# Patient Record
Sex: MALE | Race: WHITE | NOT HISPANIC OR LATINO | URBAN - METROPOLITAN AREA
[De-identification: names, ages, dates, MRNs, and addresses within clinical notes are randomized per-mention and may not be internally consistent; named-entity substitution may affect disease eponyms.]

---

## 2019-07-02 ENCOUNTER — INPATIENT (INPATIENT)
Facility: HOSPITAL | Age: 84
LOS: 1 days | Discharge: HOME | DRG: 74 | End: 2019-07-04
Attending: INTERNAL MEDICINE | Admitting: INTERNAL MEDICINE
Payer: MEDICARE

## 2019-07-02 VITALS
HEART RATE: 68 BPM | TEMPERATURE: 99 F | SYSTOLIC BLOOD PRESSURE: 167 MMHG | OXYGEN SATURATION: 96 % | HEIGHT: 67 IN | DIASTOLIC BLOOD PRESSURE: 87 MMHG | WEIGHT: 160.06 LBS | RESPIRATION RATE: 17 BRPM

## 2019-07-02 DIAGNOSIS — I63.9 CEREBRAL INFARCTION, UNSPECIFIED: ICD-10-CM

## 2019-07-02 LAB
ALBUMIN SERPL ELPH-MCNC: 4.1 G/DL — SIGNIFICANT CHANGE UP (ref 3.3–5.2)
ALP SERPL-CCNC: 60 U/L — SIGNIFICANT CHANGE UP (ref 40–120)
ALT FLD-CCNC: 17 U/L — SIGNIFICANT CHANGE UP
ANION GAP SERPL CALC-SCNC: 10 MMOL/L — SIGNIFICANT CHANGE UP (ref 5–17)
APPEARANCE UR: CLEAR — SIGNIFICANT CHANGE UP
APTT BLD: 41.4 SEC — HIGH (ref 27.5–36.3)
AST SERPL-CCNC: 20 U/L — SIGNIFICANT CHANGE UP
BASOPHILS # BLD AUTO: 0.04 K/UL — SIGNIFICANT CHANGE UP (ref 0–0.2)
BASOPHILS NFR BLD AUTO: 0.7 % — SIGNIFICANT CHANGE UP (ref 0–2)
BILIRUB SERPL-MCNC: 0.4 MG/DL — SIGNIFICANT CHANGE UP (ref 0.4–2)
BILIRUB UR-MCNC: NEGATIVE — SIGNIFICANT CHANGE UP
BUN SERPL-MCNC: 18 MG/DL — SIGNIFICANT CHANGE UP (ref 8–20)
CALCIUM SERPL-MCNC: 9.3 MG/DL — SIGNIFICANT CHANGE UP (ref 8.6–10.2)
CHLORIDE SERPL-SCNC: 107 MMOL/L — SIGNIFICANT CHANGE UP (ref 98–107)
CO2 SERPL-SCNC: 23 MMOL/L — SIGNIFICANT CHANGE UP (ref 22–29)
COLOR SPEC: YELLOW — SIGNIFICANT CHANGE UP
CREAT SERPL-MCNC: 0.78 MG/DL — SIGNIFICANT CHANGE UP (ref 0.5–1.3)
DIFF PNL FLD: NEGATIVE — SIGNIFICANT CHANGE UP
EOSINOPHIL # BLD AUTO: 0.2 K/UL — SIGNIFICANT CHANGE UP (ref 0–0.5)
EOSINOPHIL NFR BLD AUTO: 3.3 % — SIGNIFICANT CHANGE UP (ref 0–6)
GLUCOSE SERPL-MCNC: 100 MG/DL — SIGNIFICANT CHANGE UP (ref 70–115)
GLUCOSE UR QL: NEGATIVE MG/DL — SIGNIFICANT CHANGE UP
HCT VFR BLD CALC: 40.4 % — SIGNIFICANT CHANGE UP (ref 39–50)
HGB BLD-MCNC: 13.5 G/DL — SIGNIFICANT CHANGE UP (ref 13–17)
IMM GRANULOCYTES NFR BLD AUTO: 0.2 % — SIGNIFICANT CHANGE UP (ref 0–1.5)
INR BLD: 1.94 RATIO — HIGH (ref 0.88–1.16)
KETONES UR-MCNC: NEGATIVE — SIGNIFICANT CHANGE UP
LEUKOCYTE ESTERASE UR-ACNC: NEGATIVE — SIGNIFICANT CHANGE UP
LYMPHOCYTES # BLD AUTO: 1.17 K/UL — SIGNIFICANT CHANGE UP (ref 1–3.3)
LYMPHOCYTES # BLD AUTO: 19.6 % — SIGNIFICANT CHANGE UP (ref 13–44)
MCHC RBC-ENTMCNC: 30.3 PG — SIGNIFICANT CHANGE UP (ref 27–34)
MCHC RBC-ENTMCNC: 33.4 GM/DL — SIGNIFICANT CHANGE UP (ref 32–36)
MCV RBC AUTO: 90.6 FL — SIGNIFICANT CHANGE UP (ref 80–100)
MONOCYTES # BLD AUTO: 0.77 K/UL — SIGNIFICANT CHANGE UP (ref 0–0.9)
MONOCYTES NFR BLD AUTO: 12.9 % — SIGNIFICANT CHANGE UP (ref 2–14)
NEUTROPHILS # BLD AUTO: 3.79 K/UL — SIGNIFICANT CHANGE UP (ref 1.8–7.4)
NEUTROPHILS NFR BLD AUTO: 63.3 % — SIGNIFICANT CHANGE UP (ref 43–77)
NITRITE UR-MCNC: NEGATIVE — SIGNIFICANT CHANGE UP
PH UR: 6.5 — SIGNIFICANT CHANGE UP (ref 5–8)
PLATELET # BLD AUTO: 184 K/UL — SIGNIFICANT CHANGE UP (ref 150–400)
POTASSIUM SERPL-MCNC: 4.3 MMOL/L — SIGNIFICANT CHANGE UP (ref 3.5–5.3)
POTASSIUM SERPL-SCNC: 4.3 MMOL/L — SIGNIFICANT CHANGE UP (ref 3.5–5.3)
PROT SERPL-MCNC: 6.8 G/DL — SIGNIFICANT CHANGE UP (ref 6.6–8.7)
PROT UR-MCNC: NEGATIVE MG/DL — SIGNIFICANT CHANGE UP
PROTHROM AB SERPL-ACNC: 22.8 SEC — HIGH (ref 10–12.9)
RBC # BLD: 4.46 M/UL — SIGNIFICANT CHANGE UP (ref 4.2–5.8)
RBC # FLD: 13.2 % — SIGNIFICANT CHANGE UP (ref 10.3–14.5)
SODIUM SERPL-SCNC: 140 MMOL/L — SIGNIFICANT CHANGE UP (ref 135–145)
SP GR SPEC: 1 — LOW (ref 1.01–1.02)
TROPONIN T SERPL-MCNC: <0.01 NG/ML — SIGNIFICANT CHANGE UP (ref 0–0.06)
UROBILINOGEN FLD QL: NEGATIVE MG/DL — SIGNIFICANT CHANGE UP
WBC # BLD: 5.98 K/UL — SIGNIFICANT CHANGE UP (ref 3.8–10.5)
WBC # FLD AUTO: 5.98 K/UL — SIGNIFICANT CHANGE UP (ref 3.8–10.5)

## 2019-07-02 PROCEDURE — 93010 ELECTROCARDIOGRAM REPORT: CPT

## 2019-07-02 PROCEDURE — 99291 CRITICAL CARE FIRST HOUR: CPT

## 2019-07-02 PROCEDURE — 71045 X-RAY EXAM CHEST 1 VIEW: CPT | Mod: 26

## 2019-07-02 PROCEDURE — 70450 CT HEAD/BRAIN W/O DYE: CPT | Mod: 26

## 2019-07-02 NOTE — ED ADULT NURSE NOTE - OBJECTIVE STATEMENT
Assumed pt care, pt ambulatory to stretcher, sitting comfortably at this time, family at bedside. Pt is A+Ox4 complaining of "left arm heaviness." Pt states he has been having this L arm pain and feeling of heaviness and "having no strength in my left hand." Pt denies explicit chest pain but states "I believe I had a little heart attack." Pt denies shortness of breath but does complain of generalized "fatigued" feeling with activity x1 week. Assumed pt care, pt ambulatory to stretcher, sitting comfortably at this time, family at bedside. Pt is A+Ox4 complaining of "left arm heaviness." Pt states he has been having this L arm pain and feeling of heaviness and "having no strength in my left hand." Pt denies numbness or tingling in hand. Pt denies chest pain or shortness of breath but does complain of generalized "fatigued" feeling with activity x1 week. Pt with equal strength in b/l hands. Moving all extremities without issue. Assumed pt care, pt ambulatory to stretcher, sitting comfortably at this time, family at bedside. Pt is A+Ox4 complaining of "left arm heaviness." Pt states he has been having this L arm pain and feeling of heaviness and "having no strength in my left hand." Pt denies numbness or tingling in hand. Pt denies chest pain or shortness of breath but does complain of generalized "fatigued" feeling with activity x1 week. Pt with equal strength in b/l hands. Moving all extremities without issue.  Pt has LLQ colostomy, beefy red stoma.

## 2019-07-02 NOTE — ED PROVIDER NOTE - PHYSICAL EXAMINATION
Gen: NAD, AOx3  Head: NCAT  HEENT: PERRL, EOMI, oral mucosa moist, normal conjunctiva, neck supple  Lung: CTAB, no respiratory distress  CV: rrr, no murmur, Normal perfusion  Abd: soft, NTND  MSK: No edema, no visible deformities  Neuro: CN II-XII intact, 5/5 global strength except for left wrist extension, sensation intact, no dysmetria/ataxia, gait intact   Skin: No rash   Psych: normal affect Gen: NAD, AOx3  Head: NCAT  HEENT: PERRL, EOMI, oral mucosa moist, normal conjunctiva, neck supple  Lung: CTAB, no respiratory distress  CV: rrr, no murmur, Normal perfusion  Abd: soft, NTND  MSK: No edema, no visible deformities  Neuro: CN II-XII intact, 5/5 global strength except for left wrist extension and finger abduction/adduction, sensation intact, no dysmetria/ataxia, gait intact   Skin: No rash   Psych: normal affect

## 2019-07-02 NOTE — ED ADULT TRIAGE NOTE - CHIEF COMPLAINT QUOTE
Pt A&Ox4 states "I've had left hand numbness and trouble closing it."  BIBA ambulated into ED with steady gait c/o hand weakness and numbness. Patient has equal hand strength, [id number] Ling at beside for eval. 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

## 2019-07-02 NOTE — ED PROVIDER NOTE - CLINICAL SUMMARY MEDICAL DECISION MAKING FREE TEXT BOX
patient with dizziness/weaknses/CP on exertion last few days. here today for isolated left hand weakness, rapidly improving, only in extension. unlikely CVA. more likely peripheral but will active code stroke with onset @ 6pm patient with dizziness/weaknses/CP on exertion last few days. here today for isolated left hand weakness, rapidly improving, but with noted deficit in extension/abduction/adduction. possible CVA vs peripheral lesion. CODE STROKE ACTIVATED

## 2019-07-02 NOTE — ED PROVIDER NOTE - NS ED ROS FT
ROS: +CP no SOB. no cough. no fever. no n/v/d/c. no abd pain. no rash. no bleeding. no urinary complaints. +weakness. no vision changes. no HA. no neck/back pain. no extremity swelling/deformity. No change in mental status.

## 2019-07-03 DIAGNOSIS — Z93.3 COLOSTOMY STATUS: Chronic | ICD-10-CM

## 2019-07-03 LAB
APTT BLD: 35.2 SEC — SIGNIFICANT CHANGE UP (ref 27.5–36.3)
CK SERPL-CCNC: 34 U/L — SIGNIFICANT CHANGE UP (ref 30–200)
CK SERPL-CCNC: 34 U/L — SIGNIFICANT CHANGE UP (ref 30–200)
CK SERPL-CCNC: 37 U/L — SIGNIFICANT CHANGE UP (ref 30–200)
HBA1C BLD-MCNC: 5.4 % — SIGNIFICANT CHANGE UP (ref 4–5.6)
INR BLD: 1.24 RATIO — HIGH (ref 0.88–1.16)
PROTHROM AB SERPL-ACNC: 14.4 SEC — HIGH (ref 10–12.9)
TROPONIN T SERPL-MCNC: <0.01 NG/ML — SIGNIFICANT CHANGE UP (ref 0–0.06)

## 2019-07-03 PROCEDURE — 70551 MRI BRAIN STEM W/O DYE: CPT | Mod: 26

## 2019-07-03 PROCEDURE — 99223 1ST HOSP IP/OBS HIGH 75: CPT

## 2019-07-03 PROCEDURE — 12345: CPT | Mod: NC

## 2019-07-03 PROCEDURE — 70498 CT ANGIOGRAPHY NECK: CPT | Mod: 26

## 2019-07-03 PROCEDURE — 70496 CT ANGIOGRAPHY HEAD: CPT | Mod: 26

## 2019-07-03 RX ORDER — RIVAROXABAN 15 MG-20MG
20 KIT ORAL
Refills: 0 | Status: DISCONTINUED | OUTPATIENT
Start: 2019-07-03 | End: 2019-07-03

## 2019-07-03 RX ORDER — METOPROLOL TARTRATE 50 MG
100 TABLET ORAL DAILY
Refills: 0 | Status: DISCONTINUED | OUTPATIENT
Start: 2019-07-03 | End: 2019-07-04

## 2019-07-03 RX ORDER — ENOXAPARIN SODIUM 100 MG/ML
70 INJECTION SUBCUTANEOUS EVERY 12 HOURS
Refills: 0 | Status: DISCONTINUED | OUTPATIENT
Start: 2019-07-03 | End: 2019-07-04

## 2019-07-03 RX ORDER — ASPIRIN/CALCIUM CARB/MAGNESIUM 324 MG
81 TABLET ORAL DAILY
Refills: 0 | Status: DISCONTINUED | OUTPATIENT
Start: 2019-07-03 | End: 2019-07-04

## 2019-07-03 RX ORDER — ATORVASTATIN CALCIUM 80 MG/1
20 TABLET, FILM COATED ORAL AT BEDTIME
Refills: 0 | Status: DISCONTINUED | OUTPATIENT
Start: 2019-07-03 | End: 2019-07-04

## 2019-07-03 RX ORDER — ISOSORBIDE MONONITRATE 60 MG/1
30 TABLET, EXTENDED RELEASE ORAL DAILY
Refills: 0 | Status: DISCONTINUED | OUTPATIENT
Start: 2019-07-03 | End: 2019-07-04

## 2019-07-03 RX ADMIN — Medication 81 MILLIGRAM(S): at 13:30

## 2019-07-03 RX ADMIN — ATORVASTATIN CALCIUM 20 MILLIGRAM(S): 80 TABLET, FILM COATED ORAL at 21:32

## 2019-07-03 RX ADMIN — ENOXAPARIN SODIUM 70 MILLIGRAM(S): 100 INJECTION SUBCUTANEOUS at 17:11

## 2019-07-03 RX ADMIN — Medication 100 MILLIGRAM(S): at 10:47

## 2019-07-03 RX ADMIN — ISOSORBIDE MONONITRATE 30 MILLIGRAM(S): 60 TABLET, EXTENDED RELEASE ORAL at 10:48

## 2019-07-03 NOTE — H&P ADULT - NSHPPHYSICALEXAM_GEN_ALL_CORE
General: Well developed. well nourished. not in distress  HEENT: AT, NC. PERRL. intact EOM. no throat erythema or exudate. no nystagmus, no facial droop.   Neck: supple. no JVD.   Chest: CTA bilaterally  Heart: S1,S2. RRR. no heart murmur. trace edema of LLE.   Abdomen: soft. non-tender. non-distended. + BS. + colostomy bag, intact, no surrounding erythema or pain.   Ext: no calf tenderness. DP 2 + b/L.   Neuro: AAO x3. no speech disorder. no facial droop. LUE strength 4/5 and in the hand 3 to 4 /5.  motor strength in rest of the muscle groups is 5/5. sensory intact B/L. CNs intact.   Skin: no rash noted, no pallor, no diaphoresis.   Psych : normal affect.

## 2019-07-03 NOTE — H&P ADULT - HISTORY OF PRESENT ILLNESS
82 y/o male reports that he took a nap around 6 pm on 7/2/19 for an hour and when he woke up he notiuced weakness in his LUE and left hand. Pt. called his Cardiologist in a different state , left the message and as per pt. about 45 minutes later cardiologist called and asked him to call ambulance and go to the hospital. Pt. was code stroke in the ER, pt. did not get TPA as he is on xeralto and INR is 1.94. Pt's LUE/ hand weakness has improved while in the ER. pt. did not have weakness in any other parts of his body. no speech. swallow difficulty. pt. has felt on and off somewhat dizzy for past couple of days. no syncopy. Pt. also felt mid sternal cp for past 2 days which was lasting for about 5 minutes and pt. thought it was indigestion. NO SOB. no cough, no fever. no abd. pain. no n/v/d. pt. does not have h/o stroke. pt. had LLE dvt and PE as per pt. it was about few months ago and he has been on xeralto by his cardiologist. no other complaints. 84 y/o male reports that he took a nap around 6 pm on 7/2/19 for an hour and when he woke up he notiuced weakness in his LUE and left hand. Pt. called his Cardiologist in a different state , left the message and as per pt. about 45 minutes later cardiologist called and asked him to call ambulance and go to the hospital. Pt. was code stroke in the ER, pt. did not get TPA as he is on xeralto and INR is 1.94. Pt's LUE/ hand weakness has improved while in the ER. pt. did not have weakness in any other parts of his body. no speech. swallow difficulty. pt. has felt on and off somewhat dizzy for past couple of days. no syncopy. Pt. also felt mid sternal cp for past 2 days which was lasting for about 5 minutes and pt. thought it was indigestion. no cp now. NO SOB. no cough, no fever. no abd. pain. no n/v/d. pt. does not have h/o stroke. pt. had LLE dvt and PE as per pt. it was about few months ago and he has been on xeralto by his cardiologist. no other complaints.

## 2019-07-03 NOTE — H&P ADULT - ASSESSMENT
pt. is admitted for     CVA unspecified mecahnism. stroke unit, neuro checks, mri brain, ct angio, neck and brain, neeurology consult krish mann.     Other CP, echo, serial trop, cardio consult [facility].     Essential htn, close monitoring , if cva ruled out then will restart lopressor/ imdur, bp treatment as per stroke protocol as of now.    LLE dvt, old unspecified vein, will continue his xeralto, pt. is admitted for     CVA unspecified mecahnism. stroke unit, neuro checks, mri brain, ct angio, neck and brain performed, report pending, will request on call hospitalist to follow up report, neeurology consult krish mann.     Other CP, echo, serial trop, cardio consult [facility].     Essential htn, close monitoring , if cva ruled out then will restart lopressor/ imdur, bp treatment as per stroke protocol as of now.    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

## 2019-07-03 NOTE — ED ADULT NURSE REASSESSMENT NOTE - NURSING NEURO LEVEL OF CONSCIOUSNESS
alert and awake
follows commands/alert and awake
alert and awake
follows commands/alert and awake

## 2019-07-03 NOTE — ED ADULT NURSE REASSESSMENT NOTE - GLASGOW COMA SCALE: EYE OPENING
(E4) spontaneous

## 2019-07-03 NOTE — CONSULT NOTE ADULT - ASSESSMENT
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 Patient states that he woke up around 7 PM yesterday with new onset left hand numbness and weakness, and called his Cardiologist Dr. Conroy who advised him to go to the ED. Patient was a code stroke, CT head negative for acute infarct, awaiting MRI. Patient states he follows closely with Dr. Conroy, who put him on the Xarelto for the DVT and PE. Patient states that for the last few months he has been experiencing VU and fatigue with limited activity, worsening at this time. Patient has also had some dizziness on and off. Patient currently denying any chest pain. Patient denies any fevers, chills, orthopnea, dyspnea at rest, abdominal pain, N/v/d, hematuria, dysuria, melena, or BRPBR.   Troponin neg x 2  CK normal    1. CAD  - Past angina with anterolateral ischemia on stress test 01/2018, being medically managed  - Pt now with progressive fatigue and dyspnea with exertion  - Obtain TTE  - Plan for cardiac cath on Friday pending MRI and Neuro evaluation  - Continue asa, statin, beta blocker, and Imdur  - Monitor for signs of ACS    2. CVA  - CT negative  - MRI pending  - Neuro consult   - Will evaluate need for further workup pending MRI results    3. DVT/PE  - On Xarelto for at least 3 mos, continue  - Repeat LLE duplex to evaluate for clots  - Unprovoked DVT    Preliminary evaluation, please await official recommendations by Dr. 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 Patient states that he woke up around 7 PM yesterday with new onset left hand numbness and weakness, and called his Cardiologist Dr. Conroy who advised him to go to the ED. Patient was a code stroke, CT head negative for acute infarct, awaiting MRI. Patient states he follows closely with Dr. Conroy, who put him on the Xarelto for the DVT and PE. Patient states that for the last few months he has been experiencing VU and fatigue with limited activity, worsening at this time. Patient has also had some dizziness on and off. Patient currently denying any chest pain. Patient denies any fevers, chills, orthopnea, dyspnea at rest, abdominal pain, N/v/d, hematuria, dysuria, melena, or BRPBR.   Troponin neg x 2  CK normal    1. CAD  - Past angina with anterolateral ischemia on stress test 01/2018, being medically managed  - Pt now with progressive fatigue and dyspnea with exertion  - Obtain TTE  - Plan for cardiac cath on Friday pending MRI and Neuro evaluation  - Continue asa, statin, beta blocker, and Imdur  - Monitor for signs of ACS    2. CVA  - CT negative  - MRI pending  - Neuro consult   - Will evaluate need for further workup pending MRI results    3. DVT/PE  - On Xarelto for at least 3 mos, continue indefinitely  - Repeat LLE duplex to evaluate for clots  - Unprovoked DVT

## 2019-07-03 NOTE — ED ADULT NURSE REASSESSMENT NOTE - GENERAL PATIENT STATE
comfortable appearance/cooperative/resting/sleeping
comfortable appearance/resting/sleeping/cooperative
resting/sleeping/comfortable appearance/cooperative
family/SO at bedside/cooperative/resting/sleeping/comfortable appearance
comfortable appearance/cooperative/resting/sleeping/family/SO at bedside

## 2019-07-03 NOTE — ED ADULT NURSE REASSESSMENT NOTE - ED NEURO NIH ASSESS
within defined limits

## 2019-07-03 NOTE — ED ADULT NURSE REASSESSMENT NOTE - STATUS
awaiting bed, no change

## 2019-07-03 NOTE — CONSULT NOTE ADULT - ATTENDING COMMENTS
Patient seen and examined.  Agree with above.  TTE, cardiac enzymes.  Await brain MRI/neurology evaluation.  Will plan for cardiac catheterization pending MRI/neuro eval.

## 2019-07-03 NOTE — ED ADULT NURSE REASSESSMENT NOTE - GLASGOW COMA SCALE: BEST MOTOR RESPONSE
(M6) obeys commands

## 2019-07-03 NOTE — CONSULT NOTE ADULT - SUBJECTIVE AND OBJECTIVE BOX
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 Patient states that he woke up around 7 PM yesterday with new onset left hand numbness and weakness, and called his Cardiologist Dr. Conroy who advised him to go to the ED. Patient was a code stroke, CT head negative for acute infarct, awaiting MRI. Patient states he follows closely with Dr. Conroy, who put him on the Xarelto for the DVT and PE. Patient states that for the last few months he has been experiencing VU and fatigue with limited activity, worsening at this time. Patient has also had some dizziness on and off. Patient currently denying any chest pain. Patient denies any fevers, chills, orthopnea   82 y/o male reports that he took a nap around 6 pm on 19 for an hour and when he woke up he notiuced weakness in his LUE and left hand. Pt. called his Cardiologist in a different state , left the message and as per pt. about 45 minutes later cardiologist called and asked him to call ambulance and go to the hospital. Pt. was code stroke in the ER, pt. did not get TPA as he is on xeralto and INR is 1.94. Pt's LUE/ hand weakness has improved while in the ER. pt. did not have weakness in any other parts of his body. no speech. swallow difficulty. pt. has felt on and off somewhat dizzy for past couple of days. no syncopy. Pt. also felt mid sternal cp for past 2 days which was lasting for about 5 minutes and pt. thought it was indigestion. no cp now. NO SOB. no cough, no fever. no abd. pain. no n/v/d. pt. does not have h/o stroke. pt. had LLE dvt and PE as per pt. it was about few months ago and he has been on xeralto by his cardiologist. no other complaints. (2019 00:45)      PAST MEDICAL & SURGICAL HISTORY:  Colon cancer: colostomy about 40 years ago per pt.  Hyperlipidemia  HTN (hypertension)  Pulmonary embolism  DVT, lower extremity  S/P colostomy: several years ago.      PREVIOUS DIAGNOSTIC TESTING:      ECHO  FINDINGS:    STRESS  FINDINGS:    CATHETERIZATION  FINDINGS:      Allergies    No Known Allergies    Intolerances        MEDICATIONS  (STANDING):  atorvastatin 20 milliGRAM(s) Oral at bedtime  rivaroxaban 20 milliGRAM(s) Oral with dinner    MEDICATIONS  (PRN):      FAMILY HISTORY:      SOCIAL HISTORY:    CIGARETTES:    ALCOHOL:    REVIEW OF SYSTEMS:  CONSTITUTIONAL: No fever, weight loss, or fatigue  EYES: No eye pain, visual disturbances, or discharge  ENMT:  No difficulty hearing, tinnitus, vertigo; No sinus or throat pain  NECK: No pain or stiffness  RESPIRATORY: No cough, wheezing, chills or hemoptysis; No Shortness of Breath  CARDIOVASCULAR: No chest pain, palpitations, passing out, dizziness, or leg swelling  GASTROINTESTINAL: No abdominal or epigastric pain. No nausea, vomiting, or hematemesis; No diarrhea or constipation. No melena or hematochezia.  GENITOURINARY: No dysuria, frequency, hematuria, or incontinence  NEUROLOGICAL: No headaches, memory loss, loss of strength, numbness, or tremors  SKIN: No itching, burning, rashes, or lesions   LYMPH Nodes: No enlarged glands  ENDOCRINE: No heat or cold intolerance; No hair loss  MUSCULOSKELETAL: No joint pain or swelling; No muscle, back, or extremity pain  PSYCHIATRIC: No depression, anxiety, mood swings, or difficulty sleeping  HEME/LYMPH: No easy bruising, or bleeding gums  ALLERY AND IMMUNOLOGIC: No hives or eczema	      REVIEW OF SYMPTOMS:   Cardiovascular:     chest pain,  dyspnea,  syncope,    palpitaitons,      dizziness,    Orthopnea,      Paroxsymal nocturnal dyspnea;  Respiratory:    Dyspnea,   cough,   ;   Genitourinary:  No dysuria, no hematuria;   Gastrointestinal:   No dark color stool, no melena, no diarrhea, no constipation, no abdominal pain;   Neurological: No headache, no dizziness, no slurred speech;    Psychiatric: No agitation, no anxiety.    ALL OTHER REVIEW OF SYSTEMS ARE NEGATIVE.    Vital Signs Last 24 Hrs  T(C): 37 (2019 20:54), Max: 37 (2019 20:26)  T(F): 98.6 (2019 20:54), Max: 98.6 (2019 20:26)  HR: 60 (2019 07:34) (58 - 68)  BP: 129/71 (2019 07:34) (120/84 - 184/90)  BP(mean): --  RR: 18 (2019 07:34) (17 - 22)  SpO2: 97% (2019 07:34) (96% - 100%)    Daily Height in cm: 170.18 (2019 20:26)    Daily     I&O's Detail      PHYSICAL EXAM:  Appearance: Normal, well nourished	  HEENT:   Normal oral mucosa, PERRL, EOMI, sclera non-icteric	  Lymphatic: No cervical lymphadenopathy  Cardiovascular: Normal S1 S2, No JVD, II/VI systolic murmur, No carotid bruits, Trace LLE edema   Respiratory: Lungs clear to auscultation	  Psychiatry: A & O x 3, Mood & affect appropriate  Gastrointestinal:  Soft, Non-tender, + BS, no bruits	  Skin: No rashes, No ecchymoses, No cyanosis  Neurologic: 4/5-5 LUE strength, 5/5 RUE, RLE, and LLE strength  Extremities: Normal range of motion, No clubbing, cyanosis, trace LLE  Vascular: Peripheral pulses palpable 2+ bilaterally      INTERPRETATION OF TELEMETRY: SR PACs    ECG: NSR 1st degree AV block    LABS:                        13.5   5.98  )-----------( 184      ( 2019 21:08 )             40.4     07-02    140  |  107  |  18.0  ----------------------------<  100  4.3   |  23.0  |  0.78    Ca    9.3      2019 21:08    TPro  6.8  /  Alb  4.1  /  TBili  0.4  /  DBili  x   /  AST  20  /  ALT  17  /  AlkPhos  60  07-02    CARDIAC MARKERS ( 2019 01:31 )  x     / <0.01 ng/mL / 37 U/L / x     / x      CARDIAC MARKERS ( 2019 21:08 )  x     / <0.01 ng/mL / x     / x     / x          PT/INR - ( 2019 06:23 )   PT: 14.4 sec;   INR: 1.24 ratio         PTT - ( 2019 06:23 )  PTT:35.2 sec  Urinalysis Basic - ( 2019 22:12 )    Color: Yellow / Appearance: Clear / S.005 / pH: x  Gluc: x / Ketone: Negative  / Bili: Negative / Urobili: Negative mg/dL   Blood: x / Protein: Negative mg/dL / Nitrite: Negative   Leuk Esterase: Negative / RBC: x / WBC x   Sq Epi: x / Non Sq Epi: x / Bacteria: x      I&O's Summary    BNP    RADIOLOGY & ADDITIONAL STUDIES: Patient is a 83y old  Male who presents with a chief complaint of LUE / LEFT HAND weakness (2019 00:45)      HPI: 84 y/o M with a PMHx of angina (with anterolateral ischemia on stress testing ), 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 Patient states that he woke up around 7 PM yesterday with new onset left hand numbness and weakness, and called his Cardiologist Dr. Conroy who advised him to go to the ED. Patient was a code stroke, CT head negative for acute infarct, awaiting MRI. Patient states he follows closely with Dr. Conroy, who put him on the Xarelto for the DVT and PE. Patient states that for the last few months he has been experiencing VU and fatigue with limited activity, worsening at this time. Patient has also had some dizziness on and off. Patient currently denying any chest pain. Patient denies any fevers, chills, orthopnea, dyspnea at rest, abdominal pain, N/v/d, hematuria, dysuria, melena, or BRPBR.       PAST MEDICAL & SURGICAL HISTORY:  Colon cancer: colostomy about 40 years ago per pt.  Hyperlipidemia  HTN (hypertension)  Pulmonary embolism  DVT, lower extremity  S/P colostomy: several years ago.      PREVIOUS DIAGNOSTIC TESTING:      ECHO  FINDINGS:  Outpatient at Dr. Conroy's Office 18  ef 60%, normal RV, normal LA/RA, aortic valve with sclerosis and mild insufficiency, no stenosis, mild MR, trace Tr, trace IN, ascending aorta dilatation. No RWMA     STRESS  FINDINGS:  Outpatient at Dr. Conroy 18  Exercise stress ECG is positive for ischemia. Patient experienced chest pain interpreted as angina with stress. No arrhythmias. Moderate sized ischemic area demonstrated anterolaterally. LVEF 63%. Abnormal RV size.     Allergies    No Known Allergies    Intolerances    MEDICATIONS  (STANDING):  atorvastatin 20 milliGRAM(s) Oral at bedtime  rivaroxaban 20 milliGRAM(s) Oral with dinner      FAMILY HISTORY: Non-contributory      SOCIAL HISTORY:    CIGARETTES: Never smoker    ALCOHOL: Denies    REVIEW OF SYSTEMS:  CONSTITUTIONAL: AS PER HPI  Cardiovascular: AS PER HPI  Respiratory:  AS PER HPI  Genitourinary:  No dysuria, no hematuria;   Gastrointestinal:   No dark color stool, no melena, no diarrhea, no constipation, no abdominal pain;   Neurological: AS PER HPI   Psychiatric: No agitation, no anxiety.    ALL OTHER REVIEW OF SYSTEMS ARE NEGATIVE.    Vital Signs Last 24 Hrs  T(C): 37 (2019 20:54), Max: 37 (2019 20:26)  T(F): 98.6 (2019 20:54), Max: 98.6 (2019 20:26)  HR: 60 (2019 07:34) (58 - 68)  BP: 129/71 (2019 07:34) (120/84 - 184/90)  RR: 18 (2019 07:34) (17 - 22)  SpO2: 97% (2019 07:34) (96% - 100%)    Daily Height in cm: 170.18 (2019 20:26)      PHYSICAL EXAM:  Appearance: Normal, well nourished	  HEENT:   Normal oral mucosa, PERRL, EOMI, sclera non-icteric	  Lymphatic: No cervical lymphadenopathy  Cardiovascular: Normal S1 S2, No JVD, II/VI systolic murmur, No carotid bruits, Trace LLE edema   Respiratory: Lungs clear to auscultation	  Psychiatry: A & O x 3, Mood & affect appropriate  Gastrointestinal:  Soft, Non-tender, + BS, no bruits	  Skin: No rashes, No ecchymoses, No cyanosis  Neurologic: 4/5-5 LUE strength, 5/5 RUE, RLE, and LLE strength  Extremities: Normal range of motion, No clubbing, cyanosis, trace LLE  Vascular: Peripheral pulses palpable 2+ bilaterally      INTERPRETATION OF TELEMETRY:  PACs    ECG: NSR 1st degree AV block    LABS:                        13.5   5.98  )-----------( 184      ( 2019 21:08 )             40.4     07-02    140  |  107  |  18.0  ----------------------------<  100  4.3   |  23.0  |  0.78    Ca    9.3      2019 21:08    TPro  6.8  /  Alb  4.1  /  TBili  0.4  /  DBili  x   /  AST  20  /  ALT  17  /  AlkPhos  60  07-02    CARDIAC MARKERS ( 2019 01:31 )  x     / <0.01 ng/mL / 37 U/L / x     / x      CARDIAC MARKERS ( 2019 21:08 )  x     / <0.01 ng/mL / x     / x     / x          PT/INR - ( 2019 06:23 )   PT: 14.4 sec;   INR: 1.24 ratio         PTT - ( 2019 06:23 )  PTT:35.2 sec  Urinalysis Basic - ( 2019 22:12 )    Color: Yellow / Appearance: Clear / S.005 / pH: x  Gluc: x / Ketone: Negative  / Bili: Negative / Urobili: Negative mg/dL   Blood: x / Protein: Negative mg/dL / Nitrite: Negative   Leuk Esterase: Negative / RBC: x / WBC x   Sq Epi: x / Non Sq Epi: x / Bacteria: x      I&O's Summary    BNP    RADIOLOGY & ADDITIONAL STUDIES:  < from: CT Brain Stroke Protocol (19 @ 20:59) >  IMPRESSION:  Moderate chronic microvascular changes without evidence of   an acute transcortical infarction or hemorrhage. MR is a more sensitive   imaging modality for the evaluation of an acute infarction. On his   discussed with Dr. Barone at 9:55 PM.    < end of copied text >    < from: CT Angio Head w/ IV Cont (19 @ 00:26) >  IMPRESSION:    No acute findings    A preliminary report was given by [facility].      < end of copied text >    < from: CT Angio Neck w/ IV Cont (19 @ 00:25) >   EXAM:  CT ANGIO NECK (W)AW IC                         EXAM:  CT ANGIO BRAIN (W)AW IC                          PROCEDURE DATE:  2019          INTERPRETATION:  CT angiogram head and neck with and without contrast    History left hand weakness    Multiplanar MIPS included    Contrast Omnipaque 94 cc; 6 cc discarded    Precontrast CT of the brain is negative for hemorrhage, cortical edema,   mass effect or hydrocephalus.    Examination of the intracranial circulation is negative for focal   occlusion or aneurysm. There is a hypoplastic right A1 segment. There is   roughly 50% stenosis of the proximal right posterior cerebral artery. It   originates from the anterior circulation. There is uniform venous sinus   enhancement.    Examinationof the cervical circulation demonstrates mild calcific   atheromatous plaque in the carotid bulbs without significant carotid or   vertebral artery stenosis, occlusion or dissection. The right vertebral   artery is slightly dominant. Examination of the left-sided vessels is   somewhat limited by artifact related to left-sided venous injection and   associated reflux.    The ascending aorta is ectatic, measuring approximately 4 cm in diameter.    IMPRESSION:    No acute findings    A preliminary report was given by [facility].        < end of copied text >

## 2019-07-03 NOTE — CONSULT NOTE ADULT - SUBJECTIVE AND OBJECTIVE BOX
[facility]                                     [facility] [place]                                 Rajan Troy, & Cholo                                  [address]                                        [place], [place], [address]                                             [phone]    CC: left hand/arm weakness  HPI: The patient is a 83y Male who presented with left hand weakness and left arm heaviness starting yesterday after he awoke from a nap. He was last normal at 1800 on 7/2.  His hand slowly got better and he no longer feels heaviness of the arm.  He had no facial droop, other limb weakness or numbness, imbalance or dizziness during this event.  Neurology is asked to evaluate.    PAST MEDICAL & SURGICAL HISTORY:  Colon cancer: colostomy about 40 years ago per pt.  Hyperlipidemia  HTN (hypertension)  Pulmonary embolism  DVT, lower extremity  S/P colostomy: several years ago.      MEDICATIONS  (STANDING):  aspirin enteric coated 81 milliGRAM(s) Oral daily  atorvastatin 20 milliGRAM(s) Oral at bedtime  enoxaparin Injectable 70 milliGRAM(s) SubCutaneous every 12 hours  isosorbide   mononitrate ER Tablet (IMDUR) 30 milliGRAM(s) Oral daily  metoprolol succinate  milliGRAM(s) Oral daily    MEDICATIONS  (PRN):      Allergies    No Known Allergies    Intolerances        SOCIAL HISTORY:  no tob,   social alcohol   no drugs    FAMILY HISTORY:    no CVA    ROS: 14 point ROS negative other than what is present in HPI or below    Vital Signs Last 24 Hrs  T(C): 37 (02 Jul 2019 20:54), Max: 37 (02 Jul 2019 20:26)  T(F): 98.6 (02 Jul 2019 20:54), Max: 98.6 (02 Jul 2019 20:26)  HR: 63 (03 Jul 2019 13:33) (58 - 73)  BP: 111/66 (03 Jul 2019 13:33) (111/66 - 184/90)  BP(mean): --  RR: 18 (03 Jul 2019 13:33) (17 - 22)  SpO2: 99% (03 Jul 2019 13:33) (96% - 100%)      General: NAD    Detailed Neurologic Exam:    Mental status: The patient is awake and alert and has normal attention span.  The patient is fully oriented in 3 spheres. The patient is oriented to current events. The patient is able to name objects, follow commands, repeat sentences.    Cranial nerves: Pupils equal and react symmetrically to light. There is no visual field deficit to confrontation. Extraocular motion is full with no nystagmus. There is no ptosis. Facial sensation is intact. Facial musculature is symmetric. Palate elevates symmetrically. Shoulder shrug is normal. Tongue is midline.    Motor: There is normal bulk and tone.  There is no tremor.  Strength is 5/5 in the right arm and leg.   Strength is 5/5 in the left arm and leg.    Sensation: Intact to light touch and pin in 4 extremities    Reflexes: 1+ throughout and plantar responses are flexor.    Cerebellar: There is no dysmetria on finger to nose testing.    Gait : deferred    LABS:                         13.5   5.98  )-----------( 184      ( 02 Jul 2019 21:08 )             40.4       07-02    140  |  107  |  18.0  ----------------------------<  100  4.3   |  23.0  |  0.78    Ca    9.3      02 Jul 2019 21:08    TPro  6.8  /  Alb  4.1  /  TBili  0.4  /  DBili  x   /  AST  20  /  ALT  17  /  AlkPhos  60  07-02      PT/INR - ( 03 Jul 2019 06:23 )   PT: 14.4 sec;   INR: 1.24 ratio         PTT - ( 03 Jul 2019 06:23 )  PTT:35.2 sec        RADIOLOGY & ADDITIONAL STUDIES (independently reviewed unless otherwise noted):  MRI brain did not show acute CVA on diffusion weighted sequences, no bleed or mass seen  CTA head: no aneurysm, AVM, LVO or sig stenosis in COW, hypoplastic right A1, 50% strenosis iun right PCA  CTA neck: no sig carotid or vertebral stenosis

## 2019-07-03 NOTE — CONSULT NOTE ADULT - ASSESSMENT
The patient is a 83y Male who is followed by neurology because of left hand numbness    Left hand numbness  nearly resolved.   possible neurapraxia   no CVA on MRI brain  PT/OT eval    HTN  continue home meds, keep normotensive    will follow with you    Larry Tolentino MD PhD   [id number]

## 2019-07-03 NOTE — H&P ADULT - NSHPLABSRESULTS_GEN_ALL_CORE
sinus rhythm 90 with frequent pvcs.  cxr reviewed by me no acute cardio pulmonary process noted, radiologist read pending.

## 2019-07-03 NOTE — ED ADULT NURSE REASSESSMENT NOTE - NS ED NURSE REASSESS COMMENT FT1
Assumed pt care at 0720, pt sleeping in stretcher in no apparent signs of distress. Pt A&Ox4, still connected to cardiac monitor. Updated pt on plan of care for the day, pt safely ambulated to bathroom without assistance. Awaiting bed on 4brkt and MRI. pt status unchanged, refer to flowsheet and chart, pt safety maintained, pt hemodynamically stable, will continue to monitor.
Code Stroke in progress, MD Lizabeth Toussaint, critical care RN ROWAN and RN AC at bedside with pt at this time.
Pt asleep in stretcher, easily arousable to verbal stimuli. Pt offers no complaints at this time. Continues awaiting bed placement.
Pt received back from CT scan, no acute distress noted. Pt placed back on cardiac monitor/. MD Betancourt at bedside evaluating pt for admission at this time. Pt safety maintained, call bell within reach. Pt is ambulatory independently with steady gait.

## 2019-07-03 NOTE — PHYSICAL THERAPY INITIAL EVALUATION ADULT - ADDITIONAL COMMENTS
Pt lives alone in a 2 story house with no steps to enter.  Independent with all PTA, without devices.

## 2019-07-03 NOTE — PROGRESS NOTE ADULT - ASSESSMENT
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 Patient was a code stroke, CT head negative for acute infarct, awaiting MRI. Patient states he follows closely with Dr. Conroy, who put him on the Xarelto for the DVT and PE. Patient states that for the last few months he has been experiencing VU and fatigue with limited activity, worsening at this time.    A/P     > L sided weakness - r/o CVA  - CT head negative  - CTA unremarkable  - MRI pending  - Neuro consult   - start aspirin, statin  -Lipid panel in am     > CAD  - Past angina with anterolateral ischemia on stress test 01/2018, being medically managed  - Pt now with progressive fatigue and dyspnea with exertion  - Obtain TTE  - Plan for cardiac cath on Friday pending MRI and Neuro evaluation  - Continue asa, statin, beta blocker, and Imdur    >HTN - c/w metoprolol, Imdur  monitor BP    >HLD - c/w statin    > DVT/PE  - On Xarelto for at least 3 mos, continue  - Unprovoked DVT 84 y/o M with a PMHx of angina (with anterolateral ischemia on stress testing 2018), 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 Patient was a code stroke, CT head negative for acute infarct, awaiting MRI. Patient states he follows closely with Dr. Conroy, who put him on the Xarelto for the DVT and PE. Patient states that for the last few months he has been experiencing VU and fatigue with limited activity, worsening at this time.    A/P     > L sided weakness - resolved - r/o CVA  - CT head negative  - CTA unremarkable  - MRI pending  - Neuro consult   - start aspirin, statin  -Lipid panel in am     > CAD  - Past angina with anterolateral ischemia on stress test 01/2018, being medically managed  - Pt now with progressive fatigue and dyspnea with exertion  - Obtain TTE  - Plan for cardiac cath on Friday pending MRI and Neuro evaluation  - Continue asa, statin, beta blocker, and Imdur    >HTN - c/w metoprolol, Imdur  monitor BP    >HLD - c/w statin    > DVT/PE  - On Xarelto for at least 3 mos, continue  - Unprovoked DVT

## 2019-07-03 NOTE — H&P ADULT - NSICDXPASTMEDICALHX_GEN_ALL_CORE_FT
PAST MEDICAL HISTORY:  Colon cancer colostomy about 40 years ago per pt.    DVT, lower extremity     HTN (hypertension)     Hyperlipidemia     Pulmonary embolism

## 2019-07-04 VITALS
RESPIRATION RATE: 18 BRPM | TEMPERATURE: 98 F | DIASTOLIC BLOOD PRESSURE: 64 MMHG | OXYGEN SATURATION: 93 % | SYSTOLIC BLOOD PRESSURE: 116 MMHG | HEART RATE: 60 BPM

## 2019-07-04 LAB
ANION GAP SERPL CALC-SCNC: 10 MMOL/L — SIGNIFICANT CHANGE UP (ref 5–17)
BUN SERPL-MCNC: 20 MG/DL — SIGNIFICANT CHANGE UP (ref 8–20)
CALCIUM SERPL-MCNC: 8.9 MG/DL — SIGNIFICANT CHANGE UP (ref 8.6–10.2)
CHLORIDE SERPL-SCNC: 108 MMOL/L — HIGH (ref 98–107)
CHOLEST SERPL-MCNC: 134 MG/DL — SIGNIFICANT CHANGE UP (ref 110–199)
CO2 SERPL-SCNC: 22 MMOL/L — SIGNIFICANT CHANGE UP (ref 22–29)
CREAT SERPL-MCNC: 0.7 MG/DL — SIGNIFICANT CHANGE UP (ref 0.5–1.3)
GLUCOSE SERPL-MCNC: 92 MG/DL — SIGNIFICANT CHANGE UP (ref 70–115)
HCT VFR BLD CALC: 41.1 % — SIGNIFICANT CHANGE UP (ref 39–50)
HDLC SERPL-MCNC: 40 MG/DL — SIGNIFICANT CHANGE UP
HGB BLD-MCNC: 13.7 G/DL — SIGNIFICANT CHANGE UP (ref 13–17)
LIPID PNL WITH DIRECT LDL SERPL: 73 MG/DL — SIGNIFICANT CHANGE UP
MCHC RBC-ENTMCNC: 30.1 PG — SIGNIFICANT CHANGE UP (ref 27–34)
MCHC RBC-ENTMCNC: 33.3 GM/DL — SIGNIFICANT CHANGE UP (ref 32–36)
MCV RBC AUTO: 90.3 FL — SIGNIFICANT CHANGE UP (ref 80–100)
PLATELET # BLD AUTO: 163 K/UL — SIGNIFICANT CHANGE UP (ref 150–400)
POTASSIUM SERPL-MCNC: 4 MMOL/L — SIGNIFICANT CHANGE UP (ref 3.5–5.3)
POTASSIUM SERPL-SCNC: 4 MMOL/L — SIGNIFICANT CHANGE UP (ref 3.5–5.3)
RBC # BLD: 4.55 M/UL — SIGNIFICANT CHANGE UP (ref 4.2–5.8)
RBC # FLD: 13.1 % — SIGNIFICANT CHANGE UP (ref 10.3–14.5)
SODIUM SERPL-SCNC: 140 MMOL/L — SIGNIFICANT CHANGE UP (ref 135–145)
TOTAL CHOLESTEROL/HDL RATIO MEASUREMENT: 3 RATIO — LOW (ref 3.4–9.6)
TRIGL SERPL-MCNC: 103 MG/DL — SIGNIFICANT CHANGE UP (ref 10–200)
WBC # BLD: 6.08 K/UL — SIGNIFICANT CHANGE UP (ref 3.8–10.5)
WBC # FLD AUTO: 6.08 K/UL — SIGNIFICANT CHANGE UP (ref 3.8–10.5)

## 2019-07-04 PROCEDURE — 70450 CT HEAD/BRAIN W/O DYE: CPT

## 2019-07-04 PROCEDURE — 97163 PT EVAL HIGH COMPLEX 45 MIN: CPT

## 2019-07-04 PROCEDURE — 83036 HEMOGLOBIN GLYCOSYLATED A1C: CPT

## 2019-07-04 PROCEDURE — 70498 CT ANGIOGRAPHY NECK: CPT

## 2019-07-04 PROCEDURE — 99232 SBSQ HOSP IP/OBS MODERATE 35: CPT

## 2019-07-04 PROCEDURE — 93005 ELECTROCARDIOGRAM TRACING: CPT

## 2019-07-04 PROCEDURE — 84484 ASSAY OF TROPONIN QUANT: CPT

## 2019-07-04 PROCEDURE — C8929: CPT

## 2019-07-04 PROCEDURE — 80061 LIPID PANEL: CPT

## 2019-07-04 PROCEDURE — 81003 URINALYSIS AUTO W/O SCOPE: CPT

## 2019-07-04 PROCEDURE — 71045 X-RAY EXAM CHEST 1 VIEW: CPT

## 2019-07-04 PROCEDURE — 99239 HOSP IP/OBS DSCHRG MGMT >30: CPT

## 2019-07-04 PROCEDURE — 85610 PROTHROMBIN TIME: CPT

## 2019-07-04 PROCEDURE — 85027 COMPLETE CBC AUTOMATED: CPT

## 2019-07-04 PROCEDURE — 70551 MRI BRAIN STEM W/O DYE: CPT

## 2019-07-04 PROCEDURE — 80053 COMPREHEN METABOLIC PANEL: CPT

## 2019-07-04 PROCEDURE — 70496 CT ANGIOGRAPHY HEAD: CPT

## 2019-07-04 PROCEDURE — 36415 COLL VENOUS BLD VENIPUNCTURE: CPT

## 2019-07-04 PROCEDURE — 99291 CRITICAL CARE FIRST HOUR: CPT

## 2019-07-04 PROCEDURE — 82962 GLUCOSE BLOOD TEST: CPT

## 2019-07-04 PROCEDURE — 80048 BASIC METABOLIC PNL TOTAL CA: CPT

## 2019-07-04 PROCEDURE — 85730 THROMBOPLASTIN TIME PARTIAL: CPT

## 2019-07-04 PROCEDURE — 82550 ASSAY OF CK (CPK): CPT

## 2019-07-04 RX ORDER — METOPROLOL TARTRATE 50 MG
1 TABLET ORAL
Qty: 0 | Refills: 0 | DISCHARGE

## 2019-07-04 RX ORDER — ISOSORBIDE MONONITRATE 60 MG/1
1 TABLET, EXTENDED RELEASE ORAL
Qty: 0 | Refills: 0 | DISCHARGE

## 2019-07-04 RX ORDER — RIVAROXABAN 15 MG-20MG
1 KIT ORAL
Qty: 0 | Refills: 0 | DISCHARGE

## 2019-07-04 RX ORDER — ASPIRIN/CALCIUM CARB/MAGNESIUM 324 MG
1 TABLET ORAL
Qty: 0 | Refills: 0 | DISCHARGE
Start: 2019-07-04

## 2019-07-04 RX ORDER — ATORVASTATIN CALCIUM 80 MG/1
1 TABLET, FILM COATED ORAL
Qty: 0 | Refills: 0 | DISCHARGE

## 2019-07-04 RX ADMIN — Medication 100 MILLIGRAM(S): at 05:38

## 2019-07-04 RX ADMIN — ISOSORBIDE MONONITRATE 30 MILLIGRAM(S): 60 TABLET, EXTENDED RELEASE ORAL at 11:56

## 2019-07-04 RX ADMIN — ENOXAPARIN SODIUM 70 MILLIGRAM(S): 100 INJECTION SUBCUTANEOUS at 05:37

## 2019-07-04 NOTE — PROGRESS NOTE ADULT - SUBJECTIVE AND OBJECTIVE BOX
[facility]                                     [facility] at [place]                                 Rajan Troy, & Cholo                                  [address]                                        [place], [place], [address]                                             [phone]    CC: left hand/arm weakness  HPI: The patient is a 83y Male who presented with left hand weakness and left arm heaviness starting yesterday after he awoke from a nap. He was last normal at 1800 on 7/2.  His hand slowly got better and he no longer feels heaviness of the arm.  He had no facial droop, other limb weakness or numbness, imbalance or dizziness during this event.  Neurology is asked to evaluate.    Interval history: power in left hand improving    ROS neurology: Denies headache or dizziness. Denies weakness/numbness.  Denies speech/language deficits. Denies diplopia/blurred vision.  Denies confusion    MEDICATIONS  (STANDING):  aspirin enteric coated 81 milliGRAM(s) Oral daily  atorvastatin 20 milliGRAM(s) Oral at bedtime  enoxaparin Injectable 70 milliGRAM(s) SubCutaneous every 12 hours  isosorbide   mononitrate ER Tablet (IMDUR) 30 milliGRAM(s) Oral daily  metoprolol succinate  milliGRAM(s) Oral daily    MEDICATIONS  (PRN):      Vital Signs Last 24 Hrs  T(C): 36.4 (04 Jul 2019 10:15), Max: 36.7 (03 Jul 2019 21:05)  T(F): 97.5 (04 Jul 2019 10:15), Max: 98.1 (03 Jul 2019 21:05)  HR: 69 (04 Jul 2019 10:15) (56 - 69)  BP: 126/65 (04 Jul 2019 10:15) (111/66 - 127/73)  BP(mean): --  RR: 18 (04 Jul 2019 10:15) (16 - 19)  SpO2: 93% (04 Jul 2019 10:15) (93% - 99%)      Detailed Neurologic Exam:    Mental status: The patient is awake and alert and has normal attention span.  The patient is fully oriented in 3 spheres. The patient is oriented to current events. The patient is able to name objects, follow commands, repeat sentences.    Cranial nerves: Pupils equal and react symmetrically to light. There is no visual field deficit to confrontation. Extraocular motion is full with no nystagmus. There is no ptosis. Facial sensation is intact. Facial musculature is symmetric. Palate elevates symmetrically. Shoulder shrug is normal. Tongue is midline.    Motor: There is normal bulk and tone.  There is no tremor.  Strength is 5/5 in the right arm and leg.   Strength is 5/5 in the left arm and leg.    Sensation: Intact to light touch and pin in 4 extremities    Reflexes: 1+ throughout and plantar responses are flexor.    Cerebellar: There is no dysmetria on finger to nose testing.    Gait : deferred    LABS:                                    13.7   6.08  )-----------( 163      ( 04 Jul 2019 06:01 )             41.1     07-04    140  |  108<H>  |  20.0  ----------------------------<  92  4.0   |  22.0  |  0.70    Ca    8.9      04 Jul 2019 06:01    TPro  6.8  /  Alb  4.1  /  TBili  0.4  /  DBili  x   /  AST  20  /  ALT  17  /  AlkPhos  60  07-02    LIVER FUNCTIONS - ( 02 Jul 2019 21:08 )  Alb: 4.1 g/dL / Pro: 6.8 g/dL / ALK PHOS: 60 U/L / ALT: 17 U/L / AST: 20 U/L / GGT: x           PT/INR - ( 03 Jul 2019 06:23 )   PT: 14.4 sec;   INR: 1.24 ratio         PTT - ( 03 Jul 2019 06:23 )  PTT:35.2 sec    Lipid Profile (07.04.19 @ 06:01)    Total Cholesterol/HDL Ratio Measurement: 3.0 Ratio    Cholesterol, Serum: 134 mg/dL    Triglycerides, Serum: 103 mg/dL    HDL Cholesterol, Serum: 40: HDL Levels >/= 60 mg/dL are considered beneficial and a "negative" risk  factor.  Effective 08/15/2018: New reference range and interpretive comment. mg/dL    Direct LDL: 73:      RADIOLOGY & ADDITIONAL STUDIES (independently reviewed unless otherwise noted):  MRI brain did not show acute CVA on diffusion weighted sequences, no bleed or mass seen  CTA head: no aneurysm, AVM, LVO or sig stenosis in COW, hypoplastic right A1, 50% stenosis in right PCA  CTA neck: no sig carotid or vertebral stenosis    < from: TTE Echo w/Cont Complete (07.03.19 @ 09:27) >    Summary:   1. Left ventricular ejection fraction, by visual estimation, is 60 to   65%.   2. Normal global left ventricular systolic function.   3. Mildly increased LV wall thickness.   4. Spectral Doppler shows impaired relaxation pattern of left   ventricular myocardial filling (Grade I diastolic dysfunction).   5. Mild mitral annular calcification.   6. Thickening of the anterior and posterior mitral valve leaflets.   7. Mild aortic regurgitation.   8. Dilatation of the aortic root.
Internal Medicine Hospitalist - Dr. Virginie CHAPA    [id number]    83y      Male    Patient is a 83y old  Male who presents with a chief complaint of LUE / LEFT HAND weakness (2019 09:12)      INTERVAL HPI/ OVERNIGHT EVENTS: Patient is seen and examined, weakness resolved, denied chest pain, SOB, numbness, tingling    REVIEW OF SYSTEMS:    Denied fever, chills, abd. pain, nausea, vomiting, chest pain, SOB, headache, dizziness    PHYSICAL EXAM:    Vital Signs Last 24 Hrs  T(C): 37 (2019 20:54), Max: 37 (2019 20:26)  T(F): 98.6 (2019 20:54), Max: 98.6 (2019 20:26)  HR: 73 (2019 10:44) (58 - 73)  BP: 152/82 (2019 10:44) (120/84 - 184/90)  BP(mean): --  RR: 18 (2019 10:44) (17 - 22)  SpO2: 96% (2019 10:44) (96% - 100%)    GENERAL: NAD  HEENT: EOMI, no facial asymmetry  Neck: supple  CHEST/LUNG: CTA b/l   HEART: S1S2+ audible  ABDOMEN: Soft, Nontender, Nondistended; Bowel sounds present  EXTREMITIES:  no edema  CNS: AAO X 3, power 5/5 all 4 extremities, sensation intact  Psychiatry: normal mood    LABS:                        13.5   5.98  )-----------( 184      ( 2019 21:08 )             40.4     07-02    140  |  107  |  18.0  ----------------------------<  100  4.3   |  23.0  |  0.78    Ca    9.3      2019 21:08    TPro  6.8  /  Alb  4.1  /  TBili  0.4  /  DBili  x   /  AST  20  /  ALT  17  /  AlkPhos  60  07-02    PT/INR - ( 2019 06:23 )   PT: 14.4 sec;   INR: 1.24 ratio         PTT - ( 2019 06:23 )  PTT:35.2 sec  Urinalysis Basic - ( 2019 22:12 )    Color: Yellow / Appearance: Clear / S.005 / pH: x  Gluc: x / Ketone: Negative  / Bili: Negative / Urobili: Negative mg/dL   Blood: x / Protein: Negative mg/dL / Nitrite: Negative   Leuk Esterase: Negative / RBC: x / WBC x   Sq Epi: x / Non Sq Epi: x / Bacteria: x          MEDICATIONS  (STANDING):  atorvastatin 20 milliGRAM(s) Oral at bedtime  isosorbide   mononitrate ER Tablet (IMDUR) 30 milliGRAM(s) Oral daily  metoprolol succinate  milliGRAM(s) Oral daily  rivaroxaban 20 milliGRAM(s) Oral with dinner    MEDICATIONS  (PRN):      RADIOLOGY & ADDITIONAL TEST
[facility]                                                        Office: [address] [address]                                                       Telephone: [phone]. Fax: [phone]      CC: paresthesias    INTERVAL HISTORY: Patient stable. No chest pain. Mild to moderate shortness of breath.     MEDICATIONS  (STANDING):  aspirin enteric coated 81 milliGRAM(s) Oral daily  atorvastatin 20 milliGRAM(s) Oral at bedtime  enoxaparin Injectable 70 milliGRAM(s) SubCutaneous every 12 hours  isosorbide   mononitrate ER Tablet (IMDUR) 30 milliGRAM(s) Oral daily  metoprolol succinate  milliGRAM(s) Oral daily    ROS: All others negative     PHYSICAL EXAM:  T(C): 36.4 (07-04-19 @ 10:15), Max: 36.7 (07-03-19 @ 21:05)  HR: 69 (07-04-19 @ 10:15) (56 - 69)  BP: 126/65 (07-04-19 @ 10:15) (111/66 - 127/73)  RR: 18 (07-04-19 @ 10:15) (16 - 19)  SpO2: 93% (07-04-19 @ 10:15) (93% - 99%)  Wt(kg): --  I&O's Summary    03 Jul 2019 07:01  -  04 Jul 2019 07:00  --------------------------------------------------------  IN: 480 mL / OUT: 0 mL / NET: 480 mL    04 Jul 2019 07:01  -  04 Jul 2019 11:40  --------------------------------------------------------  IN: 240 mL / OUT: 0 mL / NET: 240 mL      Appearance: Normal	  HEENT:   Normal oral mucosa, PERRL, EOMI	  Lymphatic: No lymphadenopathy  Cardiovascular: Normal S1 S2, No JVD, No murmurs, No edema  Respiratory: Lungs clear to auscultation	  Psychiatry: A & O x 3, Mood & affect appropriate  Gastrointestinal:  Soft, Non-tender, + BS	  Skin: No rashes, No ecchymoses, No cyanosis  Neurologic: Non-focal  Extremities: Normal range of motion, No clubbing, cyanosis or edema  Vascular: Peripheral pulses palpable 2+ bilaterally      LABS:	 	                        13.7   6.08  )-----------( 163      ( 04 Jul 2019 06:01 )             41.1     07-04    140  |  108<H>  |  20.0  ----------------------------<  92  4.0   |  22.0  |  0.70    Ca    8.9      04 Jul 2019 06:01    TPro  6.8  /  Alb  4.1  /  TBili  0.4  /  DBili  x   /  AST  20  /  ALT  17  /  AlkPhos  60  07-02

## 2019-07-04 NOTE — PROGRESS NOTE ADULT - ASSESSMENT
Neurologic symptoms- MRi negative for acute CVA. Now with improved symptoms. Unclear etiology.   Abnormal stress in 1/2019- has progressive symptoms of VU. Cath is recommended. Patient does not want to undergo procedure here at [facility]. He wants to return to [place] to his cardiologist.   Considering baseline normal EKG, normal echo and no acute changes in symptoms, can consider discharge safely with close follow up with primary cardiologist.

## 2019-07-04 NOTE — DISCHARGE NOTE PROVIDER - NSDCCPCAREPLAN_GEN_ALL_CORE_FT
PRINCIPAL DISCHARGE DIAGNOSIS  Diagnosis: Weakness  Assessment and Plan of Treatment: - Neuro consult appreciated - possible neurapraxia , can have o/p EMG/NCV if persists greater than 10 days      SECONDARY DISCHARGE DIAGNOSES  Diagnosis: CAD (coronary artery disease)  Assessment and Plan of Treatment: c/w home medication    Diagnosis: DVT, lower extremity  Assessment and Plan of Treatment: c/w xarelto    Diagnosis: HLD (hyperlipidemia)  Assessment and Plan of Treatment: c/w statin    Diagnosis: HTN (hypertension)  Assessment and Plan of Treatment: c/w home medication

## 2019-07-04 NOTE — DISCHARGE NOTE NURSING/CASE MANAGEMENT/SOCIAL WORK - NSDCPEPTSTRK_GEN_ALL_CORE
Stroke warning signs and symptoms/Signs and symptoms of stroke/Stroke support groups for patients, families, and friends/Call 911 for stroke/Need for follow up after discharge/Prescribed medications/Risk factors for stroke/Stroke education booklet

## 2019-07-04 NOTE — DISCHARGE NOTE PROVIDER - NSDCFUADDAPPT_GEN_ALL_CORE_FT
f/u primary care and cardiology  f/u Neuro- possible neurapraxia , can have o/p EMG/NCV if persists greater than 10 days

## 2019-07-04 NOTE — DISCHARGE NOTE NURSING/CASE MANAGEMENT/SOCIAL WORK - NSDCDPATPORTLINK_GEN_ALL_CORE
You can access the [facility] Patient Portal, offered by [facility], by registering with the following website: [email or web address]

## 2019-07-04 NOTE — PROGRESS NOTE ADULT - ASSESSMENT
The patient is a 83y Male who is followed by neurology because of left hand numbness    Left hand numbness/weakness  nearly resolved.   possible neurapraxia   can have o/p EMG/NCV if persists greater than 10 days  no CVA on MRI brain  PT/OT eval    HTN  continue home meds, keep normotensive    Lipids  check fasting lipid panel LDL=73  goal LDL is under 70 for TIA/stroke or cardiovascular issues  increase lipitor to 40 mg daily    Inpatient neuro work up completed  available as needed to follow up    Thank you for allowing me to participate in the care of your patient    Larry Tolentino MD, PhD   [id number]

## 2019-07-04 NOTE — DISCHARGE NOTE PROVIDER - CARE PROVIDER_API CALL
Larry Tolentino; PhD)  Neurology; Vascular Neurology  [address], [address]  [place] [address]  Phone: [phone]  Fax: [phone]  Follow Up Time:     Héctor Bonilla)  Cardiovascular Disease; Interventional Cardiology  [address], [address]  [place] [address]  Phone: [phone]  Fax: [phone]  Follow Up Time:

## 2019-07-04 NOTE — DISCHARGE NOTE PROVIDER - HOSPITAL COURSE
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 Patient was a code stroke, CT head negative for acute infarct, awaiting MRI. Patient states he follows closely with Dr. Conroy, who put him on the Xarelto for the DVT and PE. Patient states that for the last few months he has been experiencing VU and fatigue with limited activity, worsening at this time.         A/P         > L sided weakness - ruled out CVA    - CT head negative    - CTA unremarkable    - MRI head no acute CVA    - Neuro consult appreciated - possible neurapraxia , can have o/p EMG/NCV if persists greater than 10 days    - c/w aspirin, statin    LDL 73        > CAD    - Past angina with anterolateral ischemia on stress test 01/2018, being medically managed    - Pt now with progressive fatigue and dyspnea with exertion    -  TTE LVEF 60-65%    - Continue asa, statin, beta blocker, and Imdur    - cardiology eval appreciated - . Cath is recommended. Patient does not want to undergo procedure here at [facility]. He wants to return to [place] to his cardiologist.     Considering baseline normal EKG, normal echo and no acute changes in symptoms, can consider discharge safely with close follow up with primary cardiologist. Risk and benefit explained to patient - MI/death - understood, recommend f/u with primary cardiologist ASAP            >HTN - c/w metoprolol, Imdur    monitor BP        >HLD - c/w statin        > DVT/PE    - On Xarelto for at least 3 mos, continue    - Unprovoked DVT        ICU Vital Signs Last 24 Hrs    T(C): 36.4 (04 Jul 2019 10:15), Max: 36.7 (03 Jul 2019 21:05)    T(F): 97.5 (04 Jul 2019 10:15), Max: 98.1 (03 Jul 2019 21:05)    HR: 69 (04 Jul 2019 10:15) (56 - 69)    BP: 126/65 (04 Jul 2019 10:15) (111/66 - 127/73)    BP(mean): --    ABP: --    ABP(mean): --    RR: 18 (04 Jul 2019 10:15) (16 - 19)    SpO2: 93% (04 Jul 2019 10:15) (93% - 99%)        PHYSICAL EXAM:        GENERAL: NAD    EYES: EOMI, PERRLA, conjunctiva and sclera clear    NECK: Supple, No JVD, Normal thyroid    NERVOUS SYSTEM:  Alert & Oriented X3, Good concentration; Motor Strength 5/5 B/L upper and lower extremities; DTRs 2+ intact and symmetric    CHEST/LUNG: Clear to percussion bilaterally; No rales, rhonchi, wheezing, or rubs    HEART: Regular rate and rhythm; No murmurs, rubs, or gallops    ABDOMEN: Soft, Nontender, Nondistended; Bowel sounds present    EXTREMITIES: no edema        time spent 32 minutes

## 2019-07-04 NOTE — DISCHARGE NOTE PROVIDER - CARE PROVIDERS DIRECT ADDRESSES
[email or web address]

## 2019-07-10 PROBLEM — Z00.00 ENCOUNTER FOR PREVENTIVE HEALTH EXAMINATION: Status: ACTIVE | Noted: 2019-07-10

## 2020-04-25 ENCOUNTER — APPOINTMENT (INPATIENT)
Dept: RADIOLOGY | Facility: HOSPITAL | Age: 85
DRG: 301 | End: 2020-04-25
Attending: HOSPITALIST
Payer: MEDICARE

## 2020-04-25 ENCOUNTER — HOSPITAL ENCOUNTER (INPATIENT)
Facility: HOSPITAL | Age: 85
LOS: 2 days | Discharge: HOME | DRG: 301 | End: 2020-04-27
Attending: EMERGENCY MEDICINE | Admitting: HOSPITALIST
Payer: MEDICARE

## 2020-04-25 ENCOUNTER — APPOINTMENT (EMERGENCY)
Dept: RADIOLOGY | Facility: HOSPITAL | Age: 85
DRG: 301 | End: 2020-04-25
Attending: EMERGENCY MEDICINE
Payer: MEDICARE

## 2020-04-25 DIAGNOSIS — R55 NEAR SYNCOPE: Primary | ICD-10-CM

## 2020-04-25 DIAGNOSIS — R05.9 COUGH: ICD-10-CM

## 2020-04-25 DIAGNOSIS — R06.00 DYSPNEA, UNSPECIFIED TYPE: ICD-10-CM

## 2020-04-25 DIAGNOSIS — M79.605 LEFT LEG PAIN: ICD-10-CM

## 2020-04-25 DIAGNOSIS — I82.412 ACUTE DEEP VEIN THROMBOSIS (DVT) OF FEMORAL VEIN OF LEFT LOWER EXTREMITY (CMS/HCC): ICD-10-CM

## 2020-04-25 PROBLEM — E78.5 HLD (HYPERLIPIDEMIA): Status: ACTIVE | Noted: 2020-04-25

## 2020-04-25 PROBLEM — I82.409 DVT (DEEP VENOUS THROMBOSIS) (CMS/HCC): Status: ACTIVE | Noted: 2020-04-25

## 2020-04-25 PROBLEM — I25.10 CAD (CORONARY ARTERY DISEASE): Status: ACTIVE | Noted: 2020-04-25

## 2020-04-25 LAB
ANION GAP SERPL CALC-SCNC: 6 MEQ/L (ref 3–15)
APTT PPP: 28 SEC (ref 23–35)
BASOPHILS # BLD: 0.04 K/UL (ref 0.01–0.1)
BASOPHILS NFR BLD: 0.7 %
BUN SERPL-MCNC: 15 MG/DL (ref 8–20)
CALCIUM SERPL-MCNC: 9 MG/DL (ref 8.9–10.3)
CHLORIDE SERPL-SCNC: 109 MEQ/L (ref 98–109)
CO2 SERPL-SCNC: 24 MEQ/L (ref 22–32)
CREAT SERPL-MCNC: 1 MG/DL (ref 0.8–1.3)
DIFFERENTIAL METHOD BLD: ABNORMAL
EOSINOPHIL # BLD: 0.13 K/UL (ref 0.04–0.54)
EOSINOPHIL NFR BLD: 2.2 %
ERYTHROCYTE [DISTWIDTH] IN BLOOD BY AUTOMATED COUNT: 13.4 % (ref 11.6–14.4)
GFR SERPL CREATININE-BSD FRML MDRD: >60 ML/MIN/1.73M*2
GLUCOSE SERPL-MCNC: 108 MG/DL (ref 70–99)
HCT VFR BLDCO AUTO: 40.2 % (ref 40.1–51)
HGB BLD-MCNC: 13.6 G/DL (ref 13.7–17.5)
IMM GRANULOCYTES # BLD AUTO: 0.01 K/UL (ref 0–0.08)
IMM GRANULOCYTES NFR BLD AUTO: 0.2 %
INR PPP: 1.2 INR
LYMPHOCYTES # BLD: 0.64 K/UL (ref 1.2–3.5)
LYMPHOCYTES NFR BLD: 10.7 %
MCH RBC QN AUTO: 31.1 PG (ref 28–33.2)
MCHC RBC AUTO-ENTMCNC: 33.8 G/DL (ref 32.2–36.5)
MCV RBC AUTO: 91.8 FL (ref 83–98)
MONOCYTES # BLD: 0.73 K/UL (ref 0.3–1)
MONOCYTES NFR BLD: 12.2 %
NEUTROPHILS # BLD: 4.42 K/UL (ref 1.7–7)
NEUTS SEG NFR BLD: 74 %
NRBC BLD-RTO: 0 %
PDW BLD AUTO: 10 FL (ref 9.4–12.4)
PLATELET # BLD AUTO: 181 K/UL (ref 150–350)
POTASSIUM SERPL-SCNC: 4.5 MEQ/L (ref 3.6–5.1)
PROTHROMBIN TIME: 14.8 SEC (ref 12.2–14.5)
RBC # BLD AUTO: 4.38 M/UL (ref 4.5–5.8)
SARS-COV-2 RNA RESP QL NAA+PROBE: NEGATIVE
SODIUM SERPL-SCNC: 139 MEQ/L (ref 136–144)
TROPONIN I SERPL-MCNC: <0.02 NG/ML
TROPONIN I SERPL-MCNC: <0.02 NG/ML
WBC # BLD AUTO: 5.97 K/UL (ref 3.8–10.5)

## 2020-04-25 PROCEDURE — 99223 1ST HOSP IP/OBS HIGH 75: CPT | Performed by: HOSPITALIST

## 2020-04-25 PROCEDURE — 71045 X-RAY EXAM CHEST 1 VIEW: CPT

## 2020-04-25 PROCEDURE — 80048 BASIC METABOLIC PNL TOTAL CA: CPT | Performed by: PHYSICIAN ASSISTANT

## 2020-04-25 PROCEDURE — 36415 COLL VENOUS BLD VENIPUNCTURE: CPT | Performed by: PHYSICIAN ASSISTANT

## 2020-04-25 PROCEDURE — 96360 HYDRATION IV INFUSION INIT: CPT | Mod: 59

## 2020-04-25 PROCEDURE — 85610 PROTHROMBIN TIME: CPT | Performed by: HOSPITALIST

## 2020-04-25 PROCEDURE — 63600000 HC DRUGS/DETAIL CODE: Performed by: INTERNAL MEDICINE

## 2020-04-25 PROCEDURE — 93970 EXTREMITY STUDY: CPT

## 2020-04-25 PROCEDURE — 93005 ELECTROCARDIOGRAM TRACING: CPT | Performed by: PHYSICIAN ASSISTANT

## 2020-04-25 PROCEDURE — 63600105 HC IODINE BASED CONTRAST: Performed by: HOSPITALIST

## 2020-04-25 PROCEDURE — 63700000 HC SELF-ADMINISTRABLE DRUG: Performed by: INTERNAL MEDICINE

## 2020-04-25 PROCEDURE — 85730 THROMBOPLASTIN TIME PARTIAL: CPT | Performed by: HOSPITALIST

## 2020-04-25 PROCEDURE — 25800000 HC PHARMACY IV SOLUTIONS: Performed by: PHYSICIAN ASSISTANT

## 2020-04-25 PROCEDURE — 85025 COMPLETE CBC W/AUTO DIFF WBC: CPT | Performed by: PHYSICIAN ASSISTANT

## 2020-04-25 PROCEDURE — 84484 ASSAY OF TROPONIN QUANT: CPT | Performed by: HOSPITALIST

## 2020-04-25 PROCEDURE — U0002 COVID-19 LAB TEST NON-CDC: HCPCS | Performed by: PHYSICIAN ASSISTANT

## 2020-04-25 PROCEDURE — 71260 CT THORAX DX C+: CPT

## 2020-04-25 PROCEDURE — 20600000 HC ROOM AND CARE INTERMEDIATE/TELEMETRY

## 2020-04-25 PROCEDURE — 99285 EMERGENCY DEPT VISIT HI MDM: CPT

## 2020-04-25 PROCEDURE — 84484 ASSAY OF TROPONIN QUANT: CPT | Performed by: PHYSICIAN ASSISTANT

## 2020-04-25 RX ORDER — NITROGLYCERIN 0.4 MG/1
0.4 TABLET SUBLINGUAL EVERY 5 MIN PRN
COMMUNITY

## 2020-04-25 RX ORDER — ISOSORBIDE MONONITRATE 30 MG/1
30 TABLET, EXTENDED RELEASE ORAL DAILY
Status: DISCONTINUED | OUTPATIENT
Start: 2020-04-26 | End: 2020-04-27 | Stop reason: HOSPADM

## 2020-04-25 RX ORDER — DEXTROSE 50 % IN WATER (D50W) INTRAVENOUS SYRINGE
25 AS NEEDED
Status: DISCONTINUED | OUTPATIENT
Start: 2020-04-25 | End: 2020-04-27 | Stop reason: HOSPADM

## 2020-04-25 RX ORDER — HEPARIN SODIUM 10000 [USP'U]/100ML
0-4000 INJECTION, SOLUTION INTRAVENOUS
Status: DISCONTINUED | OUTPATIENT
Start: 2020-04-25 | End: 2020-04-25

## 2020-04-25 RX ORDER — ATORVASTATIN CALCIUM 20 MG/1
20 TABLET, FILM COATED ORAL
Status: DISCONTINUED | OUTPATIENT
Start: 2020-04-25 | End: 2020-04-27 | Stop reason: HOSPADM

## 2020-04-25 RX ORDER — METOPROLOL SUCCINATE 100 MG/1
100 TABLET, EXTENDED RELEASE ORAL DAILY
Status: DISCONTINUED | OUTPATIENT
Start: 2020-04-26 | End: 2020-04-27 | Stop reason: HOSPADM

## 2020-04-25 RX ORDER — METOPROLOL SUCCINATE 100 MG/1
100 TABLET, EXTENDED RELEASE ORAL DAILY
COMMUNITY
Start: 2019-10-21

## 2020-04-25 RX ORDER — NITROGLYCERIN 0.4 MG/1
0.4 TABLET SUBLINGUAL EVERY 5 MIN PRN
Status: DISCONTINUED | OUTPATIENT
Start: 2020-04-25 | End: 2020-04-27 | Stop reason: HOSPADM

## 2020-04-25 RX ORDER — ATROPINE SULFATE 0.1 MG/ML
0.5 INJECTION INTRAVENOUS EVERY 5 MIN PRN
Status: DISCONTINUED | OUTPATIENT
Start: 2020-04-25 | End: 2020-04-27 | Stop reason: HOSPADM

## 2020-04-25 RX ORDER — DEXTROSE 40 %
15-30 GEL (GRAM) ORAL AS NEEDED
Status: DISCONTINUED | OUTPATIENT
Start: 2020-04-25 | End: 2020-04-27 | Stop reason: HOSPADM

## 2020-04-25 RX ORDER — RIVAROXABAN 10 MG/1
10 TABLET, FILM COATED ORAL DAILY
COMMUNITY
Start: 2020-02-18 | End: 2020-04-27 | Stop reason: HOSPADM

## 2020-04-25 RX ORDER — IBUPROFEN 200 MG
16-32 TABLET ORAL AS NEEDED
Status: DISCONTINUED | OUTPATIENT
Start: 2020-04-25 | End: 2020-04-27 | Stop reason: HOSPADM

## 2020-04-25 RX ORDER — HEPARIN SODIUM 10000 [USP'U]/100ML
0-4000 INJECTION, SOLUTION INTRAVENOUS
Status: DISCONTINUED | OUTPATIENT
Start: 2020-04-25 | End: 2020-04-26

## 2020-04-25 RX ORDER — CYANOCOBALAMIN 1000 UG/ML
1000 INJECTION, SOLUTION INTRAMUSCULAR; SUBCUTANEOUS
COMMUNITY
Start: 2020-04-21

## 2020-04-25 RX ORDER — ATORVASTATIN CALCIUM 20 MG/1
20 TABLET, FILM COATED ORAL
Status: DISCONTINUED | OUTPATIENT
Start: 2020-04-26 | End: 2020-04-25

## 2020-04-25 RX ORDER — ISOSORBIDE MONONITRATE 30 MG/1
30 TABLET, EXTENDED RELEASE ORAL DAILY
COMMUNITY
Start: 2019-11-25 | End: 2024-06-15 | Stop reason: ENTERED-IN-ERROR

## 2020-04-25 RX ORDER — ATORVASTATIN CALCIUM 20 MG/1
20 TABLET, FILM COATED ORAL DAILY
COMMUNITY
Start: 2020-03-09

## 2020-04-25 RX ORDER — NITROGLYCERIN 0.4 MG/1
0.4 TABLET SUBLINGUAL EVERY 5 MIN PRN
Status: DISCONTINUED | OUTPATIENT
Start: 2020-04-25 | End: 2020-04-25

## 2020-04-25 RX ADMIN — ATORVASTATIN CALCIUM 20 MG: 20 TABLET, FILM COATED ORAL at 22:56

## 2020-04-25 RX ADMIN — HEPARIN SODIUM AND DEXTROSE 1300 UNITS/HR: 10000; 5 INJECTION INTRAVENOUS at 22:43

## 2020-04-25 RX ADMIN — SODIUM CHLORIDE 250 ML: 9 INJECTION, SOLUTION INTRAVENOUS at 17:06

## 2020-04-25 RX ADMIN — IOHEXOL 85 ML: 300 INJECTION, SOLUTION INTRAVENOUS at 18:00

## 2020-04-25 ASSESSMENT — ENCOUNTER SYMPTOMS
CHILLS: 0
PALPITATIONS: 0
DIZZINESS: 1
CHEST TIGHTNESS: 1
ABDOMINAL DISTENTION: 0
ABDOMINAL PAIN: 0
DIFFICULTY URINATING: 0
FEVER: 0
COUGH: 1
SHORTNESS OF BREATH: 1

## 2020-04-25 NOTE — ASSESSMENT & PLAN NOTE
Possibly related to viral syndrome, dehydration but in setting of known DVT cannot rule out PE  Send for CT chest with IV contrast to rule out PE - will start heparin gtt and hold xarelto for now  Check orthostatics  Monitor on tele for arrhythmias  Check echocardiogram  Encourage hydration

## 2020-04-25 NOTE — ASSESSMENT & PLAN NOTE
With cough and subjective fevers for over 1 month - sars-cov-2 test still pending  Possible viral etiology but cannot rule out PE   CT chest pending   CXR is negative for acute disease and no O2 requirement  Supportive care with tylenol, cough suppressant, etc

## 2020-04-25 NOTE — ASSESSMENT & PLAN NOTE
On NOAC, denies any missed doses over last few weeks  Doppler done in ED showing resolution of L calf DVT but continued femoral/ proximal DVT  ? xarelto failure  Consult hematology - will start heparin gtt while awaiting results of CT chest

## 2020-04-25 NOTE — H&P
"[facility] DISASTER HISTORY AND PHYSICAL NOTE    HPI  Dave Arredondo is a 84 y.o. year-old male admitted on 4/25/2020 with Near syncope [R55].  Pt is a 83 yo male with pmh CAD, HTN, DVT, colon CA who presented to [facility] ED with dyspnea and cough. He also reports \"almost passing out\" this morning after urinating. He was standing in his bathroom when he suddenly felt like he was about to faint. He grabbed onto the sink and was able to brace himself. He did not fall but he did continue to have lightheadedness for the next hour or so. He reports feeling \"dizzy\" all day today. He admits he does not drink enough fluids but has been eating normal amount. He reports he developed a productive cough approx 4 weeks ago that has been resolving and is now dry. He began feeling short of breath yesterday and today noticed that he was \"extremely winded\" and \"panting\" with just minimal exertion. He reports subjective elevated temps (his normal temp is 96, he has been having temps around 97 lately) but denies chills or sweats. He admits to chest discomfort happening mostly at night when he lays down to go to sleep. He has discussed this previously with his cardiologist who reportedly asked him to roll over onto his other side when it happens. The chest pain is not new. He believes he was exposed to COVID 19 as he lives in a commune ([facility]) and one of the sisters is currently hospitalized with COVID 19. He denies any GI symptoms. 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 He denies skipping any doses of his xarelto. The last time he skipped a dose was for a colonoscopy about 1 year ago.     Code status was discussed and pt wants to remain full code.    I attempted to reach pt's friend/ contact GUY at the number provided and received error message.    Reviewed case with all relevant consultants.    PCP:   Mazin Muhammad,     EMERGENCY CONTACT: "   Extended Emergency Contact Information  Primary Emergency Contact: Elías Blackman   [facility]  Home Phone: [phone]  Relation: Friend    SUBJECTIVE:  Past Medical History:  Past Medical History:   Diagnosis Date   • Colon cancer (CMS/HCC)    • Coronary artery disease    • DVT (deep venous thrombosis) (CMS/HCC)    • Hypertension    • Lipid disorder        Past Surgical History:  Past Surgical History:   Procedure Laterality Date   • COLOSTOMY     • HERNIA REPAIR         Allergies:  Patient has no known allergies.    Home Medications:  Prior to Admission medications    Medication Sig Start Date End Date Taking? Authorizing Provider   atorvastatin (LIPITOR) 20 mg tablet Take 20 mg by mouth daily. 3/9/20  Yes Nemesio Urrutia MD   isosorbide mononitrate (IMDUR) 30 mg 24 hr tablet Take 30 mg by mouth daily. 11/25/19  Yes Nemesio Urrutia MD   metoprolol succinate XL (TOPROL-XL) 100 mg 24 hr tablet Take 100 mg by mouth daily. 10/21/19  Yes Nemesio Urrutia MD   cyanocobalamin (VITAMIN B-12) 1,000 mcg/mL injection Inject 1,000 mcg into the shoulder, thigh, or buttocks every 30 (thirty) days. 4/21/20   Nemesio Urrutia MD   nitroglycerin (NITROSTAT) 0.4 mg SL tablet Place 0.4 mg under the tongue every 5 (five) minutes as needed.      Nemesio Urrutia MD   XARELTO 10 mg tablet Take 10 mg by mouth daily.   2/18/20   Nemesio Urrutia MD       Social History:  Social History     Socioeconomic History   • Marital status: Single     Spouse name: None   • Number of children: None   • Years of education: None   • Highest education level: None   Occupational History   • None   Social Needs   • Financial resource strain: None   • Food insecurity:     Worry: None     Inability: None   • Transportation needs:     Medical: None     Non-medical: None   Tobacco Use   • Smoking status: Never Smoker   • Smokeless tobacco: Never Used   Substance and Sexual Activity   • Alcohol use: Not Currently    • Drug use: Never   • Sexual activity: None   Lifestyle   • Physical activity:     Days per week: None     Minutes per session: None   • Stress: None   Relationships   • Social connections:     Talks on phone: None     Gets together: None     Attends L[employer] service: None     Active member of club or organization: None     Attends meetings of clubs or organizations: None     Relationship status: None   • Intimate partner violence:     Fear of current or ex partner: None     Emotionally abused: None     Physically abused: None     Forced sexual activity: None   Other Topics Concern   • None   Social History Narrative    Lives in the [facility] in [place]        Family History:  Family History   Problem Relation Age of Onset   • Heart disease Biological Mother        Review of Systems:  A complete review of systems was reviewed.  This included: Review of Systems   Constitutional: Negative for chills and fever.   HENT: Negative for congestion.    Respiratory: Positive for cough, chest tightness and shortness of breath.    Cardiovascular: Positive for chest pain. Negative for palpitations and leg swelling.   Gastrointestinal: Negative for abdominal distention and abdominal pain.   Genitourinary: Negative for difficulty urinating.   Neurological: Positive for dizziness.       OBJECTIVE:  Vitals:   Visit Vitals  BP (!) 112/58 (BP Location: Right upper arm, Patient Position: Lying)   Pulse (!) 56   Temp 37.1 °C (98.7 °F) (Tympanic)   Resp 18   Wt 72.6 kg (160 lb)   SpO2 97%     TMax (12h): Temp (12hrs), Av.1 °C (98.7 °F), Min:37.1 °C (98.7 °F), Max:37.1 °C (98.7 °F)    Weight from last three encounters:   Wt Readings from Last 3 Encounters:   20 72.6 kg (160 lb)     I/Os: No intake or output data in the 24 hours ending 20 1633    Physical Exam   Constitutional: He is oriented to person, place, and time. No distress.   Elderly male   HENT:   Head: Normocephalic and atraumatic.   Neck: Neck  supple.   Cardiovascular: Normal rate and normal heart sounds.   Pulmonary/Chest: Effort normal and breath sounds normal. No respiratory distress.   Abdominal: Soft. Bowel sounds are normal. There is no tenderness.   LLQ colostomy bag with formed brown stool   Musculoskeletal: He exhibits no edema.   Neurological: He is alert and oriented to person, place, and time.   Skin: Skin is warm and dry. No rash noted.   Nursing note and vitals reviewed.      DATA  Imaging personally reviewed(does not include unread studies):  X-ray Chest 1 View    Result Date: 4/25/2020  IMPRESSION: No definite acute cardiopulmonary disease. I certify that I have reviewed this examination and agree with this report. Ishmael Montes De Oca MD    Us Venous Leg Bilateral    Result Date: 4/25/2020  IMPRESSION: 1.  Occlusive thrombus within the LEFT femoral through popliteal vein. 2.  No evidence for RIGHT lower extremity deep venous thrombosis. Finding: Other   Acuity: Critical  Status: OPEN The results were critically read back with DR RAVINDER JUSTICE on 4/25/2020 4: PM. I certify that I have reviewed this examination and agree with this report. Ishmael Montes De Oca MD      Telemetry/ECGs: Personally reviewed    Labs personally reviewed:  Results from last 7 days   Lab Units 04/25/20  1349   SODIUM mEQ/L 139   POTASSIUM mEQ/L 4.5   CHLORIDE mEQ/L 109   CO2 mEQ/L 24   BUN mg/dL 15   CREATININE mg/dL 1.0   GLUCOSE mg/dL 108*   CALCIUM mg/dL 9.0          Results from last 7 days   Lab Units 04/25/20  1349   WBC K/uL 5.97   HEMOGLOBIN g/dL 13.6*   HEMATOCRIT % 40.2   PLATELETS K/uL 181                Microbiology Data personally reviewed:  Microbiology Results     Procedure Component Value Units Date/Time    SARS-CoV-2 (COVID-19), PCR Nasopharynx [[id number]]  (Normal) Collected:  04/25/20 1503    Specimen:  Nasopharyngeal Swab from Nasopharynx Updated:  04/25/20 1607     SARS-CoV-2 (COVID-19) Negative          ASSESSMENT AND  PLAN  Dyspnea  Assessment & Plan  With cough and subjective fevers for over 1 month - sars-cov-2 test still pending  Possible viral etiology but cannot rule out PE   CT chest pending   CXR is negative for acute disease and no O2 requirement  Supportive care with tylenol, cough suppressant, etc    Near syncope  Assessment & Plan  Possibly related to viral syndrome, dehydration but in setting of known DVT cannot rule out PE  Send for CT chest with IV contrast to rule out PE - will start heparin gtt and hold xarelto for now  Check orthostatics  Monitor on tele for arrhythmias  Check echocardiogram  Encourage hydration    HLD (hyperlipidemia)  Assessment & Plan  Cont statin    DVT (deep venous thrombosis) (CMS/[facility])  Assessment & Plan  On NOAC, denies any missed doses over last few weeks  Doppler done in ED showing resolution of L calf DVT but continued femoral/ proximal DVT  ? xarelto failure  Consult hematology - will start heparin gtt while awaiting results of CT chest    CAD (coronary artery disease)  Assessment & Plan  Sees Dr Campbell at [facility]  Has known angina - takes imdur, beta blocker, and nitroglycerin prn  Continue medical management  Low suspicion for ACS but with report of chest discomfort with coughing will trend troponin and monitor on tele          Code Status: Full Code  VTE Assessment: [place] VTE Score: 7  VTE Prophylaxis:   Estimated discharge date: 4/27/2020    Caity Wellington DO  4/25/2020 4:33 PM

## 2020-04-25 NOTE — ED ATTESTATION NOTE
I personally saw and evaluated the patient, participated in the management, and agree with the findings in the above note except as where stated.  The Physician Assistant and I discussed  the case, workup, and disposition.  Because of the COVID-19 pandemic, in order to limit patient contact and promote the safety of patients and the healthcare team and preserve PPE, I did not physically examine the patient.  84-year-old man states over the last week he has had fever with increasing dry cough and shortness of breath.  He states in early passed out today.  He has had is a mild headache but denies abdominal pain or vomiting.  Examination: Alert man pleasant cooperative no respiratory distress is not pale answer questions appropriately.  Nonfocal neurologic exam.  Discussion with patient about CODE STATUS and he states if need be, he would be intubated or get CPR.     Yariel Farmer MD  04/25/20 3119

## 2020-04-25 NOTE — ASSESSMENT & PLAN NOTE
Sees Dr Campbell at [facility]  Has known angina - takes imdur, beta blocker, and nitroglycerin prn  Continue medical management  Low suspicion for ACS but with report of chest discomfort with coughing will trend troponin and monitor on tele

## 2020-04-25 NOTE — ED PROVIDER NOTES
HPI     Chief Complaint   Patient presents with   • Cough     fever       Patient reports over the last week he has had periods of subjective fever with increasing cough increasing shortness of breath.  Patient states of the last 24 hours he has developed worsening shortness of breath.  With a nonproductive cough.  He reports today he had a period of feeling lightheaded like he could faint after going to the bathroom.  He also admits to intermittent chest discomfort multiple episodes over the last week.  Last episode was last night lasting about 10 minutes.  Patient states mild discomfort in his chest without radiation shortness of breath nauseous or vomiting.  Patient states usually he feels the chest pain at night.  Currently chest pain-free.  Patient denies any nauseous vomiting or diarrhea denies any abdominal pain.  Denies any headaches.  Denies any chills but admits to subjective fever.           Patient History     Past Medical History:   Diagnosis Date   • Colon cancer (CMS/HCC)    • Coronary artery disease    • DVT (deep venous thrombosis) (CMS/HCC)    • Hypertension    • Lipid disorder        Past Surgical History:   Procedure Laterality Date   • COLOSTOMY     • HERNIA REPAIR         Family History   Problem Relation Age of Onset   • Heart disease Biological Mother        Social History     Tobacco Use   • Smoking status: Never Smoker   • Smokeless tobacco: Never Used   Substance Use Topics   • Alcohol use: Not Currently   • Drug use: Never       Systems Reviewed from Nursing Triage:  Tobacco  Allergies  Meds  Problems  Med Hx  Surg Hx  Fam Hx  Soc Hx           Review of Systems     Review of Systems   Constitutional: Negative for chills and fever.   Respiratory: Positive for chest tightness and shortness of breath.    All other systems reviewed and are negative.       Physical Exam     ED Triage Vitals   Temp Heart Rate Resp BP SpO2   04/25/20 1338 04/25/20 1338 04/25/20 1338 04/25/20 1338 04/25/20  1338   37.1 °C (98.7 °F) 65 20 (!) 148/78 98 %      Temp Source Heart Rate Source Patient Position BP Location FiO2 (%) (Set)   04/25/20 1338 04/25/20 1504 04/25/20 1504 04/25/20 1504 --   Tympanic Monitor Lying Right upper arm        Pulse Ox %: 99 % (04/25/20 1406)  Pulse Ox Interpretation: Normal (04/25/20 1406)  Heart Rate: 68 (04/25/20 1406)  Rhythm Strip Interpretation: Sinus Bradycardia (04/25/20 1406)    Patient Vitals for the past 24 hrs:   BP Temp Temp src Pulse Resp SpO2 Weight   04/25/20 1808 118/63 -- -- (!) 58 20 97 % --   04/25/20 1619 (!) 133/93 -- -- (!) 56 18 96 % --   04/25/20 1504 (!) 112/58 -- -- (!) 56 18 97 % --   04/25/20 1338 (!) 148/78 37.1 °C (98.7 °F) Tympanic 65 20 98 % 72.6 kg (160 lb)                                          Physical Exam   Constitutional: He is oriented to person, place, and time. He appears well-developed.   HENT:   Head: Normocephalic.   Eyes: Pupils are equal, round, and reactive to light.   Cardiovascular: Normal rate, regular rhythm and normal heart sounds.   Pulmonary/Chest: Effort normal. No respiratory distress. He has rales (Mild right base).   Abdominal: Soft. Bowel sounds are normal. He exhibits no distension. There is no tenderness. There is no guarding.   Musculoskeletal: Normal range of motion. He exhibits no tenderness.   Neurological: He is alert and oriented to person, place, and time.   Skin: Skin is warm.   Psychiatric: He has a normal mood and affect.            Procedures    ED Course & MDM     Labs Reviewed   CBC AND DIFF - Abnormal       Result Value    WBC 5.97      RBC 4.38 (*)     Hemoglobin 13.6 (*)     Hematocrit 40.2      MCV 91.8      MCH 31.1      MCHC 33.8      RDW 13.4      Platelets 181      MPV 10.0      Differential Type Auto      nRBC 0.0      Immature Granulocytes 0.2      Neutrophils 74.0      Lymphocytes 10.7      Monocytes 12.2      Eosinophils 2.2      Basophils 0.7      Immature Granulocytes, Absolute 0.01      Neutrophils,  Absolute 4.42      Lymphocytes, Absolute 0.64 (*)     Monocytes, Absolute 0.73      Eosinophils, Absolute 0.13      Basophils, Absolute 0.04     BASIC METABOLIC PANEL - Abnormal    Sodium 139      Potassium 4.5      Chloride 109      CO2 24      BUN 15      Creatinine 1.0      Glucose 108 (*)     Calcium 9.0      eGFR >60.0      Anion Gap 6     SARS-COV-2 (COVID 19), PCR - Normal    SARS-CoV-2 (COVID-19) Negative     TROPONIN I - Normal    Troponin I <0.02     RAINBOW DRAW PANEL    Narrative:     The following orders were created for panel order [place] Draw Panel.  Procedure                               Abnormality         Status                     ---------                               -----------         ------                     RAINBOW LT BLUE[[id number]]                                  In process                   Please view results for these tests on the individual orders.   RAINBOW LT BLUE       CT CHEST PULMONARY EMBOLISM WITH IV CONTRAST   Final Result   IMPRESSION:   1.  No evidence of acute pulmonary embolism to the level of the segmental   pulmonary arteries.   2. Bilateral pulmonary nodules.  Recommend comparison with prior outside CT   chest imaging.   3. Two sclerotic foci right ribs. Likely represent bone islands however direct   comparison to the previous CT is recommended.   4. Prominent renal pelvis cystic structures likely parapelvic cysts. Dilated   renal pelvises unlikely however direct comparison to the previous study is   recommended.         I certify that I have reviewed this examination and agree with this report.   Harmony Iraheta MD      US venous leg bilateral   Final Result   IMPRESSION:   1.  Occlusive thrombus within the LEFT femoral through popliteal vein.   2.  No evidence for RIGHT lower extremity deep venous thrombosis.      Finding: Other   Acuity: Critical  Status: OPEN   The results were critically read back with DR RAVINDER JUSTICE on 4/25/2020 4:   PM.      I certify  that I have reviewed this examination and agree with this report.   Ishmael Montes De Oca MD      X-RAY CHEST 1 VIEW   Final Result   IMPRESSION:   No definite acute cardiopulmonary disease.         I certify that I have reviewed this examination and agree with this report.   Ishmael Montes De Oca MD      ECG 12 lead   ED Interpretation   Sinus 62 bpm with first-degree block EKG read by attending                        [facility]         ED Course as of Apr 25 1850   Sat Apr 25, 2020   1514 I spoke with Dr. Wellington regarding admission.  Awaiting ultrasound.  I was reviewing plan with patient when he commented that he has been rubbing his left calf his left pain in his calf this week.  Ordered ultrasound pending results    [JR]   1619 Discussed with Dr. Wellington she will write for anticoagulations patient has a thrombus in his left lower extremity and she is going order a CT chest     [JR]      ED Course User Index  [JR] Ricardo Leroy PA C         Clinical Impressions as of Apr 25 1850   Near syncope   Cough   Dyspnea, unspecified type   Left leg pain   Acute deep vein thrombosis (DVT) of femoral vein of left lower extremity (CMS/[facility])        Ricardo Leroy PA C  04/25/20 1850

## 2020-04-26 LAB
APTT PPP: 132 SEC (ref 23–35)
APTT PPP: 76 SEC (ref 23–35)
APTT PPP: >230 SEC (ref 23–35)
ATRIAL RATE: 62
P AXIS: 44
PR INTERVAL: 248
QRS DURATION: 96
QT INTERVAL: 412
QTC CALCULATION(BAZETT): 418
R AXIS: 7
T WAVE AXIS: 34
TROPONIN I SERPL-MCNC: <0.02 NG/ML
VENTRICULAR RATE: 62

## 2020-04-26 PROCEDURE — 36415 COLL VENOUS BLD VENIPUNCTURE: CPT | Performed by: INTERNAL MEDICINE

## 2020-04-26 PROCEDURE — 63700000 HC SELF-ADMINISTRABLE DRUG: Performed by: INTERNAL MEDICINE

## 2020-04-26 PROCEDURE — 85730 THROMBOPLASTIN TIME PARTIAL: CPT | Performed by: INTERNAL MEDICINE

## 2020-04-26 PROCEDURE — 97116 GAIT TRAINING THERAPY: CPT | Mod: GP

## 2020-04-26 PROCEDURE — 63700000 HC SELF-ADMINISTRABLE DRUG: Performed by: HOSPITALIST

## 2020-04-26 PROCEDURE — 84484 ASSAY OF TROPONIN QUANT: CPT | Performed by: HOSPITALIST

## 2020-04-26 PROCEDURE — 63600000 HC DRUGS/DETAIL CODE: Performed by: INTERNAL MEDICINE

## 2020-04-26 PROCEDURE — 97162 PT EVAL MOD COMPLEX 30 MIN: CPT | Mod: GP

## 2020-04-26 PROCEDURE — 99233 SBSQ HOSP IP/OBS HIGH 50: CPT | Performed by: HOSPITALIST

## 2020-04-26 PROCEDURE — 85730 THROMBOPLASTIN TIME PARTIAL: CPT | Performed by: HOSPITALIST

## 2020-04-26 PROCEDURE — 63700000 HC SELF-ADMINISTRABLE DRUG: Performed by: STUDENT IN AN ORGANIZED HEALTH CARE EDUCATION/TRAINING PROGRAM

## 2020-04-26 PROCEDURE — 20600000 HC ROOM AND CARE INTERMEDIATE/TELEMETRY

## 2020-04-26 RX ORDER — DOCUSATE SODIUM 100 MG/1
100 CAPSULE, LIQUID FILLED ORAL 2 TIMES DAILY
Status: DISCONTINUED | OUTPATIENT
Start: 2020-04-26 | End: 2020-04-27 | Stop reason: HOSPADM

## 2020-04-26 RX ADMIN — DOCUSATE SODIUM 100 MG: 100 CAPSULE, LIQUID FILLED ORAL at 12:42

## 2020-04-26 RX ADMIN — ISOSORBIDE MONONITRATE 30 MG: 30 TABLET, EXTENDED RELEASE ORAL at 09:13

## 2020-04-26 RX ADMIN — APIXABAN 10 MG: 5 TABLET, FILM COATED ORAL at 18:51

## 2020-04-26 RX ADMIN — METOPROLOL SUCCINATE 100 MG: 100 TABLET, EXTENDED RELEASE ORAL at 09:13

## 2020-04-26 RX ADMIN — ATORVASTATIN CALCIUM 20 MG: 20 TABLET, FILM COATED ORAL at 17:20

## 2020-04-26 RX ADMIN — DOCUSATE SODIUM 100 MG: 100 CAPSULE, LIQUID FILLED ORAL at 18:51

## 2020-04-26 RX ADMIN — HEPARIN SODIUM AND DEXTROSE 1100 UNITS/HR: 10000; 5 INJECTION INTRAVENOUS at 16:16

## 2020-04-26 ASSESSMENT — COGNITIVE AND FUNCTIONAL STATUS - GENERAL
WALKING IN HOSPITAL ROOM: 4 - NONE
MOVING TO AND FROM BED TO CHAIR: 4 - NONE
STANDING UP FROM CHAIR USING ARMS: 4 - NONE
CLIMB 3 TO 5 STEPS WITH RAILING: 4 - NONE
AFFECT: WFL

## 2020-04-26 NOTE — PROGRESS NOTES
"Patient: Dave RODRIGUEZ [facility]  Location: [address] [address]  MRN: [id number]  Today's date: 4/26/2020    Patient in bed, alarm on, call bell and belongings in reach, NADLuis Alberto RODRIGUEZ is a 84 y.o. male admitted on 4/25/2020 with Near syncope. Principal problem is No Principal Problem: There is no principal problem currently on the Problem List. Please update the Problem List and refresh..    Past Medical History  Dave RODRIGUEZ has a past medical history of Colon cancer (CMS/[facility]), Coronary artery disease, DVT (deep venous thrombosis) (CMS/[facility]), Hypertension, and Lipid disorder.    History of Present Illness  Pt presented to [facility] ED with dyspnea and cough. He also reports \"almost passing out\" this morning after urinating. He was standing in his bathroom when he suddenly felt like he was about to faint. He grabbed onto the sink and was able to brace himself. He did not fall but he did continue to have lightheadedness for the next hour or so. He reports feeling \"dizzy\" all day today. He admits he does not drink enough fluids but has been eating normal amount. He reports he developed a productive cough approx 4 weeks ago that has been resolving and is now dry. Patient stated he came to hospital over concern about COVID-like symptoms. COVID (-).    PT Vitals    Date/Time Pulse SpO2 Pt Activity O2 Therapy BP [facility]   04/26/20 1620 75 94 % At rest None (Room air) 122/75 BP taken by nursing prior to PT's arrival ML   04/26/20 1625 -- 94 % Walking None (Room air) -- MLP      PT Pain    Date/Time Pain Type Pref Pain Scale Rating: Rest Rating: Activity [facility]   04/26/20 1620 Pain Assessment number (Numeric Rating Pain Scale) 0 0 MLP          Prior Living Environment      Most Recent Value   Living Environment Comment  2SH w/ FF U/L HR to 2nd fl bed/bath (stall shower), Does have stall shower on 1st fl if needed. Patient stated he lives w/ \"two or three other men on sort of like a [place].\"          Prior Level of Function  "     Most Recent Value   Dominant Hand  right   Ambulation  independent   Transferring  independent   Toileting  independent   Bathing  independent   Dressing  independent   Eating  independent   Communication  understands/communicates without difficulty   Swallowing  swallows foods/liquids without difficulty   Prior Level of Function Comment  ind w/ ADLs, works as a  where he lives, drives, uses no DME          PT Evaluation and Treatment - 04/26/20 1610        Time Calculation    Start Time  1610     Stop Time  1633     Time Calculation (min)  23 min        Session Details    Document Type  initial evaluation     Mode of Treatment  physical therapy        General Information    Patient Profile Reviewed?  yes     General Observations of Patient  patient standing at sink w/ nurse when PT entered.     Existing Precautions/Restrictions  other (see comments);fall    on Heparin drip       Cognition/Psychosocial    Affect/Mental Status (Cognitive)  WFL     Orientation Status (Cognition)  oriented x 3     Follows Commands (Cognition)  WFL        Sensory    Hearing Status  WFL        Vision Assessment/Intervention    Visual Impairment/Limitations  WFL        Sensory Assessment (Somatosensory)    Sensory Assessment (Somatosensory)  sensation intact;bilateral LE        Range of Motion (ROM)    Range of Motion  ROM is WFL        Strength (Manual Muscle Testing)    Strength (Manual Muscle Testing)  strength is WFL        Strength Comprehensive (MMT)    Comprehensive MMT Assessment  no strength deficits identified        Bed Mobility    Bed Mobility  bed mobility (all) activities     [place], All Bed Mobility Activities  independent        Sit to Stand Transfer    [place], Sit to Stand Transfer  distant supervision     Assistive Device  none        Stand to Sit Transfer    [place], Stand to Sit Transfer  distant supervision     Assistive Device  none        Gait Training    [place], Gait  distant  supervision     Assistive Device  other (see comments)    used IV pole as AD    Distance in Feet  400 feet     Gait Pattern Utilized  step-through     Comment  no SOB, tolerated well, SpO2 94% RA        Stairs Training    [place], Stairs  distant supervision     Assistive Device  railing     Handrail Location  right side (ascending)     Number of Stairs  3     Ascending Stairs Technique  step-over-step     Descending Stairs Technique  step-over-step     Comment  descended backwards using HR on R, no LOB    Limited steps due to IV length       Balance    Balance Assessment  sitting static balance;standing static balance     Static Sitting Balance  WFL     Static Standing Balance  WFL        Motor Skills    Motor Skills  coordination;functional endurance     Coordination  WFL;fine motor deficit;gross motor deficit     Functional Endurance  good        Coping    Observed Emotional State  accepting     Verbalized Emotional State  acceptance        AM-PAC (TM) - Mobility (Current Function)    Turning from your back to your side while in a flat bed without using bedrails?  4 - None     Moving from lying on your back to sitting on the side of a flat bed without using bedrails?  4 - None     Moving to and from a bed to a chair?  4 - None     Standing up from a chair using your arms?  4 - None     To walk in a hospital room?  4 - None     Climbing 3-5 steps with a railing?  4 - None     AM-PAC (TM) Mobility Score  24        Therapy Assessment/Plan (PT)    Rehab Potential (PT)  good, to achieve stated therapy goals     Therapy Frequency (PT)  2 times/wk    likely only needs 1-2 sessions to assess on FF of steps       Progress Summary (PT)    Daily Outcome Statement (PT)  Good motivation, happy to have ambulated/did some steps w/ PT.  Used IV as AD in order to slow patient's pace as patient mildly impulsive.     Symptoms Noted During/After Treatment  none    no SOB, no dizziness.       Therapy Plan Review/Discharge Plan  (PT)    PT Recommended Discharge Disposition  home with assist     Anticipated Equipment Needs at Discharge (PT Eval)  none        Plan of Care Review    Plan of Care Reviewed With  patient                       Education provided this session. See the Patient Education summary report for full details.    PT Goals      Most Recent Value   Gait Training Goal 1   Activity/Assistive Device  gait (walking locomotion) at 04/26/2020 1610   [place]  independent at 04/26/2020 1610   Distance  400 at 04/26/2020 1610   Stairs Goal 1   Activity/Assistive Device  stairs, all skills, ascending stairs, descending stairs at 04/26/2020 1610   [place]  independent at 04/26/2020 1610   Number of Stairs  13 at 04/26/2020 1610   Time Frame  by discharge at 04/26/2020 1610   Progress/Outcome  goal ongoing at 04/26/2020 1610

## 2020-04-26 NOTE — ASSESSMENT & PLAN NOTE
Possibly related to viral syndrome, dehydration   Sent  for CT chest with IV contrast  Has ruled out new or acute  PE  TTE nml EF 60-60% no wall motion abnormalities    Follow up with outpt cardiologist at [facility]  Encourage hydration

## 2020-04-26 NOTE — HOSPITAL COURSE
"Dave RODRIGUEZ is a 84 y.o. male admitted on 4/25/2020 with Near syncope. Principal problem is No Principal Problem: There is no principal problem currently on the Problem List. Please update the Problem List and refresh..    Past Medical History  Dave RODRIGUEZ has a past medical history of Colon cancer (CMS/[facility]), Coronary artery disease, DVT (deep venous thrombosis) (CMS/[facility]), Hypertension, and Lipid disorder.    History of Present Illness  Pt presented to [facility] ED with dyspnea and cough. He also reports \"almost passing out\" this morning after urinating. He was standing in his bathroom when he suddenly felt like he was about to faint. He grabbed onto the sink and was able to brace himself. He did not fall but he did continue to have lightheadedness for the next hour or so. He reports feeling \"dizzy\" all day today. He admits he does not drink enough fluids but has been eating normal amount. He reports he developed a productive cough approx 4 weeks ago that has been resolving and is now dry. Patient stated he came to hospital over concern about COVID-like symptoms. COVID (-).  "

## 2020-04-26 NOTE — PROGRESS NOTES
"[facility] DAILY SOAP NOTE    SUBJECTIVE  Dave Arredondo is a 84 y.o. year-old male admitted on 2020 with Cough [R05]  Left leg pain [M79.605]  Near syncope [R55]  Acute deep vein thrombosis (DVT) of femoral vein of left lower extremity (CMS/[facility]) [I82.412]  Dyspnea, unspecified type [R06.00].  Mild left calf pain, is better, no other complaint, denies dizziness, denies cough or shortness of breath, denies chest pain.    Patient is awake alert orientated, he said he will call to family by himself    Reviewed case with all relevant consultants.    OBJECTIVE  Vitals:   Visit Vitals  BP (!) 163/79 (BP Location: Right upper arm, Patient Position: Lying)   Pulse 63   Temp 36.6 °C (97.9 °F) (Oral)   Resp 16   Ht 1.676 m (5' 6\")   Wt 72.6 kg (160 lb)   SpO2 97%   BMI 25.82 kg/m²     Tmax (24h): Temp (24hrs), Av.7 °C (98 °F), Min:36.5 °C (97.7 °F), Max:37.1 °C (98.7 °F)    I/Os:    Intake/Output Summary (Last 24 hours) at 2020 1009  Last data filed at 2020 0557  Gross per 24 hour   Intake --   Output 650 ml   Net -650 ml       Physical Exam:  Physical Exam       General: no acute distress  HEENT: Normocephalic atraumatic.  Pupils equal round reactive to light.  Sclera anicteric.    Oropharynx: moist mucous membranes with no lesions  Neck: supple, trachea midline  Heart: regular rate, regular rhythm, normal S1, normal S2  Lungs: clear to auscultation bilaterally,   Abdomen: soft, normal bowel sounds, no distension, no mass, no tenderness  Musculoskeletal: normal range of motion, no deformity  Skin: no rash  Neurologic::alert, Cranial nerves II through XII are grossly intact.  Sensation intact to light touch.  Power is noted to be equal and symmetric at 5 out of 5.    Vital signs reviewed        DATA  Imaging personally reviewed (does not include unread studies):  X-ray Chest 1 View    Result Date: 2020  IMPRESSION: No definite acute cardiopulmonary disease. I certify that I have " reviewed this examination and agree with this report. Ishmael Montes De Oca MD    Ct Chest Pulmonary Embolism With Iv Contrast    Result Date: 4/25/2020  IMPRESSION: 1.  No evidence of acute pulmonary embolism to the level of the segmental pulmonary arteries. 2. Bilateral pulmonary nodules.  Recommend comparison with prior outside CT chest imaging. 3. Two sclerotic foci right ribs. Likely represent bone islands however direct comparison to the previous CT is recommended. 4. Prominent renal pelvis cystic structures likely parapelvic cysts. Dilated renal pelvises unlikely however direct comparison to the previous study is recommended. I certify that I have reviewed this examination and agree with this report. Harmony Iraheta MD    Us Venous Leg Bilateral    Result Date: 4/25/2020  IMPRESSION: 1.  Occlusive thrombus within the LEFT femoral through popliteal vein. 2.  No evidence for RIGHT lower extremity deep venous thrombosis. Finding: Other   Acuity: Critical  Status: OPEN The results were critically read back with DR RAVINDER JUSTICE on 4/25/2020 4: PM. I certify that I have reviewed this examination and agree with this report. Ishmael Montes De Oca MD      Telemetry/ECGs: Personally reviewed    Labs (personally reviewed):  Results from last 7 days   Lab Units 04/25/20  1349   SODIUM mEQ/L 139   POTASSIUM mEQ/L 4.5   CHLORIDE mEQ/L 109   CO2 mEQ/L 24   BUN mg/dL 15   CREATININE mg/dL 1.0   GLUCOSE mg/dL 108*   CALCIUM mg/dL 9.0          Results from last 7 days   Lab Units 04/25/20  1349   WBC K/uL 5.97   HEMOGLOBIN g/dL 13.6*   HEMATOCRIT % 40.2   PLATELETS K/uL 181       Results from last 7 days   Lab Units 04/25/20  1350   INR INR 1.2          Microbiology Data (personally reviewed):  Microbiology Results     Procedure Component Value Units Date/Time    SARS-CoV-2 (COVID-19), PCR Nasopharynx [[id number]]  (Normal) Collected:  04/25/20 1508    Specimen:  Nasopharyngeal Swab from Nasopharynx Updated:  04/25/20 5209      SARS-CoV-2 (COVID-19) Negative          ASSESSMENT AND PLAN  Dyspnea  Assessment & Plan  rWith cough and subjective fevers for over 1 month    resolved     sars-cov-2 test neg  CXR is negative for acute disease and no O2 requirement  Supportive care with tylenol, cough suppressant, etc    * DVT (deep venous thrombosis) (CMS/HCC)  Assessment & Plan  On NOAC, denies any missed doses over last few weeks  Doppler done in ED showing resolution of L calf DVT with  Occlusive thrombus within the LEFT femoral through popliteal vein.  Likely xarelto failure  Pending  hematology   Cont  heparin gtt       Near syncope  Assessment & Plan  Possibly related to viral syndrome, dehydration   Sent  for CT chest with IV contrast  Has ruled out new or acute  PE    Cont heparin gtt and hold xarelto for now because of new DVT in left lower extremity possible failed outpatient  xarelto  at home    Check orthostatics  Monitor on tele for arrhythmias  Check echocardiogram  Encourage hydration        Code Status: Full Code  VTE Assessment: [place] VTE Score: 7  VTE Prophylaxis: Current anticoagulant orders:              heparin infusion in D5W 100 units/mL  Titrated          heparin (porcine) bolus from bag 2,900-5,800 Units  Every 6 hours PRN                 Estimated discharge date: 4/27/2020    Hollis Atkinson MD  4/26/2020 10:09 AM

## 2020-04-26 NOTE — PLAN OF CARE
Problem: Adult Inpatient Plan of Care  Goal: Plan of Care Review  Outcome: Progressing  Flowsheets (Taken 4/26/2020 1075)  Progress: improving  Plan of Care Reviewed With: patient  Outcome Summary: PT eval completed.

## 2020-04-26 NOTE — CONSULTS
Heme/Onc Consult Note  Given the COVID-19 pandemic this consult was done remotely in order to protect the safety of the patient and the health care team and also to preserve personal protective equipment.  The patient was interviewed on the phone and the electronic medical record was reviewed.    Subjective     Dave Arredondo is a 84 y.o. male who was admitted for Cough [R05]  Left leg pain [M79.605]  Near syncope [R55]  Acute deep vein thrombosis (DVT) of femoral vein of left lower extremity (CMS/HCC) [I82.412]  Dyspnea, unspecified type [R06.00]. Hematology/Oncology consulted for management recommendations.     Patient is 84-year-old gentleman with a history of rectal cancer in 2012 without evidence of recurrence and a history in March 2019 of having an extensive left leg DVT and subsegmental pulmonary emboli.  He was treated with Xarelto at a therapeutic dose for about 6 months and then in the fall of 2019 was changed to a prophylactic dose of 10 mg daily.  He reports that he has been compliant with his Xarelto.      The patient has had about 4 weeks of initially a productive cough which is now resolving.  On the day prior to admission he noted some dyspnea on exertion.  The dyspnea became worse on the day of admission and he had a near syncopal episode which prompted him going to the emergency department.  CTA of the chest was negative for pulmonary emboli.  Left leg Doppler showed a DVT from his calf into the mid common femoral vein.  Report of a Doppler done and in the [place] system from March 2019 was that there was an extensive acute DVT from the calf into the mid common femoral vein.    Xarelto has been discontinued and the patient is now on IV heparin.   COVID-19 NP swab is reported to be negative.    The patient states that he has had some left calf pain over about the past week in addition to his other symptoms as noted above.  He denies having any fevers.    Regarding the history of rectal cancer  records are difficult find given that the patient has been seen at three separate health systems.  However, he tells me that he was diagnosed with rectal cancer in 2010.  On review of records it was apparently a [id number] rectal cancer for which he received neoadjuvant chemotherapy/radiation followed by surgery.  He tells me that he had multiple complications after the surgery and had several operations.  He has a colostomy.  He tells me that he received 4 months of adjuvant chemotherapy postoperatively.  There has been no evidence of recurrence of the rectal cancer.    Outside records reviewed.    Medical History:   Past Medical History:   Diagnosis Date   • Colon cancer (CMS/HCC)    • Coronary artery disease    • DVT (deep venous thrombosis) (CMS/HCC)    • Hypertension    • Lipid disorder        Surgical History:   Past Surgical History:   Procedure Laterality Date   • COLOSTOMY     • HERNIA REPAIR         Allergies: Patient has no known allergies.    •  atorvastatin, 20 mg, oral, Daily (6p)  •  atropine, 0.5 mg, intravenous, q5 min PRN  •  glucose, 16-32 g of dextrose, oral, PRN **OR** dextrose, 15-30 g of dextrose, oral, PRN **OR** glucagon, 1 mg, intramuscular, PRN **OR** dextrose in water, 25 mL, intravenous, PRN  •  docusate sodium, 100 mg, oral, BID  •  heparin (porcine), 40-80 Units/kg, intravenous, q6h PRN  •  heparin infusion - MAR calculator by PTT, 0-4,000 Units/hr, intravenous, Titrated  •  isosorbide mononitrate, 30 mg, oral, Daily  •  metoprolol succinate XL, 100 mg, oral, Daily  •  nitroglycerin, 0.4 mg, sublingual, q5 min PRN    Social History:   Social History     Socioeconomic History   • Marital status: Single     Spouse name: None   • Number of children: None   • Years of education: None   • Highest education level: None   Occupational History   • None   Social Needs   • Financial resource strain: None   • Food insecurity:     Worry: None     Inability: None   • Transportation needs:     Medical:  "None     Non-medical: None   Tobacco Use   • Smoking status: Never Smoker   • Smokeless tobacco: Never Used   Substance and Sexual Activity   • Alcohol use: Not Currently   • Drug use: Never   • Sexual activity: None   Lifestyle   • Physical activity:     Days per week: None     Minutes per session: None   • Stress: None   Relationships   • Social connections:     Talks on phone: None     Gets together: None     Attends [employer] service: None     Active member of club or organization: None     Attends meetings of clubs or organizations: None     Relationship status: None   • Intimate partner violence:     Fear of current or ex partner: None     Emotionally abused: None     Physically abused: None     Forced sexual activity: None   Other Topics Concern   • None   Social History Narrative    Lives in the [facility] in [place]        Family History:   Family History   Problem Relation Age of Onset   • Heart disease Biological Mother        Review of Systems  All other systems reviewed and negative except as noted in the HPI.    Vital signs in last 24 hours:  Temp:  [36.5 °C (97.7 °F)-37.1 °C (98.7 °F)] 36.6 °C (97.9 °F)  Heart Rate:  [55-65] 63  Resp:  [16-20] 16  BP: (112-163)/(58-93) 163/79    Objective     Physical Exam:  Visit Vitals  BP (!) 163/79 (BP Location: Right upper arm, Patient Position: Lying)   Pulse 63   Temp 36.6 °C (97.9 °F) (Oral)   Resp 16   Ht 1.676 m (5' 6\")   Wt 72.6 kg (160 lb)   SpO2 97%   BMI 25.82 kg/m²         Labs  I have reviewed the patient's labs to the time of note. No new clinical concern.    Results from last 7 days   Lab Units 04/25/20  1349   WBC K/uL 5.97   HEMOGLOBIN g/dL 13.6*   HEMATOCRIT % 40.2   PLATELETS K/uL 181     Results from last 7 days   Lab Units 04/25/20  1349   SODIUM mEQ/L 139   POTASSIUM mEQ/L 4.5   CHLORIDE mEQ/L 109   CO2 mEQ/L 24   BUN mg/dL 15   CREATININE mg/dL 1.0   GLUCOSE mg/dL 108*   CALCIUM mg/dL 9.0     Results from last 7 days   Lab Units " 04/26/20  0526 04/25/20  2103 04/25/20  1349   TROPONIN I ng/mL <0.02 <0.02 <0.02     Results from last 7 days   Lab Units 04/25/20  1350   INR INR 1.2         Imaging  I have independently reviewed the pertinent imaging from the last 24 hrs.  X-ray Chest 1 View    Result Date: 4/25/2020  CLINICAL HISTORY: Shortness of breath.  COVID clinically suspected. COMPARISON: None available. COMMENT: Frontal erect portable view of the chest was obtained. Lungs/pleura: No definite focal consolidation. No large pleural effusion or pneumothorax. Cardiomediastinal silhouette: Within normal limits. Upper abdomen: Within normal limits. Bones: Regional osseous structures are unremarkable.     IMPRESSION: No definite acute cardiopulmonary disease. I certify that I have reviewed this examination and agree with this report. Ishmael Montes De Oca MD    Ct Chest Pulmonary Embolism With Iv Contrast    Result Date: 4/25/2020  CLINICAL HISTORY: Dyspnea.  Clinical concern for pulmonary embolism.  Left lower extremity deep vein thrombosis.  History of colorectal cancer 2012. COMMENT: COMPARISON: Outside report CT chest 8/20/2019. Chest x-ray 4/25/2020 TECHNIQUE: CT of the chest was performed with the patient in the supine position. Images reconstructed/reformatted in the axial, coronal and sagittal plane. CT DOSE:  One or more dose reduction techniques (e.g. automated exposure control, adjustment of the mA and/or kV according to patient size, use of iterative reconstruction technique) utilized for this examination. ORAL CONTRAST: None INTRAVENOUS CONTRAST: 85 mL of Omnipaque 350 intravenous contrast was administered without event. Image quality and contrast bolus timing: Satisfactory bolus.  Respiratory motion at the lung bases limits evaluation of the subsegmental pulmonary arteries. CHEST LUNG, LARGE AIRWAYS AND PLEURA: Bibasilar dependent hypoventilatory changes. Bibasilar subsegmental atelectasis and/or scarring. Pulmonary nodules are as  follows: *  6 mm left upper lobe linear nodular opacity likely scar (image #45). *  5 mm lateral right middle lobe pulmonary nodule (lung series, axial image #61) appears linear on reformatted images and may represent scar. *  2 mm right middle lobe pulmonary nodule (image #66). *  3 mm right middle lobe subpleural pulmonary nodule (image #63). *  5 mm right lower lobe pulmonary nodule (image #67). *  3 mm left lingula pulmonary nodule (image #84). *  4 mm left upper lobe pulmonary nodule (image #57). *  2 mm right upper lobe nodule (image #54). Scattered minimal subpleural reticulation.  No focal consolidation.  No pleural effusion or pneumothorax. CHEST WALL AND LOWER NECK: Bilateral gynecomastia MEDIASTINUM AND JENNIFER: Within normal limits. HEART: Normal in size. No pericardial effusion.  Coronary artery calcifications. VESSELS: No evidence of acute intraluminal filling defect within the central, lobar, or segmental pulmonary arteries to suggest pulmonary embolism. Atherosclerotic disease of the normal caliber thoracic aorta. UPPER ABDOMEN: Partially included kidneys demonstrate prominent renal pelvis cystic structures, left greater than right. Parapelvic cysts are described on the previous abdominal CT. BONES: Sclerotic foci right ribs. These may represent bone islands. Multilevel degenerative changes in the imaged thoracolumbar spine.     IMPRESSION: 1.  No evidence of acute pulmonary embolism to the level of the segmental pulmonary arteries. 2. Bilateral pulmonary nodules.  Recommend comparison with prior outside CT chest imaging. 3. Two sclerotic foci right ribs. Likely represent bone islands however direct comparison to the previous CT is recommended. 4. Prominent renal pelvis cystic structures likely parapelvic cysts. Dilated renal pelvises unlikely however direct comparison to the previous study is recommended. I certify that I have reviewed this examination and agree with this report. Harmony Iraheta,  MD    Us Venous Leg Bilateral    Result Date: 4/25/2020  CLINICAL HISTORY: Left leg pain.  Cough and shortness of breath.  History of deep vein thrombosis.  COVID possible. COMMENT: Ultrasound examination of the bilateral lower extremity venous system is performed utilizing gray scale, color, and pulsed Doppler sonography. COMPARISON: US lower extremity outside report 3/5/2019. There is normal response to compression within the RIGHT common femoral, femoral, and popliteal veins. Normal color-flow, venous waveforms, and response to augmentation is identified. The peroneal and posterior tibial vein were also examined in the calf and where visualized are free of thrombus. There is near occlusive thrombus noted within the LEFT femoral and popliteal veins, which demonstrates noncompressibility, trace color-flow, and absent venous waveforms. There is normal response to compression within the LEFT common femoral vein. Normal color-flow, venous waveforms, and response to augmentation is identified. The peroneal and posterior tibial vein were also examined in the calf and where visualized are free of thrombus.     IMPRESSION: 1.  Occlusive thrombus within the LEFT femoral through popliteal vein. 2.  No evidence for RIGHT lower extremity deep venous thrombosis. Finding: Other   Acuity: Critical  Status: OPEN The results were critically read back with DR RAVINDER JUSTICE on 4/25/2020 4: PM. I certify that I have reviewed this examination and agree with this report. Ishmael Montes De Oca MD          Assessment   84 y.o. male being consulted for management recommendations.       The patient presents with a near syncopal episode probably from dehydration due to a viral syndrome.  He has had some left calf pain and a venous Doppler shows a DVT as noted above.  In March 2019 he had an extensive acute left leg DVT from the calf extending into the mid common femoral vein.  He has been treated with Xarelto at a therapeutic dose for 6  months and in the fall of 2019 he was changed to prophylactic dose.  He has recently had some left calf pain.  CT of the chest shows no evidence of pulmonary emboli.  He has a history of rectal cancer as noted in the HPI without evidence of recurrence.    It is difficult to tell whether this is truly a Xarelto failure since there have not been any Dopplers done since his initial diagnosis one year ago until now.  However, given that he does have left calf pain I believe that he should not be placed back on Xarelto, but I would use Eliquis instead.  I recommend that the IV heparin be discontinued and that he be treated with Eliquis 10 mg twice a day for 7 days and then 5 mg twice a day.  Obviously, he needs to follow-up with his other physicians both in the [place] and [place] systems for continued management.    Our group will be available to comment during the rest of the patient's hospitalization.     Plan     No new Assessment & Plan notes have been filed under this hospital service since the last note was generated.  Service: Oncology      See above.      Jorge Plummer MD  4/26/2020

## 2020-04-26 NOTE — PLAN OF CARE
Problem: Adult Inpatient Plan of Care  Goal: Plan of Care Review  Outcome: Progressing  Flowsheets (Taken 4/26/2020 1533)  Progress: no change  Plan of Care Reviewed With: patient  Outcome Summary: Patient feeling better today. No dizziness, afebrile. CHD currently at 1100. next PTT due 1745.  Waiting on heme/onc c/s; has been on Xarelto for H/O of DVT.

## 2020-04-26 NOTE — ASSESSMENT & PLAN NOTE
On NOAC, denies any missed doses over last few weeks  Doppler done in ED showing resolution of L calf DVT with  Occlusive thrombus within the LEFT femoral through popliteal vein.Likely xarelto failure  hematology consulted- recommended switching xarelto to Eliquis   Follow up with outpt hematologist at [place]

## 2020-04-27 ENCOUNTER — APPOINTMENT (INPATIENT)
Dept: CARDIOLOGY | Facility: HOSPITAL | Age: 85
DRG: 301 | End: 2020-04-27
Attending: HOSPITALIST
Payer: MEDICARE

## 2020-04-27 VITALS
TEMPERATURE: 97.6 F | SYSTOLIC BLOOD PRESSURE: 164 MMHG | HEIGHT: 66 IN | OXYGEN SATURATION: 96 % | WEIGHT: 160 LBS | HEART RATE: 62 BPM | RESPIRATION RATE: 16 BRPM | DIASTOLIC BLOOD PRESSURE: 82 MMHG | BODY MASS INDEX: 25.71 KG/M2

## 2020-04-27 LAB
ANION GAP SERPL CALC-SCNC: 6 MEQ/L (ref 3–15)
AORTIC ROOT ANNULUS: 4.2 CM
AORTIC VALVE MEAN VELOCITY: 0.62 M/S
AORTIC VALVE VELOCITY TIME INTEGRAL: 20.5 CM
APTT PPP: 31 SEC (ref 23–35)
AV MEAN GRADIENT: 2 MMHG
AV PEAK GRADIENT: 4 MMHG
AV PEAK VELOCITY-S: 0.96 M/S
AV REG PEAK VEL: 2.31 M/S
AV REGURGITATION PRESSURE HALF TIME: 649 MS
AV VALVE AREA: 2.21 CM2
BSA FOR ECHO PROCEDURE: 1.84 M2
BUN SERPL-MCNC: 18 MG/DL (ref 8–20)
CALCIUM SERPL-MCNC: 9 MG/DL (ref 8.9–10.3)
CHLORIDE SERPL-SCNC: 109 MEQ/L (ref 98–109)
CO2 SERPL-SCNC: 24 MEQ/L (ref 22–32)
CREAT SERPL-MCNC: 0.8 MG/DL (ref 0.8–1.3)
DOP CALC LVOT STROKE VOLUME: 45.22 ML
E WAVE DECELERATION TIME: 331 MS
E/A RATIO: 0.8
E/E' RATIO: 11.7
E/LAT E' RATIO: 8.2
EDV (BP): 110 CM3
EF (A4C): 67.3 %
EF A2C: 64.2 %
EJECTION FRACTION: 64.7 %
ERYTHROCYTE [DISTWIDTH] IN BLOOD BY AUTOMATED COUNT: 13.2 % (ref 11.6–14.4)
ESV (BP): 38.8 CM3
GFR SERPL CREATININE-BSD FRML MDRD: >60 ML/MIN/1.73M*2
GLUCOSE SERPL-MCNC: 91 MG/DL (ref 70–99)
HCT VFR BLDCO AUTO: 39.9 % (ref 40.1–51)
HGB BLD-MCNC: 13.6 G/DL (ref 13.7–17.5)
INTERVENTRICULAR SEPTUM: 1.21 CM
LA ESV (BP): 81.8 CM3
LA ESV INDEX (A2C): 42.34 CM3/M2
LA ESV INDEX (BP): 44.46 CM3/M2
LA/AORTA RATIO: 0.95
LAAS-AP2: 24.4 CM2
LAAS-AP4: 25.7 CM2
LAD 2D: 4 CM
LALD A4C: 6.04 CM
LALD A4C: 6.18 CM
LAV-S: 77.9 CM3
LEFT ATRIUM VOLUME INDEX: 45.71 CM3/M2
LEFT ATRIUM VOLUME: 84.1 CM3
LEFT INTERNAL DIMENSION IN SYSTOLE: 3.18 CM (ref 2.53–3.83)
LEFT VENTRICLE DIASTOLIC VOLUME INDEX: 65.76 CM3/M2
LEFT VENTRICLE DIASTOLIC VOLUME: 121 CM3
LEFT VENTRICLE SYSTOLIC VOLUME INDEX: 21.52 CM3/M2
LEFT VENTRICLE SYSTOLIC VOLUME: 39.6 CM3
LEFT VENTRICULAR POSTERIOR WALL IN END DIASTOLE: 1.3 CM (ref 0.56–1.04)
LV DIASTOLIC VOLUME: 101 CM3
LV ESV (APICAL 2 CHAMBER): 36.2 CM3
LVAD-AP2: 32.7 CM2
LVAD-AP4: 36.5 CM2
LVAS-AP2: 17.7 CM2
LVAS-AP4: 18 CM2
LVEDVI(A2C): 54.89 CM3/M2
LVEDVI(BP): 59.78 CM3/M2
LVESVI(A2C): 19.67 CM3/M2
LVESVI(BP): 21.09 CM3/M2
LVLD-AP2: 8.69 CM
LVLD-AP4: 8.71 CM
LVLS-AP2: 7.06 CM
LVLS-AP4: 6.64 CM
LVOT 2D: 2 CM
LVOT A: 3.14 CM2
LVOT MG: 1 MMHG
LVOT MV: 0.48 M/S
LVOT PEAK VELOCITY: 0.7 M/S
LVOT PG: 2 MMHG
LVOT VTI: 14.4 CM
MCH RBC QN AUTO: 30.9 PG (ref 28–33.2)
MCHC RBC AUTO-ENTMCNC: 34.1 G/DL (ref 32.2–36.5)
MCV RBC AUTO: 90.7 FL (ref 83–98)
MV E'TISSUE VEL-LAT: 0.07 M/S
MV E'TISSUE VEL-MED: 0.05 M/S
MV PEAK A VEL: 0.7 M/S
MV PEAK E VEL: 0.54 M/S
MV VALVE AREA P 1/2 METHOD: 2.27 CM2
PDW BLD AUTO: 9.9 FL (ref 9.4–12.4)
PLATELET # BLD AUTO: 157 K/UL (ref 150–350)
POSTERIOR WALL: 1.3 CM
POTASSIUM SERPL-SCNC: 4 MEQ/L (ref 3.6–5.1)
PULMONARY REGURGITATION LATE DIASTOLIC VELOCITY: 0.75 M/S
RBC # BLD AUTO: 4.4 M/UL (ref 4.5–5.8)
SODIUM SERPL-SCNC: 139 MEQ/L (ref 136–144)
TAPSE: 2.75 CM
TR MAX PG: 22 MMHG
TRICUSPID VALVE PEAK REGURGITATION VELOCITY: 2.36 M/S
WBC # BLD AUTO: 5.35 K/UL (ref 3.8–10.5)
Z-SCORE OF LEFT VENTRICULAR DIMENSION IN END SYSTOLE: 0.17
Z-SCORE OF LEFT VENTRICULAR POSTERIOR WALL IN END DIASTOLE: 2.77

## 2020-04-27 PROCEDURE — 85730 THROMBOPLASTIN TIME PARTIAL: CPT | Performed by: HOSPITALIST

## 2020-04-27 PROCEDURE — 63700000 HC SELF-ADMINISTRABLE DRUG: Performed by: HOSPITALIST

## 2020-04-27 PROCEDURE — 85027 COMPLETE CBC AUTOMATED: CPT | Performed by: STUDENT IN AN ORGANIZED HEALTH CARE EDUCATION/TRAINING PROGRAM

## 2020-04-27 PROCEDURE — 63700000 HC SELF-ADMINISTRABLE DRUG: Performed by: STUDENT IN AN ORGANIZED HEALTH CARE EDUCATION/TRAINING PROGRAM

## 2020-04-27 PROCEDURE — 99239 HOSP IP/OBS DSCHRG MGMT >30: CPT | Performed by: HOSPITALIST

## 2020-04-27 PROCEDURE — 97116 GAIT TRAINING THERAPY: CPT | Mod: GP

## 2020-04-27 PROCEDURE — 93306 TTE W/DOPPLER COMPLETE: CPT

## 2020-04-27 PROCEDURE — 80048 BASIC METABOLIC PNL TOTAL CA: CPT | Performed by: STUDENT IN AN ORGANIZED HEALTH CARE EDUCATION/TRAINING PROGRAM

## 2020-04-27 PROCEDURE — 36415 COLL VENOUS BLD VENIPUNCTURE: CPT | Performed by: HOSPITALIST

## 2020-04-27 RX ADMIN — APIXABAN 10 MG: 5 TABLET, FILM COATED ORAL at 09:47

## 2020-04-27 RX ADMIN — ISOSORBIDE MONONITRATE 30 MG: 30 TABLET, EXTENDED RELEASE ORAL at 09:48

## 2020-04-27 RX ADMIN — METOPROLOL SUCCINATE 100 MG: 100 TABLET, EXTENDED RELEASE ORAL at 09:48

## 2020-04-27 RX ADMIN — DOCUSATE SODIUM 100 MG: 100 CAPSULE, LIQUID FILLED ORAL at 09:47

## 2020-04-27 ASSESSMENT — COGNITIVE AND FUNCTIONAL STATUS - GENERAL
CLIMB 3 TO 5 STEPS WITH RAILING: 4 - NONE
STANDING UP FROM CHAIR USING ARMS: 4 - NONE
WALKING IN HOSPITAL ROOM: 4 - NONE
AFFECT: WFL
MOVING TO AND FROM BED TO CHAIR: 4 - NONE

## 2020-04-27 NOTE — PROGRESS NOTES
"Patient: Dave RODRIGUEZ [facility]  Location: [address] [address]  MRN: [id number]  Today's date: 4/27/2020    Pt left supine in bed with call button within reach.    Dave RODRIGUEZ is a 84 y.o. male admitted on 4/25/2020 with Near syncope. Principal problem is No Principal Problem: There is no principal problem currently on the Problem List. Please update the Problem List and refresh..    Past Medical History  Dave RODRIGUEZ has a past medical history of Colon cancer (CMS/[facility]), Coronary artery disease, DVT (deep venous thrombosis) (CMS/[facility]), Hypertension, and Lipid disorder.    History of Present Illness  Pt presented to [facility] ED with dyspnea and cough. He also reports \"almost passing out\" this morning after urinating. He was standing in his bathroom when he suddenly felt like he was about to faint. He grabbed onto the sink and was able to brace himself. He did not fall but he did continue to have lightheadedness for the next hour or so. He reports feeling \"dizzy\" all day today. He admits he does not drink enough fluids but has been eating normal amount. He reports he developed a productive cough approx 4 weeks ago that has been resolving and is now dry. Patient stated he came to hospital over concern about COVID-like symptoms. COVID (-).    PT Vitals    Date/Time Pulse SpO2 Pt Activity O2 Therapy BP BP Location BP Method Pt Position [facility]   04/27/20 1124 62 96 % At rest None (Room air) 164/82 Left upper arm Automatic Lying RC      PT Pain    Date/Time Pain Type Pref Pain Scale Rating: Rest Rating: Activity [facility]   04/27/20 1124 Pain Assessment number (Numeric Rating Pain Scale) 0 0 RC          Prior Living Environment      Most Recent Value   Living Environment Comment  2SH w/ FF U/L HR to 2nd fl bed/bath (stall shower), Does have stall shower on 1st fl if needed. Patient stated he lives w/ \"two or three other men on sort of like a [place] commune.\"          Prior Level of Function      Most Recent Value   Dominant Hand  right "   Ambulation  independent   Transferring  independent   Toileting  independent   Bathing  independent   Dressing  independent   Eating  independent   Communication  understands/communicates without difficulty   Swallowing  swallows foods/liquids without difficulty   Prior Level of Function Comment  ind w/ ADLs, works as a  where he lives, drives, uses no DME          PT Evaluation and Treatment - 04/27/20 1124        Time Calculation    Start Time  1124     Stop Time  1136     Time Calculation (min)  12 min        Session Details    Document Type  daily treatment/progress note     Mode of Treatment  physical therapy        General Information    Patient Profile Reviewed?  yes     General Observations of Patient  pt rec'd lying supine in bed, agreeable to PT treatment     Existing Precautions/Restrictions  no known precautions/restrictions        Cognition/Psychosocial    Affect/Mental Status (Cognitive)  WFL     Orientation Status (Cognition)  oriented x 3     Follows Commands (Cognition)  WFL        Bed Mobility    [place], Supine to Sit  independent     [place], Sit to Supine  independent     Comment (Bed Mobility)  HOB flat        Transfers    Transfers  other (see comments)    sit to stand       Sit to Stand Transfer    [place], Sit to Stand Transfer  independent     Assistive Device  none     Comment  denies lightheadedness or dizziness, no instability noted        Stand to Sit Transfer    [place], Stand to Sit Transfer  independent     Assistive Device  none     Comment  no instability noted        Gait Training    [place], Gait  independent     Distance in Feet  250 feet     Gait Pattern Utilized  step-through     Comment  no SOB or dizziness, no episodes of instability or LOB, no recommended for use of an AD at this time        Stairs Training    [place], Stairs  modified independence     Assistive Device  railing     Handrail Location  right side (descending);left side  (ascending)     Number of Stairs  13     Ascending Stairs Technique  step-over-step     Descending Stairs Technique  step-over-step     Comment  no instability noted, denies lightheadedness or SOB        Balance    Balance Assessment  sitting static balance;standing static balance;standing dynamic balance     Static Sitting Balance  WFL     Static Standing Balance  WFL     Dynamic Standing Balance  WFL        Coping    Observed Emotional State  calm        AM-PAC (TM) - Mobility (Current Function)    Turning from your back to your side while in a flat bed without using bedrails?  4 - None     Moving from lying on your back to sitting on the side of a flat bed without using bedrails?  4 - None     Moving to and from a bed to a chair?  4 - None     Standing up from a chair using your arms?  4 - None     To walk in a hospital room?  4 - None     Climbing 3-5 steps with a railing?  4 - None     AM-PAC (TM) Mobility Score  24        Therapy Assessment/Plan (PT)    Rehab Potential (PT)  good, to achieve stated therapy goals     Therapy Frequency (PT)  --    d/c from acute PT       Progress Summary (PT)    Daily Outcome Statement (PT)  [facility] 24/24 and indep in all mobility tested this session including stair navigation and ambulation without an assistive device. Pt without further acute PT goals and is recommended for d/c home upon medical stability.        Therapy Plan Review/Discharge Plan (PT)    PT Recommended Discharge Disposition  home     Anticipated Equipment Needs at Discharge (PT Eval)  none        Plan of Care Review    Plan of Care Reviewed With  patient                       Education provided this session. See the Patient Education summary report for full details.    PT Goals      Most Recent Value   Gait Training Goal 1   Activity/Assistive Device  gait (walking locomotion) at 04/26/2020 1610   [place]  independent at 04/26/2020 1610   Distance  400 at 04/26/2020 1610   Progress/Outcome  goal partially  met at 04/27/2020 1124   Stairs Goal 1   Activity/Assistive Device  stairs, all skills, ascending stairs, descending stairs at 04/26/2020 1610   [place]  independent at 04/26/2020 1610   Number of Stairs  13 at 04/26/2020 1610   Time Frame  by discharge at 04/26/2020 1610   Progress/Outcome  goal met at 04/27/2020 1124

## 2020-04-27 NOTE — ASSESSMENT & PLAN NOTE
Recent increase in Left calf pain as outpatient.  Hx extensive acute left leg DVT from the calf extending into the mid common femoral vein in 3/19.    Treated with Xarelto at a therapeutic dose for 6 months. In fall of 2019 he was changed to prophylactic dose.      Doppler US 4/25/20:  1.  Occlusive thrombus within the LEFT femoral through popliteal vein.  2.  No evidence for RIGHT lower extremity deep venous thrombosis.    CT of the chest shows no evidence of pulmonary emboli.     Difficult to tell whether this is truly a Xarelto failure since there have not been any Dopplers done since his initial diagnosis one year ago until now. However, given that he does have left calf pain, he should not be placed back on Xarelto, but use Eliquis instead.    -IV Heparin was discontinued as recommended  -Recommend Eliquis 10 mg twice a day for 7 days and then 5 mg twice a day.    -Follow-up with physicians both in the [place] and [place] systems for continued management.  -Patient states he will follow up with Dr Muhammad in [place].

## 2020-04-27 NOTE — PLAN OF CARE
Problem: Adult Inpatient Plan of Care  Goal: Readiness for Transition of Care  Intervention: Mutually Develop Transition Plan  Flowsheets (Taken 4/27/2020 1133)  Discharge Coordination/Progress: Pt denied needs and stated  he can call for a ride back to [facility] in [facility].  Equipment Currently Used at Home: none  Concerns Comments: Pt lives at [facility] in [facility] and stated they will transport him back to home.  Transportation Concerns: car, none  Readmission Within the Last 30 Days: no previous admission in last 30 days  Patient/Family Anticipated Services at Transition: none  Concerns to be Addressed: no discharge needs identified

## 2020-04-27 NOTE — DISCHARGE SUMMARY
[facility]ISA[facility] DISCHARGE SUMMARY    OVERVIEW    Admitting Provider: Caity Wellington DO  Attending Provider: Hollis Atkinson MD Attending phys phone: [phone]    PCP: Mazin Muhammad [phone]    Admission Date: 4/25/2020  Discharge Date: 4/27/2020    DISCHARGE DIAGNOSES    Primary Discharge Diagnosis  DVT (deep venous thrombosis) (CMS/HCC)    Secondary Discharge Diagnoses  Active Hospital Problems    Diagnosis Date Noted   • Near syncope 04/25/2020   • Dyspnea 04/25/2020   • CAD (coronary artery disease) 04/25/2020   • DVT (deep venous thrombosis) (CMS/HCC) 04/25/2020   • HLD (hyperlipidemia) 04/25/2020      Resolved Hospital Problems    Diagnosis Date Noted Date Resolved   • Cough 04/25/2020 04/25/2020   • Left leg pain 04/25/2020 04/25/2020       Problem List on Day of Discharge  Dyspnea  Assessment & Plan  rWith cough and subjective fevers for over 1 month  resolved  sars-cov-2 test neg  CXR is negative for acute disease and no O2 requirement      Near syncope  Assessment & Plan  Possibly related to viral syndrome, dehydration   Sent  for CT chest with IV contrast  Has ruled out new or acute  PE  TTE nml EF 60-60% no wall motion abnormalities    Follow up with outpt [facility]ologist at [facility]  Encourage hydration    * DVT (deep venous thrombosis) (CMS/HCC)  Assessment & Plan  On NOAC, denies any missed doses over last few weeks  Doppler done in ED showing resolution of L calf DVT with  Occlusive thrombus within the LEFT femoral through popliteal vein.Likely xarelto failure  hematology consulted- recommended switching xarelto to Eliquis   Follow up with outpt hematologist at [place]      Presenting Problem/History of Present Illness  Near syncope    SUMMARY OF HOSPITALIZATION  This is a 84 y.o. year-old male admitted on 4/25/2020 with Cough [R05]  Left leg pain [M79.605]  Near syncope [R55]  Acute deep vein thrombosis (DVT) of femoral vein of left lower extremity (CMS/HCC) [I82.412]  Dyspnea,  "unspecified type [R06.00].    Per HPI    \"Dave Arredondo is a 84 y.o. year-old male admitted on 4/25/2020 with Near syncope [R55].  Pt is a 83 yo male with pmh CAD, HTN, DVT, colon CA who presented to [facility] ED with dyspnea and cough. He also reports \"almost passing out\" this morning after urinating. He was standing in his bathroom when he suddenly felt like he was about to faint. He grabbed onto the sink and was able to brace himself. He did not fall but he did continue to have lightheadedness for the next hour or so. He reports feeling \"dizzy\" all day today. He admits he does not drink enough fluids but has been eating normal amount. He reports he developed a productive cough approx 4 weeks ago that has been resolving and is now dry. He began feeling short of breath yesterday and today noticed that he was \"extremely winded\" and \"panting\" with just minimal exertion. He reports subjective elevated temps (his normal temp is 96, he has been having temps around 97 lately) but denies chills or sweats. He admits to chest discomfort happening mostly at night when he lays down to go to sleep. He has discussed this previously with his cardiologist who reportedly asked him to roll over onto his other side when it happens. The chest pain is not new. He believes he was exposed to COVID 19 as he lives in a commune ([facility]) and one of the sisters is currently hospitalized with COVID 19. He denies any GI symptoms. 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 He denies skipping any doses of his xarelto. The last time he skipped a dose was for a colonoscopy about 1 year ago.\"    Hospital Course    Patient was admitted for dyspnea and dizziness. Doppler done in ED showing resolution of L calf DVT with  Occlusive thrombus within the LEFT femoral through popliteal vein. Patient was on xarelto for recent DVT Likely xarelto failure. Patient was started on heparin and " hematology consulted. They recommended switching xarelto to eliquis. Near syncope related to viral syndrome vs dehydration. Patient was encourage to stay hydrate. TTE neg. CT PE was also negative. Patient was monitored overnight with no acute event thus was stable for discharge with initiation of eliquis and follow up with his outpatient hematologist at [place].     Exam on Day of Discharge  Physical Exam  HEENT: Normocephalic atraumatic.  Pupils equal round reactive to light.    Oropharynx: moist mucous membranes with no lesions  Neck: supple, trachea midline  Heart: regular rate, regular rhythm, normal S1, normal S2  Lungs: clear to auscultation bilaterally,   Abdomen: soft, normal bowel sounds, no distension, no mass, no tenderness  Musculoskeletal: normal range of motion, no deformity  Skin: no rash  Neurologic: A&ox3. Non focal  Vital signs reviewed     Consults During Admission  IP CONSULT TO HEMATOLOGY    DISCHARGE MEDICATIONS       Medication List      START taking these medications    * apixaban 5 mg tablet  Commonly known as:  ELIQUIS  Take 2 tablets (10 mg total) by mouth 2 (two) times a day for 12 doses Indications: blood clot in a deep vein of the extremities, a clot in the lung.  Dose:  10 mg     * apixaban 5 mg tablet  Commonly known as:  ELIQUIS  Start taking on:  May 3, 2020  Take 1 tablet (5 mg total) by mouth 2 (two) times a day Indications: blood clot in a deep vein of the extremities, a clot in the lung.  Dose:  5 mg         * This list has 2 medication(s) that are the same as other medications prescribed for you. Read the directions carefully, and ask your doctor or other care provider to review them with you.            CONTINUE taking these medications    atorvastatin 20 mg tablet  Commonly known as:  LIPITOR  Take 20 mg by mouth daily.  Dose:  20 mg     cyanocobalamin 1,000 mcg/mL injection  Commonly known as:  VITAMIN B-12  Inject 1,000 mcg into the shoulder, thigh, or buttocks every 30  (thirty) days.  Dose:  1,000 mcg     isosorbide mononitrate 30 mg 24 hr tablet  Commonly known as:  IMDUR  Take 30 mg by mouth daily.  Dose:  30 mg     metoprolol succinate  mg 24 hr tablet  Commonly known as:  TOPROL-XL  Take 100 mg by mouth daily.  Dose:  100 mg     nitroglycerin 0.4 mg SL tablet  Commonly known as:  NITROSTAT  Place 0.4 mg under the tongue every 5 (five) minutes as needed.  Dose:  0.4 mg        STOP taking these medications    XARELTO 10 mg tablet  Generic drug:  rivaroxaban            Instructions for after discharge     Follow-up with primary physician (PCP)      In 1-2 weeks    Other Follow-up      follow-up with his other physicians ( oncologist and cardiologist)  both in the [place] and [place] systems for continued management.          PROCEDURES/LABS/IMAGING    Operative Procedures (if any)  None    Other Procedures  none    Pertinent Labs  Results from last 7 days   Lab Units 04/27/20  0443 04/25/20  1349   WBC K/uL 5.35 5.97   HEMOGLOBIN g/dL 13.6* 13.6*   HEMATOCRIT % 39.9* 40.2   PLATELETS K/uL 157 181     Results from last 7 days   Lab Units 04/27/20  0443 04/25/20  1349   SODIUM mEQ/L 139 139   POTASSIUM mEQ/L 4.0 4.5   CHLORIDE mEQ/L 109 109   CO2 mEQ/L 24 24   BUN mg/dL 18 15   CREATININE mg/dL 0.8 1.0   GLUCOSE mg/dL 91 108*   CALCIUM mg/dL 9.0 9.0     COVID neg    Pertinent Imaging  X-ray Chest 1 View    Result Date: 4/25/2020  IMPRESSION: No definite acute cardiopulmonary disease. I certify that I have reviewed this examination and agree with this report. Ishmael Montes De Oca MD    Ct Chest Pulmonary Embolism With Iv Contrast    Result Date: 4/25/2020  IMPRESSION: 1.  No evidence of acute pulmonary embolism to the level of the segmental pulmonary arteries. 2. Bilateral pulmonary nodules.  Recommend comparison with prior outside CT chest imaging. 3. Two sclerotic foci right ribs. Likely represent bone islands however direct comparison to the previous CT is recommended. 4.  Prominent renal pelvis cystic structures likely parapelvic cysts. Dilated renal pelvises unlikely however direct comparison to the previous study is recommended. I certify that I have reviewed this examination and agree with this report. Harmony Iraheta MD    Us Venous Leg Bilateral    Result Date: 4/25/2020  IMPRESSION: 1.  Occlusive thrombus within the LEFT femoral through popliteal vein. 2.  No evidence for RIGHT lower extremity deep venous thrombosis. Finding: Other   Acuity: Critical  Status: OPEN The results were critically read back with DR RAVINDER JUSTICE on 4/25/2020 4: PM. I certify that I have reviewed this examination and agree with this report. Ishmael Montes De Oca MD      OUTPATIENT FOLLOW UP:  Follow up with PCP  Follow up with outpt hematologist at [place]  Follow up with outpt cardiologist at [place]    DISCHARGE DISPOSITION  Disposition: Home     Code Status At Discharge: Full Code    Physician Order for Life-Sustaining Treatment Document Status      No documents found                Juan Jose Salcido MD  4/27/2020 1:30 PM

## 2020-04-27 NOTE — DISCHARGE INSTRUCTIONS
You presented to [facility] with dizziness, shortness of breath and left pain was found to have acute DVT of left calf  You were seen by the Resident Family Medicine Team along with specialists from Hematology  Your hospital course progressed without incident.   It was a pleasure taking care of you.         New Medications on Discharge:    START taking:  Eliquis 10 mg twice a day for 7 days and then 5 mg twice a day.    Change how you take these medications:   STOP TAKING  Xarelto    no changes to rest of your home medications      Please make an appointment with your PCP within 2-3 days of discharge. Any time you are in a hospital you should see your primary within one week of discharge.    follow-up with his other physicians (oncologist and cardiologist)  both in the [place] and [place] systems for continued management.

## 2020-04-27 NOTE — PLAN OF CARE
Problem: Adult Inpatient Plan of Care  Goal: Plan of Care Review  Outcome: Progressing  Flowsheets (Taken 4/27/2020 1138)  Progress: improving  Plan of Care Reviewed With: patient  Outcome Summary: PT treatment: [facility] 24/24 and indep in all mobility tested this session including stair navigation and ambulation without an assistive device. Pt without further acute PT goals and is recommended for d/c home upon medical stability.

## 2020-04-27 NOTE — ASSESSMENT & PLAN NOTE
Cough and subjective fevers for > 1 month  Covid19 negative  CXR no acute disease     CTA chest:   No evidence of acute pulmonary embolism to the level of the segmental pulmonary arteries.  2. Bilateral pulmonary nodules.  Recommend comparison with prior outside CT chest imaging.  3. Two sclerotic foci right ribs. Likely represent bone islands however direct comparison to the previous CT is recommended.  4. Prominent renal pelvis cystic structures likely parapelvic cysts. Dilated renal pelvises unlikely however direct comparison to the previous study recommended.    -Supportive Care  -Follow up with physicians at [facility] and [facility]

## 2020-04-27 NOTE — PROGRESS NOTES
"ONCOLOGY Progress Note  Heme/Onc Consult Note  Given the COVID-19 pandemic this consult was done remotely in order to protect the safety of the patient and the health care team and also to preserve personal protective equipment.  The patient was interviewed on the phone and the electronic medical record was reviewed.      Subjective    Discussed with patient today by telephone. Dizziness resolved when walking yesterday, but felt slightly dizzy when awakening this morning and was still lying in bed. No cough or SOB. No Chest pain. Left calf pain improved.      Objective  Vital signs in last 24 hours:  Temp:  [36.1 °C (97 °F)-36.8 °C (98.2 °F)] 36.8 °C (98.2 °F)  Heart Rate:  [58-75] 62  Resp:  [16-18] 18  BP: (111-163)/(65-81) 126/74    Physical Exam:  Visit Vitals  /74 (BP Location: Left upper arm, Patient Position: Lying)   Pulse 62   Temp 36.8 °C (98.2 °F) (Oral)   Resp 18   Ht 1.676 m (5' 6\")   Wt 72.6 kg (160 lb)   SpO2 96%   BMI 25.82 kg/m²       Physical exam not completed by Heme/Onc.    Medications:    •  apixaban, 10 mg, oral, BID **FOLLOWED BY** [START ON 5/3/2020] apixaban, 5 mg, oral, BID  •  atorvastatin, 20 mg, oral, Daily (6p)  •  atropine, 0.5 mg, intravenous, q5 min PRN  •  glucose, 16-32 g of dextrose, oral, PRN **OR** dextrose, 15-30 g of dextrose, oral, PRN **OR** glucagon, 1 mg, intramuscular, PRN **OR** dextrose in water, 25 mL, intravenous, PRN  •  docusate sodium, 100 mg, oral, BID  •  isosorbide mononitrate, 30 mg, oral, Daily  •  metoprolol succinate XL, 100 mg, oral, Daily  •  nitroglycerin, 0.4 mg, sublingual, q5 min PRN    Labs:  Results from last 7 days   Lab Units 04/27/20  0443 04/25/20  1349   WBC K/uL 5.35 5.97   HEMOGLOBIN g/dL 13.6* 13.6*   HEMATOCRIT % 39.9* 40.2   PLATELETS K/uL 157 181     Results from last 7 days   Lab Units 04/27/20  0443 04/25/20  1349   SODIUM mEQ/L 139 139   POTASSIUM mEQ/L 4.0 4.5   CHLORIDE mEQ/L 109 109   CO2 mEQ/L 24 24   BUN mg/dL 18 15 "   CREATININE mg/dL 0.8 1.0   GLUCOSE mg/dL 91 108*   CALCIUM mg/dL 9.0 9.0     Results from last 7 days   Lab Units 04/26/20  0526 04/25/20  2103 04/25/20  1349   TROPONIN I ng/mL <0.02 <0.02 <0.02     Results from last 7 days   Lab Units 04/25/20  1350   INR INR 1.2          Imaging:  X-ray Chest 1 View  4/25/2020  IMPRESSION: No definite acute cardiopulmonary disease..         Assessment & Plan    * DVT (deep venous thrombosis) (CMS/[facility])  Assessment & Plan  Recent increase in Left calf pain as outpatient.  Hx extensive acute left leg DVT from the calf extending into the mid common femoral vein in 3/19.    Treated with Xarelto at a therapeutic dose for 6 months. In fall of 2019 he was changed to prophylactic dose.      Doppler US 4/25/20:  1.  Occlusive thrombus within the LEFT femoral through popliteal vein.  2.  No evidence for RIGHT lower extremity deep venous thrombosis.    CT of the chest shows no evidence of pulmonary emboli.     Difficult to tell whether this is truly a Xarelto failure since there have not been any Dopplers done since his initial diagnosis one year ago until now. However, given that he does have left calf pain, he should not be placed back on Xarelto, but use Eliquis instead.    -IV Heparin was discontinued as recommended  -Recommend Eliquis 10 mg twice a day for 7 days and then 5 mg twice a day.    -Follow-up with physicians both in the [place] and [place] systems for continued management.  -Patient states he will follow up with Dr Muhammad in [place].    Dyspnea  Assessment & Plan  Cough and subjective fevers for > 1 month  Covid19 negative  CXR no acute disease     CTA chest:   No evidence of acute pulmonary embolism to the level of the segmental pulmonary arteries.  2. Bilateral pulmonary nodules.  Recommend comparison with prior outside CT chest imaging.  3. Two sclerotic foci right ribs. Likely represent bone islands however direct comparison to the previous CT is  recommended.  4. Prominent renal pelvis cystic structures likely parapelvic cysts. Dilated renal pelvises unlikely however direct comparison to the previous study recommended.    -Supportive Care  -Follow up with physicians at [facility] and [facility]    Near syncope  Assessment & Plan  Possibly related to viral syndrome, dehydration  -Echo pending  -Mgmnt per hospitalist attending          VALENCIA Hua  4/27/2020

## 2020-04-27 NOTE — PROGRESS NOTES
Patient: Dave Arredondo  Location: [facility] 6C 0663  MRN: [id number]  Today's date: 4/27/2020    Attempted to see patient for therapy. Unable due to patient at test or procedure(ECHO).   Will cont to follow.

## 2020-04-27 NOTE — PATIENT CARE CONFERENCE
Care Progression Rounds Note  Date: 4/27/2020  Time: 10:45 AM     Patient Name: Dave Arredondo     Medical Record Number: [id number]   YOB: 1935  Sex: Male      Room/Bed: 06    Admitting Diagnosis: Cough [R05]  Left leg pain [M79.605]  Near syncope [R55]  Acute deep vein thrombosis (DVT) of femoral vein of left lower extremity (CMS/HCC) [I82.412]  Dyspnea, unspecified type [R06.00]   Admit Date/Time: 4/25/2020  1:25 PM    Primary Diagnosis: DVT (deep venous thrombosis) (CMS/HCC)  Principal Problem: DVT (deep venous thrombosis) (CMS/HCC)    GMLOS: pending  Anticipated Discharge Date: 4/27/2020    AM-PAC  Mobility Score: 24    Discharge Planning:       Barriers to Discharge:  Barriers to Discharge: None    Participants:  , social work/services, nursing, physical therapy

## 2021-05-26 NOTE — ASSESSMENT & PLAN NOTE
rWith cough and subjective fevers for over 1 month  resolved  sars-cov-2 test neg  CXR is negative for acute disease and no O2 requirement     Erivedge Counseling- I discussed with the patient the risks of Erivedge including but not limited to nausea, vomiting, diarrhea, constipation, weight loss, changes in the sense of taste, decreased appetite, muscle spasms, and hair loss.  The patient verbalized understanding of the proper use and possible adverse effects of Erivedge.  All of the patient's questions and concerns were addressed.

## 2024-06-15 ENCOUNTER — APPOINTMENT (INPATIENT)
Dept: RADIOLOGY | Facility: HOSPITAL | Age: 89
DRG: 181 | End: 2024-06-15
Attending: HOSPITALIST
Payer: COMMERCIAL

## 2024-06-15 ENCOUNTER — APPOINTMENT (EMERGENCY)
Dept: RADIOLOGY | Facility: HOSPITAL | Age: 89
DRG: 181 | End: 2024-06-15
Attending: EMERGENCY MEDICINE
Payer: COMMERCIAL

## 2024-06-15 ENCOUNTER — APPOINTMENT (EMERGENCY)
Dept: RADIOLOGY | Facility: HOSPITAL | Age: 89
DRG: 181 | End: 2024-06-15
Payer: COMMERCIAL

## 2024-06-15 ENCOUNTER — HOSPITAL ENCOUNTER (INPATIENT)
Facility: HOSPITAL | Age: 89
LOS: 4 days | Discharge: HOME | DRG: 181 | End: 2024-06-19
Attending: EMERGENCY MEDICINE | Admitting: HOSPITALIST
Payer: COMMERCIAL

## 2024-06-15 DIAGNOSIS — M79.601 RIGHT ARM PAIN: ICD-10-CM

## 2024-06-15 DIAGNOSIS — J90 PLEURAL EFFUSION ON RIGHT: Primary | ICD-10-CM

## 2024-06-15 PROBLEM — Z85.048 HISTORY OF RECTAL CANCER: Status: ACTIVE | Noted: 2024-06-15

## 2024-06-15 LAB
ALBUMIN SERPL-MCNC: 3.9 G/DL (ref 3.5–5.7)
ALP SERPL-CCNC: 104 IU/L (ref 34–125)
ALT SERPL-CCNC: 15 IU/L (ref 7–52)
ANION GAP SERPL CALC-SCNC: 6 MEQ/L (ref 3–15)
AST SERPL-CCNC: 18 IU/L (ref 13–39)
BASOPHILS # BLD: 0.05 K/UL (ref 0.01–0.1)
BASOPHILS NFR BLD: 0.6 %
BILIRUB SERPL-MCNC: 0.8 MG/DL (ref 0.3–1.2)
BILIRUB UR QL STRIP.AUTO: NEGATIVE MG/DL
BNP SERPL-MCNC: 115 PG/ML
BUN SERPL-MCNC: 17 MG/DL (ref 7–25)
CALCIUM SERPL-MCNC: 9.8 MG/DL (ref 8.6–10.3)
CHLORIDE SERPL-SCNC: 100 MEQ/L (ref 98–107)
CLARITY UR REFRACT.AUTO: CLEAR
CO2 SERPL-SCNC: 29 MEQ/L (ref 21–31)
COLOR UR AUTO: COLORLESS
CREAT SERPL-MCNC: 1 MG/DL (ref 0.7–1.3)
DIFFERENTIAL METHOD BLD: ABNORMAL
EGFRCR SERPLBLD CKD-EPI 2021: >60 ML/MIN/1.73M*2
EOSINOPHIL # BLD: 0.21 K/UL (ref 0.04–0.54)
EOSINOPHIL NFR BLD: 2.5 %
ERYTHROCYTE [DISTWIDTH] IN BLOOD BY AUTOMATED COUNT: 13.4 % (ref 11.6–14.4)
EST. AVERAGE GLUCOSE BLD GHB EST-MCNC: 117 MG/DL
FLUAV RNA SPEC QL NAA+PROBE: NEGATIVE
FLUBV RNA SPEC QL NAA+PROBE: NEGATIVE
GLUCOSE SERPL-MCNC: 129 MG/DL (ref 70–99)
GLUCOSE UR STRIP.AUTO-MCNC: NEGATIVE MG/DL
HBA1C MFR BLD: 5.7 %
HCT VFR BLD AUTO: 43.5 % (ref 40.1–51)
HGB BLD-MCNC: 14 G/DL (ref 13.7–17.5)
HGB UR QL STRIP.AUTO: NEGATIVE
IMM GRANULOCYTES # BLD AUTO: 0.02 K/UL (ref 0–0.08)
IMM GRANULOCYTES NFR BLD AUTO: 0.2 %
INR PPP: 1.4
KETONES UR STRIP.AUTO-MCNC: NEGATIVE MG/DL
LEUKOCYTE ESTERASE UR QL STRIP.AUTO: NEGATIVE
LYMPHOCYTES # BLD: 1.21 K/UL (ref 1.2–3.5)
LYMPHOCYTES NFR BLD: 14.4 %
MAGNESIUM SERPL-MCNC: 2.1 MG/DL (ref 1.8–2.5)
MCH RBC QN AUTO: 29.3 PG (ref 28–33.2)
MCHC RBC AUTO-ENTMCNC: 32.2 G/DL (ref 32.2–36.5)
MCV RBC AUTO: 91 FL (ref 83–98)
MONOCYTES # BLD: 1.24 K/UL (ref 0.3–1)
MONOCYTES NFR BLD: 14.7 %
NEUTROPHILS # BLD: 5.7 K/UL (ref 1.7–7)
NEUTS SEG NFR BLD: 67.6 %
NITRITE UR QL STRIP.AUTO: NEGATIVE
NRBC BLD-RTO: 0 %
PDW BLD AUTO: 9.3 FL (ref 9.4–12.4)
PH UR STRIP.AUTO: 6.5 [PH]
PHOSPHATE SERPL-MCNC: 3 MG/DL (ref 2.4–4.7)
PLATELET # BLD AUTO: 264 K/UL (ref 150–350)
POTASSIUM SERPL-SCNC: 3.9 MEQ/L (ref 3.5–5.1)
PROT SERPL-MCNC: 7.3 G/DL (ref 6–8.2)
PROT UR QL STRIP.AUTO: NEGATIVE
PROTHROMBIN TIME: 16.8 SEC (ref 12.2–14.5)
RBC # BLD AUTO: 4.78 M/UL (ref 4.5–5.8)
RSV RNA SPEC QL NAA+PROBE: NEGATIVE
SARS-COV-2 RNA RESP QL NAA+PROBE: NEGATIVE
SODIUM SERPL-SCNC: 135 MEQ/L (ref 136–145)
SP GR UR REFRACT.AUTO: 1.01
TROPONIN I SERPL HS-MCNC: 8.2 PG/ML
TROPONIN I SERPL HS-MCNC: 8.6 PG/ML
UROBILINOGEN UR STRIP-ACNC: 0.2 EU/DL
WBC # BLD AUTO: 8.43 K/UL (ref 3.8–10.5)

## 2024-06-15 PROCEDURE — 63600000 HC DRUGS/DETAIL CODE: Mod: JZ | Performed by: HOSPITALIST

## 2024-06-15 PROCEDURE — 84484 ASSAY OF TROPONIN QUANT: CPT | Performed by: EMERGENCY MEDICINE

## 2024-06-15 PROCEDURE — 93005 ELECTROCARDIOGRAM TRACING: CPT | Performed by: EMERGENCY MEDICINE

## 2024-06-15 PROCEDURE — 63700000 HC SELF-ADMINISTRABLE DRUG: Performed by: HOSPITALIST

## 2024-06-15 PROCEDURE — 84100 ASSAY OF PHOSPHORUS: CPT | Performed by: HOSPITALIST

## 2024-06-15 PROCEDURE — 83735 ASSAY OF MAGNESIUM: CPT | Performed by: HOSPITALIST

## 2024-06-15 PROCEDURE — 63600105 HC IODINE BASED CONTRAST: Mod: JZ | Performed by: EMERGENCY MEDICINE

## 2024-06-15 PROCEDURE — 81003 URINALYSIS AUTO W/O SCOPE: CPT | Performed by: HOSPITALIST

## 2024-06-15 PROCEDURE — 99223 1ST HOSP IP/OBS HIGH 75: CPT | Performed by: HOSPITALIST

## 2024-06-15 PROCEDURE — 71046 X-RAY EXAM CHEST 2 VIEWS: CPT

## 2024-06-15 PROCEDURE — 93971 EXTREMITY STUDY: CPT | Mod: RT

## 2024-06-15 PROCEDURE — 71275 CT ANGIOGRAPHY CHEST: CPT

## 2024-06-15 PROCEDURE — 99285 EMERGENCY DEPT VISIT HI MDM: CPT | Mod: 25

## 2024-06-15 PROCEDURE — 85610 PROTHROMBIN TIME: CPT | Performed by: HOSPITALIST

## 2024-06-15 PROCEDURE — 83880 ASSAY OF NATRIURETIC PEPTIDE: CPT | Performed by: EMERGENCY MEDICINE

## 2024-06-15 PROCEDURE — 85025 COMPLETE CBC W/AUTO DIFF WBC: CPT | Performed by: EMERGENCY MEDICINE

## 2024-06-15 PROCEDURE — 20600000 HC ROOM AND CARE INTERMEDIATE/TELEMETRY

## 2024-06-15 PROCEDURE — 83036 HEMOGLOBIN GLYCOSYLATED A1C: CPT | Performed by: HOSPITALIST

## 2024-06-15 PROCEDURE — 96360 HYDRATION IV INFUSION INIT: CPT | Mod: 59

## 2024-06-15 PROCEDURE — 25800000 HC PHARMACY IV SOLUTIONS: Performed by: EMERGENCY MEDICINE

## 2024-06-15 PROCEDURE — 80053 COMPREHEN METABOLIC PANEL: CPT | Performed by: EMERGENCY MEDICINE

## 2024-06-15 PROCEDURE — 87637 SARSCOV2&INF A&B&RSV AMP PRB: CPT | Performed by: EMERGENCY MEDICINE

## 2024-06-15 PROCEDURE — 36415 COLL VENOUS BLD VENIPUNCTURE: CPT | Performed by: EMERGENCY MEDICINE

## 2024-06-15 RX ORDER — IBUPROFEN 200 MG
16-32 TABLET ORAL AS NEEDED
Status: DISCONTINUED | OUTPATIENT
Start: 2024-06-15 | End: 2024-06-19 | Stop reason: HOSPADM

## 2024-06-15 RX ORDER — RIVAROXABAN 10 MG/1
TABLET, FILM COATED ORAL
Status: ON HOLD | COMMUNITY
End: 2024-07-09

## 2024-06-15 RX ORDER — PANTOPRAZOLE SODIUM 40 MG/1
40 TABLET, DELAYED RELEASE ORAL NIGHTLY
Status: DISCONTINUED | OUTPATIENT
Start: 2024-06-15 | End: 2024-06-19 | Stop reason: HOSPADM

## 2024-06-15 RX ORDER — ALBUTEROL SULFATE 90 UG/1
2 INHALANT RESPIRATORY (INHALATION) EVERY 8 HOURS PRN
COMMUNITY
Start: 2024-05-01

## 2024-06-15 RX ORDER — DOCUSATE SODIUM 100 MG/1
100 CAPSULE, LIQUID FILLED ORAL 2 TIMES DAILY
Status: DISCONTINUED | OUTPATIENT
Start: 2024-06-15 | End: 2024-06-19 | Stop reason: HOSPADM

## 2024-06-15 RX ORDER — FUROSEMIDE 10 MG/ML
40 INJECTION INTRAMUSCULAR; INTRAVENOUS ONCE
Status: COMPLETED | OUTPATIENT
Start: 2024-06-15 | End: 2024-06-15

## 2024-06-15 RX ORDER — IOPAMIDOL 755 MG/ML
100 INJECTION, SOLUTION INTRAVASCULAR
Status: COMPLETED | OUTPATIENT
Start: 2024-06-15 | End: 2024-06-15

## 2024-06-15 RX ORDER — ATORVASTATIN CALCIUM 20 MG/1
20 TABLET, FILM COATED ORAL DAILY
Status: DISCONTINUED | OUTPATIENT
Start: 2024-06-16 | End: 2024-06-19 | Stop reason: HOSPADM

## 2024-06-15 RX ORDER — ACETAMINOPHEN 325 MG/1
650 TABLET ORAL EVERY 6 HOURS PRN
Status: DISCONTINUED | OUTPATIENT
Start: 2024-06-15 | End: 2024-06-19 | Stop reason: HOSPADM

## 2024-06-15 RX ORDER — ALBUTEROL SULFATE 90 UG/1
2 INHALANT RESPIRATORY (INHALATION) EVERY 4 HOURS PRN
Status: DISCONTINUED | OUTPATIENT
Start: 2024-06-15 | End: 2024-06-19 | Stop reason: HOSPADM

## 2024-06-15 RX ORDER — DEXTROSE 40 %
15-30 GEL (GRAM) ORAL AS NEEDED
Status: DISCONTINUED | OUTPATIENT
Start: 2024-06-15 | End: 2024-06-19 | Stop reason: HOSPADM

## 2024-06-15 RX ORDER — METOPROLOL SUCCINATE 100 MG/1
100 TABLET, EXTENDED RELEASE ORAL DAILY
Status: DISCONTINUED | OUTPATIENT
Start: 2024-06-16 | End: 2024-06-19 | Stop reason: HOSPADM

## 2024-06-15 RX ORDER — FUROSEMIDE 20 MG/1
20 TABLET ORAL DAILY
Status: DISCONTINUED | OUTPATIENT
Start: 2024-06-16 | End: 2024-06-19 | Stop reason: HOSPADM

## 2024-06-15 RX ORDER — FUROSEMIDE 20 MG/1
20 TABLET ORAL DAILY
COMMUNITY
Start: 2024-05-16

## 2024-06-15 RX ORDER — DEXTROSE 50 % IN WATER (D50W) INTRAVENOUS SYRINGE
25 AS NEEDED
Status: DISCONTINUED | OUTPATIENT
Start: 2024-06-15 | End: 2024-06-19 | Stop reason: HOSPADM

## 2024-06-15 RX ADMIN — IOPAMIDOL 100 ML: 755 INJECTION, SOLUTION INTRAVENOUS at 12:43

## 2024-06-15 RX ADMIN — SODIUM CHLORIDE 500 ML: 9 INJECTION, SOLUTION INTRAVENOUS at 12:29

## 2024-06-15 RX ADMIN — PANTOPRAZOLE SODIUM 40 MG: 40 TABLET, DELAYED RELEASE ORAL at 21:10

## 2024-06-15 RX ADMIN — FUROSEMIDE 40 MG: 10 INJECTION, SOLUTION INTRAMUSCULAR; INTRAVENOUS at 15:09

## 2024-06-15 RX ADMIN — DOCUSATE SODIUM 100 MG: 100 CAPSULE, LIQUID FILLED ORAL at 21:10

## 2024-06-15 ASSESSMENT — ENCOUNTER SYMPTOMS
COUGH: 0
FLANK PAIN: 0
HEMOPTYSIS: 0
FEVER: 0
PND: 0
CLAUDICATION: 0
ABDOMINAL PAIN: 0
VOMITING: 0
MUSCLE WEAKNESS: 0
PALPITATIONS: 0
NECK PAIN: 0
DIAPHORESIS: 0
FATIGUE: 0
CHILLS: 0
WHEEZING: 0
STIFFNESS: 0
SHORTNESS OF BREATH: 1
EXTREMITY NUMBNESS: 0
BACK PAIN: 0
HEADACHES: 0

## 2024-06-15 ASSESSMENT — COGNITIVE AND FUNCTIONAL STATUS - GENERAL
CLIMB 3 TO 5 STEPS WITH RAILING: 3 - A LITTLE
WALKING IN HOSPITAL ROOM: 4 - NONE
MOVING TO AND FROM BED TO CHAIR: 4 - NONE
STANDING UP FROM CHAIR USING ARMS: 4 - NONE
DO YOU HAVE SERIOUS DIFFICULTY WALKING OR CLIMBING STAIRS: NO

## 2024-06-15 NOTE — PROGRESS NOTES
"Dave Arredondo   [id number]    Your doctor has referred you for a CT examination that requires the injection of an iodinated contrast material into your bloodstream. This iodinated contrast material, sometimes referred to as \"x-ray dye\" allows for better interpretation of the x-ray films or CT images and results in a more accurate interpretation of the examination.     Without the use of iodinated contrast (x-ray dye), the examination may be less informative and result in a suboptimal examination, and possibly a delay in diagnosis and, if needed, treatment.     The iodinated contrast material is given through a small needle or catheter placed into a vein, usually on the inside of the elbow, on the back of hand, or in a vein in the foot or lower leg.    The most common, though still rare, potential reaction to an intravenous contrast injection is an allergic-like reaction. Most allergic-like reactions are mild, though a small subset of people can have more severe reactions. Mild reactions include mild / scattered hives, itching, scratchy throat, sneezing and nasal congestion. More severe reactions include facial swelling, severe difficulty breathing, wheezing and anaphylactic shock. Those with a prior history allergic-like reaction to the same class of contrast media (such as CT contrast or MRI contrast) are at the highest risk for an allergic reaction.     If you believe you had an allergic reaction to contrast in the past, please let our staff know. We can determine if this increases your risk for a future reaction and provide steps to decrease the risk. This may delay your examination, but it decreases the risk of having a new and possibly more severe reaction to the contrast injection.    People with a history of prior allergic reactions to other substances (such as unrelated medications and food) and patients with a history of asthma have slightly increased risk for an allergic reaction from contrast " material when compared with the general population, but do not require to be pretreated prior to a contrast injection.    You should notify the physician, nurse or technologist if you have ever had any of these conditions or if you have any questions.

## 2024-06-15 NOTE — ED PROVIDER NOTES
Emergency Medicine Note  HPI   HISTORY OF PRESENT ILLNESS       History provided by:  Patient  Upper Extremity Issue  Location:  Arm  Arm location:  R arm  Injury: no    Pain details:     Quality:  Burning    Radiates to:  Does not radiate    Severity:  Mild    Onset quality:  Sudden    Duration:  2 months    Timing:  Constant    Progression:  Waxing and waning  Handedness:  Right-handed  Dislocation: no    Prior injury to area:  No  Relieved by: exercise.  Worsened by:  Nothing  Ineffective treatments:  None tried  Associated symptoms: no back pain, no decreased range of motion, no fatigue, no fever, no muscle weakness, no neck pain, no numbness, no stiffness, no swelling and no tingling    Shortness of Breath  Severity:  Moderate  Onset quality:  Gradual  Duration:  2 months  Timing:  Constant  Progression:  Worsening  Chronicity:  New  Context comment:  Diagnosed with pneumonia on april 17.  treated with 6 days antibiotics.  never felt better.  having gradually worsening exertional shortness of breath  Relieved by:  Rest  Worsened by:  Exertion and activity  Ineffective treatments:  None tried  Associated symptoms: no abdominal pain, no chest pain, no claudication, no cough, no diaphoresis, no ear pain, no fever, no headaches, no hemoptysis, no neck pain, no PND, no rash, no vomiting and no wheezing          Patient History   PAST HISTORY     Reviewed from Nursing Triage:  Tobacco  Allergies  Meds  Problems  Med Hx  Surg Hx  Fam Hx  Soc   Hx      Past Medical History:   Diagnosis Date    Colon cancer (CMS/HCC)     Coronary artery disease     DVT (deep venous thrombosis) (CMS/HCC)     Hypertension     Lipid disorder        Past Surgical History:   Procedure Laterality Date    COLOSTOMY      HERNIA REPAIR         Family History   Problem Relation Age of Onset    Heart disease Biological Mother        Social History     Tobacco Use    Smoking status: Never    Smokeless tobacco: Never   Substance Use Topics     Alcohol use: Not Currently    Drug use: Never         Review of Systems   REVIEW OF SYSTEMS     Review of Systems   Constitutional:  Negative for chills, diaphoresis, fatigue and fever.   HENT:  Negative for ear pain.    Respiratory:  Positive for shortness of breath. Negative for cough, hemoptysis and wheezing.    Cardiovascular:  Negative for chest pain, palpitations, claudication, leg swelling and PND.   Gastrointestinal:  Negative for abdominal pain and vomiting.   Genitourinary:  Negative for flank pain.   Musculoskeletal:  Negative for back pain, neck pain and stiffness.   Skin:  Negative for rash.   Neurological:  Negative for headaches.   All other systems reviewed and are negative.        VITALS     ED Vitals      Date/Time Temp Pulse Resp BP SpO2 [facility]   06/15/24 1339 -- -- 22 172/77 99 % [facility]   06/15/24 1130 -- 96 28 145/81 96 % SK   06/15/24 1114 37.1 °C (98.8 °F) 101 22 141/81 95 % J[facility]          Pulse Ox %: 96 % (06/15/24 1135)  Pulse Ox Interpretation: Normal (06/15/24 1135)  Heart Rate: 96 (06/15/24 1135)  Rhythm Strip Interpretation: Normal Sinus Rhythm (06/15/24 1135)     Physical Exam   PHYSICAL EXAM     Physical Exam  Vitals and nursing note reviewed.   Constitutional:       Appearance: Normal appearance. He is normal weight.   HENT:      Head: Normocephalic.   Eyes:      Conjunctiva/sclera: Conjunctivae normal.   Cardiovascular:      Rate and Rhythm: Normal rate. Rhythm irregular.      Heart sounds: Normal heart sounds.   Pulmonary:      Effort: No tachypnea, bradypnea, accessory muscle usage or respiratory distress.      Breath sounds: Examination of the right-middle field reveals decreased breath sounds. Examination of the right-lower field reveals decreased breath sounds. Decreased breath sounds present.   Abdominal:      Palpations: Abdomen is soft.      Tenderness: There is no abdominal tenderness.   Musculoskeletal:      Cervical back: Neck supple.      Right lower leg: No edema.      Left  lower leg: No edema.   Skin:     General: Skin is warm and dry.      Capillary Refill: Capillary refill takes less than 2 seconds.   Neurological:      Mental Status: He is alert and oriented to person, place, and time.   Psychiatric:         Mood and Affect: Mood normal.           PROCEDURES     ECG 12 lead    Date/Time: 6/15/2024 11:30 AM    Performed by: Cholo Kilpatrick PA C  Authorized by: Cholo Kilpatrick PA C    ECG interpreted by ED Physician in the absence of a cardiologist: yes    Previous ECG:     Previous ECG:  Compared to current    Similarity:  No change  Rate:     ECG rate:  86    ECG rate assessment: normal    Rhythm:     Rhythm: sinus rhythm    Ectopy:     Ectopy: PVCs    QRS:     QRS axis:  Normal    QRS intervals:  Normal    QRS conduction: normal    ST segments:     ST segments:  Normal  T waves:     T waves: normal         DATA     Results       Procedure Component Value Units Date/Time    B-type natriuretic peptide [[id number]] Collected: 06/15/24 1427    Specimen: Blood, Venous Updated: 06/15/24 1430    SARS-COV-2 (COVID-19)/ FLU A/B, AND RSV, PCR Nasopharynx [[id number]]  (Normal) Collected: 06/15/24 1247    Specimen: Nasopharyngeal Swab from Nasopharynx Updated: 06/15/24 1335     SARS-CoV-2 (COVID-19) Negative     Influenza A Negative     Influenza B Negative     Respiratory Syncytial Virus Negative    Narrative:      Testing performed using real-time PCR for detection of COVID-19. EUA approved validation studies performed on site.     HS Troponin (with 2 hour reflex) [[id number]]  (Normal) Collected: 06/15/24 1126    Specimen: Blood, Venous Updated: 06/15/24 1212     High Sens Troponin I 8.6 pg/mL     Comprehensive metabolic panel [[id number]]  (Abnormal) Collected: 06/15/24 1126    Specimen: Blood, Venous Updated: 06/15/24 1208     Sodium 135 mEQ/L      Potassium 3.9 mEQ/L      Comment: Results obtained on plasma. Plasma Potassium values may be up to 0.4 mEQ/L less than serum values. The  differences may be greater for patients with high platelet or white cell counts.        Chloride 100 mEQ/L      CO2 29 mEQ/L      BUN 17 mg/dL      Creatinine 1.0 mg/dL      Glucose 129 mg/dL      Calcium 9.8 mg/dL      AST (SGOT) 18 IU/L      ALT (SGPT) 15 IU/L      Alkaline Phosphatase 104 IU/L      Total Protein 7.3 g/dL      Comment: Test performed on plasma which typically contains approximately 0.4 g/dL more protein than serum.        Albumin 3.9 g/dL      Bilirubin, Total 0.8 mg/dL      eGFR >60.0 mL/min/1.73m*2      Comment: Calculation based on the Chronic Kidney Disease Epidemiology Collaboration (CKD-EPI) equation refit without adjustment for race.        Anion Gap 6 mEQ/L     CBC and differential [[id number]]  (Abnormal) Collected: 06/15/24 1126    Specimen: Blood, Venous Updated: 06/15/24 1133     WBC 8.43 K/uL      RBC 4.78 M/uL      Hemoglobin 14.0 g/dL      Hematocrit 43.5 %      MCV 91.0 fL      MCH 29.3 pg      MCHC 32.2 g/dL      RDW 13.4 %      Platelets 264 K/uL      MPV 9.3 fL      Differential Type Auto     nRBC 0.0 %      Immature Granulocytes 0.2 %      Neutrophils 67.6 %      Lymphocytes 14.4 %      Monocytes 14.7 %      Eosinophils 2.5 %      Basophils 0.6 %      Immature Granulocytes, Absolute 0.02 K/uL      Neutrophils, Absolute 5.70 K/uL      Lymphocytes, Absolute 1.21 K/uL      Monocytes, Absolute 1.24 K/uL      Eosinophils, Absolute 0.21 K/uL      Basophils, Absolute 0.05 K/uL     [facility] Blood Culture [[id number]] Collected: 06/15/24 1126    Specimen: Blood, Venous Updated: 06/15/24 1130    [facility] Draw Panel [[id number]] Collected: 06/15/24 1126    Specimen: Blood, Venous Updated: 06/15/24 1130    Narrative:      The following orders were created for panel order [facility] Draw Panel.  Procedure                               Abnormality         Status                     ---------                               -----------         ------                     RAINBOW LT BLUE[[id number]]                                   In process                 [place] Blood Culture[[id number]]                            In process                   Please view results for these tests on the individual orders.    LAUREN POND [[id number]] Collected: 06/15/24 1126    Specimen: Blood, Venous Updated: 06/15/24 1130            Imaging Results              CT ANGIOGRAPHY CHEST PULMONARY EMBOLISM WITH IV CONTRAST (Final result)  Result time 06/15/24 13:02:50      Final result                   Impression:    IMPRESSION:  development of a large right pleural effusion with right lower lobe collapse and  partial atelectasis of the right middle and upper lobes centrally.    Scattered small pulmonary nodules are again seen less than 6 mm the largest is  linearly oriented in the left upper lobe.    No CT evidence for proximal or segmental pulmonary arterial embolism.                 Narrative:    CLINICAL HISTORY:    Pulmonary embolism (PE) suspected, high prob    COMPARISON:    4/25/2020    COMMENT:  Technique: CT of the Chest was performed following the administration of IV  contrast.    100mL of iopamidoL (ISOVUE-370) 370 mg iodine /mL (76 %) injection 100 mL            KV and/or mA were adjusted according to the patient's size and body part for CT  dose reduction.  3D MIP and/or volume rendered image reconstruction performed and reviewed.    CHEST:    Lungs airways and pleura: Subpleural reticulation is seen.  Scattered small  pulmonary nodules are again seen less than 6 mm the largest is linearly oriented  in the left upper lobe.  The right middle and lower lobe pulmonary nodules are  difficult to evaluate due to the development of a large right pleural effusion  with right lower lobe collapse and partial atelectasis of the right middle and  upper lobes centrally.    Lower Neck: within normal limits.  Axilla: No enlarged nodes.  Mediastinum and Glenda: No enlarged nodes.  Heart and Pericardium: Normal size.  No pericardial  effusion.  Vessels:  Vascular calcification  Pulmonary arteries:  There is no proximal or segmental pulmonary arterial  embolism.  Esophagus:  Normal caliber.  Upper abdomen:  Stable.  Chest Wall: Bilateral gynecomastia again noted..  Bones:  Degenerative change.  Sclerotic foci in the ribs are unchanged.                                       X-RAY CHEST 2 VIEWS (Final result)  Result time 06/15/24 11:41:54      Final result                   Impression:    IMPRESSION:  moderate right pleural effusion with basilar airspace opacity.  Fluid is likely  to accurately into the fissures on the right as well.               Narrative:      CLINICAL HISTORY:   sob    COMMENT:    VIEWS: two - frontal and lateral.    Comparison date: 4/25/2020.    The heart and mediastinal contours are partly obscured by moderate right pleural  effusion with basilar airspace opacity.  Fluid is likely to accurately into the  fissures on the right as well.  The left lung is clear.  There is a large left  pleural effusion.  Trachea is midline.  Bony thorax intact with degenerative  change.                                        ECG 12 lead   Independent Interpretation by ED Provider   Rhythm: [NSR]  Rate: 80  P waves: [1st deg block]  QRS: [normal QRS]  Axis: [normal]  ST Segments: [no obvious ST elevation or ischemia]  Unchanged from previous   Reviewed by ED attending      ECG 12 lead    (Results Pending)       Scoring tools                                  ED Course & MDM   MDM / ED COURSE / CLINICAL IMPRESSION / DISPO     Medical Decision Making  Problems Addressed:  Pleural effusion on right: acute illness or injury    Amount and/or Complexity of Data Reviewed  External Data Reviewed: labs and ECG.  Labs: ordered. Decision-making details documented in ED Course.  Radiology: ordered. Decision-making details documented in ED Course.  ECG/medicine tests: ordered and independent interpretation performed.    Risk  Prescription drug  management.  Decision regarding hospitalization.        ED Course as of 06/15/24 1442   Sat Scar 15, 2024   1135 CBC and differential(!)  No significant change [RS]   1146 X-RAY CHEST 2 VIEWS  IMPRESSION:  moderate right pleural effusion with basilar airspace opacity.  Fluid is likely  to accurately into the fissures on the right as well.   [RS]   1152 Pt updated on results and plan for chest ct [RS]   1209 Comprehensive metabolic panel(!)  No significant change [RS]   1214 High Sens Troponin I: 8.6  Will repeat 2 hour trop [RS]   1324 CT ANGIOGRAPHY CHEST PULMONARY EMBOLISM WITH IV CONTRAST  IMPRESSION:  development of a large right pleural effusion with right lower lobe collapse and  partial atelectasis of the right middle and upper lobes centrally.     Scattered small pulmonary nodules are again seen less than 6 mm the largest is  linearly oriented in the left upper lobe.     No CT evidence for proximal or segmental pulmonary arterial embolism.      [RS]   1340 SARS-COV-2 (COVID-19)/ FLU A/B, AND RSV, PCR Nasopharynx [RS]   1340 [facility] [place] [RS]   0439 Spoke with [facility] they will evaluate [RS]      ED Course User Index  [RS] Cholo Kilpatrick PA C     Clinical Impression      Pleural effusion on right     _________________       ED Disposition   Admit / Observation                       Cholo Kilpatrick PA C  06/15/24 1442

## 2024-06-15 NOTE — ASSESSMENT & PLAN NOTE
Chronic  Continue Xarelto ,metoprolol and statin therapy  Cardiology is following, appreciate recommendations.   Cardiology recommends no further workup required, follow-up at discharge with outpatient cardiologist Dr. Campbell.

## 2024-06-15 NOTE — ASSESSMENT & PLAN NOTE
Acute  Reportedly had right upper extremity pain and swelling  Doppler of the extremity negative for DVT  Currently pain-free, attributes symptoms as radiating from the chest.  Tylenol as needed   No pain this am

## 2024-06-15 NOTE — ASSESSMENT & PLAN NOTE
Chronic  Status post resection in 2010 per patient  There is concern about the patient's pleural effusion potentially being attributed to an underlying malignancy, thoracentesis  completed , pleural fluid pending cytology 6/17/2024.   follows with colorectal surgery at [facility] f/u at discharge

## 2024-06-15 NOTE — H&P
Hospital Medicine Service -  History & Physical        CHIEF COMPLAINT   Rt arm/forearm pain     HISTORY OF PRESENT ILLNESS      Dave Arredondo is a 88 y.o. 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     - Says that for the past few months, his Rt forearm/arm has felt intermittently painful and colder than Lft.   - Denies any injuries.   - Says Sxs worsened today, extending up to axilla, prompting ED visit.   - On exam in ED, no swelling, erythema, lesion noted. No significant temp change between arms. Good radial pulses bilat.     - (+)MARY x 2 months. Mild intermittent cough. Denies any associated CP or palpitations. Denies prior Hx HF.   - Says he has been following w/his Cardiologist for these Sxs since April. Tx w/Abx in April for possible PNA w/out improvement of Sxs. Started on Lasix 20mg PO daily in April w/out much improvement.   - On exam in ED, comfortable appearing overall. On RA. No resp distress or tachypnea noted. Poor air movement on Rt.       Cardiologist - Dr Campbell at [facility]  Colorectal Surgeon - [facility]  Code Status - Full Code    PAST MEDICAL AND SURGICAL HISTORY      Past Medical History:   Diagnosis Date    Colon cancer (CMS/HCC)     Coronary artery disease     DVT (deep venous thrombosis) (CMS/[facility], [facility])     Hypertension     Lipid disorder        Past Surgical History:   Procedure Laterality Date    COLOSTOMY      HERNIA REPAIR         MEDICATIONS      Prior to Admission medications    Medication Sig Start Date End Date Taking? Authorizing Provider   albuterol HFA 90 mcg/actuation inhaler INHALE 2 PUFF USING INHALER EVERY EIGHT HOURS AS NEEDED 5/1/24  Yes Provider, [facility] MD Nemesio   furosemide (LASIX) 20 mg tablet Take 20 mg by mouth daily. 5/16/24  Yes Provider, [facility] MD Nemesio   atorvastatin (LIPITOR) 20 mg tablet Take 20 mg  by mouth daily. 3/9/20   Nemesio Urrutia MD   cyanocobalamin (VITAMIN B-12) 1,000 mcg/mL injection Inject 1,000 mcg into the shoulder, thigh, or buttocks every 30 (thirty) days. 4/21/20   Nemesio Urrutia MD   metoprolol succinate XL (TOPROL-XL) 100 mg 24 hr tablet Take 100 mg by mouth daily. 10/21/19   Nemesio Urrutia MD   nitroglycerin (NITROSTAT) 0.4 mg SL tablet Place 0.4 mg under the tongue every 5 (five) minutes as needed.      Nemesio Urrutia MD   XARELTO 10 mg tablet TAKE 1 TABLET BY MOUTH EVERY DAY WITH DINNER    Ghada haven Herr MD   apixaban (ELIQUIS) 5 mg tablet Take 2 tablets (10 mg total) by mouth 2 (two) times a day for 12 doses Indications: blood clot in a deep vein of the extremities, a clot in the lung. 4/27/20 6/15/24  Juan Jose Salcido MD   apixaban (ELIQUIS) 5 mg tablet Take 1 tablet (5 mg total) by mouth 2 (two) times a day Indications: blood clot in a deep vein of the extremities, a clot in the lung. 5/3/20 6/15/24  Juan Jose Salcido MD   isosorbide mononitrate (IMDUR) 30 mg 24 hr tablet Take 30 mg by mouth daily. 11/25/19 6/15/24  Nemesio Urrutia MD       ALLERGIES      Patient has no known allergies.    FAMILY HISTORY      Family History   Problem Relation Age of Onset    Heart disease Biological Mother        SOCIAL HISTORY      Social History     Socioeconomic History    Marital status: Single     Spouse name: None    Number of children: None    Years of education: None    Highest education level: None   Tobacco Use    Smoking status: Never    Smokeless tobacco: Never   Substance and Sexual Activity    Alcohol use: Not Currently    Drug use: Never    Sexual activity: Defer   Social History Narrative    Lives in the [facility]      Social Determinants of Health     Food Insecurity: No Food Insecurity (6/15/2024)    Hunger Vital Sign     Worried About Running Out of Food in the Last Year: Never true     Ran Out of Food in the Last  Year: Never true       REVIEW OF SYSTEMS      All other systems reviewed and negative except as noted in HPI    PHYSICAL EXAMINATION        General: Alert, comfortable, well appearing overall.  Neck: Supple.  Cardio: Regular rate, (+)S1/S2, no murmurs. Good, equal radial pulse bilat.  Pulm: Good chest expansion. No rales/rhonchi/wheezing bilat. Poor air movement on Rt.  GI: (+)BS. Abd soft, NT, ND.  Gu: No Eaton.   Neuro: AAOx3. CN 2-12 intact. Strength and sensation in UE/LE equal bilat.  Skin: Normal appearing. No lesions/rashes.  Lymphatics: No LE edema bilat.  Psych: Normal mood and affect.    LABS / IMAGING / EKG          - Old/Prior available records reviewed.  - Labs personally reviewed.  - EKG and imaging personally reviewed.    ASSESSMENT AND PLAN           * Pleural effusion on right  Assessment & Plan  Large Right Pleural Effusion, Lung Nodules  - (+)MARY x 2 months. Mild intermittent cough. Denies any associated CP or palpitations. Denies prior Hx HF.   - Says he has been following w/his Cardiologist for these Sxs since April. Tx w/Abx in April for possible PNA w/out improvement of Sxs. Started on Lasix 20mg PO daily in April w/out much improvement.   - On exam in ED, comfortable appearing overall. On RA. No resp distress or tachypnea noted. Poor air movement on Rt.     - CXR:  Moderate right pleural effusion with basilar airspace opacity.    Fluid is likely to accurately into the fissures on the right as well.    - CTA Chest:  Development of a large right pleural effusion with right lower lobe collapse and partial atelectasis of the right middle and upper lobes centrally.    Scattered small pulmonary nodules are again seen less than 6 mm the largest is linearly oriented in the left upper lobe.  No CT evidence for proximal or segmental pulmonary arterial embolism.    - Trop neg. EKG w/no acute ischemic changes.   - Check BNP.   - Obtain Echo to r/o HF.     - Afebrile. No leukocytosis.   - Labs  unremarkable overall. VSS.   - COVID/RSV/Flu neg.     - Obtain Diagnostic/Therapeutic Rt Thoracentesis.   - HOLD home Xarelto until cleared by IR to restart. Last dose on Fri, 6/14 in PM.  - Give dose Lasix 40mg IV x1 now. Continue home Lasix tomorrow.   - Pulm consulted to eval for possible malignant causes in setting of lung nodules noted on CT.         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   - (+)MARY x 2 months. Mild intermittent cough. Denies any associated CP or palpitations. Denies prior Hx HF.   - Says he has been following w/his Cardiologist for these Sxs since April. Tx w/Abx in April for possible PNA w/out improvement of Sxs. Started on Lasix 20mg PO daily in April w/out much improvement.     - BP stable.   - Trop neg. EKG w/no acute ischemic changes.   - Check BNP.     - Echo 4/2020:  Normal-sized LV. Mild concentric left ventricular hypertrophy.  Normal LV systolic function. Estimated EF 60- 65%. No regional wall motion abnormalities.  Normal-sized RV. Normal RV systolic function.  Mildly dilated LA.  Normal-sized RA.  Tricuspid AV. Sclerotic AV leaflets. Mild AV regurgitation.  MV bileaflet thickening.  Normal-sized IVC.  No evidence of pericardial effusion.    - Obtain new Echo to assess for changes.   - Give dose Lasix 40mg IV x1 now, then continue home Lasix dose.  - Pending diagnostic and therapeutic Thoracentesis.  - Cardiology consulted to eval for possible HF as cause. Follows w/Dr Campbell at [facility].  - Continue home meds: Metoprolol XL, Xarelto (HOLD pending Thoro), Atorvastatin, Lasix.   - GI ppx added for AC to prevent GIB (will need Rx upon D/C).       Right arm pain  Assessment & Plan  RUE Pain  - Pt presents w/Rt arm/forearm pain.  - Says that for the past few months, his Rt forearm/arm has felt intermittently painful and colder than Lft.   - Denies  any injuries.   - Says Sxs worsened today, extending up to axilla, prompting ED visit.   - On exam in ED, no swelling, erythema, lesion noted. No significant temp change between arms. Good radial pulses bilat.   - Suspect thoracic outlet syndrome/MSK cause, though may be related to significant Rt pleural effusion (referred pain).   - Due to Hx prior DVT, will obtain RUE US. Says he is complaint w/Xarelto 10mg nightly.     History of rectal cancer  Assessment & Plan  Hx Rectal Cancer s/p Ostomy (2012, s/p resection/chemo/ostomy)  - Stable.  - Ostomy care.   - Follows w/Colorectal Surgeon at [facility].           VTE ppx  - On Xarelto.  - SCDs.     VTE Assessment: Padua VTE Score: 4  VTE Prophylaxis Plan: See noted above.   Code Status: Full Code  Estimated Discharge Date: 6/18/2024  Disposition Planning: 3 days    Total time spent providing patient care is 75 min (prep work, reviewing old records, reviewing new/recent results, obtaining history, performing exam, discussing results/care with patient and family, providing counseling/education, ordering tests/labs/medications, discussing w/other providers, coordinating care, documenting).     Deja Mackey, DO  6/15/2024

## 2024-06-15 NOTE — PLAN OF CARE
Problem: Adult Inpatient Plan of Care  Goal: Plan of Care Review  Outcome: Progressing  Flowsheets (Taken 6/15/2024 5196)  Progress: improving  Outcome Evaluation: pt feels better after IV lasix given and voided a lot,  ind in the room.  awaiting IR for pleural effusion drainage and echo on monday,  for US of right arm tonight.  sinus on the monitor. no report of pain.  will cont to monitor  Plan of Care Reviewed With: patient   Plan of Care Review  Plan of Care Reviewed With: patient  Progress: improving  Outcome Evaluation: pt feels better after IV lasix given and voided a lot,  ind in the room.  awaiting IR for pleural effusion drainage and echo on monday,  for US of right arm tonight.  sinus on the monitor. no report of pain.  will cont to monitor

## 2024-06-15 NOTE — ED ATTESTATION NOTE
I have personally seen and examined the patient.  I personally performed the key components of the encounter and provided a substantive portion of the care and medical decision making for this patient.  I reviewed and agree with physician assistant / nurse practitioner’s assessment and plan of care, with the following exceptions: None  My examination, assessment, and plan of care of Dave Arredondo is as follows:     Exam: Well-appearing, no acute distress, awake alert oriented x 3, regular rate and rhythm, mild tachypnea, lungs decreased in the right base, right upper extremity normal in appearance, normal temperature, normal pulses normal strength and sensation, no swelling, no lower extremity edema    Assessment and plan: Patient has had shortness of breath and dry cough since mid April was treated with antibiotics by his cardiologist without much improvement, worse with lying down, intermittent chest pain on the right, also complains of right upper extremity pain to the medial aspect that is also intermittent in nature, he has a nonischemic EKG, lab work reassuring, chest x-ray reviewed/interpreted by me shows pleural effusion with possible infiltrate, will CT and reassess     Jorge Pratt,   06/15/24 1005

## 2024-06-16 LAB
ANION GAP SERPL CALC-SCNC: 7 MEQ/L (ref 3–15)
ATRIAL RATE: 138
ATRIAL RATE: 67
ATRIAL RATE: 80
BUN SERPL-MCNC: 20 MG/DL (ref 7–25)
CALCIUM SERPL-MCNC: 9.2 MG/DL (ref 8.6–10.3)
CHLORIDE SERPL-SCNC: 105 MEQ/L (ref 98–107)
CO2 SERPL-SCNC: 25 MEQ/L (ref 21–31)
CREAT SERPL-MCNC: 0.8 MG/DL (ref 0.7–1.3)
EGFRCR SERPLBLD CKD-EPI 2021: >60 ML/MIN/1.73M*2
ERYTHROCYTE [DISTWIDTH] IN BLOOD BY AUTOMATED COUNT: 13.5 % (ref 11.6–14.4)
GLUCOSE SERPL-MCNC: 99 MG/DL (ref 70–99)
HCT VFR BLD AUTO: 39.5 % (ref 40.1–51)
HGB BLD-MCNC: 13 G/DL (ref 13.7–17.5)
MAGNESIUM SERPL-MCNC: 2.1 MG/DL (ref 1.8–2.5)
MCH RBC QN AUTO: 29.7 PG (ref 28–33.2)
MCHC RBC AUTO-ENTMCNC: 32.9 G/DL (ref 32.2–36.5)
MCV RBC AUTO: 90.4 FL (ref 83–98)
P AXIS: 42
P AXIS: 9
PDW BLD AUTO: 9.6 FL (ref 9.4–12.4)
PHOSPHATE SERPL-MCNC: 3.4 MG/DL (ref 2.4–4.7)
PLATELET # BLD AUTO: 227 K/UL (ref 150–350)
POTASSIUM SERPL-SCNC: 4.2 MEQ/L (ref 3.5–5.1)
PR INTERVAL: 242
PR INTERVAL: 246
QRS DURATION: 90
QRS DURATION: 90
QRS DURATION: 94
QT INTERVAL: 352
QT INTERVAL: 368
QT INTERVAL: 402
QTC CALCULATION(BAZETT): 421
QTC CALCULATION(BAZETT): 424
QTC CALCULATION(BAZETT): 424
R AXIS: 15
R AXIS: 21
R AXIS: 5
RBC # BLD AUTO: 4.37 M/UL (ref 4.5–5.8)
SODIUM SERPL-SCNC: 137 MEQ/L (ref 136–145)
T WAVE AXIS: 42
T WAVE AXIS: 44
T WAVE AXIS: 49
VENTRICULAR RATE: 67
VENTRICULAR RATE: 80
VENTRICULAR RATE: 86
WBC # BLD AUTO: 7.91 K/UL (ref 3.8–10.5)

## 2024-06-16 PROCEDURE — 20600000 HC ROOM AND CARE INTERMEDIATE/TELEMETRY

## 2024-06-16 PROCEDURE — 99223 1ST HOSP IP/OBS HIGH 75: CPT | Performed by: INTERNAL MEDICINE

## 2024-06-16 PROCEDURE — 36415 COLL VENOUS BLD VENIPUNCTURE: CPT | Performed by: HOSPITALIST

## 2024-06-16 PROCEDURE — 84100 ASSAY OF PHOSPHORUS: CPT | Performed by: HOSPITALIST

## 2024-06-16 PROCEDURE — 85027 COMPLETE CBC AUTOMATED: CPT | Performed by: HOSPITALIST

## 2024-06-16 PROCEDURE — 63700000 HC SELF-ADMINISTRABLE DRUG: Performed by: HOSPITALIST

## 2024-06-16 PROCEDURE — 80048 BASIC METABOLIC PNL TOTAL CA: CPT | Performed by: HOSPITALIST

## 2024-06-16 PROCEDURE — 99233 SBSQ HOSP IP/OBS HIGH 50: CPT | Performed by: STUDENT IN AN ORGANIZED HEALTH CARE EDUCATION/TRAINING PROGRAM

## 2024-06-16 PROCEDURE — 83735 ASSAY OF MAGNESIUM: CPT | Performed by: HOSPITALIST

## 2024-06-16 PROCEDURE — 93005 ELECTROCARDIOGRAM TRACING: CPT | Performed by: HOSPITALIST

## 2024-06-16 RX ADMIN — FUROSEMIDE 20 MG: 20 TABLET ORAL at 08:27

## 2024-06-16 RX ADMIN — METOPROLOL SUCCINATE 100 MG: 100 TABLET, EXTENDED RELEASE ORAL at 08:27

## 2024-06-16 RX ADMIN — DOCUSATE SODIUM 100 MG: 100 CAPSULE, LIQUID FILLED ORAL at 08:27

## 2024-06-16 RX ADMIN — ATORVASTATIN CALCIUM 20 MG: 20 TABLET, FILM COATED ORAL at 08:27

## 2024-06-16 RX ADMIN — DOCUSATE SODIUM 100 MG: 100 CAPSULE, LIQUID FILLED ORAL at 21:00

## 2024-06-16 RX ADMIN — PANTOPRAZOLE SODIUM 40 MG: 40 TABLET, DELAYED RELEASE ORAL at 21:01

## 2024-06-16 ASSESSMENT — COGNITIVE AND FUNCTIONAL STATUS - GENERAL
STANDING UP FROM CHAIR USING ARMS: 4 - NONE
MOVING TO AND FROM BED TO CHAIR: 4 - NONE
CLIMB 3 TO 5 STEPS WITH RAILING: 3 - A LITTLE
WALKING IN HOSPITAL ROOM: 3 - A LITTLE

## 2024-06-16 NOTE — PLAN OF CARE
Problem: Adult Inpatient Plan of Care  Goal: Plan of Care Review  Outcome: Progressing  Flowsheets (Taken 6/16/2024 9916)  Progress: improving  Outcome Evaluation: Pt received lying in bed, A&Ox4. Vss on ra. Reports some sob and R sided chest pain. Colostomy bag changed this shift. Large bulge surrounding LLQ ostomy, denies pain states it's always been there. Resting on/off through shift, ambulating independently refusing bed alarm at this time call bell in reach.  Plan of Care Reviewed With: patient

## 2024-06-16 NOTE — PROGRESS NOTES
Hospital Medicine Service -  Daily Progress Note       SUBJECTIVE   Interval History:     The patient is an 88-year-old male with a past medical history of hypertension, coronary artery disease, hyperlipidemia, rectal cancer status post resection and subsequent colostomy, deep vein thrombosis on Xarelto, first-degree AV block, ventral hernia, and obesity, who presented to the emergency department at [facility] 6/15/2024 with chest pain associated with right arm pain.    The patient was seen and examined at bedside, he is seated comfortably on chair beside his bed.  He still notes dyspnea on exertion and some mild shortness of breath.  He was informed of the CT findings consistent with a large right-sided pleural effusion I am concerned that, because of his history of colon cancer, that there is a chance that the pleural fluid buildup is from an underlying malignancy.  IR for thoracentesis tomorrow.  Xarelto on hold.     OBJECTIVE        Vital signs in last 24 hours:  Temp:  [36.6 °C (97.8 °F)-36.9 °C (98.5 °F)] 36.6 °C (97.8 °F)  Heart Rate:  [65-76] 65  Resp:  [18] 18  BP: (120-143)/(60-76) 133/75  No intake or output data in the 24 hours ending 06/16/24 1453      PHYSICAL EXAMINATION      Physical Exam  Constitutional:       General: He is not in acute distress.     Appearance: He is not ill-appearing.   HENT:      Head: Normocephalic and atraumatic.      Nose: No congestion or rhinorrhea.      Mouth/Throat:      Pharynx: No oropharyngeal exudate or posterior oropharyngeal erythema.   Eyes:      General: No scleral icterus.     Conjunctiva/sclera: Conjunctivae normal.   Cardiovascular:      Rate and Rhythm: Normal rate.      Heart sounds: No murmur heard.  Pulmonary:      Effort: No respiratory distress.      Breath sounds: Rhonchi (Particularly in the right lower lobe) present.   Abdominal:      General: There is no distension.      Tenderness: There is no abdominal tenderness.      Hernia: A hernia  is present.      Comments: Colostomy bag in place without any surrounding erythema, or leakage   Musculoskeletal:      Cervical back: Neck supple. No tenderness.      Right lower leg: No edema.      Left lower leg: No edema.   Skin:     Findings: No erythema or rash.   Neurological:      Mental Status: He is alert and oriented to person, place, and time.      Cranial Nerves: No cranial nerve deficit.      Motor: No weakness.        LINES, CATHETERS, DRAINS, AIRWAYS, AND WOUNDS   Lines, Drains, and Airways:  Wounds (agree with documentation and present on admission):  Peripheral IV (Adult) 06/15/24 Left Antecubital (Active)   Number of days: 1          LABS / IMAGING / TELE      Labs  CBC   CBC Results         06/16/24 06/15/24 04/27/20     0514 1126 0443    WBC 7.91 8.43 5.35    RBC 4.37 4.78 4.40    HGB 13.0 14.0 13.6    HCT 39.5 43.5 39.9    MCV 90.4 91.0 90.7    MCH 29.7 29.3 30.9    MCHC 32.9 32.2 34.1     264 157           BMP or CMP   BMP Results         06/16/24 06/15/24 04/27/20     0514 1126 0443     135 139    K 4.2 3.9 4.0    Cl 105 100 109    CO2 25 29 24    Glucose 99 129 91    BUN 20 17 18    Creatinine 0.8 1.0 0.8    Calcium 9.2 9.8 9.0    Anion Gap 7 6 6    EGFR >60.0 >60.0 >60.0           Comment for K at 1126 on 06/15/24: Results obtained on plasma. Plasma Potassium values may be up to 0.4 mEQ/L less than serum values. The differences may be greater for patients with high platelet or white cell counts.    Comment for EGFR at 0514 on 06/16/24: Calculation based on the Chronic Kidney Disease Epidemiology Collaboration (CKD-EPI) equation refit without adjustment for race.    Comment for EGFR at 1126 on 06/15/24: Calculation based on the Chronic Kidney Disease Epidemiology Collaboration (CKD-EPI) equation refit without adjustment for race.          CMP Results         06/16/24 06/15/24 04/27/20     0514 1126 0443     135 139    K 4.2 3.9 4.0    Cl 105 100 109    CO2 25 29 24     Glucose 99 129 91    BUN 20 17 18    Creatinine 0.8 1.0 0.8    Calcium 9.2 9.8 9.0    Anion Gap 7 6 6    AST -- 18 --    ALT -- 15 --    Albumin -- 3.9 --    EGFR >60.0 >60.0 >60.0           Comment for K at 1126 on 06/15/24: Results obtained on plasma. Plasma Potassium values may be up to 0.4 mEQ/L less than serum values. The differences may be greater for patients with high platelet or white cell counts.    Comment for EGFR at 0514 on 06/16/24: Calculation based on the Chronic Kidney Disease Epidemiology Collaboration (CKD-EPI) equation refit without adjustment for race.    Comment for EGFR at 1126 on 06/15/24: Calculation based on the Chronic Kidney Disease Epidemiology Collaboration (CKD-EPI) equation refit without adjustment for race.                 Imaging  Ultrasound venous arm right    Result Date: 6/15/2024  CLINICAL HISTORY: Right upper extremity numbness and pain. COMMENT: Ultrasound imaging of the right upper extremity was performed.  Color and spectral flow Doppler images were also obtained. COMPARISON: None. There is normal compressibility and flow in the internal jugular, axillary, basilic, cephalic and brachial veins.  Spectral Doppler images demonstrate normal respiratory phasicity.  There is normal color flow within the subclavian vein.     IMPRESSION: No evidence of right upper extremity deep vein thrombosis.     CT ANGIOGRAPHY CHEST PULMONARY EMBOLISM WITH IV CONTRAST    Result Date: 6/15/2024  CLINICAL HISTORY:    Pulmonary embolism (PE) suspected, high prob COMPARISON:    4/25/2020 COMMENT: Technique: CT of the Chest was performed following the administration of IV contrast. 100mL of iopamidoL (ISOVUE-370) 370 mg iodine /mL (76 %) injection 100 mL KV and/or mA were adjusted according to the patient's size and body part for CT dose reduction. 3D MIP and/or volume rendered image reconstruction performed and reviewed. CHEST: Lungs airways and pleura: Subpleural reticulation is seen.  Scattered  small pulmonary nodules are again seen less than 6 mm the largest is linearly oriented in the left upper lobe.  The right middle and lower lobe pulmonary nodules are difficult to evaluate due to the development of a large right pleural effusion with right lower lobe collapse and partial atelectasis of the right middle and upper lobes centrally. Lower Neck: within normal limits. Axilla: No enlarged nodes. Mediastinum and Glenda: No enlarged nodes. Heart and Pericardium: Normal size.  No pericardial effusion. Vessels:  Vascular calcification Pulmonary arteries:  There is no proximal or segmental pulmonary arterial embolism. Esophagus:  Normal caliber. Upper abdomen:  Stable. Chest Wall: Bilateral gynecomastia again noted.. Bones:  Degenerative change.  Sclerotic foci in the ribs are unchanged.     IMPRESSION: development of a large right pleural effusion with right lower lobe collapse and partial atelectasis of the right middle and upper lobes centrally. Scattered small pulmonary nodules are again seen less than 6 mm the largest is linearly oriented in the left upper lobe. No CT evidence for proximal or segmental pulmonary arterial embolism.     X-RAY CHEST 2 VIEWS    Result Date: 6/15/2024  CLINICAL HISTORY:   sob COMMENT: VIEWS: two - frontal and lateral. Comparison date: 4/25/2020. The heart and mediastinal contours are partly obscured by moderate right pleural effusion with basilar airspace opacity.  Fluid is likely to accurately into the fissures on the right as well.  The left lung is clear.  There is a large left pleural effusion.  Trachea is midline.  Bony thorax intact with degenerative change.     IMPRESSION: moderate right pleural effusion with basilar airspace opacity.  Fluid is likely to accurately into the fissures on the right as well.     ECG/Telemetry  Monitor.     ASSESSMENT AND PLAN      * Pleural effusion on right  Assessment & Plan  CT finding  Imaging noted a large right pleural effusion with right  lower lobe collapse and partial atelectasis of the right middle lobe and upper lobe centrally  Etiology concerning for underlying malignancy in light of history of colon cancer  For thoracentesis tomorrow  Holding Xarelto  Currently saturating well on room air, monitor saturations and maintain O2 sats above 90%  Incentive spirometry  Recommend out of bed to chair    History of rectal cancer  Assessment & Plan  Chronic  Status post resection in 2010 per patient  There is concern about the patient's pleural effusion potentially being attributed to an underlying malignancy, thoracentesis to be done on 6/17/2024  The patient does follow with colorectal surgery at [facility]    Right arm pain  Assessment & Plan  Acute  Reportedly had right upper extremity pain and swelling  Doppler of the extremity negative for DVT  Currently pain-free, attributes symptoms as radiating from the chest  Monitor closely, Tylenol as needed for now    CAD (coronary artery disease)  Assessment & Plan  Chronic  Xarelto is currently on hold, continue metoprolol and statin therapy  Monitor telemetry  Cardiology is following, appreciate recommendations.          VTE Assessment: Padua VTE Score: 4  VTE Prophylaxis:  Current anticoagulants:  [Provider Managed Hold] rivaroxaban (XARELTO) tablet 10 mg, oral, Daily with dinner      Code Status: Full Code      Estimated Discharge Date: 6/18/2024     Disposition Planning: Large right pleural effusion concerning for underlying malignancy in light of history of colon cancer.  Thoracentesis tomorrow.  Xarelto on hold.    I have spent more than 55 minutes performing the evaluation and patient discussion, physical examination, entering orders, documenting, providing counseling and education, independently reviewing charting and studies, communicating results, discussing with house staff including specialists and nursing, and coordinating care.       Chad Schuler MD  6/16/2024

## 2024-06-16 NOTE — CONSULTS
Pulmonology Consult Note    Subjective     Dave Arredondo is a 88 y.o. male with a past medical history of hypertension hyperlipidemia coronary artery disease deep venous thrombosis and pulmonary embolism.  There is a history of rectal cancer in 2012 treated with radiation chemotherapy and surgery.  Approximately 2 months ago he developed a mild intermittent cough which was nonproductive.  He noted decline in exercise tolerance, weakness and loss of appetite.  He was seen and prescribed antibiotics.  No chest x-ray was obtained.  He denied fever chills night sweats, myalgias, arthralgias, nasal congestion or hemoptysis.    He has had discomfort in the right upper extremity and right anterior chest.  No neurologic deficits.  He believes there may be weight loss.  No ankle edema.  Due to worsening shortness of breath he presented to the emergency room for further evaluation.  Found to have large right pleural effusion.    He was never a cigarette smoker.  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  No known history of tuberculosis or tuberculosis exposure.    Outside records reviewed..    Past Medical History:   Diagnosis Date    Colon cancer (CMS/HCC)     Coronary artery disease     DVT (deep venous thrombosis) (CMS/[facility])     Hypertension     Lipid disorder      Past Surgical History:   Procedure Laterality Date    COLOSTOMY      HERNIA REPAIR       Social History     Socioeconomic History    Marital status: Single     Spouse name: None    Number of children: None    Years of education: None    Highest education level: None   Tobacco Use    Smoking status: Never    Smokeless tobacco: Never   Substance and Sexual Activity    Alcohol use: Not Currently    Drug use: Never    Sexual activity: Defer   Social History Narrative    Lives in the [facility]      Social Determinants of Health      Food Insecurity: No Food Insecurity (6/15/2024)    Hunger Vital Sign     Worried About Running Out of Food in the Last Year: Never true     Ran Out of Food in the Last Year: Never true     Family History   Problem Relation Age of Onset    Heart disease Biological Mother        Allergies: Patient has no known allergies.    Current Inpatient Medications   Medication Dose Route Frequency Provider Last Rate Last Admin    acetaminophen (TYLENOL) tablet 650 mg  650 mg oral q6h PRN Rashidaatocatherine Deja, DO        albuterol HFA 90 mcg/actuation inhaler 2 puff  2 puff inhalation q4h PRN Rashidaatocatherine Deja, DO        atorvastatin (LIPITOR) tablet 20 mg  20 mg oral Daily Ignatowitara, Deja, DO   20 mg at 06/16/24 0827    glucose chewable tablet 16-32 g of dextrose  16-32 g of dextrose oral PRN Key Deja, DO        Or    dextrose 40 % oral gel 15-30 g of dextrose  15-30 g of dextrose oral PRN Key Deja, DO        Or    glucagon (GLUCAGEN) injection 1 mg  1 mg intramuscular PRN Key Deja DO        Or    dextrose 50 % in water (D50) injection 12.5 g  25 mL intravenous PRN Key Deja, DO        docusate sodium (COLACE) capsule 100 mg  100 mg oral BID Rashidaatocatherine Deja, DO   100 mg at 06/16/24 0827    furosemide (LASIX) tablet 20 mg  20 mg oral Daily Rashidaatowitara Deja, DO   20 mg at 06/16/24 0827    metoprolol succinate XL (TOPROL-XL) 24 hr ER tablet 100 mg  100 mg oral Daily Ignatowitara, Djea, DO   100 mg at 06/16/24 0827    pantoprazole (PROTONIX) tablet,delayed release (DR/EC) 40 mg  40 mg oral Nightly Rashidaatocatherine Deja, DO   40 mg at 06/15/24 2110    [Provider Managed Hold] rivaroxaban (XARELTO) tablet 10 mg  10 mg oral Daily with dinner Deja Mackey DO            Medication List        ASK your doctor about these medications      albuterol HFA 90 mcg/actuation inhaler  INHALE 2 PUFF USING INHALER EVERY EIGHT HOURS AS NEEDED     atorvastatin 20 mg tablet  Commonly known as:  LIPITOR  Take 20 mg by mouth daily.  Dose: 20 mg     cyanocobalamin 1,000 mcg/mL injection  Commonly known as: VITAMIN B-12  Inject 1,000 mcg into the shoulder, thigh, or buttocks every 30 (thirty) days.  Dose: 1,000 mcg     furosemide 20 mg tablet  Commonly known as: LASIX  Take 20 mg by mouth daily.  Dose: 20 mg     metoprolol succinate  mg 24 hr tablet  Commonly known as: TOPROL-XL  Take 100 mg by mouth daily.  Dose: 100 mg     nitroglycerin 0.4 mg SL tablet  Commonly known as: NITROSTAT  Place 0.4 mg under the tongue every 5 (five) minutes as needed.  Dose: 0.4 mg     XARELTO 10 mg tablet  TAKE 1 TABLET BY MOUTH EVERY DAY WITH DINNER  Generic drug: rivaroxaban              Review of Systems:  Pertinent items are noted in HPI.    Objective     Vital Signs for the last 24 hours:  Temp:  [36.6 °C (97.8 °F)-36.9 °C (98.5 °F)] 36.6 °C (97.8 °F)  Heart Rate:  [65-76] 65  Resp:  [18] 18  BP: (120-143)/(60-76) 133/75  SpO2:  [93 %-96 %] 94 %    Oxygen:  Oxygen Therapy: None (Room air)              Labs:     Latest Reference Range & Units 06/15/24 11:26   Sodium 136 - 145 mEQ/L 135 (L)   Potassium, Bld 3.5 - 5.1 mEQ/L 3.9   Chloride 98 - 107 mEQ/L 100   CO2 21 - 31 mEQ/L 29   BUN 7 - 25 mg/dL 17   Creatinine 0.7 - 1.3 mg/dL 1.0   Glucose 70 - 99 mg/dL 129 (H)   Calcium 8.6 - 10.3 mg/dL 9.8   AST (SGOT) 13 - 39 IU/L 18   ALT (SGPT) 7 - 52 IU/L 15   Alkaline Phosphatase 34 - 125 IU/L 104   Total Protein 6.0 - 8.2 g/dL 7.3   Albumin 3.5 - 5.7 g/dL 3.9   Bilirubin, Total 0.3 - 1.2 mg/dL 0.8   eGFR >=60.0 mL/min/1.73m*2 >60.0   Anion Gap 3 - 15 mEQ/L 6   Magnesium 1.8 - 2.5 mg/dL 2.1   Phosphorus 2.4 - 4.7 mg/dL 3.0      [facility] Reference Range & Units 06/15/24 11:26   High Sens Troponin I <15.0 pg/mL 8.6   Hemoglobin A1C <5.7 % 5.7 (H)   Estimated Ave Glucose mg/dL 117   Calcium 8.6 - 10.3 mg/dL 9.8   WBC 3.80 - 10.50 K/uL 8.43   RBC 4.50 - 5.80 M/uL 4.78   Hemoglobin 13.7 - 17.5 g/dL 14.0   Hematocrit 40.1 - 51.0 %  43.5   MCV 83.0 - 98.0 fL 91.0   MCH 28.0 - 33.2 pg 29.3   MCHC 32.2 - 36.5 g/dL 32.2   RDW 11.6 - 14.4 % 13.4   Platelets 150 - 350 K/uL 264   MPV 9.4 - 12.4 fL 9.3 (L)   Differential Type  Auto   nRBC <=0.0 % 0.0   Immature Granulocytes % 0.2   Neutrophils % 67.6   Lymphocytes % 14.4   Monocytes % 14.7   Eosinophils % 2.5     INR 1.4 PT 16.8    Influenza A/B, RSV, COVID negative    Imaging:  Chest x-ray PA and lateral independently reviewed from 4/17/2024. - right pleural effusion.  Follow-up chest x-ray on 5/17/2024 reveals increasing pleural effusion.  Follow-up chest x-ray on 6/15/2020 for further increasing right pleural effusion now extending into the fissure.    CT of the chest on 4/25/2020 reveals multiple scattered lung nodules in the 3 to 6 mm range.  CT angiogram on 6/15/2024-large right pleural effusion.  Compressive atelectasis middle lobe and right lower lobe.  No evidence of pulmonary embolism.        ECG:  sinus rhythm    Physical Exam:      ENT exam unremarkable  Neck revealed no adenopathy  Chest dull on the right, diminished breath sounds  Cardiac regular rate and rhythm  Abdomen soft      Assessment       88 y.o. male being consulted for subacute development of enlarging right pleural effusion.  Compressive atelectasis of middle and right lower lobe.  No signs of infection.  No signs of congestive heart failure.  No evidence of intra-abdominal process.  Should be considered malignant until proven otherwise.      Plan     -Hold Xarelto  -Right thoracentesis-  -repeat chest CT after right thoracentesis to visualize underlying lung.    Vince Park MD.

## 2024-06-16 NOTE — PLAN OF CARE
Care Coordination Admission Assessment Note    General Information:  Readmission Within the last 30 days: no previous admission in last 30 days  Does patient have a : No  Patient-Specific Goals (include timeframe):      Living Arrangements:  Arrived From: home (Father Shauna home [place])  Current Living Arrangements: home  People in Home: other (see comments) (Father Shauna brothers)  Home Accessibility:    Living Arrangement Comments:      Housing Stability and Utility Access (SDOH):  In the last 12 months, was there a time when you were not able to pay the mortgage or rent on time?:    In the last 12 months, how many places have you lived?:    In the last 12 months, was there a time when you did not have a steady place to sleep or slept in a shelter (including now)?:    In the past 12 months has the electric, gas, oil, or water company threatened to shut off services in your home?:      Functional Status Prior to Admission:   Assistive Device/Animal Currently Used at Home: none  Functional Status Comments:    IADL Comments:       Supports and Services:  Current Outpatient/Agency/Support Group[facility]  Type of Current Home Care Services:    History of home care episode or rehab stay:      Discharge Needs Assessment:   Concerns to be Addressed: denies needs/concerns at this time  Current Discharge Risk:    Anticipated Changes Related to Illness: none    Patient/Family Anticipated Discharge Plan:  Patient/Family Anticipates Transition To: home  Patient/Family Anticipated Services at Transition: none    Connection to Community       Patient Choice:   Offered/Gave Vendor List:    Patient's Choice of Community Agency(s):         Anticipated Discharge Plan:  Met with patient. Provided education and contact information for Care Coordination services.: yes  Anticipated Discharge Disposition: home without assistance or services     Transportation Needs (SDOH):  Transportation Concerns:  "none  Transportation Anticipated: family or friend will provide  Is Out of Hospital DNR needed at discharge?: no    In the past 12 months, has lack of transportation kept you from medical appointments or from getting medications?:    In the past 12 months, has lack of transportation kept you from meetings, work, or from getting things needed for daily living?:      Concerns - comments:    Met with patient. Patient lives with his \"brothers\" with Father Shauna group. Says he has no family, travels often to [place]. Patient independent, is a . Patient denies having a PCP, accepted PCP resource. Confirmed pharmacy [facility]. Denies issues accessing services or medications. Denies having/using any DME. He says he had home care in the remote past, fourteen years ago during illness. Plan home at discharge, does not need transport, denies needs.  "

## 2024-06-16 NOTE — CONSULTS
[facility]  [facility] Consult Note       Reason for consult: L R pleural effusion, ?CHF     HPI     Dave Arredondo is a 88 y.o. 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  Sometimes radiates down R am.  No orthopnea/pnd/edema/abdominal distension.  He endorses poor appetite and ~ 5lb weight loss.   He was treated in April for possible pneumonia without improvement and then started on Lasix 20 daily back in April- again without improvement.  Thus far no clear issue with his right arm is been identified, RUE ultrasound negative for DVT.  However, workup included chest imaging with a CTA that showed a large right pleural effusion with right lower lobe collapse and partial atelectasis of the right middle and upper lobe centrally along with scattered small pulmonary nodules. He is oxygenating adequately on room air, vital signs are notable for elevated BP at times.  He is afebrile with no signs of infection.  .  His home Xarelto is on hold with plans for diagnostic and therapeutic thoracentesis tomorrow.  We are consulted to comment on possible CHF contributing to his effusion.  He has no prior history of CHF, last available echo from 2020 with mild cLVH, preserved EF.  Prior outpatient chest x-rays from April and then again in May show right effusion as well.    He was treated with IV fluid bolus followed by Lasix 40 IV and then 20 oral  this morning.  Otherwise his remains in his regular cardiac meds with exception his Xarelto is on hold for planned thoracentesis.  Urine output not recorded, creatinine stable at 0.8.  He is clinically euvolemic on exam.     Past History      Past Medical History:   Diagnosis Date   • Colon cancer (CMS/HCC)    • Coronary artery disease    • DVT (deep venous thrombosis) (CMS/HCC)    • Hypertension    • Lipid disorder        Past Surgical History:   Procedure Laterality Date   • COLOSTOMY     • HERNIA REPAIR         Social History     Social History Narrative    Lives in the [facility] in [place]        Family History   Problem Relation Age of Onset   • Heart disease Biological Mother         Medications     Medications prior to admission:  Medications Prior to Admission   Medication Sig Dispense Refill Last Dose   • atorvastatin (LIPITOR) 20 mg tablet Take 20 mg by mouth daily.   6/15/2024   • cyanocobalamin (VITAMIN B-12) 1,000 mcg/mL injection Inject 1,000 mcg into the shoulder, thigh, or buttocks every 30 (thirty) days.   Past Month   • furosemide (LASIX) 20 mg tablet Take 20 mg by mouth daily.   6/15/2024   • metoprolol succinate XL (TOPROL-XL) 100 mg 24 hr tablet Take 100 mg by mouth daily.   6/15/2024   • XARELTO 10 mg tablet TAKE 1 TABLET BY MOUTH EVERY DAY WITH DINNER   6/14/2024   • albuterol HFA 90 mcg/actuation inhaler INHALE 2 PUFF USING INHALER EVERY EIGHT HOURS AS NEEDED   More than a month   • nitroglycerin (NITROSTAT) 0.4 mg SL tablet Place 0.4 mg under the tongue every 5 (five) minutes as needed.     Unknown       Scheduled Inpatient Medications:  • atorvastatin  20 mg oral Daily   • docusate sodium  100 mg oral BID   • furosemide  20 mg oral Daily   • metoprolol succinate XL  100 mg oral Daily   • pantoprazole  40 mg oral Nightly   • [Provider Managed Hold] rivaroxaban  10 mg oral Daily with dinner       Scheduled Inpatient Infusions:       Allergies     Patient has no known allergies.    "  Vital Signs/Exam     Vital signs in last 24 hours:  Temp:  [36.1 °C (97 °F)-37.1 °C (98.8 °F)] 36.9 °C (98.4 °F)  Heart Rate:  [] 69  Resp:  [18-28] 18  BP: (120-172)/(60-89) 136/76    I/O    Intake/Output Summary (Last 24 hours) at 6/16/2024 0830  Last data filed at 6/15/2024 1329  Gross per 24 hour   Intake 500 ml   Output --   Net 500 ml       Weight  Weight change:     Physical Exam:  Visit Vitals  /76 (BP Location: Left upper arm, Patient Position: Lying)   Pulse 69   Temp 36.9 °C (98.4 °F) (Oral)   Resp 18   Ht 1.702 m (5' 7\")   Wt 73.9 kg (162 lb 14.4 oz)   SpO2 93%   BMI 25.51 kg/m²       Gen: no distress  HEENT: no jvd  Lungs: decreased R 1/2 way up  Chest wall: no tenderness  Heart: regular rate and rhythm; no murmur  Abdomen: nondistended, nontender  Extremities: no edema  Neuro: nonfocal, alert and oriented  Psych: normal mood and affect     Diagnostic Data   Diagnostic data personally reviewed.    Labs:  Results from last 7 days   Lab Units 06/16/24  0514 06/15/24  1126   WBC K/uL 7.91 8.43   HEMOGLOBIN g/dL 13.0* 14.0   HEMATOCRIT % 39.5* 43.5   PLATELETS K/uL 227 264     Results from last 7 days   Lab Units 06/16/24  0514 06/15/24  1126   SODIUM mEQ/L 137 135*   POTASSIUM mEQ/L 4.2 3.9   CHLORIDE mEQ/L 105 100   CO2 mEQ/L 25 29   BUN mg/dL 20 17   CREATININE mg/dL 0.8 1.0   GLUCOSE mg/dL 99 129*   CALCIUM mg/dL 9.2 9.8         High Sens Troponin I   Date Value Ref Range Status   06/15/2024 8.2 <15.0 pg/mL Final   06/15/2024 8.6 <15.0 pg/mL Final         Results from last 7 days   Lab Units 06/15/24  1427   BNP pg/mL 115*     Results from last 7 days   Lab Units 06/15/24  1126   PROTIME sec 16.8*   INR  1.4                   Imaging last 24 hrs:   Ultrasound venous arm right    Result Date: 6/15/2024  CLINICAL HISTORY: Right upper extremity numbness and pain. COMMENT: Ultrasound imaging of the right upper extremity was performed.  Color and spectral flow Doppler images were also obtained. " COMPARISON: None. There is normal compressibility and flow in the internal jugular, axillary, basilic, cephalic and brachial veins.  Spectral Doppler images demonstrate normal respiratory phasicity.  There is normal color flow within the subclavian vein.     IMPRESSION: No evidence of right upper extremity deep vein thrombosis.     CT ANGIOGRAPHY CHEST PULMONARY EMBOLISM WITH IV CONTRAST    Result Date: 6/15/2024  CLINICAL HISTORY:    Pulmonary embolism (PE) suspected, high prob COMPARISON:    4/25/2020 COMMENT: Technique: CT of the Chest was performed following the administration of IV contrast. 100mL of iopamidoL (ISOVUE-370) 370 mg iodine /mL (76 %) injection 100 mL KV and/or mA were adjusted according to the patient's size and body part for CT dose reduction. 3D MIP and/or volume rendered image reconstruction performed and reviewed. CHEST: Lungs airways and pleura: Subpleural reticulation is seen.  Scattered small pulmonary nodules are again seen less than 6 mm the largest is linearly oriented in the left upper lobe.  The right middle and lower lobe pulmonary nodules are difficult to evaluate due to the development of a large right pleural effusion with right lower lobe collapse and partial atelectasis of the right middle and upper lobes centrally. Lower Neck: within normal limits. Axilla: No enlarged nodes. Mediastinum and Glenda: No enlarged nodes. Heart and Pericardium: Normal size.  No pericardial effusion. Vessels:  Vascular calcification Pulmonary arteries:  There is no proximal or segmental pulmonary arterial embolism. Esophagus:  Normal caliber. Upper abdomen:  Stable. Chest Wall: Bilateral gynecomastia again noted.. Bones:  Degenerative change.  Sclerotic foci in the ribs are unchanged.     IMPRESSION: development of a large right pleural effusion with right lower lobe collapse and partial atelectasis of the right middle and upper lobes centrally. Scattered small pulmonary nodules are again seen less than  6 mm the largest is linearly oriented in the left upper lobe. No CT evidence for proximal or segmental pulmonary arterial embolism.     X-RAY CHEST 2 VIEWS    Result Date: 6/15/2024  CLINICAL HISTORY:   sob COMMENT: VIEWS: two - frontal and lateral. Comparison date: 4/25/2020. The heart and mediastinal contours are partly obscured by moderate right pleural effusion with basilar airspace opacity.  Fluid is likely to accurately into the fissures on the right as well.  The left lung is clear.  There is a large left pleural effusion.  Trachea is midline.  Bony thorax intact with degenerative change.     IMPRESSION: moderate right pleural effusion with basilar airspace opacity.  Fluid is likely to accurately into the fissures on the right as well.     Vascular Studies:  No results found for this or any previous visit from the past 365 days.      Peripheral:  No results found for this or any previous visit from the past 365 days.      Stress Tests:             Cardiac cath:      Echocardiogram:  Cardiac Imaging    TRANSTHORACIC ECHO (TTE) COMPLETE 04/27/2020    Interpretation Summary  · Normal-sized LV. Mild concentric left ventricular hypertrophy.  · Normal LV systolic function. Estimated EF 60- 65%. No regional wall motion abnormalities.  · Normal-sized RV. Normal RV systolic function.  · Mildly dilated LA.  · Normal-sized RA.  · Tricuspid AV. Sclerotic AV leaflets. Mild AV regurgitation.  · MV bileaflet thickening.  · Normal-sized IVC.  · No evidence of pericardial effusion.      ECG: Independently reviewed: Sinus with first-degree AV block, no ischemic changes    Telemetry: Independently reviewed: sinus with 1st AVb       Assessment and Plan    CODE STATUS: Full Code    Active Hospital Problems    Diagnosis Date Noted   • Pleural effusion on right 06/15/2024   • Right arm pain 06/15/2024   • History of rectal cancer 06/15/2024   • CAD (coronary artery disease) 04/25/2020      Resolved Hospital Problems   No resolved  problems to display.       Dave Arredondo is a 88 y.o. 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  He also endorsed approximately 2 months of worsening dyspnea for which has been treated as an outpatient with antibiotic for possible pneumonia as well as low-dose Lasix with no significant improvement.  Imaging showed a large right pleural effusion and he is being admitted for diagnostic/therapeutic thoracentesis.  We are consulted to comment on possible heart failure contributing to the effusion.    R Pleural Effusion   R chest pain> R arm.    >Increased in size c/w outpt CXR from April, May  >, Clinically euvolemic   > No DVT on R UE US today.   ?referred pain from effusion.  Pain is positional/pleurtic.    -For diagnostic/therapeutic thoracentesis 6/17.   Xarelto on hold.  Pt with hx colon CA 2010 (no recurrence at f/u visits per pt)  -Check echo, BP control.  No need for further IV diuresis at this time.  Continues on home oral dose    Chronic DVT, hx PE  >on low dose xarelto as outpt.  Currently on hold for thoracentesis     Hx presumed CAD (?based on abnormal stress in 2018, pEF in 2020)  Continue statin/toprol.   Not on ASA- presumably due to Xarelto use.    F/u with BENJY Diaz seen in conjunction with Dr Welch    [facility] Cardiology, Main Office: [phone]  Primary [facility]: [facility]     6/16/2024

## 2024-06-17 ENCOUNTER — APPOINTMENT (INPATIENT)
Dept: RADIOLOGY | Facility: HOSPITAL | Age: 89
DRG: 181 | End: 2024-06-17
Attending: STUDENT IN AN ORGANIZED HEALTH CARE EDUCATION/TRAINING PROGRAM
Payer: COMMERCIAL

## 2024-06-17 ENCOUNTER — APPOINTMENT (INPATIENT)
Dept: RADIOLOGY | Facility: HOSPITAL | Age: 89
DRG: 181 | End: 2024-06-17
Attending: PHYSICIAN ASSISTANT
Payer: COMMERCIAL

## 2024-06-17 ENCOUNTER — APPOINTMENT (INPATIENT)
Dept: CARDIOLOGY | Facility: HOSPITAL | Age: 89
DRG: 181 | End: 2024-06-17
Attending: HOSPITALIST
Payer: COMMERCIAL

## 2024-06-17 LAB
ALBUMIN SERPL-MCNC: 3.3 G/DL (ref 3.5–5.7)
ALP SERPL-CCNC: 86 IU/L (ref 34–125)
ALT SERPL-CCNC: 14 IU/L (ref 7–52)
ANION GAP SERPL CALC-SCNC: 7 MEQ/L (ref 3–15)
AORTIC ROOT ANNULUS: 4 CM
APPEARANCE FLD: ABNORMAL
AST SERPL-CCNC: 17 IU/L (ref 13–39)
BILIRUB SERPL-MCNC: 0.8 MG/DL (ref 0.3–1.2)
BODY FLD TYPE: ABNORMAL
BSA FOR ECHO PROCEDURE: 1.86 M2
BUN SERPL-MCNC: 23 MG/DL (ref 7–25)
CALCIUM SERPL-MCNC: 9.2 MG/DL (ref 8.6–10.3)
CHLORIDE SERPL-SCNC: 104 MEQ/L (ref 98–107)
CO2 SERPL-SCNC: 26 MEQ/L (ref 21–31)
COLOR FLD: ABNORMAL
CREAT SERPL-MCNC: 0.7 MG/DL (ref 0.7–1.3)
E WAVE DECELERATION TIME: 267 MS
E/A RATIO: 0.6
E/E' RATIO: 6.1
E/LAT E' RATIO: 5.5
EDV (BP): 78.2 CM3
EF (A4C): 61.8 %
EF A2C: 43.4 %
EGFRCR SERPLBLD CKD-EPI 2021: >60 ML/MIN/1.73M*2
EJECTION FRACTION: 54.7 %
EOSINOPHIL NFR FLD MANUAL: 1 %
ERYTHROCYTE [DISTWIDTH] IN BLOOD BY AUTOMATED COUNT: 13.5 % (ref 11.6–14.4)
EST RIGHT VENT SYSTOLIC PRESSURE BY TRICUSPID REGURGITATION JET: 37 MMHG
ESV (BP): 35.4 CM3
FRACTIONAL SHORTENING: 27.42 %
GLUCOSE FLD-MCNC: 61 MG/DL
GLUCOSE SERPL-MCNC: 98 MG/DL (ref 70–99)
HCT VFR BLD AUTO: 39.8 % (ref 40.1–51)
HGB BLD-MCNC: 13.3 G/DL (ref 13.7–17.5)
INTERVENTRICULAR SEPTUM: 1.32 CM
LA ESV (BP): 34.9 CM3
LA ESV INDEX (A2C): 17.31 CM3/M2
LA ESV INDEX (BP): 18.76 CM3/M2
LA/AORTA RATIO: 0.95
LAAS-AP2: 13.6 CM2
LAAS-AP4: 15.3 CM2
LAD 2D: 3.8 CM
LALD A4C: 4.71 CM
LALD A4C: 4.81 CM
LAV-S: 32.2 CM3
LDH FLD L TO P-CCNC: 247 U/L
LDH SERPL L TO P-CCNC: 160 IU/L (ref 98–271)
LEFT ATRIUM VOLUME INDEX: 20.32 CM3/M2
LEFT ATRIUM VOLUME: 37.8 CM3
LEFT INTERNAL DIMENSION IN SYSTOLE: 2.62 CM (ref 2.58–3.9)
LEFT VENTRICLE DIASTOLIC VOLUME INDEX: 47.96 CM3/M2
LEFT VENTRICLE DIASTOLIC VOLUME: 89.2 CM3
LEFT VENTRICLE SYSTOLIC VOLUME INDEX: 18.33 CM3/M2
LEFT VENTRICLE SYSTOLIC VOLUME: 34.1 CM3
LEFT VENTRICULAR INTERNAL DIMENSION IN DIASTOLE: 3.61 CM (ref 4.36–6.06)
LEFT VENTRICULAR POSTERIOR WALL IN END DIASTOLE: 1.54 CM (ref 0.57–1.07)
LV DIASTOLIC VOLUME: 65.8 CM3
LV ESV (APICAL 2 CHAMBER): 37.3 CM3
LVAD-AP2: 25.5 CM2
LVAD-AP4: 30.8 CM2
LVAS-AP2: 18.2 CM2
LVAS-AP4: 17.8 CM2
LVEDVI(A2C): 35.38 CM3/M2
LVEDVI(BP): 42.04 CM3/M2
LVESVI(A2C): 20.05 CM3/M2
LVESVI(BP): 19.03 CM3/M2
LVLD-AP2: 8.14 CM
LVLD-AP4: 8.64 CM
LVLS-AP2: 7.81 CM
LVLS-AP4: 7.8 CM
LYMPHOCYTES NFR FLD MANUAL: 92 %
MAGNESIUM SERPL-MCNC: 2 MG/DL (ref 1.8–2.5)
MCH RBC QN AUTO: 30 PG (ref 28–33.2)
MCHC RBC AUTO-ENTMCNC: 33.4 G/DL (ref 32.2–36.5)
MCV RBC AUTO: 89.8 FL (ref 83–98)
MONOS+MACROS NFR FLD MANUAL: 6 %
MV E'TISSUE VEL-LAT: 0.09 M/S
MV E'TISSUE VEL-MED: 0.08 M/S
MV PEAK A VEL: 0.84 M/S
MV PEAK E VEL: 0.5 M/S
MV STENOSIS PRESSURE HALF TIME: 78 MS
MV VALVE AREA P 1/2 METHOD: 2.82 CM2
NEUTROPHILS NFR FLD MANUAL: 1 %
PDW BLD AUTO: 9.9 FL (ref 9.4–12.4)
PH FLD: 7.5 [PH]
PHOSPHATE SERPL-MCNC: 3.3 MG/DL (ref 2.4–4.7)
PLATELET # BLD AUTO: 223 K/UL (ref 150–350)
POSTERIOR WALL: 1.54 CM
POTASSIUM SERPL-SCNC: 4.4 MEQ/L (ref 3.5–5.1)
PROT FLD-MCNC: 3.6 G/DL
PROT SERPL-MCNC: 5.9 G/DL (ref 6–8.2)
RAP: 5 MMHG
RBC # BLD AUTO: 4.43 M/UL (ref 4.5–5.8)
RBC # SNV: ABNORMAL CELLS/CU MM (ref 0–10000)
SODIUM SERPL-SCNC: 137 MEQ/L (ref 136–145)
SPECIMEN SOURCE: ABNORMAL
TR MAX PG: 32.26 MMHG
TRICUSPID VALVE PEAK REGURGITATION VELOCITY: 2.84 M/S
WBC # BLD AUTO: 8.14 K/UL (ref 3.8–10.5)
WBC # SNV AUTO: 1439 CELLS/CU MM (ref 0–200)
Z-SCORE OF LEFT VENTRICULAR DIMENSION IN END DIASTOLE: -3.53
Z-SCORE OF LEFT VENTRICULAR DIMENSION IN END SYSTOLE: -1.51
Z-SCORE OF LEFT VENTRICULAR POSTERIOR WALL IN END DIASTOLE: 3.54

## 2024-06-17 PROCEDURE — 93306 TTE W/DOPPLER COMPLETE: CPT

## 2024-06-17 PROCEDURE — 87205 SMEAR GRAM STAIN: CPT | Performed by: STUDENT IN AN ORGANIZED HEALTH CARE EDUCATION/TRAINING PROGRAM

## 2024-06-17 PROCEDURE — 63700000 HC SELF-ADMINISTRABLE DRUG: Performed by: HOSPITALIST

## 2024-06-17 PROCEDURE — 93306 TTE W/DOPPLER COMPLETE: CPT | Mod: 26 | Performed by: INTERNAL MEDICINE

## 2024-06-17 PROCEDURE — 83615 LACTATE (LD) (LDH) ENZYME: CPT | Performed by: STUDENT IN AN ORGANIZED HEALTH CARE EDUCATION/TRAINING PROGRAM

## 2024-06-17 PROCEDURE — 71045 X-RAY EXAM CHEST 1 VIEW: CPT

## 2024-06-17 PROCEDURE — 82945 GLUCOSE OTHER FLUID: CPT | Performed by: STUDENT IN AN ORGANIZED HEALTH CARE EDUCATION/TRAINING PROGRAM

## 2024-06-17 PROCEDURE — 99233 SBSQ HOSP IP/OBS HIGH 50: CPT | Performed by: STUDENT IN AN ORGANIZED HEALTH CARE EDUCATION/TRAINING PROGRAM

## 2024-06-17 PROCEDURE — 80053 COMPREHEN METABOLIC PANEL: CPT | Performed by: STUDENT IN AN ORGANIZED HEALTH CARE EDUCATION/TRAINING PROGRAM

## 2024-06-17 PROCEDURE — 99233 SBSQ HOSP IP/OBS HIGH 50: CPT | Performed by: INTERNAL MEDICINE

## 2024-06-17 PROCEDURE — 87116 MYCOBACTERIA CULTURE: CPT | Performed by: STUDENT IN AN ORGANIZED HEALTH CARE EDUCATION/TRAINING PROGRAM

## 2024-06-17 PROCEDURE — 27200000 HC STERILE SUPPLY

## 2024-06-17 PROCEDURE — 36100345 IR THORACENTESIS

## 2024-06-17 PROCEDURE — 87206 SMEAR FLUORESCENT/ACID STAI: CPT | Performed by: STUDENT IN AN ORGANIZED HEALTH CARE EDUCATION/TRAINING PROGRAM

## 2024-06-17 PROCEDURE — 88112 CYTOPATH CELL ENHANCE TECH: CPT | Performed by: STUDENT IN AN ORGANIZED HEALTH CARE EDUCATION/TRAINING PROGRAM

## 2024-06-17 PROCEDURE — C1729 CATH, DRAINAGE: HCPCS

## 2024-06-17 PROCEDURE — 87102 FUNGUS ISOLATION CULTURE: CPT | Performed by: STUDENT IN AN ORGANIZED HEALTH CARE EDUCATION/TRAINING PROGRAM

## 2024-06-17 PROCEDURE — 84100 ASSAY OF PHOSPHORUS: CPT | Performed by: STUDENT IN AN ORGANIZED HEALTH CARE EDUCATION/TRAINING PROGRAM

## 2024-06-17 PROCEDURE — 63700000 HC SELF-ADMINISTRABLE DRUG: Performed by: STUDENT IN AN ORGANIZED HEALTH CARE EDUCATION/TRAINING PROGRAM

## 2024-06-17 PROCEDURE — 97161 PT EVAL LOW COMPLEX 20 MIN: CPT | Mod: GP

## 2024-06-17 PROCEDURE — 84157 ASSAY OF PROTEIN OTHER: CPT | Performed by: STUDENT IN AN ORGANIZED HEALTH CARE EDUCATION/TRAINING PROGRAM

## 2024-06-17 PROCEDURE — 85027 COMPLETE CBC AUTOMATED: CPT | Performed by: STUDENT IN AN ORGANIZED HEALTH CARE EDUCATION/TRAINING PROGRAM

## 2024-06-17 PROCEDURE — 89051 BODY FLUID CELL COUNT: CPT | Performed by: STUDENT IN AN ORGANIZED HEALTH CARE EDUCATION/TRAINING PROGRAM

## 2024-06-17 PROCEDURE — 83986 ASSAY PH BODY FLUID NOS: CPT | Performed by: STUDENT IN AN ORGANIZED HEALTH CARE EDUCATION/TRAINING PROGRAM

## 2024-06-17 PROCEDURE — 20600000 HC ROOM AND CARE INTERMEDIATE/TELEMETRY

## 2024-06-17 PROCEDURE — 0W993ZZ DRAINAGE OF RIGHT PLEURAL CAVITY, PERCUTANEOUS APPROACH: ICD-10-PCS | Performed by: RADIOLOGY

## 2024-06-17 PROCEDURE — 83735 ASSAY OF MAGNESIUM: CPT | Performed by: STUDENT IN AN ORGANIZED HEALTH CARE EDUCATION/TRAINING PROGRAM

## 2024-06-17 PROCEDURE — 36415 COLL VENOUS BLD VENIPUNCTURE: CPT | Performed by: STUDENT IN AN ORGANIZED HEALTH CARE EDUCATION/TRAINING PROGRAM

## 2024-06-17 RX ADMIN — FUROSEMIDE 20 MG: 20 TABLET ORAL at 08:09

## 2024-06-17 RX ADMIN — RIVAROXABAN 10 MG: 10 TABLET, FILM COATED ORAL at 17:01

## 2024-06-17 RX ADMIN — PANTOPRAZOLE SODIUM 40 MG: 40 TABLET, DELAYED RELEASE ORAL at 21:10

## 2024-06-17 RX ADMIN — DOCUSATE SODIUM 100 MG: 100 CAPSULE, LIQUID FILLED ORAL at 21:10

## 2024-06-17 RX ADMIN — METOPROLOL SUCCINATE 100 MG: 100 TABLET, EXTENDED RELEASE ORAL at 08:09

## 2024-06-17 RX ADMIN — ATORVASTATIN CALCIUM 20 MG: 20 TABLET, FILM COATED ORAL at 08:09

## 2024-06-17 ASSESSMENT — COGNITIVE AND FUNCTIONAL STATUS - GENERAL
MOVING TO AND FROM BED TO CHAIR: 4 - NONE
CLIMB 3 TO 5 STEPS WITH RAILING: 3 - A LITTLE
WALKING IN HOSPITAL ROOM: 3 - A LITTLE
STANDING UP FROM CHAIR USING ARMS: 4 - NONE
CLIMB 3 TO 5 STEPS WITH RAILING: 3 - A LITTLE
WALKING IN HOSPITAL ROOM: 4 - NONE
STANDING UP FROM CHAIR USING ARMS: 4 - NONE
CLIMB 3 TO 5 STEPS WITH RAILING: 3 - A LITTLE
MOVING TO AND FROM BED TO CHAIR: 4 - NONE
AFFECT: WFL;FLAT/BLUNTED AFFECT
WALKING IN HOSPITAL ROOM: 4 - NONE
WALKING IN HOSPITAL ROOM: 3 - A LITTLE
MOVING TO AND FROM BED TO CHAIR: 4 - NONE
CLIMB 3 TO 5 STEPS WITH RAILING: 4 - NONE
MOVING TO AND FROM BED TO CHAIR: 4 - NONE

## 2024-06-17 NOTE — PROGRESS NOTES
"   [facility] Cardiology  [facility] Progress Note          Interval History       On interview he reports an improvement in his pleuritic chest pain but continues to have dyspnea with minimal activity and a cough. He remains on room air. Pulmonary consulted. Plans for right thoracentesis with CT chest after to visual underlying lungs.      Medications     Scheduled Inpatient Medications:  • atorvastatin  20 mg oral Daily   • docusate sodium  100 mg oral BID   • furosemide  20 mg oral Daily   • metoprolol succinate XL  100 mg oral Daily   • pantoprazole  40 mg oral Nightly   • [Provider Managed Hold] rivaroxaban  10 mg oral Daily with dinner       Scheduled Inpatient Infusions:       Vital Signs/Exam     Vital signs in last 24 hours:  Temp:  [36.3 °C (97.4 °F)-37.1 °C (98.7 °F)] 36.6 °C (97.9 °F)  Heart Rate:  [65-87] 87  Resp:  [16-18] 16  BP: (105-163)/(68-84) 163/84    I/O  No intake or output data in the 24 hours ending 06/17/24 1058    Weight  Weight change:     Physical Exam:  Visit Vitals  BP (!) 163/84   Pulse 87   Temp 36.6 °C (97.9 °F) (Oral)   Resp 16   Ht 1.702 m (5' 7\")   Wt 73.5 kg (162 lb)   SpO2 95%   BMI 25.37 kg/m²            Gen: no distress  HEENT: no jvd  Lungs: diminished right sided breath sounds; left lungs clear   Chest wall: no tenderness  Heart: regular rate and rhythm; no murmur  Abdomen: nondistended, nontender  Extremities: no edema  Neuro: nonfocal, alert and oriented  Psych: normal mood and affect     Diagnostic Data   Diagnostic data personally reviewed.    Labs:  Results from last 7 days   Lab Units 06/17/24  0440 06/16/24  0514 06/15/24  1126   WBC K/uL 8.14 7.91 8.43   HEMOGLOBIN g/dL 13.3* 13.0* 14.0   HEMATOCRIT % 39.8* 39.5* 43.5   PLATELETS K/uL 223 227 264     Results from last 7 days   Lab Units 06/17/24  0440 06/16/24  0514 06/15/24  1126   SODIUM mEQ/L 137 137 135*   POTASSIUM mEQ/L 4.4 4.2 3.9   CHLORIDE mEQ/L 104 105 100   CO2 mEQ/L 26 25 29   BUN mg/dL 23 20 17 "   CREATININE mg/dL 0.7 0.8 1.0   GLUCOSE mg/dL 98 99 129*   CALCIUM mg/dL 9.2 9.2 9.8         High Sens Troponin I   Date Value Ref Range Status   06/15/2024 8.2 <15.0 pg/mL Final   06/15/2024 8.6 <15.0 pg/mL Final         Results from last 7 days   Lab Units 06/15/24  1427   BNP pg/mL 115*     Results from last 7 days   Lab Units 06/15/24  1126   PROTIME sec 16.8*   INR  1.4                   Cardiac cath:      Echocardiogram:  Cardiac Imaging    TRANSTHORACIC ECHO (TTE) COMPLETE 04/27/2020    Interpretation Summary  · Normal-sized LV. Mild concentric left ventricular hypertrophy.  · Normal LV systolic function. Estimated EF 60- 65%. No regional wall motion abnormalities.  · Normal-sized RV. Normal RV systolic function.  · Mildly dilated LA.  · Normal-sized RA.  · Tricuspid AV. Sclerotic AV leaflets. Mild AV regurgitation.  · MV bileaflet thickening.  · Normal-sized IVC.  · No evidence of pericardial effusion.      Telemetry: Recent telemetry independently reviewed: Sinus rhythm with PACs and PVCs       Assessment and Plan    CODE STATUS: Full Code      Active Hospital Problems    Diagnosis Date Noted   • Pleural effusion on right 06/15/2024   • Right arm pain 06/15/2024   • History of rectal cancer 06/15/2024   • CAD (coronary artery disease) 04/25/2020      Resolved Hospital Problems   No resolved problems to display.     Home cardiac medications: Lipitor 20 daily Lasix 20 daily Toprol 100 daily Xarelto 10 daily    Dave Arredondo is a 88 y.o. male known to Dr. 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       He presented to [facility] on 6/15/2024 with complaints of worsening right arm pain and dyspnea x 2 months for which has been treated as an outpatient with antibiotic for possible pneumonia as  well as low-dose Lasix with no significant improvement.  Imaging showed a large right pleural effusion and he is being admitted for diagnostic/therapeutic thoracentesis.  Cardiology was consulted to comment on possible heart failure contributing to the effusion.     Right pleural effusion; Right chest/arm pain   -CTA with large right pleural effusion with right lower lobe collapse. Pulmonary nodules noted  - on admission  -No DVT on RUE ultrasound  -RUE pain is possibly referred from effusion and chest pain is positional/pleuritic  -He is euvolemic on exam - continue outpatient PO Lasix   -Diagnostic and therapeutic thoracentesis anticipated 6/17   -Echocardiogram pending  -Management per HMS and pulmonary - plan to repeat CT scan after thoracentesis. Hx of colon cancer     Chronic DVT: History of PE  -On low dose Xarelto as outpatient - currently on hold for thoracentesis      Presumed coronary artery disease (? based on abnormal stress in 2018, pEF in 2020)  -Continue statin therapy and Toprol XL. Not on aspirin - presumably due to Xarelto use   -HS troponin negative x 2. EKG nonischemic   -F/u with Dr Campbell     Hypertension  -Blood pressure is intermittently elevated. Continue to monitor on Toprol XL  -Adjust regimen as needed       VALENCIA Martínez with Dr. Parker Mendes     [facility] Cardiology, Main Office: [phone]  Pager [phone]  Primary [facility] [place]: [facility]    6/17/2024

## 2024-06-17 NOTE — PLAN OF CARE
Plan of Care Review  Plan of Care Reviewed With: patient  Progress: no change  Outcome Evaluation: Patient supervision OOB. Independent with colostomy care. Denies any right arm pain.

## 2024-06-17 NOTE — PROGRESS NOTES
Physical Therapy -  Initial Evaluation     Patient: Dave Arredondo  Location: [facility] 3C 0363  MRN: [id number]  Today's date: 6/17/2024    HISTORY OF PRESENT ILLNESS     Dave is a 88 y.o. male admitted on 6/15/2024 with Right arm pain [M79.601]  Pleural effusion on right [J90]. Principal problem is Pleural effusion on right.    Past Medical History  Dave has a past medical history of Colon cancer (CMS/HCC), Coronary artery disease, DVT (deep venous thrombosis) (CMS/HCC), Hypertension, and Lipid disorder.    History of Present Illness   Per H&P: 88 y.o. male w/PMHx HTN, HLD, CAD (declined cath), 1st AV Block, LLE DVT/PE (2019, on Xarelto), Rectal Cancer s/p Ostomy (2012, s/p resection/chemo/ostomy), Never Smoker presents to [facility] w/Rt arm/forearm pain.      - Says that for the past few months, his Rt forearm/arm has felt intermittently painful and colder than Lft.   - Denies any injuries.   - Says Sxs worsened today, extending up to axilla, prompting ED visit.   PRIOR LEVEL OF FUNCTION AND LIVING ENVIRONMENT     Prior Level of Function      Flowsheet Row Most Recent Value   Dominant Hand right   Ambulation independent   Transferring independent   Toileting independent   Bathing assistive equipment   Dressing independent   Prior Level of Function Comment independent PTA w/o AD   Assistive Device Currently Used at Home grab bar, shower chair  [pt reports has access to walkers and canes]          Prior Living Environment      Flowsheet Row Most Recent Value   People in Home other (see comments)  [Father Divine brothers]   Current Living Arrangements home   Living Environment Comment 3SH, pt reports his bedroom/bathroom is on 2nd floor          VITALS AND PAIN     PT Vitals      Date/Time Pulse SpO2 Pt Activity O2 Therapy BP BP Location BP Method Pt Position Who   06/17/24 0826 80 95 % At rest None (Room air) 143/83 Right upper arm Automatic Sitting EEW   06/17/24 0831 87 95 % At rest s/p  "mobility None (Room air) 163/84 Right upper arm Automatic Sitting EEW          PT Pain      Date/Time Pain Type Location Rating: Rest Rating: Activity Interventions [facility]   06/17/24 0826 Pain Assessment chest pt reports care team is aware 2 - mild pain \"tightness\" 0 - no pain position adjusted EEW             Objective   OBJECTIVE     Start time:  0825  End time:  0837  Session Length: 12 min  Mode of Treatment: individual therapy, physical therapy    General Observations  Patient received upright, in chair. He was agreeable to therapy, no issues or concerns identified by nurse prior to session.      Precautions: no known precautions/restrictions              PT Eval and Treat - 06/17/24 0825          Cognition    Orientation Status oriented x 3   date=June 18th or 19th    Affect/Mental Status WFL;flat/blunted affect     Follows Commands WFL     Comment, Cognition pleasant/cooperative/receptive        Vision Assessment/Intervention    Vision Assessment corrective lenses for reading        Hearing Assessment    Hearing Status WFL   pt reports he may need hearing aids but is \"stubborn\"       Sensory Assessment    Sensory Assessment sensation intact, lower extremities   denied numbness/tingling in LEs       Lower Extremity Assessment    General Observations Assessed functionally BLE ROM WFL and strength at least 3/5        Bed Mobility    Comment NT as pt OOB t/o session        Mobility Belt    Mobility Belt Used During Session no - medical contraindication     Reason Mobility Belt Not Used colostomy and pt independent        Sit/Stand Transfer    Surface chair with arm rests     [place] independent     Assistive Device none        Gait Training    [place], Gait modified independence     Assistive Device none     Distance in Feet 150 feet     Pattern step-through     Comment no overt LOBs observed        Stairs Training    [place], Stairs supervision     Number of Stairs 3     Ascending Stairs Technique " step-to-step     Descending Stairs Technique step-to-step     Comment Performed on step stool in pt's room w/ U/L UE support.        Balance    Static Sitting Balance WFL     Sit to Stand Dynamic Balance WFL     Static Standing Balance WFL;unsupported     Dynamic Standing Balance WFL;unsupported     Comment, Balance no overt LOBs observed while ambulating in hallway        Impairments/Safety Issues    Comment, Safety Issues/Impairments Pt reports he could have assist at home if needed                                    Education Documentation  Fall Prevention, taught by Nel Saldivar PT at 6/17/2024  8:54 AM.  Learner: Patient  Readiness: Acceptance  Method: Explanation  Response: Verbalizes Understanding  Comment: Role of PT, PT POC, pacing mobility at home, to sit down and call for RN if not feeling well upon standing while in hospital        Session Outcome  Patient upright, in chair at end of session, call light in reach, personal items in reach, all needs met (unalarmed as found at beginning of session). Nursing notified about patient's performance.    AM-PAC™ - Mobility (Current Function)     Turning form your back to your side while in flat bed without using bedrails 4 - None   Moving from lying on your back to sitting on the side of a flat bed without using bedrails 4 - None   Moving to and from a bed to a chair 4 - None   Standing up from a chair using your arms 4 - None   To walk in a hospital room 4 - None   Climbing 3-5 steps with a railing 3 - A Little   AM-PAC™ Mobility Score 23      ASSESSMENT AND PLAN     Progress Summary  [facility] 23/24. Pt independent/mod I for STS & amb w/o AD and supervision for step ups. No overt LOBs observed and pt appears close to fxnl baseline. PT educated regading pacing mobility at d/c. Pt has no further acute PT needs at this time. Rec home w/ assist at d/c.    Patient/Family Therapy Goals Statement: eat breakfast    PT Plan      Flowsheet Row Most Recent Value   Therapy  Frequency evaluation only at 06/17/2024 0825            PT Discharge Recommendations      Flowsheet Row Most Recent Value   PT Recommended Discharge Disposition home with assistance at 06/17/2024 0825   Anticipated Equipment Needs if Discharged Home (PT) none  [pt reports he has access to ADs if needed] at 06/17/2024 0825

## 2024-06-17 NOTE — HOSPITAL COURSE
Dave is a 88 y.o. male admitted on 6/15/2024 with Right arm pain [M79.601]  Pleural effusion on right [J90]. Principal problem is Pleural effusion on right.    Past Medical History  Dave has a past medical history of Colon cancer (CMS/HCC), Coronary artery disease, DVT (deep venous thrombosis) (CMS/HCC), Hypertension, and Lipid disorder.    History of Present Illness   Per H&P: 88 y.o. male w/PMHx HTN, HLD, CAD (declined cath), 1st AV Block, LLE DVT/PE (2019, on Xarelto), Rectal Cancer s/p Ostomy (2012, s/p resection/chemo/ostomy), Never Smoker presents to [facility] w/Rt arm/forearm pain.      - Says that for the past few months, his Rt forearm/arm has felt intermittently painful and colder than Lft.   - Denies any injuries.   - Says Sxs worsened today, extending up to axilla, prompting ED visit.

## 2024-06-17 NOTE — POST-PROCEDURE NOTE
Interventional Radiology Brief Postprocedure Note    Dave Arredondo     Attending: BENJY Sanders / ALLY Yen MD     Assistant: Jose    Diagnosis: SOB    Description of procedure: US guided R thora    Contrast: none     Anesthesia:  Local (Lidocaine)    Volume of Lidocaine Utilized (ml): 10     Medications: none     Complications: None      Estimated Blood Loss: Estimated Blood Loss: None    Anticoagulation: Okay to resume after 24 hours    Specimens: 1.2L serosanguinous pleural fluid    Findings: Successful US guided R thora with 1.2L pleural fluid removed and sent to lab. Pt linda well. Vss. Cxr pending.     6/17/2024 2:23 PM

## 2024-06-17 NOTE — PROGRESS NOTES
Hospital Medicine Service -  Daily Progress Note       SUBJECTIVE   Interval History:     The patient is an 88-year-old male with a past medical history of hypertension, coronary artery disease, hyperlipidemia, rectal cancer status post resection and subsequent colostomy, deep vein thrombosis on Xarelto, first-degree AV block, ventral hernia, and obesity, who presented to the emergency department at [facility] 6/15/2024 with chest pain associated with right arm pain.    The patient was seen and examined at bedside, he is status post therapeutic and diagnostic thoracentesis on the right.  Appears to be transudative per lights criteria.  Consulted infectious disease.  He remains on room air, otherwise.  Remains dyspneic as well, however.     OBJECTIVE        Vital signs in last 24 hours:  Temp:  [36.3 °C (97.4 °F)-37.1 °C (98.7 °F)] 36.3 °C (97.4 °F)  Heart Rate:  [65-87] 73  Resp:  [16-20] 16  BP: (115-172)/(68-84) 127/70    Intake/Output Summary (Last 24 hours) at 6/17/2024 1530  Last data filed at 6/17/2024 1415  Gross per 24 hour   Intake --   Output 1200 ml   Net -1200 ml         PHYSICAL EXAMINATION        Physical Exam  Constitutional:       General: He is not in acute distress.     Appearance: He is not ill-appearing.   HENT:      Head: Normocephalic and atraumatic.      Nose: No congestion or rhinorrhea.      Mouth/Throat:      Pharynx: No oropharyngeal exudate or posterior oropharyngeal erythema.   Eyes:      General: No scleral icterus.     Conjunctiva/sclera: Conjunctivae normal.   Cardiovascular:      Rate and Rhythm: Normal rate.      Heart sounds: No murmur heard.  Pulmonary:      Effort: No respiratory distress.      Breath sounds: Rhonchi (Particularly in the right lower lobe) present.   Abdominal:      General: There is no distension.      Tenderness: There is no abdominal tenderness.      Hernia: A hernia is present.      Comments: Colostomy bag in place without any surrounding erythema,  or leakage   Musculoskeletal:      Cervical back: Neck supple. No tenderness.      Right lower leg: No edema.      Left lower leg: No edema.   Skin:     Findings: No erythema or rash.   Neurological:      Mental Status: He is alert and oriented to person, place, and time.      Cranial Nerves: No cranial nerve deficit.      Motor: No weakness.      LINES, CATHETERS, DRAINS, AIRWAYS, AND WOUNDS   Lines, Drains, and Airways:  Wounds (agree with documentation and present on admission):  Peripheral IV (Adult) 06/15/24 Left Antecubital (Active)   Number of days: 1          LABS / IMAGING / TELE      Labs  CBC   CBC Results         06/16/24 06/15/24 04/27/20     0514 1126 0443    WBC 7.91 8.43 5.35    RBC 4.37 4.78 4.40    HGB 13.0 14.0 13.6    HCT 39.5 43.5 39.9    MCV 90.4 91.0 90.7    MCH 29.7 29.3 30.9    MCHC 32.9 32.2 34.1     264 157           BMP or CMP   BMP Results         06/16/24 06/15/24 04/27/20     0514 1126 0443     135 139    K 4.2 3.9 4.0    Cl 105 100 109    CO2 25 29 24    Glucose 99 129 91    BUN 20 17 18    Creatinine 0.8 1.0 0.8    Calcium 9.2 9.8 9.0    Anion Gap 7 6 6    EGFR >60.0 >60.0 >60.0           Comment for K at 1126 on 06/15/24: Results obtained on plasma. Plasma Potassium values may be up to 0.4 mEQ/L less than serum values. The differences may be greater for patients with high platelet or white cell counts.    Comment for EGFR at 0514 on 06/16/24: Calculation based on the Chronic Kidney Disease Epidemiology Collaboration (CKD-EPI) equation refit without adjustment for race.    Comment for EGFR at 1126 on 06/15/24: Calculation based on the Chronic Kidney Disease Epidemiology Collaboration (CKD-EPI) equation refit without adjustment for race.          CMP Results         06/16/24 06/15/24 04/27/20     0514 1126 0443     135 139    K 4.2 3.9 4.0    Cl 105 100 109    CO2 25 29 24    Glucose 99 129 91    BUN 20 17 18    Creatinine 0.8 1.0 0.8    Calcium 9.2 9.8 9.0    Anion  Gap 7 6 6    AST -- 18 --    ALT -- 15 --    Albumin -- 3.9 --    EGFR >60.0 >60.0 >60.0           Comment for K at 1126 on 06/15/24: Results obtained on plasma. Plasma Potassium values may be up to 0.4 mEQ/L less than serum values. The differences may be greater for patients with high platelet or white cell counts.    Comment for EGFR at 0514 on 06/16/24: Calculation based on the Chronic Kidney Disease Epidemiology Collaboration (CKD-EPI) equation refit without adjustment for race.    Comment for EGFR at 1126 on 06/15/24: Calculation based on the Chronic Kidney Disease Epidemiology Collaboration (CKD-EPI) equation refit without adjustment for race.               Imaging  Noted.     ECG/Telemetry  Monitor.     ASSESSMENT AND PLAN      * Pleural effusion on right  Assessment & Plan  CT finding  Imaging noted a large right pleural effusion with right lower lobe collapse and partial atelectasis of the right middle lobe and upper lobe centrally  Etiology concerning for underlying malignancy in light of history of colon cancer  Status post thoracentesis, lights criteria positive for exudative fluid.  Notable blood in the fluid as well.  Infectious disease consulted.  Patient endorsed he recently had a pneumonia.  Currently saturating well on room air, monitor saturations and maintain O2 sats above 90%  Incentive spirometry  Recommend out of bed to chair    History of rectal cancer  Assessment & Plan  Chronic  Status post resection in 2010 per patient  There is concern about the patient's pleural effusion potentially being attributed to an underlying malignancy, thoracentesis to be done on 6/17/2024  The patient does follow with colorectal surgery at [facility]    Right arm pain  Assessment & Plan  Acute  Reportedly had right upper extremity pain and swelling  Doppler of the extremity negative for DVT  Currently pain-free, attributes symptoms as radiating from the chest  Monitor closely, Tylenol as needed  for now    CAD (coronary artery disease)  Assessment & Plan  Chronic  Xarelto is currently on hold, continue metoprolol and statin therapy  Monitor telemetry  Cardiology is following, appreciate recommendations.          VTE Assessment: Padua VTE Score: 4  VTE Prophylaxis:  Current anticoagulants:  rivaroxaban (XARELTO) tablet 10 mg, oral, Daily with dinner      Code Status: Full Code      Estimated Discharge Date: 6/18/2024     Disposition Planning: Large right pleural effusion concerning for underlying malignancy in light of history of colon cancer.  Thoracentesis done today. 1.2L serosanguinous fluid removed. Exudative per Lights criteria. ID consulted.     I have spent more than 55 minutes performing the evaluation and patient discussion, physical examination, entering orders, documenting, providing counseling and education, independently reviewing charting and studies, communicating results, discussing with house staff including specialists and nursing, and coordinating care.       Chad Schuler MD  6/17/2024

## 2024-06-17 NOTE — OR SURGEON
Pre-Procedure patient identification:  I am the primary operating surgeon/proceduralist and I have reviewed the applicable pathology reports and radiology studies for this procedure. I have identified the patient on 06/17/24 at 2:23 PM BENJY Sanders

## 2024-06-17 NOTE — PLAN OF CARE
Problem: Adult Inpatient Plan of Care  Goal: Plan of Care Review  Outcome: Progressing  Flowsheets (Taken 6/17/2024 4178)  Progress: improving  Outcome Evaluation: PT eval complete.  Plan of Care Reviewed With: patient

## 2024-06-18 ENCOUNTER — APPOINTMENT (INPATIENT)
Dept: RADIOLOGY | Facility: HOSPITAL | Age: 89
DRG: 181 | End: 2024-06-18
Attending: NURSE PRACTITIONER
Payer: COMMERCIAL

## 2024-06-18 PROBLEM — R07.9 RIGHT-SIDED CHEST PAIN: Status: ACTIVE | Noted: 2024-06-18

## 2024-06-18 LAB
FUNGUS STAIN: NORMAL
RHODAMINE-AURAMINE STN SPEC: NORMAL

## 2024-06-18 PROCEDURE — 99233 SBSQ HOSP IP/OBS HIGH 50: CPT | Performed by: STUDENT IN AN ORGANIZED HEALTH CARE EDUCATION/TRAINING PROGRAM

## 2024-06-18 PROCEDURE — 99233 SBSQ HOSP IP/OBS HIGH 50: CPT | Performed by: INTERNAL MEDICINE

## 2024-06-18 PROCEDURE — 20600000 HC ROOM AND CARE INTERMEDIATE/TELEMETRY

## 2024-06-18 PROCEDURE — 71250 CT THORAX DX C-: CPT

## 2024-06-18 PROCEDURE — 63700000 HC SELF-ADMINISTRABLE DRUG: Performed by: HOSPITALIST

## 2024-06-18 PROCEDURE — 63700000 HC SELF-ADMINISTRABLE DRUG: Performed by: STUDENT IN AN ORGANIZED HEALTH CARE EDUCATION/TRAINING PROGRAM

## 2024-06-18 RX ADMIN — RIVAROXABAN 10 MG: 10 TABLET, FILM COATED ORAL at 17:09

## 2024-06-18 RX ADMIN — PANTOPRAZOLE SODIUM 40 MG: 40 TABLET, DELAYED RELEASE ORAL at 22:07

## 2024-06-18 RX ADMIN — DOCUSATE SODIUM 100 MG: 100 CAPSULE, LIQUID FILLED ORAL at 22:07

## 2024-06-18 RX ADMIN — FUROSEMIDE 20 MG: 20 TABLET ORAL at 08:47

## 2024-06-18 RX ADMIN — DOCUSATE SODIUM 100 MG: 100 CAPSULE, LIQUID FILLED ORAL at 08:47

## 2024-06-18 RX ADMIN — METOPROLOL SUCCINATE 100 MG: 100 TABLET, EXTENDED RELEASE ORAL at 08:46

## 2024-06-18 RX ADMIN — ATORVASTATIN CALCIUM 20 MG: 20 TABLET, FILM COATED ORAL at 08:46

## 2024-06-18 ASSESSMENT — COGNITIVE AND FUNCTIONAL STATUS - GENERAL
WALKING IN HOSPITAL ROOM: 3 - A LITTLE
MOVING TO AND FROM BED TO CHAIR: 4 - NONE
WALKING IN HOSPITAL ROOM: 4 - NONE
CLIMB 3 TO 5 STEPS WITH RAILING: 3 - A LITTLE
STANDING UP FROM CHAIR USING ARMS: 4 - NONE
CLIMB 3 TO 5 STEPS WITH RAILING: 3 - A LITTLE
MOVING TO AND FROM BED TO CHAIR: 4 - NONE
STANDING UP FROM CHAIR USING ARMS: 4 - NONE

## 2024-06-18 NOTE — PROGRESS NOTES
"   [facility]  [facility] Progress Note          Interval History     He underwent a right sided thoracentesis yesterday with -1.2L serosanguinous fluid removed. Xarelto resumed. Echocardiogram was also completed and generally unremarkable with preserved LV function.    On interview, the patient reports an improvement in his chest/RUE pain but his breathing is slower to recover. He remains on room air and is compliant with IS use.      Medications     Scheduled Inpatient Medications:  • atorvastatin  20 mg oral Daily   • azithromycin  500 mg intravenous q24h INT   • cefTRIAXone  1 g intravenous q24h INT   • docusate sodium  100 mg oral BID   • furosemide  20 mg oral Daily   • metoprolol succinate XL  100 mg oral Daily   • pantoprazole  40 mg oral Nightly   • rivaroxaban  10 mg oral Daily with dinner       Scheduled Inpatient Infusions:       Vital Signs/Exam     Vital signs in last 24 hours:  Temp:  [36.2 °C (97.1 °F)-36.9 °C (98.5 °F)] 36.9 °C (98.5 °F)  Heart Rate:  [69-76] 72  Resp:  [16-20] 16  BP: (124-172)/(69-84) 124/74    I/O    Intake/Output Summary (Last 24 hours) at 6/18/2024 0931  Last data filed at 6/17/2024 1415  Gross per 24 hour   Intake --   Output 1200 ml   Net -1200 ml       Weight  Weight change:     Physical Exam:  Visit Vitals  /74 (BP Location: Left upper arm, Patient Position: Lying)   Pulse 72   Temp 36.9 °C (98.5 °F) (Oral)   Resp 16   Ht 1.702 m (5' 7\")   Wt 73.5 kg (162 lb)   SpO2 94%   BMI 25.37 kg/m²       Gen: no distress  HEENT: no jvd  Lungs: clear/diminished bilaterally, improved aeration on the right   Chest wall: no tenderness  Heart: regular rate and rhythm; no murmur  Abdomen: nondistended, nontender  Extremities: no edema  Neuro: nonfocal, alert and oriented  Psych: normal mood and affect     Diagnostic Data   Diagnostic data personally reviewed.    Labs:  Results from last 7 days   Lab Units 06/17/24  0440 06/16/24  0514 06/15/24  1126   WBC K/uL 8.14 " 7.91 8.43   HEMOGLOBIN g/dL 13.3* 13.0* 14.0   HEMATOCRIT % 39.8* 39.5* 43.5   PLATELETS K/uL 223 227 264     Results from last 7 days   Lab Units 06/17/24  0440 06/16/24  0514 06/15/24  1126   SODIUM mEQ/L 137 137 135*   POTASSIUM mEQ/L 4.4 4.2 3.9   CHLORIDE mEQ/L 104 105 100   CO2 mEQ/L 26 25 29   BUN mg/dL 23 20 17   CREATININE mg/dL 0.7 0.8 1.0   GLUCOSE mg/dL 98 99 129*   CALCIUM mg/dL 9.2 9.2 9.8         High Sens Troponin I   Date Value Ref Range Status   06/15/2024 8.2 <15.0 pg/mL Final   06/15/2024 8.6 <15.0 pg/mL Final         Results from last 7 days   Lab Units 06/15/24  1427   BNP pg/mL 115*     Results from last 7 days   Lab Units 06/15/24  1126   PROTIME sec 16.8*   INR  1.4                   Cardiac cath:      Echocardiogram:  Cardiac Imaging    TRANSTHORACIC ECHO (TTE) COMPLETE 06/17/2024    Interpretation Summary  •  Left Ventricle: Normal ventricle size. Concentric left ventricular hypertrophy. Normal systolic function. Estimated EF 65%. No regional wall motion abnormalities. Grade I diastolic dysfunction.  •  Right Ventricle: Normal ventricle size. Normal systolic function.  •  Left Atrium: Normal sized atrium.  •  Right Atrium: Normal sized atrium.  •  Aortic Valve: Tricuspid valve.  Sclerotic leaflets. Mild regurgitation.  •  Mitral Valve: Sclerotic mitral valve. Normal leaflet motion. Mild mitral annular calcification. Mild regurgitation.  •  Tricuspid Valve: Normal structure. Mild regurgitation. Estimated RVSP = 37 mmHg.  •  Pulmonic Valve: Normal structure. Trace regurgitation.  •  Aorta: Aortic root grossly normal.  •  IVC/SVC: Normal sized inferior vena cava. Inferior vena cava demonstrates normal respiratory collapse.  •  Pericardium: No evidence of pericardial effusion.  •  Compared to the prior echocardiogram of 4/27/2020, there is no significant change.      Telemetry: Recent telemetry independently reviewed: Sinus rhythm        Assessment and Plan    CODE STATUS: Full  Code      Active Hospital Problems    Diagnosis Date Noted   • Pleural effusion on right 06/15/2024   • Right arm pain 06/15/2024   • History of rectal cancer 06/15/2024   • CAD (coronary artery disease) 04/25/2020      Resolved Hospital Problems   No resolved problems to display.     Home cardiac medications: Lipitor 20 daily Lasix 20 daily Toprol 100 daily Xarelto 10 daily    Dave Arredondo is a 88 y.o. male known to Dr. Campbell ([place]) with a history of CAD (based on abnormal stress test in 2018 with anterolateral ischemia, declined cath at that time), first-degree AV block, HTN, HLD, colon cancer 2010, DVT/PE (2012-postop, 3/20/2019) maintained on low-dose Xarelto.       He presented to [facility] on 6/15/2024 with complaints of worsening right arm pain and dyspnea x 2 months for which has been treated as an outpatient with antibiotic for possible pneumonia as well as low-dose Lasix with no significant improvement.  Imaging showed a large right pleural effusion and he is being admitted for diagnostic/therapeutic thoracentesis.  Cardiology was consulted to comment on possible heart failure contributing to the effusion.     Right pleural effusion; Right chest/arm pain   -CTA with large right pleural effusion with right lower lobe collapse. Pulmonary nodules noted  - on admission  -No DVT on RUE ultrasound  -RUE pain is possibly referred from effusion and chest pain is positional/pleuritic  -He is euvolemic on exam - continue outpatient PO Lasix   -S/p thoracentesis on 6/17 with -1.2 L serosanguineous fluid removed, follow studies   -TTE LVEF 65% with normal wall motion. Grade I DD. No significant valvular disease   -Management per HMS and pulmonary - plan to repeat CT scan after thoracentesis. Hx of colon cancer     Chronic DVT: History of PE  -On low dose Xarelto as outpatient      Presumed coronary artery disease (? based on abnormal stress in 2018)  -Continue statin therapy and Toprol XL. Not  on aspirin - presumably due to Xarelto use   -HS troponin negative x 2. EKG nonischemic  -F/u with Dr. Campbell after discharge     Hypertension  -Blood pressure is intermittently elevated. Continue to monitor on Toprol XL  -Adjust regimen as needed     *The patient appears stable from a cardiac standpoint.  We will follow on an as-needed basis    VALENCIA Martínez with Dr. Parker Mendes     [facility], Main Office: [phone]  Pager [phone]  [facility] [place]    6/18/2024

## 2024-06-18 NOTE — UM PHYSICIAN REVIEW NOTE
Patient Name: Dave Arredondo      MRN: [id number]    6/15 admit    6/16 pulm and cards saw     6/17 thora 1.2L of serosanguinous pleural fluid removed (had to wait for rivaroxaban washout)    Awaiting CT scan as fluid is exudative   LD > than plasma     Spoke to gladys to setup P2P Dr Lombardi    [facility].    Zeyad Whipple MD  6/18/2024

## 2024-06-18 NOTE — CONSULTS
"Infectious Disease Consult Note    Patient Name: Dave Arredondo  MR#: [id number]  : 1935  Admission Date: 6/15/2024  Consult Date: 24 4:42 PM   Consultant: Solis Rogers MD    Reason for Consult: Exudative effusion per lights criteria  Referring Provider: Dr. Chad Schuler        Dave Arredondo is a 88 y.o. male who was admitted on 6/15/2024.  I was asked to see this patient due to exudative right-sided effusion.  He was diagnosed with \"aspiration pneumonia\" at [facility] in April and treated with 6 days of IV antibiotics.  He never recovered and continued with shortness of breath nonproductive cough and worsening arm and chest pain.  He was found to have a large effusion with was tapped.  The fluid had 21,000 red cells 1400 white cells 92% lymphocytes and a glucose of 61 LDH and 247 and pH of 7.5  The patient's white count is normal with no left shift he has a history of rectal cancer until now in remission    Allergies: No Known Allergies    Medical History:   Past Medical History:   Diagnosis Date   • Colon cancer (CMS/HCC)    • Coronary artery disease    • DVT (deep venous thrombosis) (CMS/[facility])    • Hypertension    • Lipid disorder        Surgical History:   Past Surgical History:   Procedure Laterality Date   • COLOSTOMY     • HERNIA REPAIR         Social History     Tobacco Use   • Smoking status: Never   • Smokeless tobacco: Never   Substance Use Topics   • Alcohol use: Not Currently   • Drug use: Never       Family History:   Family History   Problem Relation Age of Onset   • Heart disease Biological Mother        Review of Systems    All other systems reviewed and negative except as noted in the HPI.    Medications:    Current IP Meds (From admission, onward)        Frequency     cefTRIAXone (ROCEPHIN) IVPB 1 g in 100 mL NSS vial in bag  Status:  Discontinued         Every 24 hours interval     azithromycin (ZITHROMAX) IVPB 500 mg in 250 mL NSS vial in bag  Status:  Discontinued      "    Every 24 hours interval     atorvastatin (LIPITOR) tablet 20 mg         Daily     furosemide (LASIX) tablet 20 mg         Daily     metoprolol succinate XL (TOPROL-XL) 24 hr ER tablet 100 mg         Daily     pantoprazole (PROTONIX) tablet,delayed release (DR/EC) 40 mg         Nightly     docusate sodium (COLACE) capsule 100 mg  (Order Panel)         2 times daily     rivaroxaban (XARELTO) tablet 10 mg         Daily with dinner     albuterol HFA 90 mcg/actuation inhaler 2 puff         Every 4 hours PRN     glucose chewable tablet 16-32 g of dextrose  (Hypoglycemia Treatment Protocol and Hyperglycemia Validation Protocol)        See Hyperspace for full Linked Orders Report.    As needed     dextrose 40 % oral gel 15-30 g of dextrose  (Hypoglycemia Treatment Protocol and Hyperglycemia Validation Protocol)        See Hyperspace for full Linked Orders Report.    As needed     glucagon (GLUCAGEN) injection 1 mg  (Hypoglycemia Treatment Protocol and Hyperglycemia Validation Protocol)        See Hyperspace for full Linked Orders Report.    As needed     dextrose 50 % in water (D50) injection 12.5 g  (Hypoglycemia Treatment Protocol and Hyperglycemia Validation Protocol)        See Hyperspace for full Linked Orders Report.    As needed     acetaminophen (TYLENOL) tablet 650 mg  (Order Panel)         Every 6 hours PRN                Vital Signs:    Temp:  [36.2 °C (97.1 °F)-36.9 °C (98.5 °F)] 36.7 °C (98.1 °F)  Heart Rate:  [69-77] 76  Resp:  [16-18] 16  BP: (117-167)/(65-80) 128/65    Temp (72hrs), Av.7 °C (98 °F), Min:36.2 °C (97.1 °F), Max:37.1 °C (98.7 °F)      Physical Exam     Gen: Aox3  HEENT: OP clear  Neck: Supple  LAD: No cervical LAD  Lungs: Decreased breath sounds with crackles on the right  CV: RRR no murmurs  Abd: Soft NTND +BS  Ext: No c/c/e  Skin: no rash  Neuro: II-XII intact        Lines, Drains, Airways, Wounds:  Peripheral IV (Adult) 06/15/24 Left Antecubital (Active)   Number of days: 3        Labs:    Lab Results   Component Value Date    WBC 8.14 06/17/2024    HGB 13.3 (L) 06/17/2024    HCT 39.8 (L) 06/17/2024    MCV 89.8 06/17/2024     06/17/2024     Lab Results   Component Value Date    GLUCOSE 98 06/17/2024    CALCIUM 9.2 06/17/2024     06/17/2024    K 4.4 06/17/2024    CO2 26 06/17/2024     06/17/2024    BUN 23 06/17/2024    CREATININE 0.7 06/17/2024     Lab Results   Component Value Date    ALT 14 06/17/2024    AST 17 06/17/2024    ALKPHOS 86 06/17/2024    BILITOT 0.8 06/17/2024     UA Results       06/15/24     1626    Color Colorless    Clarity Clear    Glucose Negative    Bilirubin Negative    Ketones Negative    Sp Grav 1.013    Blood Negative    Ph 6.5    Protein Negative    Urobilinogen 0.2    Nitrite Negative    Leuk Est Negative         Comment for Blood at 1626 on 06/15/24: The sensitivity of the occult blood test is equivalent to approximately 4 intact RBC/HPF.    Comment for Leuk Est at 1626 on 06/15/24: Results can be falsely negative due to high specific gravity, some antibiotics, glucose >3 g/dl, or WBC other than neutrophils.        Microbiology Results     Procedure Component Value Units Date/Time    Body Fluid Culture/Smear Pleural Fluid, Right [[id number]] Collected: 06/17/24 1410    Specimen: Pleural Fluid, Right Updated: 06/18/24 0056     Gram Stain Result 1+ WBC      No organisms seen    Fungal Stain Pleural Fluid, Right [[id number]] Collected: 06/17/24 1410    Specimen: Pleural Fluid, Right Updated: 06/18/24 0648     Fungus Stain No fungal elements seen    AFB stain Pleural Fluid, Right [[id number]]  (Normal) Collected: 06/17/24 1410    Specimen: Pleural Fluid, Right Updated: 06/18/24 1514     AFB Stain No acid fast bacilli seen    SARS-COV-2 (COVID-19)/ FLU A/B, AND RSV, PCR Nasopharynx [[id number]]  (Normal) Collected: 06/15/24 1247    Specimen: Nasopharyngeal Swab from Nasopharynx Updated: 06/15/24 1335     SARS-CoV-2 (COVID-19) Negative     Influenza A  Negative     Influenza B Negative     Respiratory Syncytial Virus Negative    Narrative:      Testing performed using real-time PCR for detection of COVID-19. EUA approved validation studies performed on site.            Pathology Results     ** No results found for the last 720 hours. **          Echo:     Cardiac Imaging    TRANSTHORACIC ECHO (TTE) COMPLETE 06/17/2024    Interpretation Summary  •  Left Ventricle: Normal ventricle size. Concentric left ventricular hypertrophy. Normal systolic function. Estimated EF 65%. No regional wall motion abnormalities. Grade I diastolic dysfunction.  •  Right Ventricle: Normal ventricle size. Normal systolic function.  •  Left Atrium: Normal sized atrium.  •  Right Atrium: Normal sized atrium.  •  Aortic Valve: Tricuspid valve.  Sclerotic leaflets. Mild regurgitation.  •  Mitral Valve: Sclerotic mitral valve. Normal leaflet motion. Mild mitral annular calcification. Mild regurgitation.  •  Tricuspid Valve: Normal structure. Mild regurgitation. Estimated RVSP = 37 mmHg.  •  Pulmonic Valve: Normal structure. Trace regurgitation.  •  Aorta: Aortic root grossly normal.  •  IVC/SVC: Normal sized inferior vena cava. Inferior vena cava demonstrates normal respiratory collapse.  •  Pericardium: No evidence of pericardial effusion.  •  Compared to the prior echocardiogram of 4/27/2020, there is no significant change.      Imaging:    Radiology Imaging    XR CHEST 1 VW    Narrative  CLINICAL HISTORY: s/p R thora    Impression  IMPRESSION:  No pneumothorax.  Small right pleural effusion and right basilar airspace  opacity.    COMMENT:    Comparison: Chest x-ray 6/15/2024.    Technique: A single frontal AP portable upright projection of the chest was  obtained.    FINDINGS:    There is a small right pleural effusion with right basilar airspace opacity.  The left lung is clear.  There is no pneumothorax.  The cardiomediastinal  silhouette is unchanged.  The upper abdomen is unremarkable.   There is no acute  osseous abnormality.      Patient Active Problem List   Diagnosis Code   • Near syncope R55   • Dyspnea R06.00   • CAD (coronary artery disease) I25.10   • DVT (deep venous thrombosis) (CMS/HCC) I82.409   • HLD (hyperlipidemia) E78.5   • Pleural effusion on right J90   • Right arm pain M79.601   • History of rectal cancer Z85.048   • Right-sided chest pain R07.9     * Pleural effusion on right  Assessment & Plan  I have little concern for infection  I doubt he had aspiration pneumonia in April, it was likely the beginning of this pleural effusion issue  The main concern is either a primary malignancy or recurrence of his prior colorectal cancer and cytology is pending  Pulmonary is on the case  I do not recommend antibiotics        Solis Rogers MD  6/18/2024 4:42 PM

## 2024-06-18 NOTE — PLAN OF CARE
Plan of Care Review  Plan of Care Reviewed With: patient  Progress: no change  Outcome Evaluation: Patient independent in the room. No c/o pain. Walked the [place]ins today. Self managing colostomy. Had CT of chest done this evening. Seen by infectious disease today.

## 2024-06-18 NOTE — ASSESSMENT & PLAN NOTE
On admit right-sided chest pain  On admit CT chest right pleural effusion  On admit   HS troponin negative x 2  Ultrasound RUE no DVT  RUE pain likely referred from pleural effusion and chest pain is positional/pleuritic  Appears euvolemic on exam  Continue outpatient Lasix  6/17/2024 TT echo EF 65% grade 1 diastolic dysfunction  Cardiology consult no ischemia on EKG  When lying flat in bed and especially lying on right side continues to have some right-sided chest discomfort, but no chest discomfort with ambulation or being out of bed.  Continue home Lasix, metoprolol, statin, Xarelto history DVT/PE, (not on aspirin)  Per cardiology no further workup or testing required ;follow-up outpatient with outpatient cardiologist Dr. Campbell at discharge at [place]

## 2024-06-18 NOTE — PROGRESS NOTES
Hospital Medicine Service -  Daily Progress Note       SUBJECTIVE   Interval History: Awake alert lying flat in bed.  Patient states his breathing is much easier and he no longer has right chest or arm pain after having thoracentesis yesterday.  Denies any lightheadedness or dizziness.  Denies any palpitations.  Denies any abdominal pain or nausea.  Denies any dysuria.    Pulmonary consult recommends repeat CAT scan chest s/p thoracentesis to better visualize lung.  Monitor cytology pleural fluid.    CRNP Haase discussed with Dr Solis Allison ID pleural fluid exudative? pleural fluid per ID no antibiotics required no sign of infection.   OBJECTIVE      Vital signs in last 24 hours:  Temp:  [36.2 °C (97.1 °F)-36.9 °C (98.5 °F)] 36.8 °C (98.3 °F)  Heart Rate:  [69-76] 71  Resp:  [16-20] 16  BP: (117-172)/(69-84) 117/69    Intake/Output Summary (Last 24 hours) at 6/18/2024 1327  Last data filed at 6/17/2024 1415  Gross per 24 hour   Intake --   Output 1200 ml   Net -1200 ml       PHYSICAL EXAMINATION      Physical Exam  Vitals afebrile 98.3-71-16 117/69 O2 sat 94% on room air  Telemetry NSR with first-degree AV block occasional PVC  Frail-appearing elderly gentleman color slightly pale no apparent distress  Awake alert and oriented x 3  Sclera nonicteric  No JVD  Lungs diminished bilaterally  S1-S2 regular rate and rhythm no murmur gallop or rub  Abdomen soft rounded nontender nondistended positive bowel sounds x 4 quadrants ;left side of abdomen ostomy appliance with brown soft stool.  Large amount.  Musculoskeletal strength 5/5 all extremities  No pretibial edema  No rashes or lesions  Calm, cooperative, pleasant, appropriate conversation     LINES, CATHETERS, DRAINS, AIRWAYS, AND WOUNDS   Lines, Drains, and Airways:  Wounds   Peripheral IV (Adult) 06/15/24 Left Antecubital (Active)   Number of days: 3         Comments: Peripheral IV no erythema no drainage   LABS / IMAGING / TELE      Labs  Results from  last 7 days   Lab Units 06/17/24  0440   WBC K/uL 8.14   HEMOGLOBIN g/dL 13.3*   HEMATOCRIT % 39.8*   PLATELETS K/uL 223     Results from last 7 days   Lab Units 06/17/24  0440   SODIUM mEQ/L 137   POTASSIUM mEQ/L 4.4   CHLORIDE mEQ/L 104   CO2 mEQ/L 26   BUN mg/dL 23   CREATININE mg/dL 0.7   GLUCOSE mg/dL 98   CALCIUM mg/dL 9.2     Results from last 7 days   Lab Units 06/17/24  0440   MAGNESIUM mg/dL 2.0       Imaging  Significant findings include:   6/17/2024 IR right thoracentesis  IMPRESSION: Successful ultrasound-guided right-sided thoracentesis with 1.2 L   pleural fluid removed and sent to the lab.  All components of the time-out,   debriefing, and handling of the specimen were conducted as per the ASAP Specimen   Protocol.     6/17/24 s/p thoracentesis  IMPRESSION:   No pneumothorax.  Small right pleural effusion and right basilar airspace   opacity.     6/17/2024 TT echo  nterpretation Summary         Left Ventricle: Normal ventricle size. Concentric left ventricular hypertrophy. Normal systolic function. Estimated EF 65%. No regional wall motion abnormalities. Grade I diastolic dysfunction.    Right Ventricle: Normal ventricle size. Normal systolic function.    Left Atrium: Normal sized atrium.    Right Atrium: Normal sized atrium.    Aortic Valve: Tricuspid valve.  Sclerotic leaflets. Mild regurgitation.    Mitral Valve: Sclerotic mitral valve. Normal leaflet motion. Mild mitral annular calcification. Mild regurgitation.    Tricuspid Valve: Normal structure. Mild regurgitation. Estimated RVSP = 37 mmHg.    Pulmonic Valve: Normal structure. Trace regurgitation.    Aorta: Aortic root grossly normal.    IVC/SVC: Normal sized inferior vena cava. Inferior vena cava demonstrates normal respiratory collapse.    Pericardium: No evidence of pericardial effusion.    Compared to the prior echocardiogram of 4/27/2020, there is no significant change.     ECG/Telemetry  Telemetry NSR with first-degree AV block  occasional PVC    ASSESSMENT AND PLAN      * Pleural effusion on right  Assessment & Plan  CT finding  Imaging noted a large right pleural effusion with right lower lobe collapse and partial atelectasis of the right middle lobe and upper lobe centrally  Etiology concerning for underlying malignancy in light of history of colon cancer  6/17 s/p Right thoracentesis , lights criteria positive for exudative fluid.  Notable blood in the fluid as well.  Patient endorsed he recently had a pneumonia. However ,ID consult does not recommend antibiotics.  Currently O2 sat stable   on room air, monitor saturations and maintain O2 sats above 90%  Incentive spirometry  Recommend out of bed to chair  Monitor temp, WBC  Monitor pleural fluid cytology and cultures  Pulm consult recommends repeat  CT chest s/p thoracentesis to evaluate lung.     CAD (coronary artery disease)  Assessment & Plan  Chronic  Continue Xarelto ,metoprolol and statin therapy  Monitor telemetry  Cardiology is following, appreciate recommendations.   Cardiology recommends no further workup required follow-up at discharge with outpatient cardiologist Dr. Campbell.    Right-sided chest pain  Assessment & Plan  On admit right-sided chest pain  On admit CT chest right pleural effusion  On admit   HS troponin negative x 2  Ultrasound RUE no DVT  RUE pain likely referred from pleural effusion and chest pain is positional/pleuritic  Appears euvolemic on exam  Continue outpatient Lasix  6/17/2024 TT echo EF 65% grade 1 diastolic dysfunction  Cardiology consult no ischemia on EKG  No further chest pain s/p thoracentesis  Continue home Lasix, metoprolol, statin, Xarelto history DVT/PE, (not on aspirin)  Per cardiology no further workup or testing required ;follow-up outpatient with outpatient cardiologist Dr. Campbell at discharge/           History of rectal cancer  Assessment & Plan  Chronic  Status post resection in 2010 per patient  There is concern about  the patient's pleural effusion potentially being attributed to an underlying malignancy, thoracentesis  cpleted , pleural fluid pending cytology 6/17/2024  The patient does follow with colorectal surgery at [facility]  Pulm recommends check CT chest s/p thoracentesis to better visualize lung.     Right arm pain  Assessment & Plan  Acute  Reportedly had right upper extremity pain and swelling  Doppler of the extremity negative for DVT  Currently pain-free, attributes symptoms as radiating from the chest  Monitor closely, Tylenol as needed for now.  No pain this am         VTE Assessment: Pad[place] VTE Score: 4  VTE Prophylaxis:  Current anticoagulants:  rivaroxaban (XARELTO) tablet 10 mg, oral, Daily with dinner      Code Status: Full Code      Estimated Discharge Date: 6/19/2024   Disposition Planning: Pulmonology recommends repeat CAT scan chest s/p thoracentesis to better visualize lung.  Monitor cytology pleural fluid.  Monitor O2 sat closely.     Patricia Haase, CRNP  6/18/2024

## 2024-06-18 NOTE — PLAN OF CARE
Problem: Adult Inpatient Plan of Care  Goal: Plan of Care Review  Outcome: Progressing  Flowsheets (Taken 6/18/2024 0512)  Progress: improving  Outcome Evaluation: pt without complaints of pain overnigt. Free from falls this shift. Independent in room. Independent with colostomy care. Swallows pills whole with water. NSR with 1st degree AVB on monitor. Will ctm  Plan of Care Reviewed With: patient   Plan of Care Review  Plan of Care Reviewed With: patient  Progress: improving  Outcome Evaluation: pt without complaints of pain overnigt. Free from falls this shift. Independent in room. Independent with colostomy care. Swallows pills whole with water. NSR with 1st degree AVB on monitor. Will ctm

## 2024-06-19 VITALS
HEIGHT: 67 IN | RESPIRATION RATE: 16 BRPM | DIASTOLIC BLOOD PRESSURE: 70 MMHG | OXYGEN SATURATION: 94 % | SYSTOLIC BLOOD PRESSURE: 133 MMHG | BODY MASS INDEX: 25.43 KG/M2 | TEMPERATURE: 98.3 F | WEIGHT: 162 LBS | HEART RATE: 76 BPM

## 2024-06-19 PROBLEM — R91.8 PULMONARY NODULES: Status: ACTIVE | Noted: 2024-06-19

## 2024-06-19 LAB
ANION GAP SERPL CALC-SCNC: 7 MEQ/L (ref 3–15)
BASOPHILS # BLD: 0.04 K/UL (ref 0.01–0.1)
BASOPHILS NFR BLD: 0.5 %
BUN SERPL-MCNC: 26 MG/DL (ref 7–25)
CALCIUM SERPL-MCNC: 9.1 MG/DL (ref 8.6–10.3)
CASE RPRT: NORMAL
CHLORIDE SERPL-SCNC: 105 MEQ/L (ref 98–107)
CO2 SERPL-SCNC: 25 MEQ/L (ref 21–31)
CREAT SERPL-MCNC: 0.8 MG/DL (ref 0.7–1.3)
DIFFERENTIAL METHOD BLD: ABNORMAL
EGFRCR SERPLBLD CKD-EPI 2021: >60 ML/MIN/1.73M*2
EOSINOPHIL # BLD: 0.32 K/UL (ref 0.04–0.54)
EOSINOPHIL NFR BLD: 4 %
ERYTHROCYTE [DISTWIDTH] IN BLOOD BY AUTOMATED COUNT: 13.7 % (ref 11.6–14.4)
GLUCOSE SERPL-MCNC: 98 MG/DL (ref 70–99)
HCT VFR BLD AUTO: 41.2 % (ref 40.1–51)
HGB BLD-MCNC: 13.5 G/DL (ref 13.7–17.5)
IMM GRANULOCYTES # BLD AUTO: 0.03 K/UL (ref 0–0.08)
IMM GRANULOCYTES NFR BLD AUTO: 0.4 %
LYMPHOCYTES # BLD: 1.04 K/UL (ref 1.2–3.5)
LYMPHOCYTES NFR BLD: 12.9 %
MAGNESIUM SERPL-MCNC: 2 MG/DL (ref 1.8–2.5)
MCH RBC QN AUTO: 29.6 PG (ref 28–33.2)
MCHC RBC AUTO-ENTMCNC: 32.8 G/DL (ref 32.2–36.5)
MCV RBC AUTO: 90.4 FL (ref 83–98)
MONOCYTES # BLD: 1.11 K/UL (ref 0.3–1)
MONOCYTES NFR BLD: 13.8 %
NEUTROPHILS # BLD: 5.53 K/UL (ref 1.7–7)
NEUTS SEG NFR BLD: 68.4 %
NRBC BLD-RTO: 0 %
PATH REPORT.FINAL DX SPEC: NORMAL
PATH REPORT.GROSS SPEC: NORMAL
PDW BLD AUTO: 9.3 FL (ref 9.4–12.4)
PLATELET # BLD AUTO: 229 K/UL (ref 150–350)
POTASSIUM SERPL-SCNC: 4.2 MEQ/L (ref 3.5–5.1)
RBC # BLD AUTO: 4.56 M/UL (ref 4.5–5.8)
SODIUM SERPL-SCNC: 137 MEQ/L (ref 136–145)
WBC # BLD AUTO: 8.07 K/UL (ref 3.8–10.5)

## 2024-06-19 PROCEDURE — 63700000 HC SELF-ADMINISTRABLE DRUG: Performed by: HOSPITALIST

## 2024-06-19 PROCEDURE — 85025 COMPLETE CBC W/AUTO DIFF WBC: CPT | Performed by: NURSE PRACTITIONER

## 2024-06-19 PROCEDURE — 99239 HOSP IP/OBS DSCHRG MGMT >30: CPT | Performed by: STUDENT IN AN ORGANIZED HEALTH CARE EDUCATION/TRAINING PROGRAM

## 2024-06-19 PROCEDURE — 83735 ASSAY OF MAGNESIUM: CPT | Performed by: NURSE PRACTITIONER

## 2024-06-19 PROCEDURE — 80048 BASIC METABOLIC PNL TOTAL CA: CPT | Performed by: NURSE PRACTITIONER

## 2024-06-19 PROCEDURE — 36415 COLL VENOUS BLD VENIPUNCTURE: CPT | Performed by: NURSE PRACTITIONER

## 2024-06-19 RX ORDER — PANTOPRAZOLE SODIUM 40 MG/1
40 TABLET, DELAYED RELEASE ORAL NIGHTLY
Qty: 30 TABLET | Refills: 0 | Status: SHIPPED | OUTPATIENT
Start: 2024-06-19

## 2024-06-19 RX ADMIN — ATORVASTATIN CALCIUM 20 MG: 20 TABLET, FILM COATED ORAL at 08:28

## 2024-06-19 RX ADMIN — FUROSEMIDE 20 MG: 20 TABLET ORAL at 08:28

## 2024-06-19 RX ADMIN — METOPROLOL SUCCINATE 100 MG: 100 TABLET, EXTENDED RELEASE ORAL at 08:28

## 2024-06-19 ASSESSMENT — COGNITIVE AND FUNCTIONAL STATUS - GENERAL
MOVING TO AND FROM BED TO CHAIR: 4 - NONE
STANDING UP FROM CHAIR USING ARMS: 4 - NONE
WALKING IN HOSPITAL ROOM: 4 - NONE
WALKING IN HOSPITAL ROOM: 4 - NONE
CLIMB 3 TO 5 STEPS WITH RAILING: 3 - A LITTLE
CLIMB 3 TO 5 STEPS WITH RAILING: 3 - A LITTLE
STANDING UP FROM CHAIR USING ARMS: 4 - NONE
MOVING TO AND FROM BED TO CHAIR: 4 - NONE

## 2024-06-19 NOTE — DISCHARGE SUMMARY
Hospital Medicine Service -  Inpatient Discharge Summary        BRIEF OVERVIEW   Patient: Dave Arredondo (1935)    Admitting Provider: Deja Mackey DO  Attending Provider: Chad Schuler MD Attending phys phone: [phone]    PCP: Mazin Muhammad [phone]    Admission Date: 6/15/2024  Discharge Date: 6/19/2024     DISCHARGE DIAGNOSES      Primary Discharge Diagnosis  Pleural effusion on right    Secondary Discharge Diagnoses  Active Hospital Problems    Diagnosis Date Noted    Pleural effusion on right 06/15/2024     Priority: High    CAD (coronary artery disease) 04/25/2020     Priority: Medium    Pulmonary nodules 06/19/2024     Priority: Low    Right-sided chest pain 06/18/2024     Priority: Low    Right arm pain 06/15/2024     Priority: Low    History of rectal cancer 06/15/2024     Priority: Low    DVT (deep venous thrombosis) (CMS/[facility]) 04/25/2020     Priority: Low      Resolved Hospital Problems   No resolved problems to display.       Problem List on Day of Discharge  * Pleural effusion on right  Assessment & Plan  CT finding  Imaging noted a large right pleural effusion with right lower lobe collapse and partial atelectasis of the right middle lobe and upper lobe centrally  Etiology concerning for underlying malignancy in light of history of colon cancer  6/17 s/p Right thoracentesis , lights criteria positive for exudative fluid.  Notable blood in the fluid as well.  Patient endorsed he recently had a pneumonia. However ,ID consult does not recommend antibiotics.  6/18/2024 CT chest s/p thoracentesis Moderate pleural effusion, decreased from the prior CT. Stable pulmonary nodules.  Ambulatory O2 sat stable on room air  Incentive spirometry continue at discharge discussed with patient  Follow-up with PCP within 1 week of discharge  Follow-up with pulmonology Dr. Park and oncology/hematology Dr. Schreiber for monitoring of pleural fluid cytology pending.  Pleural fluid cultures  NGTD       CAD (coronary artery disease)  Assessment & Plan  Chronic  Continue Xarelto ,metoprolol and statin therapy  Cardiology is following, appreciate recommendations.   Cardiology recommends no further workup required, follow-up at discharge with outpatient cardiologist Dr. Campbell.    Pulmonary nodules  Assessment & Plan  6/18 CAT scan chest revealed pulmonary nodules  Follow-up with Dr. Schreiber hematology/oncology and Dr. Vince Vega pulmonology for continued surveillance of pulmonary nodules and to review results pleural fluid cytology pending.    Right-sided chest pain  Assessment & Plan  On admit right-sided chest pain  On admit CT chest right pleural effusion  On admit   HS troponin negative x 2  Ultrasound RUE no DVT  RUE pain likely referred from pleural effusion and chest pain is positional/pleuritic  Appears euvolemic on exam  Continue outpatient Lasix  6/17/2024 TT echo EF 65% grade 1 diastolic dysfunction  Cardiology consult no ischemia on EKG  When lying flat in bed and especially lying on right side continues to have some right-sided chest discomfort, but no chest discomfort with ambulation or being out of bed.  Continue home Lasix, metoprolol, statin, Xarelto history DVT/PE, (not on aspirin)  Per cardiology no further workup or testing required ;follow-up outpatient with outpatient cardiologist Dr. Campbell at discharge at [place]           History of rectal cancer  Assessment & Plan  Chronic  Status post resection in 2010 per patient  There is concern about the patient's pleural effusion potentially being attributed to an underlying malignancy, thoracentesis  completed , pleural fluid pending cytology 6/17/2024.   follows with colorectal surgery at [facility] f/u at discharge      Right arm pain  Assessment & Plan  Acute  Reportedly had right upper extremity pain and swelling  Doppler of the extremity negative for DVT  Currently pain-free, attributes symptoms as radiating from  the chest.  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 year-old male admitted on 6/15/2024 with Pleural effusion on right.        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    Follows with Dr. Sundeep Campbell cardiology at [facility].  Follows with colorectal surgery at [place].    HPI:  Patient presented to [facility] emergency room on 6/15/2024 with complaint of right arm/forearm pain.  Patient reports he had had right forearm arm pain intermittently for a few months extending to the right axilla. In ED afebrile, on physical exam right upper extremity no swelling, erythema or lesion ,bilateral upper extremities  +2 radial pulses, equally warm.  He also reported dyspnea for approximately 2 months with mild intermittent cough.  He denied any chest pain or palpitations.  He stated he did take antibiotics April 2024 for possible pneumonia without improvement of his symptoms as prescribed by his cardiologist and was started on Lasix 20 mg daily without much improvement.  In ED O2 sat stable on room air.  Did note decreased breath sounds right lungs.    In ED CTA chest No pulmonary embolism .  Did reveal large right pleural effusion with right lower lobe collapse and partial atelectasis of the right middle lobe and upper lobes centrally.  Small pulmonary nodules scattered again visualized less than 6 mm largest is left upper lobe.  Troponin negative.  EKG no acute ischemic changes.  .  Afebrile, no  leukocytosis.  COVID-19, RSV and flu all negative.  He was admitted for close monitoring with right pleural effusion outpatient Xarelto was held.  He did receive 1 dose of IV Lasix 40 mg in the ED.  He was then maintained on home dose p.o. Lasix.  Pulmonology was consulted Dr. Vince Park agreed with consult interventional radiology for right thoracentesis.  On 6/17/2024 IR performed right thoracentesis therapeutic and diagnostic 1.2 L removed pleural fluid.  Pleural fluid cultures no growth to date.  ID was consulted Dr. Solis Allison no antibiotics were required pleural fluid no sign of bacteria.  Pleural fluid cytology was pending.  Postthoracentesis pulmonology recommended repeat CAT scan chest to review right lung.  6/18/2024 CAT scan chest revealed moderate right pleural effusion, decreased from previous CT on admission.  Improved aeration right lung with persistent atelectasis of the right middle and upper lobes.  Scattered small pulmonary nodules again visualized without interval change.  No pneumothorax identified.  Osseous structures are stable.  He remained hemodynamically stable throughout his hospitalization.  His O2 sat was stable on room air.  Day of discharge ambulatory 2-minute room air O2 sat was stable.  At discharge he should follow-up with his PCP within 1 week for continued surveillance of pleural fluid cultures and pleural fluid cytology which is pending.  He should follow-up outpatient with pulmonology Dr. Vince Pandyafor monitoring of pleural fluid cytology and for continued surveillance of pulmonary nodules.  He was also given contact information for Dr. Vince Schreiber hematology oncology for continued surveillance of pulmonary nodules and pleural fluid cytology.  He was evaluated by cardiology no signs of acute ischemia on EKG.  Cardiology recommends follow-up outpatient with Dr. Sundeep Ricci outpatient cardiologist within 2 weeks.      Exam on Day of Discharge  Physical Exam  Subjective:  Awake alert sitting out of bed in the chair.  Walked in the hallway on room air O2 sat remained 94% or greater.  Denies any lightheadedness or dizziness with ambulating.  Denies any chest pain ,palpitations, shortness of breath or dyspnea with ambulation.  Denies any nausea tolerating diet.  Denies any dysuria.  Patient reports he did have some right-sided chest discomfort and right arm pain less severe compared to prior to hospitalization.  Patient states that right-sided chest pain is worse when he lies on his right side changing positions relieves the chest discomfort.  He has no chest discomfort when out of bed or with activity/ambulation.  Objective:  Vitals afebrile 98.3-76-16 133/70 O2 sat 94% on room air  Thin appearing gentleman color slightly pale no apparent distress  Awake alert and oriented x 3.  No neurodeficits  ambulating in the hallway with nursing gait strong and steady.  O2 sat stable room air  Lungs decreased RLL otherwise clear  S1-S2 regular rate and rhythm no murmur gallop or rub  Abdomen soft rounded nontender nondistended positive bowel sounds x 4 quadrants  Ostomy with soft brown stool  Musculoskeletal strength 5/5 all extremities  Trace pretibial edema bilateral lower extremities  No rashes or lesions  Calm, cooperative, appropriate conversation, pleasant    Consults During Admission  IP CONSULT TO SOCIAL WORK  IP CONSULT TO CARDIOLOGY  IP CONSULT TO PULMONOLOGY/SLEEP MEDICINE  IP CONSULT TO INFECTIOUS DISEASE    DISCHARGE MEDICATIONS               Medication List        START taking these medications      pantoprazole 40 mg EC tablet  Commonly known as: PROTONIX  Take 1 tablet (40 mg total) by mouth nightly Indications: GI ppx w/Xarelto.  Dose: 40 mg            CONTINUE taking these medications      albuterol HFA 90 mcg/actuation inhaler  INHALE 2 PUFF USING INHALER EVERY EIGHT HOURS AS NEEDED     atorvastatin 20 mg tablet  Commonly known as: LIPITOR  Take 20 mg by mouth daily.  Dose: 20  mg     cyanocobalamin 1,000 mcg/mL injection  Commonly known as: VITAMIN B-12  Inject 1,000 mcg into the shoulder, thigh, or buttocks every 30 (thirty) days.  Dose: 1,000 mcg     furosemide 20 mg tablet  Commonly known as: LASIX  Take 20 mg by mouth daily.  Dose: 20 mg     metoprolol succinate  mg 24 hr tablet  Commonly known as: TOPROL-XL  Take 100 mg by mouth daily.  Dose: 100 mg     nitroglycerin 0.4 mg SL tablet  Commonly known as: NITROSTAT  Place 0.4 mg under the tongue every 5 (five) minutes as needed.  Dose: 0.4 mg     XARELTO 10 mg tablet  TAKE 1 TABLET BY MOUTH EVERY DAY WITH DINNER  Generic drug: rivaroxaban               Instructions for after discharge       Discharge diet      Diet Type / Texture:  Regular  Cardiac (Low Sodium, Low Fat)       Fluid Consistency: Thin Liquids    Follow-up with Provider:      Follow-up with cardiology within 2 weeks of discharge.    Sundeep Campbell MD   [phone]    [place] [address] [place] [id number]       Follow-up with Provider:      Follow-up with pulmonology and hematology for surveillance of pulmonary nodules within 2 to 4 weeks.    Cornelio Schreiber DO   [phone]    [address]  [address]  [place] [address]       Follow-up with primary physician (PCP)      Follow-up with primary care physician within 1 week of discharge for hospital follow-up and to monitor pleural fluid cytology which is pending..    Follow-up with provider      Follow-up with pulmonology within the next 2 to 4 wee weeks to review pleural fluid cytology results and for continued surveillance of pulmonary nodules.    Vince Park MD   [phone]    Pulmonary  [address] [facility] [address] [facility]  [address]  [place] [address]       Post-Discharge Activity: Normal activity as tolerated.      Normal activity as tolerated.               PROCEDURES / LABS / IMAGING          Other Procedures  6/17/24 TT echo  Interpretation Summary          Left Ventricle: Normal ventricle size. Concentric left ventricular hypertrophy. Normal systolic function. Estimated EF 65%. No regional wall motion abnormalities. Grade I diastolic dysfunction.    Right Ventricle: Normal ventricle size. Normal systolic function.    Left Atrium: Normal sized atrium.    Right Atrium: Normal sized atrium.    Aortic Valve: Tricuspid valve.  Sclerotic leaflets. Mild regurgitation.    Mitral Valve: Sclerotic mitral valve. Normal leaflet motion. Mild mitral annular calcification. Mild regurgitation.    Tricuspid Valve: Normal structure. Mild regurgitation. Estimated RVSP = 37 mmHg.    Pulmonic Valve: Normal structure. Trace regurgitation.    Aorta: Aortic root grossly normal.    IVC/SVC: Normal sized inferior vena cava. Inferior vena cava demonstrates normal respiratory collapse.    Pericardium: No evidence of pericardial effusion.    Compared to the prior echocardiogram of 4/27/2020, there is no significant change.    Pertinent Labs  Results from last 7 days   Lab Units 06/19/24  0516   WBC K/uL 8.07   HEMOGLOBIN g/dL 13.5*   HEMATOCRIT % 41.2   PLATELETS K/uL 229     Results from last 7 days   Lab Units 06/19/24  0516   SODIUM mEQ/L 137   POTASSIUM mEQ/L 4.2   CHLORIDE mEQ/L 105   CO2 mEQ/L 25   BUN mg/dL 26*   CREATININE mg/dL 0.8   GLUCOSE mg/dL 98   CALCIUM mg/dL 9.1     Results from last 7 days   Lab Units 06/19/24  0516   MAGNESIUM mg/dL 2.0           Pertinent Imaging  CT CHEST WITHOUT IV CONTRAST    Result Date: 6/18/2024  IMPRESSION: 1.  Moderate pleural effusion, decreased from the prior CT. 2.  Stable pulmonary nodules.    IR THORACENTESIS    Result Date: 6/17/2024  IMPRESSION: Successful ultrasound-guided right-sided thoracentesis with 1.2 L pleural fluid removed and sent to the lab.  All components of the time-out, debriefing, and handling of the specimen were conducted as per the ASAP Specimen Protocol. I certify that I have personally reviewed this examination and agree  with Maria Antonia Bradley's report. Cj Yen M.D.    X-RAY CHEST 1 VIEW    Result Date: 6/17/2024  IMPRESSION: No pneumothorax.  Small right pleural effusion and right basilar airspace opacity. COMMENT: Comparison: Chest x-ray 6/15/2024. Technique: A single frontal AP portable upright projection of the chest was obtained. FINDINGS: There is a small right pleural effusion with right basilar airspace opacity. The left lung is clear.  There is no pneumothorax.  The cardiomediastinal silhouette is unchanged.  The upper abdomen is unremarkable.  There is no acute osseous abnormality.     Ultrasound venous arm right    Result Date: 6/15/2024  IMPRESSION: No evidence of right upper extremity deep vein thrombosis.     CT ANGIOGRAPHY CHEST PULMONARY EMBOLISM WITH IV CONTRAST    Result Date: 6/15/2024  IMPRESSION: development of a large right pleural effusion with right lower lobe collapse and partial atelectasis of the right middle and upper lobes centrally. Scattered small pulmonary nodules are again seen less than 6 mm the largest is linearly oriented in the left upper lobe. No CT evidence for proximal or segmental pulmonary arterial embolism.     X-RAY CHEST 2 VIEWS    Result Date: 6/15/2024  IMPRESSION: moderate right pleural effusion with basilar airspace opacity.  Fluid is likely to accurately into the fissures on the right as well.     OUTPATIENT  FOLLOW-UP / REFERRALS / PENDING TESTS        Test Results Pending at Discharge  Pending Inpatient Labs       Order Current Status    AFB Culture Pleural Fluid, Right In process    AFB culture Pleural Fluid, Right In process    Cytology, Fluids Pleural Fluid, Right In process    [place] Draw Panel In process    Body Fluid Culture/Smear Pleural Fluid, Right Preliminary result    Fungal Culture Pleural Fluid, Right Preliminary result    Fungal culture / smear Pleural Fluid, Right Preliminary result            Important Issues to Address in Follow-Up  Discharge instructions:  -You  presented to the hospital with right sided chest pain and right arm pain.  On admission CAT scan of your chest revealed no pulmonary embolism was identified however ,you did have a large right pleural effusion.   - On 6/17/2024 interventional radiology performed a right thoracentesis and removal of the pleural fluid.   - You were evaluated by Dr Solis Allison infectious disease and did not require any antibiotics, the pleural fluid was not infected.     - The pleural fluid was sent for pathology/cytology and is still pending results.  Follow-up with your primary care physician within 1 week of discharge for monitoring of the pleural fluid results and hospital follow-up.    -You were seen by Dr. Vince Park pulmonology and had a CAT scan of your chest after completing the thoracentesis which revealed decreasing size of the right pleural effusion.    -The CAT scans you had of your chest did reveal pulmonary nodules you need follow-up with Dr. Vince Park pulmonology to review results of the pleural fluid cytology/pathology and for monitoring of pulmonary nodules within the next 2 weeks.    -You should also follow-up with Dr. Cornelio Schreiber hematologist/oncologist for continued surveillance of pulmonary nodules and pleural fluid cytology results.      -You were evaluated by cardiology because you had the right chest discomfort you had no abnormality on your EKG no identified acute cardiac problem.  Recommend that you follow-up outpatient with Dr. Campbell your cardiologist within 2 weeks of discharge.      -You were started on Protonix (pantoprazole) to protect your stomach because you are taking the blood thinner Xarelto daily.  DISCHARGE DISPOSITION AND DESTINATION      Disposition: Home Home to father Divine with other brothers.  Destination: Home                            Code Status At Discharge: Full Code    Physician Order for Life-Sustaining Treatment Document Status        No documents found                      >30 mins spent for coordination/counselling related to discharge plan, including final examination of patient, discussion regarding discharge instructions, reconciliation/prescription of medications, preparation of discharge records, etc.

## 2024-06-19 NOTE — NURSING NOTE
Patient discharged to home with no needs. All discharge instructions, medications, and follow ups reviewed with patient at the bedside. All questions asked and all questions answered. IV removed. Patient medications returned from safe. Patient to be wheeled out to private vehicle by transport staff.

## 2024-06-19 NOTE — ASSESSMENT & PLAN NOTE
History of DVT/PE on Xarelto on admit  Continue home Xarelto  Added PPI with Xarelto  Follow-up with outpatient cardiologist and PCP

## 2024-06-19 NOTE — PLAN OF CARE
Problem: Adult Inpatient Plan of Care  Goal: Plan of Care Review  Outcome: Progressing  Flowsheets (Taken 6/19/2024 1111)  Progress: improving  Outcome Evaluation: Assumed care at 1930. Patient is oriented, calm, and cooperative. Ambulates independently in room and manages colostomy himself. No SOB reported. Slept through the night with no complaints. Vitals stable and SR / 1 AVB on tele monitor. Patient is a possible d/c today.

## 2024-06-19 NOTE — DISCHARGE INSTRUCTIONS
-You presented to the hospital with right sided chest pain and right arm pain.  On admission CAT scan of your chest revealed no pulmonary embolism was identified however ,you did have a large right pleural effusion.   - On 6/17/2024 interventional radiology performed a right thoracentesis and removal of the pleural fluid.   - You were evaluated by Dr Solis Allison infectious disease and did not require any antibiotics, the pleural fluid was not infected.     - The pleural fluid was sent for pathology/cytology and is still pending results.  Follow-up with your primary care physician within 1 week of discharge for monitoring of the pleural fluid results and hospital follow-up.    -You were seen by Dr. Vince Park pulmonology and had a CAT scan of your chest after completing the thoracentesis which revealed decreasing size of the right pleural effusion.    -The CAT scans you had of your chest did reveal pulmonary nodules you need follow-up with Dr. Vince Park pulmonology to review results of the pleural fluid cytology/pathology and for monitoring of pulmonary nodules within the next 2 weeks.    -You should also follow-up with Dr. Cornelio Schreiber hematologist/oncologist for continued surveillance of pulmonary nodules and pleural fluid cytology results.      -You were evaluated by cardiology because you had the right chest discomfort you had no abnormality on your EKG no identified acute cardiac problem.  Recommend that you follow-up outpatient with Dr. Campbell your cardiologist within 2 weeks of discharge.      -You were started on Protonix (pantoprazole) to protect your stomach because you are taking the blood thinner Xarelto daily.    -Thank you for allowing us to participate in your care.  We wish you continued health.

## 2024-06-19 NOTE — ASSESSMENT & PLAN NOTE
6/18 CAT scan chest revealed pulmonary nodules  Follow-up with Dr. Schreiber hematology/oncology and Dr. Vince Vega pulmonology for continued surveillance of pulmonary nodules and to review results pleural fluid cytology pending.

## 2024-06-21 LAB
GRAM STN SPEC: NORMAL
GRAM STN SPEC: NORMAL
MICROORGANISM SPEC CULT: NORMAL

## 2024-07-01 ENCOUNTER — HOSPITAL ENCOUNTER (INPATIENT)
Facility: HOSPITAL | Age: 89
LOS: 8 days | Discharge: HOME HEALTH | DRG: 167 | End: 2024-07-09
Admitting: HOSPITALIST
Payer: COMMERCIAL

## 2024-07-01 ENCOUNTER — APPOINTMENT (EMERGENCY)
Dept: RADIOLOGY | Facility: HOSPITAL | Age: 89
DRG: 167 | End: 2024-07-01
Payer: COMMERCIAL

## 2024-07-01 DIAGNOSIS — R07.9 CHEST PAIN, UNSPECIFIED TYPE: ICD-10-CM

## 2024-07-01 DIAGNOSIS — R91.8 PULMONARY NODULES: ICD-10-CM

## 2024-07-01 DIAGNOSIS — J90 PLEURAL EFFUSION ON RIGHT: Primary | ICD-10-CM

## 2024-07-01 DIAGNOSIS — Z85.048 HISTORY OF RECTAL CANCER: ICD-10-CM

## 2024-07-01 DIAGNOSIS — R06.00 DYSPNEA, UNSPECIFIED TYPE: ICD-10-CM

## 2024-07-01 LAB
ALBUMIN SERPL-MCNC: 3.5 G/DL (ref 3.5–5.7)
ALP SERPL-CCNC: 102 IU/L (ref 34–125)
ALT SERPL-CCNC: 15 IU/L (ref 7–52)
ANION GAP SERPL CALC-SCNC: 8 MEQ/L (ref 3–15)
APTT PPP: 37 SEC (ref 23–35)
AST SERPL-CCNC: 17 IU/L (ref 13–39)
ATRIAL RATE: 93
BASOPHILS # BLD: 0.05 K/UL (ref 0.01–0.1)
BASOPHILS NFR BLD: 0.5 %
BILIRUB SERPL-MCNC: 0.9 MG/DL (ref 0.3–1.2)
BNP SERPL-MCNC: 149 PG/ML
BUN SERPL-MCNC: 14 MG/DL (ref 7–25)
CALCIUM SERPL-MCNC: 9.4 MG/DL (ref 8.6–10.3)
CHLORIDE SERPL-SCNC: 100 MEQ/L (ref 98–107)
CO2 SERPL-SCNC: 27 MEQ/L (ref 21–31)
CREAT SERPL-MCNC: 0.9 MG/DL (ref 0.7–1.3)
DIFFERENTIAL METHOD BLD: ABNORMAL
EGFRCR SERPLBLD CKD-EPI 2021: >60 ML/MIN/1.73M*2
EOSINOPHIL # BLD: 0.09 K/UL (ref 0.04–0.54)
EOSINOPHIL NFR BLD: 0.9 %
ERYTHROCYTE [DISTWIDTH] IN BLOOD BY AUTOMATED COUNT: 13.8 % (ref 11.6–14.4)
GLUCOSE SERPL-MCNC: 132 MG/DL (ref 70–99)
HCT VFR BLD AUTO: 41.2 % (ref 40.1–51)
HGB BLD-MCNC: 13.5 G/DL (ref 13.7–17.5)
IMM GRANULOCYTES # BLD AUTO: 0.03 K/UL (ref 0–0.08)
IMM GRANULOCYTES NFR BLD AUTO: 0.3 %
INR PPP: 1.8
LIPASE SERPL-CCNC: 74 U/L (ref 11–82)
LYMPHOCYTES # BLD: 0.71 K/UL (ref 1.2–3.5)
LYMPHOCYTES NFR BLD: 7 %
MCH RBC QN AUTO: 29.5 PG (ref 28–33.2)
MCHC RBC AUTO-ENTMCNC: 32.8 G/DL (ref 32.2–36.5)
MCV RBC AUTO: 90 FL (ref 83–98)
MONOCYTES # BLD: 1.51 K/UL (ref 0.3–1)
MONOCYTES NFR BLD: 14.8 %
NEUTROPHILS # BLD: 7.78 K/UL (ref 1.7–7)
NEUTS SEG NFR BLD: 76.5 %
NRBC BLD-RTO: 0 %
P AXIS: 41
PDW BLD AUTO: 9.4 FL (ref 9.4–12.4)
PLATELET # BLD AUTO: 307 K/UL (ref 150–350)
POTASSIUM SERPL-SCNC: 3.8 MEQ/L (ref 3.5–5.1)
PR INTERVAL: 268
PROT SERPL-MCNC: 6.9 G/DL (ref 6–8.2)
PROTHROMBIN TIME: 20.8 SEC (ref 12.2–14.5)
QRS DURATION: 82
QT INTERVAL: 364
QTC CALCULATION(BAZETT): 452
R AXIS: 24
RBC # BLD AUTO: 4.58 M/UL (ref 4.5–5.8)
SODIUM SERPL-SCNC: 135 MEQ/L (ref 136–145)
T WAVE AXIS: 44
TROPONIN I SERPL HS-MCNC: 12.6 PG/ML
TROPONIN I SERPL HS-MCNC: 14.4 PG/ML
VENTRICULAR RATE: 93
WBC # BLD AUTO: 10.17 K/UL (ref 3.8–10.5)

## 2024-07-01 PROCEDURE — 83880 ASSAY OF NATRIURETIC PEPTIDE: CPT

## 2024-07-01 PROCEDURE — 84484 ASSAY OF TROPONIN QUANT: CPT

## 2024-07-01 PROCEDURE — 93005 ELECTROCARDIOGRAM TRACING: CPT

## 2024-07-01 PROCEDURE — 63600000 HC DRUGS/DETAIL CODE: Performed by: HOSPITALIST

## 2024-07-01 PROCEDURE — 83690 ASSAY OF LIPASE: CPT | Performed by: PHYSICIAN ASSISTANT

## 2024-07-01 PROCEDURE — 99285 EMERGENCY DEPT VISIT HI MDM: CPT | Mod: 25

## 2024-07-01 PROCEDURE — 80053 COMPREHEN METABOLIC PANEL: CPT

## 2024-07-01 PROCEDURE — 85025 COMPLETE CBC W/AUTO DIFF WBC: CPT

## 2024-07-01 PROCEDURE — 36415 COLL VENOUS BLD VENIPUNCTURE: CPT

## 2024-07-01 PROCEDURE — 85610 PROTHROMBIN TIME: CPT

## 2024-07-01 PROCEDURE — 85730 THROMBOPLASTIN TIME PARTIAL: CPT

## 2024-07-01 PROCEDURE — 20600000 HC ROOM AND CARE INTERMEDIATE/TELEMETRY

## 2024-07-01 PROCEDURE — 71046 X-RAY EXAM CHEST 2 VIEWS: CPT

## 2024-07-01 PROCEDURE — 63700000 HC SELF-ADMINISTRABLE DRUG: Performed by: HOSPITALIST

## 2024-07-01 PROCEDURE — 99223 1ST HOSP IP/OBS HIGH 75: CPT | Performed by: HOSPITALIST

## 2024-07-01 PROCEDURE — 1123F ACP DISCUSS/DSCN MKR DOCD: CPT | Performed by: HOSPITALIST

## 2024-07-01 RX ORDER — FUROSEMIDE 20 MG/1
20 TABLET ORAL DAILY
Status: DISCONTINUED | OUTPATIENT
Start: 2024-07-01 | End: 2024-07-09 | Stop reason: HOSPADM

## 2024-07-01 RX ORDER — DEXTROSE 40 %
15-30 GEL (GRAM) ORAL AS NEEDED
Status: DISCONTINUED | OUTPATIENT
Start: 2024-07-01 | End: 2024-07-09 | Stop reason: HOSPADM

## 2024-07-01 RX ORDER — IBUPROFEN 200 MG
16-32 TABLET ORAL AS NEEDED
Status: DISCONTINUED | OUTPATIENT
Start: 2024-07-01 | End: 2024-07-09 | Stop reason: HOSPADM

## 2024-07-01 RX ORDER — METOPROLOL SUCCINATE 25 MG/1
100 TABLET, EXTENDED RELEASE ORAL DAILY
Status: DISCONTINUED | OUTPATIENT
Start: 2024-07-01 | End: 2024-07-01

## 2024-07-01 RX ORDER — IBUPROFEN/PSEUDOEPHEDRINE HCL 200MG-30MG
3 TABLET ORAL NIGHTLY PRN
Status: DISCONTINUED | OUTPATIENT
Start: 2024-07-01 | End: 2024-07-09 | Stop reason: HOSPADM

## 2024-07-01 RX ORDER — NITROGLYCERIN 0.4 MG/1
0.4 TABLET SUBLINGUAL EVERY 5 MIN PRN
Status: DISCONTINUED | OUTPATIENT
Start: 2024-07-01 | End: 2024-07-09 | Stop reason: HOSPADM

## 2024-07-01 RX ORDER — CYANOCOBALAMIN 1000 UG/ML
1000 INJECTION, SOLUTION INTRAMUSCULAR; SUBCUTANEOUS
Status: DISCONTINUED | OUTPATIENT
Start: 2024-07-01 | End: 2024-07-09 | Stop reason: HOSPADM

## 2024-07-01 RX ORDER — POLYETHYLENE GLYCOL 3350 17 G/17G
17 POWDER, FOR SOLUTION ORAL DAILY PRN
Status: DISCONTINUED | OUTPATIENT
Start: 2024-07-01 | End: 2024-07-05

## 2024-07-01 RX ORDER — ALBUTEROL SULFATE 90 UG/1
2 INHALANT RESPIRATORY (INHALATION) EVERY 6 HOURS PRN
Status: DISCONTINUED | OUTPATIENT
Start: 2024-07-01 | End: 2024-07-09 | Stop reason: HOSPADM

## 2024-07-01 RX ORDER — DEXTROSE 40 %
15-30 GEL (GRAM) ORAL AS NEEDED
Status: DISCONTINUED | OUTPATIENT
Start: 2024-07-01 | End: 2024-07-01

## 2024-07-01 RX ORDER — ACETAMINOPHEN 325 MG/1
650 TABLET ORAL EVERY 4 HOURS PRN
Status: DISCONTINUED | OUTPATIENT
Start: 2024-07-01 | End: 2024-07-09 | Stop reason: HOSPADM

## 2024-07-01 RX ORDER — DEXTROSE 50 % IN WATER (D50W) INTRAVENOUS SYRINGE
25 AS NEEDED
Status: DISCONTINUED | OUTPATIENT
Start: 2024-07-01 | End: 2024-07-09 | Stop reason: HOSPADM

## 2024-07-01 RX ORDER — ATORVASTATIN CALCIUM 20 MG/1
20 TABLET, FILM COATED ORAL
Status: DISCONTINUED | OUTPATIENT
Start: 2024-07-01 | End: 2024-07-09 | Stop reason: HOSPADM

## 2024-07-01 RX ORDER — ACETAMINOPHEN 325 MG/1
650 TABLET ORAL EVERY 4 HOURS PRN
Status: DISCONTINUED | OUTPATIENT
Start: 2024-07-01 | End: 2024-07-01

## 2024-07-01 RX ORDER — IBUPROFEN 200 MG
16-32 TABLET ORAL AS NEEDED
Status: DISCONTINUED | OUTPATIENT
Start: 2024-07-01 | End: 2024-07-01

## 2024-07-01 RX ORDER — PANTOPRAZOLE SODIUM 40 MG/1
40 TABLET, DELAYED RELEASE ORAL NIGHTLY
Status: DISCONTINUED | OUTPATIENT
Start: 2024-07-01 | End: 2024-07-09 | Stop reason: HOSPADM

## 2024-07-01 RX ORDER — DEXTROSE 50 % IN WATER (D50W) INTRAVENOUS SYRINGE
25 AS NEEDED
Status: DISCONTINUED | OUTPATIENT
Start: 2024-07-01 | End: 2024-07-01

## 2024-07-01 RX ORDER — METOPROLOL SUCCINATE 100 MG/1
100 TABLET, EXTENDED RELEASE ORAL DAILY
Status: DISCONTINUED | OUTPATIENT
Start: 2024-07-02 | End: 2024-07-09 | Stop reason: HOSPADM

## 2024-07-01 RX ADMIN — ATORVASTATIN CALCIUM 20 MG: 20 TABLET, FILM COATED ORAL at 17:44

## 2024-07-01 RX ADMIN — CYANOCOBALAMIN 1000 MCG: 1000 INJECTION INTRAMUSCULAR; SUBCUTANEOUS at 21:14

## 2024-07-01 RX ADMIN — FUROSEMIDE 20 MG: 20 TABLET ORAL at 17:44

## 2024-07-01 RX ADMIN — PANTOPRAZOLE SODIUM 40 MG: 40 TABLET, DELAYED RELEASE ORAL at 21:13

## 2024-07-01 ASSESSMENT — COGNITIVE AND FUNCTIONAL STATUS - GENERAL
DO YOU HAVE SERIOUS DIFFICULTY WALKING OR CLIMBING STAIRS: NO
WALKING IN HOSPITAL ROOM: 4 - NONE
MOVING TO AND FROM BED TO CHAIR: 4 - NONE
STANDING UP FROM CHAIR USING ARMS: 4 - NONE
STANDING UP FROM CHAIR USING ARMS: 4 - NONE
CLIMB 3 TO 5 STEPS WITH RAILING: 4 - NONE
MOVING TO AND FROM BED TO CHAIR: 4 - NONE
CLIMB 3 TO 5 STEPS WITH RAILING: 4 - NONE
WALKING IN HOSPITAL ROOM: 4 - NONE

## 2024-07-01 ASSESSMENT — ENCOUNTER SYMPTOMS
VOMITING: 0
NAUSEA: 0
NUMBNESS: 0
WEAKNESS: 0
ABDOMINAL PAIN: 0
SHORTNESS OF BREATH: 1
FEVER: 0

## 2024-07-01 NOTE — ASSESSMENT & PLAN NOTE
In 2010 per patient, cared for at [place]   S/p colostomy, chemo and radiation   With recurrent exudative pleural effusion and CT scan findings concern for malignancy   VATS/pleural biopsy and Pleurx placement 7/8... Will follow-up with CT surgery and oncology outpatient to go over results and management of the Pleurx

## 2024-07-01 NOTE — PLAN OF CARE
Plan of Care Review  Progress: improving  Outcome Evaluation: No acute events, Patient eating dinner, offer no c/o, mild pleuric pain, repositioned for comfort and pillow support, wallet and money sent to security safe,7/2  IR for possible thoracentesis, patient independent with colostomy, call bell in reach, will continue with poc.

## 2024-07-01 NOTE — ED PROVIDER NOTES
Emergency Medicine Note  HPI   HISTORY OF PRESENT ILLNESS     Patient history of colon cancer colostomy DVT on Xarelto complains of worsening shortness of breath right-sided chest pain.  Patient states he is here few weeks ago had fluid drained.  Symptoms are getting worse.  He is having difficulty getting a full satisfying breath with pain.  Patient states has had no fever or chills.  Etiology of the pleural effusion is still under investigation per patient report..  Patient denies any abdominal pain denies any nauseous or vomiting.  Denies any recent fall injury or trauma.  Patient states that symptoms continued worsening came to the ER for further evaluation.            Patient History   PAST HISTORY     Reviewed from Nursing Triage:  Meds  Problems       Past Medical History:   Diagnosis Date    Colon cancer (CMS/HCC)     Coronary artery disease     DVT (deep venous thrombosis) (CMS/HCC)     Hypertension     Lipid disorder        Past Surgical History:   Procedure Laterality Date    COLOSTOMY      HERNIA REPAIR         Family History   Problem Relation Age of Onset    Heart disease Biological Mother        Social History     Tobacco Use    Smoking status: Never    Smokeless tobacco: Never   Substance Use Topics    Alcohol use: Not Currently    Drug use: Never         Review of Systems   REVIEW OF SYSTEMS     Review of Systems   Constitutional:  Negative for fever.   Respiratory:  Positive for shortness of breath.    Cardiovascular:  Positive for chest pain.   Gastrointestinal:  Negative for abdominal pain, nausea and vomiting.   Neurological:  Negative for weakness and numbness.         VITALS     ED Vitals      Date/Time Temp Pulse Resp BP SpO2 [facility]   07/01/24 1623 37.1 °C (98.7 °F) 86 18 133/75 92 % BES   07/01/24 1430 -- 84 18 140/85 94 % JR   07/01/24 1227 -- 84 20 113/66 93 % TR   07/01/24 1209 -- 85 -- -- -- DFI   07/01/24 1119 36.9 °C (98.4 °F) 101 20 143/84 95 % JLG          Pulse Ox %: 95 % (07/01/24  1216)  Pulse Ox Interpretation: Normal (07/01/24 1216)  Heart Rate: 85 (07/01/24 1216)  Rhythm Strip Interpretation: Normal Sinus Rhythm (07/01/24 1216)     Physical Exam   PHYSICAL EXAM     Physical Exam  Vitals and nursing note reviewed.   HENT:      Head: Normocephalic and atraumatic.   Eyes:      Conjunctiva/sclera: Conjunctivae normal.   Cardiovascular:      Rate and Rhythm: Normal rate and regular rhythm.   Pulmonary:      Comments: Right-sided posterior lung fields diminished breath sounds  Abdominal:      General: Bowel sounds are normal.      Palpations: Abdomen is soft.      Tenderness: There is no abdominal tenderness. There is no guarding.   Musculoskeletal:      Right lower leg: No edema.      Left lower leg: No edema.   Neurological:      General: No focal deficit present.      Mental Status: He is alert and oriented to person, place, and time.   Psychiatric:         Mood and Affect: Mood normal.           PROCEDURES     Procedures     DATA     Results       Procedure Component Value Units Date/Time    B-type natriuretic peptide [[id number]]  (Abnormal) Collected: 07/01/24 1123    Specimen: Blood, Venous Updated: 07/01/24 1303      pg/mL     Protime-INR [[id number]]  (Abnormal) Collected: 07/01/24 1123    Specimen: Blood, Venous Updated: 07/01/24 1236     PT 20.8 sec      INR 1.8     Comment: Moderate Intensity Anticoagulation = 2.0 to 3.0, High Intensity = 2.5 to 3.5       PTT [[id number]]  (Abnormal) Collected: 07/01/24 1123    Specimen: Blood, Venous Updated: 07/01/24 1236     PTT 37 sec      Comment: The Standard Therapeutic Range for Heparin is 74 to 106 seconds.       HS Troponin (with 2 hour reflex) [[id number]]  (Normal) Collected: 07/01/24 1123    Specimen: Blood, Venous Updated: 07/01/24 1208     High Sens Troponin I 14.4 pg/mL     Comprehensive metabolic panel [[id number]]  (Abnormal) Collected: 07/01/24 1123    Specimen: Blood, Venous Updated: 07/01/24 1203     Sodium 135 mEQ/L       Potassium 3.8 mEQ/L      Comment: Results obtained on plasma. Plasma Potassium values may be up to 0.4 mEQ/L less than serum values. The differences may be greater for patients with high platelet or white cell counts.        Chloride 100 mEQ/L      CO2 27 mEQ/L      BUN 14 mg/dL      Creatinine 0.9 mg/dL      Glucose 132 mg/dL      Calcium 9.4 mg/dL      AST (SGOT) 17 IU/L      ALT (SGPT) 15 IU/L      Alkaline Phosphatase 102 IU/L      Total Protein 6.9 g/dL      Comment: Test performed on plasma which typically contains approximately 0.4 g/dL more protein than serum.        Albumin 3.5 g/dL      Bilirubin, Total 0.9 mg/dL      eGFR >60.0 mL/min/1.73m*2      Comment: Calculation based on the Chronic Kidney Disease Epidemiology Collaboration (CKD-EPI) equation refit without adjustment for race.        Anion Gap 8 mEQ/L     Lipase [[id number]]  (Normal) Collected: 07/01/24 1123    Specimen: Blood, Venous Updated: 07/01/24 1203     Lipase 74 U/L     CBC and differential [[id number]]  (Abnormal) Collected: 07/01/24 1123    Specimen: Blood, Venous Updated: 07/01/24 1141     WBC 10.17 K/uL      RBC 4.58 M/uL      Hemoglobin 13.5 g/dL      Hematocrit 41.2 %      MCV 90.0 fL      MCH 29.5 pg      MCHC 32.8 g/dL      RDW 13.8 %      Platelets 307 K/uL      MPV 9.4 fL      Differential Type Auto     nRBC 0.0 %      Immature Granulocytes 0.3 %      Neutrophils 76.5 %      Lymphocytes 7.0 %      Monocytes 14.8 %      Eosinophils 0.9 %      Basophils 0.5 %      Immature Granulocytes, Absolute 0.03 K/uL      Neutrophils, Absolute 7.78 K/uL      Lymphocytes, Absolute 0.71 K/uL      Monocytes, Absolute 1.51 K/uL      Eosinophils, Absolute 0.09 K/uL      Basophils, Absolute 0.05 K/uL     Kimberly Draw Panel [[id number]] Collected: 07/01/24 1123    Specimen: Blood, Venous Updated: 07/01/24 1138    Narrative:      The following orders were created for panel order Kimberly Draw Panel.  Procedure                               Abnormality          Status                     ---------                               -----------         ------                     LAUREN POND[[id number]]                                  In process                   Please view results for these tests on the individual orders.    LAUREN POND [[id number]] Collected: 07/01/24 1123    Specimen: Blood, Venous Updated: 07/01/24 1138            Imaging Results              X-RAY CHEST 2 VIEWS (Final result)  Result time 07/01/24 11:53:57      Final result                   Impression:    IMPRESSION: Large right pleural effusion.    COMMENT: PA and lateral radiographs the chest reveal a large right pleural  effusion. Left lung is well-aerated. Cardiac silhouette is unchanged compared to  6/17/2024.               Narrative:    CLINICAL HISTORY: Chest pain.                                      ECG 12 lead   Independent Interpretation by ED Provider   Sinus 93 bpm first-degree block nonspecific ST-T wave changes EKG read by attending          Scoring tools                                  ED Course & MDM   MDM / ED COURSE / CLINICAL IMPRESSION / DISPO     Medical Decision Making  Problems Addressed:  Chest pain, unspecified type: acute illness or injury  Dyspnea, unspecified type: acute illness or injury  Pleural effusion on right: acute illness or injury    Amount and/or Complexity of Data Reviewed  Labs: ordered.  Radiology: ordered. Decision-making details documented in ED Course.  ECG/medicine tests: ordered and independent interpretation performed.    Risk  Decision regarding hospitalization.        ED Course as of 07/01/24 1714   Mon Jul 01, 2024   1142 EKG was interpreted by ED physician:  Sinus rhythm with first-degree AV block at 93 bpm, normal axis, no acute ischemic changes, QTc 452. [MONICA]   1146 \ [JR]   1147 DISCHARGE NOT Assessment/Plan: 6/19/24     Right pleural effusion.  Acute.  Noted on CT.  S/p right thoracentesis.  Lights criteria shows exudative fluid.   Evaluated by ID who did not feel this is infectious in nature.  Potentially attributed to underlying malignancy. Cytology pending. Pulmonology outpatient. Pending cytology with PCP, patient may require outpatient hematology/oncology.      Rest of care as per TOMER documentation     I have spent more than 55 minutes performing the evaluation and patient discussion, physical examination, entering orders, documenting, providing counseling and education, independently reviewing charting and studies, communicating results, discussing with house staff including specialists and nursing, and coordinating care.        Chad Schuler MD  06/19/24      [JR]   1224 CXR w/ large right pleural effusion noted, no pneumothorax.  [MONICA]   1245 X-RAY CHEST 2 VIEWS  Reading Physician Reading Date Result Priority  Quentin Ritter MD  [phone] 7/1/2024     Narrative & Impression  CLINICAL HISTORY: Chest pain.     --  IMPRESSION: Large right pleural effusion.     COMMENT: PA and lateral radiographs the chest reveal a large right pleural  effusion. Left lung is well-aerated. Cardiac silhouette is unchanged compared to  6/17/2024.        Specimen Collected: 07/01/24 11:53 Last Resulted: 07/01/24 11:53      Order Details        View Encounter        Lab and Collection Details        Routing        Result History    View All Conversations on this Encounter         [JR]   4652 Update patient regarding diagnosis and plan patient verbalized understanding.  Patient states he is full code [JR]      ED Course User Index  [MONICA] Gm Flores,   [JR] Ricardo Leroy PA C     Clinical Impression      Pleural effusion on right   Chest pain, unspecified type   Dyspnea, unspecified type     _________________       ED Disposition   Admit / Observation                       Ricardo Leroy PA C  07/01/24 2021

## 2024-07-01 NOTE — ASSESSMENT & PLAN NOTE
6/18 CAT scan chest revealed pulmonary nodules  CT chest 7/2 showing stable pulm nodules, largest JESSE up to 6mm   Follow-up outpatient with PCP and oncology

## 2024-07-01 NOTE — PLAN OF CARE
Care Coordination Admission Assessment Note    General Information:  Readmission Within the last 30 days: current reason for admission unrelated to previous admission  Does patient have a : No  Patient-Specific Goals (include timeframe): Safe and sustainable discharge when medically stable.    Living Arrangements:  Arrived From: home  Current Living Arrangements: home  People in Home: friend(s)  Home Accessibility:    Living Arrangement Comments: Mr. Arredondo resides with several others who belong to the same [employer] order.  He has known many of them for over 50 years.    Housing Stability and Utility Access (SDOH):  In the last 12 months, was there a time when you were not able to pay the mortgage or rent on time?: No  In the last 12 months, how many places have you lived?: 1  In the last 12 months, was there a time when you did not have a steady place to sleep or slept in a shelter (including now)?: No  In the past 12 months has the electric, gas, oil, or water company threatened to shut off services in your home?: No    Functional Status Prior to Admission:   Assistive Device/Animal Currently Used at Home: grab bar, shower chair  Functional Status Comments: Mr. Arredondo is independent with ADLs and mobility.  He does not use an assistive device to support his ambulation  IADL Comments:       Supports and Services:  Current Outpatient/Agency/Support Group: none  Type of Current Home Care Services:    History of home care episode or rehab stay: None    Discharge Needs Assessment:   Concerns to be Addressed: denies needs/concerns at this time, care coordination/care conferences, discharge planning  Current Discharge Risk: chronically ill  Anticipated Changes Related to Illness: none    Patient/Family Anticipated Discharge Plan:  Patient/Family Anticipates Transition To: home  Patient/Family Anticipated Services at Transition: none    Connection to Community  Not applicable    Patient Choice:    Offered/Gave Vendor List: other (see comments)  Patient's Choice of Community Agency(s): NA as no discharge planning referrals have been initiated at this time as the medical plan of care is pending.       Anticipated Discharge Plan:  Met with patient. Provided education and contact information for Care Coordination services.: yes  Anticipated Discharge Disposition: home without assistance or services     Transportation Needs (SDOH):  Transportation Concerns: none  Transportation Anticipated: family or friend will provide  Is Out of Hospital DNR needed at discharge?: no    In the past 12 months, has lack of transportation kept you from medical appointments or from getting medications?: No  In the past 12 months, has lack of transportation kept you from meetings, work, or from getting things needed for daily living?: No    Concerns - comments: Mr Arredondo has no specific concerns in advance of discharge home.  He is hopeful to feel better once the fluid is removed from his lung.    I have reviewed the interdisciplinary progress notes to date.      A Care Coordination Consult has been ordered by the healthcare team at the time of this assessment.  The consultation order requests interventions related to ongoing discharge planning.      In an effort to support the ongoing healthcare needs of our patients, a Care Coordination admission assessment is attempted to be completed for each patient who is admitted to the acute care setting.  A Care Coordination admission assessment was completed at the emergency department bedside while patient awaits admission to [facility] ([facility]).  Assessment was completed in collaboration with Mr Arredondo.      Patient presents to the [facility] emergency department for shortness of breathe.  Patient has had 0 emergency department visits and 1 acute care hospitalizations in the last 30 days.  He was admitted to [facility] 06/15-06/19/2024 for similar health concerns and discharged to the home  setting.  He shares that he was feeling well for some time, but then could feel the fluid recollecting and then once again began to have difficulties with breathing.        Initiation of Discharge Planning Referrals:     No discharge planning referrals have been initiated at the completion of the admission assessment as the medical needs of the patient after discharge have not yet been established as the plan of care is still being developed.  Mr. Arredondo shares that he plans to return home at discharge.  Home healthcare can be discussed as the medical plan of care is established to determine if there is a specific skilled or therapy need to be addressed.  He does not anticipate this but would be agreeable to further conversation should his healthcare needs change.  Mr. Arredondo is independent at baseline and anticipates this being the same once the fluid is removed from his lung again.  Friends will assist with transportation at discharge.      Demographic information, insurance, home address, pharmacy and emergency contacts confirmed.  Mr Arredondo has recently been provided with information for PCP providers.  He does not have a PCP at this time and does not need any additional information regarding such.      Care Coordination Plan of Care:  Care Coordination will continue to be available throughout this medical encounter for discharge planning as indicated to support a safe and sustainable discharge plan that aligns with the medical plan of care.  Ongoing discharge planning needs have been identified pending the outcome of the medical plan of care and patient response to treatment.      Please do not hesitate to reach out to the assigned Care Coordinator on the unit in which the patient is receiving care for additional intervention.      Dispo:  Home w/friends

## 2024-07-01 NOTE — ED ATTESTATION NOTE
I have personally seen and examined Dave Arredondo.  I was involved in the care and medical decision making for this patient.    I reviewed and agree with physician assistant / nurse practitioner’s assessment and plan of care; any exceptions are documented below.      My focused history, examination, assessment and plan of care of Dave Arredondo is as follows:      Focused Physical Exam:  Physical Exam  Constitutional:       General: He is not in acute distress.     Appearance: He is not ill-appearing or diaphoretic.   Cardiovascular:      Rate and Rhythm: Normal rate and regular rhythm.      Heart sounds: No murmur heard.  Pulmonary:      Effort: Pulmonary effort is normal. No tachypnea, accessory muscle usage or respiratory distress.      Comments: Diminished breath sounds right side  Skin:     General: Skin is warm and dry.   Neurological:      General: No focal deficit present.      Mental Status: He is alert and oriented to person, place, and time.             Assessment / Plan:  See ED course      Medical Decision Making  Amount and/or Complexity of Data Reviewed  Labs: ordered.  Radiology: ordered. Decision-making details documented in ED Course.  ECG/medicine tests: ordered and independent interpretation performed.    Risk  Decision regarding hospitalization.              Gm Flores,   07/01/24 1536

## 2024-07-01 NOTE — ASSESSMENT & PLAN NOTE
Reason for presentation.  Presenting as shortness of breath  S/p thoracentesis x 2 with negative cytology   S/p pigtail placement 7/4  and TPA/dornase x 6 doses with follow up CT scan on 7/7 showing decrease in size of effusion, small R hydropneumothorax noted.   S/p VATS/pleural biopsy and Pleurex placement on 7/8

## 2024-07-01 NOTE — CONSULTS
[facility] Pulmonary, Critical Care and Sleep Consult Note       SUBJECTIVE:  Mr. Dave Arredondo is an 89 yo M with a PMHx of HTN, HLD and CAD who presented on 71/24 with worsening shortness of breath and right-sided pleuritic chest pain.  He was recently hospitalized for similar symptoms and was noted to have a right pleural effusion for which he underwent thoracentesis 6/17/24. 1.2L of pleural fluid was drained. The pleural fluid was exudative with negative cytology and culture. The patient symptoms improved following drainage and he was discharged home on 6/19/24. On arrival to the hospital this time he was noted to be afebrile, hemodynamically stable and saturating well on room air. Initial laboratory analysis was notable for WBC 10.17, eos 90, Hb 13.5, HCO3 14, Cr 0.9, hsTN 14.4 and pro-. A CXR was obtained and showed a large R pleural effusion.     Outside records reviewed.    Medical History:   Past Medical History:   Diagnosis Date    Colon cancer (CMS/HCC)     Coronary artery disease     DVT (deep venous thrombosis) (CMS/[facility])     Hypertension     Lipid disorder        Surgical History:   Past Surgical History:   Procedure Laterality Date    COLOSTOMY      HERNIA REPAIR         Allergies: Patient has no known allergies.    Med List Reviewed.    Social History:   Social History     Socioeconomic History    Marital status:      Spouse name: None    Number of children: None    Years of education: None    Highest education level: None   Tobacco Use    Smoking status: Never    Smokeless tobacco: Never   Substance and Sexual Activity    Alcohol use: Not Currently    Drug use: Never    Sexual activity: Defer   Social History Narrative    Lives in the [facility]      Social Determinants of Health     Food Insecurity: No Food Insecurity (7/1/2024)    Hunger Vital Sign     Worried About Running Out of Food in the Last Year: Never true     Ran Out of Food in the Last Year: Never  true   Transportation Needs: No Transportation Needs (7/1/2024)    PRAPARE - Transportation     Lack of Transportation (Medical): No     Lack of Transportation (Non-Medical): No   Housing Stability: Low Risk  (7/1/2024)    Housing Stability Vital Sign     Unable to Pay for Housing in the Last Year: No     Number of Places Lived in the Last Year: 1     Unstable Housing in the Last Year: No       Family History:   Family History   Problem Relation Age of Onset    Heart disease Biological Mother        Review of Systems  All other systems reviewed and negative except as noted in the HPI.    Vital signs in last 24 hours:  Temp:  [36.9 °C (98.4 °F)-37.1 °C (98.7 °F)] 37.1 °C (98.7 °F)  Heart Rate:  [] 86  Resp:  [18-20] 18  BP: (113-143)/(66-85) 133/75    OBJECTIVE    Physical Exam:  Gen: WDWN, in NAD, AAOx3, pleasant  HEENT: PERRLA, neck supple, no LAD  CV: RRR, +s1/s2, no M/R/G  Pulm: CTA b/l without W/R/R  GI: abd soft, NT/ND, normoactive BS  Extremities: no LE edema   Skin: clean, dry and intact without evidence of break down   Neuro: CN II-XII grossly in tact, no focal deficits     Labs:  I have reviewed the patient's pertinent labs.     Imaging:  I have independently reviewed the patient's pertinent imaging for this hospital visit.   X-RAY CHEST 2 VIEWS    Result Date: 7/1/2024  IMPRESSION: Large right pleural effusion. COMMENT: PA and lateral radiographs the chest reveal a large right pleural effusion. Left lung is well-aerated. Cardiac silhouette is unchanged compared to 6/17/2024.    CT CHEST WITHOUT IV CONTRAST    Result Date: 6/18/2024  IMPRESSION: 1.  Moderate pleural effusion, decreased from the prior CT. 2.  Stable pulmonary nodules.    IR THORACENTESIS    Result Date: 6/17/2024  IMPRESSION: Successful ultrasound-guided right-sided thoracentesis with 1.2 L pleural fluid removed and sent to the lab.  All components of the time-out, debriefing, and handling of the specimen were conducted as per the ASAP  Specimen Protocol. I certify that I have personally reviewed this examination and agree with Maria Antonia Bradley's report. Cj Yen M.D.    X-RAY CHEST 1 VIEW    Result Date: 6/17/2024  IMPRESSION: No pneumothorax.  Small right pleural effusion and right basilar airspace opacity. COMMENT: Comparison: Chest x-ray 6/15/2024. Technique: A single frontal AP portable upright projection of the chest was obtained. FINDINGS: There is a small right pleural effusion with right basilar airspace opacity. The left lung is clear.  There is no pneumothorax.  The cardiomediastinal silhouette is unchanged.  The upper abdomen is unremarkable.  There is no acute osseous abnormality.     Ultrasound venous arm right    Result Date: 6/15/2024  IMPRESSION: No evidence of right upper extremity deep vein thrombosis.     CT ANGIOGRAPHY CHEST PULMONARY EMBOLISM WITH IV CONTRAST    Result Date: 6/15/2024  IMPRESSION: development of a large right pleural effusion with right lower lobe collapse and partial atelectasis of the right middle and upper lobes centrally. Scattered small pulmonary nodules are again seen less than 6 mm the largest is linearly oriented in the left upper lobe. No CT evidence for proximal or segmental pulmonary arterial embolism.     X-RAY CHEST 2 VIEWS    Result Date: 6/15/2024  IMPRESSION: moderate right pleural effusion with basilar airspace opacity.  Fluid is likely to accurately into the fissures on the right as well.       Assessment and Plan:  Mr. Dave Arredondo is an 87 yo M with a PMHx of rectal cancer s/p resection, chemotherapy and ostomy placement, HTN, HLD and CAD who presented on 71/24 with worsening shortness of breath and right-sided pleuritic chest pain. He was hospitalized 6/15/24 to /19/24 and was found to have a large right pleural effusion for which 1.2 L was drained during thoracentesis.  Pleural fluid analysis was consistent with an exudate with negative cytology and culture.  He now presents with  worsening dyspnea and a large recurrent right pleural effusion.    #Recurrent exudative pleural effusion  #Dyspnea     -Recommend IR consult for diagnostic R thoracentesis  -Would recommend a non-con CT chest following thoracentesis to evaluate the lung parenchyma when full expanded  -Pleural fluid analysis 6/17 was notable for an exudate with negative cytology and culture   -A malignant pleural effusion due to recurrent rectal cancer certainly needs to be excluded   -If the pleural fluid is again exudative with negative cytology and culture, and the CT chest is unrevealing may need to consider thoracic surgery consult for consideration of VATS pleural biopsy although he may be a poor candidate given his age and underlying comorbidities    -Please call with any questions or concerns you may have        Parker Post, DO  Pulmonary and Critical Care Medicine

## 2024-07-01 NOTE — ASSESSMENT & PLAN NOTE
Patient with presumed CAD based on abnormal stress test in 2018, declined cardiac cath   Continue statin therapy

## 2024-07-02 ENCOUNTER — APPOINTMENT (INPATIENT)
Dept: RADIOLOGY | Facility: HOSPITAL | Age: 89
DRG: 167 | End: 2024-07-02
Attending: HOSPITALIST
Payer: COMMERCIAL

## 2024-07-02 ENCOUNTER — APPOINTMENT (INPATIENT)
Dept: RADIOLOGY | Facility: HOSPITAL | Age: 89
DRG: 167 | End: 2024-07-02
Attending: PHYSICIAN ASSISTANT
Payer: COMMERCIAL

## 2024-07-02 PROBLEM — R63.0 ANOREXIA: Status: ACTIVE | Noted: 2024-07-02

## 2024-07-02 PROBLEM — D64.9 ANEMIA: Status: ACTIVE | Noted: 2024-07-02

## 2024-07-02 LAB
ANION GAP SERPL CALC-SCNC: 7 MEQ/L (ref 3–15)
APPEARANCE FLD: ABNORMAL
BODY FLD TYPE: ABNORMAL
BUN SERPL-MCNC: 13 MG/DL (ref 7–25)
CALCIUM SERPL-MCNC: 9.1 MG/DL (ref 8.6–10.3)
CEA SERPL-MCNC: 3.1 NG/ML
CHLORIDE SERPL-SCNC: 100 MEQ/L (ref 98–107)
CO2 SERPL-SCNC: 28 MEQ/L (ref 21–31)
COLOR FLD: ABNORMAL
CREAT SERPL-MCNC: 0.8 MG/DL (ref 0.7–1.3)
EGFRCR SERPLBLD CKD-EPI 2021: >60 ML/MIN/1.73M*2
EOSINOPHIL NFR FLD MANUAL: 1 %
ERYTHROCYTE [DISTWIDTH] IN BLOOD BY AUTOMATED COUNT: 13.9 % (ref 11.6–14.4)
FERRITIN SERPL-MCNC: 222 NG/ML (ref 24–250)
FOLATE SERPL-MCNC: 11.5 NG/ML
FUNGUS STAIN: NORMAL
GLUCOSE FLD-MCNC: 13 MG/DL
GLUCOSE SERPL-MCNC: 99 MG/DL (ref 70–99)
HCT VFR BLD AUTO: 39.1 % (ref 40.1–51)
HGB BLD-MCNC: 12.6 G/DL (ref 13.7–17.5)
INR PPP: 1.2
IRON SATN MFR SERPL: 17 % (ref 15–45)
IRON SERPL-MCNC: 40 UG/DL (ref 35–150)
LDH FLD L TO P-CCNC: 428 U/L
LDH SERPL L TO P-CCNC: 161 IU/L (ref 98–271)
LYMPHOCYTES NFR FLD MANUAL: 79 %
MCH RBC QN AUTO: 29 PG (ref 28–33.2)
MCHC RBC AUTO-ENTMCNC: 32.2 G/DL (ref 32.2–36.5)
MCV RBC AUTO: 90.1 FL (ref 83–98)
MONOS+MACROS NFR FLD MANUAL: 8 %
NEUTROPHILS NFR FLD MANUAL: 12 %
PDW BLD AUTO: 9.2 FL (ref 9.4–12.4)
PH FLD: 7.5 [PH]
PLATELET # BLD AUTO: 274 K/UL (ref 150–350)
POTASSIUM SERPL-SCNC: 3.9 MEQ/L (ref 3.5–5.1)
PROT FLD-MCNC: <3 G/DL
PROTHROMBIN TIME: 15.3 SEC (ref 12.2–14.5)
RBC # BLD AUTO: 4.34 M/UL (ref 4.5–5.8)
RBC # SNV: ABNORMAL CELLS/CU MM (ref 0–10000)
SODIUM SERPL-SCNC: 135 MEQ/L (ref 136–145)
SPECIMEN SOURCE: ABNORMAL
TIBC SERPL-MCNC: 232 UG/DL (ref 270–460)
UIBC SERPL-MCNC: 192 UG/DL (ref 180–360)
VIT B12 SERPL-MCNC: >1500 PG/ML (ref 180–914)
WBC # BLD AUTO: 8.82 K/UL (ref 3.8–10.5)
WBC # SNV AUTO: 413 CELLS/CU MM (ref 0–200)

## 2024-07-02 PROCEDURE — 99232 SBSQ HOSP IP/OBS MODERATE 35: CPT | Performed by: HOSPITALIST

## 2024-07-02 PROCEDURE — 82945 GLUCOSE OTHER FLUID: CPT | Performed by: HOSPITALIST

## 2024-07-02 PROCEDURE — 83986 ASSAY PH BODY FLUID NOS: CPT | Performed by: HOSPITALIST

## 2024-07-02 PROCEDURE — 89050 BODY FLUID CELL COUNT: CPT | Performed by: HOSPITALIST

## 2024-07-02 PROCEDURE — 82746 ASSAY OF FOLIC ACID SERUM: CPT | Performed by: NURSE PRACTITIONER

## 2024-07-02 PROCEDURE — 36415 COLL VENOUS BLD VENIPUNCTURE: CPT | Performed by: HOSPITALIST

## 2024-07-02 PROCEDURE — 83550 IRON BINDING TEST: CPT | Performed by: NURSE PRACTITIONER

## 2024-07-02 PROCEDURE — 63700000 HC SELF-ADMINISTRABLE DRUG

## 2024-07-02 PROCEDURE — 99221 1ST HOSP IP/OBS SF/LOW 40: CPT | Performed by: SURGERY

## 2024-07-02 PROCEDURE — C1729 CATH, DRAINAGE: HCPCS

## 2024-07-02 PROCEDURE — 83540 ASSAY OF IRON: CPT | Performed by: NURSE PRACTITIONER

## 2024-07-02 PROCEDURE — 87206 SMEAR FLUORESCENT/ACID STAI: CPT | Performed by: HOSPITALIST

## 2024-07-02 PROCEDURE — 88184 FLOWCYTOMETRY/ TC 1 MARKER: CPT | Performed by: HOSPITALIST

## 2024-07-02 PROCEDURE — 84157 ASSAY OF PROTEIN OTHER: CPT | Performed by: HOSPITALIST

## 2024-07-02 PROCEDURE — 85027 COMPLETE CBC AUTOMATED: CPT | Performed by: HOSPITALIST

## 2024-07-02 PROCEDURE — 0W993ZZ DRAINAGE OF RIGHT PLEURAL CAVITY, PERCUTANEOUS APPROACH: ICD-10-PCS | Performed by: RADIOLOGY

## 2024-07-02 PROCEDURE — 83615 LACTATE (LD) (LDH) ENZYME: CPT | Performed by: NURSE PRACTITIONER

## 2024-07-02 PROCEDURE — 36100345 IR THORACENTESIS

## 2024-07-02 PROCEDURE — 87116 MYCOBACTERIA CULTURE: CPT | Performed by: HOSPITALIST

## 2024-07-02 PROCEDURE — 63600000 HC DRUGS/DETAIL CODE: Mod: JZ | Performed by: PHYSICIAN ASSISTANT

## 2024-07-02 PROCEDURE — 63700000 HC SELF-ADMINISTRABLE DRUG: Performed by: HOSPITALIST

## 2024-07-02 PROCEDURE — 25000000 HC PHARMACY GENERAL: Performed by: PHYSICIAN ASSISTANT

## 2024-07-02 PROCEDURE — 88112 CYTOPATH CELL ENHANCE TECH: CPT | Performed by: HOSPITALIST

## 2024-07-02 PROCEDURE — 20600000 HC ROOM AND CARE INTERMEDIATE/TELEMETRY

## 2024-07-02 PROCEDURE — 27200000 HC STERILE SUPPLY

## 2024-07-02 PROCEDURE — 82728 ASSAY OF FERRITIN: CPT | Performed by: NURSE PRACTITIONER

## 2024-07-02 PROCEDURE — 88185 FLOWCYTOMETRY/TC ADD-ON: CPT | Performed by: HOSPITALIST

## 2024-07-02 PROCEDURE — 97162 PT EVAL MOD COMPLEX 30 MIN: CPT | Mod: GP

## 2024-07-02 PROCEDURE — 85610 PROTHROMBIN TIME: CPT | Performed by: HOSPITALIST

## 2024-07-02 PROCEDURE — 80048 BASIC METABOLIC PNL TOTAL CA: CPT | Performed by: HOSPITALIST

## 2024-07-02 PROCEDURE — 82378 CARCINOEMBRYONIC ANTIGEN: CPT | Performed by: NURSE PRACTITIONER

## 2024-07-02 PROCEDURE — 82607 VITAMIN B-12: CPT | Performed by: NURSE PRACTITIONER

## 2024-07-02 PROCEDURE — 83615 LACTATE (LD) (LDH) ENZYME: CPT | Performed by: HOSPITALIST

## 2024-07-02 PROCEDURE — 71250 CT THORAX DX C-: CPT

## 2024-07-02 PROCEDURE — 87102 FUNGUS ISOLATION CULTURE: CPT | Performed by: HOSPITALIST

## 2024-07-02 PROCEDURE — 87205 SMEAR GRAM STAIN: CPT | Performed by: HOSPITALIST

## 2024-07-02 RX ORDER — LIDOCAINE HYDROCHLORIDE 10 MG/ML
INJECTION, SOLUTION INFILTRATION; PERINEURAL
Status: COMPLETED | OUTPATIENT
Start: 2024-07-02 | End: 2024-07-02

## 2024-07-02 RX ORDER — ENOXAPARIN SODIUM 100 MG/ML
40 INJECTION SUBCUTANEOUS
Status: DISCONTINUED | OUTPATIENT
Start: 2024-07-02 | End: 2024-07-09 | Stop reason: HOSPADM

## 2024-07-02 RX ORDER — CALCIUM CARBONATE 200(500)MG
1000 TABLET,CHEWABLE ORAL 2 TIMES DAILY PRN
Status: DISCONTINUED | OUTPATIENT
Start: 2024-07-02 | End: 2024-07-09 | Stop reason: HOSPADM

## 2024-07-02 RX ADMIN — FUROSEMIDE 20 MG: 20 TABLET ORAL at 08:27

## 2024-07-02 RX ADMIN — ATORVASTATIN CALCIUM 20 MG: 20 TABLET, FILM COATED ORAL at 17:20

## 2024-07-02 RX ADMIN — PANTOPRAZOLE SODIUM 40 MG: 40 TABLET, DELAYED RELEASE ORAL at 21:23

## 2024-07-02 RX ADMIN — LIDOCAINE HYDROCHLORIDE 5 ML: 10 INJECTION, SOLUTION INFILTRATION; PERINEURAL at 09:25

## 2024-07-02 RX ADMIN — METOPROLOL SUCCINATE 100 MG: 100 TABLET, EXTENDED RELEASE ORAL at 08:27

## 2024-07-02 RX ADMIN — ENOXAPARIN SODIUM 40 MG: 40 INJECTION SUBCUTANEOUS at 17:22

## 2024-07-02 RX ADMIN — ACETAMINOPHEN 650 MG: 325 TABLET ORAL at 21:29

## 2024-07-02 ASSESSMENT — COGNITIVE AND FUNCTIONAL STATUS - GENERAL
STANDING UP FROM CHAIR USING ARMS: 4 - NONE
MOVING TO AND FROM BED TO CHAIR: 4 - NONE
MOVING TO AND FROM BED TO CHAIR: 4 - NONE
WALKING IN HOSPITAL ROOM: 4 - NONE
STANDING UP FROM CHAIR USING ARMS: 4 - NONE
AFFECT: WFL
CLIMB 3 TO 5 STEPS WITH RAILING: 4 - NONE
WALKING IN HOSPITAL ROOM: 4 - NONE
CLIMB 3 TO 5 STEPS WITH RAILING: 4 - NONE

## 2024-07-02 NOTE — OR SURGEON
Pre-Procedure patient identification:  I am the primary operating surgeon/proceduralist and I have reviewed the applicable pathology reports and radiology studies for this procedure. I have identified the patient and confirmed laterality is right on 07/02/24 at 9:15 AM BENJY Carrizales C

## 2024-07-02 NOTE — ASSESSMENT & PLAN NOTE
Normocytic anemia.  -Hemoglobin 13.5.  MCV 90.1.  Creatinine 0.9.  -Patient receives B12 injections every 30 days.  -Monitor. Hgb stable        Lab Results   Component Value Date    [id number] >1,500 (H) 07/02/2024     Lab Results   Component Value Date    FOLATE 11.5 07/02/2024     Lab Results   Component Value Date    IRON 40 07/02/2024    TIBC 232 (L) 07/02/2024    FERRITIN 222 07/02/2024     % sat 17

## 2024-07-02 NOTE — CONSULTS
Consult Note    Subjective     Dave Arredondo is a 88 year old man with history of rectal cancer s/p resection with ostomy, CAD, DVT/PE on Xarelto who initially presented in mid June with SOB and pleuritic chest pain. She had a thoracentesis at that time with removal of 1.2L exudative fluid, negative cytology and culture. He returned to the ED yesterday with worsening SOB and pleuritic chest pain, worse when lying on his side. He was again found to have a pleural effusion and is now s/p thoracentesis with 1.1L exudative fluid. Cytology and culture pending. CT obtained after thoracenitesis with moderate R loculated pleural effusion c/f malignant effusion in the setting of prior rectal cancer.     Outside records reviewed. Pertinent radiology results reviewed. Pertinent lab results reviewed.    Medical History:   Past Medical History:   Diagnosis Date    Colon cancer (CMS/HCC)     Coronary artery disease     DVT (deep venous thrombosis) (CMS/HCC)     Hypertension     Lipid disorder        Surgical History:   Past Surgical History:   Procedure Laterality Date    COLOSTOMY      HERNIA REPAIR         Allergies: Patient has no known allergies.    Current Inpatient Medications   Medication Dose Route Frequency Provider Last Rate Last Admin    acetaminophen (TYLENOL) tablet 650 mg  650 mg oral q4h PRN Lauren Man MD        albuterol HFA 90 mcg/actuation inhaler 2 puff  2 puff inhalation q6h PRN Lauren Man MD        atorvastatin (LIPITOR) tablet 20 mg  20 mg oral Daily (6p) Lauren Man MD   20 mg at 07/02/24 1720    calcium carbonate (TUMS) chewable tablet 1,000 mg  1,000 mg oral 2x daily PRN Kvng Valladares MD        cyanocobalamin (VITAMIN B-12) injection 1,000 mcg  1,000 mcg intramuscular q30 days Lauren Man MD   1,000 mcg at 07/01/24 2114    glucose chewable tablet 16-32 g of dextrose  16-32 g of dextrose oral PRN Lauren Man MD        Or    dextrose 40 % oral gel 15-30 g of dextrose   15-30 g of dextrose oral PRN Lauren Man MD        Or    glucagon (GLUCAGEN) injection 1 mg  1 mg intramuscular PRN Lauren Man MD        Or    dextrose 50 % in water (D50) injection 12.5 g  25 mL intravenous PRN Lauren Man MD        enoxaparin (LOVENOX) syringe 40 mg  40 mg subcutaneous Daily (6p) India Baxter PA C   40 mg at 07/02/24 1722    furosemide (LASIX) tablet 20 mg  20 mg oral Daily Lauren Man MD   20 mg at 07/02/24 0827    melatonin ODT 3 mg  3 mg oral Nightly PRN Dania Martínez CRNP        metoprolol succinate XL (TOPROL-XL) 24 hr ER tablet 100 mg  100 mg oral Daily Dania Martínez CRNP   100 mg at 07/02/24 0827    nitroglycerin (NITROSTAT) SL tablet 0.4 mg  0.4 mg sublingual q5 min PRN Lauren Man MD        pantoprazole (PROTONIX) tablet,delayed release (DR/EC) 40 mg  40 mg oral Nightly Lauren Man MD   40 mg at 07/01/24 2113    polyethylene glycol (MIRALAX) 17 gram packet 17 g  17 g oral Daily PRN Dania Martínez CRNP            Social History:   Social History     Socioeconomic History    Marital status:      Spouse name: None    Number of children: None    Years of education: None    Highest education level: None   Tobacco Use    Smoking status: Never    Smokeless tobacco: Never   Substance and Sexual Activity    Alcohol use: Not Currently    Drug use: Never    Sexual activity: Defer   Social History Narrative    Lives in the [facility]      Social Determinants of Health     Food Insecurity: No Food Insecurity (7/1/2024)    Hunger Vital Sign     Worried About Running Out of Food in the Last Year: Never true     Ran Out of Food in the Last Year: Never true   Transportation Needs: No Transportation Needs (7/1/2024)    PRAPARE - Transportation     Lack of Transportation (Medical): No     Lack of Transportation (Non-Medical): No   Housing Stability: Low Risk  (7/1/2024)    Housing Stability Vital Sign     Unable to  Pay for Housing in the Last Year: No     Number of Places Lived in the Last Year: 1     Unstable Housing in the Last Year: No       Family History:   Family History   Problem Relation Age of Onset    Heart disease Biological Mother        Review of Systems  Review of Systems   Cardiovascular:  Positive for chest pain.       Objective     Vital signs for last 24 hours:  Temp:  [36.1 °C (97 °F)-37.1 °C (98.8 °F)] 36.1 °C (97 °F)  Heart Rate:  [73-94] 78  Resp:  [16-20] 20  BP: (101-168)/(60-86) 116/66    Physicial Exam  Physical Exam  Constitutional:       General: He is not in acute distress.  Eyes:      General: No scleral icterus.     Conjunctiva/sclera: Conjunctivae normal.   Cardiovascular:      Rate and Rhythm: Normal rate and regular rhythm.      Pulses: Normal pulses.   Pulmonary:      Effort: Pulmonary effort is normal. No respiratory distress.   Skin:     General: Skin is warm and dry.   Neurological:      General: No focal deficit present.      Mental Status: He is alert.         Labs  Labs reviewed    Imaging  I have independently reviewed the pertinent imaging from the last 24 hrs.    ECG/Telemetry  I have independently reviewed the ECG. No significant findings.    Assessment/Plan     Dave Arredondo is a 88 year old man with history of rectal cancer s/p resection with ostomy, CAD, DVT/PE on Xarelto who presented with a recurrent loculated pleural effusion concerning for possible malignancy    Plan:  - No acute surgical intervention at this time, patient is stable with no acute respiratory distress  - Follow up pleural fluid culture and cytology  - Recommend IR pigtail placement followed by 6 doses of tPA/Dornase  - CT chest after 6th dose for re-evaluation of pleural effusion  - Rest of care as per primary    Michele Joseph M.D.  General Surgery PGY-2  [phone]

## 2024-07-02 NOTE — PLAN OF CARE
Problem: Adult Inpatient Plan of Care  Goal: Plan of Care Review  Flowsheets (Taken 7/2/2024 1407)  Progress: no change  Outcome Evaluation: Pt seen for MST score.  Plan of Care Reviewed With: patient     Pt in bed at time of visit s/p R thoracentesis with 1.1 L removed today.  Pt was admitted 6/15-6/19 and had R thoracentesis at that time with 1.2 L removed and cytology was negative.  Per pulmonary note, need to exclude malignant pleural effusion from recurrent rectal CA.  Per pt his appetite has been decreased for a few weeks, but he ate well today and is feeling better with the fluid removed.  Pt declined any supplements at this time, but is aware they are available.  Pt with no significant weight changes.    Goals:  Pt to meet >/= 75% of needs via PO    Recommendations:  Continue cardiac diet  Encourage PO intake  If intake <50% consider Ensure Compact BID  Monitor weight

## 2024-07-02 NOTE — PLAN OF CARE
Plan of Care Review  Plan of Care Reviewed With: patient  Progress: no change  Outcome Evaluation: Patient has no c/o pain. NSR with 1st deg AV block on monitor. Stand by OOB. Colostomy changed. Plan for IR for thoracentesis today.

## 2024-07-02 NOTE — ASSESSMENT & PLAN NOTE
LLE DVT and PE in 3/2019, on therapeutic dose Xarelto for 6 months then changed to prophylactic dose, then had evidence of DVT in 4/2020, unclear if acute DVT but anticoagulation was changed to Eliquis then resumed Xarelto.    -Xarelto on hold pending completion of invasive procedures  -Resume Xarelto 7/10 -- per Surgery  -DVT prophylactic dose enoxaparin 40 mg daily has been ordered in interim

## 2024-07-02 NOTE — ASSESSMENT & PLAN NOTE
Rectal cancer  -Patient was followed at [facility], T2 N1 M0 rectal cancer  -Neoadjuvant chemotherapy and radiotherapy followed by surgery (APR), then had multiple complications after the surgery, then recovered and had 4 months of adjuvant chemotherapy postoperatively and has not had known recurrence of his rectal cancer.    -Surveillance colonoscopy 8/11/2022 revealed hyperplastic polyp in the transverse colon with pathology revealing tubular adenoma.  -Ostomy functioning  -CEA 3.1

## 2024-07-02 NOTE — POST-PROCEDURE NOTE
Interventional Radiology Brief Postprocedure Note    Dave Arredondo     Attending: BENJY Carrizales MD    Assistant: none    Diagnosis: recurrent right pleural effusion    Description of procedure: right thora    Contrast: none     Anesthesia:  Local (Lidocaine)    Volume of Lidocaine Utilized (ml): 5     Medications: none     Complications: None      Estimated Blood Loss: Estimated Blood Loss: None    Anticoagulation: off Xarelto    Specimens: 1.1 L serosang fluid removed and sent to lab    Findings: successful right thora on multi-loculated effusion  Pt tolerated procedure well  Vs stable    To CT for chest    7/2/2024 9:50 AM

## 2024-07-02 NOTE — PROGRESS NOTES
Hospital Medicine     Daily Progress Note       SUBJECTIVE   Interval History: Patient seen and examined at bedside.  No acute events since presentation to the floor from emergency department.  Patient is reporting ongoing dyspnea on exertion and conversational dyspnea.  Has right-sided chest discomfort.  Reviewed plan for thoracentesis today, and CT scan post procedure.  Patient otherwise without acute complaint this morning.    Patient reports that he has no family locally, declined team calling any family members today to update.      OBJECTIVE      Vital signs in last 24 hours:  Temp:  [36.6 °C (97.9 °F)-37.1 °C (98.8 °F)] 36.6 °C (97.9 °F)  Heart Rate:  [] 94  Resp:  [16-20] 18  BP: (113-168)/(66-86) 139/71    Intake/Output Summary (Last 24 hours) at 7/2/2024 0936  Last data filed at 7/2/2024 0428  Gross per 24 hour   Intake --   Output 500 ml   Net -500 ml       PHYSICAL EXAMINATION      Physical Exam  Vitals and nursing note reviewed.   Constitutional:       General: He is not in acute distress.     Comments: Very pleasant elderly gentleman sitting up in bed   HENT:      Head: Normocephalic.      Mouth/Throat:      Mouth: Mucous membranes are moist.   Eyes:      Extraocular Movements: Extraocular movements intact.   Cardiovascular:      Rate and Rhythm: Normal rate and regular rhythm.   Pulmonary:      Comments: Breath sounds diminished at right lung base  On room air but with mild conversational dyspnea noted  Abdominal:      Palpations: Abdomen is soft.      Tenderness: There is no abdominal tenderness.      Comments: Colostomy LUQ    Musculoskeletal:      Right lower leg: No edema.      Left lower leg: No edema.   Skin:     General: Skin is warm and dry.   Neurological:      General: No focal deficit present.      Mental Status: He is alert and oriented to person, place, and time.            LINES, CATHETERS, DRAINS, AIRWAYS, AND WOUNDS   Lines, Drains, and Airways:  Wounds (agree with  documentation and present on admission):  Peripheral IV (Adult) 07/01/24 Left Antecubital (Active)   Number of days: 1       Colostomy Unknown LUQ (Active)   Number of days: 5212         Comments:    LABS / IMAGING / TELE          Labs  I have reviewed the patient's pertinent labs.  Significant abnormals are mild hyponatremia, normocytic anemia.    Results from last 7 days   Lab Units 07/02/24  0337 07/01/24  1123   WBC K/uL 8.82 10.17   HEMOGLOBIN g/dL 12.6* 13.5*   HEMATOCRIT % 39.1* 41.2   PLATELETS K/uL 274 307       Results from last 7 days   Lab Units 07/02/24  0337 07/01/24  1123   SODIUM mEQ/L 135* 135*   POTASSIUM mEQ/L 3.9 3.8   CHLORIDE mEQ/L 100 100   CO2 mEQ/L 28 27   BUN mg/dL 13 14   CREATININE mg/dL 0.8 0.9   CALCIUM mg/dL 9.1 9.4   ALBUMIN g/dL  --  3.5   BILIRUBIN TOTAL mg/dL  --  0.9   ALK PHOS IU/L  --  102   ALT IU/L  --  15   AST IU/L  --  17   GLUCOSE mg/dL 99 132*             Imaging  I have independently reviewed the pertinent imaging from the last 24 hrs.    Narrative & Impression   CLINICAL HISTORY: Chest pain.     --  IMPRESSION: Large right pleural effusion.     COMMENT: PA and lateral radiographs the chest reveal a large right pleural  effusion. Left lung is well-aerated. Cardiac silhouette is unchanged compared to  6/17/2024.       ECG/Telemetry  I have independently reviewed the telemetry. No events for the last 24 hours.    ASSESSMENT AND PLAN      * Pleural effusion on right  Assessment & Plan  S/p 6/17 R sided thoracentesis with 1.2 L removed, exudative. Culture and cytology negative   CXR on admission with large pleural effusion   Stable on RA but with MARY and mild conversational dyspnea     Holding xarelto for planned thoracentesis today- will send off fluid to lab   Pulmonary recommendations appreciated, will check noncontrast CT chest post IR thoracentesis   2MWT prior to d/c   IS, OOB       Pulmonary nodules  Assessment & Plan  6/18 CAT scan chest revealed pulmonary  nodules  Repeating CT chest post thora today   Heme/onc consulted       Right-sided chest pain  Assessment & Plan  Pleuritic in nature, suspect secondary to large R pleural effusion   No ischemic EKG changes  Monitor on telemetry       History of rectal cancer  Assessment & Plan  S/p colostomy, chemo  With recurrent exudative pleural effusion c/f malignancy related   Heme/onc consulted   Checking CT chest post thora today     DVT (deep venous thrombosis) (CMS/HCC)  Assessment & Plan  On Xarelto- holding for thoracentesis today     CAD (coronary artery disease)  Assessment & Plan  Patient with presumed CAD based on abnormal stress test in 2018, declined cardiac cath   Chest pain noted but is R sided and suspected 2/2 large pleural effusion   Troponin flat, monitoring on tele          VTE Assessment: [place] VTE Score: 4  VTE Prophylaxis:  Current anticoagulants:    None      Code Status: Full Code      Estimated Discharge Date: 7/3/2024   Disposition Planning: home when medically stable for d/c      BENJY Gonzalez  7/2/2024

## 2024-07-02 NOTE — H&P (VIEW-ONLY)
Consult Note    Subjective     Dave Arredondo is a 88 year old man with history of rectal cancer s/p resection with ostomy, CAD, DVT/PE on Xarelto who initially presented in mid June with SOB and pleuritic chest pain. She had a thoracentesis at that time with removal of 1.2L exudative fluid, negative cytology and culture. He returned to the ED yesterday with worsening SOB and pleuritic chest pain, worse when lying on his side. He was again found to have a pleural effusion and is now s/p thoracentesis with 1.1L exudative fluid. Cytology and culture pending. CT obtained after thoracenitesis with moderate R loculated pleural effusion c/f malignant effusion in the setting of prior rectal cancer.     Outside records reviewed. Pertinent radiology results reviewed. Pertinent lab results reviewed.    Medical History:   Past Medical History:   Diagnosis Date    Colon cancer (CMS/HCC)     Coronary artery disease     DVT (deep venous thrombosis) (CMS/HCC)     Hypertension     Lipid disorder        Surgical History:   Past Surgical History:   Procedure Laterality Date    COLOSTOMY      HERNIA REPAIR         Allergies: Patient has no known allergies.    Current Inpatient Medications   Medication Dose Route Frequency Provider Last Rate Last Admin    acetaminophen (TYLENOL) tablet 650 mg  650 mg oral q4h PRN Lauren Man MD        albuterol HFA 90 mcg/actuation inhaler 2 puff  2 puff inhalation q6h PRN Lauren Man MD        atorvastatin (LIPITOR) tablet 20 mg  20 mg oral Daily (6p) Lauren Man MD   20 mg at 07/02/24 1720    calcium carbonate (TUMS) chewable tablet 1,000 mg  1,000 mg oral 2x daily PRN Kvng Valladares MD        cyanocobalamin (VITAMIN B-12) injection 1,000 mcg  1,000 mcg intramuscular q30 days Lauren Man MD   1,000 mcg at 07/01/24 2114    glucose chewable tablet 16-32 g of dextrose  16-32 g of dextrose oral PRN Lauren Man MD        Or    dextrose 40 % oral gel 15-30 g of dextrose   15-30 g of dextrose oral PRN Lauren Man MD        Or    glucagon (GLUCAGEN) injection 1 mg  1 mg intramuscular PRN aLuren Man MD        Or    dextrose 50 % in water (D50) injection 12.5 g  25 mL intravenous PRN Lauren Man MD        enoxaparin (LOVENOX) syringe 40 mg  40 mg subcutaneous Daily (6p) India Baxter PA C   40 mg at 07/02/24 1722    furosemide (LASIX) tablet 20 mg  20 mg oral Daily Lauren Man MD   20 mg at 07/02/24 0827    melatonin ODT 3 mg  3 mg oral Nightly PRN Dania Martínez CRNP        metoprolol succinate XL (TOPROL-XL) 24 hr ER tablet 100 mg  100 mg oral Daily Dania Martínez CRNP   100 mg at 07/02/24 0827    nitroglycerin (NITROSTAT) SL tablet 0.4 mg  0.4 mg sublingual q5 min PRN Lauren Man MD        pantoprazole (PROTONIX) tablet,delayed release (DR/EC) 40 mg  40 mg oral Nightly Lauren Man MD   40 mg at 07/01/24 2113    polyethylene glycol (MIRALAX) 17 gram packet 17 g  17 g oral Daily PRN Dania Martínez CRNP            Social History:   Social History     Socioeconomic History    Marital status:      Spouse name: None    Number of children: None    Years of education: None    Highest education level: None   Tobacco Use    Smoking status: Never    Smokeless tobacco: Never   Substance and Sexual Activity    Alcohol use: Not Currently    Drug use: Never    Sexual activity: Defer   Social History Narrative    L[facility] in the [facility]      Social Determinants of Health     Food Insecurity: No Food Insecurity (7/1/2024)    Hunger Vital Sign     Worried About Running Out of Food in the Last Year: Never true     Ran Out of Food in the Last Year: Never true   Transportation Needs: No Transportation Needs (7/1/2024)    PRAPARE - Transportation     Lack of Transportation (Medical): No     Lack of Transportation (Non-Medical): No   Housing Stability: Low Risk  (7/1/2024)    Housing Stability Vital Sign     Unable to  Pay for Housing in the Last Year: No     Number of Places Lived in the Last Year: 1     Unstable Housing in the Last Year: No       Family History:   Family History   Problem Relation Age of Onset    Heart disease Biological Mother        Review of Systems  Review of Systems   Cardiovascular:  Positive for chest pain.       Objective     Vital signs for last 24 hours:  Temp:  [36.1 °C (97 °F)-37.1 °C (98.8 °F)] 36.1 °C (97 °F)  Heart Rate:  [73-94] 78  Resp:  [16-20] 20  BP: (101-168)/(60-86) 116/66    Physicial Exam  Physical Exam  Constitutional:       General: He is not in acute distress.  Eyes:      General: No scleral icterus.     Conjunctiva/sclera: Conjunctivae normal.   Cardiovascular:      Rate and Rhythm: Normal rate and regular rhythm.      Pulses: Normal pulses.   Pulmonary:      Effort: Pulmonary effort is normal. No respiratory distress.   Skin:     General: Skin is warm and dry.   Neurological:      General: No focal deficit present.      Mental Status: He is alert.         Labs  Labs reviewed    Imaging  I have independently reviewed the pertinent imaging from the last 24 hrs.    ECG/Telemetry  I have independently reviewed the ECG. No significant findings.    Assessment/Plan     Dave Arredondo is a 88 year old man with history of rectal cancer s/p resection with ostomy, CAD, DVT/PE on Xarelto who presented with a recurrent loculated pleural effusion concerning for possible malignancy    Plan:  - No acute surgical intervention at this time, patient is stable with no acute respiratory distress  - Follow up pleural fluid culture and cytology  - Recommend IR pigtail placement followed by 6 doses of tPA/Dornase  - CT chest after 6th dose for re-evaluation of pleural effusion  - Rest of care as per primary    Michele Joseph M.D.  General Surgery PGY-2  [phone]

## 2024-07-02 NOTE — HOSPITAL COURSE
Dave is a 88 y.o. male admitted on 7/1/2024 with Pleural effusion on right [J90]  Dyspnea, unspecified type [R06.00]  Chest pain, unspecified type [R07.9]. Principal problem is Pleural effusion on right.    Past Medical History  Dave has a past medical history of Colon cancer (CMS/HCC), Coronary artery disease, DVT (deep venous thrombosis) (CMS/HCC), Hypertension, and Lipid disorder.    History of Present Illness   Dave Arredondo is a 87 yo Male admitted to [facility] with cc: SOB, right sided chest pain.     Chest X-Ray 7/1:   Large right pleural effusion     IR thoracentesis 7/2

## 2024-07-02 NOTE — NURSING NOTE
Patient returned to unit form IR, 1100 ml taken off/ Thoracentesis. Patient vitals stable, no c/o of new pain. Band aid on Back CDI. Patient will remain bedrest as per order/ until 1050am. Patient is aware, Call bell in reach of patient.       Patient conts to admit to mild pleuretic pain, no c/o SOB.  refused pain meds. Supp oob, Thoracic surgery involved for loculated pleural effusion. Colostomy intact/ patent.

## 2024-07-02 NOTE — PROGRESS NOTES
[facility] Pulmonary, Critical Care and Sleep Progress Note       SUBJECTIVE:  Pt seen and examined at bedside. No acute events overnight. S/p R thoracentesis this AM with 1.1L of serosanguinous fluid removed. CT chest following thoracentesis showed a moderate R pleural effusion, that appears complex, with associated pleural thickening, patchy consolidative changes in the RML and lower lobes and scattered pulmonary nodules with the largest in the JESSE measuring 6mm.     Outside records reviewed.    Medical History:   Past Medical History:   Diagnosis Date    Colon cancer (CMS/HCC)     Coronary artery disease     DVT (deep venous thrombosis) (CMS/HCC)     Hypertension     Lipid disorder        Surgical History:   Past Surgical History:   Procedure Laterality Date    COLOSTOMY      HERNIA REPAIR         Allergies: Patient has no known allergies.    Med List Reviewed.    Social History:   Social History     Socioeconomic History    Marital status:      Spouse name: None    Number of children: None    Years of education: None    Highest education level: None   Tobacco Use    Smoking status: Never    Smokeless tobacco: Never   Substance and Sexual Activity    Alcohol use: Not Currently    Drug use: Never    Sexual activity: Defer   Social History Narrative    Lives in the [facility]      Social Determinants of Health     Food Insecurity: No Food Insecurity (7/1/2024)    Hunger Vital Sign     Worried About Running Out of Food in the Last Year: Never true     Ran Out of Food in the Last Year: Never true   Transportation Needs: No Transportation Needs (7/1/2024)    PRAPARE - Transportation     Lack of Transportation (Medical): No     Lack of Transportation (Non-Medical): No   Housing Stability: Low Risk  (7/1/2024)    Housing Stability Vital Sign     Unable to Pay for Housing in the Last Year: No     Number of Places Lived in the Last Year: 1     Unstable Housing in the Last Year: No        Family History:   Family History   Problem Relation Age of Onset    Heart disease Biological Mother        Review of Systems  All other systems reviewed and negative except as noted in the HPI.    Vital signs in last 24 hours:  Temp:  [36.1 °C (97 °F)-37.1 °C (98.8 °F)] 36.1 °C (97 °F)  Heart Rate:  [73-94] 81  Resp:  [16-20] 20  BP: (101-168)/(60-86) 116/66    OBJECTIVE    Physical Exam:  Gen: WDWN, in NAD, AAOx3, pleasant  HEENT: PERRLA, neck supple, no LAD  CV: RRR, +s1/s2, no M/R/G  Pulm: CTA b/l without W/R/R  GI: abd soft, NT/ND, normoactive BS  Extremities: no LE edema   Skin: clean, dry and intact without evidence of break down   Neuro: CN II-XII grossly in tact, no focal deficits     Labs:  I have reviewed the patient's pertinent labs.     Imaging:  I have independently reviewed the patient's pertinent imaging for this hospital visit.   CT CHEST WITHOUT IV CONTRAST    Result Date: 7/2/2024  IMPRESSION: Moderate right pleural effusion with areas of pleural thickening and heterogeneity particularly at the right lung base for which malignancy is not excluded. Patchy consolidative changes in the right middle and lower lobes for which superimposed pneumonia should be excluded clinically. Additional findings as above.    X-RAY CHEST 2 VIEWS    Result Date: 7/1/2024  IMPRESSION: Large right pleural effusion. COMMENT: PA and lateral radiographs the chest reveal a large right pleural effusion. Left lung is well-aerated. Cardiac silhouette is unchanged compared to 6/17/2024.    CT CHEST WITHOUT IV CONTRAST    Result Date: 6/18/2024  IMPRESSION: 1.  Moderate pleural effusion, decreased from the prior CT. 2.  Stable pulmonary nodules.    IR THORACENTESIS    Result Date: 6/17/2024  IMPRESSION: Successful ultrasound-guided right-sided thoracentesis with 1.2 L pleural fluid removed and sent to the lab.  All components of the time-out, debriefing, and handling of the specimen were conducted as per the ASAP Specimen  Protocol. I certify that I have personally reviewed this examination and agree with Maria Antonia Bradley's report. Cj Yen M.D.    X-RAY CHEST 1 VIEW    Result Date: 6/17/2024  IMPRESSION: No pneumothorax.  Small right pleural effusion and right basilar airspace opacity. COMMENT: Comparison: Chest x-ray 6/15/2024. Technique: A single frontal AP portable upright projection of the chest was obtained. FINDINGS: There is a small right pleural effusion with right basilar airspace opacity. The left lung is clear.  There is no pneumothorax.  The cardiomediastinal silhouette is unchanged.  The upper abdomen is unremarkable.  There is no acute osseous abnormality.     Ultrasound venous arm right    Result Date: 6/15/2024  IMPRESSION: No evidence of right upper extremity deep vein thrombosis.     CT ANGIOGRAPHY CHEST PULMONARY EMBOLISM WITH IV CONTRAST    Result Date: 6/15/2024  IMPRESSION: development of a large right pleural effusion with right lower lobe collapse and partial atelectasis of the right middle and upper lobes centrally. Scattered small pulmonary nodules are again seen less than 6 mm the largest is linearly oriented in the left upper lobe. No CT evidence for proximal or segmental pulmonary arterial embolism.     X-RAY CHEST 2 VIEWS    Result Date: 6/15/2024  IMPRESSION: moderate right pleural effusion with basilar airspace opacity.  Fluid is likely to accurately into the fissures on the right as well.       Assessment and Plan:  Mr. Dave Arredondo is an 89 yo M with a PMHx of rectal cancer s/p resection, chemotherapy and ostomy placement, HTN, HLD and CAD who presented on 71/24 with worsening shortness of breath and right-sided pleuritic chest pain. He was hospitalized 6/15/24 to /19/24 and was found to have a large right pleural effusion for which 1.2 L was drained during thoracentesis.  Pleural fluid analysis was consistent with an exudate with negative cytology and culture.  He now presents with worsening  dyspnea and a large recurrent right pleural effusion. S/p thoracentesis 7/2. CT chest following thoracentesis showed a moderate R pleural effusion, that appears complex, with associated pleural thickening, patchy consolidative changes in the RML and lower lobes and scattered pulmonary nodules with the largest in the JESSE measuring 6mm.    #Recurrent exudative pleural effusion  #Dyspnea   #History of rectal cancer    -S/p R sided thoracentesis on 7/2 with 1.1L of serosanguinous fluid removed; pleural fluid analysis appears c/w an exudate  -A malignant pleural effusion due to recurrent rectal cancer (or a separate malignancy) certainly needs to be excluded   -F/u pleural fluid culture and cytology  -CT chest following thoracentesis shows a complex moderate R pleural effusion with associated pleural thickening, consolidative opacities in the RML and LL's and scattered pulmonary nodules  -Agree with thoracic surgery consult  -May need to consider R sided chest tube placement and possible intrapleural fibrinolysis  -Pleural fluid analysis 6/17 was notable for an exudate with negative cytology and culture   -If the pleural fluid cytology and culture are again negative may need to consider pleural biopsy for further evaluation  -Pain control w/o oversedation  -IS 10 x per hour    -Please call with any questions or concerns you may have        Parker Post, DO  Pulmonary and Critical Care Medicine

## 2024-07-02 NOTE — ASSESSMENT & PLAN NOTE
Recurrent right pleural effusion.  Concern for malignancy.  -Patient was inpatient at [facility] 6/15 to 6/19/2024, SOB, right pleuritic chest pain  -R Thoracentesis 6/17/2024: 1.2L pleural fluid, exudative, negative cytology, culture.  Improved and he was discharged 6/19/2024.  -He returned to [facility] 7/1/2024 due to gradually increasing SOB with exertion and right chest pain worse when lying on the right side.   -Mild anorexia and 6 or 7 pound weight loss.  He has had a dry cough for several months.    -Hematology/Oncology consulted due to concern regarding possible malignancy  -CXR 7/1/2024 large right pleural effusion.  -Right thoracentesis 7/2/2024 for multiloculated effusion with 1.1L fluid removed.  -Pleural fluid: , pH 7.5, protein <3.   (N 12, L79, M8, Eos 1). Cultures NGSF. Cytology 7/2 negative; flow negative  -CT chest 7/2/2024 moderate right pleural effusion, pleural thickening and heterogeneity particularly at right lung base, malignancy not excluded; patchy consolidative changes right middle and lower lobes--superimposed pneumonia should be excluded; stable scattered pulmonary nodules largest in JESSE measuring up to 6 mm; mildly prominent mediastinal lymph nodes measuring up to 9 mm.  -LDH-161 not elevated  -CEA- 3.1 --minimal elevation (nonsmoker)  -Right pleural pigtail inserted 7/3/2024 --intrapleural fibrinolysis completed    -Appreciate pulmonologist input and thoracic surgery input  -Cytology, flow cytometry of right pleural fluid from 7/2/2024 negative for malignancy  -Completed 6 doses tPA/dornase instillations (as recommended by thoracic surgery)   -Repeat CT  7/7/24 decrease right pleural effusion post drainage. Right pigtail catheter pleural space. Small right hydropneumothorax.  -Patient is nonsmoker, consider CTDs with serositis in DDX. Pleural fluid diff predominantly lymphocytic suggesting lymphoma vs autoimmune etiology    -VATS, right pleural biopsy, right pleurex catheter 7/8/2024.  Pathology pending  -Will arrange prompt outpatient follow up ASAP after discharge to review pathology and arrange treatment  -Home RN needed to manage pleurex catheter  -Consider outpatient PET/CT when discharged

## 2024-07-02 NOTE — CONSULTS
Heme/Onc Consult Note    Subjective     Dave Arredondo is a 88 y.o. male who was admitted for Pleural effusion on right [J90]  Dyspnea, unspecified type [R06.00]  Chest pain, unspecified type [R07.9]. Hematology/Oncology consulted for management recommendations regarding recurrent pleural effusion and concern for possible malignancy.     Patient is 88-year-old Mong[place]ian gentleman, never smoker, admitted to [facility] 7/1/2024 with worsening SOB and right pleuritic chest pain.  He has history HTN, HLD, CAD, [facility] in 2012 [facility], on therapeutic dose Xarelto for 6 months then changed to prophylactic dose, then had evidence of DVT in 4/2020, unclear if acute DVT but anticoagulation was changed to Eliquis then resumed Xarelto.  Patient was inpatient at [facility] 6/15 to 6/19/2024 for shortness of breath and right pleuritic chest pain and had thoracentesis 6/17/2024 when 1.2L of pleural fluid was removed, exudative, with negative cytology and culture.  His symptoms improved and he was discharged on 6/19/2024.  He returned to [facility] 7/1/2024 due to gradually increasing SOB with exertion and right chest pain worse when lying on the right side.  He also has mild anorexia and 6 or 7 pound weight loss.  He has had a dry cough for several months.  Hematology/Oncology consulted due to concern regarding possible malignancy.    In the ED, afebrile.  .  RR 20.  /84.  Pulse ox 95% on room air.  Has not been hypoxic on room air.    WBC 10.17.  Hemoglobin 13.5 then 12.6.  MCV 90.1.  Platelets 307,000.    .  High-sensitivity troponin 12.6.    INR 1.8.  PTT 37.    Sodium 135.  Potassium 3.8.  Chloride 100.  BUN 14.  Creatinine 0.9.  Glucose 132.  Calcium 9.4.  HCO3 14.  Liver function studies unremarkable.    CXR 7/1/2024 large right pleural effusion.    After our evaluation this morning, patient had right thoracentesis for multiloculated effusion with 1.1L fluid  removed.    Pleural fluid with , pH 7.5, protein <3.     (N 12, L79, M8, Eos 1)  Cultures pending  Cytology pending    CT chest 7/2/2024 with moderate right pleural effusion with areas of pleural thickening and heterogeneity particularly at right lung base for which malignancy is not excluded; patchy consolidative changes in the right middle and lower lobes for which superimposed pneumonia should be excluded; stable scattered pulmonary nodules largest in left upper lobe measuring up to 6 mm; mildly prominent mediastinal lymph nodes measuring up to 9 mm.    Regarding rectal cancer, patient was followed at [facility], T2 N1 M0 rectal cancer, neoadjuvant chemotherapy and radiotherapy followed by surgery (APR), then had multiple complications after the surgery, then recovered and had 4 months of adjuvant chemotherapy postoperatively and has not had known recurrence of his rectal cancer.  Surveillance colonoscopy 8/11/2022 revealed hyperplastic polyp in the transverse colon with pathology revealing tubular adenoma.    Outside records reviewed.    Medical History:   Past Medical History:   Diagnosis Date    Colon cancer (CMS/HCC)     Coronary artery disease     DVT (deep venous thrombosis) (CMS/HCC)     Hypertension     Lipid disorder        Surgical History:   Past Surgical History:   Procedure Laterality Date    COLOSTOMY      HERNIA REPAIR         Allergies: Patient has no known allergies.      acetaminophen, 650 mg, oral, q4h PRN    albuterol HFA, 2 puff, inhalation, q6h PRN    atorvastatin, 20 mg, oral, Daily (6p)    calcium carbonate, 1,000 mg, oral, 2x daily PRN    cyanocobalamin, 1,000 mcg, intramuscular, q30 days    glucose, 16-32 g of dextrose, oral, PRN **OR** dextrose, 15-30 g of dextrose, oral, PRN **OR** glucagon, 1 mg, intramuscular, PRN **OR** dextrose 50 % in water (D50), 25 mL, intravenous, PRN    enoxaparin, 40 mg, subcutaneous, Daily (6p)    furosemide, 20 mg, oral, Daily     "melatonin, 3 mg, oral, Nightly PRN    metoprolol succinate XL, 100 mg, oral, Daily    nitroglycerin, 0.4 mg, sublingual, q5 min PRN    pantoprazole, 40 mg, oral, Nightly    polyethylene glycol, 17 g, oral, Daily PRN    Social History:   Social History     Socioeconomic History    Marital status:      Spouse name: None    Number of children: None    Years of education: None    Highest education level: None   Tobacco Use    Smoking status: Never    Smokeless tobacco: Never   Substance and Sexual Activity    Alcohol use: Not Currently    Drug use: Never    Sexual activity: Defer   Social History Narrative    Lives in the [facility] in [place]      Social Determinants of Health     Food Insecurity: No Food Insecurity (7/1/2024)    Hunger Vital Sign     Worried About Running Out of Food in the Last Year: Never true     Ran Out of Food in the Last Year: Never true   Transportation Needs: No Transportation Needs (7/1/2024)    PRAPARE - Transportation     Lack of Transportation (Medical): No     Lack of Transportation (Non-Medical): No   Housing Stability: Low Risk  (7/1/2024)    Housing Stability Vital Sign     Unable to Pay for Housing in the Last Year: No     Number of Places Lived in the Last Year: 1     Unstable Housing in the Last Year: No       Family History:   Family History   Problem Relation Age of Onset    Heart disease Biological Mother        Review of Systems  All other systems reviewed and negative except as noted in the HPI.    Vital signs in last 24 hours:  Temp:  [36.6 °C (97.9 °F)-37.1 °C (98.8 °F)] 36.6 °C (97.9 °F)  Heart Rate:  [73-94] 84  Resp:  [16-20] 18  BP: (101-168)/(60-86) 112/60    Objective     Physical Exam:  Visit Vitals  /60 (BP Location: Right upper arm, Patient Position: Lying)   Pulse 84   Temp 36.6 °C (97.9 °F) (Oral)   Resp 18   Ht 1.702 m (5' 7\")   Wt 72.7 kg (160 lb 3.2 oz)   SpO2 95%   BMI 25.09 kg/m²       General appearance: alert, no acute distress. " Respirations non-labored on RA. Non-toxic  Head: normocephalic, without obvious abnormality, atraumatic  Eyes: Anicteric.   Throat: Mucosa moist and pink. No exudates or ulcers.  Neck: no adenopathy  Lungs: clear to auscultation bilaterally; decreased BS on R  Heart: regular rate and rhythm and S1, S2 normal  Abdomen: soft, non-tender; ostomy left abd.with brown stool.  Extremities: No BLE edema or calf tenderness  Skin: Skin color, texture, turgor normal. No rashes or lesions  Lymph nodes: No palpable adenopathy  Neurologic: Grossly normal        Labs  I have reviewed the patient's pertinent labs.  Significant abnormals are below.    Results from last 7 days   Lab Units 07/02/24  0337 07/01/24  1123   WBC K/uL 8.82 10.17   HEMOGLOBIN g/dL 12.6* 13.5*   HEMATOCRIT % 39.1* 41.2   PLATELETS K/uL 274 307     Results from last 7 days   Lab Units 07/02/24  0337 07/01/24  1123   SODIUM mEQ/L 135* 135*   POTASSIUM mEQ/L 3.9 3.8   CHLORIDE mEQ/L 100 100   CO2 mEQ/L 28 27   BUN mg/dL 13 14   CREATININE mg/dL 0.8 0.9   GLUCOSE mg/dL 99 132*   CALCIUM mg/dL 9.1 9.4         Results from last 7 days   Lab Units 07/02/24  0337 07/01/24  1123   INR  1.2 1.8         Imaging  Significant findings include: below  X-RAY CHEST 2 VIEWS    Result Date: 7/1/2024  CLINICAL HISTORY: Chest pain.     IMPRESSION: Large right pleural effusion. COMMENT: PA and lateral radiographs the chest reveal a large right pleural effusion. Left lung is well-aerated. Cardiac silhouette is unchanged compared to 6/17/2024.         Assessment   88 y.o. male being consulted for management recommendations       Plan     * Pleural effusion on right  Assessment & Plan  Recurrent right pleural effusion.  Concern for malignancy.  -Patient was inpatient at [facility] 6/15 to 6/19/2024, SOB, right pleuritic chest pain  -R Thoracentesis 6/17/2024: 1.2L pleural fluid, exudative, negative cytology, culture.  Improved and he was discharged 6/19/2024.  -He returned to [facility] 7/1/2024  due to gradually increasing SOB with exertion and right chest pain worse when lying on the right side.   -Mild anorexia and 6 or 7 pound weight loss.  He has had a dry cough for several months.    -Hematology/Oncology consulted due to concern regarding possible malignancy  -CXR 7/1/2024 large right pleural effusion.  -Right thoracentesis 7/2/2024 for multiloculated effusion with 1.1L fluid removed.  -Pleural fluid with , pH 7.5, protein <3.   (N 12, L79, M8, Eos 1). Cultures pending. Cytology pending  -CT chest 7/2/2024 moderate right pleural effusion, pleural thickening and heterogeneity particularly at right lung base, malignancy not excluded; patchy consolidative changes right middle and lower lobes--superimposed pneumonia should be excluded; stable scattered pulmonary nodules largest in JESSE measuring up to 6 mm; mildly prominent mediastinal lymph nodes measuring up to 9 mm.    -Appreciate pulmonologist input  -Await cytology of right pleural fluid from 7/2/2024  -Consider cardiothoracic surgery evaluation for VATS versus completion of EBUS  -Consider CT A/P  -Consider outpatient PET/CT  -Check LDH, CEA      Anorexia  Assessment & Plan  Patient has had anorexia with 6 to 7 pound weight loss.  Workup to rule out malignancy is in progress  -Protonix 40 mg nightly  -Nutrition consult  -Encourage supplements        Anemia  Assessment & Plan  Normocytic anemia.  -Hemoglobin 13.5 then 12.6.  MCV 90.1.  Creatinine 0.9.  -Check iron studies, B12 and folate levels.  -Patient receives B12 injections every 30 days.  -Monitor.    Pulmonary nodules  Assessment & Plan  See Pleural effusion    History of rectal cancer  Assessment & Plan  Rectal cancer  -Patient was followed at [facility], T2 N1 M0 rectal cancer  -Neoadjuvant chemotherapy and radiotherapy followed by surgery (APR), then had multiple complications after the surgery, then recovered and had 4 months of adjuvant chemotherapy postoperatively  and has not had known recurrence of his rectal cancer.    -Surveillance colonoscopy 8/11/2022 revealed hyperplastic polyp in the transverse colon with pathology revealing tubular adenoma.    DVT (deep venous thrombosis) (CMS/HCC)  Assessment & Plan  LLE DVT and PE in 3/2019, on therapeutic dose Xarelto for 6 months then changed to prophylactic dose, then had evidence of DVT in 4/2020, unclear if acute DVT but anticoagulation was changed to Eliquis then resumed Xarelto.    -Xarelto on hold pending completion of invasive procedures  -Resume Xarelto when able  -DVT prophylactic dose enoxaparin 40 mg daily has been ordered.    CAD (coronary artery disease)  Assessment & Plan  Patient takes atorvastatin and metoprolol              VALENCIA Hua  7/2/2024

## 2024-07-02 NOTE — PROGRESS NOTES
Physical Therapy -  Initial Evaluation     Patient: Dave Arredondo  Location: [facility] 3C [id number]  MRN: [id number]  Today's date: 7/2/2024    HISTORY OF PRESENT ILLNESS     Dave is a 88 y.o. male admitted on 7/1/2024 with Pleural effusion on right [J90]  Dyspnea, unspecified type [R06.00]  Chest pain, unspecified type [R07.9]. Principal problem is Pleural effusion on right.    Past Medical History  Dave has a past medical history of Colon cancer (CMS/[facility]), Coronary artery disease, DVT (deep venous thrombosis) (CMS/[facility]), Hypertension, and Lipid disorder.    History of Present Illness   Dave Arredondo is a 89 yo Male admitted to [facility] with cc: SOB, right sided chest pain.     Chest X-Ray 7/1:   Large right pleural effusion     IR thoracentesis 7/2   PRIOR LEVEL OF FUNCTION AND LIVING ENVIRONMENT     Prior Level of Function      Flowsheet Row Most Recent Value   Dominant Hand right   Ambulation independent   Transferring independent   Toileting independent   Bathing assistive equipment   Dressing independent   Eating independent   IADLs independent   Driving/Transportation    Communication understands/communicates without difficulty   Prior Level of Function Comment per chart review+pt: IND w/o AD, retired.   Assistive Device Currently Used at Home grab bar, shower chair          Prior Living Environment      Flowsheet Row Most Recent Value   People in Home friend(s)   Current Living Arrangements home   Living Environment Comment per chart review: 3SH, 4 MILLIE, FF with b/l HR to bed/bath on 2nd floor, GB/SC.          VITALS AND PAIN     PT Vitals      Date/Time Pulse SpO2 Pt Activity O2 Therapy BP BP Location BP Method Pt Position Who   07/02/24 1408 82 95 % At rest None (Room air) 125/61 Right upper arm Automatic Lying MONICA   07/02/24 1428 82 92 % At rest None (Room air) 131/75 Right upper arm Automatic Sitting MONICA          PT Pain      Date/Time Pain Type Side/Orientation Location  Rating: Rest Rating: Activity [facility]   07/02/24 1408 Pain Assessment right chest 1 1 MONICA             Objective   OBJECTIVE     Start time:  1408  End time:  1428  Session Length: 20 min  Mode of Treatment: individual therapy, physical therapy    General Observations  Patient received supine, in bed. He was agreeable to therapy, no issues or concerns identified by nurse prior to session. per RN: OK to see pt.    Precautions: fall        Services  Do You Speak a Language Other Than English at Home?: no      PT Eval and Treat - 07/02/24 1408          Cognition    Orientation Status oriented x 3     Affect/Mental Status WFL     Follows Commands WFL     Cognitive Function WFL     Comment, Cognition Pt pleasant and cooperative t/o session. Mildy lethargic upon entry.        Vision Assessment/Intervention    Vision Assessment corrective lenses for reading        Hearing Assessment    Hearing Status WFL        Sensory Assessment    Sensory Assessment sensation intact, lower extremities        Lower Extremity Strength    Hip, Left (Strength) 5/5     Knee, Left (Strength) 5/5     Ankle, Left (Strength) 5/5     Hip, Right (Strength) 5/5     Knee, Right (Strength) 5/5     Ankle, Right (Strength) 5/5        Motor Skills    Motor Skills neuro-muscular function     Coordination WFL;bilateral;lower extremity;heel to shin   alternating toe taps intact    Muscle Tone WNL     Neuromuscular Function WFL        Bed Mobility    Bed Mobility Activities supine to sit;sit to supine     [facility] [place] independent     Assistive Device none     Comment IND for sup<>sit. No dizziness sitting EOB.        Mobility Belt    Mobility Belt Used During Session yes        Sit/Stand Transfer    Surface edge of bed     [facility] independent     Assistive Device none     Transfer Comments IND for STS from EOB and steady upon standing. No LOB with pregait weightshifting and standing marches.        Gait Training    [facility] [place], Gait independent      Assistive Device none     Distance in Feet 120 feet     Pattern step-through     Comment IND ambulating 120 ft with no AD. Some SOB at end of gait trial, VSS.        Stairs Training    [place], Stairs independent     Assistive Device railing     Handrail Location (Stairs) right side (ascending);left side (descending)     Number of Stairs 8     Ascending Stairs Technique step-over-step     Descending Stairs Technique step-over-step     Comment IND for 8 steps with R HR ascending and L HR descending.        Balance    Static Sitting Balance WFL     Dynamic Sitting Balance WFL     Sit to Stand Dynamic Balance WFL     Static Standing Balance WFL     Dynamic Standing Balance WFL     Comment, Balance Pt steady during STS, ambulation, and stairs with no LOB.        Lower Extremity (Therapeutic Exercise)    Exercise Position/Type standing     General Exercise bilateral;marching while standing     Reps and Sets x10        Impairments/Safety Issues    Impairments Affecting Function pain;shortness of breath;endurance/activity tolerance     Functional Endurance Limited by SOB after gait trial.                                    Education Documentation  Transfers, taught by Carla Bocanegra at 7/2/2024  3:05 PM.  Learner: Patient  Readiness: Acceptance  Method: Explanation  Response: Verbalizes Understanding  Comment: role of PT in acute care setting, pacing, purpose of mobility belt, pursed lip breathing techniques    Stairs Negotiation, taught by Carla Bocanegra at 7/2/2024  3:05 PM.  Learner: Patient  Readiness: Acceptance  Method: Explanation  Response: Verbalizes Understanding  Comment: role of PT in acute care setting, pacing, purpose of mobility belt, pursed lip breathing techniques    Guarding Techniques and Tips, taught by Carla Bocanegra at 7/2/2024  3:05 PM.  Learner: Patient  Readiness: Acceptance  Method: Explanation  Response: Verbalizes Understanding  Comment: role of PT in acute care setting, pacing,  purpose of mobility belt, pursed lip breathing techniques    Gait Training, taught by Carla Bocanegra at 7/2/2024  3:05 PM.  Learner: Patient  Readiness: Acceptance  Method: Explanation  Response: Verbalizes Understanding  Comment: role of PT in acute care setting, pacing, purpose of mobility belt, pursed lip breathing techniques    Bed Mobility, taught by Carla Bocanegra at 7/2/2024  3:05 PM.  Learner: Patient  Readiness: Acceptance  Method: Explanation  Response: Verbalizes Understanding  Comment: role of PT in acute care setting, pacing, purpose of mobility belt, pursed lip breathing techniques        Session Outcome  Patient supine, in bed at end of session, all needs met, personal items in reach, call light in reach. Nursing notified about change in vital signs, patient's position, patient's response to therapy/activity, and patient's performance.    AM-PAC™ - Mobility (Current Function)     Turning form your back to your side while in flat bed without using bedrails 4 - None   Moving from lying on your back to sitting on the side of a flat bed without using bedrails 4 - None   Moving to and from a bed to a chair 4 - None   Standing up from a chair using your arms 4 - None   To walk in a hospital room 4 - None   Climbing 3-5 steps with a railing 4 - None   AM-PAC™ Mobility Score 24      ASSESSMENT AND PLAN     Progress Summary  PT eval complete on 7/2/2024. Pt is a 89 yo Male admitted to [facility] with cc: SOB, right sided chest pain. Recent hospitalization at [facility] for R arm/forearm pain. Chest X-Ray 7/1: large R pleural effusion, IR thoracentesis on 7/2. Pt IND for bed mobility, transfers, ambulation with no AD, and stairs. Pt main limiting factor is R sided chest pain and SOB after increased activity. At this time, skilled physical therapy intervention in the acute care setting is no longer needed. PT d/c rec: home. PT to sign off.    Patient/Family Therapy Goals Statement: to go home    Reason for Discharge: no  further needs identified    PT Discharge Recommendations      Flowsheet Row Most Recent Value   PT Recommended Discharge Disposition home at 07/02/2024 1404   Anticipated Equipment Needs if Discharged Home (PT) none at 07/02/2024 140

## 2024-07-02 NOTE — PLAN OF CARE
Problem: Adult Inpatient Plan of Care  Goal: Plan of Care Review  Outcome: Progressing  Flowsheets (Taken 7/2/2024 0486)  Progress: improving  Outcome Evaluation: PT eval complete  Plan of Care Reviewed With: patient     Problem: Mobility Impairment  Goal: Optimal Mobility  Outcome: Progressing

## 2024-07-03 ENCOUNTER — APPOINTMENT (INPATIENT)
Dept: RADIOLOGY | Facility: HOSPITAL | Age: 89
DRG: 167 | End: 2024-07-03
Attending: HOSPITALIST
Payer: COMMERCIAL

## 2024-07-03 LAB
ANION GAP SERPL CALC-SCNC: 7 MEQ/L (ref 3–15)
BUN SERPL-MCNC: 17 MG/DL (ref 7–25)
CALCIUM SERPL-MCNC: 8.9 MG/DL (ref 8.6–10.3)
CHLORIDE SERPL-SCNC: 101 MEQ/L (ref 98–107)
CO2 SERPL-SCNC: 27 MEQ/L (ref 21–31)
CREAT SERPL-MCNC: 0.7 MG/DL (ref 0.7–1.3)
EGFRCR SERPLBLD CKD-EPI 2021: >60 ML/MIN/1.73M*2
ERYTHROCYTE [DISTWIDTH] IN BLOOD BY AUTOMATED COUNT: 13.7 % (ref 11.6–14.4)
GLUCOSE SERPL-MCNC: 100 MG/DL (ref 70–99)
HCT VFR BLD AUTO: 39.7 % (ref 40.1–51)
HGB BLD-MCNC: 13 G/DL (ref 13.7–17.5)
MCH RBC QN AUTO: 29.3 PG (ref 28–33.2)
MCHC RBC AUTO-ENTMCNC: 32.7 G/DL (ref 32.2–36.5)
MCV RBC AUTO: 89.6 FL (ref 83–98)
PDW BLD AUTO: 9.4 FL (ref 9.4–12.4)
PLATELET # BLD AUTO: 284 K/UL (ref 150–350)
POTASSIUM SERPL-SCNC: 4.1 MEQ/L (ref 3.5–5.1)
RBC # BLD AUTO: 4.43 M/UL (ref 4.5–5.8)
RHODAMINE-AURAMINE STN SPEC: NORMAL
SODIUM SERPL-SCNC: 135 MEQ/L (ref 136–145)
WBC # BLD AUTO: 8.6 K/UL (ref 3.8–10.5)

## 2024-07-03 PROCEDURE — 63700000 HC SELF-ADMINISTRABLE DRUG

## 2024-07-03 PROCEDURE — 99233 SBSQ HOSP IP/OBS HIGH 50: CPT | Performed by: STUDENT IN AN ORGANIZED HEALTH CARE EDUCATION/TRAINING PROGRAM

## 2024-07-03 PROCEDURE — 25000000 HC PHARMACY GENERAL: Performed by: RADIOLOGY

## 2024-07-03 PROCEDURE — 20600000 HC ROOM AND CARE INTERMEDIATE/TELEMETRY

## 2024-07-03 PROCEDURE — 200200 PR NO CHARGE: Performed by: SURGERY

## 2024-07-03 PROCEDURE — 25000000 HC PHARMACY GENERAL: Performed by: STUDENT IN AN ORGANIZED HEALTH CARE EDUCATION/TRAINING PROGRAM

## 2024-07-03 PROCEDURE — 27200000 HC STERILE SUPPLY

## 2024-07-03 PROCEDURE — 3E0L317 INTRODUCTION OF OTHER THROMBOLYTIC INTO PLEURAL CAVITY, PERCUTANEOUS APPROACH: ICD-10-PCS | Performed by: STUDENT IN AN ORGANIZED HEALTH CARE EDUCATION/TRAINING PROGRAM

## 2024-07-03 PROCEDURE — 63600000 HC DRUGS/DETAIL CODE: Mod: JZ | Performed by: PHYSICIAN ASSISTANT

## 2024-07-03 PROCEDURE — 25800000 HC PHARMACY IV SOLUTIONS: Performed by: STUDENT IN AN ORGANIZED HEALTH CARE EDUCATION/TRAINING PROGRAM

## 2024-07-03 PROCEDURE — 0W9930Z DRAINAGE OF RIGHT PLEURAL CAVITY WITH DRAINAGE DEVICE, PERCUTANEOUS APPROACH: ICD-10-PCS | Performed by: RADIOLOGY

## 2024-07-03 PROCEDURE — 76942 ECHO GUIDE FOR BIOPSY: CPT

## 2024-07-03 PROCEDURE — 80048 BASIC METABOLIC PNL TOTAL CA: CPT | Performed by: PHYSICIAN ASSISTANT

## 2024-07-03 PROCEDURE — 36415 COLL VENOUS BLD VENIPUNCTURE: CPT | Performed by: PHYSICIAN ASSISTANT

## 2024-07-03 PROCEDURE — 3E0L3GC INTRODUCTION OF OTHER THERAPEUTIC SUBSTANCE INTO PLEURAL CAVITY, PERCUTANEOUS APPROACH: ICD-10-PCS | Performed by: STUDENT IN AN ORGANIZED HEALTH CARE EDUCATION/TRAINING PROGRAM

## 2024-07-03 PROCEDURE — 63700000 HC SELF-ADMINISTRABLE DRUG: Performed by: HOSPITALIST

## 2024-07-03 PROCEDURE — 63600000 HC DRUGS/DETAIL CODE: Mod: JZ,JG | Performed by: STUDENT IN AN ORGANIZED HEALTH CARE EDUCATION/TRAINING PROGRAM

## 2024-07-03 PROCEDURE — 36100360 IR PLEURAL CATHETER PLACEMENT

## 2024-07-03 PROCEDURE — C1729 CATH, DRAINAGE: HCPCS

## 2024-07-03 PROCEDURE — 85027 COMPLETE CBC AUTOMATED: CPT | Performed by: PHYSICIAN ASSISTANT

## 2024-07-03 PROCEDURE — C1769 GUIDE WIRE: HCPCS

## 2024-07-03 RX ORDER — LIDOCAINE HYDROCHLORIDE 10 MG/ML
INJECTION, SOLUTION INFILTRATION; PERINEURAL
Status: COMPLETED | OUTPATIENT
Start: 2024-07-03 | End: 2024-07-03

## 2024-07-03 RX ORDER — TRAMADOL HYDROCHLORIDE 50 MG/1
50 TABLET ORAL ONCE
Status: COMPLETED | OUTPATIENT
Start: 2024-07-03 | End: 2024-07-03

## 2024-07-03 RX ORDER — OXYCODONE HYDROCHLORIDE 5 MG/1
5 TABLET ORAL ONCE
Status: COMPLETED | OUTPATIENT
Start: 2024-07-03 | End: 2024-07-03

## 2024-07-03 RX ADMIN — ACETAMINOPHEN 650 MG: 325 TABLET ORAL at 17:20

## 2024-07-03 RX ADMIN — ENOXAPARIN SODIUM 40 MG: 40 INJECTION SUBCUTANEOUS at 17:20

## 2024-07-03 RX ADMIN — ATORVASTATIN CALCIUM 20 MG: 20 TABLET, FILM COATED ORAL at 17:20

## 2024-07-03 RX ADMIN — OXYCODONE HYDROCHLORIDE 5 MG: 5 TABLET ORAL at 20:58

## 2024-07-03 RX ADMIN — Medication 3 MG: at 22:45

## 2024-07-03 RX ADMIN — FUROSEMIDE 20 MG: 20 TABLET ORAL at 08:26

## 2024-07-03 RX ADMIN — DORNASE ALFA 5 MG: 1 SOLUTION RESPIRATORY (INHALATION) at 18:00

## 2024-07-03 RX ADMIN — ALTEPLASE 10 MG: 2.2 INJECTION, POWDER, LYOPHILIZED, FOR SOLUTION INTRAVENOUS at 18:00

## 2024-07-03 RX ADMIN — LIDOCAINE HYDROCHLORIDE 10 ML: 10 INJECTION, SOLUTION INFILTRATION; PERINEURAL at 14:12

## 2024-07-03 RX ADMIN — METOPROLOL SUCCINATE 100 MG: 100 TABLET, EXTENDED RELEASE ORAL at 08:25

## 2024-07-03 RX ADMIN — ACETAMINOPHEN 650 MG: 325 TABLET ORAL at 22:45

## 2024-07-03 RX ADMIN — ACETAMINOPHEN 650 MG: 325 TABLET ORAL at 08:25

## 2024-07-03 RX ADMIN — TRAMADOL HYDROCHLORIDE 50 MG: 50 TABLET, COATED ORAL at 20:04

## 2024-07-03 RX ADMIN — PANTOPRAZOLE SODIUM 40 MG: 40 TABLET, DELAYED RELEASE ORAL at 20:04

## 2024-07-03 ASSESSMENT — COGNITIVE AND FUNCTIONAL STATUS - GENERAL
CLIMB 3 TO 5 STEPS WITH RAILING: 4 - NONE
STANDING UP FROM CHAIR USING ARMS: 4 - NONE
MOVING TO AND FROM BED TO CHAIR: 4 - NONE
WALKING IN HOSPITAL ROOM: 4 - NONE

## 2024-07-03 NOTE — PLAN OF CARE
Plan of Care Review  Plan of Care Reviewed With: patient  Progress: improving  Outcome Evaluation: c/o mild pleuritic pain relieved with tylenol. Denies SOB. Standby assist to [facility].

## 2024-07-03 NOTE — NURSING NOTE
VAT: 1800 Chest Tube clamped. Wall suction turned off, and stopcock pointing to atrium. Intrapleural tpa/ Dornase administered via Rt chest tube as ordered. Patient denies pain or discomfort. We will return to unclamp chest tube post one hour dwell time. Primary RN made aware it.     1900: Chest tube unclamped, wall suction turned back on, and stopcock opened to drainage. Patient  remains without pain or discomfort. Primary RN aware.

## 2024-07-03 NOTE — NURSING NOTE
Patient with 250ml out put form chest tube, 1st hour after TPA given. Drainage bloody, not deeper than previous drainage. Dr Dennis made aware. No new order received . Care ongoing. Patient with some increase pain R side. a response for HMS. Care ongoing.

## 2024-07-03 NOTE — PROGRESS NOTES
Hospital Medicine     Daily Progress Note       SUBJECTIVE   Interval History: Patient seen and examined at bedside. No acute events overnight. S/p thoracentesis yesterday with 1.1 L removed. Reviewed CT scan results and plan for pigtail catheter placement, TPA/dornse. Patient in agreement with proceeding. Reports SOB and chest discomfort is better today. Patient discussed on multidisciplinary rounds today.     Case discussed with IR and surgery today regarding AC.      OBJECTIVE      Vital signs in last 24 hours:  Temp:  [36.1 °C (97 °F)-36.9 °C (98.5 °F)] 36.6 °C (97.8 °F)  Heart Rate:  [76-86] 76  Resp:  [16-20] 16  BP: (112-145)/(61-85) 124/67    Intake/Output Summary (Last 24 hours) at 7/3/2024 1244  Last data filed at 7/3/2024 0600  Gross per 24 hour   Intake 560 ml   Output 550 ml   Net 10 ml       PHYSICAL EXAMINATION      Physical Exam  Vitals and nursing note reviewed.   Constitutional:       General: He is not in acute distress.     Comments: Very pleasant elderly gentleman resting in bed    HENT:      Head: Normocephalic.      Mouth/Throat:      Mouth: Mucous membranes are moist.   Eyes:      Extraocular Movements: Extraocular movements intact.   Cardiovascular:      Rate and Rhythm: Normal rate and regular rhythm.   Pulmonary:      Comments: Breathing comfortably on RA, breath sounds diminished right lung base   Abdominal:      Palpations: Abdomen is soft.      Comments: Colostomy with soft brown stool noted. + BS, nontender    Musculoskeletal:      Right lower leg: No edema.      Left lower leg: No edema.   Neurological:      General: No focal deficit present.      Mental Status: He is alert and oriented to person, place, and time.            LINES, CATHETERS, DRAINS, AIRWAYS, AND WOUNDS   Lines, Drains, and Airways:  Wounds (agree with documentation and present on admission):  Peripheral IV (Adult) 07/01/24 Left Antecubital (Active)   Number of days: 2       Colostomy Unknown LUQ (Active)   Number  of days: 5213         Comments:    LABS / IMAGING / TELE      Labs  I have reviewed the patient's labs to the time of note. No new clinical concern.    Results from last 7 days   Lab Units 07/03/24 0445 07/02/24 0337 07/01/24  1123   WBC K/uL 8.60 8.82 10.17   HEMOGLOBIN g/dL 13.0* 12.6* 13.5*   HEMATOCRIT % 39.7* 39.1* 41.2   PLATELETS K/uL 284 274 307       Results from last 7 days   Lab Units 07/03/24 0445 07/02/24  0337 07/01/24  1123   SODIUM mEQ/L 135* 135* 135*   POTASSIUM mEQ/L 4.1 3.9 3.8   CHLORIDE mEQ/L 101 100 100   CO2 mEQ/L 27 28 27   BUN mg/dL 17 13 14   CREATININE mg/dL 0.7 0.8 0.9   CALCIUM mg/dL 8.9 9.1 9.4   ALBUMIN g/dL  --   --  3.5   BILIRUBIN TOTAL mg/dL  --   --  0.9   ALK PHOS IU/L  --   --  102   ALT IU/L  --   --  15   AST IU/L  --   --  17   GLUCOSE mg/dL 100* 99 132*           Imaging  I have independently reviewed the pertinent imaging from the last 24 hrs.    Narrative & Impression   CLINICAL HISTORY: Pleural effusion, malignancy suspected     COMPARISON: CT 6/18/2024     COMMENT:     TECHNIQUE: CT of the chest was performed with the patient in the supine  position. Images reconstructed/reformatted in the axial, coronal and  sagittal  plane.  CT DOSE:  One or more dose reduction techniques (e.g. automated exposure  control, adjustment of the mA and/or kV according to patient size, use of  iterative reconstruction technique) utilized for this examination.  ADMINISTERED CONTRAST AND DOSE: None  The lack of intravenous contrast limits interpretation of the solid organs,  vessels and certain processes.     LUNG, LARGE AIRWAYS AND PLEURA: Moderate right pleural effusion with areas of  pleural thickening and heterogeneity particularly at the right lung base for  which malignancy is not excluded.  Patchy consolidative changes in the right  middle and lower lobes for which superimposed pneumonia should be excluded  clinically.  Stable scattered pulmonary nodules largest in the left upper  lobe  measuring up to 6 mm (axial image #132).  CHEST WALL AND LOWER NECK: within normal limits.  MEDIASTINUM AND JENNIFER: There are mildly prominent mediastinal lymph nodes  measuring up to 9 mm in short axis.  HEART: normal size.  Trace pericardial effusion.  VESSELS: Aorta measures up to 4.3 cm at the level the main pulmonary artery.  UPPER ABDOMEN:Partially visualized bilateral parapelvic cysts.  BONES: Mild multilevel degenerative changes in the spine.  Stable sclerotic foci  in the ribs.     --  IMPRESSION:  Moderate right pleural effusion with areas of pleural thickening and  heterogeneity particularly at the right lung base for which malignancy is not  excluded.  Patchy consolidative changes in the right middle and lower lobes for which  superimposed pneumonia should be excluded clinically.  Additional findings as above.       ECG/Telemetry  I have independently reviewed the telemetry. No events for the last 24 hours.    ASSESSMENT AND PLAN      * Pleural effusion on right  Assessment & Plan  S/p 6/17 R sided thoracentesis with 1.2 L removed, exudative. Culture and cytology negative   CXR on admission with large pleural effusion   S/p repeat thora 7/2 with 1.1 L off, locultated   Follow up fluid culture studies and flow cytometry   CT chest post thora on 7/2 showing ongoing moderate R pleural effusion and pelural thickening, concern for malignancy (noted hx of rectal CA)   Pulmonary and thoracic surgery following   Plan for IR for pigtail placement today then TPA/dornase x 6 doses and follow up CT chest post       Pulmonary nodules  Assessment & Plan  6/18 CAT scan chest revealed pulmonary nodules  CT chest 7/2 showing stable pulm nodules, largest JESSE up to 6mm       Right-sided chest pain  Assessment & Plan  Pleuritic in nature, suspect secondary to large R pleural effusion   Improved post thora yesterday   No ischemic EKG changes  Monitor on telemetry       History of rectal cancer  Assessment & Plan  In 2010  per patient, cared for at [facility]   S/p colostomy, chemo and radiation   With recurrent exudative pleural effusion and CT scan findings concern for malignancy   Oncology following   Follow up pleural fluid studies     DVT (deep venous thrombosis) (CMS/HCC)  Assessment & Plan  History of DVT/PE   On Xarelto as OP   Holding Xarelto for pigtail cath placement, on subq lovenox for vte ppx only   Discussed with surgery who will be okay with AC while pigtail in place/ undergoing TPA/dornase     CAD (coronary artery disease)  Assessment & Plan  Patient with presumed CAD based on abnormal stress test in 2018, declined cardiac cath   Chest pain noted but is R sided and suspected 2/2 large pleural effusion   Troponin flat, monitoring on tele          VTE Assessment: Padua VTE Score: 4  VTE Prophylaxis:  Current anticoagulants:  enoxaparin (LOVENOX) syringe 40 mg, subcutaneous, Daily (6p)      Code Status: Full Code      Estimated Discharge Date: 7/7/2024   Disposition Planning: will be inpatient for minimum 3 additional days for TPA/dornase      BENJY Gonzalez  7/3/2024

## 2024-07-03 NOTE — CONSULTS
Progress Note    Subjective     NAEON, AVDONNELL. This morning, ***    Medical History:   Past Medical History:   Diagnosis Date    Colon cancer (CMS/HCC)     Coronary artery disease     DVT (deep venous thrombosis) (CMS/HCC)     Hypertension     Lipid disorder        Surgical History:   Past Surgical History:   Procedure Laterality Date    COLOSTOMY      HERNIA REPAIR         Allergies: Patient has no known allergies.    Current Inpatient Medications   Medication Dose Route Frequency Provider Last Rate Last Admin    acetaminophen (TYLENOL) tablet 650 mg  650 mg oral q4h PRN Lauren Man MD   650 mg at 07/02/24 2129    albuterol HFA 90 mcg/actuation inhaler 2 puff  2 puff inhalation q6h PRN Lauren Man MD        atorvastatin (LIPITOR) tablet 20 mg  20 mg oral Daily (6p) Lauren Man MD   20 mg at 07/02/24 1720    calcium carbonate (TUMS) chewable tablet 1,000 mg  1,000 mg oral 2x daily PRN Kvng Valladares MD        cyanocobalamin (VITAMIN B-12) injection 1,000 mcg  1,000 mcg intramuscular q30 days Lauren Man MD   1,000 mcg at 07/01/24 2114    glucose chewable tablet 16-32 g of dextrose  16-32 g of dextrose oral PRN Lauren Man MD        Or    dextrose 40 % oral gel 15-30 g of dextrose  15-30 g of dextrose oral PRN Lauren Man MD        Or    glucagon (GLUCAGEN) injection 1 mg  1 mg intramuscular PRN Lauren Man MD        Or    dextrose 50 % in water (D50) injection 12.5 g  25 mL intravenous PRN Lauren Man MD        enoxaparin (LOVENOX) syringe 40 mg  40 mg subcutaneous Daily (6p) India Baxter PA C   40 mg at 07/02/24 1722    furosemide (LASIX) tablet 20 mg  20 mg oral Daily Lauren Man MD   20 mg at 07/02/24 0827    melatonin ODT 3 mg  3 mg oral Nightly PRN Dania Martínez CRNP        metoprolol succinate XL (TOPROL-XL) 24 hr ER tablet 100 mg  100 mg oral Daily Dania Martínez CRNP   100 mg at 07/02/24 0827    nitroglycerin (NITROSTAT) SL tablet  0.4 mg  0.4 mg sublingual q5 min PRN Lauren Man MD        pantoprazole (PROTONIX) tablet,delayed release (DR/EC) 40 mg  40 mg oral Nightly Lauren Man MD   40 mg at 07/02/24 2123    polyethylene glycol (MIRALAX) 17 gram packet 17 g  17 g oral Daily PRN Dania Martínez CRNP            Social History:   Social History     Socioeconomic History    Marital status:      Spouse name: None    Number of children: None    Years of education: None    Highest education level: None   Tobacco Use    Smoking status: Never    Smokeless tobacco: Never   Substance and Sexual Activity    Alcohol use: Not Currently    Drug use: Never    Sexual activity: Defer   Social History Narrative    Lives in the [facility] in [place]      Social Determinants of Health     Food Insecurity: No Food Insecurity (7/1/2024)    Hunger Vital Sign     Worried About Running Out of Food in the Last Year: Never true     Ran Out of Food in the Last Year: Never true   Transportation Needs: No Transportation Needs (7/1/2024)    PRAPARE - Transportation     Lack of Transportation (Medical): No     Lack of Transportation (Non-Medical): No   Housing Stability: Low Risk  (7/1/2024)    Housing Stability Vital Sign     Unable to Pay for Housing in the Last Year: No     Number of Places Lived in the Last Year: 1     Unstable Housing in the Last Year: No       Family History:   Family History   Problem Relation Age of Onset    Heart disease Biological Mother        Review of Systems  Review of Systems   Cardiovascular:  Positive for chest pain.       Objective     Vital signs for last 24 hours:  Temp:  [36.1 °C (97 °F)-36.9 °C (98.5 °F)] 36.9 °C (98.5 °F)  Heart Rate:  [73-94] 80  Resp:  [16-20] 20  BP: (101-168)/(60-86) 125/75    Physicial Exam  Physical Exam  Constitutional:       General: He is not in acute distress.  Eyes:      General: No scleral icterus.     Conjunctiva/sclera: Conjunctivae normal.   Cardiovascular:      Rate  and Rhythm: Normal rate and regular rhythm.      Pulses: Normal pulses.   Pulmonary:      Effort: Pulmonary effort is normal. No respiratory distress.   Skin:     General: Skin is warm and dry.   Neurological:      General: No focal deficit present.      Mental Status: He is alert.         Labs  Labs reviewed    Imaging  I have independently reviewed the pertinent imaging from the last 24 hrs.    ECG/Telemetry  I have independently reviewed the ECG. No significant findings.    Assessment/Plan     Dave Arredondo is a 88 year old man with history of rectal cancer s/p resection with ostomy, CAD, DVT/PE on Xarelto who presented with a recurrent loculated pleural effusion concerning for possible malignancy    Plan:  - No acute surgical intervention at this time, patient is stable with no acute respiratory distress  - Follow up pleural fluid culture and cytology  - Recommend IR pigtail placement followed by 6 doses of tPA/Dornase  - CT chest after 6th dose for re-evaluation of pleural effusion  - Rest of care as per primary    Deepa Gudino M.D.  General Surgery PGY-2  [phone]

## 2024-07-03 NOTE — PLAN OF CARE
Care Coordination Discharge Plan Note     Discharge Needs Assessment  Concerns to be Addressed: other (see comments) (Pt states doctors at [facility] referred him to doctors that did not accept his insurance. BENJY Osborne was informed of pt's concerns r/t specialist not in network with [employer].)  Current Discharge Risk: chronically ill    Anticipated Discharge Plan  Anticipated Discharge Disposition: home without assistance or services       Patient Choice  Offered/Gave Vendor List: no  Patient's Choice of Community Agency(s): NA as no discharge planning referrals have been initiated at this time as the medical plan of care is pending.         ---------------------------------------------------------------------------------------------------------------------    Interdisciplinary Discharge Plan Review:  Participants:advanced practice provider, physical therapy, nursing, social work/services    Concerns Comments: Pt signed IMM. Pending medical clearance, pt will dc home w/o services.    Discharge Plan:   Disposition/Destination: Home  / Home  Discharge Facility:    Community Resources:      Discharge Transportation:  Is Out of Hospital DNR needed at Discharge: no  Does patient need discharge transport? No      DCP: dc home w/o services

## 2024-07-03 NOTE — PROGRESS NOTES
[facility] Pulmonary, Critical Care and Sleep Progress Note       SUBJECTIVE:  Pt seen and examined at bedside. No acute events overnight. Now s/p R 12Fr pigtail placement.     Outside records reviewed.    Medical History:   Past Medical History:   Diagnosis Date    Colon cancer (CMS/HCC)     Coronary artery disease     DVT (deep venous thrombosis) (CMS/HCC)     Hypertension     Lipid disorder        Surgical History:   Past Surgical History:   Procedure Laterality Date    COLOSTOMY      HERNIA REPAIR         Allergies: Patient has no known allergies.    Med List Reviewed.    Social History:   Social History     Socioeconomic History    Marital status:      Spouse name: None    Number of children: None    Years of education: None    Highest education level: None   Tobacco Use    Smoking status: Never    Smokeless tobacco: Never   Substance and Sexual Activity    Alcohol use: Not Currently    Drug use: Never    Sexual activity: Defer   Social History Narrative    Lives in the [facility]      Social Determinants of Health     Food Insecurity: No Food Insecurity (7/1/2024)    Hunger Vital Sign     Worried About Running Out of Food in the Last Year: Never true     Ran Out of Food in the Last Year: Never true   Transportation Needs: No Transportation Needs (7/1/2024)    PRAPARE - Transportation     Lack of Transportation (Medical): No     Lack of Transportation (Non-Medical): No   Housing Stability: Low Risk  (7/1/2024)    Housing Stability Vital Sign     Unable to Pay for Housing in the Last Year: No     Number of Places Lived in the Last Year: 1     Unstable Housing in the Last Year: No       Family History:   Family History   Problem Relation Age of Onset    Heart disease Biological Mother        Review of Systems  All other systems reviewed and negative except as noted in the HPI.    Vital signs in last 24 hours:  Temp:  [36.4 °C (97.6 °F)-36.9 °C (98.5 °F)] 36.7 °C (98 °F)  Heart  Rate:  [76-86] 82  Resp:  [16-20] 18  BP: (112-152)/(67-85) 125/75    OBJECTIVE    Physical Exam:  Gen: WDWN, in NAD, AAOx3, pleasant  HEENT: PERRLA, neck supple, no LAD  CV: RRR, +s1/s2, no M/R/G  Pulm: CTA b/l without W/R/R  GI: abd soft, NT/ND, normoactive BS  Extremities: no LE edema   Skin: clean, dry and intact without evidence of break down   Neuro: CN II-XII grossly in tact, no focal deficits     Labs:  I have reviewed the patient's pertinent labs.     Imaging:  I have independently reviewed the patient's pertinent imaging for this hospital visit.   IR PLEURAL CATHETER PLACEMENT    Result Date: 7/3/2024  IMPRESSION:   Satisfactory 12 Tu[place]uan right pigtail chest tube placement. COMMENT: PROCEDURE:  Chest tube placement ANESTHESIA:  Lidocaine 1% local. FLUORO TIME:  0.1 minutes. REFERENCE AIR KERMA: 0.9 mGy. CONTRAST:  None. MEDICATIONS:  None. DESCRIPTION OF PROCEDURE:    Written informed consent was obtained.  Ultrasound demonstrates complex, multiloculated right pleural effusion.  The patient was prepped and draped in the usual sterile manner.  Using real-time ultrasound guidance, a Yueh needle was inserted into the right pleural effusion.  Following serial dilation, a 12 Tuvaluan pigtail chest tube was inserted.  Serosanguineous fluid was aspirated.  The catheter was secured with suture and connected to Pleur-evac drainage.  The patient tolerated the procedure well without immediate complication.     ULTRASOUND GUIDED NEEDLE PLACEMENT    Result Date: 7/3/2024  IMPRESSION:   Satisfactory 12 [place] right pigtail chest tube placement. COMMENT: PROCEDURE:  Chest tube placement ANESTHESIA:  Lidocaine 1% local. FLUORO TIME:  0.1 minutes. REFERENCE AIR KERMA: 0.9 mGy. CONTRAST:  None. MEDICATIONS:  None. DESCRIPTION OF PROCEDURE:    Written informed consent was obtained.  Ultrasound demonstrates complex, multiloculated right pleural effusion.  The patient was prepped and draped in the usual sterile manner.  Using  real-time ultrasound guidance, a Yueh needle was inserted into the right pleural effusion.  Following serial dilation, a 12 Persian pigtail chest tube was inserted.  Serosanguineous fluid was aspirated.  The catheter was secured with suture and connected to Pleur-evac drainage.  The patient tolerated the procedure well without immediate complication.     IR THORACENTESIS    Result Date: 7/3/2024  IMPRESSION: Successful ultrasound guided right thoracentesis with 1100 cc of fluid removed and  sent for laboratory studies, as requested COMMENT: The patient is referred for ultrasound guided thoracentesis on the right side. Informed consent was obtained. With the patient sitting, ultrasound imaging is performed and a moderately large multiloculated right pleural effusion is identified. The fluid is localized and a site is selected on the skin with ultrasound. The site is marked. Using sterile technique, under local anesthesia, the thoracentesis catheter is inserted into the pleural space. 1100 cc of serosanguineous fluid are removed. The fluid spontaneously stopped draining. The catheter is removed and a sterile dressing is placed over the catheter insertion site. Vital signs were monitored throughout the procedure and remained stable. The fluid is discarded  sent for laboratory analysis, as requested. A dedicated CT chest was ordered and will be obtained and separately reported. This service rendered by Melissa Tsang PA-C. I certify that I have personally reviewed this examination and agree with Melissa Tsang's report. Quentin Ritter M.D.    CT CHEST WITHOUT IV CONTRAST    Result Date: 7/2/2024  IMPRESSION: Moderate right pleural effusion with areas of pleural thickening and heterogeneity particularly at the right lung base for which malignancy is not excluded. Patchy consolidative changes in the right middle and lower lobes for which superimposed pneumonia should be excluded clinically. Additional findings as  above.    X-RAY CHEST 2 VIEWS    Result Date: 7/1/2024  IMPRESSION: Large right pleural effusion. COMMENT: PA and lateral radiographs the chest reveal a large right pleural effusion. Left lung is well-aerated. Cardiac silhouette is unchanged compared to 6/17/2024.    CT CHEST WITHOUT IV CONTRAST    Result Date: 6/18/2024  IMPRESSION: 1.  Moderate pleural effusion, decreased from the prior CT. 2.  Stable pulmonary nodules.    IR THORACENTESIS    Result Date: 6/17/2024  IMPRESSION: Successful ultrasound-guided right-sided thoracentesis with 1.2 L pleural fluid removed and sent to the lab.  All components of the time-out, debriefing, and handling of the specimen were conducted as per the ASAP Specimen Protocol. I certify that I have personally reviewed this examination and agree with Maria Antonia Bradley's report. Cj Yen M.D.    X-RAY CHEST 1 VIEW    Result Date: 6/17/2024  IMPRESSION: No pneumothorax.  Small right pleural effusion and right basilar airspace opacity. COMMENT: Comparison: Chest x-ray 6/15/2024. Technique: A single frontal AP portable upright projection of the chest was obtained. FINDINGS: There is a small right pleural effusion with right basilar airspace opacity. The left lung is clear.  There is no pneumothorax.  The cardiomediastinal silhouette is unchanged.  The upper abdomen is unremarkable.  There is no acute osseous abnormality.     Ultrasound venous arm right    Result Date: 6/15/2024  IMPRESSION: No evidence of right upper extremity deep vein thrombosis.     CT ANGIOGRAPHY CHEST PULMONARY EMBOLISM WITH IV CONTRAST    Result Date: 6/15/2024  IMPRESSION: development of a large right pleural effusion with right lower lobe collapse and partial atelectasis of the right middle and upper lobes centrally. Scattered small pulmonary nodules are again seen less than 6 mm the largest is linearly oriented in the left upper lobe. No CT evidence for proximal or segmental pulmonary arterial embolism.     X-RAY  CHEST 2 VIEWS    Result Date: 6/15/2024  IMPRESSION: moderate right pleural effusion with basilar airspace opacity.  Fluid is likely to accurately into the fissures on the right as well.       Assessment and Plan:  Mr. Dave Arredondo is an 89 yo M with a PMHx of rectal cancer s/p resection, chemotherapy and ostomy placement, HTN, HLD and CAD who presented on 71/24 with worsening shortness of breath and right-sided pleuritic chest pain. He was hospitalized 6/15/24 to /19/24 and was found to have a large right pleural effusion for which 1.2 L was drained during thoracentesis.  Pleural fluid analysis was consistent with an exudate with negative cytology and culture.  He now presents with worsening dyspnea and a large recurrent right pleural effusion. S/p thoracentesis 7/2 and chest tube placement 7/3. CT chest following thoracentesis showed a moderate R pleural effusion, that appears complex, with associated pleural thickening, patchy consolidative changes in the RML and lower lobes and scattered pulmonary nodules with the largest in the JESSE measuring 6mm.    #Recurrent exudative pleural effusion  #Dyspnea   #History of rectal cancer    -S/p R sided thoracentesis on 7/2 with 1.1L of serosanguinous fluid removed; pleural fluid analysis appears c/w an exudate  -A malignant pleural effusion due to recurrent rectal cancer (or a separate malignancy) certainly needs to be excluded   -F/u pleural fluid culture and cytology  -CT chest following thoracentesis shows a complex moderate R pleural effusion with associated pleural thickening, consolidative opacities in the RML and LL's and scattered pulmonary nodules  -Now s/p R sided 12 Fr pigtail placement with plans for intrapleural fibrinolysis x 3   -Appreciate on going thoracic surgery recs   -If the pleural fluid cytology and culture are again negative may need to consider pleural biopsy for further evaluation  -Pain control w/o oversedation  -IS 10 x per hour    -Please  call with any questions or concerns you may have      Parker Post, DO  Pulmonary and Critical Care Medicine

## 2024-07-03 NOTE — POST-PROCEDURE NOTE
Interventional Radiology Brief Postprocedure Note    Dave Arredondo    Attending: Yovana    Assistant: None    Diagnosis: Loculated right pleural effusion    Description of procedure: Chest tube placement    Contrast: None     Anesthesia:  Local (Lidocaine)    Volume of Lidocaine Utilized (ml): 5     Medications: None     Complications: None    Estimated Blood Loss: 0-10 ml    Anticoagulation: No restrictions    Specimens: None    Findings: 12F right pigtail chest tube placement.    Cj Yen MD  L

## 2024-07-03 NOTE — PROGRESS NOTES
Thoracic Surgery Daily Progress Note    Subjective        NAEON, AVSS. This morning, patient reports mild CP that improves upon sitting. Otherwise, no complaints. Denies n/v, f/c, -dizziness/LH. Satting well on RA.     Objective     Vital signs in last 24 hours:  Temp:  [36.1 °C (97 °F)-36.9 °C (98.5 °F)] 36.9 °C (98.4 °F)  Heart Rate:  [73-94] 77  Resp:  [16-20] 20  BP: (101-168)/(60-86) 145/82      Intake/Output Summary (Last 24 hours) at 7/3/2024 0512  Last data filed at 7/2/2024 1830  Gross per 24 hour   Intake 1050 ml   Output 1400 ml   Net -350 ml     Intake/Output this shift:  No intake/output data recorded.    Physical Exam  Physical Exam  Constitutional:       General: He is not in acute distress.  Eyes:      General: No scleral icterus.     Conjunctiva/sclera: Conjunctivae normal.   Cardiovascular:      Rate and Rhythm: Normal rate and regular rhythm.      Pulses: Normal pulses.   Pulmonary:      Effort: Pulmonary effort is normal. No respiratory distress.   Skin:     General: Skin is warm and dry.   Neurological:      General: No focal deficit present.      Mental Status: He is alert.             Labs:  BMP Results         07/02/24 07/01/24 06/19/24     0337 1123 0516     135 137    K 3.9 3.8 4.2    Cl 100 100 105    CO2 28 27 25    Glucose 99 132 98    BUN 13 14 26    Creatinine 0.8 0.9 0.8    Calcium 9.1 9.4 9.1    Anion Gap 7 8 7    EGFR >60.0 >60.0 >60.0           Comment for K at 1123 on 07/01/24: Results obtained on plasma. Plasma Potassium values may be up to 0.4 mEQ/L less than serum values. The differences may be greater for patients with high platelet or white cell counts.    Comment for EGFR at 0337 on 07/02/24: Calculation based on the Chronic Kidney Disease Epidemiology Collaboration (CKD-EPI) equation refit without adjustment for race.    Comment for EGFR at 1123 on 07/01/24: Calculation based on the Chronic Kidney Disease Epidemiology Collaboration (CKD-EPI) equation refit without  adjustment for race.    Comment for EGFR at 0516 on 06/19/24: Calculation based on the Chronic Kidney Disease Epidemiology Collaboration (CKD-EPI) equation refit without adjustment for race.           CBC Results         07/02/24 07/01/24 06/19/24     0337 1123 0516    WBC 8.82 10.17 8.07    RBC 4.34 4.58 4.56    HGB 12.6 13.5 13.5    HCT 39.1 41.2 41.2    MCV 90.1 90.0 90.4    MCH 29.0 29.5 29.6    MCHC 32.2 32.8 32.8     307 229            Imaging  I have reviewed the Imaging from the last 24 hrs.      Assessment/Plan       Dave Arredondo is a 88 year old man with history of rectal cancer s/p resection with ostomy, CAD, DVT/PE on Xarelto who presented with a recurrent loculated pleural effusion concerning for possible malignancy     Plan:  - No acute surgical intervention at this time, patient is stable with no acute respiratory distress  - Follow up pleural fluid culture and cytology  - Recommend IR pigtail placement followed by 6 doses of tPA/Dornase  - CT chest after 6th dose for re-evaluation of pleural effusion  - Rest of care as per primary        Deepa Gudino MD  General Surgery Resident, PGY1  Please page *[facility] [phone] with any questions or concerns.

## 2024-07-03 NOTE — PROGRESS NOTES
"ONCOLOGY Progress Note    Subjective    Patient with mild chest pain, worse if side lying on right.     Cytology pending from pleural fluid 7/2/2024    Added Flow cytometry to pleural fluid testing--result pending    Await pleural pigtail catheter placement then TPA/dornase      Objective  Vital signs in last 24 hours:  Temp:  [36.1 °C (97 °F)-36.9 °C (98.5 °F)] 36.6 °C (97.8 °F)  Heart Rate:  [76-86] 76  Resp:  [16-20] 16  BP: (112-145)/(61-85) 124/67    Physical Exam:  Visit Vitals  /67 (BP Location: Right upper arm, Patient Position: Lying)   Pulse 76   Temp 36.6 °C (97.8 °F) (Oral)   Resp 16   Ht 1.702 m (5' 7\")   Wt 71.7 kg (158 lb 1.1 oz)   SpO2 96%   BMI 24.76 kg/m²       General appearance: alert, no acute distress. Respirations non-labored on RA. Non-toxic  Eyes: Anicteric.   Throat: Mucosa moist and pink. No exudates or ulcers.  Neck: no adenopathy  Lungs: clear to auscultation bilaterally; decreased BS on R  Heart: regular rate and rhythm and S1, S2 normal  Abdomen: soft, non-tender; ostomy left abd.with brown stool.  Extremities: No BLE edema or calf tenderness  Skin: Skin color, texture, turgor normal. No rashes or lesions  Neurologic: Grossly normal    Medications:      acetaminophen, 650 mg, oral, q4h PRN    albuterol HFA, 2 puff, inhalation, q6h PRN    atorvastatin, 20 mg, oral, Daily (6p)    calcium carbonate, 1,000 mg, oral, 2x daily PRN    cyanocobalamin, 1,000 mcg, intramuscular, q30 days    glucose, 16-32 g of dextrose, oral, PRN **OR** dextrose, 15-30 g of dextrose, oral, PRN **OR** glucagon, 1 mg, intramuscular, PRN **OR** dextrose 50 % in water (D50), 25 mL, intravenous, PRN    enoxaparin, 40 mg, subcutaneous, Daily (6p)    furosemide, 20 mg, oral, Daily    melatonin, 3 mg, oral, Nightly PRN    metoprolol succinate XL, 100 mg, oral, Daily    nitroglycerin, 0.4 mg, sublingual, q5 min PRN    pantoprazole, 40 mg, oral, Nightly    polyethylene glycol, 17 g, oral, Daily PRN    Labs:  Results " from last 7 days   Lab Units 07/03/24  0445 07/02/24  0337 07/01/24  1123   WBC K/uL 8.60 8.82 10.17   HEMOGLOBIN g/dL 13.0* 12.6* 13.5*   HEMATOCRIT % 39.7* 39.1* 41.2   PLATELETS K/uL 284 274 307     Results from last 7 days   Lab Units 07/03/24  0445 07/02/24  0337 07/01/24  1123   SODIUM mEQ/L 135* 135* 135*   POTASSIUM mEQ/L 4.1 3.9 3.8   CHLORIDE mEQ/L 101 100 100   CO2 mEQ/L 27 28 27   BUN mg/dL 17 13 14   CREATININE mg/dL 0.7 0.8 0.9   GLUCOSE mg/dL 100* 99 132*   CALCIUM mg/dL 8.9 9.1 9.4         Results from last 7 days   Lab Units 07/02/24  0337 07/01/24  1123   INR  1.2 1.8       Microbiology Results       Procedure Component Value Units Date/Time    Body Fluid Culture/Smear Pleural Fluid, Right [[id number]] Collected: 07/02/24 0928    Specimen: Pleural Fluid, Right Updated: 07/03/24 1016     Culture No growth at 18-24 hours     Gram Stain Result 1+ WBC      No organisms seen    Fungal Stain Pleural Fluid, Right [[id number]] Collected: 07/02/24 0928    Specimen: Pleural Fluid, Right Updated: 07/02/24 1404     Fungus Stain No fungal elements seen    Body Fluid Culture/Smear Pleural Fluid, Right [[id number]] Collected: 06/17/24 1410    Specimen: Pleural Fluid, Right Updated: 06/21/24 1212     Culture No growth at 96 hours     Gram Stain Result 1+ WBC      No organisms seen    Fungal Stain Pleural Fluid, Right [[id number]] Collected: 06/17/24 1410    Specimen: Pleural Fluid, Right Updated: 06/18/24 0648     Fungus Stain No fungal elements seen    Fungal Culture Pleural Fluid, Right [[id number]]  (Normal) Collected: 06/17/24 1410    Specimen: Pleural Fluid, Right Updated: 06/18/24 2300     Fungal Culture No Fungus Isolated to Date    AFB stain Pleural Fluid, Right [[id number]]  (Normal) Collected: 06/17/24 1410    Specimen: Pleural Fluid, Right Updated: 06/18/24 1514     AFB Stain No acid fast bacilli seen    AFB Culture Pleural Fluid, Right [[id number]]  (Normal) Collected: 06/17/24 1410    Specimen: Pleural  Fluid, Right Updated: 06/19/24 1401     AFB Culture No Acid Fast Bacilli Isolated to Date    SARS-COV-2 (COVID-19)/ FLU A/B, AND RSV, PCR Nasopharynx [[id number]]  (Normal) Collected: 06/15/24 1247    Specimen: Nasopharyngeal Swab from Nasopharynx Updated: 06/15/24 1335     SARS-CoV-2 (COVID-19) Negative     Influenza A Negative     Influenza B Negative     Respiratory Syncytial Virus Negative    Narrative:      Testing performed using real-time PCR for detection of COVID-19. EUA approved validation studies performed on site.              Imaging:  IR THORACENTESIS    Result Date: 7/3/2024  89526 THORACOSTOMY CATHETER INSERTION WITH ULTRASOUND GUIDANCE CLINICAL HISTORY: Recurrent right pleural effusion, shortness of breath     IMPRESSION: Successful ultrasound guided right thoracentesis with 1100 cc of fluid removed and  sent for laboratory studies, as requested COMMENT: The patient is referred for ultrasound guided thoracentesis on the right side. Informed consent was obtained. With the patient sitting, ultrasound imaging is performed and a moderately large multiloculated right pleural effusion is identified. The fluid is localized and a site is selected on the skin with ultrasound. The site is marked. Using sterile technique, under local anesthesia, the thoracentesis catheter is inserted into the pleural space. 1100 cc of serosanguineous fluid are removed. The fluid spontaneously stopped draining. The catheter is removed and a sterile dressing is placed over the catheter insertion site. Vital signs were monitored throughout the procedure and remained stable. The fluid is discarded  sent for laboratory analysis, as requested. A dedicated CT chest was ordered and will be obtained and separately reported. This service rendered by Melissa Tsang PA-C. I certify that I have personally reviewed this examination and agree with Melissa Tsang's report. Quentin Ritter M.D.    CT CHEST WITHOUT IV CONTRAST    Result Date:  7/2/2024  CLINICAL HISTORY: Pleural effusion, malignancy suspected COMPARISON: CT 6/18/2024 COMMENT: TECHNIQUE: CT of the chest was performed with the patient in the supine position. Images reconstructed/reformatted in the axial, coronal and  sagittal plane. CT DOSE:  One or more dose reduction techniques (e.g. automated exposure control, adjustment of the mA and/or kV according to patient size, use of iterative reconstruction technique) utilized for this examination. ADMINISTERED CONTRAST AND DOSE: None The lack of intravenous contrast limits interpretation of the solid organs, vessels and certain processes. LUNG, LARGE AIRWAYS AND PLEURA: Moderate right pleural effusion with areas of pleural thickening and heterogeneity particularly at the right lung base for which malignancy is not excluded.  Patchy consolidative changes in the right middle and lower lobes for which superimposed pneumonia should be excluded clinically.  Stable scattered pulmonary nodules largest in the left upper lobe measuring up to 6 mm (axial image #132). CHEST WALL AND LOWER NECK: within normal limits. MEDIASTINUM AND JENNIFER: There are mildly prominent mediastinal lymph nodes measuring up to 9 mm in short axis. HEART: normal size.  Trace pericardial effusion. VESSELS: Aorta measures up to 4.3 cm at the level the main pulmonary artery. UPPER ABDOMEN:Partially visualized bilateral parapelvic cysts. BONES: Mild multilevel degenerative changes in the spine.  Stable sclerotic foci in the ribs.     IMPRESSION: Moderate right pleural effusion with areas of pleural thickening and heterogeneity particularly at the right lung base for which malignancy is not excluded. Patchy consolidative changes in the right middle and lower lobes for which superimposed pneumonia should be excluded clinically. Additional findings as above.          Assessment & Plan    * Pleural effusion on right  Assessment & Plan  Recurrent right pleural effusion.  Concern for  malignancy.  -Patient was inpatient at [facility] 6/15 to 6/19/2024, SOB, right pleuritic chest pain  -R Thoracentesis 6/17/2024: 1.2L pleural fluid, exudative, negative cytology, culture.  Improved and he was discharged 6/19/2024.  -He returned to [facility] 7/1/2024 due to gradually increasing SOB with exertion and right chest pain worse when lying on the right side.   -Mild anorexia and 6 or 7 pound weight loss.  He has had a dry cough for several months.    -Hematology/Oncology consulted due to concern regarding possible malignancy  -CXR 7/1/2024 large right pleural effusion.  -Right thoracentesis 7/2/2024 for multiloculated effusion with 1.1L fluid removed.  -Pleural fluid with , pH 7.5, protein <3.   (N 12, L79, M8, Eos 1). Cultures NGSF. Cytology pending  -CT chest 7/2/2024 moderate right pleural effusion, pleural thickening and heterogeneity particularly at right lung base, malignancy not excluded; patchy consolidative changes right middle and lower lobes--superimposed pneumonia should be excluded; stable scattered pulmonary nodules largest in JESSE measuring up to 6 mm; mildly prominent mediastinal lymph nodes measuring up to 9 mm.  -LDH-161 not elevated  -CEA- 3.1 --minimal elevation (nonsmoker)    -Appreciate pulmonologist input and thoracic surgery input  -Await cytology of right pleural fluid from 7/2/2024, and added Flow Cytometry pleural fluid--pending  -Await pleural pigtail catheter placement then tPA/dornase instillations and repeat CT  -May need to consider VATS   -May need CT A/P,  outpatient PET/CT      Anorexia  Assessment & Plan  Patient has had anorexia with 6 to 7 pound weight loss.  Workup to rule out malignancy is in progress  -Protonix 40 mg nightly  -Nutrition consult  -Encourage supplements        Anemia  Assessment & Plan  Normocytic anemia.  -Hemoglobin 13.5 then 12.6.  MCV 90.1.  Creatinine 0.9.  -Patient receives B12 injections every 30 days.  -Monitor. Hgb 13 on  7/3--stable        Lab Results   Component Value Date    UMDCYBOW88 >1,500 (H) 07/02/2024     Lab Results   Component Value Date    FOLATE 11.5 07/02/2024     Lab Results   Component Value Date    IRON 40 07/02/2024    TIBC 232 (L) 07/02/2024    FERRITIN 222 07/02/2024     % sat 17    Pulmonary nodules  Assessment & Plan  See Pleural effusion    History of rectal cancer  Assessment & Plan  Rectal cancer  -Patient was followed at [facility], T2 N1 M0 rectal cancer  -Neoadjuvant chemotherapy and radiotherapy followed by surgery (APR), then had multiple complications after the surgery, then recovered and had 4 months of adjuvant chemotherapy postoperatively and has not had known recurrence of his rectal cancer.    -Surveillance colonoscopy 8/11/2022 revealed hyperplastic polyp in the transverse colon with pathology revealing tubular adenoma.  -Ostomy functioning  -CEA 3.1    DVT (deep venous thrombosis) (CMS/HCC)  Assessment & Plan  LLE DVT and PE in 3/2019, on therapeutic dose Xarelto for 6 months then changed to prophylactic dose, then had evidence of DVT in 4/2020, unclear if acute DVT but anticoagulation was changed to Eliquis then resumed Xarelto.    -Xarelto on hold pending completion of invasive procedures  -Resume Xarelto when able  -DVT prophylactic dose enoxaparin 40 mg daily has been ordered.    CAD (coronary artery disease)  Assessment & Plan  Patient takes atorvastatin and metoprolol            VALENCIA Hua  7/3/2024  [facility] ([facility])  [phone] office

## 2024-07-03 NOTE — NURSING NOTE
Patient returned to unit from IR with R chest tube, order to attach to wall suction. -20 H20. Patient Aao3, no c/o increase pluretic pain, admits to discomfort at cath site, Cath site dsg CDI, Patient with diminished lung sounds R bases, no crepitus felt, no c/o SOB. Chest tube draining light bloody drainage. Chest tube protocol initiated. Care on going.     18:49 7/3/2024. First dose of TPA adm by IV Team nurse. Chest tube clamped, Patency to be initiated in 1 hr by IV team. Patient denies chest pain or SOB, tylenol given prior to TPA adm. Care ongoing.

## 2024-07-04 LAB
ANION GAP SERPL CALC-SCNC: 8 MEQ/L (ref 3–15)
BUN SERPL-MCNC: 23 MG/DL (ref 7–25)
CALCIUM SERPL-MCNC: 9 MG/DL (ref 8.6–10.3)
CHLORIDE SERPL-SCNC: 100 MEQ/L (ref 98–107)
CO2 SERPL-SCNC: 26 MEQ/L (ref 21–31)
CREAT SERPL-MCNC: 0.9 MG/DL (ref 0.7–1.3)
EGFRCR SERPLBLD CKD-EPI 2021: >60 ML/MIN/1.73M*2
ERYTHROCYTE [DISTWIDTH] IN BLOOD BY AUTOMATED COUNT: 13.7 % (ref 11.6–14.4)
GLUCOSE SERPL-MCNC: 115 MG/DL (ref 70–99)
HCT VFR BLD AUTO: 38.5 % (ref 40.1–51)
HGB BLD-MCNC: 12.6 G/DL (ref 13.7–17.5)
MAGNESIUM SERPL-MCNC: 2 MG/DL (ref 1.8–2.5)
MCH RBC QN AUTO: 29.4 PG (ref 28–33.2)
MCHC RBC AUTO-ENTMCNC: 32.7 G/DL (ref 32.2–36.5)
MCV RBC AUTO: 90 FL (ref 83–98)
PDW BLD AUTO: 9.5 FL (ref 9.4–12.4)
PLATELET # BLD AUTO: 296 K/UL (ref 150–350)
POTASSIUM SERPL-SCNC: 4.7 MEQ/L (ref 3.5–5.1)
RBC # BLD AUTO: 4.28 M/UL (ref 4.5–5.8)
SODIUM SERPL-SCNC: 134 MEQ/L (ref 136–145)
WBC # BLD AUTO: 10.07 K/UL (ref 3.8–10.5)

## 2024-07-04 PROCEDURE — 63600000 HC DRUGS/DETAIL CODE: Mod: JZ,JG | Performed by: STUDENT IN AN ORGANIZED HEALTH CARE EDUCATION/TRAINING PROGRAM

## 2024-07-04 PROCEDURE — 85027 COMPLETE CBC AUTOMATED: CPT | Performed by: PHYSICIAN ASSISTANT

## 2024-07-04 PROCEDURE — 80048 BASIC METABOLIC PNL TOTAL CA: CPT | Performed by: PHYSICIAN ASSISTANT

## 2024-07-04 PROCEDURE — 25000000 HC PHARMACY GENERAL: Performed by: STUDENT IN AN ORGANIZED HEALTH CARE EDUCATION/TRAINING PROGRAM

## 2024-07-04 PROCEDURE — 99233 SBSQ HOSP IP/OBS HIGH 50: CPT | Performed by: STUDENT IN AN ORGANIZED HEALTH CARE EDUCATION/TRAINING PROGRAM

## 2024-07-04 PROCEDURE — 3E0L3GC INTRODUCTION OF OTHER THERAPEUTIC SUBSTANCE INTO PLEURAL CAVITY, PERCUTANEOUS APPROACH: ICD-10-PCS | Performed by: STUDENT IN AN ORGANIZED HEALTH CARE EDUCATION/TRAINING PROGRAM

## 2024-07-04 PROCEDURE — 63700000 HC SELF-ADMINISTRABLE DRUG

## 2024-07-04 PROCEDURE — 63700000 HC SELF-ADMINISTRABLE DRUG: Performed by: STUDENT IN AN ORGANIZED HEALTH CARE EDUCATION/TRAINING PROGRAM

## 2024-07-04 PROCEDURE — 20600000 HC ROOM AND CARE INTERMEDIATE/TELEMETRY

## 2024-07-04 PROCEDURE — 3E0L317 INTRODUCTION OF OTHER THROMBOLYTIC INTO PLEURAL CAVITY, PERCUTANEOUS APPROACH: ICD-10-PCS | Performed by: STUDENT IN AN ORGANIZED HEALTH CARE EDUCATION/TRAINING PROGRAM

## 2024-07-04 PROCEDURE — 63600000 HC DRUGS/DETAIL CODE: Mod: JZ | Performed by: PHYSICIAN ASSISTANT

## 2024-07-04 PROCEDURE — 200200 PR NO CHARGE: Performed by: SURGERY

## 2024-07-04 PROCEDURE — 83735 ASSAY OF MAGNESIUM: CPT | Performed by: PHYSICIAN ASSISTANT

## 2024-07-04 PROCEDURE — 25800000 HC PHARMACY IV SOLUTIONS: Performed by: STUDENT IN AN ORGANIZED HEALTH CARE EDUCATION/TRAINING PROGRAM

## 2024-07-04 PROCEDURE — 36415 COLL VENOUS BLD VENIPUNCTURE: CPT | Performed by: PHYSICIAN ASSISTANT

## 2024-07-04 PROCEDURE — 63700000 HC SELF-ADMINISTRABLE DRUG: Performed by: HOSPITALIST

## 2024-07-04 RX ORDER — MORPHINE SULFATE 2 MG/ML
2 INJECTION, SOLUTION INTRAMUSCULAR; INTRAVENOUS 2 TIMES DAILY PRN
Status: DISCONTINUED | OUTPATIENT
Start: 2024-07-04 | End: 2024-07-06

## 2024-07-04 RX ORDER — ADHESIVE BANDAGE
30 BANDAGE TOPICAL ONCE
Status: COMPLETED | OUTPATIENT
Start: 2024-07-04 | End: 2024-07-04

## 2024-07-04 RX ADMIN — ATORVASTATIN CALCIUM 20 MG: 20 TABLET, FILM COATED ORAL at 17:50

## 2024-07-04 RX ADMIN — DORNASE ALFA 5 MG: 1 SOLUTION RESPIRATORY (INHALATION) at 17:39

## 2024-07-04 RX ADMIN — FUROSEMIDE 20 MG: 20 TABLET ORAL at 08:46

## 2024-07-04 RX ADMIN — ACETAMINOPHEN 650 MG: 325 TABLET ORAL at 21:09

## 2024-07-04 RX ADMIN — ACETAMINOPHEN 650 MG: 325 TABLET ORAL at 08:18

## 2024-07-04 RX ADMIN — PANTOPRAZOLE SODIUM 40 MG: 40 TABLET, DELAYED RELEASE ORAL at 20:47

## 2024-07-04 RX ADMIN — ENOXAPARIN SODIUM 40 MG: 40 INJECTION SUBCUTANEOUS at 17:50

## 2024-07-04 RX ADMIN — DORNASE ALFA 5 MG: 1 SOLUTION RESPIRATORY (INHALATION) at 08:18

## 2024-07-04 RX ADMIN — ACETAMINOPHEN 650 MG: 325 TABLET ORAL at 17:38

## 2024-07-04 RX ADMIN — ALTEPLASE 10 MG: 2.2 INJECTION, POWDER, LYOPHILIZED, FOR SOLUTION INTRAVENOUS at 17:39

## 2024-07-04 RX ADMIN — MAGNESIUM HYDROXIDE 30 ML: 400 SUSPENSION ORAL at 17:50

## 2024-07-04 RX ADMIN — ALTEPLASE 10 MG: 2.2 INJECTION, POWDER, LYOPHILIZED, FOR SOLUTION INTRAVENOUS at 08:17

## 2024-07-04 RX ADMIN — METOPROLOL SUCCINATE 100 MG: 100 TABLET, EXTENDED RELEASE ORAL at 08:46

## 2024-07-04 ASSESSMENT — COGNITIVE AND FUNCTIONAL STATUS - GENERAL
MOVING TO AND FROM BED TO CHAIR: 4 - NONE
CLIMB 3 TO 5 STEPS WITH RAILING: 4 - NONE
MOVING TO AND FROM BED TO CHAIR: 4 - NONE
STANDING UP FROM CHAIR USING ARMS: 4 - NONE
STANDING UP FROM CHAIR USING ARMS: 4 - NONE
WALKING IN HOSPITAL ROOM: 4 - NONE
CLIMB 3 TO 5 STEPS WITH RAILING: 4 - NONE
WALKING IN HOSPITAL ROOM: 4 - NONE

## 2024-07-04 NOTE — PROGRESS NOTES
Thoracic Surgery Daily Progress Note    Subjective     No acute events overnight. Satting well on RA.     Objective     Vital signs in last 24 hours:  Temp:  [36.1 °C (97 °F)-36.8 °C (98.2 °F)] 36.3 °C (97.3 °F)  Heart Rate:  [75-84] 81  Resp:  [16-18] 18  BP: (100-138)/(59-77) 115/70      Intake/Output Summary (Last 24 hours) at 7/5/2024 0638  Last data filed at 7/4/2024 1854  Gross per 24 hour   Intake 10 ml   Output 970 ml   Net -960 ml     Intake/Output this shift:  No intake/output data recorded.    Physical Exam  Physical Exam  Constitutional:       General: He is not in acute distress.  Eyes:      General: No scleral icterus.     Conjunctiva/sclera: Conjunctivae normal.   Cardiovascular:      Rate and Rhythm: Normal rate and regular rhythm.      Pulses: Normal pulses.   Pulmonary:      Effort: Pulmonary effort is normal. No respiratory distress. CT to suction, serosang output  Skin:     General: Skin is warm and dry.   Neurological:      General: No focal deficit present.      Mental Status: He is alert.             Labs:  BMP Results         07/02/24 07/01/24 06/19/24     0337 1123 0516     135 137    K 3.9 3.8 4.2    Cl 100 100 105    CO2 28 27 25    Glucose 99 132 98    BUN 13 14 26    Creatinine 0.8 0.9 0.8    Calcium 9.1 9.4 9.1    Anion Gap 7 8 7    EGFR >60.0 >60.0 >60.0           Comment for K at 1123 on 07/01/24: Results obtained on plasma. Plasma Potassium values may be up to 0.4 mEQ/L less than serum values. The differences may be greater for patients with high platelet or white cell counts.    Comment for EGFR at 0337 on 07/02/24: Calculation based on the Chronic Kidney Disease Epidemiology Collaboration (CKD-EPI) equation refit without adjustment for race.    Comment for EGFR at 1123 on 07/01/24: Calculation based on the Chronic Kidney Disease Epidemiology Collaboration (CKD-EPI) equation refit without adjustment for race.    Comment for EGFR at 0516 on 06/19/24: Calculation based on the  Chronic Kidney Disease Epidemiology Collaboration (CKD-EPI) equation refit without adjustment for race.           CBC Results         07/02/24 07/01/24 06/19/24     0337 1123 0516    WBC 8.82 10.17 8.07    RBC 4.34 4.58 4.56    HGB 12.6 13.5 13.5    HCT 39.1 41.2 41.2    MCV 90.1 90.0 90.4    MCH 29.0 29.5 29.6    MCHC 32.2 32.8 32.8     307 229            Imaging  I have reviewed the Imaging from the last 24 hrs.      Assessment/Plan       Dave Arredondo is a 88 year old man with history of rectal cancer s/p resection with ostomy, CAD, DVT/PE on Xarelto who presented with a recurrent loculated pleural effusion concerning for possible malignancy     Plan:  - Follow up pleural fluid culture and cytology  - Continue with tPA/Dornase 6 doses total (end 7/6), followed by CT chest for re-evaluation of pleural effusion  - Rest of care as per primary    Sven Quinonez MD  General Surgery, PGY-3  [phone]

## 2024-07-04 NOTE — PROGRESS NOTES
Hospital Medicine     Daily Progress Note       SUBJECTIVE   Interval History: Patient endorses having pain while having tPA/dornase administered yesterday.  Required tramadol and oxycodone for pain management.  He is asking for something to tolerate the pain prior to receiving more this morning.  Otherwise denies any fevers, chills, headaches, chest pain, worsening shortness of breath, abdominal pain.     OBJECTIVE      Vital signs in last 24 hours:  Temp:  [36.1 °C (97 °F)-36.7 °C (98 °F)] 36.1 °C (97 °F)  Heart Rate:  [75-88] 75  Resp:  [16-18] 16  BP: (113-143)/(68-89) 138/73    Intake/Output Summary (Last 24 hours) at 7/4/2024 1422  Last data filed at 7/4/2024 1132  Gross per 24 hour   Intake 230 ml   Output 1945 ml   Net -1715 ml       PHYSICAL EXAMINATION          Constitutional: no acute distress, sitting up in bed  Eyes:  non-icteric   ENMT: Moist mucous membranes  CV: RRR, no murmurs detected  Pulm: Breathing comfortably on room air.  Diminish right lung base sounds.  Right-sided pigtail in place  GI: Bowel sounds are normal, soft  MSK: no cyanosis, clubbing  Neuro: awake, alert and oriented x 3.  No obvious focal deficits   LINES, CATHETERS, DRAINS, AIRWAYS, AND WOUNDS   Lines, Drains, and Airways:  Wounds (agree with documentation and present on admission):  Peripheral IV (Adult) 07/01/24 Left Antecubital (Active)   Number of days: 2       Colostomy Unknown LUQ (Active)   Number of days: 5213         Comments:    LABS / IMAGING / TELE      Labs  I have reviewed the patient's labs to the time of note. No new clinical concern.    Results from last 7 days   Lab Units 07/04/24  0320 07/03/24 0445 07/02/24  0337   WBC K/uL 10.07 8.60 8.82   HEMOGLOBIN g/dL 12.6* 13.0* 12.6*   HEMATOCRIT % 38.5* 39.7* 39.1*   PLATELETS K/uL 296 284 274       Results from last 7 days   Lab Units 07/04/24  0320 07/03/24  0445 07/02/24  0337 07/01/24  1123   SODIUM mEQ/L 134* 135* 135* 135*   POTASSIUM mEQ/L 4.7 4.1 3.9 3.8    CHLORIDE mEQ/L 100 101 100 100   CO2 mEQ/L 26 27 28 27   BUN mg/dL 23 17 13 14   CREATININE mg/dL 0.9 0.7 0.8 0.9   CALCIUM mg/dL 9.0 8.9 9.1 9.4   ALBUMIN g/dL  --   --   --  3.5   BILIRUBIN TOTAL mg/dL  --   --   --  0.9   ALK PHOS IU/L  --   --   --  102   ALT IU/L  --   --   --  15   AST IU/L  --   --   --  17   GLUCOSE mg/dL 115* 100* 99 132*           Imaging  IR PLEURAL CATHETER PLACEMENT    Result Date: 7/3/2024  CLINICAL HISTORY:    88-year-old male with loculated right pleural effusion.     IMPRESSION:   Satisfactory 12 [place]an right pigtail chest tube placement. COMMENT: PROCEDURE:  Chest tube placement ANESTHESIA:  Lidocaine 1% local. FLUORO TIME:  0.1 minutes. REFERENCE AIR KERMA: 0.9 mGy. CONTRAST:  None. MEDICATIONS:  None. DESCRIPTION OF PROCEDURE:    Written informed consent was obtained.  Ultrasound demonstrates complex, multiloculated right pleural effusion.  The patient was prepped and draped in the usual sterile manner.  Using real-time ultrasound guidance, a Yueh needle was inserted into the right pleural effusion.  Following serial dilation, a 12 Chilean pigtail chest tube was inserted.  Serosanguineous fluid was aspirated.  The catheter was secured with suture and connected to Pleur-evac drainage.  The patient tolerated the procedure well without immediate complication.     ULTRASOUND GUIDED NEEDLE PLACEMENT    Result Date: 7/3/2024  CLINICAL HISTORY:    88-year-old male with loculated right pleural effusion.     IMPRESSION:   Satisfactory 12 [place]an right pigtail chest tube placement. COMMENT: PROCEDURE:  Chest tube placement ANESTHESIA:  Lidocaine 1% local. FLUORO TIME:  0.1 minutes. REFERENCE AIR KERMA: 0.9 mGy. CONTRAST:  None. MEDICATIONS:  None. DESCRIPTION OF PROCEDURE:    Written informed consent was obtained.  Ultrasound demonstrates complex, multiloculated right pleural effusion.  The patient was prepped and draped in the usual sterile manner.  Using real-time ultrasound guidance, a  Yueh needle was inserted into the right pleural effusion.  Following serial dilation, a 12 Greenlandic pigtail chest tube was inserted.  Serosanguineous fluid was aspirated.  The catheter was secured with suture and connected to Pleur-evac drainage.  The patient tolerated the procedure well without immediate complication.     IR THORACENTESIS    Result Date: 7/3/2024  06585 THORACOSTOMY CATHETER INSERTION WITH ULTRASOUND GUIDANCE CLINICAL HISTORY: Recurrent right pleural effusion, shortness of breath     IMPRESSION: Successful ultrasound guided right thoracentesis with 1100 cc of fluid removed and  sent for laboratory studies, as requested COMMENT: The patient is referred for ultrasound guided thoracentesis on the right side. Informed consent was obtained. With the patient sitting, ultrasound imaging is performed and a moderately large multiloculated right pleural effusion is identified. The fluid is localized and a site is selected on the skin with ultrasound. The site is marked. Using sterile technique, under local anesthesia, the thoracentesis catheter is inserted into the pleural space. 1100 cc of serosanguineous fluid are removed. The fluid spontaneously stopped draining. The catheter is removed and a sterile dressing is placed over the catheter insertion site. Vital signs were monitored throughout the procedure and remained stable. The fluid is discarded  sent for laboratory analysis, as requested. A dedicated CT chest was ordered and will be obtained and separately reported. This service rendered by Melissa Tsang PA-C. I certify that I have personally reviewed this examination and agree with Melissa Tsang's report. Quentin Ritter M.D.    CT CHEST WITHOUT IV CONTRAST    Result Date: 7/2/2024  CLINICAL HISTORY: Pleural effusion, malignancy suspected COMPARISON: CT 6/18/2024 COMMENT: TECHNIQUE: CT of the chest was performed with the patient in the supine position. Images reconstructed/reformatted in the axial, coronal  and  sagittal plane. CT DOSE:  One or more dose reduction techniques (e.g. automated exposure control, adjustment of the mA and/or kV according to patient size, use of iterative reconstruction technique) utilized for this examination. ADMINISTERED CONTRAST AND DOSE: None The lack of intravenous contrast limits interpretation of the solid organs, vessels and certain processes. LUNG, LARGE AIRWAYS AND PLEURA: Moderate right pleural effusion with areas of pleural thickening and heterogeneity particularly at the right lung base for which malignancy is not excluded.  Patchy consolidative changes in the right middle and lower lobes for which superimposed pneumonia should be excluded clinically.  Stable scattered pulmonary nodules largest in the left upper lobe measuring up to 6 mm (axial image #132). CHEST WALL AND LOWER NECK: within normal limits. MEDIASTINUM AND JENNIFER: There are mildly prominent mediastinal lymph nodes measuring up to 9 mm in short axis. HEART: normal size.  Trace pericardial effusion. VESSELS: Aorta measures up to 4.3 cm at the level the main pulmonary artery. UPPER ABDOMEN:Partially visualized bilateral parapelvic cysts. BONES: Mild multilevel degenerative changes in the spine.  Stable sclerotic foci in the ribs.     IMPRESSION: Moderate right pleural effusion with areas of pleural thickening and heterogeneity particularly at the right lung base for which malignancy is not excluded. Patchy consolidative changes in the right middle and lower lobes for which superimposed pneumonia should be excluded clinically. Additional findings as above.    X-RAY CHEST 2 VIEWS    Result Date: 7/1/2024  CLINICAL HISTORY: Chest pain.     IMPRESSION: Large right pleural effusion. COMMENT: PA and lateral radiographs the chest reveal a large right pleural effusion. Left lung is well-aerated. Cardiac silhouette is unchanged compared to 6/17/2024.    CT CHEST WITHOUT IV CONTRAST    Result Date: 6/18/2024  CLINICAL HISTORY:  Pleural effusion. Follow up after thoracentesis. Malignancy suspected. COMPARISON: CT dated 6/15/2024, and other studies TECHNIQUE: Noncontrast multidetector CT of the thorax was performed. Reformatted axial, sagittal and coronal images were reviewed. Axial maximum intensity projection (MIP) reconstructions were also reviewed. Dose reduction techniques such as automated exposure control were used in this study where applicable. COMMENT: The visualized thyroid gland is unremarkable. No thoracic lymphadenopathy. Ectasia of the ascending thoracic aorta measuring up to to 4.3 cm. Coronary and aortic calcifications noted. Normal cardiac size.  No pericardial effusion. No acute abnormality in the visualized upper abdomen. The central airways are grossly patent. There is a moderate right pleural effusion, decreased in size compared to the recent prior chest CT. There is improved aeration right lung with persistent atelectasis of the right middle and upper lobes. Scattered small pulmonary nodules are again noted without interval change. No pneumothorax identified. The osseous structures are stable.     IMPRESSION: 1.  Moderate pleural effusion, decreased from the prior CT. 2.  Stable pulmonary nodules.    IR THORACENTESIS    Result Date: 6/17/2024  CLINICAL HISTORY: shortness of breath. COMMENT: The patient was referred for ultrasound-guided thoracentesis on the right side.  Informed consent was obtained.  With the patient lying on his left side, ultrasound imaging was performed and a large size pleural effusion was identified.  The fluid was localized and a site was selected on the skin with ultrasound.  The site was marked.  Using sterile technique, under local anesthesia, a 4-Malay valved Yueh catheter was inserted into the pleural space. 1.2 L of serosanguineous pleural fluid were removed.  The catheter was removed and a sterile dressing was placed over the catheter insertion site. A small pleural effusion remained  post procedure.  This procedure was discontinued prior to complete evacuation of the effusion because the daily maximum was reached. The fluid was sent to the lab.  The patient tolerated the procedure well. An erect frontal chest film was obtained immediately following the procedure and will be separately reported. This service rendered by Maria Antonia Bradley PA-C     IMPRESSION: Successful ultrasound-guided right-sided thoracentesis with 1.2 L pleural fluid removed and sent to the lab.  All components of the time-out, debriefing, and handling of the specimen were conducted as per the ASAP Specimen Protocol. I certify that I have personally reviewed this examination and agree with Maria Antonia Bradley's report. Cj Yen M.D.    X-RAY CHEST 1 VIEW    Result Date: 6/17/2024  CLINICAL HISTORY: s/p R thora     IMPRESSION: No pneumothorax.  Small right pleural effusion and right basilar airspace opacity. COMMENT: Comparison: Chest x-ray 6/15/2024. Technique: A single frontal AP portable upright projection of the chest was obtained. FINDINGS: There is a small right pleural effusion with right basilar airspace opacity. The left lung is clear.  There is no pneumothorax.  The cardiomediastinal silhouette is unchanged.  The upper abdomen is unremarkable.  There is no acute osseous abnormality.     Transthoracic Echo (TTE) Complete    Result Date: 6/17/2024    Left Ventricle: Normal ventricle size. Concentric left ventricular hypertrophy. Normal systolic function. Estimated EF 65%. No regional wall motion abnormalities. Grade I diastolic dysfunction.   Right Ventricle: Normal ventricle size. Normal systolic function.   Left Atrium: Normal sized atrium.   Right Atrium: Normal sized atrium.   Aortic Valve: Tricuspid valve.  Sclerotic leaflets. Mild regurgitation.   Mitral Valve: Sclerotic mitral valve. Normal leaflet motion. Mild mitral annular calcification. Mild regurgitation.   Tricuspid Valve: Normal structure. Mild regurgitation.  Estimated RVSP = 37 mmHg.   Pulmonic Valve: Normal structure. Trace regurgitation.   Aorta: Aortic root grossly normal.   IVC/SVC: Normal sized inferior vena cava. Inferior vena cava demonstrates normal respiratory collapse.   Pericardium: No evidence of pericardial effusion.   Compared to the prior echocardiogram of 4/27/2020, there is no significant change.     Ultrasound venous arm right    Result Date: 6/15/2024  CLINICAL HISTORY: Right upper extremity numbness and pain. COMMENT: Ultrasound imaging of the right upper extremity was performed.  Color and spectral flow Doppler images were also obtained. COMPARISON: None. There is normal compressibility and flow in the internal jugular, axillary, basilic, cephalic and brachial veins.  Spectral Doppler images demonstrate normal respiratory phasicity.  There is normal color flow within the subclavian vein.     IMPRESSION: No evidence of right upper extremity deep vein thrombosis.     CT ANGIOGRAPHY CHEST PULMONARY EMBOLISM WITH IV CONTRAST    Result Date: 6/15/2024  CLINICAL HISTORY:    Pulmonary embolism (PE) suspected, high prob COMPARISON:    4/25/2020 COMMENT: Technique: CT of the Chest was performed following the administration of IV contrast. 100mL of iopamidoL ([id number]) 370 mg iodine /mL (76 %) injection 100 mL KV and/or mA were adjusted according to the patient's size and body part for CT dose reduction. 3D MIP and/or volume rendered image reconstruction performed and reviewed. CHEST: Lungs airways and pleura: Subpleural reticulation is seen.  Scattered small pulmonary nodules are again seen less than 6 mm the largest is linearly oriented in the left upper lobe.  The right middle and lower lobe pulmonary nodules are difficult to evaluate due to the development of a large right pleural effusion with right lower lobe collapse and partial atelectasis of the right middle and upper lobes centrally. Lower Neck: within normal limits. Axilla: No enlarged nodes.  Mediastinum and Glenda: No enlarged nodes. Heart and Pericardium: Normal size.  No pericardial effusion. Vessels:  Vascular calcification Pulmonary arteries:  There is no proximal or segmental pulmonary arterial embolism. Esophagus:  Normal caliber. Upper abdomen:  Stable. Chest Wall: Bilateral gynecomastia again noted.. Bones:  Degenerative change.  Sclerotic foci in the ribs are unchanged.     IMPRESSION: development of a large right pleural effusion with right lower lobe collapse and partial atelectasis of the right middle and upper lobes centrally. Scattered small pulmonary nodules are again seen less than 6 mm the largest is linearly oriented in the left upper lobe. No CT evidence for proximal or segmental pulmonary arterial embolism.     X-RAY CHEST 2 VIEWS    Result Date: 6/15/2024  CLINICAL HISTORY:   sob COMMENT: VIEWS: two - frontal and lateral. Comparison date: 4/25/2020. The heart and mediastinal contours are partly obscured by moderate right pleural effusion with basilar airspace opacity.  Fluid is likely to accurately into the fissures on the right as well.  The left lung is clear.  There is a large left pleural effusion.  Trachea is midline.  Bony thorax intact with degenerative change.     IMPRESSION: moderate right pleural effusion with basilar airspace opacity.  Fluid is likely to accurately into the fissures on the right as well.         ECG/Telemetry  I have independently reviewed the telemetry. No events for the last 24 hours.    ASSESSMENT AND PLAN      * Pleural effusion on right  Assessment & Plan  S/p 6/17 R sided thoracentesis with 1.2 L removed, exudative. Culture and cytology negative   CXR on admission with large pleural effusion   S/p repeat thora 7/2 with 1.1 L off, locultated   Follow up fluid culture studies and flow cytometry   CT chest post thora on 7/2 showing ongoing moderate R pleural effusion and pelural thickening, concern for malignancy (noted hx of rectal CA)   Pulmonary and  thoracic surgery following   IR for pigtail placement 7/4  then TPA/dornase x 6 doses and follow up CT chest post > while receiving medication causing him pain so as needed IV morphine available       Pulmonary nodules  Assessment & Plan  6/18 CAT scan chest revealed pulmonary nodules  CT chest 7/2 showing stable pulm nodules, largest JESSE up to 6mm       Right-sided chest pain  Assessment & Plan  Improving  Pleuritic in nature, suspect secondary to large R pleural effusion   Improved post thora yesterday   No ischemic EKG changes  Monitor on telemetry       History of rectal cancer  Assessment & Plan  In 2010 per patient, cared for at [place]   S/p colostomy, chemo and radiation   With recurrent exudative pleural effusion and CT scan findings concern for malignancy   Oncology following   Follow up pleural fluid studies     DVT (deep venous thrombosis) (CMS/HCC)  Assessment & Plan  History of DVT/PE   On Xarelto as OP   Discussed with surgery who will be okay with AC while pigtail in place/ undergoing TPA/dornase     CAD (coronary artery disease)  Assessment & Plan  Patient with presumed CAD based on abnormal stress test in 2018, declined cardiac cath   Chest pain noted but is R sided and suspected 2/2 large pleural effusion   Troponin flat, monitoring on tele          VTE Assessment: [place] VTE Score: 4  VTE Prophylaxis:  Current anticoagulants:  enoxaparin (LOVENOX) syringe 40 mg, subcutaneous, Daily (6p)      Code Status: Full Code      Estimated Discharge Date: 7/7/2024   Disposition Planning: will be inpatient for minimum 2 additional days for TPA/dornase      Kvng Valladares MD  7/4/2024

## 2024-07-04 NOTE — NURSING NOTE
VAT: Right chest tube unclamped at 0920 and back to suction. RICH Benitez aware. Patient  with no c/o discomfort at this time. TRAN Santillan RN

## 2024-07-04 NOTE — NURSING NOTE
VAT: Cathflo and Dornase instilled per order in right chest tube. Chest tube remains clamped and patient tolerated well. TRAN Santillan RN

## 2024-07-04 NOTE — PLAN OF CARE
Plan of Care Review  Plan of Care Reviewed With: patient  Progress: improving  AA&O pleasant appropriate and compliant. Medicated prn for CT insertion site pain. Draining average of 50cc/hr upon administration of TPA at 1800. Serous sanguinous drng at this time. VSS CT to 20 cm neg suction. Easy non labored but shallow BS note d/t pain. Bed alarm intact call light within reach

## 2024-07-04 NOTE — NURSING NOTE
VAT: right chest tube unclamped and back to suction. Patient without discomfort. RICH Benitez. TRAN Santillan RN.

## 2024-07-04 NOTE — PLAN OF CARE
Problem: Adult Inpatient Plan of Care  Goal: Plan of Care Review  Outcome: Progressing  Flowsheets (Taken 7/4/2024 6450)  Progress: improving  Outcome Evaluation: pain better today around chest tube,  TPA given 2 times with IVT.  draining serousaginous fluid.  new pleurex container placed today.  chest tube to suction and draining.  moving around in the bed. instructed to use IS.  pleasant man  Plan of Care Reviewed With: patient   Plan of Care Review  Plan of Care Reviewed With: patient  Progress: improving  Outcome Evaluation: pain better today around chest tube,  TPA given 2 times with IVT.  draining serousaginous fluid.  new pleurex container placed today.  chest tube to suction and draining.  moving around in the bed. instructed to use IS.  pleasant man

## 2024-07-04 NOTE — NURSING NOTE
"VAT: Cathflo/Dornase instilled in right chest tube per protocol. Patient given Tylenol by staff RN prior to instilling. Awaiting MD response for different pain medication to be given prior to future doses. Patient tolerated procedure but stated \"the pain will start 30 minutes from now\". TRAN Santillan RN  "

## 2024-07-04 NOTE — PROGRESS NOTES
"ONCOLOGY Progress Note    Subjective    Right pleural pigtail inserted 7/3/2024 with plan for intrapleural fibrinolysis--due to recurrent loculated exudative pleural effusion    tPA/dornase instilled--per surgery, recommend 6 doses, then CT chest after 6 dose    Cytology pleural fluid 7/2 still pending    Patient is not hypoxic on room air  +MARY, but no SOB at rest    Afebrile    Objective  Vital signs in last 24 hours:  Temp:  [36.3 °C (97.3 °F)-36.7 °C (98 °F)] 36.4 °C (97.5 °F)  Heart Rate:  [76-88] 76  Resp:  [16-18] 18  BP: (124-152)/(67-89) 134/74    Physical Exam:  Visit Vitals  /74 (BP Location: Left upper arm, Patient Position: Lying)   Pulse 76   Temp 36.4 °C (97.5 °F) (Oral)   Resp 18   Ht 1.702 m (5' 7\")   Wt 71.7 kg (158 lb 1.1 oz)   SpO2 94%   BMI 24.76 kg/m²       EMR reviewed    Medications:      acetaminophen, 650 mg, oral, q4h PRN    albuterol HFA, 2 puff, inhalation, q6h PRN    alteplase (CATHFLO ACTIVASE) 10 mg in sodium chloride 0.9 % 20 mL solution, 10 mg, intrapleural, q12h (7a, 7p) **AND** dornase alpha (PULMOZYME) 1 mg/mL 5 mg in sterile water 25 mL solution, 5 mg, intrapleural, q12h (7a, 7p)    atorvastatin, 20 mg, oral, Daily (6p)    calcium carbonate, 1,000 mg, oral, 2x daily PRN    cyanocobalamin, 1,000 mcg, intramuscular, q30 days    glucose, 16-32 g of dextrose, oral, PRN **OR** dextrose, 15-30 g of dextrose, oral, PRN **OR** glucagon, 1 mg, intramuscular, PRN **OR** dextrose 50 % in water (D50), 25 mL, intravenous, PRN    enoxaparin, 40 mg, subcutaneous, Daily (6p)    furosemide, 20 mg, oral, Daily    melatonin, 3 mg, oral, Nightly PRN    metoprolol succinate XL, 100 mg, oral, Daily    nitroglycerin, 0.4 mg, sublingual, q5 min PRN    pantoprazole, 40 mg, oral, Nightly    polyethylene glycol, 17 g, oral, Daily PRN    Labs:  Results from last 7 days   Lab Units 07/04/24  0320 07/03/24  0445 07/02/24  0337   WBC K/uL 10.07 8.60 8.82   HEMOGLOBIN g/dL 12.6* 13.0* 12.6*   HEMATOCRIT " % 38.5* 39.7* 39.1*   PLATELETS K/uL 296 284 274     Results from last 7 days   Lab Units 07/04/24  0320 07/03/24  0445 07/02/24  0337   SODIUM mEQ/L 134* 135* 135*   POTASSIUM mEQ/L 4.7 4.1 3.9   CHLORIDE mEQ/L 100 101 100   CO2 mEQ/L 26 27 28   BUN mg/dL 23 17 13   CREATININE mg/dL 0.9 0.7 0.8   GLUCOSE mg/dL 115* 100* 99   CALCIUM mg/dL 9.0 8.9 9.1         Results from last 7 days   Lab Units 07/02/24  0337 07/01/24  1123   INR  1.2 1.8       Microbiology Results       Procedure Component Value Units Date/Time    Body Fluid Culture/Smear Pleural Fluid, Right [[id number]] Collected: 07/02/24 0928    Specimen: Pleural Fluid, Right Updated: 07/03/24 1016     Culture No growth at 18-24 hours     Gram Stain Result 1+ WBC      No organisms seen    Fungal Stain Pleural Fluid, Right [[id number]] Collected: 07/02/24 0928    Specimen: Pleural Fluid, Right Updated: 07/02/24 1404     Fungus Stain No fungal elements seen    Fungal Culture Pleural Fluid, Right [[id number]]  (Normal) Collected: 07/02/24 0928    Specimen: Pleural Fluid, Right Updated: 07/03/24 1402     Fungal Culture No Fungus Isolated to Date    AFB stain Pleural Fluid, Right [[id number]]  (Normal) Collected: 07/02/24 0928    Specimen: Pleural Fluid, Right Updated: 07/03/24 1347     AFB Stain No acid fast bacilli seen    Body Fluid Culture/Smear Pleural Fluid, Right [[id number]] Collected: 06/17/24 1410    Specimen: Pleural Fluid, Right Updated: 06/21/24 1212     Culture No growth at 96 hours     Gram Stain Result 1+ WBC      No organisms seen    Fungal Stain Pleural Fluid, Right [[id number]] Collected: 06/17/24 1410    Specimen: Pleural Fluid, Right Updated: 06/18/24 0648     Fungus Stain No fungal elements seen    Fungal Culture Pleural Fluid, Right [[id number]]  (Normal) Collected: 06/17/24 1410    Specimen: Pleural Fluid, Right Updated: 06/18/24 2300     Fungal Culture No Fungus Isolated to Date    AFB stain Pleural Fluid, Right [[id number]]  (Normal) Collected:  06/17/24 1410    Specimen: Pleural Fluid, Right Updated: 06/18/24 1514     AFB Stain No acid fast bacilli seen    AFB Culture Pleural Fluid, Right [[id number]]  (Normal) Collected: 06/17/24 1410    Specimen: Pleural Fluid, Right Updated: 06/19/24 1401     AFB Culture No Acid Fast Bacilli Isolated to Date    SARS-COV-2 (COVID-19)/ FLU A/B, AND RSV, PCR Nasopharynx [[id number]]  (Normal) Collected: 06/15/24 1247    Specimen: Nasopharyngeal Swab from Nasopharynx Updated: 06/15/24 1335     SARS-CoV-2 (COVID-19) Negative     Influenza A Negative     Influenza B Negative     Respiratory Syncytial Virus Negative    Narrative:      Testing performed using real-time PCR for detection of COVID-19. EUA approved validation studies performed on site.              Imaging:  IR PLEURAL CATHETER PLACEMENT    Result Date: 7/3/2024  CLINICAL HISTORY:    88-year-old male with loculated right pleural effusion.     IMPRESSION:   Satisfactory 12 Kittitian right pigtail chest tube placement. COMMENT: PROCEDURE:  Chest tube placement ANESTHESIA:  Lidocaine 1% local. FLUORO TIME:  0.1 minutes. REFERENCE AIR KERMA: 0.9 mGy. CONTRAST:  None. MEDICATIONS:  None. DESCRIPTION OF PROCEDURE:    Written informed consent was obtained.  Ultrasound demonstrates complex, multiloculated right pleural effusion.  The patient was prepped and draped in the usual sterile manner.  Using real-time ultrasound guidance, a Yueh needle was inserted into the right pleural effusion.  Following serial dilation, a 12 Kittitian pigtail chest tube was inserted.  Serosanguineous fluid was aspirated.  The catheter was secured with suture and connected to Pleur-evac drainage.  The patient tolerated the procedure well without immediate complication.     ULTRASOUND GUIDED NEEDLE PLACEMENT    Result Date: 7/3/2024  CLINICAL HISTORY:    88-year-old male with loculated right pleural effusion.     IMPRESSION:   Satisfactory 12 Kittitian right pigtail chest tube placement. COMMENT:  PROCEDURE:  Chest tube placement ANESTHESIA:  Lidocaine 1% local. FLUORO TIME:  0.1 minutes. REFERENCE AIR KERMA: 0.9 mGy. CONTRAST:  None. MEDICATIONS:  None. DESCRIPTION OF PROCEDURE:    Written informed consent was obtained.  Ultrasound demonstrates complex, multiloculated right pleural effusion.  The patient was prepped and draped in the usual sterile manner.  Using real-time ultrasound guidance, a Yueh needle was inserted into the right pleural effusion.  Following serial dilation, a 12 Icelandic pigtail chest tube was inserted.  Serosanguineous fluid was aspirated.  The catheter was secured with suture and connected to Pleur-evac drainage.  The patient tolerated the procedure well without immediate complication.           Assessment & Plan    * Pleural effusion on right  Assessment & Plan  Recurrent right pleural effusion.  Concern for malignancy.  -Patient was inpatient at [facility] 6/15 to 6/19/2024, SOB, right pleuritic chest pain  -R Thoracentesis 6/17/2024: 1.2L pleural fluid, exudative, negative cytology, culture.  Improved and he was discharged 6/19/2024.  -He returned to [facility] 7/1/2024 due to gradually increasing SOB with exertion and right chest pain worse when lying on the right side.   -Mild anorexia and 6 or 7 pound weight loss.  He has had a dry cough for several months.    -Hematology/Oncology consulted due to concern regarding possible malignancy  -CXR 7/1/2024 large right pleural effusion.  -Right thoracentesis 7/2/2024 for multiloculated effusion with 1.1L fluid removed.  -Pleural fluid with , pH 7.5, protein <3.   (N 12, L79, M8, Eos 1). Cultures NGSF. Cytology 7/2 pending  -CT chest 7/2/2024 moderate right pleural effusion, pleural thickening and heterogeneity particularly at right lung base, malignancy not excluded; patchy consolidative changes right middle and lower lobes--superimposed pneumonia should be excluded; stable scattered pulmonary nodules largest in JESSE measuring up to 6 mm;  mildly prominent mediastinal lymph nodes measuring up to 9 mm.  -LDH-161 not elevated  -CEA- 3.1 --minimal elevation (nonsmoker)  -Right pleural pigtail inserted 7/3/2024 with plan for intrapleural fibrinolysis--due to recurrent loculated exudative pleural effusion    -Appreciate pulmonologist input and thoracic surgery input  -Await cytology of right pleural fluid from 7/2/2024, and added Flow Cytometry pleural fluid--pending  -Await completion of 6 doses tPA/dornase instillations (as recommended by thoracic surgery) then repeat CT  -May need to consider VATS, pleural biopsy  -Patient is nonsmoker, consider CTDs with serositis in DDX. Pleural fluid diff again predominantly lymphocytic suggesting ltmphoma vs autoimmune etiology (Flow pending)  -Consider outpatient PET/CT      Anorexia  Assessment & Plan  Patient has had anorexia with 6 to 7 pound weight loss.  Workup to rule out malignancy is in progress  -Protonix 40 mg nightly  -Nutrition consult  -Encourage supplements        Anemia  Assessment & Plan  Normocytic anemia.  -Hemoglobin 13.5 then 12.6.  MCV 90.1.  Creatinine 0.9.  -Patient receives B12 injections every 30 days.  -Monitor. Hgb 13 on 7/3--stable        Lab Results   Component Value Date    LHZPUHZU31 >1,500 (H) 07/02/2024     Lab Results   Component Value Date    FOLATE 11.5 07/02/2024     Lab Results   Component Value Date    IRON 40 07/02/2024    TIBC 232 (L) 07/02/2024    FERRITIN 222 07/02/2024     % sat 17    Pulmonary nodules  Assessment & Plan  See Pleural effusion    History of rectal cancer  Assessment & Plan  Rectal cancer  -Patient was followed at [facility], T2 N1 M0 rectal cancer  -Neoadjuvant chemotherapy and radiotherapy followed by surgery (APR), then had multiple complications after the surgery, then recovered and had 4 months of adjuvant chemotherapy postoperatively and has not had known recurrence of his rectal cancer.    -Surveillance colonoscopy 8/11/2022 revealed  hyperplastic polyp in the transverse colon with pathology revealing tubular adenoma.  -Ostomy functioning  -CEA 3.1    DVT (deep venous thrombosis) (CMS/HCC)  Assessment & Plan  LLE DVT and PE in 3/2019, on therapeutic dose Xarelto for 6 months then changed to prophylactic dose, then had evidence of DVT in 4/2020, unclear if acute DVT but anticoagulation was changed to Eliquis then resumed Xarelto.    -Xarelto on hold pending completion of invasive procedures  -Resume Xarelto when able  -DVT prophylactic dose enoxaparin 40 mg daily has been ordered.    CAD (coronary artery disease)  Assessment & Plan  Patient takes atorvastatin and metoprolol            VALENCIA Hua  7/4/2024  [facility] ([facility])  [phone] office

## 2024-07-04 NOTE — PROGRESS NOTES
Thoracic Surgery Daily Progress Note    Subjective     No acute events overnight. IR pigtail placed yesterday, tPA/Dornase therapy started. Sharp pain with initial dose now resolved. Feels much better this AM. CT with serosang output. Satting well on RA.     Objective     Vital signs in last 24 hours:  Temp:  [36.3 °C (97.3 °F)-36.7 °C (98 °F)] 36.4 °C (97.6 °F)  Heart Rate:  [76-88] 77  Resp:  [16-18] 16  BP: (123-152)/(67-89) 123/74      Intake/Output Summary (Last 24 hours) at 7/4/2024 0740  Last data filed at 7/4/2024 0615  Gross per 24 hour   Intake 220 ml   Output 1555 ml   Net -1335 ml     Intake/Output this shift:  No intake/output data recorded.    Physical Exam  Physical Exam  Constitutional:       General: He is not in acute distress.  Eyes:      General: No scleral icterus.     Conjunctiva/sclera: Conjunctivae normal.   Cardiovascular:      Rate and Rhythm: Normal rate and regular rhythm.      Pulses: Normal pulses.   Pulmonary:      Effort: Pulmonary effort is normal. No respiratory distress. CT in place with serosang output  Skin:     General: Skin is warm and dry.   Neurological:      General: No focal deficit present.      Mental Status: He is alert.             Labs:  BMP Results         07/02/24 07/01/24 06/19/24     0337 1123 0516     135 137    K 3.9 3.8 4.2    Cl 100 100 105    CO2 28 27 25    Glucose 99 132 98    BUN 13 14 26    Creatinine 0.8 0.9 0.8    Calcium 9.1 9.4 9.1    Anion Gap 7 8 7    EGFR >60.0 >60.0 >60.0           Comment for K at 1123 on 07/01/24: Results obtained on plasma. Plasma Potassium values may be up to 0.4 mEQ/L less than serum values. The differences may be greater for patients with high platelet or white cell counts.    Comment for EGFR at 0337 on 07/02/24: Calculation based on the Chronic Kidney Disease Epidemiology Collaboration (CKD-EPI) equation refit without adjustment for race.    Comment for EGFR at 1123 on 07/01/24: Calculation based on the Chronic Kidney  Disease Epidemiology Collaboration (CKD-EPI) equation refit without adjustment for race.    Comment for EGFR at 0516 on 06/19/24: Calculation based on the Chronic Kidney Disease Epidemiology Collaboration (CKD-EPI) equation refit without adjustment for race.           CBC Results         07/02/24 07/01/24 06/19/24     0337 1123 0516    WBC 8.82 10.17 8.07    RBC 4.34 4.58 4.56    HGB 12.6 13.5 13.5    HCT 39.1 41.2 41.2    MCV 90.1 90.0 90.4    MCH 29.0 29.5 29.6    MCHC 32.2 32.8 32.8     307 229            Imaging  I have reviewed the Imaging from the last 24 hrs.      Assessment/Plan       Dave Arredondo is a 88 year old man with history of rectal cancer s/p resection with ostomy, CAD, DVT/PE on Xarelto who presented with a recurrent loculated pleural effusion concerning for possible malignancy     Plan:  - Follow up pleural fluid culture and cytology  - Continue with tPA/Dornase therapy followed by CT chest for re-evaluation of pleural effusion  - Rest of care as per primary        Michele Joseph MD  General Surgery Resident, PGY2  Please page *[facility] [phone] with any questions or concerns.

## 2024-07-04 NOTE — PROGRESS NOTES
[facility] Pulmonary, Critical Care and Sleep Progress Note       SUBJECTIVE:  Pt seen and examined at bedside. No acute events overnight. Reports improving SOB and chest pain. Now s/p 2 doses of tpa/dornase. >1500 cc of output on 7/3.     Outside records reviewed.    Medical History:   Past Medical History:   Diagnosis Date    Colon cancer (CMS/HCC)     Coronary artery disease     DVT (deep venous thrombosis) (CMS/HCC)     Hypertension     Lipid disorder        Surgical History:   Past Surgical History:   Procedure Laterality Date    COLOSTOMY      HERNIA REPAIR         Allergies: Patient has no known allergies.    Med List Reviewed.    Social History:   Social History     Socioeconomic History    Marital status:      Spouse name: None    Number of children: None    Years of education: None    Highest education level: None   Tobacco Use    Smoking status: Never    Smokeless tobacco: Never   Substance and Sexual Activity    Alcohol use: Not Currently    Drug use: Never    Sexual activity: Defer   Social History Narrative    Lives in the [facility]      Social Determinants of Health     Food Insecurity: No Food Insecurity (7/1/2024)    Hunger Vital Sign     Worried About Running Out of Food in the Last Year: Never true     Ran Out of Food in the Last Year: Never true   Transportation Needs: No Transportation Needs (7/1/2024)    PRAPARE - Transportation     Lack of Transportation (Medical): No     Lack of Transportation (Non-Medical): No   Housing Stability: Low Risk  (7/1/2024)    Housing Stability Vital Sign     Unable to Pay for Housing in the Last Year: No     Number of Places Lived in the Last Year: 1     Unstable Housing in the Last Year: No       Family History:   Family History   Problem Relation Age of Onset    Heart disease Biological Mother        Review of Systems  All other systems reviewed and negative except as noted in the HPI.    Vital signs in last 24 hours:  Temp:   [36.1 °C (97 °F)-36.7 °C (98 °F)] 36.1 °C (97 °F)  Heart Rate:  [75-88] 75  Resp:  [16-18] 16  BP: (113-152)/(68-89) 138/73    OBJECTIVE    Physical Exam:  Gen: WDWN, in NAD, AAOx3, pleasant  HEENT: PERRLA, neck supple, no LAD  CV: RRR, +s1/s2, no M/R/G  Pulm: CTA b/l without W/R/R  GI: abd soft, NT/ND, normoactive BS  Extremities: no LE edema   Skin: clean, dry and intact without evidence of break down   Neuro: CN II-XII grossly in tact, no focal deficits     Labs:  I have reviewed the patient's pertinent labs.     Imaging:  I have independently reviewed the patient's pertinent imaging for this hospital visit.   IR PLEURAL CATHETER PLACEMENT    Result Date: 7/3/2024  IMPRESSION:   Satisfactory 12 American right pigtail chest tube placement. COMMENT: PROCEDURE:  Chest tube placement ANESTHESIA:  Lidocaine 1% local. FLUORO TIME:  0.1 minutes. REFERENCE AIR KERMA: 0.9 mGy. CONTRAST:  None. MEDICATIONS:  None. DESCRIPTION OF PROCEDURE:    Written informed consent was obtained.  Ultrasound demonstrates complex, multiloculated right pleural effusion.  The patient was prepped and draped in the usual sterile manner.  Using real-time ultrasound guidance, a Yueh needle was inserted into the right pleural effusion.  Following serial dilation, a 12 American pigtail chest tube was inserted.  Serosanguineous fluid was aspirated.  The catheter was secured with suture and connected to Pleur-evac drainage.  The patient tolerated the procedure well without immediate complication.     ULTRASOUND GUIDED NEEDLE PLACEMENT    Result Date: 7/3/2024  IMPRESSION:   Satisfactory 12 American right pigtail chest tube placement. COMMENT: PROCEDURE:  Chest tube placement ANESTHESIA:  Lidocaine 1% local. FLUORO TIME:  0.1 minutes. REFERENCE AIR KERMA: 0.9 mGy. CONTRAST:  None. MEDICATIONS:  None. DESCRIPTION OF PROCEDURE:    Written informed consent was obtained.  Ultrasound demonstrates complex, multiloculated right pleural effusion.  The patient was  prepped and draped in the usual sterile manner.  Using real-time ultrasound guidance, a Yueh needle was inserted into the right pleural effusion.  Following serial dilation, a 12 Rwandan pigtail chest tube was inserted.  Serosanguineous fluid was aspirated.  The catheter was secured with suture and connected to Pleur-evac drainage.  The patient tolerated the procedure well without immediate complication.     IR THORACENTESIS    Result Date: 7/3/2024  IMPRESSION: Successful ultrasound guided right thoracentesis with 1100 cc of fluid removed and  sent for laboratory studies, as requested COMMENT: The patient is referred for ultrasound guided thoracentesis on the right side. Informed consent was obtained. With the patient sitting, ultrasound imaging is performed and a moderately large multiloculated right pleural effusion is identified. The fluid is localized and a site is selected on the skin with ultrasound. The site is marked. Using sterile technique, under local anesthesia, the thoracentesis catheter is inserted into the pleural space. 1100 cc of serosanguineous fluid are removed. The fluid spontaneously stopped draining. The catheter is removed and a sterile dressing is placed over the catheter insertion site. Vital signs were monitored throughout the procedure and remained stable. The fluid is discarded  sent for laboratory analysis, as requested. A dedicated CT chest was ordered and will be obtained and separately reported. This service rendered by Melissa Tsang PA-C. I certify that I have personally reviewed this examination and agree with Melissa Tsang's report. Quentin Ritter M.D.    CT CHEST WITHOUT IV CONTRAST    Result Date: 7/2/2024  IMPRESSION: Moderate right pleural effusion with areas of pleural thickening and heterogeneity particularly at the right lung base for which malignancy is not excluded. Patchy consolidative changes in the right middle and lower lobes for which superimposed pneumonia should be  excluded clinically. Additional findings as above.    X-RAY CHEST 2 VIEWS    Result Date: 7/1/2024  IMPRESSION: Large right pleural effusion. COMMENT: PA and lateral radiographs the chest reveal a large right pleural effusion. Left lung is well-aerated. Cardiac silhouette is unchanged compared to 6/17/2024.    CT CHEST WITHOUT IV CONTRAST    Result Date: 6/18/2024  IMPRESSION: 1.  Moderate pleural effusion, decreased from the prior CT. 2.  Stable pulmonary nodules.    IR THORACENTESIS    Result Date: 6/17/2024  IMPRESSION: Successful ultrasound-guided right-sided thoracentesis with 1.2 L pleural fluid removed and sent to the lab.  All components of the time-out, debriefing, and handling of the specimen were conducted as per the ASAP Specimen Protocol. I certify that I have personally reviewed this examination and agree with Maria Antonia Bradley's report. Cj Yen M.D.    X-RAY CHEST 1 VIEW    Result Date: 6/17/2024  IMPRESSION: No pneumothorax.  Small right pleural effusion and right basilar airspace opacity. COMMENT: Comparison: Chest x-ray 6/15/2024. Technique: A single frontal AP portable upright projection of the chest was obtained. FINDINGS: There is a small right pleural effusion with right basilar airspace opacity. The left lung is clear.  There is no pneumothorax.  The cardiomediastinal silhouette is unchanged.  The upper abdomen is unremarkable.  There is no acute osseous abnormality.     Ultrasound venous arm right    Result Date: 6/15/2024  IMPRESSION: No evidence of right upper extremity deep vein thrombosis.     CT ANGIOGRAPHY CHEST PULMONARY EMBOLISM WITH IV CONTRAST    Result Date: 6/15/2024  IMPRESSION: development of a large right pleural effusion with right lower lobe collapse and partial atelectasis of the right middle and upper lobes centrally. Scattered small pulmonary nodules are again seen less than 6 mm the largest is linearly oriented in the left upper lobe. No CT evidence for proximal or  segmental pulmonary arterial embolism.     X-RAY CHEST 2 VIEWS    Result Date: 6/15/2024  IMPRESSION: moderate right pleural effusion with basilar airspace opacity.  Fluid is likely to accurately into the fissures on the right as well.       Assessment and Plan:  Mr. Dave Arredondo is an 89 yo M with a PMHx of rectal cancer s/p resection, chemotherapy and ostomy placement, HTN, HLD and CAD who presented on 71/24 with worsening shortness of breath and right-sided pleuritic chest pain. He was hospitalized 6/15/24 to /19/24 and was found to have a large right pleural effusion for which 1.2 L was drained during thoracentesis.  Pleural fluid analysis was consistent with an exudate with negative cytology and culture.  He now presents with worsening dyspnea and a large recurrent right pleural effusion. S/p thoracentesis 7/2 and chest tube placement 7/3. CT chest following thoracentesis showed a moderate R pleural effusion, that appears complex, with associated pleural thickening, patchy consolidative changes in the RML and lower lobes and scattered pulmonary nodules with the largest in the JESSE measuring 6mm.    #Recurrent exudative pleural effusion  #Dyspnea   #History of rectal cancer    -S/p R sided thoracentesis on 7/2 with 1.1L of serosanguinous fluid removed; pleural fluid analysis appears c/w an exudate  -A malignant pleural effusion due to recurrent rectal cancer (or a separate malignancy) certainly needs to be excluded   -F/u pleural fluid cytology, cultures are negative   -CT chest following thoracentesis shows a complex moderate R pleural effusion with associated pleural thickening, consolidative opacities in the RML and LL's and scattered pulmonary nodules  -Now s/p R sided 12 Fr pigtail placement with plans for intrapleural fibrinolysis x 3   -Appreciate on going thoracic surgery recs   -Agree w/ chest tube to low wall suction  -If the pleural fluid cytology and culture are again negative may need to consider  pleural biopsy for further evaluation  -Pain control w/o oversedation  -IS 10 x per hour    -Please call with any questions or concerns you may have      Parker Post, DO  Pulmonary and Critical Care Medicine

## 2024-07-05 PROBLEM — E87.1 HYPONATREMIA: Status: ACTIVE | Noted: 2024-07-05

## 2024-07-05 LAB
ANION GAP SERPL CALC-SCNC: 6 MEQ/L (ref 3–15)
BUN SERPL-MCNC: 22 MG/DL (ref 7–25)
CALCIUM SERPL-MCNC: 9.1 MG/DL (ref 8.6–10.3)
CASE RPRT: NORMAL
CHLORIDE SERPL-SCNC: 99 MEQ/L (ref 98–107)
CO2 SERPL-SCNC: 28 MEQ/L (ref 21–31)
CREAT SERPL-MCNC: 0.9 MG/DL (ref 0.7–1.3)
EGFRCR SERPLBLD CKD-EPI 2021: >60 ML/MIN/1.73M*2
ERYTHROCYTE [DISTWIDTH] IN BLOOD BY AUTOMATED COUNT: 13.8 % (ref 11.6–14.4)
GLUCOSE SERPL-MCNC: 114 MG/DL (ref 70–99)
HCT VFR BLD AUTO: 40.6 % (ref 40.1–51)
HGB BLD-MCNC: 13 G/DL (ref 13.7–17.5)
MAGNESIUM SERPL-MCNC: 2.1 MG/DL (ref 1.8–2.5)
MCH RBC QN AUTO: 29 PG (ref 28–33.2)
MCHC RBC AUTO-ENTMCNC: 32 G/DL (ref 32.2–36.5)
MCV RBC AUTO: 90.4 FL (ref 83–98)
PATH REPORT.FINAL DX SPEC: NORMAL
PATH REPORT.GROSS SPEC: NORMAL
PDW BLD AUTO: 9.3 FL (ref 9.4–12.4)
PHOSPHATE SERPL-MCNC: 3.2 MG/DL (ref 2.4–4.7)
PLATELET # BLD AUTO: 290 K/UL (ref 150–350)
POTASSIUM SERPL-SCNC: 4.6 MEQ/L (ref 3.5–5.1)
RBC # BLD AUTO: 4.49 M/UL (ref 4.5–5.8)
SODIUM SERPL-SCNC: 133 MEQ/L (ref 136–145)
WBC # BLD AUTO: 10.23 K/UL (ref 3.8–10.5)

## 2024-07-05 PROCEDURE — 63700000 HC SELF-ADMINISTRABLE DRUG

## 2024-07-05 PROCEDURE — 63600000 HC DRUGS/DETAIL CODE: Mod: JZ | Performed by: PHYSICIAN ASSISTANT

## 2024-07-05 PROCEDURE — 20600000 HC ROOM AND CARE INTERMEDIATE/TELEMETRY

## 2024-07-05 PROCEDURE — 63700000 HC SELF-ADMINISTRABLE DRUG: Performed by: HOSPITALIST

## 2024-07-05 PROCEDURE — 3E0L317 INTRODUCTION OF OTHER THROMBOLYTIC INTO PLEURAL CAVITY, PERCUTANEOUS APPROACH: ICD-10-PCS | Performed by: STUDENT IN AN ORGANIZED HEALTH CARE EDUCATION/TRAINING PROGRAM

## 2024-07-05 PROCEDURE — 63600000 HC DRUGS/DETAIL CODE: Mod: JZ,JG | Performed by: STUDENT IN AN ORGANIZED HEALTH CARE EDUCATION/TRAINING PROGRAM

## 2024-07-05 PROCEDURE — 83735 ASSAY OF MAGNESIUM: CPT | Performed by: STUDENT IN AN ORGANIZED HEALTH CARE EDUCATION/TRAINING PROGRAM

## 2024-07-05 PROCEDURE — 25800000 HC PHARMACY IV SOLUTIONS: Performed by: STUDENT IN AN ORGANIZED HEALTH CARE EDUCATION/TRAINING PROGRAM

## 2024-07-05 PROCEDURE — 85027 COMPLETE CBC AUTOMATED: CPT | Performed by: STUDENT IN AN ORGANIZED HEALTH CARE EDUCATION/TRAINING PROGRAM

## 2024-07-05 PROCEDURE — 25000000 HC PHARMACY GENERAL: Performed by: STUDENT IN AN ORGANIZED HEALTH CARE EDUCATION/TRAINING PROGRAM

## 2024-07-05 PROCEDURE — 84100 ASSAY OF PHOSPHORUS: CPT | Performed by: STUDENT IN AN ORGANIZED HEALTH CARE EDUCATION/TRAINING PROGRAM

## 2024-07-05 PROCEDURE — 80048 BASIC METABOLIC PNL TOTAL CA: CPT | Performed by: STUDENT IN AN ORGANIZED HEALTH CARE EDUCATION/TRAINING PROGRAM

## 2024-07-05 PROCEDURE — 99233 SBSQ HOSP IP/OBS HIGH 50: CPT | Performed by: STUDENT IN AN ORGANIZED HEALTH CARE EDUCATION/TRAINING PROGRAM

## 2024-07-05 PROCEDURE — 36415 COLL VENOUS BLD VENIPUNCTURE: CPT | Performed by: STUDENT IN AN ORGANIZED HEALTH CARE EDUCATION/TRAINING PROGRAM

## 2024-07-05 PROCEDURE — 3E0L3GC INTRODUCTION OF OTHER THERAPEUTIC SUBSTANCE INTO PLEURAL CAVITY, PERCUTANEOUS APPROACH: ICD-10-PCS | Performed by: STUDENT IN AN ORGANIZED HEALTH CARE EDUCATION/TRAINING PROGRAM

## 2024-07-05 RX ORDER — POLYETHYLENE GLYCOL 3350 17 G/17G
17 POWDER, FOR SOLUTION ORAL DAILY
Status: DISCONTINUED | OUTPATIENT
Start: 2024-07-06 | End: 2024-07-09 | Stop reason: HOSPADM

## 2024-07-05 RX ADMIN — METOPROLOL SUCCINATE 100 MG: 100 TABLET, EXTENDED RELEASE ORAL at 08:08

## 2024-07-05 RX ADMIN — PANTOPRAZOLE SODIUM 40 MG: 40 TABLET, DELAYED RELEASE ORAL at 20:59

## 2024-07-05 RX ADMIN — DORNASE ALFA 5 MG: 1 SOLUTION RESPIRATORY (INHALATION) at 08:15

## 2024-07-05 RX ADMIN — ALTEPLASE 10 MG: 2.2 INJECTION, POWDER, LYOPHILIZED, FOR SOLUTION INTRAVENOUS at 18:01

## 2024-07-05 RX ADMIN — POLYETHYLENE GLYCOL 3350 17 G: 17 POWDER, FOR SOLUTION ORAL at 03:26

## 2024-07-05 RX ADMIN — FUROSEMIDE 20 MG: 20 TABLET ORAL at 08:08

## 2024-07-05 RX ADMIN — ACETAMINOPHEN 650 MG: 325 TABLET ORAL at 01:23

## 2024-07-05 RX ADMIN — ACETAMINOPHEN 650 MG: 325 TABLET ORAL at 07:23

## 2024-07-05 RX ADMIN — DORNASE ALFA 5 MG: 1 SOLUTION RESPIRATORY (INHALATION) at 18:01

## 2024-07-05 RX ADMIN — ENOXAPARIN SODIUM 40 MG: 40 INJECTION SUBCUTANEOUS at 18:10

## 2024-07-05 RX ADMIN — ATORVASTATIN CALCIUM 20 MG: 20 TABLET, FILM COATED ORAL at 18:10

## 2024-07-05 RX ADMIN — ALTEPLASE 10 MG: 2.2 INJECTION, POWDER, LYOPHILIZED, FOR SOLUTION INTRAVENOUS at 08:15

## 2024-07-05 ASSESSMENT — COGNITIVE AND FUNCTIONAL STATUS - GENERAL
CLIMB 3 TO 5 STEPS WITH RAILING: 4 - NONE
MOVING TO AND FROM BED TO CHAIR: 4 - NONE
STANDING UP FROM CHAIR USING ARMS: 4 - NONE
WALKING IN HOSPITAL ROOM: 4 - NONE

## 2024-07-05 NOTE — PLAN OF CARE
Plan of Care Review  Plan of Care Reviewed With: patient  Progress: no change  Outcome Evaluation: Pt c/o pain around chest tube site, prn tylenol given twice with relief of pain symptoms. Chest tube remains connected to suction per order, draining serosanginous fluid. NSR with 1st deg AV block on monitor. Stand by assist oob. Colostomy intact.

## 2024-07-05 NOTE — NURSING NOTE
VAT Note:  Intrapleural dosing with t-PA / Dornase per Protocol.  Stopcock and clamp opened after 1 hr dwell time.   No untoward effects noted. Chest Tubes returned to suction.

## 2024-07-05 NOTE — PLAN OF CARE
Care Coordination Discharge Plan Note     Discharge Needs Assessment  Concerns to be Addressed: other (see comments) (Pt states doctors at [facility] referred him to doctors that did not accept his insurance. BENJY Osborne was informed of pt's concerns r/t specialist not in network with [employer].)  Current Discharge Risk: chronically ill    Anticipated Discharge Plan  Anticipated Discharge Disposition: home without assistance or services       Patient Choice  Offered/Gave Vendor List: no  Patient's Choice of Community Agency(s): No needs identified close to d/c         ---------------------------------------------------------------------------------------------------------------------    Interdisciplinary Discharge Plan Review:  Participants:advanced practice provider, nursing, occupational therapy, social work/services    Concerns Comments: SW met w/ pt in room. Pt confirmed home w/ no needs at time of potential d/c. IMM rights reviewed.    Discharge Plan:   Disposition/Destination: Home  / Home  Discharge Facility:    Community Resources:      Discharge Transportation:  Is Out of Hospital DNR needed at Discharge: no  Does patient need discharge transport? No          DCP: Home w/ no needs identified at this time  Pt's family will transport   IMM completed 7.5.24

## 2024-07-05 NOTE — NURSING NOTE
1800 Chest Tube clamped. Wall suction turned off, and stopcock pointing to atrium. Intrapleural tpa/ Dornase administered via Rt chest tube as ordered. Patient denies pain or discomfort.

## 2024-07-05 NOTE — PROGRESS NOTES
Hospital Medicine     Daily Progress Note       SUBJECTIVE   Interval History: Patient seen and examined at bedside. No acute events overnight. Tolerating TPA/dornase via chest tube. Some discomfort at chest tube site, requesting tylenol this am. Has been getting pre-medication for TPA. Chest tube output documented with 820 out yesterday, noted to be bloody. Reporting some constipation. Otherwise no acute concerns. Reports respiratory status is improving, he feels less dyspneic on exertion.      OBJECTIVE      Vital signs in last 24 hours:  Temp:  [36.1 °C (97 °F)-36.8 °C (98.3 °F)] 36.8 °C (98.3 °F)  Heart Rate:  [75-89] 89  Resp:  [16-18] 18  BP: (100-162)/(59-84) 162/84    Intake/Output Summary (Last 24 hours) at 7/5/2024 1015  Last data filed at 7/5/2024 0925  Gross per 24 hour   Intake 10 ml   Output 1230 ml   Net -1220 ml       PHYSICAL EXAMINATION      Physical Exam  Vitals and nursing note reviewed.   Constitutional:       General: He is not in acute distress.     Comments: Very pleasant elderly gentleman resting in bed    HENT:      Head: Normocephalic.      Mouth/Throat:      Mouth: Mucous membranes are moist.   Eyes:      Extraocular Movements: Extraocular movements intact.   Cardiovascular:      Rate and Rhythm: Normal rate and regular rhythm.      Heart sounds: Normal heart sounds.   Pulmonary:      Comments: Breathing comfortably on RA. Breath sounds diminished R lung base    Chest tube R chest with sanguinous output   Abdominal:      Tenderness: There is no abdominal tenderness.      Comments: Colostomy LLQ    Musculoskeletal:      Right lower leg: No edema.      Left lower leg: No edema.   Neurological:      General: No focal deficit present.      Mental Status: He is alert and oriented to person, place, and time.            LINES, CATHETERS, DRAINS, AIRWAYS, AND WOUNDS   Lines, Drains, and Airways:  Wounds (agree with documentation and present on admission):  Peripheral IV (Adult) 07/01/24 Left  Antecubital (Active)   Number of days: 4       Chest Tube 1 Right 12 Fr (Active)   Number of days: 2       Colostomy Unknown LUQ (Active)   Number of days: 5215         Comments:    LABS / IMAGING / TELE      Labs  I have reviewed the patient's labs to the time of note. No new clinical concern.    Results from last 7 days   Lab Units 07/05/24  0507 07/04/24 0320 07/03/24  0445   WBC K/uL 10.23 10.07 8.60   HEMOGLOBIN g/dL 13.0* 12.6* 13.0*   HEMATOCRIT % 40.6 38.5* 39.7*   PLATELETS K/uL 290 296 284       Results from last 7 days   Lab Units 07/05/24  0507 07/04/24  0320 07/03/24 0445 07/02/24  0337 07/01/24  1123   SODIUM mEQ/L 133* 134* 135*   < > 135*   POTASSIUM mEQ/L 4.6 4.7 4.1   < > 3.8   CHLORIDE mEQ/L 99 100 101   < > 100   CO2 mEQ/L 28 26 27   < > 27   BUN mg/dL 22 23 17   < > 14   CREATININE mg/dL 0.9 0.9 0.7   < > 0.9   CALCIUM mg/dL 9.1 9.0 8.9   < > 9.4   ALBUMIN g/dL  --   --   --   --  3.5   BILIRUBIN TOTAL mg/dL  --   --   --   --  0.9   ALK PHOS IU/L  --   --   --   --  102   ALT IU/L  --   --   --   --  15   AST IU/L  --   --   --   --  17   GLUCOSE mg/dL 114* 115* 100*   < > 132*    < > = values in this interval not displayed.             Imaging  I have independently reviewed the pertinent imaging from the last 24 hrs.    ECG/Telemetry  I have independently reviewed the telemetry. No events for the last 24 hours.    ASSESSMENT AND PLAN      * Pleural effusion on right  Assessment & Plan  S/p 6/17 R sided thoracentesis with 1.2 L removed, exudative. Culture and cytology negative   CXR on admission with recurrent large R pleural effusion   S/p repeat thora 7/2 with 1.1 L off, locultated   CT chest post thora on 7/2 showing ongoing moderate R pleural effusion and pelural thickening, concern for malignancy (noted hx of rectal CA)   Pulmonary and thoracic surgery following   S/p pigtail placement 7/4  with initiation of TPA/dornase x 6 doses   Plan for follow up CT once completed tomorrow  7/6  Premedicate for TPA/dornase administrations   Follow up fluid culture studies and flow cytometry which are pending     Pulmonary nodules  Assessment & Plan  6/18 CAT scan chest revealed pulmonary nodules  CT chest 7/2 showing stable pulm nodules, largest JESSE up to 6mm       Right-sided chest pain  Assessment & Plan  Improving  Secondary to pleural effusion and now chest tube       History of rectal cancer  Assessment & Plan  In 2010 per patient, cared for at [place]   S/p colostomy, chemo and radiation   With recurrent exudative pleural effusion and CT scan findings concern for malignancy   Oncology following   Follow up pleural fluid studies, flow cytometry     DVT (deep venous thrombosis) (CMS/HCC)  Assessment & Plan  History of DVT/PE   On Xarelto as OP   On subq lovenox as bloody fluid noted from chest tube   Plan to resume Xarelto when TPA/dornase complete/ chest tube removed and assuming no further procedures needed     CAD (coronary artery disease)  Assessment & Plan  Patient with presumed CAD based on abnormal stress test in 2018, declined cardiac cath   Chest on admission is improving, R sided and suspected 2/2 large pleural effusion and now chest tube   Troponin flat, monitoring on tele          VTE Assessment: Pad[place] VTE Score: 4  VTE Prophylaxis:  Current anticoagulants:  enoxaparin (LOVENOX) syringe 40 mg, subcutaneous, Daily (6p)      Code Status: Full Code      Estimated Discharge Date: 7/7/2024   Disposition Planning: home when completes TPA/dornase if follow up CT chest without concerns      BENJY Gonzalez  7/5/2024

## 2024-07-05 NOTE — PROGRESS NOTES
[facility] Pulmonary, Critical Care and Sleep Progress Note       SUBJECTIVE:  Pt seen and examined at bedside. No acute events overnight. Chest pain continues to improved. Today is day 2 of 3 for intrapleural fibrinolysis.     Outside records reviewed.    Medical History:   Past Medical History:   Diagnosis Date    Colon cancer (CMS/HCC)     Coronary artery disease     DVT (deep venous thrombosis) (CMS/HCC)     Hypertension     Lipid disorder        Surgical History:   Past Surgical History:   Procedure Laterality Date    COLOSTOMY      HERNIA REPAIR         Allergies: Patient has no known allergies.    Med List Reviewed.    Social History:   Social History     Socioeconomic History    Marital status:      Spouse name: None    Number of children: None    Years of education: None    Highest education level: None   Tobacco Use    Smoking status: Never    Smokeless tobacco: Never   Substance and Sexual Activity    Alcohol use: Not Currently    Drug use: Never    Sexual activity: Defer   Social History Narrative    Lives in the [facility]      Social Determinants of Health     Food Insecurity: No Food Insecurity (7/1/2024)    Hunger Vital Sign     Worried About Running Out of Food in the Last Year: Never true     Ran Out of Food in the Last Year: Never true   Transportation Needs: No Transportation Needs (7/1/2024)    PRAPARE - Transportation     Lack of Transportation (Medical): No     Lack of Transportation (Non-Medical): No   Housing Stability: Low Risk  (7/1/2024)    Housing Stability Vital Sign     Unable to Pay for Housing in the Last Year: No     Number of Places Lived in the Last Year: 1     Unstable Housing in the Last Year: No       Family History:   Family History   Problem Relation Age of Onset    Heart disease Biological Mother        Review of Systems  All other systems reviewed and negative except as noted in the HPI.    Vital signs in last 24 hours:  Temp:  [36.3 °C (97.3  °F)-36.8 °C (98.3 °F)] 36.4 °C (97.6 °F)  Heart Rate:  [78-89] 79  Resp:  [18] 18  BP: (100-162)/(59-84) 135/75    OBJECTIVE    Physical Exam:  Gen: WDWN, in NAD, AAOx3, pleasant  HEENT: PERRLA, neck supple, no LAD  CV: RRR, +s1/s2, no M/R/G  Pulm: CTA b/l without W/R/R  GI: abd soft, NT/ND, normoactive BS  Extremities: no LE edema   Skin: clean, dry and intact without evidence of break down   Neuro: CN II-XII grossly in tact, no focal deficits     Labs:  I have reviewed the patient's pertinent labs.     Imaging:  I have independently reviewed the patient's pertinent imaging for this hospital visit.   IR PLEURAL CATHETER PLACEMENT    Result Date: 7/3/2024  IMPRESSION:   Satisfactory 12 [place] right pigtail chest tube placement. COMMENT: PROCEDURE:  Chest tube placement ANESTHESIA:  Lidocaine 1% local. FLUORO TIME:  0.1 minutes. REFERENCE AIR KERMA: 0.9 mGy. CONTRAST:  None. MEDICATIONS:  None. DESCRIPTION OF PROCEDURE:    Written informed consent was obtained.  Ultrasound demonstrates complex, multiloculated right pleural effusion.  The patient was prepped and draped in the usual sterile manner.  Using real-time ultrasound guidance, a Yueh needle was inserted into the right pleural effusion.  Following serial dilation, a 12 [place]tarian pigtail chest tube was inserted.  Serosanguineous fluid was aspirated.  The catheter was secured with suture and connected to Pleur-evac drainage.  The patient tolerated the procedure well without immediate complication.     ULTRASOUND GUIDED NEEDLE PLACEMENT    Result Date: 7/3/2024  IMPRESSION:   Satisfactory 12 [place] right pigtail chest tube placement. COMMENT: PROCEDURE:  Chest tube placement ANESTHESIA:  Lidocaine 1% local. FLUORO TIME:  0.1 minutes. REFERENCE AIR KERMA: 0.9 mGy. CONTRAST:  None. MEDICATIONS:  None. DESCRIPTION OF PROCEDURE:    Written informed consent was obtained.  Ultrasound demonstrates complex, multiloculated right pleural effusion.  The patient was prepped and  draped in the usual sterile manner.  Using real-time ultrasound guidance, a Yueh needle was inserted into the right pleural effusion.  Following serial dilation, a 12 Portuguese pigtail chest tube was inserted.  Serosanguineous fluid was aspirated.  The catheter was secured with suture and connected to Pleur-evac drainage.  The patient tolerated the procedure well without immediate complication.     IR THORACENTESIS    Result Date: 7/3/2024  IMPRESSION: Successful ultrasound guided right thoracentesis with 1100 cc of fluid removed and  sent for laboratory studies, as requested COMMENT: The patient is referred for ultrasound guided thoracentesis on the right side. Informed consent was obtained. With the patient sitting, ultrasound imaging is performed and a moderately large multiloculated right pleural effusion is identified. The fluid is localized and a site is selected on the skin with ultrasound. The site is marked. Using sterile technique, under local anesthesia, the thoracentesis catheter is inserted into the pleural space. 1100 cc of serosanguineous fluid are removed. The fluid spontaneously stopped draining. The catheter is removed and a sterile dressing is placed over the catheter insertion site. Vital signs were monitored throughout the procedure and remained stable. The fluid is discarded  sent for laboratory analysis, as requested. A dedicated CT chest was ordered and will be obtained and separately reported. This service rendered by Melissa Tsang PA-C. I certify that I have personally reviewed this examination and agree with Melissa Tsang's report. Quentin Ritter M.D.    CT CHEST WITHOUT IV CONTRAST    Result Date: 7/2/2024  IMPRESSION: Moderate right pleural effusion with areas of pleural thickening and heterogeneity particularly at the right lung base for which malignancy is not excluded. Patchy consolidative changes in the right middle and lower lobes for which superimposed pneumonia should be excluded  clinically. Additional findings as above.    X-RAY CHEST 2 VIEWS    Result Date: 7/1/2024  IMPRESSION: Large right pleural effusion. COMMENT: PA and lateral radiographs the chest reveal a large right pleural effusion. Left lung is well-aerated. Cardiac silhouette is unchanged compared to 6/17/2024.    CT CHEST WITHOUT IV CONTRAST    Result Date: 6/18/2024  IMPRESSION: 1.  Moderate pleural effusion, decreased from the prior CT. 2.  Stable pulmonary nodules.    IR THORACENTESIS    Result Date: 6/17/2024  IMPRESSION: Successful ultrasound-guided right-sided thoracentesis with 1.2 L pleural fluid removed and sent to the lab.  All components of the time-out, debriefing, and handling of the specimen were conducted as per the ASAP Specimen Protocol. I certify that I have personally reviewed this examination and agree with Maria Antonia Bradley's report. Cj Yen M.D.    X-RAY CHEST 1 VIEW    Result Date: 6/17/2024  IMPRESSION: No pneumothorax.  Small right pleural effusion and right basilar airspace opacity. COMMENT: Comparison: Chest x-ray 6/15/2024. Technique: A single frontal AP portable upright projection of the chest was obtained. FINDINGS: There is a small right pleural effusion with right basilar airspace opacity. The left lung is clear.  There is no pneumothorax.  The cardiomediastinal silhouette is unchanged.  The upper abdomen is unremarkable.  There is no acute osseous abnormality.     Ultrasound venous arm right    Result Date: 6/15/2024  IMPRESSION: No evidence of right upper extremity deep vein thrombosis.     CT ANGIOGRAPHY CHEST PULMONARY EMBOLISM WITH IV CONTRAST    Result Date: 6/15/2024  IMPRESSION: development of a large right pleural effusion with right lower lobe collapse and partial atelectasis of the right middle and upper lobes centrally. Scattered small pulmonary nodules are again seen less than 6 mm the largest is linearly oriented in the left upper lobe. No CT evidence for proximal or segmental  pulmonary arterial embolism.     X-RAY CHEST 2 VIEWS    Result Date: 6/15/2024  IMPRESSION: moderate right pleural effusion with basilar airspace opacity.  Fluid is likely to accurately into the fissures on the right as well.       Assessment and Plan:  Mr. Dave Arredondo is an 87 yo M with a PMHx of rectal cancer s/p resection, chemotherapy and ostomy placement, HTN, HLD and CAD who presented on 71/24 with worsening shortness of breath and right-sided pleuritic chest pain. He was hospitalized 6/15/24 to /19/24 and was found to have a large right pleural effusion for which 1.2 L was drained during thoracentesis.  Pleural fluid analysis was consistent with an exudate with negative cytology and culture.  He now presents with worsening dyspnea and a large recurrent right pleural effusion. S/p thoracentesis 7/2 and chest tube placement 7/3. CT chest following thoracentesis showed a moderate R pleural effusion, that appears complex, with associated pleural thickening, patchy consolidative changes in the RML and lower lobes and scattered pulmonary nodules with the largest in the JESSE measuring 6mm.    #Recurrent exudative pleural effusion  #Dyspnea   #History of rectal cancer    -S/p R sided thoracentesis on 7/2 with 1.1L of serosanguinous fluid removed; pleural fluid analysis appears c/w an exudate; pleural fluid analysis 6/17/24 was also c/w an exudate   -A malignant pleural effusion due to recurrent rectal cancer (or a separate malignancy) certainly needs to be excluded   -Pleural fluid cultures show NGTD and both cytology and flow cytometry are unremarkable.   -Now s/p R sided 12 Fr pigtail placement with plans for intrapleural fibrinolysis x 3   -Appreciate on going thoracic surgery recs   -Agree w/ chest tube to low wall suction  -Need to consider VATS pleural biopsy given the negative pleural fluid cytology x 2  -Pain control w/o oversedation  -IS 10 x per hour    -Please call with any questions or concerns you may  have      Parker Knott, DO  Pulmonary and Critical Care Medicine

## 2024-07-05 NOTE — PLAN OF CARE
Plan of Care Review  Plan of Care Reviewed With: patient  Progress: no change  Outcome Evaluation: Pt given prn tylenol for pain at chest tube site. Vitals stable on room air. Colostomy intact. NSR with 1st AV block on tele. Will ctm.

## 2024-07-05 NOTE — PROGRESS NOTES
"ONCOLOGY Progress Note    Subjective    Patient has pain at site of right chest pigtail catheter, which is draining SS dge.  Receiving Tylenol --4 doses in 24 hours--with partial relief. States pain was better relieved with dose of oxycodone on 7/3/2024.    Tolerating TPA.dornase instillations in chest tube.     No SOB at rest. + MARY. Not hypoxic on RA.     Mild constipation (has ostomy with small amount stool)    Objective  Vital signs in last 24 hours:  Temp:  [36.1 °C (97 °F)-36.8 °C (98.3 °F)] 36.8 °C (98.3 °F)  Heart Rate:  [75-89] 89  Resp:  [16-18] 18  BP: (100-162)/(59-84) 162/84    Physical Exam:  Visit Vitals  BP (!) 162/84 (BP Location: Right upper arm, Patient Position: Lying)   Pulse 89   Temp 36.8 °C (98.3 °F) (Oral)   Resp 18   Ht 1.702 m (5' 7\")   Wt 71.2 kg (156 lb 15.5 oz)   SpO2 96%   BMI 24.58 kg/m²       General appearance: alert, no acute distress. Respirations non-labored on RA. Non-toxic  Eyes: Anicteric.   Throat: Mucosa moist and pink. No exudates or ulcers.  Lungs: clear to auscultation bilaterally; decreased BS on R  Right pigtail chest tube with SS dge in pleurevac.  Heart: regular rate and rhythm and S1, S2 normal  Abdomen: soft, non-tender; ostomy left abd.with small amt brown stool.  Extremities: No BLE edema or calf tenderness  Skin: Skin color, texture, turgor normal. No rashes or lesions  Neurologic: Grossly normal    Medications:      acetaminophen, 650 mg, oral, q4h PRN    albuterol HFA, 2 puff, inhalation, q6h PRN    alteplase (CATHFLO ACTIVASE) 10 mg in sodium chloride 0.9 % 20 mL solution, 10 mg, intrapleural, q12h (7a, 7p) **AND** dornase alpha (PULMOZYME) 1 mg/mL 5 mg in sterile water 25 mL solution, 5 mg, intrapleural, q12h (7a, 7p)    atorvastatin, 20 mg, oral, Daily (6p)    calcium carbonate, 1,000 mg, oral, 2x daily PRN    cyanocobalamin, 1,000 mcg, intramuscular, q30 days    glucose, 16-32 g of dextrose, oral, PRN **OR** dextrose, 15-30 g of dextrose, oral, PRN **OR** " glucagon, 1 mg, intramuscular, PRN **OR** dextrose 50 % in water (D50), 25 mL, intravenous, PRN    enoxaparin, 40 mg, subcutaneous, Daily (6p)    furosemide, 20 mg, oral, Daily    melatonin, 3 mg, oral, Nightly PRN    metoprolol succinate XL, 100 mg, oral, Daily    morphine, 2 mg, intravenous, 2x daily PRN    nitroglycerin, 0.4 mg, sublingual, q5 min PRN    pantoprazole, 40 mg, oral, Nightly    [START ON 7/6/2024] polyethylene glycol, 17 g, oral, Daily    Labs:  Results from last 7 days   Lab Units 07/05/24  0507 07/04/24  0320 07/03/24  0445   WBC K/uL 10.23 10.07 8.60   HEMOGLOBIN g/dL 13.0* 12.6* 13.0*   HEMATOCRIT % 40.6 38.5* 39.7*   PLATELETS K/uL 290 296 284     Results from last 7 days   Lab Units 07/05/24  0507 07/04/24  0320 07/03/24  0445   SODIUM mEQ/L 133* 134* 135*   POTASSIUM mEQ/L 4.6 4.7 4.1   CHLORIDE mEQ/L 99 100 101   CO2 mEQ/L 28 26 27   BUN mg/dL 22 23 17   CREATININE mg/dL 0.9 0.9 0.7   GLUCOSE mg/dL 114* 115* 100*   CALCIUM mg/dL 9.1 9.0 8.9         Results from last 7 days   Lab Units 07/02/24  0337 07/01/24  1123   INR  1.2 1.8       Microbiology Results       Procedure Component Value Units Date/Time    Body Fluid Culture/Smear Pleural Fluid, Right [[id number]] Collected: 07/02/24 0928    Specimen: Pleural Fluid, Right Updated: 07/05/24 0758     Culture No growth at 72 hours     Gram Stain Result 1+ WBC      No organisms seen    Fungal Stain Pleural Fluid, Right [[id number]] Collected: 07/02/24 0928    Specimen: Pleural Fluid, Right Updated: 07/02/24 1404     Fungus Stain No fungal elements seen    Fungal Culture Pleural Fluid, Right [[id number]]  (Normal) Collected: 07/02/24 0928    Specimen: Pleural Fluid, Right Updated: 07/03/24 1402     Fungal Culture No Fungus Isolated to Date    AFB stain Pleural Fluid, Right [[id number]]  (Normal) Collected: 07/02/24 0928    Specimen: Pleural Fluid, Right Updated: 07/03/24 1347     AFB Stain No acid fast bacilli seen    AFB Culture Pleural Fluid, Right  [[id number]]  (Normal) Collected: 07/02/24 0928    Specimen: Pleural Fluid, Right Updated: 07/04/24 1100     AFB Culture No Acid Fast Bacilli Isolated to Date    Body Fluid Culture/Smear Pleural Fluid, Right [[id number]] Collected: 06/17/24 1410    Specimen: Pleural Fluid, Right Updated: 06/21/24 1212     Culture No growth at 96 hours     Gram Stain Result 1+ WBC      No organisms seen    Fungal Stain Pleural Fluid, Right [[id number]] Collected: 06/17/24 1410    Specimen: Pleural Fluid, Right Updated: 06/18/24 0648     Fungus Stain No fungal elements seen    Fungal Culture Pleural Fluid, Right [[id number]]  (Normal) Collected: 06/17/24 1410    Specimen: Pleural Fluid, Right Updated: 06/18/24 2300     Fungal Culture No Fungus Isolated to Date    AFB stain Pleural Fluid, Right [[id number]]  (Normal) Collected: 06/17/24 1410    Specimen: Pleural Fluid, Right Updated: 06/18/24 1514     AFB Stain No acid fast bacilli seen    AFB Culture Pleural Fluid, Right [[id number]]  (Normal) Collected: 06/17/24 1410    Specimen: Pleural Fluid, Right Updated: 06/19/24 1401     AFB Culture No Acid Fast Bacilli Isolated to Date    SARS-COV-2 (COVID-19)/ FLU A/B, AND RSV, PCR Nasopharynx [[id number]]  (Normal) Collected: 06/15/24 1247    Specimen: Nasopharyngeal Swab from Nasopharynx Updated: 06/15/24 1335     SARS-CoV-2 (COVID-19) Negative     Influenza A Negative     Influenza B Negative     Respiratory Syncytial Virus Negative    Narrative:      Testing performed using real-time PCR for detection of COVID-19. EUA approved validation studies performed on site.              Imaging:  No results found.         Assessment & Plan    * Pleural effusion on right  Assessment & Plan  Recurrent right pleural effusion.  Concern for malignancy.  -Patient was inpatient at [facility] 6/15 to 6/19/2024, SOB, right pleuritic chest pain  -R Thoracentesis 6/17/2024: 1.2L pleural fluid, exudative, negative cytology, culture.  Improved and he was discharged  6/19/2024.  -He returned to [facility] 7/1/2024 due to gradually increasing SOB with exertion and right chest pain worse when lying on the right side.   -Mild anorexia and 6 or 7 pound weight loss.  He has had a dry cough for several months.    -Hematology/Oncology consulted due to concern regarding possible malignancy  -CXR 7/1/2024 large right pleural effusion.  -Right thoracentesis 7/2/2024 for multiloculated effusion with 1.1L fluid removed.  -Pleural fluid with , pH 7.5, protein <3.   (N 12, L79, M8, Eos 1). Cultures NGSF. Cytology 7/2 pending  -CT chest 7/2/2024 moderate right pleural effusion, pleural thickening and heterogeneity particularly at right lung base, malignancy not excluded; patchy consolidative changes right middle and lower lobes--superimposed pneumonia should be excluded; stable scattered pulmonary nodules largest in JESSE measuring up to 6 mm; mildly prominent mediastinal lymph nodes measuring up to 9 mm.  -LDH-161 not elevated  -CEA- 3.1 --minimal elevation (nonsmoker)  -Right pleural pigtail inserted 7/3/2024 --intrapleural fibrinolysis in progress--due to recurrent loculated exudative pleural effusion    -Appreciate pulmonologist input and thoracic surgery input  -Await cytology of right pleural fluid from 7/2/2024, and added Flow Cytometry pleural fluid--pending  -Await completion of 6 doses tPA/dornase instillations (as recommended by thoracic surgery) then repeat CT  -May need to consider VATS, pleural biopsy  -Patient is nonsmoker, consider CTDs with serositis in DDX. Pleural fluid diff again predominantly lymphocytic suggesting ltmphoma vs autoimmune etiology (Flow pending)  -Consider outpatient PET/CT      Anorexia  Assessment & Plan  Patient has had anorexia with 6 to 7 pound weight loss.  Workup to rule out malignancy is in progress  -Protonix 40 mg nightly  -Nutrition consult  -Encourage supplements        Anemia  Assessment & Plan  Normocytic anemia.  -Hemoglobin 13.5 then  12.6.  MCV 90.1.  Creatinine 0.9.  -Patient receives B12 injections every 30 days.  -Monitor. Hgb 13 on 7/5--stable        Lab Results   Component Value Date    NXNVPRYC60 >1,500 (H) 07/02/2024     Lab Results   Component Value Date    FOLATE 11.5 07/02/2024     Lab Results   Component Value Date    IRON 40 07/02/2024    TIBC 232 (L) 07/02/2024    FERRITIN 222 07/02/2024     % sat 17    Pulmonary nodules  Assessment & Plan  See Pleural effusion    History of rectal cancer  Assessment & Plan  Rectal cancer  -Patient was followed at [facility], T2 N1 M0 rectal cancer  -Neoadjuvant chemotherapy and radiotherapy followed by surgery (APR), then had multiple complications after the surgery, then recovered and had 4 months of adjuvant chemotherapy postoperatively and has not had known recurrence of his rectal cancer.    -Surveillance colonoscopy 8/11/2022 revealed hyperplastic polyp in the transverse colon with pathology revealing tubular adenoma.  -Ostomy functioning  -CEA 3.1    DVT (deep venous thrombosis) (CMS/HCC)  Assessment & Plan  LLE DVT and PE in 3/2019, on therapeutic dose Xarelto for 6 months then changed to prophylactic dose, then had evidence of DVT in 4/2020, unclear if acute DVT but anticoagulation was changed to Eliquis then resumed Xarelto.    -Xarelto on hold pending completion of invasive procedures  -Resume Xarelto when able  -DVT prophylactic dose enoxaparin 40 mg daily has been ordered.    CAD (coronary artery disease)  Assessment & Plan  Patient takes atorvastatin and metoprolol            VALENCIA Hua  7/5/2024  [facility] H[facility] ([facility])  [phone] office         DISPLAY PLAN FREE TEXT

## 2024-07-05 NOTE — NURSING NOTE
0815 Chest Tube clamped. Wall suction turned off, and stopcock pointing to atrium. Intrapleural tpa/ Dornase administered via Rt chest tube as ordered. Patient denies pain or discomfort. We will return to unclamp chest tube post one hour dwell time. Primary RN made aware it.     0920 Chest tune unclamped, wall sunction turned back on. Stopcock open to drain. Pt denies any pain or discomfort. Primary RN made ware.

## 2024-07-06 LAB
ANION GAP SERPL CALC-SCNC: 6 MEQ/L (ref 3–15)
BUN SERPL-MCNC: 19 MG/DL (ref 7–25)
CALCIUM SERPL-MCNC: 8.9 MG/DL (ref 8.6–10.3)
CHLORIDE SERPL-SCNC: 100 MEQ/L (ref 98–107)
CO2 SERPL-SCNC: 26 MEQ/L (ref 21–31)
CREAT SERPL-MCNC: 0.7 MG/DL (ref 0.7–1.3)
EGFRCR SERPLBLD CKD-EPI 2021: >60 ML/MIN/1.73M*2
ERYTHROCYTE [DISTWIDTH] IN BLOOD BY AUTOMATED COUNT: 14 % (ref 11.6–14.4)
GLUCOSE SERPL-MCNC: 101 MG/DL (ref 70–99)
GRAM STN SPEC: NORMAL
GRAM STN SPEC: NORMAL
HCT VFR BLD AUTO: 40.2 % (ref 40.1–51)
HGB BLD-MCNC: 13.2 G/DL (ref 13.7–17.5)
MAGNESIUM SERPL-MCNC: 2 MG/DL (ref 1.8–2.5)
MCH RBC QN AUTO: 29.5 PG (ref 28–33.2)
MCHC RBC AUTO-ENTMCNC: 32.8 G/DL (ref 32.2–36.5)
MCV RBC AUTO: 89.7 FL (ref 83–98)
MICROORGANISM SPEC CULT: NO GROWTH
PDW BLD AUTO: 9.6 FL (ref 9.4–12.4)
PLATELET # BLD AUTO: 292 K/UL (ref 150–350)
POTASSIUM SERPL-SCNC: 4.9 MEQ/L (ref 3.5–5.1)
RBC # BLD AUTO: 4.48 M/UL (ref 4.5–5.8)
SODIUM SERPL-SCNC: 132 MEQ/L (ref 136–145)
WBC # BLD AUTO: 9.97 K/UL (ref 3.8–10.5)

## 2024-07-06 PROCEDURE — 63700000 HC SELF-ADMINISTRABLE DRUG: Performed by: PHYSICIAN ASSISTANT

## 2024-07-06 PROCEDURE — 99024 POSTOP FOLLOW-UP VISIT: CPT | Performed by: THORACIC SURGERY (CARDIOTHORACIC VASCULAR SURGERY)

## 2024-07-06 PROCEDURE — 80048 BASIC METABOLIC PNL TOTAL CA: CPT | Performed by: PHYSICIAN ASSISTANT

## 2024-07-06 PROCEDURE — 63600000 HC DRUGS/DETAIL CODE: Mod: JZ | Performed by: PHYSICIAN ASSISTANT

## 2024-07-06 PROCEDURE — 63700000 HC SELF-ADMINISTRABLE DRUG

## 2024-07-06 PROCEDURE — 36415 COLL VENOUS BLD VENIPUNCTURE: CPT | Performed by: PHYSICIAN ASSISTANT

## 2024-07-06 PROCEDURE — 3E0L3GC INTRODUCTION OF OTHER THERAPEUTIC SUBSTANCE INTO PLEURAL CAVITY, PERCUTANEOUS APPROACH: ICD-10-PCS | Performed by: STUDENT IN AN ORGANIZED HEALTH CARE EDUCATION/TRAINING PROGRAM

## 2024-07-06 PROCEDURE — 85027 COMPLETE CBC AUTOMATED: CPT | Performed by: PHYSICIAN ASSISTANT

## 2024-07-06 PROCEDURE — 83735 ASSAY OF MAGNESIUM: CPT | Performed by: PHYSICIAN ASSISTANT

## 2024-07-06 PROCEDURE — 3E0L317 INTRODUCTION OF OTHER THROMBOLYTIC INTO PLEURAL CAVITY, PERCUTANEOUS APPROACH: ICD-10-PCS | Performed by: STUDENT IN AN ORGANIZED HEALTH CARE EDUCATION/TRAINING PROGRAM

## 2024-07-06 PROCEDURE — 20600000 HC ROOM AND CARE INTERMEDIATE/TELEMETRY

## 2024-07-06 PROCEDURE — 63600000 HC DRUGS/DETAIL CODE: Mod: JZ,JG | Performed by: STUDENT IN AN ORGANIZED HEALTH CARE EDUCATION/TRAINING PROGRAM

## 2024-07-06 PROCEDURE — 63700000 HC SELF-ADMINISTRABLE DRUG: Performed by: STUDENT IN AN ORGANIZED HEALTH CARE EDUCATION/TRAINING PROGRAM

## 2024-07-06 PROCEDURE — 25800000 HC PHARMACY IV SOLUTIONS: Performed by: STUDENT IN AN ORGANIZED HEALTH CARE EDUCATION/TRAINING PROGRAM

## 2024-07-06 PROCEDURE — 25000000 HC PHARMACY GENERAL: Performed by: STUDENT IN AN ORGANIZED HEALTH CARE EDUCATION/TRAINING PROGRAM

## 2024-07-06 PROCEDURE — 63700000 HC SELF-ADMINISTRABLE DRUG: Performed by: HOSPITALIST

## 2024-07-06 PROCEDURE — 99233 SBSQ HOSP IP/OBS HIGH 50: CPT | Performed by: STUDENT IN AN ORGANIZED HEALTH CARE EDUCATION/TRAINING PROGRAM

## 2024-07-06 RX ORDER — LACTULOSE 10 G/15ML
30 SOLUTION ORAL ONCE
Status: COMPLETED | OUTPATIENT
Start: 2024-07-06 | End: 2024-07-06

## 2024-07-06 RX ORDER — LACTULOSE 10 G/15ML
20 SOLUTION ORAL ONCE
Status: COMPLETED | OUTPATIENT
Start: 2024-07-06 | End: 2024-07-06

## 2024-07-06 RX ADMIN — ENOXAPARIN SODIUM 40 MG: 40 INJECTION SUBCUTANEOUS at 17:29

## 2024-07-06 RX ADMIN — LACTULOSE 20 G: 20 SOLUTION ORAL at 12:47

## 2024-07-06 RX ADMIN — POLYETHYLENE GLYCOL 3350 17 G: 17 POWDER, FOR SOLUTION ORAL at 08:53

## 2024-07-06 RX ADMIN — MORPHINE SULFATE 2 MG: 2 INJECTION, SOLUTION INTRAMUSCULAR; INTRAVENOUS at 03:52

## 2024-07-06 RX ADMIN — METOPROLOL SUCCINATE 100 MG: 100 TABLET, EXTENDED RELEASE ORAL at 08:53

## 2024-07-06 RX ADMIN — DORNASE ALFA 5 MG: 1 SOLUTION RESPIRATORY (INHALATION) at 08:11

## 2024-07-06 RX ADMIN — ALTEPLASE 10 MG: 2.2 INJECTION, POWDER, LYOPHILIZED, FOR SOLUTION INTRAVENOUS at 08:12

## 2024-07-06 RX ADMIN — ACETAMINOPHEN 650 MG: 325 TABLET ORAL at 00:18

## 2024-07-06 RX ADMIN — LACTULOSE 30 G: 20 SOLUTION ORAL at 17:28

## 2024-07-06 RX ADMIN — FUROSEMIDE 20 MG: 20 TABLET ORAL at 08:54

## 2024-07-06 RX ADMIN — Medication 3 MG: at 00:19

## 2024-07-06 RX ADMIN — PANTOPRAZOLE SODIUM 40 MG: 40 TABLET, DELAYED RELEASE ORAL at 21:40

## 2024-07-06 RX ADMIN — ATORVASTATIN CALCIUM 20 MG: 20 TABLET, FILM COATED ORAL at 17:29

## 2024-07-06 ASSESSMENT — COGNITIVE AND FUNCTIONAL STATUS - GENERAL
STANDING UP FROM CHAIR USING ARMS: 4 - NONE
WALKING IN HOSPITAL ROOM: 3 - A LITTLE
CLIMB 3 TO 5 STEPS WITH RAILING: 3 - A LITTLE
MOVING TO AND FROM BED TO CHAIR: 4 - NONE
CLIMB 3 TO 5 STEPS WITH RAILING: 3 - A LITTLE
STANDING UP FROM CHAIR USING ARMS: 3 - A LITTLE
WALKING IN HOSPITAL ROOM: 3 - A LITTLE
MOVING TO AND FROM BED TO CHAIR: 4 - NONE

## 2024-07-06 NOTE — PROGRESS NOTES
Thoracic Surgery Daily Progress Note    Subjective     No acute events overnight. Satting well on RA. No air leak noted. Now s/p 6 doses tPA/Dornase. CT chest pending    Objective     Vital signs in last 24 hours:  Temp:  [36.3 °C (97.4 °F)-37.2 °C (99 °F)] 36.8 °C (98.3 °F)  Heart Rate:  [77-89] 87  Resp:  [14-20] 14  BP: (113-139)/(68-79) 133/69      Intake/Output Summary (Last 24 hours) at 7/7/2024 0613  Last data filed at 7/7/2024 0600  Gross per 24 hour   Intake 720 ml   Output 810 ml   Net -90 ml     Intake/Output this shift:  I/O this shift:  In: -   Out: 610 [Urine:150; Chest Tube:460]    Physical Exam  Physical Exam  Constitutional:       General: He is not in acute distress.  Eyes:      General: No scleral icterus.     Conjunctiva/sclera: Conjunctivae normal.   Cardiovascular:      Rate and Rhythm: Normal rate and regular rhythm.      Pulses: Normal pulses.   Pulmonary:      Effort: Pulmonary effort is normal. No respiratory distress. CT to suction, serosang output  Skin:     General: Skin is warm and dry.   Neurological:      General: No focal deficit present.      Mental Status: He is alert.             Labs:  BMP Results         07/02/24 07/01/24 06/19/24     0337 1123 0516     135 137    K 3.9 3.8 4.2    Cl 100 100 105    CO2 28 27 25    Glucose 99 132 98    BUN 13 14 26    Creatinine 0.8 0.9 0.8    Calcium 9.1 9.4 9.1    Anion Gap 7 8 7    EGFR >60.0 >60.0 >60.0           Comment for K at 1123 on 07/01/24: Results obtained on plasma. Plasma Potassium values may be up to 0.4 mEQ/L less than serum values. The differences may be greater for patients with high platelet or white cell counts.    Comment for EGFR at 0337 on 07/02/24: Calculation based on the Chronic Kidney Disease Epidemiology Collaboration (CKD-EPI) equation refit without adjustment for race.    Comment for EGFR at 1123 on 07/01/24: Calculation based on the Chronic Kidney Disease Epidemiology Collaboration (CKD-EPI) equation refit  without adjustment for race.    Comment for EGFR at 0516 on 06/19/24: Calculation based on the Chronic Kidney Disease Epidemiology Collaboration (CKD-EPI) equation refit without adjustment for race.           CBC Results         07/02/24 07/01/24 06/19/24     0337 1123 0516    WBC 8.82 10.17 8.07    RBC 4.34 4.58 4.56    HGB 12.6 13.5 13.5    HCT 39.1 41.2 41.2    MCV 90.1 90.0 90.4    MCH 29.0 29.5 29.6    MCHC 32.2 32.8 32.8     307 229            Imaging  I have reviewed the Imaging from the last 24 hrs.      Assessment/Plan       Dave Arredondo is a 88 year old man with history of rectal cancer s/p resection with ostomy, CAD, DVT/PE on Xarelto who presented with a recurrent loculated pleural effusion concerning for possible malignancy     Plan:  - Follow up pleural fluid culture and cytology  - Recommend CT chest for re-evaluation of pleural effusion today  - Rest of care as per primary    Michele Joseph M.D.  General Surgery PGY-2  [phone]

## 2024-07-06 NOTE — PROGRESS NOTES
"ONCOLOGY Progress Note    Subjective    No adverse events documented.  Sleeping comfortably when rounding earlier this AM.  Cytology, flow returned - no malignancy.      Objective  Vital signs in last 24 hours:  Temp:  [36.4 °C (97.5 °F)-36.9 °C (98.5 °F)] 36.4 °C (97.5 °F)  Heart Rate:  [74-89] 79  Resp:  [18-20] 20  BP: (117-139)/(66-79) 139/79    Physical Exam:  Visit Vitals  /79 (BP Location: Right upper arm, Patient Position: Lying)   Pulse 79   Temp 36.4 °C (97.5 °F) (Oral)   Resp 20   Ht 1.702 m (5' 7\")   Wt 69.4 kg (153 lb)   SpO2 93%   BMI 23.96 kg/m²       General appearance: Comfortably sleeping.     Medications:      acetaminophen, 650 mg, oral, q4h PRN    albuterol HFA, 2 puff, inhalation, q6h PRN    alteplase (CATHFLO ACTIVASE) 10 mg in sodium chloride 0.9 % 20 mL solution, 10 mg, intrapleural, q12h (7a, 7p) **AND** dornase alpha (PULMOZYME) 1 mg/mL 5 mg in sterile water 25 mL solution, 5 mg, intrapleural, q12h (7a, 7p)    atorvastatin, 20 mg, oral, Daily (6p)    calcium carbonate, 1,000 mg, oral, 2x daily PRN    cyanocobalamin, 1,000 mcg, intramuscular, q30 days    glucose, 16-32 g of dextrose, oral, PRN **OR** dextrose, 15-30 g of dextrose, oral, PRN **OR** glucagon, 1 mg, intramuscular, PRN **OR** dextrose 50 % in water (D50), 25 mL, intravenous, PRN    enoxaparin, 40 mg, subcutaneous, Daily (6p)    furosemide, 20 mg, oral, Daily    melatonin, 3 mg, oral, Nightly PRN    metoprolol succinate XL, 100 mg, oral, Daily    morphine, 2 mg, intravenous, 2x daily PRN    nitroglycerin, 0.4 mg, sublingual, q5 min PRN    pantoprazole, 40 mg, oral, Nightly    polyethylene glycol, 17 g, oral, Daily    Labs:  Results from last 7 days   Lab Units 07/06/24  0458 07/05/24  0507 07/04/24  0320   WBC K/uL 9.97 10.23 10.07   HEMOGLOBIN g/dL 13.2* 13.0* 12.6*   HEMATOCRIT % 40.2 40.6 38.5*   PLATELETS K/uL 292 290 296     Results from last 7 days   Lab Units 07/06/24  0458 07/05/24  0507 07/04/24  0320   SODIUM " mEQ/L 132* 133* 134*   POTASSIUM mEQ/L 4.9 4.6 4.7   CHLORIDE mEQ/L 100 99 100   CO2 mEQ/L 26 28 26   BUN mg/dL 19 22 23   CREATININE mg/dL 0.7 0.9 0.9   GLUCOSE mg/dL 101* 114* 115*   CALCIUM mg/dL 8.9 9.1 9.0         Results from last 7 days   Lab Units 07/02/24  0337 07/01/24  1123   INR  1.2 1.8       Microbiology Results       Procedure Component Value Units Date/Time    Body Fluid Culture/Smear Pleural Fluid, Right [[id number]] Collected: 07/02/24 0928    Specimen: Pleural Fluid, Right Updated: 07/06/24 0656     Culture No growth     Gram Stain Result 1+ WBC      No organisms seen    Fungal Stain Pleural Fluid, Right [[id number]] Collected: 07/02/24 0928    Specimen: Pleural Fluid, Right Updated: 07/02/24 1404     Fungus Stain No fungal elements seen    Fungal Culture Pleural Fluid, Right [[id number]]  (Normal) Collected: 07/02/24 0928    Specimen: Pleural Fluid, Right Updated: 07/03/24 1402     Fungal Culture No Fungus Isolated to Date    AFB stain Pleural Fluid, Right [[id number]]  (Normal) Collected: 07/02/24 0928    Specimen: Pleural Fluid, Right Updated: 07/03/24 1347     AFB Stain No acid fast bacilli seen    AFB Culture Pleural Fluid, Right [[id number]]  (Normal) Collected: 07/02/24 0928    Specimen: Pleural Fluid, Right Updated: 07/04/24 1100     AFB Culture No Acid Fast Bacilli Isolated to Date    Body Fluid Culture/Smear Pleural Fluid, Right [[id number]] Collected: 06/17/24 1410    Specimen: Pleural Fluid, Right Updated: 06/21/24 1212     Culture No growth at 96 hours     Gram Stain Result 1+ WBC      No organisms seen    Fungal Stain Pleural Fluid, Right [[id number]] Collected: 06/17/24 1410    Specimen: Pleural Fluid, Right Updated: 06/18/24 0648     Fungus Stain No fungal elements seen    Fungal Culture Pleural Fluid, Right [[id number]]  (Normal) Collected: 06/17/24 1410    Specimen: Pleural Fluid, Right Updated: 06/18/24 2300     Fungal Culture No Fungus Isolated to Date    AFB stain Pleural Fluid, Right  [[id number]]  (Normal) Collected: 06/17/24 1410    Specimen: Pleural Fluid, Right Updated: 06/18/24 1514     AFB Stain No acid fast bacilli seen    AFB Culture Pleural Fluid, Right [[id number]]  (Normal) Collected: 06/17/24 1410    Specimen: Pleural Fluid, Right Updated: 06/19/24 1401     AFB Culture No Acid Fast Bacilli Isolated to Date    SARS-COV-2 (COVID-19)/ FLU A/B, AND RSV, PCR Nasopharynx [[id number]]  (Normal) Collected: 06/15/24 1247    Specimen: Nasopharyngeal Swab from Nasopharynx Updated: 06/15/24 1335     SARS-CoV-2 (COVID-19) Negative     Influenza A Negative     Influenza B Negative     Respiratory Syncytial Virus Negative    Narrative:      Testing performed using real-time PCR for detection of COVID-19. EUA approved validation studies performed on site.              Imaging:  No results found.         Assessment & Plan    * Pleural effusion on right  Assessment & Plan  Recurrent right pleural effusion.  Concern for malignancy.  -Patient was inpatient at [facility] 6/15 to 6/19/2024, SOB, right pleuritic chest pain  -R Thoracentesis 6/17/2024: 1.2L pleural fluid, exudative, negative cytology, culture.  Improved and he was discharged 6/19/2024.  -He returned to [facility] 7/1/2024 due to gradually increasing SOB with exertion and right chest pain worse when lying on the right side.   -Mild anorexia and 6 or 7 pound weight loss.  He has had a dry cough for several months.    -Hematology/Oncology consulted due to concern regarding possible malignancy  -CXR 7/1/2024 large right pleural effusion.  -Right thoracentesis 7/2/2024 for multiloculated effusion with 1.1L fluid removed.  -Pleural fluid with , pH 7.5, protein <3.   (N 12, L79, M8, Eos 1). Cultures NGSF. Cytology 7/2 pending  -CT chest 7/2/2024 moderate right pleural effusion, pleural thickening and heterogeneity particularly at right lung base, malignancy not excluded; patchy consolidative changes right middle and lower lobes--superimposed pneumonia  should be excluded; stable scattered pulmonary nodules largest in JESSE measuring up to 6 mm; mildly prominent mediastinal lymph nodes measuring up to 9 mm.  -LDH-161 not elevated  -CEA- 3.1 --minimal elevation (nonsmoker)  -Right pleural pigtail inserted 7/3/2024 --intrapleural fibrinolysis in progress--due to recurrent loculated exudative pleural effusion    -Appreciate pulmonologist input and thoracic surgery input  -Cytology, flow cytometry of right pleural fluid from 7/2/2024 negative for malignancy  -Await completion of 6 doses tPA/dornase instillations (as recommended by thoracic surgery) then repeat CT  -May need to consider VATS, pleural biopsy  -Patient is nonsmoker, consider CTDs with serositis in DDX. Pleural fluid diff again predominantly lymphocytic suggesting lymphoma vs autoimmune etiology  -Consider outpatient PET/CT when discharged      Anorexia  Assessment & Plan  Patient has had anorexia with 6 to 7 pound weight loss.  Workup to rule out malignancy is in progress  -Protonix 40 mg nightly  -Nutrition consult  -Encourage supplements        Anemia  Assessment & Plan  Normocytic anemia.  -Hemoglobin 13.5 then 12.6.  MCV 90.1.  Creatinine 0.9.  -Patient receives B12 injections every 30 days.  -Monitor. Hgb stable        Lab Results   Component Value Date    [id number] >1,500 (H) 07/02/2024     Lab Results   Component Value Date    FOLATE 11.5 07/02/2024     Lab Results   Component Value Date    IRON 40 07/02/2024    TIBC 232 (L) 07/02/2024    FERRITIN 222 07/02/2024     % sat 17    Pulmonary nodules  Assessment & Plan  See Pleural effusion    History of rectal cancer  Assessment & Plan  Rectal cancer  -Patient was followed at [facility], T2 N1 M0 rectal cancer  -Neoadjuvant chemotherapy and radiotherapy followed by surgery (APR), then had multiple complications after the surgery, then recovered and had 4 months of adjuvant chemotherapy postoperatively and has not had known recurrence of his  rectal cancer.    -Surveillance colonoscopy 8/11/2022 revealed hyperplastic polyp in the transverse colon with pathology revealing tubular adenoma.  -Ostomy functioning  -CEA 3.1    DVT (deep venous thrombosis) (CMS/HCC)  Assessment & Plan  LLE DVT and PE in 3/2019, on therapeutic dose Xarelto for 6 months then changed to prophylactic dose, then had evidence of DVT in 4/2020, unclear if acute DVT but anticoagulation was changed to Eliquis then resumed Xarelto.    -Xarelto on hold pending completion of invasive procedures  -Resume Xarelto when able  -DVT prophylactic dose enoxaparin 40 mg daily has been ordered.    CAD (coronary artery disease)  Assessment & Plan  Patient takes atorvastatin and metoprolol            Jean-Pierre Meza MD  7/6/2024  [facility] H[facility] ([facility])  [phone] office

## 2024-07-06 NOTE — PLAN OF CARE
Problem: Adult Inpatient Plan of Care  Goal: Plan of Care Review  Outcome: Progressing  Flowsheets (Taken 7/6/2024 1938)  Progress: improving  Outcome Evaluation: Pt AOx4.  Pain improving.  No prn pain medication this shift.  Colostomy intact and passing flatus but no output.  Lactulose given.  Chest tube intact, SS drainage.  NSR with 1 degree AV block.  OOB to chair this shift.  Plan of Care Reviewed With: patient   Plan of Care Review  Plan of Care Reviewed With: patient  Progress: improving  Outcome Evaluation: Pt AOx4.  Pain improving.  No prn pain medication this shift.  Colostomy intact and passing flatus but no output.  Lactulose given.  Chest tube intact, SS drainage.  NSR with 1 degree AV block.  OOB to chair this shift.

## 2024-07-06 NOTE — PROGRESS NOTES
Thoracic Surgery Daily Progress Note    Subjective     No acute events overnight. Satting well on RA. No air leak noted.      Objective     Vital signs in last 24 hours:  Temp:  [36.4 °C (97.6 °F)-36.9 °C (98.5 °F)] 36.5 °C (97.7 °F)  Heart Rate:  [74-89] 77  Resp:  [18] 18  BP: (117-162)/(66-84) 132/75      Intake/Output Summary (Last 24 hours) at 7/6/2024 0419  Last data filed at 7/6/2024 0345  Gross per 24 hour   Intake 20 ml   Output 1495 ml   Net -1475 ml     Intake/Output this shift:  I/O this shift:  In: 10 [I.V.:10]  Out: 600 [Urine:400; Chest Tube:200]    Physical Exam  Physical Exam  Constitutional:       General: He is not in acute distress.  Eyes:      General: No scleral icterus.     Conjunctiva/sclera: Conjunctivae normal.   Cardiovascular:      Rate and Rhythm: Normal rate and regular rhythm.      Pulses: Normal pulses.   Pulmonary:      Effort: Pulmonary effort is normal. No respiratory distress. CT to suction, serosang output  Skin:     General: Skin is warm and dry.   Neurological:      General: No focal deficit present.      Mental Status: He is alert.             Labs:  BMP Results         07/02/24 07/01/24 06/19/24     0337 1123 0516     135 137    K 3.9 3.8 4.2    Cl 100 100 105    CO2 28 27 25    Glucose 99 132 98    BUN 13 14 26    Creatinine 0.8 0.9 0.8    Calcium 9.1 9.4 9.1    Anion Gap 7 8 7    EGFR >60.0 >60.0 >60.0           Comment for K at 1123 on 07/01/24: Results obtained on plasma. Plasma Potassium values may be up to 0.4 mEQ/L less than serum values. The differences may be greater for patients with high platelet or white cell counts.    Comment for EGFR at 0337 on 07/02/24: Calculation based on the Chronic Kidney Disease Epidemiology Collaboration (CKD-EPI) equation refit without adjustment for race.    Comment for EGFR at 1123 on 07/01/24: Calculation based on the Chronic Kidney Disease Epidemiology Collaboration (CKD-EPI) equation refit without adjustment for race.     Comment for EGFR at 0516 on 06/19/24: Calculation based on the Chronic Kidney Disease Epidemiology Collaboration (CKD-EPI) equation refit without adjustment for race.           CBC Results         07/02/24 07/01/24 06/19/24     0337 1123 0516    WBC 8.82 10.17 8.07    RBC 4.34 4.58 4.56    HGB 12.6 13.5 13.5    HCT 39.1 41.2 41.2    MCV 90.1 90.0 90.4    MCH 29.0 29.5 29.6    MCHC 32.2 32.8 32.8     307 229            Imaging  I have reviewed the Imaging from the last 24 hrs.      Assessment/Plan       Dave Arredondo is a 88 year old man with history of rectal cancer s/p resection with ostomy, CAD, DVT/PE on Xarelto who presented with a recurrent loculated pleural effusion concerning for possible malignancy     Plan:  - Follow up pleural fluid culture and cytology  - Continue with tPA/Dornase 6 doses total (end 7/6)  - Recommend CT chest for re-evaluation of pleural effusion tomorrow 7/7  - Rest of care as per primary

## 2024-07-06 NOTE — PROGRESS NOTES
Hospital Medicine     Daily Progress Note       SUBJECTIVE   Interval History: No acute overnight events.  Endorses having a bowel movement.  Denies fevers, chest pain, SOB, N/V.  Awaiting CT for further CT surgery recs.     OBJECTIVE      Vital signs in last 24 hours:  Temp:  [36.3 °C (97.4 °F)-37.2 °C (99 °F)] 36.4 °C (97.6 °F)  Heart Rate:  [81-94] 94  Resp:  [14-18] 18  BP: (113-137)/(68-79) 137/77    Intake/Output Summary (Last 24 hours) at 7/7/2024 1018  Last data filed at 7/7/2024 0930  Gross per 24 hour   Intake 960 ml   Output 610 ml   Net 350 ml       PHYSICAL EXAMINATION          Constitutional: no acute distress, laying in bed  Eyes:  non-icteric   ENMT: Moist mucous membranes  CV: RRR, no murmurs detected  Pulm: Breathing comfortably on room air.  Right-sided chest pigtail tube in place  GI: Bowel sounds are normal, soft, + colostomy bag with adequate amount of output  MSK: no cyanosis, clubbing  Neuro: awake, alert and oriented x 3.  No obvious focal deficits   LINES, CATHETERS, DRAINS, AIRWAYS, AND WOUNDS   Lines, Drains, and Airways:  Wounds (agree with documentation and present on admission):  Peripheral IV (Adult) 07/01/24 Left Antecubital (Active)   Number of days: 4       Chest Tube 1 Right 12 Fr (Active)   Number of days: 2       Colostomy Unknown LUQ (Active)   Number of days: 5215         Comments:    LABS / IMAGING / TELE      Labs  I have reviewed the patient's labs to the time of note. No new clinical concern.    Results from last 7 days   Lab Units 07/07/24  0322 07/06/24  0458 07/05/24  0507   WBC K/uL 10.05 9.97 10.23   HEMOGLOBIN g/dL 13.4* 13.2* 13.0*   HEMATOCRIT % 40.7 40.2 40.6   PLATELETS K/uL 309 292 290       Results from last 7 days   Lab Units 07/07/24  0322 07/06/24  0458 07/05/24  0507 07/02/24  0337 07/01/24  1123   SODIUM mEQ/L 133* 132* 133*   < > 135*   POTASSIUM mEQ/L 4.8 4.9 4.6   < > 3.8   CHLORIDE mEQ/L 99 100 99   < > 100   CO2 mEQ/L 27 26 28   < > 27   BUN mg/dL  15 19 22   < > 14   CREATININE mg/dL 0.7 0.7 0.9   < > 0.9   CALCIUM mg/dL 9.1 8.9 9.1   < > 9.4   ALBUMIN g/dL  --   --   --   --  3.5   BILIRUBIN TOTAL mg/dL  --   --   --   --  0.9   ALK PHOS IU/L  --   --   --   --  102   ALT IU/L  --   --   --   --  15   AST IU/L  --   --   --   --  17   GLUCOSE mg/dL 99 101* 114*   < > 132*    < > = values in this interval not displayed.             Imaging  I have independently reviewed the pertinent imaging from the last 24 hrs.    ECG/Telemetry  I have independently reviewed the telemetry. No events for the last 24 hours.    ASSESSMENT AND PLAN      * Pleural effusion on right  Assessment & Plan  S/p 6/17 R sided thoracentesis with 1.2 L removed, exudative. Culture and cytology negative   CXR on admission with recurrent large R pleural effusion   S/p repeat thora 7/2 with 1.1 L off, locultated   CT chest post thora on 7/2 showing ongoing moderate R pleural effusion and pelural thickening, concern for malignancy (noted hx of rectal CA)   Pulmonary and thoracic surgery following   S/p pigtail placement 7/4  with initiation of TPA/dornase x 6 doses (completed)  Plan for follow up CT 7/7.  Will await further CT surgery recommendations to see if adequate for patient to have pigtail chest tube removed.  Since patient has not had received contrast for scan will order IV fluids to try to prevent EDDIE  Follow up fluid culture studies and flow cytometry > negative for malignancy    Other constipation  Assessment & Plan  In a patient with colostomy  Has resolved after heavy bowel regimen.  Last BM on night of 7/6    Hyponatremia  Assessment & Plan  Mild.  No symptoms at this time.  Encourage increased p.o intake  If it continues to downtrend will do hyponatremia workup    Normocytic anemia  Assessment & Plan  Hemoglobin stable and close to baseline  Having some bloody discharge from the chest tube but otherwise denies obvious bleeding  Monitor daily CBC    Pulmonary nodules  Assessment  & Plan  6/18 CAT scan chest revealed pulmonary nodules  CT chest 7/2 showing stable pulm nodules, largest JESSE up to 6mm       Right-sided chest pain  Assessment & Plan  Improving  Secondary to pleural effusion and now chest tube       History of rectal cancer  Assessment & Plan  In 2010 per patient, cared for at [facility]   S/p colostomy, chemo and radiation   With recurrent exudative pleural effusion and CT scan findings concern for malignancy   Oncology following   Follow up pleural fluid studies, flow cytometry     DVT (deep venous thrombosis) (CMS/HCC)  Assessment & Plan  History of DVT/PE   On Xarelto as OP   On subq lovenox as bloody fluid noted from chest tube   Plan to resume Xarelto when TPA/dornase complete/ chest tube removed and assuming no further procedures needed     CAD (coronary artery disease)  Assessment & Plan  Patient with presumed CAD based on abnormal stress test in 2018, declined cardiac cath   Chest on admission is improving, R sided and suspected 2/2 large pleural effusion and now chest tube   Troponin flat, monitoring on tele          VTE Assessment: Padua VTE Score: 4  VTE Prophylaxis:  Current anticoagulants:  enoxaparin (LOVENOX) syringe 40 mg, subcutaneous, Daily (6p)      Code Status: Full Code      Estimated Discharge Date: 7/8/2024   Disposition Planning: TBD based on resolution and/or management of issues noted above     Kvng Valladares MD  7/7/2024

## 2024-07-06 NOTE — NURSING NOTE
0812 Chest Tube clamped. Wall suction turned off, and stopcock pointing to atrium. Intrapleural tpa/ Dornase administered via Rt chest tube as ordered. Patient denies pain or discomfort.     9020 Chest tube unclamped. Stopcock open to drain, and Wall suction back on. Patient remains no pain or discomfort. Primary RN made aware.

## 2024-07-06 NOTE — PROGRESS NOTES
Hospital Medicine     Daily Progress Note       SUBJECTIVE   Interval History: No acute overnight events.  Feels similar to yesterday.  Denies any fevers, chills, chest pain or worsening shortness of breath.  He does endorse constipation and would like to try something more than just MiraLAX.     OBJECTIVE      Vital signs in last 24 hours:  Temp:  [36.3 °C (97.4 °F)-36.9 °C (98.5 °F)] 36.3 °C (97.4 °F)  Heart Rate:  [74-87] 81  Resp:  [18-20] 18  BP: (117-139)/(66-79) 122/72    Intake/Output Summary (Last 24 hours) at 7/6/2024 1255  Last data filed at 7/6/2024 1100  Gross per 24 hour   Intake 730 ml   Output 1425 ml   Net -695 ml       PHYSICAL EXAMINATION          Constitutional: no acute distress, sitting up in bed watching TV  Eyes:  non-icteric   ENMT: Moist mucous membranes  CV: RRR, no murmurs detected  Pulm: Breathing comfortably on room air.  Right-sided chest pigtail tube with bloody drainage  GI: Bowel sounds are normal, soft, + colostomy bag with no output  MSK: no cyanosis, clubbing  Neuro: awake, alert and oriented x 3.  No obvious focal deficits   LINES, CATHETERS, DRAINS, AIRWAYS, AND WOUNDS   Lines, Drains, and Airways:  Wounds (agree with documentation and present on admission):  Peripheral IV (Adult) 07/01/24 Left Antecubital (Active)   Number of days: 4       Chest Tube 1 Right 12 Fr (Active)   Number of days: 2       Colostomy Unknown LUQ (Active)   Number of days: 5215         Comments:    LABS / IMAGING / TELE      Labs  I have reviewed the patient's labs to the time of note. No new clinical concern.    Results from last 7 days   Lab Units 07/06/24  0458 07/05/24  0507 07/04/24  0320   WBC K/uL 9.97 10.23 10.07   HEMOGLOBIN g/dL 13.2* 13.0* 12.6*   HEMATOCRIT % 40.2 40.6 38.5*   PLATELETS K/uL 292 290 296       Results from last 7 days   Lab Units 07/06/24  0458 07/05/24  0507 07/04/24  0320 07/02/24  0337 07/01/24  1123   SODIUM mEQ/L 132* 133* 134*   < > 135*   POTASSIUM mEQ/L 4.9 4.6  4.7   < > 3.8   CHLORIDE mEQ/L 100 99 100   < > 100   CO2 mEQ/L 26 28 26   < > 27   BUN mg/dL 19 22 23   < > 14   CREATININE mg/dL 0.7 0.9 0.9   < > 0.9   CALCIUM mg/dL 8.9 9.1 9.0   < > 9.4   ALBUMIN g/dL  --   --   --   --  3.5   BILIRUBIN TOTAL mg/dL  --   --   --   --  0.9   ALK PHOS IU/L  --   --   --   --  102   ALT IU/L  --   --   --   --  15   AST IU/L  --   --   --   --  17   GLUCOSE mg/dL 101* 114* 115*   < > 132*    < > = values in this interval not displayed.             Imaging  I have independently reviewed the pertinent imaging from the last 24 hrs.    ECG/Telemetry  I have independently reviewed the telemetry. No events for the last 24 hours.    ASSESSMENT AND PLAN      * Pleural effusion on right  Assessment & Plan  S/p 6/17 R sided thoracentesis with 1.2 L removed, exudative. Culture and cytology negative   CXR on admission with recurrent large R pleural effusion   S/p repeat thora 7/2 with 1.1 L off, locultated   CT chest post thora on 7/2 showing ongoing moderate R pleural effusion and pelural thickening, concern for malignancy (noted hx of rectal CA)   Pulmonary and thoracic surgery following   S/p pigtail placement 7/4  with initiation of TPA/dornase x 6 doses (completed)  Plan for follow up CT once completed > but CT surgery not recommending for repeat CT on 7/7 (ordered)  Follow up fluid culture studies and flow cytometry > negative for malignancy    Hyponatremia  Assessment & Plan  Mild.  No symptoms at this time.  Encourage increased p.o intake  If it continues to downtrend will do hyponatremia workup    Normocytic anemia  Assessment & Plan  Hemoglobin stable and close to baseline  Having some bloody discharge from the chest tube but otherwise denies obvious bleeding  Monitor daily CBC    Pulmonary nodules  Assessment & Plan  6/18 CAT scan chest revealed pulmonary nodules  CT chest 7/2 showing stable pulm nodules, largest JESSE up to 6mm       Right-sided chest pain  Assessment &  Plan  Improving  Secondary to pleural effusion and now chest tube       History of rectal cancer  Assessment & Plan  In 2010 per patient, cared for at [facility]   S/p colostomy, chemo and radiation   With recurrent exudative pleural effusion and CT scan findings concern for malignancy   Oncology following   Follow up pleural fluid studies, flow cytometry     DVT (deep venous thrombosis) (CMS/HCC)  Assessment & Plan  History of DVT/PE   On Xarelto as OP   On subq lovenox as bloody fluid noted from chest tube   Plan to resume Xarelto when TPA/dornase complete/ chest tube removed and assuming no further procedures needed     CAD (coronary artery disease)  Assessment & Plan  Patient with presumed CAD based on abnormal stress test in 2018, declined cardiac cath   Chest on admission is improving, R sided and suspected 2/2 large pleural effusion and now chest tube   Troponin flat, monitoring on tele          VTE Assessment: Padua VTE Score: 4  VTE Prophylaxis:  Current anticoagulants:  enoxaparin (LOVENOX) syringe 40 mg, subcutaneous, Daily (6p)      Code Status: Full Code      Estimated Discharge Date: 7/7/2024   Disposition Planning: home when completes TPA/dornase if follow up CT chest without concerns      Kvng Valladares MD  7/6/2024

## 2024-07-06 NOTE — PROGRESS NOTES
[facility] Pulmonary, Critical Care and Sleep Progress Note       SUBJECTIVE:  Pt seen and examined at bedside. No acute events overnight.     Outside records reviewed.    Medical History:   Past Medical History:   Diagnosis Date    Colon cancer (CMS/HCC)     Coronary artery disease     DVT (deep venous thrombosis) (CMS/HCC)     Hypertension     Lipid disorder        Surgical History:   Past Surgical History:   Procedure Laterality Date    COLOSTOMY      HERNIA REPAIR         Allergies: Patient has no known allergies.    Med List Reviewed.    Social History:   Social History     Socioeconomic History    Marital status:      Spouse name: None    Number of children: None    Years of education: None    Highest education level: None   Tobacco Use    Smoking status: Never    Smokeless tobacco: Never   Substance and Sexual Activity    Alcohol use: Not Currently    Drug use: Never    Sexual activity: Defer   Social History Narrative    Lives in the [facility]      Social Determinants of Health     Food Insecurity: No Food Insecurity (7/1/2024)    Hunger Vital Sign     Worried About Running Out of Food in the Last Year: Never true     Ran Out of Food in the Last Year: Never true   Transportation Needs: No Transportation Needs (7/1/2024)    PRAPARE - Transportation     Lack of Transportation (Medical): No     Lack of Transportation (Non-Medical): No   Housing Stability: Low Risk  (7/1/2024)    Housing Stability Vital Sign     Unable to Pay for Housing in the Last Year: No     Number of Places Lived in the Last Year: 1     Unstable Housing in the Last Year: No       Family History:   Family History   Problem Relation Age of Onset    Heart disease Biological Mother        Review of Systems  All other systems reviewed and negative except as noted in the HPI.    Vital signs in last 24 hours:  Temp:  [36.3 °C (97.4 °F)-36.9 °C (98.5 °F)] 36.6 °C (97.9 °F)  Heart Rate:  [74-87] 82  Resp:  [16-20]  16  BP: (113-139)/(66-79) 113/72    OBJECTIVE    Physical Exam:  Gen: WDWN, in NAD, AAOx3, pleasant  HEENT: PERRLA, neck supple, no LAD  CV: RRR, +s1/s2, no M/R/G  Pulm: CTA b/l without W/R/R  GI: abd soft, NT/ND, normoactive BS  Extremities: no LE edema   Skin: clean, dry and intact without evidence of break down   Neuro: CN II-XII grossly in tact, no focal deficits     Labs:  I have reviewed the patient's pertinent labs.     Imaging:  I have independently reviewed the patient's pertinent imaging for this hospital visit.   IR PLEURAL CATHETER PLACEMENT    Result Date: 7/3/2024  IMPRESSION:   Satisfactory 12 Cymro right pigtail chest tube placement. COMMENT: PROCEDURE:  Chest tube placement ANESTHESIA:  Lidocaine 1% local. FLUORO TIME:  0.1 minutes. REFERENCE AIR KERMA: 0.9 mGy. CONTRAST:  None. MEDICATIONS:  None. DESCRIPTION OF PROCEDURE:    Written informed consent was obtained.  Ultrasound demonstrates complex, multiloculated right pleural effusion.  The patient was prepped and draped in the usual sterile manner.  Using real-time ultrasound guidance, a Yueh needle was inserted into the right pleural effusion.  Following serial dilation, a 12 Cymro pigtail chest tube was inserted.  Serosanguineous fluid was aspirated.  The catheter was secured with suture and connected to Pleur-evac drainage.  The patient tolerated the procedure well without immediate complication.     ULTRASOUND GUIDED NEEDLE PLACEMENT    Result Date: 7/3/2024  IMPRESSION:   Satisfactory 12 Cymro right pigtail chest tube placement. COMMENT: PROCEDURE:  Chest tube placement ANESTHESIA:  Lidocaine 1% local. FLUORO TIME:  0.1 minutes. REFERENCE AIR KERMA: 0.9 mGy. CONTRAST:  None. MEDICATIONS:  None. DESCRIPTION OF PROCEDURE:    Written informed consent was obtained.  Ultrasound demonstrates complex, multiloculated right pleural effusion.  The patient was prepped and draped in the usual sterile manner.  Using real-time ultrasound guidance, a  Yueh needle was inserted into the right pleural effusion.  Following serial dilation, a 12 Azeri pigtail chest tube was inserted.  Serosanguineous fluid was aspirated.  The catheter was secured with suture and connected to Pleur-evac drainage.  The patient tolerated the procedure well without immediate complication.     IR THORACENTESIS    Result Date: 7/3/2024  IMPRESSION: Successful ultrasound guided right thoracentesis with 1100 cc of fluid removed and  sent for laboratory studies, as requested COMMENT: The patient is referred for ultrasound guided thoracentesis on the right side. Informed consent was obtained. With the patient sitting, ultrasound imaging is performed and a moderately large multiloculated right pleural effusion is identified. The fluid is localized and a site is selected on the skin with ultrasound. The site is marked. Using sterile technique, under local anesthesia, the thoracentesis catheter is inserted into the pleural space. 1100 cc of serosanguineous fluid are removed. The fluid spontaneously stopped draining. The catheter is removed and a sterile dressing is placed over the catheter insertion site. Vital signs were monitored throughout the procedure and remained stable. The fluid is discarded  sent for laboratory analysis, as requested. A dedicated CT chest was ordered and will be obtained and separately reported. This service rendered by Melissa Tsang PA-C. I certify that I have personally reviewed this examination and agree with Melissa Tsang's report. Quentin Ritter M.D.    CT CHEST WITHOUT IV CONTRAST    Result Date: 7/2/2024  IMPRESSION: Moderate right pleural effusion with areas of pleural thickening and heterogeneity particularly at the right lung base for which malignancy is not excluded. Patchy consolidative changes in the right middle and lower lobes for which superimposed pneumonia should be excluded clinically. Additional findings as above.    X-RAY CHEST 2 VIEWS    Result Date:  7/1/2024  IMPRESSION: Large right pleural effusion. COMMENT: PA and lateral radiographs the chest reveal a large right pleural effusion. Left lung is well-aerated. Cardiac silhouette is unchanged compared to 6/17/2024.    CT CHEST WITHOUT IV CONTRAST    Result Date: 6/18/2024  IMPRESSION: 1.  Moderate pleural effusion, decreased from the prior CT. 2.  Stable pulmonary nodules.    IR THORACENTESIS    Result Date: 6/17/2024  IMPRESSION: Successful ultrasound-guided right-sided thoracentesis with 1.2 L pleural fluid removed and sent to the lab.  All components of the time-out, debriefing, and handling of the specimen were conducted as per the ASAP Specimen Protocol. I certify that I have personally reviewed this examination and agree with Maria Antonia Bradley's report. Cj Yen M.D.    X-RAY CHEST 1 VIEW    Result Date: 6/17/2024  IMPRESSION: No pneumothorax.  Small right pleural effusion and right basilar airspace opacity. COMMENT: Comparison: Chest x-ray 6/15/2024. Technique: A single frontal AP portable upright projection of the chest was obtained. FINDINGS: There is a small right pleural effusion with right basilar airspace opacity. The left lung is clear.  There is no pneumothorax.  The cardiomediastinal silhouette is unchanged.  The upper abdomen is unremarkable.  There is no acute osseous abnormality.     Ultrasound venous arm right    Result Date: 6/15/2024  IMPRESSION: No evidence of right upper extremity deep vein thrombosis.     CT ANGIOGRAPHY CHEST PULMONARY EMBOLISM WITH IV CONTRAST    Result Date: 6/15/2024  IMPRESSION: development of a large right pleural effusion with right lower lobe collapse and partial atelectasis of the right middle and upper lobes centrally. Scattered small pulmonary nodules are again seen less than 6 mm the largest is linearly oriented in the left upper lobe. No CT evidence for proximal or segmental pulmonary arterial embolism.     X-RAY CHEST 2 VIEWS    Result Date:  6/15/2024  IMPRESSION: moderate right pleural effusion with basilar airspace opacity.  Fluid is likely to accurately into the fissures on the right as well.       Assessment and Plan:  Mr. Dave Arredondo is an 89 yo M with a PMHx of rectal cancer s/p resection, chemotherapy and ostomy placement, HTN, HLD and CAD who presented on 71/24 with worsening shortness of breath and right-sided pleuritic chest pain. He was hospitalized 6/15/24 to /19/24 and was found to have a large right pleural effusion for which 1.2 L was drained during thoracentesis.  Pleural fluid analysis was consistent with an exudate with negative cytology and culture.  He now presents with worsening dyspnea and a large recurrent right pleural effusion. S/p thoracentesis 7/2 and chest tube placement 7/3. CT chest following thoracentesis showed a moderate R pleural effusion, that appears complex, with associated pleural thickening, patchy consolidative changes in the RML and lower lobes and scattered pulmonary nodules with the largest in the JESSE measuring 6mm.    #Recurrent exudative pleural effusion  #Dyspnea   #History of rectal cancer    -S/p R sided thoracentesis on 7/2 with 1.1L of serosanguinous fluid removed; pleural fluid analysis appears c/w an exudate; pleural fluid analysis 6/17/24 was also c/w an exudate with negative cytology and culture   -A malignant pleural effusion due to recurrent rectal cancer (or a separate malignancy) certainly needs to be excluded   -Pleural fluid cultures show NGTD and both cytology and flow cytometry are unremarkable   -Now s/p R sided 12 Fr pigtail placement 7/3 with plans for intrapleural fibrinolysis x 3 days  -Will plan for CT chest upon completion   -Appreciate on going thoracic surgery recs   -Agree w/ chest tube to low wall suction  -Need to consider VATS pleural biopsy given the negative pleural fluid cytology x 2  -Pain control w/o oversedation  -IS 10 x per hour    -Please call with any questions or  concerns you may have      Parker Knott, DO  Pulmonary and Critical Care Medicine

## 2024-07-06 NOTE — ASSESSMENT & PLAN NOTE
Hemoglobin stable and close to baseline  Having some bloody discharge from the chest tube but otherwise denies obvious bleeding  Monitor daily CBC

## 2024-07-07 ENCOUNTER — APPOINTMENT (INPATIENT)
Dept: RADIOLOGY | Facility: HOSPITAL | Age: 89
DRG: 167 | End: 2024-07-07
Attending: STUDENT IN AN ORGANIZED HEALTH CARE EDUCATION/TRAINING PROGRAM
Payer: COMMERCIAL

## 2024-07-07 PROBLEM — K59.09 OTHER CONSTIPATION: Status: ACTIVE | Noted: 2024-07-07

## 2024-07-07 LAB
ANION GAP SERPL CALC-SCNC: 7 MEQ/L (ref 3–15)
BUN SERPL-MCNC: 15 MG/DL (ref 7–25)
CALCIUM SERPL-MCNC: 9.1 MG/DL (ref 8.6–10.3)
CHLORIDE SERPL-SCNC: 99 MEQ/L (ref 98–107)
CO2 SERPL-SCNC: 27 MEQ/L (ref 21–31)
CREAT SERPL-MCNC: 0.7 MG/DL (ref 0.7–1.3)
EGFRCR SERPLBLD CKD-EPI 2021: >60 ML/MIN/1.73M*2
ERYTHROCYTE [DISTWIDTH] IN BLOOD BY AUTOMATED COUNT: 13.9 % (ref 11.6–14.4)
GLUCOSE SERPL-MCNC: 99 MG/DL (ref 70–99)
HCT VFR BLD AUTO: 40.7 % (ref 40.1–51)
HGB BLD-MCNC: 13.4 G/DL (ref 13.7–17.5)
MAGNESIUM SERPL-MCNC: 2.1 MG/DL (ref 1.8–2.5)
MCH RBC QN AUTO: 29.5 PG (ref 28–33.2)
MCHC RBC AUTO-ENTMCNC: 32.9 G/DL (ref 32.2–36.5)
MCV RBC AUTO: 89.5 FL (ref 83–98)
PDW BLD AUTO: 9.5 FL (ref 9.4–12.4)
PHOSPHATE SERPL-MCNC: 3.3 MG/DL (ref 2.4–4.7)
PLATELET # BLD AUTO: 309 K/UL (ref 150–350)
POTASSIUM SERPL-SCNC: 4.8 MEQ/L (ref 3.5–5.1)
RBC # BLD AUTO: 4.55 M/UL (ref 4.5–5.8)
SODIUM SERPL-SCNC: 133 MEQ/L (ref 136–145)
WBC # BLD AUTO: 10.05 K/UL (ref 3.8–10.5)

## 2024-07-07 PROCEDURE — 85027 COMPLETE CBC AUTOMATED: CPT | Performed by: STUDENT IN AN ORGANIZED HEALTH CARE EDUCATION/TRAINING PROGRAM

## 2024-07-07 PROCEDURE — 71260 CT THORAX DX C+: CPT

## 2024-07-07 PROCEDURE — 99024 POSTOP FOLLOW-UP VISIT: CPT | Performed by: THORACIC SURGERY (CARDIOTHORACIC VASCULAR SURGERY)

## 2024-07-07 PROCEDURE — 83735 ASSAY OF MAGNESIUM: CPT | Performed by: STUDENT IN AN ORGANIZED HEALTH CARE EDUCATION/TRAINING PROGRAM

## 2024-07-07 PROCEDURE — 20600000 HC ROOM AND CARE INTERMEDIATE/TELEMETRY

## 2024-07-07 PROCEDURE — 63600105 HC IODINE BASED CONTRAST: Mod: JW | Performed by: STUDENT IN AN ORGANIZED HEALTH CARE EDUCATION/TRAINING PROGRAM

## 2024-07-07 PROCEDURE — 25800000 HC PHARMACY IV SOLUTIONS: Performed by: STUDENT IN AN ORGANIZED HEALTH CARE EDUCATION/TRAINING PROGRAM

## 2024-07-07 PROCEDURE — 36415 COLL VENOUS BLD VENIPUNCTURE: CPT | Performed by: STUDENT IN AN ORGANIZED HEALTH CARE EDUCATION/TRAINING PROGRAM

## 2024-07-07 PROCEDURE — 63700000 HC SELF-ADMINISTRABLE DRUG

## 2024-07-07 PROCEDURE — 63600000 HC DRUGS/DETAIL CODE: Mod: JZ | Performed by: PHYSICIAN ASSISTANT

## 2024-07-07 PROCEDURE — 63700000 HC SELF-ADMINISTRABLE DRUG: Performed by: HOSPITALIST

## 2024-07-07 PROCEDURE — 80048 BASIC METABOLIC PNL TOTAL CA: CPT | Performed by: STUDENT IN AN ORGANIZED HEALTH CARE EDUCATION/TRAINING PROGRAM

## 2024-07-07 PROCEDURE — 84100 ASSAY OF PHOSPHORUS: CPT | Performed by: STUDENT IN AN ORGANIZED HEALTH CARE EDUCATION/TRAINING PROGRAM

## 2024-07-07 PROCEDURE — 99233 SBSQ HOSP IP/OBS HIGH 50: CPT | Performed by: STUDENT IN AN ORGANIZED HEALTH CARE EDUCATION/TRAINING PROGRAM

## 2024-07-07 RX ORDER — IOPAMIDOL 755 MG/ML
75 INJECTION, SOLUTION INTRAVASCULAR
Status: COMPLETED | OUTPATIENT
Start: 2024-07-07 | End: 2024-07-07

## 2024-07-07 RX ORDER — SODIUM CHLORIDE 9 MG/ML
INJECTION, SOLUTION INTRAVENOUS CONTINUOUS
Status: DISCONTINUED | OUTPATIENT
Start: 2024-07-07 | End: 2024-07-07

## 2024-07-07 RX ADMIN — ATORVASTATIN CALCIUM 20 MG: 20 TABLET, FILM COATED ORAL at 18:01

## 2024-07-07 RX ADMIN — PANTOPRAZOLE SODIUM 40 MG: 40 TABLET, DELAYED RELEASE ORAL at 21:39

## 2024-07-07 RX ADMIN — SODIUM CHLORIDE: 9 INJECTION, SOLUTION INTRAVENOUS at 08:58

## 2024-07-07 RX ADMIN — IOPAMIDOL 75 ML: 755 INJECTION, SOLUTION INTRAVENOUS at 11:27

## 2024-07-07 RX ADMIN — FUROSEMIDE 20 MG: 20 TABLET ORAL at 08:58

## 2024-07-07 RX ADMIN — ENOXAPARIN SODIUM 40 MG: 40 INJECTION SUBCUTANEOUS at 18:01

## 2024-07-07 RX ADMIN — METOPROLOL SUCCINATE 100 MG: 100 TABLET, EXTENDED RELEASE ORAL at 08:58

## 2024-07-07 ASSESSMENT — COGNITIVE AND FUNCTIONAL STATUS - GENERAL
CLIMB 3 TO 5 STEPS WITH RAILING: 3 - A LITTLE
WALKING IN HOSPITAL ROOM: 3 - A LITTLE
MOVING TO AND FROM BED TO CHAIR: 4 - NONE
STANDING UP FROM CHAIR USING ARMS: 3 - A LITTLE

## 2024-07-07 NOTE — PLAN OF CARE
Plan of Care Review  Plan of Care Reviewed With: patient  Progress: improving  Outcome Evaluation: Large amount of stool from colostomy after receiving lactulose. Pt denies pain.Chest tube intact.

## 2024-07-07 NOTE — PROGRESS NOTES
"Dave Arredondo   [id number]    Your doctor has referred you for a CT examination that requires the injection of an iodinated contrast material into your bloodstream. This iodinated contrast material, sometimes referred to as \"x-ray dye\" allows for better interpretation of the x-ray films or CT images and results in a more accurate interpretation of the examination.     Without the use of iodinated contrast (x-ray dye), the examination may be less informative and result in a suboptimal examination, and possibly a delay in diagnosis and, if needed, treatment.     The iodinated contrast material is given through a small needle or catheter placed into a vein, usually on the inside of the elbow, on the back of hand, or in a vein in the foot or lower leg.    The most common, though still rare, potential reaction to an intravenous contrast injection is an allergic-like reaction. Most allergic-like reactions are mild, though a small subset of people can have more severe reactions. Mild reactions include mild / scattered hives, itching, scratchy throat, sneezing and nasal congestion. More severe reactions include facial swelling, severe difficulty breathing, wheezing and anaphylactic shock. Those with a prior history allergic-like reaction to the same class of contrast media (such as CT contrast or MRI contrast) are at the highest risk for an allergic reaction.     If you believe you had an allergic reaction to contrast in the past, please let our staff know. We can determine if this increases your risk for a future reaction and provide steps to decrease the risk. This may delay your examination, but it decreases the risk of having a new and possibly more severe reaction to the contrast injection.    People with a history of prior allergic reactions to other substances (such as unrelated medications and food) and patients with a history of asthma have slightly increased risk for an allergic reaction from contrast " material when compared with the general population, but do not require to be pretreated prior to a contrast injection.    You should notify the physician, nurse or technologist if you have ever had any of these conditions or if you have any questions.

## 2024-07-07 NOTE — PROGRESS NOTES
Thoracic Surgery Daily Progress Note    Subjective     Yesterday, repeat CT chest reveals interval decrease but persistent pleural effusion present. No acute events overnight. Satting well on RA. Patient has no complaints at this time. Plan for VATS pleural biopsy, possible pleurX today.     Objective     Vital signs in last 24 hours:  Temp:  [36.2 °C (97.2 °F)-37.2 °C (99 °F)] 36.6 °C (97.8 °F)  Heart Rate:  [78-96] 89  Resp:  [16-24] 16  BP: (118-137)/(70-77) 121/77      Intake/Output Summary (Last 24 hours) at 7/8/2024 0506  Last data filed at 7/8/2024 0215  Gross per 24 hour   Intake 240 ml   Output 1130 ml   Net -890 ml     Intake/Output this shift:  I/O this shift:  In: -   Out: 200 [Urine:200]    Physical Exam  Physical Exam  Constitutional:       General: He is not in acute distress.  Eyes:      General: No scleral icterus.     Conjunctiva/sclera: Conjunctivae normal.   Cardiovascular:      Rate and Rhythm: Normal rate and regular rhythm.      Pulses: Normal pulses.   Pulmonary:      Effort: Pulmonary effort is normal. No respiratory distress. CT to suction, serosang output  Skin:     General: Skin is warm and dry.   Neurological:      General: No focal deficit present.      Mental Status: He is alert.             Labs:  BMP Results         07/02/24 07/01/24 06/19/24     0337 1123 0516     135 137    K 3.9 3.8 4.2    Cl 100 100 105    CO2 28 27 25    Glucose 99 132 98    BUN 13 14 26    Creatinine 0.8 0.9 0.8    Calcium 9.1 9.4 9.1    Anion Gap 7 8 7    EGFR >60.0 >60.0 >60.0           Comment for K at 1123 on 07/01/24: Results obtained on plasma. Plasma Potassium values may be up to 0.4 mEQ/L less than serum values. The differences may be greater for patients with high platelet or white cell counts.    Comment for EGFR at 0337 on 07/02/24: Calculation based on the Chronic Kidney Disease Epidemiology Collaboration (CKD-EPI) equation refit without adjustment for race.    Comment for EGFR at 1123 on  07/01/24: Calculation based on the Chronic Kidney Disease Epidemiology Collaboration (CKD-EPI) equation refit without adjustment for race.    Comment for EGFR at 0516 on 06/19/24: Calculation based on the Chronic Kidney Disease Epidemiology Collaboration (CKD-EPI) equation refit without adjustment for race.           CBC Results         07/02/24 07/01/24 06/19/24     0337 1123 0516    WBC 8.82 10.17 8.07    RBC 4.34 4.58 4.56    HGB 12.6 13.5 13.5    HCT 39.1 41.2 41.2    MCV 90.1 90.0 90.4    MCH 29.0 29.5 29.6    MCHC 32.2 32.8 32.8     307 229            Imaging  I have reviewed the Imaging from the last 24 hrs.      Assessment/Plan       Dave Arredondo is a 88 year old man with history of rectal cancer s/p resection with ostomy, CAD, DVT/PE on Xarelto who presented with a recurrent loculated pleural effusion concerning for possible malignancy     Plan:  - OR today - VATS pleural biopsy, possible pleurX  - Rest of care as per primary    Deepa Gudino MD  General Surgery PGY-2  [phone]

## 2024-07-07 NOTE — PROGRESS NOTES
[facility] Pulmonary, Critical Care and Sleep Progress Note       SUBJECTIVE:  Pt seen and examined at bedside. No acute events overnight. CT chest this AM shows significant improvement in R pleural effusion with a small partially loculated effusion remaining.     Outside records reviewed.    Medical History:   Past Medical History:   Diagnosis Date    Colon cancer (CMS/HCC)     Coronary artery disease     DVT (deep venous thrombosis) (CMS/HCC)     Hypertension     Lipid disorder        Surgical History:   Past Surgical History:   Procedure Laterality Date    COLOSTOMY      HERNIA REPAIR         Allergies: Patient has no known allergies.    Med List Reviewed.    Social History:   Social History     Socioeconomic History    Marital status:      Spouse name: None    Number of children: None    Years of education: None    Highest education level: None   Tobacco Use    Smoking status: Never    Smokeless tobacco: Never   Substance and Sexual Activity    Alcohol use: Not Currently    Drug use: Never    Sexual activity: Defer   Social History Narrative    [facility] in the [facility]      Social Determinants of Health     Food Insecurity: No Food Insecurity (7/1/2024)    Hunger Vital Sign     Worried About Running Out of Food in the Last Year: Never true     Ran Out of Food in the Last Year: Never true   Transportation Needs: No Transportation Needs (7/1/2024)    PRAPARE - Transportation     Lack of Transportation (Medical): No     Lack of Transportation (Non-Medical): No   Housing Stability: Low Risk  (7/1/2024)    Housing Stability Vital Sign     Unable to Pay for Housing in the Last Year: No     Number of Places Lived in the Last Year: 1     Unstable Housing in the Last Year: No       Family History:   Family History   Problem Relation Age of Onset    Heart disease Biological Mother        Review of Systems  All other systems reviewed and negative except as noted in the HPI.    Vital signs in  last 24 hours:  Temp:  [36.4 °C (97.6 °F)-37.2 °C (99 °F)] 37 °C (98.6 °F)  Heart Rate:  [78-96] 78  Resp:  [14-24] 24  BP: (113-137)/(68-79) 130/74    OBJECTIVE    Physical Exam:  Gen: WDWN, in NAD, AAOx3, pleasant  HEENT: PERRLA, neck supple, no LAD  CV: RRR, +s1/s2, no M/R/G  Pulm: CTA b/l without W/R/R  GI: abd soft, NT/ND, normoactive BS  Extremities: no LE edema   Skin: clean, dry and intact without evidence of break down   Neuro: CN II-XII grossly in tact, no focal deficits     Labs:  I have reviewed the patient's pertinent labs.     Imaging:  I have independently reviewed the patient's pertinent imaging for this hospital visit.   CT CHEST WITH IV CONTRAST    Result Date: 7/7/2024  IMPRESSION: Interval decrease is size right pleural effusion post drainage. Right pigtail catheter within the right pleural space. Small right hydropneumothorax.     IR PLEURAL CATHETER PLACEMENT    Result Date: 7/3/2024  IMPRESSION:   Satisfactory 12 German right pigtail chest tube placement. COMMENT: PROCEDURE:  Chest tube placement ANESTHESIA:  Lidocaine 1% local. FLUORO TIME:  0.1 minutes. REFERENCE AIR KERMA: 0.9 mGy. CONTRAST:  None. MEDICATIONS:  None. DESCRIPTION OF PROCEDURE:    Written informed consent was obtained.  Ultrasound demonstrates complex, multiloculated right pleural effusion.  The patient was prepped and draped in the usual sterile manner.  Using real-time ultrasound guidance, a Yueh needle was inserted into the right pleural effusion.  Following serial dilation, a 12 German pigtail chest tube was inserted.  Serosanguineous fluid was aspirated.  The catheter was secured with suture and connected to Pleur-evac drainage.  The patient tolerated the procedure well without immediate complication.     ULTRASOUND GUIDED NEEDLE PLACEMENT    Result Date: 7/3/2024  IMPRESSION:   Satisfactory 12 German right pigtail chest tube placement. COMMENT: PROCEDURE:  Chest tube placement ANESTHESIA:  Lidocaine 1% local. FLUORO  TIME:  0.1 minutes. REFERENCE AIR KERMA: 0.9 mGy. CONTRAST:  None. MEDICATIONS:  None. DESCRIPTION OF PROCEDURE:    Written informed consent was obtained.  Ultrasound demonstrates complex, multiloculated right pleural effusion.  The patient was prepped and draped in the usual sterile manner.  Using real-time ultrasound guidance, a Yueh needle was inserted into the right pleural effusion.  Following serial dilation, a 12 Bulgarian pigtail chest tube was inserted.  Serosanguineous fluid was aspirated.  The catheter was secured with suture and connected to Pleur-evac drainage.  The patient tolerated the procedure well without immediate complication.     IR THORACENTESIS    Result Date: 7/3/2024  IMPRESSION: Successful ultrasound guided right thoracentesis with 1100 cc of fluid removed and  sent for laboratory studies, as requested COMMENT: The patient is referred for ultrasound guided thoracentesis on the right side. Informed consent was obtained. With the patient sitting, ultrasound imaging is performed and a moderately large multiloculated right pleural effusion is identified. The fluid is localized and a site is selected on the skin with ultrasound. The site is marked. Using sterile technique, under local anesthesia, the thoracentesis catheter is inserted into the pleural space. 1100 cc of serosanguineous fluid are removed. The fluid spontaneously stopped draining. The catheter is removed and a sterile dressing is placed over the catheter insertion site. Vital signs were monitored throughout the procedure and remained stable. The fluid is discarded  sent for laboratory analysis, as requested. A dedicated CT chest was ordered and will be obtained and separately reported. This service rendered by Melissa Tsang PA-C. I certify that I have personally reviewed this examination and agree with Melissa Tsang's report. Quentin Ritter M.D.    CT CHEST WITHOUT IV CONTRAST    Result Date: 7/2/2024  IMPRESSION: Moderate right  pleural effusion with areas of pleural thickening and heterogeneity particularly at the right lung base for which malignancy is not excluded. Patchy consolidative changes in the right middle and lower lobes for which superimposed pneumonia should be excluded clinically. Additional findings as above.    X-RAY CHEST 2 VIEWS    Result Date: 7/1/2024  IMPRESSION: Large right pleural effusion. COMMENT: PA and lateral radiographs the chest reveal a large right pleural effusion. Left lung is well-aerated. Cardiac silhouette is unchanged compared to 6/17/2024.    CT CHEST WITHOUT IV CONTRAST    Result Date: 6/18/2024  IMPRESSION: 1.  Moderate pleural effusion, decreased from the prior CT. 2.  Stable pulmonary nodules.    IR THORACENTESIS    Result Date: 6/17/2024  IMPRESSION: Successful ultrasound-guided right-sided thoracentesis with 1.2 L pleural fluid removed and sent to the lab.  All components of the time-out, debriefing, and handling of the specimen were conducted as per the ASAP Specimen Protocol. I certify that I have personally reviewed this examination and agree with Maria Antonia Bradley's report. Cj Yen M.D.    X-RAY CHEST 1 VIEW    Result Date: 6/17/2024  IMPRESSION: No pneumothorax.  Small right pleural effusion and right basilar airspace opacity. COMMENT: Comparison: Chest x-ray 6/15/2024. Technique: A single frontal AP portable upright projection of the chest was obtained. FINDINGS: There is a small right pleural effusion with right basilar airspace opacity. The left lung is clear.  There is no pneumothorax.  The cardiomediastinal silhouette is unchanged.  The upper abdomen is unremarkable.  There is no acute osseous abnormality.     Ultrasound venous arm right    Result Date: 6/15/2024  IMPRESSION: No evidence of right upper extremity deep vein thrombosis.     CT ANGIOGRAPHY CHEST PULMONARY EMBOLISM WITH IV CONTRAST    Result Date: 6/15/2024  IMPRESSION: development of a large right pleural effusion with right  lower lobe collapse and partial atelectasis of the right middle and upper lobes centrally. Scattered small pulmonary nodules are again seen less than 6 mm the largest is linearly oriented in the left upper lobe. No CT evidence for proximal or segmental pulmonary arterial embolism.     X-RAY CHEST 2 VIEWS    Result Date: 6/15/2024  IMPRESSION: moderate right pleural effusion with basilar airspace opacity.  Fluid is likely to accurately into the fissures on the right as well.       Assessment and Plan:  Mr. Dave Arredondo is an 87 yo M with a PMHx of rectal cancer s/p resection, chemotherapy and ostomy placement, HTN, HLD and CAD who presented on 71/24 with worsening shortness of breath and right-sided pleuritic chest pain. He was hospitalized 6/15/24 to /19/24 and was found to have a large right pleural effusion for which 1.2 L was drained during thoracentesis.  Pleural fluid analysis was consistent with an exudate with negative cytology and culture.  He now presents with worsening dyspnea and a large recurrent right pleural effusion. S/p thoracentesis 7/2 and chest tube placement 7/3. CT chest following thoracentesis showed a moderate R pleural effusion, that appears complex, with associated pleural thickening, patchy consolidative changes in the RML and lower lobes and scattered pulmonary nodules with the largest in the JESSE measuring 6mm.    #Recurrent exudative pleural effusion  #Dyspnea   #History of rectal cancer    -S/p R sided thoracentesis on 7/2 with 1.1L of serosanguinous fluid removed; pleural fluid analysis appears c/w an exudate; pleural fluid analysis 6/17/24 was also c/w an exudate with negative cytology and culture   -A malignant pleural effusion due to recurrent rectal cancer or a separate malignancy certainly needs to be excluded   -Pleural fluid cultures show NGTD and both cytology and flow cytometry are unremarkable   -Now s/p R sided 12 Fr pigtail placement 7/3 and intrapleural fibrinolysis x 3  days  -CT chest 7/7 with significant interval improvement in R pleural effusion with only a small partially loculated effusion remaining   -Chest tube management as per thoracic surgery   -Need to consider VATS pleural biopsy given the negative pleural fluid cytology x 2  -Pain control w/o oversedation  -IS 10 x per hour    -Please call with any questions or concerns you may have      Parker Post, DO  Pulmonary and Critical Care Medicine

## 2024-07-07 NOTE — PROGRESS NOTES
"Dave Arredondo   [id number]    Your doctor has referred you for a CT examination that requires the injection of an iodinated contrast material into your bloodstream. This iodinated contrast material, sometimes referred to as \"x-ray dye\" allows for better interpretation of the x-ray films or CT images and results in a more accurate interpretation of the examination.     Without the use of iodinated contrast (x-ray dye), the examination may be less informative and result in a suboptimal examination, and possibly a delay in diagnosis and, if needed, treatment.     The iodinated contrast material is given through a small needle or catheter placed into a vein, usually on the inside of the elbow, on the back of hand, or in a vein in the foot or lower leg.    The most common, though still rare, potential reaction to an intravenous contrast injection is an allergic-like reaction. Most allergic-like reactions are mild, though a small subset of people can have more severe reactions. Mild reactions include mild / scattered hives, itching, scratchy throat, sneezing and nasal congestion. More severe reactions include facial swelling, severe difficulty breathing, wheezing and anaphylactic shock. Those with a prior history allergic-like reaction to the same class of contrast media (such as CT contrast or MRI contrast) are at the highest risk for an allergic reaction.     If you believe you had an allergic reaction to contrast in the past, please let our staff know. We can determine if this increases your risk for a future reaction and provide steps to decrease the risk. This may delay your examination, but it decreases the risk of having a new and possibly more severe reaction to the contrast injection.    People with a history of prior allergic reactions to other substances (such as unrelated medications and food) and patients with a history of asthma have slightly increased risk for an allergic reaction from contrast " material when compared with the general population, but do not require to be pretreated prior to a contrast injection.    You should notify the physician, nurse or technologist if you have ever had any of these conditions or if you have any questions.

## 2024-07-07 NOTE — PLAN OF CARE
Problem: Adult Inpatient Plan of Care  Goal: Plan of Care Review  Outcome: Progressing  Flowsheets (Taken 7/7/2024 1621)  Progress: improving  Outcome Evaluation: AOx4.  Chest tube intact draining yellow fluid.  Repeat chest CT shows improvement of pleural effusion.  NPO after MN for OR tomorrow for VATS bx and possible pleur-x.  Colostomy intact passing flatus and liquid brown stool.  NSR with first degree AV block  Plan of Care Reviewed With: patient   Plan of Care Review  Plan of Care Reviewed With: patient  Progress: improving  Outcome Evaluation: AOx4.  Chest tube intact draining yellow fluid.  Repeat chest CT shows improvement of pleural effusion.  NPO after MN for OR tomorrow for VATS bx and possible pleur-x.  Colostomy intact passing flatus and liquid brown stool.  NSR with first degree AV block

## 2024-07-07 NOTE — PRE-PROCEDURE NOTE
Surgery Pre-operative Note    Pre-op diagnosis Recurrent right pleural effusion    Procedure:  THORACOSCOPY--VATS-PLEURAL BIOPSY, POSSIBLE PLEURX CATHETER PLACEMENT    Schedule:  7/8, add-on   Code status: Full Code   Labs Results from last 7 days   Lab Units 07/07/24  0322 07/06/24  0458 07/05/24  0507   WBC K/uL 10.05 9.97 10.23   HEMOGLOBIN g/dL 13.4* 13.2* 13.0*   HEMATOCRIT % 40.7 40.2 40.6   PLATELETS K/uL 309 292 290     Results from last 7 days   Lab Units 07/07/24  0322 07/06/24  0458 07/05/24  0507   SODIUM mEQ/L 133* 132* 133*   POTASSIUM mEQ/L 4.8 4.9 4.6   CHLORIDE mEQ/L 99 100 99   CO2 mEQ/L 27 26 28   BUN mg/dL 15 19 22   CREATININE mg/dL 0.7 0.7 0.9   GLUCOSE mg/dL 99 101* 114*   CALCIUM mg/dL 9.1 8.9 9.1   MAGNESIUM mg/dL 2.1 2.0 2.1   PHOSPHORUS mg/dL 3.3  --  3.2     No electrolyte abnormalities need to be addressed   Coagulation studies  Anticoagulation  Antiplatelets Results from last 7 days   Lab Units 07/02/24  0337 07/01/24  1123   INR  1.2 1.8   PTT sec  --  37*     Repeat coags ordered   Blood type and screen ordered   CXR/Imaging:  X-RAY CHEST 1 VIEW 06/17/2024    Narrative  CLINICAL HISTORY: s/p R thora    Impression  IMPRESSION:  No pneumothorax.  Small right pleural effusion and right basilar airspace  opacity.    COMMENT:    Comparison: Chest x-ray 6/15/2024.    Technique: A single frontal AP portable upright projection of the chest was  obtained.    FINDINGS:    There is a small right pleural effusion with right basilar airspace opacity.  The left lung is clear.  There is no pneumothorax.  The cardiomediastinal  silhouette is unchanged.  The upper abdomen is unremarkable.  There is no acute  osseous abnormality.       EKG ECG 12 lead   Independent Interpretation by ED Provider   Sinus 93 bpm first-degree block nonspecific ST-T wave changes EKG read by attending         Diet NPO after midnight   Consent To be obtained by patient   Allergies NKDA   Periop abx Ancef on call to OR   Other       Please page [phone] with any questions or concerns.    Deeap Gudino MD

## 2024-07-08 ENCOUNTER — ANESTHESIA (INPATIENT)
Dept: OPERATING ROOM | Facility: HOSPITAL | Age: 89
DRG: 167 | End: 2024-07-08
Payer: COMMERCIAL

## 2024-07-08 ENCOUNTER — ANESTHESIA EVENT (INPATIENT)
Dept: OPERATING ROOM | Facility: HOSPITAL | Age: 89
DRG: 167 | End: 2024-07-08
Payer: COMMERCIAL

## 2024-07-08 ENCOUNTER — APPOINTMENT (INPATIENT)
Dept: RADIOLOGY | Facility: HOSPITAL | Age: 89
DRG: 167 | End: 2024-07-08
Payer: COMMERCIAL

## 2024-07-08 LAB
ABO + RH BLD: NORMAL
ANION GAP SERPL CALC-SCNC: 7 MEQ/L (ref 3–15)
APTT PPP: 31 SEC (ref 23–35)
BLD GP AB SCN SERPL QL: NEGATIVE
BUN SERPL-MCNC: 18 MG/DL (ref 7–25)
CALCIUM SERPL-MCNC: 9 MG/DL (ref 8.6–10.3)
CHLORIDE SERPL-SCNC: 100 MEQ/L (ref 98–107)
CO2 SERPL-SCNC: 26 MEQ/L (ref 21–31)
CREAT SERPL-MCNC: 0.8 MG/DL (ref 0.7–1.3)
D AG BLD QL: NEGATIVE
EGFRCR SERPLBLD CKD-EPI 2021: >60 ML/MIN/1.73M*2
ERYTHROCYTE [DISTWIDTH] IN BLOOD BY AUTOMATED COUNT: 13.9 % (ref 11.6–14.4)
GLUCOSE SERPL-MCNC: 94 MG/DL (ref 70–99)
HCT VFR BLD AUTO: 41.9 % (ref 40.1–51)
HGB BLD-MCNC: 13.6 G/DL (ref 13.7–17.5)
INR PPP: 1.1
LABORATORY COMMENT REPORT: NORMAL
LABORATORY COMMENT REPORT: NORMAL
MAGNESIUM SERPL-MCNC: 2 MG/DL (ref 1.8–2.5)
MCH RBC QN AUTO: 29.2 PG (ref 28–33.2)
MCHC RBC AUTO-ENTMCNC: 32.5 G/DL (ref 32.2–36.5)
MCV RBC AUTO: 90.1 FL (ref 83–98)
PDW BLD AUTO: 9.1 FL (ref 9.4–12.4)
PHOSPHATE SERPL-MCNC: 3.4 MG/DL (ref 2.4–4.7)
PLATELET # BLD AUTO: 282 K/UL (ref 150–350)
POTASSIUM SERPL-SCNC: 4.5 MEQ/L (ref 3.5–5.1)
PROTHROMBIN TIME: 13.6 SEC (ref 12.2–14.5)
RBC # BLD AUTO: 4.65 M/UL (ref 4.5–5.8)
SODIUM SERPL-SCNC: 133 MEQ/L (ref 136–145)
SPECIMEN EXP DATE BLD: NORMAL
WBC # BLD AUTO: 8.27 K/UL (ref 3.8–10.5)

## 2024-07-08 PROCEDURE — 87070 CULTURE OTHR SPECIMN AEROBIC: CPT | Performed by: SURGERY

## 2024-07-08 PROCEDURE — 63700000 HC SELF-ADMINISTRABLE DRUG: Performed by: STUDENT IN AN ORGANIZED HEALTH CARE EDUCATION/TRAINING PROGRAM

## 2024-07-08 PROCEDURE — 86850 RBC ANTIBODY SCREEN: CPT

## 2024-07-08 PROCEDURE — 0BBN4ZX EXCISION OF RIGHT PLEURA, PERCUTANEOUS ENDOSCOPIC APPROACH, DIAGNOSTIC: ICD-10-PCS | Performed by: SURGERY

## 2024-07-08 PROCEDURE — 87206 SMEAR FLUORESCENT/ACID STAI: CPT | Performed by: SURGERY

## 2024-07-08 PROCEDURE — 63600000 HC DRUGS/DETAIL CODE: Performed by: ANESTHESIOLOGY

## 2024-07-08 PROCEDURE — 80048 BASIC METABOLIC PNL TOTAL CA: CPT

## 2024-07-08 PROCEDURE — 63700000 HC SELF-ADMINISTRABLE DRUG: Performed by: HOSPITALIST

## 2024-07-08 PROCEDURE — 85027 COMPLETE CBC AUTOMATED: CPT

## 2024-07-08 PROCEDURE — 32609 THORACOSCOPY W/BX PLEURA: CPT | Performed by: SURGERY

## 2024-07-08 PROCEDURE — 99233 SBSQ HOSP IP/OBS HIGH 50: CPT | Performed by: STUDENT IN AN ORGANIZED HEALTH CARE EDUCATION/TRAINING PROGRAM

## 2024-07-08 PROCEDURE — 25800000 HC PHARMACY IV SOLUTIONS: Performed by: STUDENT IN AN ORGANIZED HEALTH CARE EDUCATION/TRAINING PROGRAM

## 2024-07-08 PROCEDURE — 63600000 HC DRUGS/DETAIL CODE: Mod: JZ

## 2024-07-08 PROCEDURE — 63700000 HC SELF-ADMINISTRABLE DRUG

## 2024-07-08 PROCEDURE — 27200000 HC STERILE SUPPLY: Performed by: SURGERY

## 2024-07-08 PROCEDURE — 63600000 HC DRUGS/DETAIL CODE: Mod: JZ | Performed by: STUDENT IN AN ORGANIZED HEALTH CARE EDUCATION/TRAINING PROGRAM

## 2024-07-08 PROCEDURE — 88305 TISSUE EXAM BY PATHOLOGIST: CPT | Performed by: SURGERY

## 2024-07-08 PROCEDURE — 37000001 HC ANESTHESIA GENERAL: Performed by: SURGERY

## 2024-07-08 PROCEDURE — 71000011 HC PACU PHASE 1 EA ADDL MIN: Performed by: SURGERY

## 2024-07-08 PROCEDURE — 85610 PROTHROMBIN TIME: CPT

## 2024-07-08 PROCEDURE — 87075 CULTR BACTERIA EXCEPT BLOOD: CPT | Performed by: SURGERY

## 2024-07-08 PROCEDURE — 21400000 HC ROOM AND CARE CCU/INTERMEDIATE

## 2024-07-08 PROCEDURE — 87102 FUNGUS ISOLATION CULTURE: CPT | Performed by: SURGERY

## 2024-07-08 PROCEDURE — 83735 ASSAY OF MAGNESIUM: CPT

## 2024-07-08 PROCEDURE — 63600000 HC DRUGS/DETAIL CODE: Mod: JZ,JG | Performed by: SURGERY

## 2024-07-08 PROCEDURE — 87116 MYCOBACTERIA CULTURE: CPT | Performed by: SURGERY

## 2024-07-08 PROCEDURE — 25800000 HC PHARMACY IV SOLUTIONS

## 2024-07-08 PROCEDURE — 0W9940Z DRAINAGE OF RIGHT PLEURAL CAVITY WITH DRAINAGE DEVICE, PERCUTANEOUS ENDOSCOPIC APPROACH: ICD-10-PCS | Performed by: SURGERY

## 2024-07-08 PROCEDURE — 99231 SBSQ HOSP IP/OBS SF/LOW 25: CPT | Mod: 25 | Performed by: SURGERY

## 2024-07-08 PROCEDURE — 85730 THROMBOPLASTIN TIME PARTIAL: CPT

## 2024-07-08 PROCEDURE — 36000005 HC OR LEVEL 5 INITIAL 30MIN: Performed by: SURGERY

## 2024-07-08 PROCEDURE — 25000000 HC PHARMACY GENERAL: Performed by: ANESTHESIOLOGY

## 2024-07-08 PROCEDURE — 71045 X-RAY EXAM CHEST 1 VIEW: CPT

## 2024-07-08 PROCEDURE — 71000001 HC PACU PHASE 1 INITIAL 30MIN: Performed by: SURGERY

## 2024-07-08 PROCEDURE — 36415 COLL VENOUS BLD VENIPUNCTURE: CPT

## 2024-07-08 PROCEDURE — 84100 ASSAY OF PHOSPHORUS: CPT

## 2024-07-08 PROCEDURE — 36000015 HC OR LEVEL 5 EA ADDL MIN: Performed by: SURGERY

## 2024-07-08 RX ORDER — ONDANSETRON HYDROCHLORIDE 2 MG/ML
4 INJECTION, SOLUTION INTRAVENOUS
Status: DISCONTINUED | OUTPATIENT
Start: 2024-07-08 | End: 2024-07-08 | Stop reason: HOSPADM

## 2024-07-08 RX ORDER — ROCURONIUM BROMIDE 10 MG/ML
INJECTION, SOLUTION INTRAVENOUS AS NEEDED
Status: DISCONTINUED | OUTPATIENT
Start: 2024-07-08 | End: 2024-07-08 | Stop reason: SURG

## 2024-07-08 RX ORDER — DEXTROSE 50 % IN WATER (D50W) INTRAVENOUS SYRINGE
25 AS NEEDED
Status: DISCONTINUED | OUTPATIENT
Start: 2024-07-08 | End: 2024-07-08 | Stop reason: HOSPADM

## 2024-07-08 RX ORDER — BUPIVACAINE HYDROCHLORIDE 5 MG/ML
INJECTION, SOLUTION EPIDURAL; INTRACAUDAL
Status: DISCONTINUED | OUTPATIENT
Start: 2024-07-08 | End: 2024-07-08 | Stop reason: HOSPADM

## 2024-07-08 RX ORDER — HYDROMORPHONE HYDROCHLORIDE 1 MG/ML
0.5 INJECTION, SOLUTION INTRAMUSCULAR; INTRAVENOUS; SUBCUTANEOUS
Status: DISCONTINUED | OUTPATIENT
Start: 2024-07-08 | End: 2024-07-08 | Stop reason: HOSPADM

## 2024-07-08 RX ORDER — FENTANYL CITRATE 50 UG/ML
50 INJECTION, SOLUTION INTRAMUSCULAR; INTRAVENOUS EVERY 5 MIN PRN
Status: DISCONTINUED | OUTPATIENT
Start: 2024-07-08 | End: 2024-07-08 | Stop reason: HOSPADM

## 2024-07-08 RX ORDER — LABETALOL HCL 20 MG/4 ML
5 SYRINGE (ML) INTRAVENOUS AS NEEDED
Status: DISCONTINUED | OUTPATIENT
Start: 2024-07-08 | End: 2024-07-08 | Stop reason: HOSPADM

## 2024-07-08 RX ORDER — DEXTROSE 40 %
15-30 GEL (GRAM) ORAL AS NEEDED
Status: DISCONTINUED | OUTPATIENT
Start: 2024-07-08 | End: 2024-07-08 | Stop reason: HOSPADM

## 2024-07-08 RX ORDER — IBUPROFEN 200 MG
16-32 TABLET ORAL AS NEEDED
Status: DISCONTINUED | OUTPATIENT
Start: 2024-07-08 | End: 2024-07-08 | Stop reason: HOSPADM

## 2024-07-08 RX ORDER — LIDOCAINE HYDROCHLORIDE 10 MG/ML
INJECTION, SOLUTION INFILTRATION; PERINEURAL AS NEEDED
Status: DISCONTINUED | OUTPATIENT
Start: 2024-07-08 | End: 2024-07-08 | Stop reason: SURG

## 2024-07-08 RX ORDER — DEXAMETHASONE SODIUM PHOSPHATE 4 MG/ML
INJECTION, SOLUTION INTRA-ARTICULAR; INTRALESIONAL; INTRAMUSCULAR; INTRAVENOUS; SOFT TISSUE AS NEEDED
Status: DISCONTINUED | OUTPATIENT
Start: 2024-07-08 | End: 2024-07-08 | Stop reason: SURG

## 2024-07-08 RX ORDER — PROPOFOL 10 MG/ML
INJECTION, EMULSION INTRAVENOUS AS NEEDED
Status: DISCONTINUED | OUTPATIENT
Start: 2024-07-08 | End: 2024-07-08 | Stop reason: SURG

## 2024-07-08 RX ORDER — HYDROMORPHONE HYDROCHLORIDE 1 MG/ML
INJECTION, SOLUTION INTRAMUSCULAR; INTRAVENOUS; SUBCUTANEOUS AS NEEDED
Status: DISCONTINUED | OUTPATIENT
Start: 2024-07-08 | End: 2024-07-08 | Stop reason: SURG

## 2024-07-08 RX ORDER — FENTANYL CITRATE 50 UG/ML
INJECTION, SOLUTION INTRAMUSCULAR; INTRAVENOUS AS NEEDED
Status: DISCONTINUED | OUTPATIENT
Start: 2024-07-08 | End: 2024-07-08 | Stop reason: SURG

## 2024-07-08 RX ORDER — ONDANSETRON HYDROCHLORIDE 2 MG/ML
INJECTION, SOLUTION INTRAVENOUS AS NEEDED
Status: DISCONTINUED | OUTPATIENT
Start: 2024-07-08 | End: 2024-07-08 | Stop reason: SURG

## 2024-07-08 RX ADMIN — SUGAMMADEX 200 MG: 100 INJECTION, SOLUTION INTRAVENOUS at 14:38

## 2024-07-08 RX ADMIN — PANTOPRAZOLE SODIUM 40 MG: 40 TABLET, DELAYED RELEASE ORAL at 21:31

## 2024-07-08 RX ADMIN — HYDROMORPHONE HYDROCHLORIDE 0.5 MG: 1 INJECTION, SOLUTION INTRAMUSCULAR; INTRAVENOUS; SUBCUTANEOUS at 14:18

## 2024-07-08 RX ADMIN — PROPOFOL 120 MG: 10 INJECTION, EMULSION INTRAVENOUS at 13:11

## 2024-07-08 RX ADMIN — FENTANYL CITRATE 50 MCG: 50 INJECTION INTRAMUSCULAR; INTRAVENOUS at 13:45

## 2024-07-08 RX ADMIN — CEFAZOLIN 2 G: 2 INJECTION, POWDER, FOR SOLUTION INTRAMUSCULAR; INTRAVENOUS at 13:25

## 2024-07-08 RX ADMIN — CEFAZOLIN 2 G: 2 INJECTION, POWDER, FOR SOLUTION INTRAMUSCULAR; INTRAVENOUS at 21:31

## 2024-07-08 RX ADMIN — ENOXAPARIN SODIUM 40 MG: 40 INJECTION SUBCUTANEOUS at 17:18

## 2024-07-08 RX ADMIN — ATORVASTATIN CALCIUM 20 MG: 20 TABLET, FILM COATED ORAL at 17:18

## 2024-07-08 RX ADMIN — LIDOCAINE HYDROCHLORIDE 5 ML: 10 INJECTION, SOLUTION INFILTRATION; PERINEURAL at 13:11

## 2024-07-08 RX ADMIN — ACETAMINOPHEN 650 MG: 325 TABLET ORAL at 02:11

## 2024-07-08 RX ADMIN — FENTANYL CITRATE 50 MCG: 50 INJECTION INTRAMUSCULAR; INTRAVENOUS at 13:11

## 2024-07-08 RX ADMIN — ONDANSETRON 4 MG: 2 INJECTION INTRAMUSCULAR; INTRAVENOUS at 13:54

## 2024-07-08 RX ADMIN — SODIUM CHLORIDE: 9 INJECTION, SOLUTION INTRAVENOUS at 14:39

## 2024-07-08 RX ADMIN — FUROSEMIDE 20 MG: 20 TABLET ORAL at 08:40

## 2024-07-08 RX ADMIN — DEXAMETHASONE SODIUM PHOSPHATE 4 MG: 4 INJECTION, SOLUTION INTRA-ARTICULAR; INTRALESIONAL; INTRAMUSCULAR; INTRAVENOUS; SOFT TISSUE at 13:54

## 2024-07-08 RX ADMIN — ROCURONIUM BROMIDE 50 MG: 10 INJECTION, SOLUTION INTRAVENOUS at 13:11

## 2024-07-08 RX ADMIN — METOPROLOL SUCCINATE 100 MG: 100 TABLET, EXTENDED RELEASE ORAL at 08:40

## 2024-07-08 ASSESSMENT — COGNITIVE AND FUNCTIONAL STATUS - GENERAL
MOVING TO AND FROM BED TO CHAIR: 3 - A LITTLE
STANDING UP FROM CHAIR USING ARMS: 3 - A LITTLE
MOVING TO AND FROM BED TO CHAIR: 4 - NONE
CLIMB 3 TO 5 STEPS WITH RAILING: 3 - A LITTLE
WALKING IN HOSPITAL ROOM: 3 - A LITTLE
WALKING IN HOSPITAL ROOM: 3 - A LITTLE
STANDING UP FROM CHAIR USING ARMS: 3 - A LITTLE
CLIMB 3 TO 5 STEPS WITH RAILING: 3 - A LITTLE
MOVING TO AND FROM BED TO CHAIR: 4 - NONE
CLIMB 3 TO 5 STEPS WITH RAILING: 3 - A LITTLE
WALKING IN HOSPITAL ROOM: 3 - A LITTLE
STANDING UP FROM CHAIR USING ARMS: 3 - A LITTLE

## 2024-07-08 ASSESSMENT — PAIN SCALES - GENERAL: PAIN_LEVEL: 4

## 2024-07-08 NOTE — OP NOTE
THORACIC SURGERY OPERATIVE REPORT      Patient Name:  Dave Arredondo  Medical Record Number:  [id number]  YOB: 1935   Date of Operation:  7/1/2024 - 7/8/2024  Primary Surgeon:  Sergey Dennis MD  First Assistant: Michele Joseph MD    Preoperative Diagnosis:    Pre-op Diagnosis     * Pleural effusion on right [J90]    Postoperative Diagnosis:   Post-op Diagnosis     * Pleural effusion on right [J90]    Operation:  Right VATS pleural biopsy, insertion of Pleurx catheter      Indication: Dave Arredondo is a 88 y.o. male with history of rectal cancer status postresection now with an end colostomy readmitted with a recurrent loculated pleural effusion cytology negative x 3 with unclear etiology, pigtail catheter placed tPA dornase given with significant improvement in effusion but still no clear cause presents to the OR for VATS pleural biopsy    Operative Findings:  Right thoracoscopy shows extremely thickened parietal pleura diffusely with nodularity as well as some mildly thickened visceral pleura and a small residual pleural effusion    Procedure Details   Patient was brought to the operating room and laid operating table in supine position.  After smooth induction of general anesthesia, he was intubated with a double-lumen tube for single lung ventilation.  Peripheral IV access was obtained.  He was then positioned in the left lateral decubitus with the right side up.  Previously placed pigtail catheter was removed and the right chest was prepped and draped in usual sterile fashion.  Timeout performed.  In the eighth intercostal space in the posterior x-ray line, a 1 cm incision was made and carried through the subcutaneous tissues into the right pleural space.  The right pleura was thickened.  Port was placed and the VATS camera was placed to explore the right pleural space.  There was a small residual pleural effusion.  A second port was placed anteriorly and through the second assistant  port, VATS instruments were used to mobilize the lung off of the lateral posterior and anterior chest wall.  Some thickened visceral pleura was removed and sent for permanent section.  There was thickened somewhat infected appearing visceral pleura posterior basilarly which was removed and sent for culture as well as biopsy.  Finally, a 5 cm x 5 cm² piece of thickened parietal pleura posteriorly was removed and sent for permanent section biopsy.  Hemostasis was confirmed.  A Pleurx catheter was placed through the anterior working port and secured to the skin with a silk stitch.  28 Scottish straight chest tube was placed through the camera port and secured to the skin with #2 Tycron suture.  Patient tolerated procedure well.    Estimated Blood Loss:   10 cc    Specimens:                Order Name Source Comment Collection Info Order Time   ANAEROBIC CULTURE / SMEAR (INCLUDES AEROBIC CULTURE) Pleura, Right Pre-op diagnosis:  Pleural effusion on right [J90] Collected By: Sergey Dennis MD 7/8/2024  2:01 PM     Release to patient   Immediate        FUNGAL CULTURE / SMEAR Pleura, Right Pre-op diagnosis:  Pleural effusion on right [J90] Collected By: Sergey Dennis MD 7/8/2024  2:01 PM     Release to patient   Immediate        TISSUE CULTURE / SMEAR Pleura, Right Pre-op diagnosis:  Pleural effusion on right [J90] Collected By: Sergey Dennis MD 7/8/2024  2:01 PM     Release to patient   Immediate        AFB CULTURE / SMEAR Pleura, Right Pre-op diagnosis:  Pleural effusion on right [J90] Collected By: Sergey Dennis MD 7/8/2024  2:01 PM     Release to patient   Immediate        PATHOLOGY TISSUE EXAM Pleura, Right Pre-op diagnosis:  Pleural effusion on right [J90] Collected By: Sergey Dennis MD 7/8/2024  1:54 PM     Release to patient   Immediate            Drains:   Chest Tube 1 Right Pleural  (Active)       Chest Tube 2 Right Pleural 28 Fr (Active)       Colostomy Unknown LUQ (Active)   Stomal Appliance 2 piece  07/08/24 0830   Peristomal Skin dry;intact 07/08/24 0830   Accessories/Skin Care appliance belt 07/05/24 2000   Tolerance no signs/symptoms of discomfort 07/08/24 0830   Stoma Appearance round 07/08/24 0830   Stoma Function flatus;stool 07/08/24 0830   Stool Color brown 07/07/24 1945   Stool Consistency soft;loose 07/06/24 2015       Implants:   * No implants in log *    Anesthesia: General    Anesthesiologist: Anesthesiologist: Jorge Vee MD; Samina Del Castillo DO            Complications: None: the patient tolerated the procedure well           Disposition: PACU - hemodynamically stable.           Condition: stable      Sergey Dennis MD

## 2024-07-08 NOTE — ANESTHESIOLOGIST PRE-PROCEDURE ATTESTATION
Pre-Procedure Patient Identification:  I am the Primary Anesthesiologist and have identified the patient on 07/08/24 at 11:39 AM.   I have confirmed the procedure(s) will be performed by the following surgeon/proceduralist Sergey Dennis MD.

## 2024-07-08 NOTE — HOSPITAL COURSE
88-year-old male with past medical history of hypertension, CAD and hyperlipidemia presented with worsening dyspnea.  Found to have right-sided pleural effusion.  Of note, he recently was discharged after having similar symptoms and required therapeutic thoracentesis.  At the time cytology was negative for malignant cells.  Patient had another thoracentesis on 7/2 with 1.1 L removed at that was also negative for malignant cells.  Repeat CT chest concerning for pleural thickening and malignancy.  Pulmonary and cardiothoracic surgery consulted.  IR placed pigtail catheter on 7/3 and patient received tPA/dornase until 7/6.  Repeat CT ordered per cardiothoracic followed by VATS with biopsy and new Pleurex on 7/8. Surgery ok starting AC on 7/10.    PLAN  -       Application Tool (Optional): Liquid Nitrogen Sprayer Aperture Size (Optional): C Show Applicator Variable?: Yes Render Note In Bullet Format When Appropriate: No Duration Of Freeze Thaw-Cycle (Seconds): 5 Post-Care Instructions: I reviewed with the patient in detail post-care instructions. Patient should also wear sun protection, and avoid picking at any of the treated lesions. Consent: Verbal consent was obtained including but not limited to risks of crusting, scabbing, blistering, scarring, darker or lighter pigmentary change, recurrence, incomplete removal and infection. Detail Level: Detailed Number Of Freeze-Thaw Cycles: 3 freeze-thaw cycles

## 2024-07-08 NOTE — ANESTHESIA PROCEDURE NOTES
Airway  Urgency: elective    Start Time: 7/8/2024 1:11 PM  Airway not difficult    General Information and Staff    Patient location during procedure: OR  Anesthesiologist: Samina Del Castillo DO  Performed: anesthesiologist   Performed by: Samina Del Castillo DO  Authorized by: Samina Del Castillo DO      Indications and Patient Condition  Indications for airway management: anesthesia  Sedation level: general  Preoxygenated: yes  Patient position: sniffing  MILS maintained throughout  Mask difficulty assessment: 1 - vent by mask    Final Airway Details  Final airway type: endotracheal airway      Successful airway: ETT and ETT - double lumen left  Cuffed: yes   Successful intubation technique: video laryngoscopy  Facilitating devices/methods: intubating stylet  Endotracheal tube insertion site: oral  Blade: Yessenia  Blade size: #3  ETT DL size (fr): 35  Cormack-Lehane Classification: grade I - full view of glottis  Placement verified by: chest auscultation, capnometry and radiography   Cuff volume (mL): 7  Measured from: lips  Number of attempts at approach: 1  Number of other approaches attempted: 0  Atraumatic airway insertion      Additional Comments  35 left sided FARAZ placed with glidescope without issue. GR 1 view. No trauma. Bronchoscope used to confirm Blue cuff in left main stem, and inflated with cuff slightly above neelam. Placement shown and confirmed with surgeon. No trauma. Pt tolerated well.

## 2024-07-08 NOTE — PROGRESS NOTES
Hospital Medicine     Daily Progress Note       SUBJECTIVE   Interval History: Patient seen and examined at bedside. No acute events overnight. Has been NPO for procedure today. Reports respiratory status is stable. No chest pains this am. Reports feeling deconditioned due to a lot of time in bed. No acute complaints this am. States stool consistency in colostomy is improved (was concerned for constipation earlier). I asked patient if he would like me to call any family/friends to update, he said he will update them.      OBJECTIVE      Vital signs in last 24 hours:  Temp:  [36.2 °C (97.2 °F)-37.2 °C (99 °F)] 36.4 °C (97.6 °F)  Heart Rate:  [78-96] 88  Resp:  [16-24] 16  BP: (118-133)/(70-77) 127/70    Intake/Output Summary (Last 24 hours) at 7/8/2024 0995  Last data filed at 7/8/2024 0215  Gross per 24 hour   Intake --   Output 960 ml   Net -960 ml       PHYSICAL EXAMINATION      Physical Exam  Vitals and nursing note reviewed.   Constitutional:       General: He is not in acute distress.     Comments: Very pleasant elderly gentleman resting in bed, appears tired    HENT:      Head: Normocephalic.      Mouth/Throat:      Mouth: Mucous membranes are moist.   Eyes:      Extraocular Movements: Extraocular movements intact.   Cardiovascular:      Rate and Rhythm: Normal rate and regular rhythm.   Pulmonary:      Comments: Stable on RA. Breath sounds diminished R lung base. R chest tube present   Abdominal:      Palpations: Abdomen is soft.      Tenderness: There is no abdominal tenderness.      Comments: Colostomy LLQ, very soft brown stool present    Musculoskeletal:      Right lower leg: No edema.      Left lower leg: No edema.   Neurological:      General: No focal deficit present.      Mental Status: He is alert and oriented to person, place, and time.            LINES, CATHETERS, DRAINS, AIRWAYS, AND WOUNDS   Lines, Drains, and Airways:  Wounds (agree with documentation and present on admission):  Peripheral  IV (Adult) 07/01/24 Left Antecubital (Active)   Number of days: 7       Chest Tube 1 Right 12 Fr (Active)   Number of days: 5       Colostomy Unknown LUQ (Active)   Number of days: 5218         Comments:    LABS / IMAGING / TELE      Labs  I have reviewed the patient's labs to the time of note. No new clinical concern.      Imaging  I have independently reviewed the pertinent imaging from the last 24 hrs.    ECG/Telemetry  I have independently reviewed the telemetry. No events for the last 24 hours.    ASSESSMENT AND PLAN      * Pleural effusion on right  Assessment & Plan  S/p thoracentesis x 2 with negative cytology   S/p pigtail placement 7/4  and TPA/dornase x 6 doses with follow up CT scan on 7/7 showing decrease in size of effusion, small R hydropneumothorax noted. Chest tube remains in place   Surgery following, plan for VATS/pleural biopsy today and possible pleurX catheter placement     Other constipation  Assessment & Plan  In a patient with colostomy  Resolved, soft brown stool present in colostomy today     Hyponatremia  Assessment & Plan  Mil and stable at 133   Asymptomatic   Now NPO for procedure   Will consider gentle IVF per-procedure if will not occur until later in the day     Normocytic anemia  Assessment & Plan  Hemoglobin stable and close to baseline  Having some bloody discharge from the chest tube but otherwise denies obvious bleeding  Monitor daily CBC    Pulmonary nodules  Assessment & Plan  6/18 CAT scan chest revealed pulmonary nodules  CT chest 7/2 showing stable pulm nodules, largest JESSE up to 6mm       Right-sided chest pain  Assessment & Plan  Improving  Secondary to pleural effusion and now chest tube       History of rectal cancer  Assessment & Plan  In 2010 per patient, cared for at [place]   S/p colostomy, chemo and radiation   With recurrent exudative pleural effusion and CT scan findings concern for malignancy   Oncology following   VATS/pleural biopsy today     DVT (deep  venous thrombosis) (CMS/HCC)  Assessment & Plan  History of DVT/PE   On Xarelto as OP   Xarelto on hold for pleural biopsy     CAD (coronary artery disease)  Assessment & Plan  Patient with presumed CAD based on abnormal stress test in 2018, declined cardiac cath   Continue statin therapy          VTE Assessment: Padua VTE Score: 4  VTE Prophylaxis:  Current anticoagulants:  enoxaparin (LOVENOX) syringe 40 mg, subcutaneous, Daily (6p)      Code Status: Full Code      Estimated Discharge Date: 7/11/2024   Disposition Planning: VATS/pleural biopsy today      BENJY Gonzalez  7/8/2024

## 2024-07-08 NOTE — OR SURGEON
Pre-Procedure patient identification:  I am the primary operating surgeon/proceduralist and I have reviewed the applicable pathology reports and radiology studies for this procedure. I have identified the patient and confirmed laterality is right on 07/08/24 at 12:54 PM Sergey Dennis MD  Phone Number: [phone]

## 2024-07-08 NOTE — ANESTHESIA PREPROCEDURE EVALUATION
Relevant Problems   CARDIOVASCULAR   (+) CAD (coronary artery disease)      HEMATOLOGY   (+) Normocytic anemia      RESPIRATORY SYSTEM   (+) Dyspnea      Respiratory   (+) Pleural effusion on right   (+) Pulmonary nodules      Circulatory   (+) DVT (deep venous thrombosis) (CMS/HCC)      Digestive   (+) History of rectal cancer      Other   (+) HLD (hyperlipidemia)   (+) Hyponatremia   (+) Right-sided chest pain     -Lungs And Large Airways:Likely mostly atelectasis in the lower lungs. Dependent  atelectasis left base.  -Glenda/Mediastinum/Axilla: No adenopathy or mass.  -Pleura: Pigtail catheter within the right pleural space. Decreased right  pleural effusion. Small likely partially loculated right pleural effusion  remains. Small right pneumothorax.  No left pleural effusion.  -Vascular:   Main pulmonary arteries are normal in caliber.   Aorta normal in  caliber.  -Chest Wall And Lower Neck: Unremarkable.  -Bones: No acute findings.  -Heart: Normal size. No pericardial effusion.  -Upper abdomen: Within normal limits.        --  IMPRESSION:  Interval decrease is size right pleural effusion post drainage. Right pigtail  catheter within the right pleural space.  Small right hydropneumothorax.        CBC Results         07/08/24 07/07/24 07/06/24     0715 0322 0458    WBC 8.27 10.05 9.97    RBC 4.65 4.55 4.48    HGB 13.6 13.4 13.2    HCT 41.9 40.7 40.2    MCV 90.1 89.5 89.7    MCH 29.2 29.5 29.5    MCHC 32.5 32.9 32.8     309 292        Narrative    Left Ventricle: Normal ventricle size. Concentric left ventricular hypertrophy. Normal systolic function. Estimated EF 65%. No regional wall motion abnormalities. Grade I diastolic dysfunction.    Right Ventricle: Normal ventricle size. Normal systolic function.    Left Atrium: Normal sized atrium.    Right Atrium: Normal sized atrium.    Aortic Valve: Tricuspid valve.  Sclerotic leaflets. Mild regurgitation.    Mitral Valve: Sclerotic mitral valve. Normal leaflet  motion. Mild mitral annular calcification. Mild regurgitation.    Tricuspid Valve: Normal structure. Mild regurgitation. Estimated RVSP = 37 mmHg.    Pulmonic Valve: Normal structure. Trace regurgitation.    Aorta: Aortic root grossly normal.    IVC/SVC: Normal sized inferior vena cava. Inferior vena cava demonstrates normal respiratory collapse.    Pericardium: No evidence of pericardial effusion.    Compared to the prior echocardiogram of 4/27/2020, there is no significant change.     Echo Findings    Left Ventricle Normal ventricle size. Concentric left ventricular hypertrophy. Normal systolic function. Estimated EF 65%. No regional wall motion abnormalities. Grade I diastolic dysfunction.   Right Ventricle Normal ventricle size. Normal systolic function.   Left Atrium Normal sized atrium.   Right Atrium Normal sized atrium.   Aortic Valve Tricuspid valve.  Sclerotic leaflets.   Mild regurgitation.   Mitral Valve Sclerotic mitral valve. Normal leaflet motion. Mild mitral annular calcification. Mild regurgitation.   Tricuspid Valve Normal structure. Mild regurgitation. Estimated RVSP = 37 mmHg.   Pulmonic Valve Normal structure. Trace regurgitation.   Aorta Aortic root grossly normal.   IVC/SVC Normal sized inferior vena cava. Inferior vena cava demonstrates normal respiratory collapse.   Pericardium No evidence of pericardial effusion.     Sinus rhythm with 1st degree A-V block with Premature atrial complexes with Aberrant conduction  Otherwise normal ECG  Confirmed by Ceslo Barry ([id number]) on 7/1/2024 4:09:13 PM     BMP Results         07/08/24 07/07/24 07/06/24     0715 0322 0458     133 132    K 4.5 4.8 4.9    Cl 100 99 100    CO2 26 27 26    Glucose 94 99 101    BUN 18 15 19    Creatinine 0.8 0.7 0.7    Calcium 9.0 9.1 8.9    Anion Gap 7 7 6    EGFR >60.0 >60.0 >60.0           Comment for EGFR at 0715 on 07/08/24: Calculation based on the Chronic Kidney Disease Epidemiology Collaboration (CKD-EPI)  equation refit without adjustment for race.    Comment for EGFR at 0322 on 07/07/24: Calculation based on the Chronic Kidney Disease Epidemiology Collaboration (CKD-EPI) equation refit without adjustment for race.    Comment for EGFR at 0458 on 07/06/24: Calculation based on the Chronic Kidney Disease Epidemiology Collaboration (CKD-EPI) equation refit without adjustment for race.           Anesthesia ROS/MED HX      Cardiovascular   Echocardiogram reviewed and ECG reviewed  Abnormal ECG  Comments: Carious, missing teeth. Chest tube right side       Past Surgical History:   Procedure Laterality Date    COLOSTOMY      HERNIA REPAIR         Physical Exam    Airway   Mallampati: III  Cardiovascular    Rhythm: regular   Rate: normalPulmonary    Decreased breath sounds  Other Findings   Carious, missing teeth. Chest tube right side  Dental    Teeth Problems: missing        Anesthesia Plan    Plan: general    Technique: general endotracheal      Airway: video laryngoscope and oral intubation     instructed to abstain from smoking on day of procedure    Patient did not smoke on day of surgery   4 ASA  Blood Products:   Use of Blood Products Discussed: No   Anesthetic plan and risks discussed with: patient  Postop Plan:   Patient Disposition: inpatient floor planned admission   Pain Management: IV analgesics  Comments:    Plan: FARAZ, OLV.

## 2024-07-08 NOTE — PROGRESS NOTES
Thoracic Surgery Daily Progress Note    Subjective     Patient is postop day 1 from right VATS pleural biopsy, Pleurx catheter placement. CT placed to waterseal overnight.  Otherwise NAEON. Pain well controlled. Pt remains NPO w/o nausea/vomiting.  Voiding.      Objective     Vital signs in last 24 hours:  Temp:  [36.1 °C (97 °F)-36.7 °C (98.1 °F)] 36.4 °C (97.6 °F)  Heart Rate:  [68-89] 77  Resp:  [14-20] 18  BP: (100-140)/(55-73) 127/68      Intake/Output Summary (Last 24 hours) at 7/9/2024 0620  Last data filed at 7/9/2024 0000  Gross per 24 hour   Intake 986.33 ml   Output 750 ml   Net 236.33 ml     Intake/Output this shift:  I/O this shift:  In: -   Out: 435 [Urine:375; Chest Tube:60]      Physical Exam  HENT:      Head: Normocephalic.      Mouth/Throat:      Mouth: Mucous membranes are moist.   Eyes:      Extraocular Movements: Extraocular movements intact.   Cardiovascular:      Rate and Rhythm: Normal rate and regular rhythm.   Pulmonary:      Effort: Pulmonary effort is normal.   Abdominal:      General: There is no distension.      Palpations: Abdomen is soft.      Tenderness: There is abdominal tenderness. There is no guarding or rebound.      Comments: Incisions c/d/I. Appropriately tender to palpation.  Chest tube to waterseal, SS drainage, no airleak.  Pleurx catheter dressing clean dry and intact.   Skin:     General: Skin is warm and dry.   Neurological:      Mental Status: He is alert.            Labs:  BMP Results         07/02/24 07/01/24 06/19/24     0337 1123 0516     135 137    K 3.9 3.8 4.2    Cl 100 100 105    CO2 28 27 25    Glucose 99 132 98    BUN 13 14 26    Creatinine 0.8 0.9 0.8    Calcium 9.1 9.4 9.1    Anion Gap 7 8 7    EGFR >60.0 >60.0 >60.0           Comment for K at 1123 on 07/01/24: Results obtained on plasma. Plasma Potassium values may be up to 0.4 mEQ/L less than serum values. The differences may be greater for patients with high platelet or white cell counts.    Comment  for EGFR at 0337 on 07/02/24: Calculation based on the Chronic Kidney Disease Epidemiology Collaboration (CKD-EPI) equation refit without adjustment for race.    Comment for EGFR at 1123 on 07/01/24: Calculation based on the Chronic Kidney Disease Epidemiology Collaboration (CKD-EPI) equation refit without adjustment for race.    Comment for EGFR at 0516 on 06/19/24: Calculation based on the Chronic Kidney Disease Epidemiology Collaboration (CKD-EPI) equation refit without adjustment for race.           CBC Results         07/02/24 07/01/24 06/19/24     0337 1123 0516    WBC 8.82 10.17 8.07    RBC 4.34 4.58 4.56    HGB 12.6 13.5 13.5    HCT 39.1 41.2 41.2    MCV 90.1 90.0 90.4    MCH 29.0 29.5 29.6    MCHC 32.2 32.8 32.8     307 229            Imaging  I have reviewed the Imaging from the last 24 hrs.      Assessment/Plan       Dave Arredondo is a 88 year old man with history of rectal cancer s/p resection with ostomy, CAD, DVT/PE on Xarelto who presented with a recurrent loculated pleural effusion, now status post right VATS, pleural biopsy, Pleurx catheter placement on 7/8.     PLAN  -Thoracic surgery to monitor chest tube  -Chest tube to waterseal  -Daily a.m. chest x-rays  -Diet: Advance as tolerated per primary  -Okay to restart Xarelto on postop day 2 (7/10)  -Rest of care per primary    Sven Quinonez MD  General Surgery, PGY-3  [phone]

## 2024-07-08 NOTE — PLAN OF CARE
Plan of Care Review  Plan of Care Reviewed With: patient  Progress: improving  Outcome Evaluation: Chest tube intact draining moderate amount of fluid. No complaints of pain at the insertion site. NPO since midnight for VATS procedure. Colostomy intact.

## 2024-07-08 NOTE — ANESTHESIA POSTPROCEDURE EVALUATION
Patient: Dave Arredondo    Procedure Summary       Date: 07/08/24 Room / Location: [facility] OR  / [facility] OR [facility]    Anesthesia Start: 1304 Anesthesia Stop: 1505    Procedure: THORACOSCOPY--VATS-PLEURAL BIOPSY, PLEURX CATHETER PLACEMENT (Right: Chest) Diagnosis:       Pleural effusion on right      (Pleural effusion on right [J90])    Surgeons: Sergey Dennis MD Responsible Provider: Samina Del Castillo DO    Anesthesia Type: general ASA Status: 4            Anesthesia Type: general  PACU Vitals  7/8/2024 1456 - 7/8/2024 1530        7/8/2024  1515             BP: 136/69    Temp: 36.1 °C (97 °F)    Pulse: 74    Resp: 20    SpO2: 99 %              Anesthesia Post Evaluation    Pain score: 4  Pain management: satisfactory to patient  Mode of pain management: IV medication  Patient location during evaluation: PACU  Patient participation: complete - patient participated  Level of consciousness: awake  Cardiovascular status: acceptable  Airway Patency: adequate  Respiratory status: acceptable  Hydration status: stable  Anesthetic complications: no

## 2024-07-08 NOTE — PLAN OF CARE
Care Coordination Discharge Plan Note     Discharge Needs Assessment  Concerns to be Addressed: other (see comments) (Pt states doctors at [facility] referred him to doctors that did not accept his insurance. BENJY Osborne was informed of pt's concerns r/t specialist not in network with [employer].)  Current Discharge Risk: chronically ill    Anticipated Discharge Plan  Anticipated Discharge Disposition: home without assistance or services       Patient Choice  Offered/Gave Vendor List: no  Patient's Choice of Community Agency(s): No needs identified close to d/c         ---------------------------------------------------------------------------------------------------------------------    Interdisciplinary Discharge Plan Review:  Participants:     Concerns Comments: SW met w/ pt in room. Pt confirmed home w/ no needs at time of potential d/c. IMM rights reviewed.    Discharge Plan:   Disposition/Destination: Home  / Home  Discharge Facility:    Community Resources:      Discharge Transportation:  Is Out of Hospital DNR needed at Discharge: no  Does patient need discharge transport? No   Discussed in rounds, plan for O.R today for VATS procedure.

## 2024-07-08 NOTE — PROGRESS NOTES
"ONCOLOGY Progress Note    Subjective    Patient denies any chest pain this morning.  Denies SOB at rest.   Not hypoxic    Await VATS and pleural biopsy.      Objective  Vital signs in last 24 hours:  Temp:  [36.2 °C (97.2 °F)-37.2 °C (99 °F)] 36.4 °C (97.6 °F)  Heart Rate:  [78-96] 88  Resp:  [16-24] 16  BP: (118-133)/(70-77) 127/70    Physical Exam:  Visit Vitals  /70 (BP Location: Right upper arm, Patient Position: Lying)   Pulse 88   Temp 36.4 °C (97.6 °F) (Oral)   Resp 16   Ht 1.702 m (5' 7\")   Wt 70.1 kg (154 lb 9.6 oz)   SpO2 95%   BMI 24.21 kg/m²       General appearance: alert, no acute distress. Respirations non-labored on RA. Non-toxic  Eyes: Anicteric.   Lungs: decreased BS on R; no increased work of breathing  Right pigtail chest tube with serous dge in pleurevac.  Heart: regular rate and rhythm and S1, S2 normal  Abdomen: soft, non-tender; ostomy left abd with stool.  Extremities: No BLE edema or calf tenderness  Skin: warm and dry  Neurologic: Grossly normal    Medications:      acetaminophen, 650 mg, oral, q4h PRN    albuterol HFA, 2 puff, inhalation, q6h PRN    atorvastatin, 20 mg, oral, Daily (6p)    calcium carbonate, 1,000 mg, oral, 2x daily PRN    cyanocobalamin, 1,000 mcg, intramuscular, q30 days    glucose, 16-32 g of dextrose, oral, PRN **OR** dextrose, 15-30 g of dextrose, oral, PRN **OR** glucagon, 1 mg, intramuscular, PRN **OR** dextrose 50 % in water (D50), 25 mL, intravenous, PRN    enoxaparin, 40 mg, subcutaneous, Daily (6p)    furosemide, 20 mg, oral, Daily    melatonin, 3 mg, oral, Nightly PRN    metoprolol succinate XL, 100 mg, oral, Daily    nitroglycerin, 0.4 mg, sublingual, q5 min PRN    pantoprazole, 40 mg, oral, Nightly    polyethylene glycol, 17 g, oral, Daily    Labs:  Results from last 7 days   Lab Units 07/08/24  0715 07/07/24  0322 07/06/24  0458   WBC K/uL 8.27 10.05 9.97   HEMOGLOBIN g/dL 13.6* 13.4* 13.2*   HEMATOCRIT % 41.9 40.7 40.2   PLATELETS K/uL 282 309 292 "     Results from last 7 days   Lab Units 07/08/24  0715 07/07/24  0322 07/06/24  0458   SODIUM mEQ/L 133* 133* 132*   POTASSIUM mEQ/L 4.5 4.8 4.9   CHLORIDE mEQ/L 100 99 100   CO2 mEQ/L 26 27 26   BUN mg/dL 18 15 19   CREATININE mg/dL 0.8 0.7 0.7   GLUCOSE mg/dL 94 99 101*   CALCIUM mg/dL 9.0 9.1 8.9         Results from last 7 days   Lab Units 07/08/24  0420 07/02/24  0337 07/01/24  1123   INR  1.1 1.2 1.8       Microbiology Results       Procedure Component Value Units Date/Time    Body Fluid Culture/Smear Pleural Fluid, Right [[id number]] Collected: 07/02/24 0928    Specimen: Pleural Fluid, Right Updated: 07/06/24 0656     Culture No growth     Gram Stain Result 1+ WBC      No organisms seen    Fungal Stain Pleural Fluid, Right [[id number]] Collected: 07/02/24 0928    Specimen: Pleural Fluid, Right Updated: 07/02/24 1404     Fungus Stain No fungal elements seen    Fungal Culture Pleural Fluid, Right [[id number]]  (Normal) Collected: 07/02/24 0928    Specimen: Pleural Fluid, Right Updated: 07/03/24 1402     Fungal Culture No Fungus Isolated to Date    AFB stain Pleural Fluid, Right [[id number]]  (Normal) Collected: 07/02/24 0928    Specimen: Pleural Fluid, Right Updated: 07/03/24 1347     AFB Stain No acid fast bacilli seen    AFB Culture Pleural Fluid, Right [[id number]]  (Normal) Collected: 07/02/24 0928    Specimen: Pleural Fluid, Right Updated: 07/04/24 1100     AFB Culture No Acid Fast Bacilli Isolated to Date    Body Fluid Culture/Smear Pleural Fluid, Right [[id number]] Collected: 06/17/24 1410    Specimen: Pleural Fluid, Right Updated: 06/21/24 1212     Culture No growth at 96 hours     Gram Stain Result 1+ WBC      No organisms seen    Fungal Stain Pleural Fluid, Right [[id number]] Collected: 06/17/24 1410    Specimen: Pleural Fluid, Right Updated: 06/18/24 0648     Fungus Stain No fungal elements seen    Fungal Culture Pleural Fluid, Right [[id number]]  (Normal) Collected: 06/17/24 1410    Specimen: Pleural  Fluid, Right Updated: 06/18/24 2300     Fungal Culture No Fungus Isolated to Date    AFB stain Pleural Fluid, Right [[id number]]  (Normal) Collected: 06/17/24 1410    Specimen: Pleural Fluid, Right Updated: 06/18/24 1514     AFB Stain No acid fast bacilli seen    AFB Culture Pleural Fluid, Right [[id number]]  (Normal) Collected: 06/17/24 1410    Specimen: Pleural Fluid, Right Updated: 06/19/24 1401     AFB Culture No Acid Fast Bacilli Isolated to Date    SARS-COV-2 (COVID-19)/ FLU A/B, AND RSV, PCR Nasopharynx [[id number]]  (Normal) Collected: 06/15/24 1247    Specimen: Nasopharyngeal Swab from Nasopharynx Updated: 06/15/24 1335     SARS-CoV-2 (COVID-19) Negative     Influenza A Negative     Influenza B Negative     Respiratory Syncytial Virus Negative    Narrative:      Testing performed using real-time PCR for detection of COVID-19. EUA approved validation studies performed on site.              Imaging:  CT CHEST WITH IV CONTRAST    Result Date: 7/7/2024  CLINICAL HISTORY: Pleural effusion, malignancy suspected TECHNIQUE:  Helical acquisition of the chest with axial reconstructions after administration of  intravenous contrast. Coronal and sagittal reformats also obtained. 75mL of iopamidoL (ISOVUE-370) 370 mg iodine /mL (76 %) injection 75 mL CT DOSE:  One or more dose reduction techniques (e.g. automated exposure control, adjustment of the mA and/or kV according to patient size, use of iterative reconstruction technique) utilized for this examination COMPARISON: CT of the chest 7/2/2024 FINDINGS: -Lungs And Large Airways:Likely mostly atelectasis in the lower lungs. Dependent atelectasis left base. -Glenda/Mediastinum/Axilla: No adenopathy or mass. -Pleura: Pigtail catheter within the right pleural space. Decreased right pleural effusion. Small likely partially loculated right pleural effusion remains. Small right pneumothorax. No left pleural effusion. -Vascular:   Main pulmonary arteries are normal in caliber.    Aorta normal in caliber. -Chest Wall And Lower Neck: Unremarkable. -Bones: No acute findings. -Heart: Normal size. No pericardial effusion. -Upper abdomen: Within normal limits.     IMPRESSION: Interval decrease is size right pleural effusion post drainage. Right pigtail catheter within the right pleural space. Small right hydropneumothorax.           Assessment & Plan    * Pleural effusion on right  Assessment & Plan  Recurrent right pleural effusion.  Concern for malignancy.  -Patient was inpatient at [facility] 6/15 to 6/19/2024, SOB, right pleuritic chest pain  -R Thoracentesis 6/17/2024: 1.2L pleural fluid, exudative, negative cytology, culture.  Improved and he was discharged 6/19/2024.  -He returned to [facility] 7/1/2024 due to gradually increasing SOB with exertion and right chest pain worse when lying on the right side.   -Mild anorexia and 6 or 7 pound weight loss.  He has had a dry cough for several months.    -Hematology/Oncology consulted due to concern regarding possible malignancy  -CXR 7/1/2024 large right pleural effusion.  -Right thoracentesis 7/2/2024 for multiloculated effusion with 1.1L fluid removed.  -Pleural fluid: , pH 7.5, protein <3.   (N 12, L79, M8, Eos 1). Cultures NGSF. Cytology 7/2 negative; flow negative  -CT chest 7/2/2024 moderate right pleural effusion, pleural thickening and heterogeneity particularly at right lung base, malignancy not excluded; patchy consolidative changes right middle and lower lobes--superimposed pneumonia should be excluded; stable scattered pulmonary nodules largest in JESSE measuring up to 6 mm; mildly prominent mediastinal lymph nodes measuring up to 9 mm.  -LDH-161 not elevated  -CEA- 3.1 --minimal elevation (nonsmoker)  -Right pleural pigtail inserted 7/3/2024 --intrapleural fibrinolysis completed    -Appreciate pulmonologist input and thoracic surgery input  -Cytology, flow cytometry of right pleural fluid from 7/2/2024 negative for  malignancy  -Completed 6 doses tPA/dornase instillations (as recommended by thoracic surgery)   -Repeat CT  7/7/24 decrease right pleural effusion post drainage. Right pigtail catheter pleural space. Small right hydropneumothorax.  --Patient is nonsmoker, consider CTDs with serositis in DDX. Pleural fluid diff predominantly lymphocytic suggesting lymphoma vs autoimmune etiology    -VATS, pleural biopsy pending.  -Consider outpatient PET/CT when discharged      Anorexia  Assessment & Plan  Patient has had anorexia with 6 to 7 pound weight loss.  Workup to rule out malignancy is in progress  -Protonix 40 mg nightly  -Nutrition consult  -Encourage supplements        Normocytic anemia  Assessment & Plan  Normocytic anemia.  -Hemoglobin 13.5.  MCV 90.1.  Creatinine 0.9.  -Patient receives B12 injections every 30 days.  -Monitor. Hgb stable        Lab Results   Component Value Date    SCYBAHKM98 >1,500 (H) 07/02/2024     Lab Results   Component Value Date    FOLATE 11.5 07/02/2024     Lab Results   Component Value Date    IRON 40 07/02/2024    TIBC 232 (L) 07/02/2024    FERRITIN 222 07/02/2024     % sat 17    Pulmonary nodules  Assessment & Plan  See Pleural effusion    History of rectal cancer  Assessment & Plan  Rectal cancer  -Patient was followed at [facility], T2 N1 M0 rectal cancer  -Neoadjuvant chemotherapy and radiotherapy followed by surgery (APR), then had multiple complications after the surgery, then recovered and had 4 months of adjuvant chemotherapy postoperatively and has not had known recurrence of his rectal cancer.    -Surveillance colonoscopy 8/11/2022 revealed hyperplastic polyp in the transverse colon with pathology revealing tubular adenoma.  -Ostomy functioning  -CEA 3.1    DVT (deep venous thrombosis) (CMS/HCC)  Assessment & Plan  LLE DVT and PE in 3/2019, on therapeutic dose Xarelto for 6 months then changed to prophylactic dose, then had evidence of DVT in 4/2020, unclear if acute DVT  but anticoagulation was changed to Eliquis then resumed Xarelto.    -Xarelto on hold pending completion of invasive procedures  -Resume Xarelto when able  -DVT prophylactic dose enoxaparin 40 mg daily has been ordered.    CAD (coronary artery disease)  Assessment & Plan  Patient takes atorvastatin and metoprolol            VALENCIA Hua  7/8/2024  [facility] ([facility])  [phone] office

## 2024-07-08 NOTE — PERIOPERATIVE NURSING NOTE
Pt with a small air leak from chest tube. Pt asymptomatic. O2 sats 99% on 2liters. Resting comfortably. Surgery aware

## 2024-07-09 ENCOUNTER — APPOINTMENT (INPATIENT)
Dept: RADIOLOGY | Facility: HOSPITAL | Age: 89
DRG: 167 | End: 2024-07-09
Payer: COMMERCIAL

## 2024-07-09 ENCOUNTER — APPOINTMENT (INPATIENT)
Dept: RADIOLOGY | Facility: HOSPITAL | Age: 89
DRG: 167 | End: 2024-07-09
Attending: STUDENT IN AN ORGANIZED HEALTH CARE EDUCATION/TRAINING PROGRAM
Payer: COMMERCIAL

## 2024-07-09 VITALS
HEIGHT: 67 IN | RESPIRATION RATE: 18 BRPM | DIASTOLIC BLOOD PRESSURE: 56 MMHG | TEMPERATURE: 98 F | SYSTOLIC BLOOD PRESSURE: 113 MMHG | HEART RATE: 76 BPM | BODY MASS INDEX: 24.27 KG/M2 | WEIGHT: 154.6 LBS | OXYGEN SATURATION: 94 %

## 2024-07-09 PROBLEM — K59.00 CONSTIPATION: Status: ACTIVE | Noted: 2024-07-07

## 2024-07-09 PROBLEM — D72.829 LEUKOCYTOSIS: Status: ACTIVE | Noted: 2024-07-09

## 2024-07-09 LAB
ANION GAP SERPL CALC-SCNC: 8 MEQ/L (ref 3–15)
BUN SERPL-MCNC: 22 MG/DL (ref 7–25)
CALCIUM SERPL-MCNC: 9.2 MG/DL (ref 8.6–10.3)
CHLORIDE SERPL-SCNC: 99 MEQ/L (ref 98–107)
CO2 SERPL-SCNC: 25 MEQ/L (ref 21–31)
CREAT SERPL-MCNC: 0.8 MG/DL (ref 0.7–1.3)
EGFRCR SERPLBLD CKD-EPI 2021: >60 ML/MIN/1.73M*2
ERYTHROCYTE [DISTWIDTH] IN BLOOD BY AUTOMATED COUNT: 14 % (ref 11.6–14.4)
FUNGUS STAIN: NORMAL
GLUCOSE SERPL-MCNC: 109 MG/DL (ref 70–99)
HCT VFR BLD AUTO: 41.1 % (ref 40.1–51)
HGB BLD-MCNC: 13.1 G/DL (ref 13.7–17.5)
MAGNESIUM SERPL-MCNC: 2 MG/DL (ref 1.8–2.5)
MCH RBC QN AUTO: 29 PG (ref 28–33.2)
MCHC RBC AUTO-ENTMCNC: 31.9 G/DL (ref 32.2–36.5)
MCV RBC AUTO: 91.1 FL (ref 83–98)
PDW BLD AUTO: 9.5 FL (ref 9.4–12.4)
PLATELET # BLD AUTO: 313 K/UL (ref 150–350)
POTASSIUM SERPL-SCNC: 4.8 MEQ/L (ref 3.5–5.1)
RBC # BLD AUTO: 4.51 M/UL (ref 4.5–5.8)
RHODAMINE-AURAMINE STN SPEC: NORMAL
SODIUM SERPL-SCNC: 132 MEQ/L (ref 136–145)
WBC # BLD AUTO: 16.28 K/UL (ref 3.8–10.5)

## 2024-07-09 PROCEDURE — 63700000 HC SELF-ADMINISTRABLE DRUG: Performed by: STUDENT IN AN ORGANIZED HEALTH CARE EDUCATION/TRAINING PROGRAM

## 2024-07-09 PROCEDURE — 71045 X-RAY EXAM CHEST 1 VIEW: CPT

## 2024-07-09 PROCEDURE — 85027 COMPLETE CBC AUTOMATED: CPT | Performed by: STUDENT IN AN ORGANIZED HEALTH CARE EDUCATION/TRAINING PROGRAM

## 2024-07-09 PROCEDURE — 99239 HOSP IP/OBS DSCHRG MGMT >30: CPT | Performed by: STUDENT IN AN ORGANIZED HEALTH CARE EDUCATION/TRAINING PROGRAM

## 2024-07-09 PROCEDURE — 83735 ASSAY OF MAGNESIUM: CPT | Performed by: STUDENT IN AN ORGANIZED HEALTH CARE EDUCATION/TRAINING PROGRAM

## 2024-07-09 PROCEDURE — 36415 COLL VENOUS BLD VENIPUNCTURE: CPT | Performed by: STUDENT IN AN ORGANIZED HEALTH CARE EDUCATION/TRAINING PROGRAM

## 2024-07-09 PROCEDURE — 80048 BASIC METABOLIC PNL TOTAL CA: CPT | Performed by: STUDENT IN AN ORGANIZED HEALTH CARE EDUCATION/TRAINING PROGRAM

## 2024-07-09 PROCEDURE — 99231 SBSQ HOSP IP/OBS SF/LOW 25: CPT | Performed by: SURGERY

## 2024-07-09 RX ORDER — RIVAROXABAN 10 MG/1
10 TABLET, FILM COATED ORAL DAILY
Qty: 30 TABLET | Refills: 0 | Status: SHIPPED | OUTPATIENT
Start: 2024-07-10 | End: 2024-10-25 | Stop reason: HOSPADM

## 2024-07-09 RX ADMIN — FUROSEMIDE 20 MG: 20 TABLET ORAL at 08:14

## 2024-07-09 RX ADMIN — METOPROLOL SUCCINATE 100 MG: 100 TABLET, EXTENDED RELEASE ORAL at 08:14

## 2024-07-09 RX ADMIN — POLYETHYLENE GLYCOL 3350 17 G: 17 POWDER, FOR SOLUTION ORAL at 08:14

## 2024-07-09 ASSESSMENT — COGNITIVE AND FUNCTIONAL STATUS - GENERAL
WALKING IN HOSPITAL ROOM: 3 - A LITTLE
CLIMB 3 TO 5 STEPS WITH RAILING: 3 - A LITTLE
STANDING UP FROM CHAIR USING ARMS: 3 - A LITTLE
MOVING TO AND FROM BED TO CHAIR: 3 - A LITTLE

## 2024-07-09 NOTE — DISCHARGE INSTRUCTIONS
You were admitted for shortness of breath and found to have fluid around your lung.  You required a procedure to remove the fluid in your lung and have a biopsy done.  You now have a Pleurx catheter that will need training in order to prevent further accumulation of fluid around your lung.  You follow-up with the cardiothoracic surgeon regarding management of this tube.  Please make sure you follow-up with your primary care doctor and I have placed a referral for an oncologist to go over the biopsy results in case cancer is found.  Please make sure you take your medications as noted in your discharge instructions.  Follow up with your PCP within 1 week of discharge to reassess how you're doing.    Thoracic Surgery Discharge Instructions:  If you are a current smoker….Stop!!  We Discourage VAPING as we do not know the long  term effects of doing it.     Keep you bandage on for two days after chest tube removal.  ·       Once the dressings are removed, small amounts of straw colored fluid may come from the drain sites.  This is normal.  Should the fluid turn purulent, please contact us immediately.  ·       If you are sent home with a drain, please check for an air leak every morning.  Call the office daily and update us on the air leak status.  Hygiene:  Shower Daily, wash wounds with a mild soap and water.  Pat Dry.    ·       Avoid soaps with abrasive agents or abrasive sponges.  ·       No Baths or Swimming until wound is completely healed, and you are cleared by the Surgeon.  Gradually increase activity.    ·       Ambulate at least 4 times daily (In this case, the more you walk, the faster you recover)  ·       Continue using incentive spirometry for the next two weeks.  ·       Keep your lifting limited to less than 8 lbs for 1 month (roughly equivalent to 1 gallon of milk).  Pleurx catheter:  ·      You had placement of a pleurx catheter to be able to drain the fluid from the chest when it accumulates. You  will be sent home with kits and you can drain three times a week or sooner if needed pending shortness or breath/symptoms.      Temperatures:  Low grade temperatures are to be expected.       Follow-up Studies  ·       If you are given a prescription for a follow-up study, please have them completed prior to your visit  ·       Laboratory Studies should be completed 24hrs in advance  ·       Radiographic studies done at any [facility] center can be done the same day.  ·       Radiographic studies done outside of [facility], can be completed up to the day of the appointment, but you will need to bring a copy of the study on a disc. (Not just the report)         When Should You Call?  ·       Please call during normal office hours for routine questions.  Please call at any time of the day for: [phone] Call office for follow up appt in about one week and they will help you arrange where to go for your chest xray you need to get done prior to your visit   ·       Purulent drainage from your incision or drain sites.  ·       Increasing Redness around your incisions  ·       For temperatures > 101deg F.  ·       Loss of Appetite  ·       Evolving malaise  ·       Increasing Pain at the Incision at the Drain or Incision Site     Please DO NOT DRIVE while taking narcotic pain medications  Please do not lift > 8 lbs (1 gallon of milk), until cleared by the Thoracic Surgery Team

## 2024-07-09 NOTE — PROGRESS NOTES
"ONCOLOGY Progress Note    Subjective    Patient had VATS, right pleurex catheter insertion, and right pleural biopsy done 7/8/2024.   Pathology pending.    Objective  Vital signs in last 24 hours:  Temp:  [36.1 °C (97 °F)-36.7 °C (98.1 °F)] 36.4 °C (97.6 °F)  Heart Rate:  [68-85] 76  Resp:  [14-20] 18  BP: (100-140)/(55-73) 131/62    Physical Exam:  Visit Vitals  /62 (BP Location: Right upper arm, Patient Position: Lying)   Pulse 76   Temp 36.4 °C (97.6 °F) (Oral)   Resp 18   Ht 1.702 m (5' 7\")   Wt 70.1 kg (154 lb 9.6 oz)   SpO2 95%   BMI 24.21 kg/m²       General appearance: alert, no acute distress. Respirations non-labored on RA. Non-toxic  Eyes: Anicteric.   Lungs: clear,; no increased work of breathing  Right chest tube to water seal with minimal dge; right pleurex noted.  Heart: regular rate and rhythm and S1, S2 normal  Abdomen: soft, non-tender; ostomy left abd with very small amount of brown stool.  Extremities: No BLE edema or calf tenderness  Skin: warm and dry  Neurologic: Grossly normal    Medications:      acetaminophen, 650 mg, oral, q4h PRN    albuterol HFA, 2 puff, inhalation, q6h PRN    atorvastatin, 20 mg, oral, Daily (6p)    calcium carbonate, 1,000 mg, oral, 2x daily PRN    cyanocobalamin, 1,000 mcg, intramuscular, q30 days    glucose, 16-32 g of dextrose, oral, PRN **OR** dextrose, 15-30 g of dextrose, oral, PRN **OR** glucagon, 1 mg, intramuscular, PRN **OR** dextrose 50 % in water (D50), 25 mL, intravenous, PRN    enoxaparin, 40 mg, subcutaneous, Daily (6p)    furosemide, 20 mg, oral, Daily    melatonin, 3 mg, oral, Nightly PRN    metoprolol succinate XL, 100 mg, oral, Daily    nitroglycerin, 0.4 mg, sublingual, q5 min PRN    pantoprazole, 40 mg, oral, Nightly    polyethylene glycol, 17 g, oral, Daily    Labs:  Results from last 7 days   Lab Units 07/09/24  0422 07/08/24  0715 07/07/24  0322   WBC K/uL 16.28* 8.27 10.05   HEMOGLOBIN g/dL 13.1* 13.6* 13.4*   HEMATOCRIT % 41.1 41.9 40.7 "   PLATELETS K/uL 313 282 309     Results from last 7 days   Lab Units 07/09/24  0422 07/08/24  0715 07/07/24  0322   SODIUM mEQ/L 132* 133* 133*   POTASSIUM mEQ/L 4.8 4.5 4.8   CHLORIDE mEQ/L 99 100 99   CO2 mEQ/L 25 26 27   BUN mg/dL 22 18 15   CREATININE mg/dL 0.8 0.8 0.7   GLUCOSE mg/dL 109* 94 99   CALCIUM mg/dL 9.2 9.0 9.1         Results from last 7 days   Lab Units 07/08/24  0420   INR  1.1       Microbiology Results       Procedure Component Value Units Date/Time    Anaerobic Culture / Smear (includes Aerobic Culture) Pleura, Right [[id number]] Collected: 07/08/24 1400    Specimen: Tissue from Pleura, Right Updated: 07/08/24 2017     Gram Stain Result 1+ WBC      No organisms seen    Body Fluid Culture/Smear Pleural Fluid, Right [[id number]] Collected: 07/02/24 0928    Specimen: Pleural Fluid, Right Updated: 07/06/24 0656     Culture No growth     Gram Stain Result 1+ WBC      No organisms seen    Fungal Stain Pleural Fluid, Right [[id number]] Collected: 07/02/24 0928    Specimen: Pleural Fluid, Right Updated: 07/02/24 1404     Fungus Stain No fungal elements seen    Fungal Culture Pleural Fluid, Right [[id number]]  (Normal) Collected: 07/02/24 0928    Specimen: Pleural Fluid, Right Updated: 07/03/24 1402     Fungal Culture No Fungus Isolated to Date    AFB stain Pleural Fluid, Right [[id number]]  (Normal) Collected: 07/02/24 0928    Specimen: Pleural Fluid, Right Updated: 07/03/24 1347     AFB Stain No acid fast bacilli seen    AFB Culture Pleural Fluid, Right [[id number]]  (Normal) Collected: 07/02/24 0928    Specimen: Pleural Fluid, Right Updated: 07/04/24 1100     AFB Culture No Acid Fast Bacilli Isolated to Date    Body Fluid Culture/Smear Pleural Fluid, Right [[id number]] Collected: 06/17/24 1410    Specimen: Pleural Fluid, Right Updated: 06/21/24 1212     Culture No growth at 96 hours     Gram Stain Result 1+ WBC      No organisms seen    Fungal Stain Pleural Fluid, Right [[id number]] Collected: 06/17/24 1410     Specimen: Pleural Fluid, Right Updated: 06/18/24 0648     Fungus Stain No fungal elements seen    Fungal Culture Pleural Fluid, Right [[id number]]  (Normal) Collected: 06/17/24 1410    Specimen: Pleural Fluid, Right Updated: 06/18/24 2300     Fungal Culture No Fungus Isolated to Date    AFB stain Pleural Fluid, Right [[id number]]  (Normal) Collected: 06/17/24 1410    Specimen: Pleural Fluid, Right Updated: 06/18/24 1514     AFB Stain No acid fast bacilli seen    AFB Culture Pleural Fluid, Right [[id number]]  (Normal) Collected: 06/17/24 1410    Specimen: Pleural Fluid, Right Updated: 06/19/24 1401     AFB Culture No Acid Fast Bacilli Isolated to Date    SARS-COV-2 (COVID-19)/ FLU A/B, AND RSV, PCR Nasopharynx [[id number]]  (Normal) Collected: 06/15/24 1247    Specimen: Nasopharyngeal Swab from Nasopharynx Updated: 06/15/24 1335     SARS-CoV-2 (COVID-19) Negative     Influenza A Negative     Influenza B Negative     Respiratory Syncytial Virus Negative    Narrative:      Testing performed using real-time PCR for detection of COVID-19. EUA approved validation studies performed on site.              Imaging:  No results found.         Assessment & Plan    * Pleural effusion on right  Assessment & Plan  Recurrent right pleural effusion.  Concern for malignancy.  -Patient was inpatient at [facility] 6/15 to 6/19/2024, SOB, right pleuritic chest pain  -R Thoracentesis 6/17/2024: 1.2L pleural fluid, exudative, negative cytology, culture.  Improved and he was discharged 6/19/2024.  -He returned to [facility] 7/1/2024 due to gradually increasing SOB with exertion and right chest pain worse when lying on the right side.   -Mild anorexia and 6 or 7 pound weight loss.  He has had a dry cough for several months.    -Hematology/Oncology consulted due to concern regarding possible malignancy  -CXR 7/1/2024 large right pleural effusion.  -Right thoracentesis 7/2/2024 for multiloculated effusion with 1.1L fluid removed.  -Pleural fluid: ,  pH 7.5, protein <3.   (N 12, L79, M8, Eos 1). Cultures NGSF. Cytology 7/2 negative; flow negative  -CT chest 7/2/2024 moderate right pleural effusion, pleural thickening and heterogeneity particularly at right lung base, malignancy not excluded; patchy consolidative changes right middle and lower lobes--superimposed pneumonia should be excluded; stable scattered pulmonary nodules largest in JESSE measuring up to 6 mm; mildly prominent mediastinal lymph nodes measuring up to 9 mm.  -LDH-161 not elevated  -CEA- 3.1 --minimal elevation (nonsmoker)  -Right pleural pigtail inserted 7/3/2024 --intrapleural fibrinolysis completed    -Appreciate pulmonologist input and thoracic surgery input  -Cytology, flow cytometry of right pleural fluid from 7/2/2024 negative for malignancy  -Completed 6 doses tPA/dornase instillations (as recommended by thoracic surgery)   -Repeat CT  7/7/24 decrease right pleural effusion post drainage. Right pigtail catheter pleural space. Small right hydropneumothorax.  -Patient is nonsmoker, consider CTDs with serositis in DDX. Pleural fluid diff predominantly lymphocytic suggesting lymphoma vs autoimmune etiology    -VATS, right pleural biopsy, right pleurex catheter 7/8/2024. Pathology pending  -Will arrange prompt outpatient follow up ASAP after discharge to review pathology and arrange treatment  -Home RN needed to manage pleurex catheter  -Consider outpatient PET/CT when discharged        Constipation  Assessment & Plan  Patient has decreased output from colostomy  -Miralax daily  -May need to increase his bowel regimen  -defer to Hospitalist Attending    Anorexia  Assessment & Plan  Patient has had anorexia with 6 to 7 pound weight loss.  Workup to rule out malignancy is in progress  -Protonix 40 mg nightly  -Nutrition consult  -Encourage supplements        Normocytic anemia  Assessment & Plan  Normocytic anemia.  -Hemoglobin 13.5.  MCV 90.1.  Creatinine 0.9.  -Patient receives B12  injections every 30 days.  -Monitor. Hgb stable        Lab Results   Component Value Date    YOFKQXSF03 >1,500 (H) 07/02/2024     Lab Results   Component Value Date    FOLATE 11.5 07/02/2024     Lab Results   Component Value Date    IRON 40 07/02/2024    TIBC 232 (L) 07/02/2024    FERRITIN 222 07/02/2024     % sat 17    Pulmonary nodules  Assessment & Plan  See Pleural effusion    History of rectal cancer  Assessment & Plan  Rectal cancer  -Patient was followed at [facility], T2 N1 M0 rectal cancer  -Neoadjuvant chemotherapy and radiotherapy followed by surgery (APR), then had multiple complications after the surgery, then recovered and had 4 months of adjuvant chemotherapy postoperatively and has not had known recurrence of his rectal cancer.    -Surveillance colonoscopy 8/11/2022 revealed hyperplastic polyp in the transverse colon with pathology revealing tubular adenoma.  -Ostomy functioning  -CEA 3.1    DVT (deep venous thrombosis) (CMS/HCC)  Assessment & Plan  LLE DVT and PE in 3/2019, on therapeutic dose Xarelto for 6 months then changed to prophylactic dose, then had evidence of DVT in 4/2020, unclear if acute DVT but anticoagulation was changed to Eliquis then resumed Xarelto.    -Xarelto on hold pending completion of invasive procedures  -Resume Xarelto 7/10 -- per Surgery  -DVT prophylactic dose enoxaparin 40 mg daily has been ordered in interim    CAD (coronary artery disease)  Assessment & Plan  Patient takes atorvastatin and metoprolol            VALENCIA Hua  7/9/2024  [facility]cology [facility] Hemat[facility] ([facility])  [phone] office

## 2024-07-09 NOTE — ASSESSMENT & PLAN NOTE
Likely reactive after VATS and Pleurx placement.  No concern for infection at this time.  Follow-up with PCP outpatient

## 2024-07-09 NOTE — PROGRESS NOTES
[facility] Received referral from Moira CLARK. I spoke with Pt at bedside, Pt agreeable to home SN. I spoke with Latricia Pt RN, she will send Pt home with a few pleurx kits. Referral completed for 7/10/24 home care contact. Lyubov Peguero RN

## 2024-07-09 NOTE — PLAN OF CARE
Care Coordination Discharge Plan Summary    Admission Assessment Summary    General Information  Readmission Within the last 30 days: current reason for admission unrelated to previous admission  Does patient have a : No  Patient-Specific Goals (include timeframe): Safe and sustainable discharge when medically stable.    Living Arrangements  Arrived From: home  Current Living Arrangements: home  People in Home: friend(s)  Home Accessibility:    Living Arrangement Comments: Mr. Arredondo resides with several others who belong to the same [employer] order.  He has known many of them for over 50 years.    Social Determinants of Health - Screenings  Housing Stability  In the last 12 months, was there a time when you were not able to pay the mortgage or rent on time?: No  In the last 12 months, how many places have you lived?: 1  In the last 12 months, was there a time when you did not have a steady place to sleep or slept in a shelter (including now)?: No  Utility Access  In the past 12 months has the electric, gas, oil, or water company threatened to shut off services in your home?: No  Transportation Needs  In the past 12 months, has lack of transportation kept you from medical appointments or from getting medications?: No  In the past 12 months, has lack of transportation kept you from meetings, work, or from getting things needed for daily living?: No    Functional Status Prior to Admission  Assistive Device/Animal Currently Used at Home: grab bar, shower chair  Functional Status Comments: Mr. Arredondo is independent with ADLs and mobility.  He does not use an assistive device to support his ambulation  IADL Comments:      Discharge Needs Assessment    Concerns to be Addressed: other (see comments) (Pt states doctors at [facility] referred him to doctors that did not accept his insurance. BENJY Osborne was informed of pt's concerns r/t specialist not in network with [employer].)  Current  Discharge Risk: chronically ill  Anticipated Changes Related to Illness: none    Discharge Plan Summary    Patient Choice  Offered/Gave Vendor List: yes  Patient's Choice of Community Agency(s): [facility]-  Patient and/or patient guardian/advocate was made aware of their right to choose a provider. A list of eligible providers was presented and reviewed with the patient and/or patient guardian/advocate in written and/or verbal form. The list delineates providers in the patient’s desired geographic area who are participating in the Medicare program and/or providers contracted with the patient’s primary insurance. The Medicare list and quality ratings were obtained from the Medicare.gov [medicare.gov] website.    Concerns / Comments: Pt discussed during progression rounds. Pt for DC today. Quita informed by BRYON Millard pt needs HH for VRN. Quita met with pt to discuss HH. Pt agreeable and interested in [facility]-. Quita spoke Ambika with [facility] to refer pt to their services.    Discharge Plan:  Disposition/Destination: Home Health Care - [facility] / Home       Connection to Community  Not applicable  Community Resources:      Discharge Transportation:  Is Out of Hospital DNR needed at Discharge: no  Does patient need discharge transport? No, friend will transport    Addendum 1320: Dr. Valladares informed quita pt to DC today pending cardiothoracic surgery clears him. Quita met with pt to confirm his ride home. Pt reported his friend Eliezer can pick him up later this afternoon.     DC Plans  Home with [id number] made  Friends will transport

## 2024-07-09 NOTE — DISCHARGE SUMMARY
Hospital Medicine Service -  Inpatient Discharge Summary        BRIEF OVERVIEW   Patient: Dave Arredondo (1935)    Admitting Provider: Lauren Man MD  Attending Provider: No att. providers found Attending phys phone: N/A    PCP: Harmony Dupree Pcp [phone]    Admission Date: 7/1/2024  Discharge Date: 7/9/2024     DISCHARGE DIAGNOSES      Primary Discharge Diagnosis  Pleural effusion on right    Secondary Discharge Diagnoses  Active Hospital Problems    Diagnosis Date Noted    Pleural effusion on right 06/15/2024     Priority: High    History of rectal cancer 06/15/2024     Priority: Medium    DVT (deep venous thrombosis) (CMS/HCC) 04/25/2020     Priority: Medium    Leukocytosis 07/09/2024    Constipation 07/07/2024    Hyponatremia 07/05/2024    Normocytic anemia 07/02/2024    Anorexia 07/02/2024    Pulmonary nodules 06/19/2024    Right-sided chest pain 06/18/2024    CAD (coronary artery disease) 04/25/2020      Resolved Hospital Problems   No resolved problems to display.       Problem List on Day of Discharge  * Pleural effusion on right  Assessment & Plan  Reason for presentation.  Presenting as shortness of breath  S/p thoracentesis x 2 with negative cytology   S/p pigtail placement 7/4  and TPA/dornase x 6 doses with follow up CT scan on 7/7 showing decrease in size of effusion, small R hydropneumothorax noted.   S/p VATS/pleural biopsy and Pleurex placement on 7/8     History of rectal cancer  Assessment & Plan  In 2010 per patient, cared for at [facility]   S/p colostomy, chemo and radiation   With recurrent exudative pleural effusion and CT scan findings concern for malignancy   VATS/pleural biopsy and Pleurx placement 7/8... Will follow-up with CT surgery and oncology outpatient to go over results and management of the Pleurx    DVT (deep venous thrombosis) (CMS/HCC)  Assessment & Plan  History of DVT/PE   On Xarelto as OP, per surgery can be started on 7/10    Leukocytosis  Assessment &  Plan  Likely reactive after VATS and Pleurx placement.  No concern for infection at this time.  Follow-up with PCP outpatient    Constipation  Assessment & Plan  In a patient with colostomy  Resolved, soft brown stool present in colostomy today     Hyponatremia  Assessment & Plan  Mild and stable at 133   Asymptomatic   Follow-up with PCP    Normocytic anemia  Assessment & Plan  Hemoglobin stable and close to baseline  Having some bloody discharge from the chest tube but otherwise denies obvious bleeding  Monitor daily CBC    Pulmonary nodules  Assessment & Plan  6/18 CAT scan chest revealed pulmonary nodules  CT chest 7/2 showing stable pulm nodules, largest JESSE up to 6mm   Follow-up outpatient with PCP and oncology      Right-sided chest pain  Assessment & Plan  resolved  Secondary to pleural effusion/ chest tube       CAD (coronary artery disease)  Assessment & Plan  Patient with presumed CAD based on abnormal stress test in 2018, declined cardiac cath   Continue statin therapy         SUMMARY OF HOSPITALIZATION      Presenting Problem/History of Present Illness  This is a 88 y.o. year-old male admitted on 7/1/2024 with acute shortness of breath due to Pleural effusion on right.    Hospital Course    See assessment and plan as above.    Exam on Day of Discharge  Physical Exam:  General: NAD, sitting up in bed, right-sided Pleurx catheter dressing clean, dry and intact  HEENT: atraumatic, no scleral icterus  Cardiovascular: RRR, no murmurs  Pulmonary: Decreased breath sounds at the right lung base.  Breathing comfortably on room air  GI: Soft, nontender, nondistended,  + colostomy bag  Ext: no trace pedal edema  Neuro: alert and oriented to person, place, and time; no sensory or motor deficits        Consults During Admission  IP CONSULT TO SOCIAL WORK  IP CONSULT TO PULMONOLOGY/SLEEP MEDICINE  IP CONSULT TO HEMATOLOGY/ONCOLOGY  IP CONSULT TO THORACIC SURGERY  IP CONSULT TO IV TEAM    DISCHARGE MEDICATIONS                Medication List        CHANGE how you take these medications      XARELTO 10 mg tablet  Start taking on: July 10, 2024  Take 1 tablet (10 mg total) by mouth daily.  Dose: 10 mg  Generic drug: rivaroxaban  What changed: See the new instructions.            CONTINUE taking these medications      albuterol HFA 90 mcg/actuation inhaler  INHALE 2 PUFF USING INHALER EVERY EIGHT HOURS AS NEEDED     atorvastatin 20 mg tablet  Commonly known as: LIPITOR  Take 20 mg by mouth daily.  Dose: 20 mg     cyanocobalamin 1,000 mcg/mL injection  Commonly known as: VITAMIN B-12  Inject 1,000 mcg into the shoulder, thigh, or buttocks every 30 (thirty) days.  Dose: 1,000 mcg     furosemide 20 mg tablet  Commonly known as: LASIX  Take 20 mg by mouth daily.  Dose: 20 mg     metoprolol succinate  mg 24 hr tablet  Commonly known as: TOPROL-XL  Take 100 mg by mouth daily.  Dose: 100 mg     nitroglycerin 0.4 mg SL tablet  Commonly known as: NITROSTAT  Place 0.4 mg under the tongue every 5 (five) minutes as needed.  Dose: 0.4 mg     pantoprazole 40 mg EC tablet  Commonly known as: PROTONIX  Take 1 tablet (40 mg total) by mouth nightly Indications: GI ppx w/Xarelto.  Dose: 40 mg                    PROCEDURES / LABS / IMAGING      Procedures  Pleurx catheter placed on 7/8    Pertinent Labs  Results from last 7 days   Lab Units 07/09/24  0422   WBC K/uL 16.28*   HEMOGLOBIN g/dL 13.1*   HEMATOCRIT % 41.1   PLATELETS K/uL 313     Results from last 7 days   Lab Units 07/09/24  0422   SODIUM mEQ/L 132*   POTASSIUM mEQ/L 4.8   CHLORIDE mEQ/L 99   CO2 mEQ/L 25   BUN mg/dL 22   CREATININE mg/dL 0.8   GLUCOSE mg/dL 109*   CALCIUM mg/dL 9.2           Pertinent Imaging  X-RAY CHEST 1 VIEW    Result Date: 7/9/2024  IMPRESSION: Interval removal of right apical chest tube with persistent loculated pneumothorax at the right lung base, not significantly changed compared to earlier study of the same day. Stable atelectasis at the medial right lung  base. New blunting of the left costophrenic angle, which may represent a trace left pleural effusion.    X-RAY CHEST 1 VIEW    Result Date: 7/9/2024  IMPRESSION:Right pneumothorax again seen on this limited non erect examination. Repeat imaging with the patient erect or in the left lateral decubitus positioning can consider more complete assessment is desired. Actionable Finding: Follow up should be considered. COMMENT:Frontal portable semierect view of the chest was performed. Chest tube tips again seen projecting over right lung apex and medial aspect of the right base. Increased conspicuity of the right basilar pneumothorax. Otherwise, lungs are grossly clear.    X-RAY CHEST 1 VIEW    Result Date: 7/9/2024  IMPRESSION:Right pneumothorax on this limited non erect examination. Repeat imaging with the patient erect or in the left lateral decubitus positioning can consider more complete assessment is desired. COMMENT:Frontal portable symmetric view of the chest was compared with examinations dating back to July 7, 2024. Atheromatous and atherosclerotic changes of the imaged portions of the thoracic aorta. Chest tube tips project over the medial right lung base and right lung apex. There is a right basilar pneumothorax. Actionable Finding: Follow up should be considered.    CT CHEST WITH IV CONTRAST    Result Date: 7/7/2024  IMPRESSION: Interval decrease is size right pleural effusion post drainage. Right pigtail catheter within the right pleural space. Small right hydropneumothorax.     IR PLEURAL CATHETER PLACEMENT    Result Date: 7/3/2024  IMPRESSION:   Satisfactory 12 Upper sorbian right pigtail chest tube placement. COMMENT: PROCEDURE:  Chest tube placement ANESTHESIA:  Lidocaine 1% local. FLUORO TIME:  0.1 minutes. REFERENCE AIR KERMA: 0.9 mGy. CONTRAST:  None. MEDICATIONS:  None. DESCRIPTION OF PROCEDURE:    Written informed consent was obtained.  Ultrasound demonstrates complex, multiloculated right pleural effusion.   The patient was prepped and draped in the usual sterile manner.  Using real-time ultrasound guidance, a Yueh needle was inserted into the right pleural effusion.  Following serial dilation, a 12 Polish pigtail chest tube was inserted.  Serosanguineous fluid was aspirated.  The catheter was secured with suture and connected to Pleur-evac drainage.  The patient tolerated the procedure well without immediate complication.     ULTRASOUND GUIDED NEEDLE PLACEMENT    Result Date: 7/3/2024  IMPRESSION:   Satisfactory 12 Polish right pigtail chest tube placement. COMMENT: PROCEDURE:  Chest tube placement ANESTHESIA:  Lidocaine 1% local. FLUORO TIME:  0.1 minutes. REFERENCE AIR KERMA: 0.9 mGy. CONTRAST:  None. MEDICATIONS:  None. DESCRIPTION OF PROCEDURE:    Written informed consent was obtained.  Ultrasound demonstrates complex, multiloculated right pleural effusion.  The patient was prepped and draped in the usual sterile manner.  Using real-time ultrasound guidance, a Yueh needle was inserted into the right pleural effusion.  Following serial dilation, a 12 Polish pigtail chest tube was inserted.  Serosanguineous fluid was aspirated.  The catheter was secured with suture and connected to Pleur-evac drainage.  The patient tolerated the procedure well without immediate complication.     IR THORACENTESIS    Result Date: 7/3/2024  IMPRESSION: Successful ultrasound guided right thoracentesis with 1100 cc of fluid removed and  sent for laboratory studies, as requested COMMENT: The patient is referred for ultrasound guided thoracentesis on the right side. Informed consent was obtained. With the patient sitting, ultrasound imaging is performed and a moderately large multiloculated right pleural effusion is identified. The fluid is localized and a site is selected on the skin with ultrasound. The site is marked. Using sterile technique, under local anesthesia, the thoracentesis catheter is inserted into the pleural space. 1100 cc  of serosanguineous fluid are removed. The fluid spontaneously stopped draining. The catheter is removed and a sterile dressing is placed over the catheter insertion site. Vital signs were monitored throughout the procedure and remained stable. The fluid is discarded  sent for laboratory analysis, as requested. A dedicated CT chest was ordered and will be obtained and separately reported. This service rendered by Melissa Tsang PA-C. I certify that I have personally reviewed this examination and agree with Melissa Tsang's report. Quentin Ritter M.D.    CT CHEST WITHOUT IV CONTRAST    Result Date: 7/2/2024  IMPRESSION: Moderate right pleural effusion with areas of pleural thickening and heterogeneity particularly at the right lung base for which malignancy is not excluded. Patchy consolidative changes in the right middle and lower lobes for which superimposed pneumonia should be excluded clinically. Additional findings as above.    X-RAY CHEST 2 VIEWS    Result Date: 7/1/2024  IMPRESSION: Large right pleural effusion. COMMENT: PA and lateral radiographs the chest reveal a large right pleural effusion. Left lung is well-aerated. Cardiac silhouette is unchanged compared to 6/17/2024.    CT CHEST WITHOUT IV CONTRAST    Result Date: 6/18/2024  IMPRESSION: 1.  Moderate pleural effusion, decreased from the prior CT. 2.  Stable pulmonary nodules.    IR THORACENTESIS    Result Date: 6/17/2024  IMPRESSION: Successful ultrasound-guided right-sided thoracentesis with 1.2 L pleural fluid removed and sent to the lab.  All components of the time-out, debriefing, and handling of the specimen were conducted as per the ASAP Specimen Protocol. I certify that I have personally reviewed this examination and agree with Maria Antonia Bradley's report. Cj Yen M.D.    X-RAY CHEST 1 VIEW    Result Date: 6/17/2024  IMPRESSION: No pneumothorax.  Small right pleural effusion and right basilar airspace opacity. COMMENT: Comparison: Chest x-ray  6/15/2024. Technique: A single frontal AP portable upright projection of the chest was obtained. FINDINGS: There is a small right pleural effusion with right basilar airspace opacity. The left lung is clear.  There is no pneumothorax.  The cardiomediastinal silhouette is unchanged.  The upper abdomen is unremarkable.  There is no acute osseous abnormality.     Ultrasound venous arm right    Result Date: 6/15/2024  IMPRESSION: No evidence of right upper extremity deep vein thrombosis.     CT ANGIOGRAPHY CHEST PULMONARY EMBOLISM WITH IV CONTRAST    Result Date: 6/15/2024  IMPRESSION: development of a large right pleural effusion with right lower lobe collapse and partial atelectasis of the right middle and upper lobes centrally. Scattered small pulmonary nodules are again seen less than 6 mm the largest is linearly oriented in the left upper lobe. No CT evidence for proximal or segmental pulmonary arterial embolism.     X-RAY CHEST 2 VIEWS    Result Date: 6/15/2024  IMPRESSION: moderate right pleural effusion with basilar airspace opacity.  Fluid is likely to accurately into the fissures on the right as well.     OUTPATIENT  FOLLOW-UP / REFERRALS / PENDING TESTS        Outpatient Follow-Up Appointments  Encounter Information    This patient does not currently have any appointments scheduled.         Referrals  Orders Placed This Encounter   Procedures    Ambulatory referral to Hematology / Oncology     Standing Status:   Future     Standing Expiration Date:   1/9/2025     Referral Priority:   Routine     Referral Type:   Consultation     Referral Reason:   Specialty Services Required     Referred to Provider:   Jean-Pierre Meza Jr., MD     Requested Specialty:   Hematology and Oncology     Number of Visits Requested:   10       Test Results Pending at Discharge  Unresulted Labs (From admission, onward)       Start     Ordered    07/08/24 1402  Fungal Culture  PROCEDURE ONCE        Comments: Specimen A: Pre-op  diagnosis:  Pleural effusion on right [J90]     Question:  Release to patient  Answer:  Immediate    07/08/24 1401    07/08/24 1401  Fungal culture / smear Pleura, Right  RELEASE UPON ORDERING        Comments: Specimen A: Pre-op diagnosis:  Pleural effusion on right [J90]     Question:  Release to patient  Answer:  Immediate    07/08/24 1401    07/08/24 1401  AFB culture Pleura, Right  RELEASE UPON ORDERING        Comments: Specimen A: Pre-op diagnosis:  Pleural effusion on right [J90]     Question:  Release to patient  Answer:  Immediate    07/08/24 1401    07/02/24 0915  Fungal culture / smear Pleural Fluid, Right  (Thoracentesis Right Diagnostic with labs in IR)  Once        Question Answer Comment   Collect in IR Yes    Release to patient Immediate        07/02/24 0914    07/02/24 0915  AFB culture Pleural Fluid, Right  (Thoracentesis Right Diagnostic with labs in IR)  Once        Question Answer Comment   Release to patient Immediate    Collect in IR Yes        07/02/24 0914    07/02/24 0404  Extra Tubes  Once        Question Answer Comment   Light Blue No Labels    Red Top No Labels    Light Green Top No Labels    Lavender Top 1 Label    Yellow - Urine Tall No Labels    Pink Top No Labels    Gold Top 1 Label    Clear Top (Urine) No Labels    Urine Cup No Labels    Grey Top (Urine) No Labels        07/02/24 0404    07/01/24 1113  [place] Draw Panel  STAT        Question Answer Comment   Red Top No Labels    Gold Top No Labels    Light Blue 1 Label    Light Green Top No Labels    Lavender Top No Labels    Pink Top No Labels    Yellow - Urine Tall No Labels    Urine Culture Tube No Labels    Blood Culture No Labels    Release to patient Immediate        07/01/24 1112                    Important Issues to Address in Follow-Up  You were admitted for shortness of breath and found to have fluid around your lung.  You required a procedure to remove the fluid in your lung and have a biopsy done.  You now have a Pleurx  catheter that will need training in order to prevent further accumulation of fluid around your lung.  You follow-up with the cardiothoracic surgeon regarding management of this tube.  Please make sure you follow-up with your primary care doctor and I have placed a referral for an oncologist to go over the biopsy results in case cancer is found.  Please make sure you take your medications as noted in your discharge instructions.  Follow up with your PCP within 1 week of discharge to reassess how you're doing.    DISCHARGE DISPOSITION AND DESTINATION      Disposition: Home   Destination: Home                            Code Status At Discharge: Full Code    Spent more than 35 minutes in patient evaluation, family update, physical evaluation, and  coordination of discharge and care.       Physician Order for Life-Sustaining Treatment Document Status        No documents found                     This note was dictated using voice recognition software, and might contain typographical errors leading to some inaccuracies, if questions arise, please feel free to reach out.

## 2024-07-10 LAB
CASE RPRT: NORMAL
CLINICAL INFO: NORMAL
PATH REPORT.FINAL DX SPEC: NORMAL
PATH REPORT.GROSS SPEC: NORMAL

## 2024-07-11 NOTE — DISCHARGE NOTE PROVIDER - NSD[facility]_MEDRECSTATUS_GEN_ALL_CORE
Admission Reconciliation is Completed  Discharge Reconciliation is Completed PRINCIPAL DISCHARGE DIAGNOSIS  Diagnosis: Unilateral osteoarthritis of hip, left  Assessment and Plan of Treatment:

## 2024-07-13 LAB
GRAM STN SPEC: NORMAL
GRAM STN SPEC: NORMAL
MICROORGANISM SPEC CULT: NORMAL

## 2024-07-15 LAB — FUNGUS SPEC CULT: NORMAL

## 2024-07-18 DIAGNOSIS — J90 PLEURAL EFFUSION ON RIGHT: Primary | ICD-10-CM

## 2024-07-19 ENCOUNTER — OFFICE VISIT (OUTPATIENT)
Dept: THORACIC SURGERY | Facility: CLINIC | Age: 89
End: 2024-07-19
Payer: COMMERCIAL

## 2024-07-19 ENCOUNTER — HOSPITAL ENCOUNTER (EMERGENCY)
Facility: HOSPITAL | Age: 89
End: 2024-07-19
Payer: COMMERCIAL

## 2024-07-19 ENCOUNTER — HOSPITAL ENCOUNTER (EMERGENCY)
Facility: HOSPITAL | Age: 89
Discharge: HOME | End: 2024-07-19
Attending: PODIATRIST | Admitting: RADIOLOGY
Payer: COMMERCIAL

## 2024-07-19 ENCOUNTER — APPOINTMENT (OUTPATIENT)
Dept: RADIOLOGY | Facility: HOSPITAL | Age: 89
End: 2024-07-19
Payer: COMMERCIAL

## 2024-07-19 ENCOUNTER — HOSPITAL ENCOUNTER (EMERGENCY)
Facility: HOSPITAL | Age: 89
Discharge: HOME | End: 2024-07-19
Payer: COMMERCIAL

## 2024-07-19 VITALS
OXYGEN SATURATION: 95 % | SYSTOLIC BLOOD PRESSURE: 120 MMHG | TEMPERATURE: 98.7 F | DIASTOLIC BLOOD PRESSURE: 68 MMHG | RESPIRATION RATE: 20 BRPM | HEART RATE: 90 BPM | BODY MASS INDEX: 25.11 KG/M2 | WEIGHT: 160 LBS | HEIGHT: 67 IN

## 2024-07-19 VITALS
RESPIRATION RATE: 18 BRPM | SYSTOLIC BLOOD PRESSURE: 118 MMHG | HEART RATE: 75 BPM | HEIGHT: 67 IN | BODY MASS INDEX: 23.54 KG/M2 | OXYGEN SATURATION: 97 % | TEMPERATURE: 97.9 F | DIASTOLIC BLOOD PRESSURE: 70 MMHG | WEIGHT: 150 LBS

## 2024-07-19 DIAGNOSIS — J90 PLEURAL EFFUSION ON RIGHT: Primary | ICD-10-CM

## 2024-07-19 LAB
ANION GAP SERPL CALC-SCNC: 8 MEQ/L (ref 3–15)
BASOPHILS # BLD: 0.06 K/UL (ref 0.01–0.1)
BASOPHILS NFR BLD: 0.6 %
BUN SERPL-MCNC: 15 MG/DL (ref 7–25)
CALCIUM SERPL-MCNC: 9.5 MG/DL (ref 8.6–10.3)
CHLORIDE SERPL-SCNC: 100 MEQ/L (ref 98–107)
CO2 SERPL-SCNC: 27 MEQ/L (ref 21–31)
CREAT SERPL-MCNC: 0.8 MG/DL (ref 0.7–1.3)
DIFFERENTIAL METHOD BLD: ABNORMAL
EGFRCR SERPLBLD CKD-EPI 2021: >60 ML/MIN/1.73M*2
EOSINOPHIL # BLD: 0.31 K/UL (ref 0.04–0.54)
EOSINOPHIL NFR BLD: 3 %
ERYTHROCYTE [DISTWIDTH] IN BLOOD BY AUTOMATED COUNT: 14.3 % (ref 11.6–14.4)
GLUCOSE SERPL-MCNC: 138 MG/DL (ref 70–99)
HCT VFR BLD AUTO: 41.8 % (ref 40.1–51)
HGB BLD-MCNC: 13.3 G/DL (ref 13.7–17.5)
IMM GRANULOCYTES # BLD AUTO: 0.04 K/UL (ref 0–0.08)
IMM GRANULOCYTES NFR BLD AUTO: 0.4 %
LYMPHOCYTES # BLD: 0.71 K/UL (ref 1.2–3.5)
LYMPHOCYTES NFR BLD: 7 %
MCH RBC QN AUTO: 28.9 PG (ref 28–33.2)
MCHC RBC AUTO-ENTMCNC: 31.8 G/DL (ref 32.2–36.5)
MCV RBC AUTO: 90.7 FL (ref 83–98)
MONOCYTES # BLD: 1.24 K/UL (ref 0.3–1)
MONOCYTES NFR BLD: 12.2 %
NEUTROPHILS # BLD: 7.84 K/UL (ref 1.7–7)
NEUTS SEG NFR BLD: 76.8 %
NRBC BLD-RTO: 0 %
PDW BLD AUTO: 9.1 FL (ref 9.4–12.4)
PLATELET # BLD AUTO: 406 K/UL (ref 150–350)
POTASSIUM SERPL-SCNC: 4 MEQ/L (ref 3.5–5.1)
RBC # BLD AUTO: 4.61 M/UL (ref 4.5–5.8)
SODIUM SERPL-SCNC: 135 MEQ/L (ref 136–145)
TROPONIN I SERPL HS-MCNC: 9 PG/ML
WBC # BLD AUTO: 10.2 K/UL (ref 3.8–10.5)

## 2024-07-19 PROCEDURE — 93005 ELECTROCARDIOGRAM TRACING: CPT

## 2024-07-19 PROCEDURE — 80048 BASIC METABOLIC PNL TOTAL CA: CPT

## 2024-07-19 PROCEDURE — 84484 ASSAY OF TROPONIN QUANT: CPT

## 2024-07-19 PROCEDURE — 71046 X-RAY EXAM CHEST 2 VIEWS: CPT

## 2024-07-19 PROCEDURE — 85025 COMPLETE CBC W/AUTO DIFF WBC: CPT

## 2024-07-19 PROCEDURE — 36415 COLL VENOUS BLD VENIPUNCTURE: CPT

## 2024-07-19 PROCEDURE — 99024 POSTOP FOLLOW-UP VISIT: CPT | Performed by: SURGERY

## 2024-07-19 RX ORDER — TRAMADOL HYDROCHLORIDE 50 MG/1
50 TABLET ORAL EVERY 6 HOURS PRN
Qty: 10 TABLET | Refills: 0 | Status: SHIPPED | OUTPATIENT
Start: 2024-07-19 | End: 2024-10-25 | Stop reason: HOSPADM

## 2024-07-19 NOTE — ASSESSMENT & PLAN NOTE
Name: Kelsey Morejon  MRN: [id number]  Date of Service:  7/19/2024 07/19/24 0900   Restrictions/Precautions   Restrictions/Precautions Fall Risk;Isolation   General   Chart Reviewed Yes   Patient assessed for rehabilitation services? Yes   Additional Pertinent Hx RA, pacemaker, CAD, MI, DVT, CHF, neurogenic bladder, HTN, charcot's joint, psoriasis vulgaris, osteoporosis, hypothyroidism, cervical spinal stenosis   Diagnosis Acute cholecystitis   Subjective   Subjective Pt laying in bed, requires assistance to get dressed- agrees to participate in therapy.   Subjective   Subjective R back   Pain Faces: 2/10   Bed mobility   Rolling to Left Stand by assistance   Rolling to Right Stand by assistance   Supine to Sit Stand by assistance   Sit to Supine Moderate assistance   Scooting Moderate assistance;Maximal assistance;2 Person assistance  (Mod A for sideways on EOB to L and Max A X 2 for up in bed)   Bed Mobility Comments On L side of bed- use of bedrsail   Transfers   Sit to Stand Contact guard assistance  (In SS)   Stand to Sit Contact guard assistance  (From SS)   Activity Tolerance   Activity Tolerance Patient limited by endurance   Assessment   Assessment Pt limited to room activites due not wanting to get out of room in only hospital gown (getting a shower w/ OT at 11am). Pt able to perform aspects of bed mobility on hospital bed w/ rails requiring Max A X 2-SBA and is able to  SS requiring CGA.   Discharge Recommendations Continue to assess pending progress;24 hour supervision or assist;Patient would benefit from continued therapy after discharge   Safety Devices   Type of Devices Patient at risk for falls;Gait belt;Left in bed;Bed alarm in place;Call light within reach             Electronically signed by Anette Huitron PTA on 7/19/2024 at 4:26 PM    Patient has decreased output from colostomy  -Miralax daily  -May need to increase his bowel regimen  -defer to Hospitalist Attending

## 2024-07-19 NOTE — PROGRESS NOTES
Thoracic Surgery Note    Date:  7/19/2024    Date of Surgery / Procedure:   7/8/24, right VATS pleural biopsy and insertion of pleurx catheter     CC:routine surgical follow-up    HPI:Dave Arredondo is a 88 y.o. male with history of rectal cancer status postresection now with an end colostomy readmitted with a recurrent loculated pleural effusion cytology negative x 3 with unclear etiology, pigtail catheter placed tPA dornase given with significant improvement in effusion but still no clear cause presents to the OR for VATS pleural biopsy.    He was taken to the OR on 7/8/24 and underwent a right VATS pleural biopsy and insertion of pleurx catheter. Pathology showed benign pleural tissue with inflammation.  He was scheduled today for follow-up, but presented to the ED on accident because that is how he presented before. He was admitted and then left there to come to our office to be seen. He had an Xray in the ED. He has dyspnea occasionally and he is having some pain around the pleurx which is not controlled with tylenol alone. He denies fevers, chills, sweats and cough.       Objective:    BP Temp Pulse Resp SpO2   118/70 (07/19/24 1528)  36.6 °C (97.9 °F) (07/19/24 1528)  75 (07/19/24 1528) 18 (07/19/24 1528)  97 % (07/19/24 1528)      Exam:  Respiratory: Normal expansion.  Clear to auscultation.  No rales, rhonchi, or wheezing.  Cardiac: Heart sounds are normal.   Edema: No lower extremity edema present..   Abdomen:soft, nontender, and non-distended.   Incision: healing well, no significant drainage, no dehiscence    Current Meds:    albuterol HFA    atorvastatin    cyanocobalamin    furosemide    metoprolol succinate XL    nitroglycerin    pantoprazole    XARELTO    Imaging:    Independent review of most recent imaging and all relevant previous imaging was compared with the reading of the attending radiologist. Significant findings are as below:    Chest xray:  Mild right effusion with pleurx in place.       Assessment/Plan   Dave Arredondo is a 89 y/o history of rectal cancer status postresection now with an end colostomy readmitted with a recurrent loculated pleural effusion cytology negative x 3 with unclear etiology. S/p right VATS pleural biopsy and insertion of pleurx catheter on 7/8/24.  Pathology showed benign pleural tissue with inflammation. He is doing well overall. His homecare nurse drained 250cc of fluid this morning. On 7/15 drained 10cc, and on 7/11 drained 350cc. His CXR today shows a mild right effusion with pleurx in place.     His nurse is coming back out on Thursday and I asked him to continue to record the drainage amounts. Once he drains 50cc or less in 3 consecutive drainage we can remove the pleurx. I will also prescribe tramadol for pain as needed. He will call the office with any questions or concerns.     Thank you for allowing us to participate in the care of your patient.    Merline Young PA-C  [facility]  Medical Office [facility], [address]  [address] [place] [address]  [phone]      This document was generated using voice recognition software. Please disregard any inadvertent, and unrecognized discrepancies. Should there be any confusion relating to any discrepancy, please telephone for clarification.

## 2024-07-20 LAB
ATRIAL RATE: 79
P AXIS: 38
PR INTERVAL: 244
QRS DURATION: 86
QT INTERVAL: 376
QTC CALCULATION(BAZETT): 431
R AXIS: 13
T WAVE AXIS: 42
VENTRICULAR RATE: 79

## 2024-07-20 PROCEDURE — 93010 ELECTROCARDIOGRAM REPORT: CPT | Performed by: STUDENT IN AN ORGANIZED HEALTH CARE EDUCATION/TRAINING PROGRAM

## 2024-07-25 ENCOUNTER — HOSPITAL ENCOUNTER (EMERGENCY)
Facility: HOSPITAL | Age: 89
Discharge: STILL ADMITTED OR ADMINISTRATIVE | End: 2024-07-25
Payer: COMMERCIAL

## 2024-07-29 LAB — FUNGUS SPEC CULT: NORMAL

## 2024-07-29 NOTE — ED ADULT NURSE NOTE - CHPI ED NUR DURATION
[FreeTextEntry1] : 77M w CAD/CABG 2014, HFpEF hx of cva htn hld presents for f/u Sent in by: VINICIUS PMD:  Previously, pt seen 9/23 for an initial eval. pt with prolonged inpt course SHAYNE 11/2022, found unresponsive, had a cardiac arrest (per son, son found him at home minimally responsive and diaphoretic. Checked his FS which was in the 30s. Brought him to the ER where he seized and was believed to have VF arrest) with CPR and subsequent ROSC after 5 min. TTE showed Takotsubo cardiomyopathy. EF 50-55%, LVH, mild pHTN. Pt also with tonic clonic seizures post-arrest. s/p trach and PEG on 12/5, course also c/b ATN, shock liver +LUE DVT, tx w/ Eliquis, s/p multiple infections (Enterococcal faecalis cellulitis on 12/12, infected left hand hematoma status post I&D by plastics on 12/13, tracheobronchitis, sacral decubs s/p debridement. tracheostomy removed 3/7. PEG removed. pts son states they tried to introduce entresto and pt became very hypotensive and dizzy and it was stopped. pt currently lives in [facility] rehab. at last visit, pt feeling ok. euvolemic on exam. pt with PPM, sent to device clinic. Device was checked, with normal BiV PPM functioning. cont a/c. 1/24, feeling well. last seen 5/24, via telehealth per pt request. pt had an aspiration event, required inpt hospitalization, now feeling better. entresto started.   pt now present for outpatient cards care. today,  pt feeling ok, in wheelchair. currently comfortable appearing, states occasional CP symptoms, at rest. can last for a few seconds and then spontaneously resolves. no assoc sob.  Denies palpitations, diaphoresis, syncope, LE edema, orthopnea. no dizziness, no syncope, no recent falls[facility]  per son, pt mostly in wheelchair.    pt family agree with medical mgmt of cp and anginal symptoms  Exercise: physical therapy Diet: none  Prev cardiac history: see above Previous cardiac testing: Recent labs:  EKG: SR RBBB  Med hx: CAD/CABG 2014, HFpEF hx of cva htn hld Sx hx: none Family hx: no known cardiac hx Social hx: lives in [facility] rehab. tob/etoh/drugs Meds: asa 81 entresto 26-26 lopressor 25 lipitor 80 lasix 20 po jardiance eliquis 5 keppra primidone valproic doxazosin sertraline Allergies: nkda day(s)

## 2024-07-30 LAB — MYCOBACTERIUM SPEC CULT: NORMAL

## 2024-08-02 ENCOUNTER — HOSPITAL ENCOUNTER (OUTPATIENT)
Dept: RADIOLOGY | Facility: HOSPITAL | Age: 89
Discharge: HOME | End: 2024-08-02
Attending: SURGERY
Payer: COMMERCIAL

## 2024-08-02 ENCOUNTER — OFFICE VISIT (OUTPATIENT)
Dept: THORACIC SURGERY | Facility: CLINIC | Age: 89
End: 2024-08-02
Payer: COMMERCIAL

## 2024-08-02 VITALS
OXYGEN SATURATION: 95 % | HEART RATE: 100 BPM | RESPIRATION RATE: 18 BRPM | DIASTOLIC BLOOD PRESSURE: 80 MMHG | BODY MASS INDEX: 23.39 KG/M2 | HEIGHT: 67 IN | WEIGHT: 149 LBS | SYSTOLIC BLOOD PRESSURE: 118 MMHG | TEMPERATURE: 98.1 F

## 2024-08-02 DIAGNOSIS — J90 PLEURAL EFFUSION ON RIGHT: ICD-10-CM

## 2024-08-02 DIAGNOSIS — J90 PLEURAL EFFUSION ON RIGHT: Primary | ICD-10-CM

## 2024-08-02 PROCEDURE — 99024 POSTOP FOLLOW-UP VISIT: CPT | Performed by: SURGERY

## 2024-08-02 PROCEDURE — 71046 X-RAY EXAM CHEST 2 VIEWS: CPT

## 2024-08-02 NOTE — PROGRESS NOTES
Thoracic Surgery Note    Date:  8/2/2024    Date of Surgery / Procedure:   7/8/24, right VATS pleural biopsy and insertion of pleurx catheter     CC:Routine surgical follow-up    HPI:Dave Arredondo is a 88 y.o. male with history of rectal cancer status postresection now with an end colostomy readmitted with a recurrent loculated pleural effusion cytology negative x 3 with unclear etiology, pigtail catheter placed tPA dornase given with significant improvement in effusion but still no clear cause presents to the OR for VATS pleural biopsy.     He was taken to the OR on 7/8/24 and underwent a right VATS pleural biopsy and insertion of pleurx catheter. Pathology showed benign pleural tissue with inflammation.  He has dyspnea occasionally. He denies fevers, chills, sweats and cough.       Objective:    BP Temp Pulse Resp SpO2   118/80 (08/02/24 0949)  36.7 °C (98.1 °F) (08/02/24 0949)  100 (08/02/24 0949) 18 (08/02/24 0949)  95 % (08/02/24 0949)      Exam:  Respiratory: Normal expansion.  Clear to auscultation.  No rales, rhonchi, or wheezing.  Cardiac: Heart regular rate and rhythm.   Edema: No lower extremity edema present..   Abdomen:soft, nontender, and non-distended.   Incision: healing well, no significant drainage, no dehiscence. Pleurx is in place.     Current Meds:    albuterol HFA    atorvastatin    cyanocobalamin    furosemide    metoprolol succinate XL    nitroglycerin    traMADoL    XARELTO    pantoprazole    Imaging:    Independent review of most recent imaging and all relevant previous imaging was compared with the reading of the attending radiologist. Significant findings are as below:    Chest Xray:  Mild right pleural effusion, pleurx in place.      Assessment/Plan   Dave Arredondo is a 87 y/o history of rectal cancer status postresection now with an end colostomy readmitted with a recurrent loculated pleural effusion cytology negative x 3 with unclear etiology. S/p right VATS pleural biopsy and  insertion of pleurx catheter on 7/8/24.  Pathology showed benign pleural tissue with inflammation. He is doing well overall. The last 3 drainages were 50cc, 100cc and then 0cc. We will see him back for possible removal after he has 3 consecutive drainage amounts of less than 50cc. He will call the office with any questions or concerns. He should have a CXR prior to his appt.        Thank you for allowing us to participate in the care of your patient.    Merline Young PA-C  [facility]  Medical Office [facility], [address]  [address] [place] [address]  [phone]        This document was generated using voice recognition software. Please disregard any inadvertent, and unrecognized discrepancies. Should there be any confusion relating to any discrepancy, please telephone for clarification.

## 2024-08-05 LAB — FUNGUS SPEC CULT: NORMAL

## 2024-08-09 ENCOUNTER — OFFICE VISIT (OUTPATIENT)
Dept: THORACIC SURGERY | Facility: CLINIC | Age: 89
End: 2024-08-09
Payer: COMMERCIAL

## 2024-08-09 VITALS
HEIGHT: 67 IN | RESPIRATION RATE: 18 BRPM | BODY MASS INDEX: 23.64 KG/M2 | OXYGEN SATURATION: 92 % | DIASTOLIC BLOOD PRESSURE: 70 MMHG | TEMPERATURE: 98.3 F | SYSTOLIC BLOOD PRESSURE: 102 MMHG | WEIGHT: 150.6 LBS | HEART RATE: 70 BPM

## 2024-08-09 DIAGNOSIS — J90 PLEURAL EFFUSION ON RIGHT: Primary | ICD-10-CM

## 2024-08-09 PROCEDURE — 99024 POSTOP FOLLOW-UP VISIT: CPT | Performed by: SURGERY

## 2024-08-09 NOTE — PROGRESS NOTES
Thoracic Surgery Note    Date:  8/9/2024    Date of Surgery / Procedure:   7/8/24, right VATS pleural biopsy and insertion of pleurx catheter     CC: 7/8/2024  right VATS pleural biopsy insertion of Pleurx catheter    HPI: Dave Arredondo is a 88 y.o. male with history of rectal cancer status postresection now with an end colostomy readmitted with a recurrent loculated pleural effusion cytology negative x 3 with unclear etiology, pigtail catheter placed tPA dornase given with significant improvement in effusion but still no clear cause presents to the OR for VATS pleural biopsy.     He was taken to the OR on 7/8/24 and underwent a right VATS pleural biopsy and insertion of pleurx catheter. Pathology showed benign pleural tissue with inflammation.  He has dyspnea occasionally. He denies fevers, chills, sweats and cough.     Here today for removal of Pleurx catheter.    Objective:    BP Temp Pulse Resp SpO2   102/70 (08/09/24 0953)  36.8 °C (98.3 °F) (08/09/24 0953)  70 (08/09/24 0953) 18 (08/09/24 0953)  92 % (08/09/24 0953)      Exam:  Respiratory: Normal expansion.  Clear to auscultation.  No rales, rhonchi, or wheezing.  Cardiac: Heart regular rate and rhythm.   Chest: Pleurx catheter in place, no sign of infection  Edema: No lower extremity edema present..   Abdomen:soft, nontender, and non-distended.       Current Meds:    albuterol HFA    atorvastatin    cyanocobalamin    furosemide    metoprolol succinate XL    nitroglycerin    pantoprazole    traMADoL    XARELTO       Assessment/Plan   Dave Arredondo is a 89 y/o history of rectal cancer status postresection now with an end colostomy readmitted with a recurrent loculated pleural effusion cytology negative x 3 with unclear etiology. S/p right VATS pleural biopsy and insertion of pleurx catheter on 7/8/24.  Pathology showed benign pleural tissue with inflammation. He is doing well overall. His output for his last 3 drainages were less than 50cc. Lidocaine was  injected around the pleurx insertion site.  We removed the plerux catheter today and placed a dressing over the site that can be removed in 2 days. We will see him back as needed.       Thank you for allowing us to participate in the care of your patient.    Merline Young PA-C  [facility]  Medical Office [facility], [address]  [address] [place] [address]  [phone]        This document was generated using voice recognition software. Please disregard any inadvertent, and unrecognized discrepancies. Should there be any confusion relating to any discrepancy, please telephone for clarification.

## 2024-08-14 LAB — MYCOBACTERIUM SPEC CULT: NORMAL

## 2024-08-20 LAB — MYCOBACTERIUM SPEC CULT: NORMAL

## 2024-10-13 ENCOUNTER — APPOINTMENT (EMERGENCY)
Dept: RADIOLOGY | Facility: HOSPITAL | Age: 89
DRG: 186 | End: 2024-10-13
Payer: COMMERCIAL

## 2024-10-13 LAB
ALBUMIN SERPL-MCNC: 3.6 G/DL (ref 3.5–5.7)
ALP SERPL-CCNC: 90 IU/L (ref 34–125)
ALT SERPL-CCNC: 18 IU/L (ref 7–52)
ANION GAP SERPL CALC-SCNC: 10 MEQ/L (ref 3–15)
AST SERPL-CCNC: 16 IU/L (ref 13–39)
BASOPHILS # BLD: 0.02 K/UL (ref 0.01–0.1)
BASOPHILS NFR BLD: 0.2 %
BILIRUB SERPL-MCNC: 1.1 MG/DL (ref 0.3–1.2)
BUN SERPL-MCNC: 15 MG/DL (ref 7–25)
CALCIUM SERPL-MCNC: 9.7 MG/DL (ref 8.6–10.3)
CHLORIDE SERPL-SCNC: 100 MEQ/L (ref 98–107)
CO2 SERPL-SCNC: 25 MEQ/L (ref 21–31)
CREAT SERPL-MCNC: 0.7 MG/DL (ref 0.7–1.3)
DIFFERENTIAL METHOD BLD: ABNORMAL
EGFRCR SERPLBLD CKD-EPI 2021: >60 ML/MIN/1.73M*2
EOSINOPHIL # BLD: 0.03 K/UL (ref 0.04–0.54)
EOSINOPHIL NFR BLD: 0.3 %
ERYTHROCYTE [DISTWIDTH] IN BLOOD BY AUTOMATED COUNT: 14.7 % (ref 11.6–14.4)
GLUCOSE SERPL-MCNC: 151 MG/DL (ref 70–99)
HCT VFR BLD AUTO: 41.7 % (ref 40.1–51)
HGB BLD-MCNC: 14 G/DL (ref 13.7–17.5)
IMM GRANULOCYTES # BLD AUTO: 0.05 K/UL (ref 0–0.08)
IMM GRANULOCYTES NFR BLD AUTO: 0.5 %
LYMPHOCYTES # BLD: 0.59 K/UL (ref 1.2–3.5)
LYMPHOCYTES NFR BLD: 5.4 %
MCH RBC QN AUTO: 29.7 PG (ref 28–33.2)
MCHC RBC AUTO-ENTMCNC: 33.6 G/DL (ref 32.2–36.5)
MCV RBC AUTO: 88.3 FL (ref 83–98)
MONOCYTES # BLD: 1.23 K/UL (ref 0.3–1)
MONOCYTES NFR BLD: 11.3 %
NEUTROPHILS # BLD: 8.95 K/UL (ref 1.7–7)
NEUTS SEG NFR BLD: 82.3 %
NRBC BLD-RTO: 0 %
PLATELET # BLD AUTO: 205 K/UL (ref 150–350)
PMV BLD AUTO: 9.1 FL (ref 9.4–12.4)
POTASSIUM SERPL-SCNC: 3.8 MEQ/L (ref 3.5–5.1)
PROT SERPL-MCNC: 6.3 G/DL (ref 6–8.2)
RBC # BLD AUTO: 4.72 M/UL (ref 4.5–5.8)
SODIUM SERPL-SCNC: 135 MEQ/L (ref 136–145)
TROPONIN I SERPL HS-MCNC: 20 PG/ML
WBC # BLD AUTO: 10.87 K/UL (ref 3.8–10.5)

## 2024-10-13 PROCEDURE — 71046 X-RAY EXAM CHEST 2 VIEWS: CPT

## 2024-10-13 PROCEDURE — 83880 ASSAY OF NATRIURETIC PEPTIDE: CPT

## 2024-10-13 PROCEDURE — 80053 COMPREHEN METABOLIC PANEL: CPT

## 2024-10-13 PROCEDURE — 83690 ASSAY OF LIPASE: CPT

## 2024-10-13 PROCEDURE — 93005 ELECTROCARDIOGRAM TRACING: CPT

## 2024-10-13 PROCEDURE — 85025 COMPLETE CBC W/AUTO DIFF WBC: CPT

## 2024-10-13 PROCEDURE — 99285 EMERGENCY DEPT VISIT HI MDM: CPT | Mod: 25

## 2024-10-13 PROCEDURE — 36415 COLL VENOUS BLD VENIPUNCTURE: CPT

## 2024-10-13 PROCEDURE — 84484 ASSAY OF TROPONIN QUANT: CPT

## 2024-10-13 PROCEDURE — 83735 ASSAY OF MAGNESIUM: CPT

## 2024-10-14 ENCOUNTER — APPOINTMENT (INPATIENT)
Dept: RADIOLOGY | Facility: HOSPITAL | Age: 89
DRG: 186 | End: 2024-10-14
Attending: STUDENT IN AN ORGANIZED HEALTH CARE EDUCATION/TRAINING PROGRAM
Payer: COMMERCIAL

## 2024-10-14 ENCOUNTER — APPOINTMENT (INPATIENT)
Dept: RADIOLOGY | Facility: HOSPITAL | Age: 89
DRG: 186 | End: 2024-10-14
Payer: COMMERCIAL

## 2024-10-14 ENCOUNTER — HOSPITAL ENCOUNTER (INPATIENT)
Facility: HOSPITAL | Age: 89
LOS: 11 days | Discharge: NURSING, REHAB OR LONG-TERM CARE | DRG: 186 | End: 2024-10-25
Admitting: INTERNAL MEDICINE
Payer: COMMERCIAL

## 2024-10-14 ENCOUNTER — APPOINTMENT (EMERGENCY)
Dept: RADIOLOGY | Facility: HOSPITAL | Age: 89
DRG: 186 | End: 2024-10-14
Payer: COMMERCIAL

## 2024-10-14 DIAGNOSIS — K56.609 SBO (SMALL BOWEL OBSTRUCTION) (CMS/HCC): ICD-10-CM

## 2024-10-14 DIAGNOSIS — J90 PLEURAL EFFUSION ON RIGHT: ICD-10-CM

## 2024-10-14 DIAGNOSIS — I26.99 PE (PULMONARY THROMBOEMBOLISM) (CMS/HCC): Primary | ICD-10-CM

## 2024-10-14 PROBLEM — I51.89 GRADE I DIASTOLIC DYSFUNCTION: Status: ACTIVE | Noted: 2024-10-14

## 2024-10-14 PROBLEM — Z79.899 MEDICATION MANAGEMENT: Status: ACTIVE | Noted: 2024-10-14

## 2024-10-14 PROBLEM — E43 SEVERE MALNUTRITION (CMS/HCC): Status: ACTIVE | Noted: 2024-10-14

## 2024-10-14 LAB
ALBUMIN SERPL-MCNC: 3.8 G/DL (ref 3.5–5.7)
ALP SERPL-CCNC: 98 IU/L (ref 34–125)
ALT SERPL-CCNC: 19 IU/L (ref 7–52)
AMORPH CRY #/AREA URNS HPF: ABNORMAL /HPF
ANION GAP SERPL CALC-SCNC: 7 MEQ/L (ref 3–15)
APTT PPP: 161 SEC (ref 23–35)
APTT PPP: 23 SEC (ref 23–35)
APTT PPP: 47 SEC (ref 23–35)
APTT PPP: 86 SEC (ref 23–35)
AST SERPL-CCNC: 16 IU/L (ref 13–39)
ATRIAL RATE: 100
BACTERIA URNS QL MICRO: ABNORMAL /HPF
BILIRUB SERPL-MCNC: 1.1 MG/DL (ref 0.3–1.2)
BILIRUB UR QL STRIP.AUTO: NEGATIVE MG/DL
BNP SERPL-MCNC: 197 PG/ML
BUN SERPL-MCNC: 16 MG/DL (ref 7–25)
CALCIUM SERPL-MCNC: 10.1 MG/DL (ref 8.6–10.3)
CHLORIDE SERPL-SCNC: 98 MEQ/L (ref 98–107)
CLARITY UR REFRACT.AUTO: ABNORMAL
CO2 SERPL-SCNC: 31 MEQ/L (ref 21–31)
COLOR UR AUTO: YELLOW
CREAT SERPL-MCNC: 0.8 MG/DL (ref 0.7–1.3)
EGFRCR SERPLBLD CKD-EPI 2021: >60 ML/MIN/1.73M*2
ERYTHROCYTE [DISTWIDTH] IN BLOOD BY AUTOMATED COUNT: 15 % (ref 11.6–14.4)
FLUAV RNA SPEC QL NAA+PROBE: NEGATIVE
FLUBV RNA SPEC QL NAA+PROBE: NEGATIVE
GLUCOSE SERPL-MCNC: 162 MG/DL (ref 70–99)
GLUCOSE UR STRIP.AUTO-MCNC: NEGATIVE MG/DL
HCT VFR BLD AUTO: 47.6 % (ref 40.1–51)
HGB BLD-MCNC: 15.8 G/DL (ref 13.7–17.5)
HGB UR QL STRIP.AUTO: NEGATIVE
HYALINE CASTS #/AREA URNS LPF: ABNORMAL /LPF
INR PPP: 1
INR PPP: 1.1
KETONES UR STRIP.AUTO-MCNC: 1 MG/DL
LACTATE SERPL-SCNC: 1.2 MMOL/L (ref 0.4–2)
LEUKOCYTE ESTERASE UR QL STRIP.AUTO: NEGATIVE
LIPASE SERPL-CCNC: 31 U/L (ref 11–82)
MAGNESIUM SERPL-MCNC: 1.7 MG/DL (ref 1.8–2.5)
MCH RBC QN AUTO: 29.3 PG (ref 28–33.2)
MCHC RBC AUTO-ENTMCNC: 33.2 G/DL (ref 32.2–36.5)
MCV RBC AUTO: 88.1 FL (ref 83–98)
MUCOUS THREADS URNS QL MICRO: ABNORMAL /LPF
NITRITE UR QL STRIP.AUTO: NEGATIVE
P AXIS: 67
PH UR STRIP.AUTO: 7 [PH]
PLATELET # BLD AUTO: 209 K/UL (ref 150–350)
PMV BLD AUTO: 9 FL (ref 9.4–12.4)
POTASSIUM SERPL-SCNC: 4.2 MEQ/L (ref 3.5–5.1)
PR INTERVAL: 232
PROT SERPL-MCNC: 6.9 G/DL (ref 6–8.2)
PROT UR QL STRIP.AUTO: ABNORMAL
PROTHROMBIN TIME: 13 SEC (ref 12.2–14.5)
PROTHROMBIN TIME: 14 SEC (ref 12.2–14.5)
QRS DURATION: 82
QT INTERVAL: 344
QTC CALCULATION(BAZETT): 443
R AXIS: 39
RBC # BLD AUTO: 5.4 M/UL (ref 4.5–5.8)
RBC #/AREA URNS HPF: ABNORMAL /HPF
RSV RNA SPEC QL NAA+PROBE: NEGATIVE
SARS-COV-2 RNA RESP QL NAA+PROBE: NEGATIVE
SODIUM SERPL-SCNC: 136 MEQ/L (ref 136–145)
SP GR UR REFRACT.AUTO: >1.035
SQUAMOUS URNS QL MICRO: ABNORMAL /HPF
T WAVE AXIS: 69
TROPONIN I SERPL HS-MCNC: 18.5 PG/ML
UROBILINOGEN UR STRIP-ACNC: 2 EU/DL
VENTRICULAR RATE: 100
WBC # BLD AUTO: 13.35 K/UL (ref 3.8–10.5)
WBC #/AREA URNS HPF: ABNORMAL /HPF

## 2024-10-14 PROCEDURE — 85027 COMPLETE CBC AUTOMATED: CPT | Performed by: INTERNAL MEDICINE

## 2024-10-14 PROCEDURE — 99223 1ST HOSP IP/OBS HIGH 75: CPT | Performed by: INTERNAL MEDICINE

## 2024-10-14 PROCEDURE — 81001 URINALYSIS AUTO W/SCOPE: CPT

## 2024-10-14 PROCEDURE — 63600000 HC DRUGS/DETAIL CODE: Mod: JZ

## 2024-10-14 PROCEDURE — 85610 PROTHROMBIN TIME: CPT

## 2024-10-14 PROCEDURE — 1123F ACP DISCUSS/DSCN MKR DOCD: CPT | Performed by: INTERNAL MEDICINE

## 2024-10-14 PROCEDURE — 85730 THROMBOPLASTIN TIME PARTIAL: CPT

## 2024-10-14 PROCEDURE — 63600105 HC IODINE BASED CONTRAST: Mod: JZ

## 2024-10-14 PROCEDURE — 25000000 HC PHARMACY GENERAL: Performed by: INTERNAL MEDICINE

## 2024-10-14 PROCEDURE — 96374 THER/PROPH/DIAG INJ IV PUSH: CPT | Mod: 59

## 2024-10-14 PROCEDURE — 71275 CT ANGIOGRAPHY CHEST: CPT

## 2024-10-14 PROCEDURE — 20600000 HC ROOM AND CARE INTERMEDIATE/TELEMETRY

## 2024-10-14 PROCEDURE — 85730 THROMBOPLASTIN TIME PARTIAL: CPT | Performed by: STUDENT IN AN ORGANIZED HEALTH CARE EDUCATION/TRAINING PROGRAM

## 2024-10-14 PROCEDURE — 36415 COLL VENOUS BLD VENIPUNCTURE: CPT

## 2024-10-14 PROCEDURE — 63600000 HC DRUGS/DETAIL CODE: Mod: JZ | Performed by: INTERNAL MEDICINE

## 2024-10-14 PROCEDURE — 74018 RADEX ABDOMEN 1 VIEW: CPT

## 2024-10-14 PROCEDURE — 84484 ASSAY OF TROPONIN QUANT: CPT

## 2024-10-14 PROCEDURE — 74177 CT ABD & PELVIS W/CONTRAST: CPT

## 2024-10-14 PROCEDURE — 81003 URINALYSIS AUTO W/O SCOPE: CPT

## 2024-10-14 PROCEDURE — 87637 SARSCOV2&INF A&B&RSV AMP PRB: CPT

## 2024-10-14 PROCEDURE — 85610 PROTHROMBIN TIME: CPT | Performed by: INTERNAL MEDICINE

## 2024-10-14 PROCEDURE — 99222 1ST HOSP IP/OBS MODERATE 55: CPT | Performed by: SURGERY

## 2024-10-14 PROCEDURE — 80053 COMPREHEN METABOLIC PANEL: CPT | Performed by: INTERNAL MEDICINE

## 2024-10-14 PROCEDURE — 63600000 HC DRUGS/DETAIL CODE: Mod: JZ | Performed by: NURSE PRACTITIONER

## 2024-10-14 PROCEDURE — 93010 ELECTROCARDIOGRAM REPORT: CPT | Performed by: STUDENT IN AN ORGANIZED HEALTH CARE EDUCATION/TRAINING PROGRAM

## 2024-10-14 PROCEDURE — 99999 PR OFFICE/OUTPT VISIT,PROCEDURE ONLY: CPT | Performed by: STUDENT IN AN ORGANIZED HEALTH CARE EDUCATION/TRAINING PROGRAM

## 2024-10-14 PROCEDURE — 83605 ASSAY OF LACTIC ACID: CPT

## 2024-10-14 PROCEDURE — 0D9670Z DRAINAGE OF STOMACH WITH DRAINAGE DEVICE, VIA NATURAL OR ARTIFICIAL OPENING: ICD-10-PCS | Performed by: SURGERY

## 2024-10-14 PROCEDURE — 85730 THROMBOPLASTIN TIME PARTIAL: CPT | Performed by: INTERNAL MEDICINE

## 2024-10-14 PROCEDURE — 96365 THER/PROPH/DIAG IV INF INIT: CPT | Mod: 59

## 2024-10-14 RX ORDER — NITROGLYCERIN 0.4 MG/1
0.4 TABLET SUBLINGUAL EVERY 5 MIN PRN
Status: DISCONTINUED | OUTPATIENT
Start: 2024-10-14 | End: 2024-10-25 | Stop reason: HOSPADM

## 2024-10-14 RX ORDER — DEXTROSE, SODIUM CHLORIDE, SODIUM LACTATE, POTASSIUM CHLORIDE, AND CALCIUM CHLORIDE 5; .6; .31; .03; .02 G/100ML; G/100ML; G/100ML; G/100ML; G/100ML
INJECTION, SOLUTION INTRAVENOUS CONTINUOUS
Status: ACTIVE | OUTPATIENT
Start: 2024-10-14 | End: 2024-10-15

## 2024-10-14 RX ORDER — MORPHINE SULFATE 2 MG/ML
2 INJECTION, SOLUTION INTRAMUSCULAR; INTRAVENOUS ONCE
Status: COMPLETED | OUTPATIENT
Start: 2024-10-14 | End: 2024-10-14

## 2024-10-14 RX ORDER — DEXTROSE 50 % IN WATER (D50W) INTRAVENOUS SYRINGE
25 AS NEEDED
Status: DISCONTINUED | OUTPATIENT
Start: 2024-10-14 | End: 2024-10-25 | Stop reason: HOSPADM

## 2024-10-14 RX ORDER — IBUPROFEN 200 MG
16-32 TABLET ORAL AS NEEDED
Status: DISCONTINUED | OUTPATIENT
Start: 2024-10-14 | End: 2024-10-25 | Stop reason: HOSPADM

## 2024-10-14 RX ORDER — METOPROLOL TARTRATE 1 MG/ML
5 INJECTION, SOLUTION INTRAVENOUS
Status: DISCONTINUED | OUTPATIENT
Start: 2024-10-14 | End: 2024-10-17

## 2024-10-14 RX ORDER — DEXTROSE 40 %
15-30 GEL (GRAM) ORAL AS NEEDED
Status: DISCONTINUED | OUTPATIENT
Start: 2024-10-14 | End: 2024-10-25 | Stop reason: HOSPADM

## 2024-10-14 RX ORDER — IOPAMIDOL 755 MG/ML
100 INJECTION, SOLUTION INTRAVASCULAR
Status: COMPLETED | OUTPATIENT
Start: 2024-10-14 | End: 2024-10-14

## 2024-10-14 RX ORDER — HEPARIN SODIUM 10000 [USP'U]/100ML
850 INJECTION, SOLUTION INTRAVENOUS
Status: DISPENSED | OUTPATIENT
Start: 2024-10-14 | End: 2024-10-21

## 2024-10-14 RX ORDER — MORPHINE SULFATE 2 MG/ML
1 INJECTION, SOLUTION INTRAMUSCULAR; INTRAVENOUS ONCE
Status: COMPLETED | OUTPATIENT
Start: 2024-10-14 | End: 2024-10-14

## 2024-10-14 RX ADMIN — METOPROLOL TARTRATE 5 MG: 1 INJECTION, SOLUTION INTRAVENOUS at 12:13

## 2024-10-14 RX ADMIN — IOPAMIDOL 100 ML: 755 INJECTION, SOLUTION INTRAVENOUS at 02:11

## 2024-10-14 RX ADMIN — SODIUM CHLORIDE, SODIUM LACTATE, POTASSIUM CHLORIDE, CALCIUM CHLORIDE AND DEXTROSE MONOHYDRATE: 5; 600; 310; 30; 20 INJECTION, SOLUTION INTRAVENOUS at 05:06

## 2024-10-14 RX ADMIN — MORPHINE SULFATE 1 MG: 2 INJECTION, SOLUTION INTRAMUSCULAR; INTRAVENOUS at 08:54

## 2024-10-14 RX ADMIN — HEPARIN SODIUM 1200 UNITS/HR: 10000 INJECTION, SOLUTION INTRAVENOUS at 04:04

## 2024-10-14 RX ADMIN — METOPROLOL TARTRATE 5 MG: 1 INJECTION, SOLUTION INTRAVENOUS at 20:10

## 2024-10-14 RX ADMIN — METOPROLOL TARTRATE 5 MG: 1 INJECTION, SOLUTION INTRAVENOUS at 05:18

## 2024-10-14 RX ADMIN — MORPHINE SULFATE 2 MG: 2 INJECTION, SOLUTION INTRAMUSCULAR; INTRAVENOUS at 01:48

## 2024-10-14 RX ADMIN — MAGNESIUM SULFATE HEPTAHYDRATE 2 G: 40 INJECTION, SOLUTION INTRAVENOUS at 02:30

## 2024-10-14 RX ADMIN — SODIUM CHLORIDE, SODIUM LACTATE, POTASSIUM CHLORIDE, CALCIUM CHLORIDE AND DEXTROSE MONOHYDRATE: 5; 600; 310; 30; 20 INJECTION, SOLUTION INTRAVENOUS at 18:06

## 2024-10-14 ASSESSMENT — COGNITIVE AND FUNCTIONAL STATUS - GENERAL
CLIMB 3 TO 5 STEPS WITH RAILING: 2 - A LOT
MOVING TO AND FROM BED TO CHAIR: 3 - A LITTLE
WALKING IN HOSPITAL ROOM: 3 - A LITTLE
MOVING TO AND FROM BED TO CHAIR: 3 - A LITTLE
WALKING IN HOSPITAL ROOM: 3 - A LITTLE
CLIMB 3 TO 5 STEPS WITH RAILING: 2 - A LOT
STANDING UP FROM CHAIR USING ARMS: 3 - A LITTLE
STANDING UP FROM CHAIR USING ARMS: 3 - A LITTLE

## 2024-10-14 NOTE — ASSESSMENT & PLAN NOTE
- on admission reports mild central chest pain x 2-3 days  - no SOB  - CTA chest - Left upper and lower lobe pulmonary emboli.  - Patient reports stopping Xarelto 2 days ago after listening to some one talk about Xarelto and Metoprolol interaction. On Med Dispense List he hasn't had any Xarelto fills since July.  He denies difficulty with cost.    P:  - maintained on heparin gtt  -Chest tube has been removed.  There are no further procedures planned.  Will resume Xarelto.

## 2024-10-14 NOTE — ASSESSMENT & PLAN NOTE
- Patient is not able to provide accurate list of medications  - He doesn't  have a PCP. Seems like he has been getting refills through cardiologist Dr. Campbell but they have noted he hasn't seen them in over a year.  - He states stopped Xarelto and Metoprolol prior to admission because he was told there is a drug-drug interaction.     P:  - care management following  He was given a list of [facility] PCPs    For now he is going to SNF for short term rehab

## 2024-10-14 NOTE — PLAN OF CARE
Plan of Care Review  Plan of Care Reviewed With: patient  Progress: no change  Outcome Evaluation: Patient admitted from ED to 3C. Oriented to room and call bell. AAOx4, but lethargic at times. no complaints of pain. oobx1 to use urinal. IVF strted. Heparin gtt running at 12mL/he, next PTT at 0945.

## 2024-10-14 NOTE — ASSESSMENT & PLAN NOTE
ECHO 6/2024 : Left Ventricle: Normal ventricle size. Concentric left ventricular hypertrophy. Normal systolic function. Estimated EF 65%. No regional wall motion abnormalities. Grade I diastolic dysfunction   He has Lasix on his med list but has not been filled since 5/16/2024 for 90 day supply  Reportedly taking Metoprolol XL until recently  Looks euvolemic    P:  - appears euvolemic  - can resume lasix at d/c  - cont outpatient toprol xl

## 2024-10-14 NOTE — CONSULTS
Consults for d/c planning and PCP noted,  Pt's d/c plan is TBD, pt very weak and resides at [facility] with other elderly residents on 2nd fl and may not have supports for a d/c back.  CC is working on setting up with a PCP prior to discharge.

## 2024-10-14 NOTE — ED ATTESTATION NOTE
I have personally seen and examined Dave Arredondo.  I was involved in the care and medical decision making for this patient.    I reviewed and agree with physician assistant / nurse practitioner’s assessment and plan of care; any exceptions are documented below.      My focused history, examination, assessment and plan of care of Dave Arredondo is as follows:    Brief History:  Dave Arredondo is a 88 y.o. male who presents to the ED c/o generalized weakness, fatigue, shortness of breath, diffuse abdominal pain.  Patient states shortness of breath often worse when he wakes up in the morning.  He also complains of mild chest discomfort with no specified provoking or relieving factors, as well as subjective fever.  Patient denies lightheadedness or syncope, no nausea or vomiting, no rash, no other complaints.      History provided by: Patient    Focused Physical Exam:  Physical Exam  Constitutional:       General: He is not in acute distress.     Appearance: Normal appearance.      Comments: Lethargic   HENT:      Mouth/Throat:      Mouth: Mucous membranes are moist.      Pharynx: Oropharynx is clear.   Eyes:      General: No scleral icterus.     Conjunctiva/sclera: Conjunctivae normal.   Cardiovascular:      Rate and Rhythm: Normal rate and regular rhythm.      Heart sounds: No murmur heard.  Pulmonary:      Effort: No respiratory distress.      Breath sounds: No wheezing or rhonchi.      Comments: Breath sounds diminished right base  Abdominal:      General: Bowel sounds are normal. There is no distension.      Palpations: Abdomen is soft.      Tenderness: There is abdominal tenderness (RLQ). There is no guarding or rebound.      Comments: Colostomy left lower quadrant clean dry intact brown stool in bag   Musculoskeletal:         General: No swelling or deformity.   Skin:     General: Skin is warm and dry.   Neurological:      General: No focal deficit present.      Mental Status: He is oriented to person,  place, and time.             MEDICAL DECISION MAKING:  Patient is a 88 y.o. male who presents with complaint of generalized weakness, fatigue, shortness of breath, abdominal pain. Differential diagnosis includes intra-abdominal infection, bowel obstruction, bowel perforation, pleural effusion, URI, pneumonia, CHF, ACS, pneumothorax.      External documents reviewed:     Pulmonology office visit reviewed 9/12/2024.    Interpretation of all associated lab work, imaging findings and ECG (as applicable) per ED course.     All reevaluation findings and consultations detailed in ED course.      Problems Addressed:    The primary encounter diagnosis was PE (pulmonary thromboembolism) (CMS/HCC). Diagnoses of Pleural effusion on right and SBO (small bowel obstruction) (CMS/HCC) were also pertinent to this visit. (acute)      Rationale for disposition: Patient with acute PE, SBO requiring further evaluation, treatment and monitoring in the hospital to prevent clinical deterioration.         Gm Flores DO  10/14/24 0822

## 2024-10-14 NOTE — ASSESSMENT & PLAN NOTE
"- h/o recurrent pleural effusion requiring thoracentesis. S/p \"Right VATS pleural biopsy, insertion of Pleurx catheter \" by Dr. Sergey Dennis on 7/8/24. Pathology was benign. Catheter removed in office 8/9/24 after minimal drainage.  Patient states catheter had been removed 2 months ago  - He was admitted to [facility] in August 2024 for fall. Looks like at the time had PET-CT which was negative for malignancy  - CT chest  -Loculated RIGHT pleural effusion with pleural thickening. Empyema not excluded.  - Pulse ox stable on room air. Chronic dyspnea    P:  - Thoracic surgery consulted appreciated.   - s/p IR placment of 12F pigtail catheter. Fluid is exudative by Light's criteria. Cultures negative. Cytology - No malignant cells identified   - completed 6 doses of tpa/dornase.    - Repeat Chest x-ray 10/21 -improving right hydropneumothorax and basilar volume loss  - chest tube removed by IR at bedside    Previous discussions with IR and thoracic surgery, if patient reaccumulate's fluid in the future a Pleurx catheter can be considered  "

## 2024-10-14 NOTE — H&P
"   Hospital Medicine  History & Physical        CHIEF COMPLAINT   My stomach was hurting so bad     HISTORY OF PRESENT ILLNESS      Dave Arredondo is a 88 y.o. male with a past medical history of Dvt/PE, HTN, CAD, rectal cancer s/p chemoXRT and APR with end colostomy, recurrent pleural effusion, who presents with abdominal pain.    States had a bad day all day. Couldn't stand.    States since his surgery in July for pleural effusion, he has been feeling weak all the time. Reports 20lbs weight loss in 5 months due to lack of appetite.     He states he ate a whole can of beans which he heated up around 2pm Sunday. Reports around 9pm abdominal pain started around his colostomy site.  He had a colostomy placed in 2012 s/p APR for rectal cancer.   Denies nausea or vomiting.     Reports \"slight\" central chest pain since Saturday which he says at worst 2-3/10.   Reports chronic SOB. H/o recurrent pleural effusion with Pleural catheter placed July 2024 and was removed couple months ago.    Denies urinary symptoms.   Reports occasional fever but none currently.    Last dose of Xarelto was two days ago. He says he stopped Xarelto because he was told Xarelto and Toprol XL had drug-drug interaction. He says otherwise he misses doses of Xarelto sporadically    Lives with 4 other [employer] men in a [facility] in [place].    In ED vitals are unremarkable  Labs: Na 135, K 3.8, BUN 15, Cr 0.7, LFTs normal, Mg 1.7, Lactate 1.2, HS trop 20.0->18.5, , WBC 10.87K, Hgb 14.0, Plts 205K, INR 1.0, PTT 23  CTA C/A/P: prelim report: PE. SBO    Treatment in ED: heparin gt    --        PAST MEDICAL AND SURGICAL HISTORY      Past Medical History:   Diagnosis Date    Colon cancer (CMS/HCC) 2012    Coronary artery disease     DVT (deep venous thrombosis) (CMS/HCC)     Hypertension     Lipid disorder     Pleural effusion     Pulmonary embolism (CMS/HCC)        Past Surgical History   Procedure Laterality Date    " Colostomy      Hernia repair      Thoracentesis      THORACOSCOPY--VATS-PLEURAL BIOPSY, PLEURX CATHETER PLACEMENT Right 7/8/2024    Performed by Sergey Dennis MD at [facility] OR [facility]       PCP: Pt States, No Pcp    MEDICATIONS      Prior to Admission medications      Medications Prior to Admission   Medication Sig Dispense Refill Last Dose/Taking    albuterol HFA 90 mcg/actuation inhaler INHALE 2 PUFF USING INHALER EVERY EIGHT HOURS AS NEEDED       atorvastatin (LIPITOR) 20 mg tablet Take 20 mg by mouth daily.       cyanocobalamin (VITAMIN B-12) 1,000 mcg/mL injection Inject 1,000 mcg into the shoulder, thigh, or buttocks every 30 (thirty) days.       furosemide (LASIX) 20 mg tablet Take 20 mg by mouth daily.       metoprolol succinate XL (TOPROL-XL) 100 mg 24 hr tablet Take 100 mg by mouth daily.       nitroglycerin (NITROSTAT) 0.4 mg SL tablet Place 0.4 mg under the tongue every 5 (five) minutes as needed.         pantoprazole (PROTONIX) 40 mg EC tablet Take 1 tablet (40 mg total) by mouth nightly Indications: GI ppx w/Xarelto. 30 tablet 0     traMADoL (ULTRAM) 50 mg tablet Take 1 tablet (50 mg total) by mouth every 6 (six) hours as needed for severe pain. 10 tablet 0     XARELTO 10 mg tablet Take 1 tablet (10 mg total) by mouth daily. 30 tablet 0          ALLERGIES      Patient has no known allergies.    FAMILY HISTORY      Family History   Problem Relation Name Age of Onset    Heart disease Biological Mother         SOCIAL HISTORY      Social History     Socioeconomic History    Marital status:      Spouse name: None    Number of children: None    Years of education: None    Highest education level: None   Tobacco Use    Smoking status: Never    Smokeless tobacco: Never   Substance and Sexual Activity    Alcohol use: Not Currently    Drug use: Never    Sexual activity: Defer   Social History Narrative    [facility] in [place]      Social Drivers of Health     Food Insecurity: No Food  Insecurity (10/13/2024)    Hunger Vital Sign     Worried About Running Out of Food in the Last Year: Never true     Ran Out of Food in the Last Year: Never true   Transportation Needs: No Transportation Needs (7/1/2024)    PRAPARE - Transportation     Lack of Transportation (Medical): No     Lack of Transportation (Non-Medical): No   Housing Stability: Unknown (7/1/2024)    Housing Stability Vital Sign     Unable to Pay for Housing in the Last Year: No       REVIEW OF SYSTEMS      All other systems reviewed and negative except as noted in HPI    PHYSICAL EXAMINATION      Temp:  [35.9 °C (96.6 °F)] 35.9 °C (96.6 °F)  Heart Rate:  [90-96] 96  Resp:  [18-22] 20  BP: (113-169)/(74-93) 169/93  Body mass index is 23.49 kg/m².    Physical Exam  Vitals and nursing note reviewed.   Constitutional:       General: He is not in acute distress.     Appearance: He is not ill-appearing or toxic-appearing.   HENT:      Head: Normocephalic and atraumatic.      Mouth/Throat:      Mouth: Mucous membranes are dry.      Pharynx: No oropharyngeal exudate.   Eyes:      General: No scleral icterus.     Extraocular Movements: Extraocular movements intact.      Conjunctiva/sclera: Conjunctivae normal.      Pupils: Pupils are equal, round, and reactive to light.   Cardiovascular:      Rate and Rhythm: Normal rate and regular rhythm.      Pulses: Normal pulses.      Heart sounds: No murmur heard.  Pulmonary:      Effort: No respiratory distress.      Breath sounds: Rales (left lower lobe) present. No wheezing or rhonchi.      Comments: Right posterior lung- absent breath sound lower lung field otherwise diminished breath sounds  Abdominal:      General: There is no distension.      Palpations: Abdomen is soft.      Tenderness: There is abdominal tenderness (generalized). There is no guarding.      Hernia: A hernia (parastoma- left lateral to ostomy) is present.      Comments: LLQ - ostomy bag with brown stool  Normal bowel sounds upper  abdomen  Tinkling sounds lower abdomen   Musculoskeletal:      Cervical back: Neck supple. No tenderness.      Right lower leg: Edema present.      Left lower leg: Edema present.      Comments: LLE>RLE- mild pitting edema   Neurological:      General: No focal deficit present.      Mental Status: He is alert and oriented to person, place, and time.      Cranial Nerves: No cranial nerve deficit.         LABS / IMAGING / EKG        Labs  I have reviewed the patient's pertinent labs.   Labs Reviewed   CBC AND DIFF - Abnormal       Result Value    WBC 10.87 (*)     RBC 4.72      Hemoglobin 14.0      Hematocrit 41.7      MCV 88.3      MCH 29.7      MCHC 33.6      RDW 14.7 (*)     Platelets 205      MPV 9.1 (*)     Differential Type Auto      nRBC 0.0      Immature Granulocytes 0.5      Neutrophils 82.3      Lymphocytes 5.4      Monocytes 11.3      Eosinophils 0.3      Basophils 0.2      Immature Granulocytes, Absolute 0.05      Neutrophils, Absolute 8.95 (*)     Lymphocytes, Absolute 0.59 (*)     Monocytes, Absolute 1.23 (*)     Eosinophils, Absolute 0.03 (*)     Basophils, Absolute 0.02     COMPREHENSIVE METABOLIC PANEL - Abnormal    Sodium 135 (*)     Potassium 3.8      Chloride 100      CO2 25      BUN 15      Creatinine 0.7      Glucose 151 (*)     Calcium 9.7      AST (SGOT) 16      ALT (SGPT) 18      Alkaline Phosphatase 90      Total Protein 6.3      Albumin 3.6      Bilirubin, Total 1.1      eGFR >60.0      Anion Gap 10     HIGH SENSITIVE TROPONIN I (BASELINE - REFLEX 2HR) - Abnormal    High Sens Troponin I 20.0 (*)    HIGH SENSITIVE TROPONIN I (NO REFLEX) - Abnormal    High Sens Troponin I 18.5 (*)    B-TYPE NATRIURETIC PEPTIDE - Abnormal     (*)    MAGNESIUM - Abnormal    Magnesium 1.7 (*)    SARS-COV-2 (COVID-19)/ FLU A/B, AND RSV, PCR - Normal    SARS-CoV-2 (COVID-19) Negative      Influenza A Negative      Influenza B Negative      Respiratory Syncytial Virus Negative      Narrative:     Testing  performed using real-time PCR for detection of COVID-19. EUA approved validation studies performed on site.    PROTIME-INR - Normal    PT 13.0      INR 1.0     PTT - Normal    PTT 23     LIPASE - Normal    Lipase 31     URINALYSIS REFLEX CULTURE (ED AND OUTPATIENT ONLY)    Narrative:     The following orders were created for panel order UA w/ reflex culture (ED Only).  Procedure                               Abnormality         Status                     ---------                               -----------         ------                     UA Reflex to Culture (Ma...[[id number]]                                                   Please view results for these tests on the individual orders.   UA REFLEX CULTURE (MACROSCOPIC)   LACTATE, VENOUS   PTT   PROTIME-INR         Imaging  I have independently reviewed the patient's pertinent imaging for this hospital visit.   Patient Name: LUIZA RANGEL Exam(s): a/p standard CT   MR#: [id number]     History: CHEST PAIN AND P[AIN     Preliminary Impression:     Fluid-filled, distended proximal small bowel loops. Nondistended distal small bowel loops. Nondistended stomach and proximal jejunum. The findings are suspicious for mid small bowel obstruction, perhaps due to an internal hernia. There is mild infiltration of the associated mesentery.     No free fluid or free air. No hydronephrosis.     No colonic distention.     Left-sided ileostomy. Herniated bowel within the ileostomy site.       Interpreted by: Kayli Baird MD, Oct 14, 2024 02:44 AM       Patient Name: LUIZA RANGEL Exam(s): chest CT  MR#: [id number]    History: CHEST PAIN AND P[AIN    Preliminary Impression:    PE in left upper lobe segmental and subsegmental vessels. PE in left lower lobe segmental and subsegmental vessels.    Right pleural effusion, which has increased in amount in comparison with the patient's previous study. Right upper lobe and right lower lobe compressive atelectasis versus  "infiltrates.    Cardiomegaly.      Interpreted by: Kayli Baird MD, Oct 14, 2024 02:39 AM            ECG/Telemetry  I have independently reviewed the ECG.    ASSESSMENT AND PLAN            SBO (small bowel obstruction) (CMS/[facility])  Assessment & Plan  - Abd pain around area of colostomy since Sunday night  - CT A/P prelim report fluid-filled, distended proximal small bowel loops. Nondistended distal small bowel loops. Nondistended stomach and proximal jejunum. The findings are suspicious for mid small bowel obstruction,  - normal lactate at 1.2, WBC 10.87K  - No n/v    P:  - Admit to tele  - NPO  - IVF  - NGT per surgery  - surgery following    * PE (pulmonary thromboembolism) (CMS/[facility])  Overview  LLE DVT/PE in 2019-> Xarelto for 6 months then changed to prophylactic dose-> DVT 2020    Assessment & Plan  - reports mild central chest pain x 2-3 days  - no SOB  - CTA chest prelim report: PE in left upper lobe and lower lobe  segmental and subsegmental vessels.  - Patient reports stopping Xarelto 2 days ago after listening to some one talk about Xarelto and Metoprolol interaction. On Med Dispense List he hasn't had any Xarelto fills since July.     P:  - continue heparin gtt  - transition to Xarelto if no scheduled procedures  - follow up official CT report    Pleural effusion  Assessment & Plan  - h/o recurrent pleural effusion requiring thoracentesis. AS/p \"Right VATS pleural biopsy, insertion of Pleurx catheter \" by Dr. Sergey Dennis on 7/8/24. Patient states catheter had been removed 2 months ago  - He was admitted to [facility] in August 2024 for fall. Looks like at the time had PET-CT which was negative for malignancy  - CT chest shows re- accumulation of fluid  - Pulse x stable on room air. Chronic dyspnea    P:  - Consult thoracic surgery     Grade I diastolic dysfunction  Assessment & Plan  ECHO 6/2024 : Left Ventricle: Normal ventricle size. Concentric left ventricular hypertrophy. Normal systolic function. " Estimated EF 65%. No regional wall motion abnormalities. Grade I diastolic dysfunction   He has Lasix on his med list but unclear if taking   Reportedly taking Metoprolol XL until recent  Looks euvolemic    P:  - need to clarify Lasix use  - will put on Lopressor IV 5mg every 8 hours while nPO    Medication management  Assessment & Plan  - Patient is not able to provide accurate list of medications  - He doesn't  have a PCP. Seems like he has been getting refills through cardiologist Dr. Campbell but they have noted he hasn't seen them in over a year.  - He states stopped Xarelto and Metoprolol couple days ago because he was told there is a drug-drug interaction.     P:  - SW and CM consult    History of rectal cancer  Overview  T2 N1 M0 rectal cancer, neoadjuvant chemotherapy and  radiotherapy followed by surgery (APR), then had multiple  complications after the surgery,   Colostomy    Assessment & Plan  - unknown who he follows with      VTE Assessment: [facility]    VTE Prophylaxis: Current anticoagulants:  heparin (porcine) bolus from bag 2,700-5,450 Units, intravenous, q6h PRN  heparin (porcine) bolus from bag 5,450 Units, intravenous, Once  heparin infusion in D5W 100 units/mL, intravenous, Titrated      Code Status: Full Code      Discussed advanced care planning. Dave doesn't have an ACP and Dave's surrogate decision maker is Eliezer Beltre (friend).  Emergency Contacts      Name Relation Home Work Mobile   AlexsanderEliezer Friend   [phone]   Elías Blackman Friend [phone]       Estimated Discharge Date: 10/16/2024  Disposition Planning: YENNY Zamora DO  10/14/2024

## 2024-10-14 NOTE — PROGRESS NOTES
"Dave Arredondo   [id number]    Your doctor has referred you for a CT examination that requires the injection of an iodinated contrast material into your bloodstream. This iodinated contrast material, sometimes referred to as \"x-ray dye\" allows for better interpretation of the x-ray films or CT images and results in a more accurate interpretation of the examination.     Without the use of iodinated contrast (x-ray dye), the examination may be less informative and result in a suboptimal examination, and possibly a delay in diagnosis and, if needed, treatment.     The iodinated contrast material is given through a small needle or catheter placed into a vein, usually on the inside of the elbow, on the back of hand, or in a vein in the foot or lower leg.    The most common, though still rare, potential reaction to an intravenous contrast injection is an allergic-like reaction. Most allergic-like reactions are mild, though a small subset of people can have more severe reactions. Mild reactions include mild / scattered hives, itching, scratchy throat, sneezing and nasal congestion. More severe reactions include facial swelling, severe difficulty breathing, wheezing and anaphylactic shock. Those with a prior history allergic-like reaction to the same class of contrast media (such as CT contrast or MRI contrast) are at the highest risk for an allergic reaction.     If you believe you had an allergic reaction to contrast in the past, please let our staff know. We can determine if this increases your risk for a future reaction and provide steps to decrease the risk. This may delay your examination, but it decreases the risk of having a new and possibly more severe reaction to the contrast injection.    People with a history of prior allergic reactions to other substances (such as unrelated medications and food) and patients with a history of asthma have slightly increased risk for an allergic reaction from contrast " material when compared with the general population, but do not require to be pretreated prior to a contrast injection.    You should notify the physician, nurse or technologist if you have ever had any of these conditions or if you have any questions.

## 2024-10-14 NOTE — ASSESSMENT & PLAN NOTE
Patient meets criteria for severe malnutrition of chronic illness based on eating less than 75% energy needs for greater than 1 month, >5% weight loss x 3 months (lost 15% of his body weight) and moderate muscle loss with slight depression of temples.    Nutrition consult appreciated.

## 2024-10-14 NOTE — ASSESSMENT & PLAN NOTE
- Abd pain around area of colostomy since Sunday night  - CT A/P - Dilated small bowel with transition point in the left midabdomen, concerning for bowel obstruction. 5.  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   - normal lactate at 1.2, WBC 10.87K  - No n/v. No pain    P:  - Surgery recommendations appreciated.  - SBFT 10/15 showed contrast in right colon ruling out high grade bowel obstruction, likely ileus vs pSBO  - continue Colace, Miralax   - tolerating low residue diet.

## 2024-10-14 NOTE — PLAN OF CARE
Plan of Care Review  Plan of Care Reviewed With: patient  Progress: improving  Outcome Evaluation: Pt NG tube replaced, and colostomy dressing changed. NG to low cont suction with dark brown output. Vitals stable on room air. Heparin gtt currently running at 10ml/hr. Pt NPO, IV fluids running. OOBx1 to chair. Using urinal. x1 episode of dark brown emesis. Will ctm.

## 2024-10-14 NOTE — PROGRESS NOTES
Hospital Medicine     Daily Progress Note       SUBJECTIVE   Interval History:   Notified by RN that patient pulled out NG tube. Surgery recommends it be replaced.  Per RN, he is asking for pain meds prior to replacing.  PDMP reviewed, no red flags.  Patient seen and examined. He is sleepy. He c/o abdominal pain, unable to give a number.  He states he pulled the NG tube bc he thought it was a scab.  Denies chest pain, shortness of breath.     OBJECTIVE      Vital signs in last 24 hours:  Temp:  [35.9 °C (96.6 °F)-36.6 °C (97.8 °F)] 36.6 °C (97.8 °F)  Heart Rate:  [] 87  Resp:  [16-22] 16  BP: (113-177)/(74-97) 166/87    Intake/Output Summary (Last 24 hours) at 10/14/2024 1414  Last data filed at 10/14/2024 0648  Gross per 24 hour   Intake 50 ml   Output 350 ml   Net -300 ml       PHYSICAL EXAMINATION      Physical Exam  Constitutional: sleepy. Disheveled. No distress.   HENT:   Head: Normocephalic and atraumatic.   Eyes: No scleral icterus.   Cardiovascular: S1, S2. Normal rate and regular rhythm. No m/r/g appreciated.    Pulmonary/Chest: CTAB. No r/r/w. Respirations unlabored.  Abdominal: Soft. Diffusely tender. +ostomy  Musculoskeletal:  no edema.   Neurological: Sleepy. Oriented x 3 but overall poor historian  Skin: Skin is warm and dry.  No rash on exposed skin.  Psychiatric: Calm, cooperative.       LINES, CATHETERS, DRAINS, AIRWAYS, AND WOUNDS   Lines, Drains, and Airways:  Wounds (agree with documentation and present on admission):  Peripheral IV (Adult) 10/14/24 Anterior;Right Forearm (Active)   Number of days: 0       Peripheral IV (Adult) 10/14/24 Left Antecubital (Active)   Number of days: 0       NG/OG Tube 10/14/24 Right nostril (Active)   Number of days: 0       Colostomy Unknown LUQ (Active)   Number of days: 5316         Comments:    LABS / IMAGING / TELE      Labs  Results from last 7 days   Lab Units 10/14/24  0849 10/13/24  2208   SODIUM mEQ/L 136 135*   POTASSIUM mEQ/L 4.2 3.8    CHLORIDE mEQ/L 98 100   CO2 mEQ/L 31 25   BUN mg/dL 16 15   CREATININE mg/dL 0.8 0.7   GLUCOSE mg/dL 162* 151*   CALCIUM mg/dL 10.1 9.7     Results from last 7 days   Lab Units 10/14/24  0849 10/13/24  2208   WBC K/uL 13.35* 10.87*   HEMOGLOBIN g/dL 15.8 14.0   HEMATOCRIT % 47.6 41.7   PLATELETS K/uL 209 205           Imaging  X-RAY ABDOMEN 1 VIEW    Result Date: 10/14/2024  Narrative: CLINICAL HISTORY: NGt placement COMPARISON: CT dated 10/14/2024 COMMENT: A single frontal view of the abdomen was obtained. There is a nasogastric tube terminating within the stomach. The visualized lung bases are stable. Incomplete visualization of the bowel gas pattern. No findings of pneumoperitoneum. The osseous structures are unremarkable.     Impression: IMPRESSION: Nasogastric tube in satisfactory position.     CT ANGIOGRAPHY CHEST PULMONARY EMBOLISM WITH IV CONTRAST, CT ABDOMEN PELVIS WITH IV CONTRAST    Addendum Date: 10/14/2024 Addendum:   Correction to IMPRESSION ADDENDUM: 3. Subtotal atelectasis of the RIGHT LOWER and MIDDLE lobes. The left lung is clear.     Addendum Date: 10/14/2024 Addendum:   Correction of a speech recognition/transcription error in the IMPRESSION section of the report. 2. Loculated RIGHT pleural effusion with pleural thickening. Empyema not excluded.    Result Date: 10/14/2024  Narrative: CLINICAL HISTORY: Chest pain. Suspected pulmonary embolism. Left lower quadrant pain. COMPARISON: Chest x-ray dated 8/20/2024, CT dated 7/7/2024, and other studies TECHNIQUE: CT pulmonary angiography of the chest was performed after contrast administration. Maximum intensity projection (MIP) reconstructions were included for evaluation of pulmonary embolism. Three-dimensional maximum intensity projection imaging was also performed of the vasculature. Then, a contrast enhanced CT of the abdomen and pelvis was performed. Reformatted axial, sagittal and coronal images were reviewed. Dose reduction techniques such as  automated exposure control were used in this study where applicable. Administered contrast and dose: 100mL of iopamidoL (ISOVUE-370) 370 mg iodine /mL (76 %) injection 100 mL COMMENT: Thorax: The main pulmonary artery is normal in caliber. Multiple pulmonary arterial filling defects are seen involving lobar, segmental subsegmental branches in the left upper and left lower lobes. No right-sided pulmonary arterial filling defects are appreciated. The visualized thyroid gland is unremarkable. No thoracic lymphadenopathy. No aortic aneurysm. Mild cardiomegaly.  No pericardial effusion. Coronary calcifications noted. The central airways are grossly patent. There is a moderate-sized loculated right pleural effusion with associated pleural thickening, most pronounced at the lung bases. There is overall volume loss in the right lung with subtotal atelectasis in the right lower middle lobes. These findings are similar to the prior study. There is a stellate nodular opacity in the left upper lobe, series 4 image 88. This has been stable since at least 2020. There are dependent hypoventilatory changes in the left lung base. No left pleural effusion or pneumothorax. The bones of the chest are stable. Abdomen/Pelvis: The liver, gallbladder, pancreas, spleen and adrenal glands are unremarkable. There is a single nonobstructing stone in the upper pole of the left kidney. Multiple bilateral renal sinus cysts are noted, larger on the left. There are also several subcentimeter cortical hypodensities both kidneys, too small to characterize. The major abdominal vasculature is patent. There is a left lower quadrant end colostomy. 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 The proximal to mid small bowel is mildly dilated with layering air-fluid levels. A transition point is seen in the left midabdomen, axial image 114 through 109. Findings are concerning for bowel obstruction. The change with a separate from the  above-noted parastomal hernia. No large volume ascites, intra-abdominal abscess or pneumoperitoneum. No lymphadenopathy by size criteria. Small fat-containing left inguinal hernia. The urinary bladder is unremarkable. The prostate is enlarged. Mild degenerative changes in the spine and hips.     Impression: IMPRESSION: 1.  Left upper and lower lobe pulmonary emboli. 2.  Loculated left pleural effusion with pleural thickening. Empyema not excluded. 3.  Subtotal atelectasis in the left upper and lower lobe. 4.  Dilated small bowel with transition point in the left midabdomen, concerning for bowel obstruction. 5.  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 Critical findings were documented in the preliminary interpretation by the overnight radiologist. Findings were acknowledged by emergency department staff, as documented in the EMR on 10/14/2024 at 2:48 AM    X-RAY CHEST 2 VIEWS    Result Date: 10/14/2024  Narrative: CLINICAL HISTORY: Chest Pain/Arrhythmia TECHNIQUE: Frontal and lateral views of the chest were obtained COMPARISON: Concurrent CT, chest x-ray dated 8/2/2024, and other studies COMMENT: There is right-sided diffuse pleural thickening and evidence of a loculated effusion. There is also right basilar airspace opacity. Findings are better evaluated on concurrent chest CT. The left lung is clear. The cardiomediastinal silhouette is within normal limits.     Impression: IMPRESSION: Loculated right pleural effusion and basilar airspace opacity.    X-RAY ABDOMEN 1 VIEW    Result Date: 10/14/2024  Narrative: CLINICAL HISTORY: tube placement COMPARISON: Abdominal x-ray performed 3 hours prior COMMENT: A single frontal view of the abdomen was obtained. The nasogastric tube has been advanced in the interval, terminating in the region of the stomach. The visualized lung bases and visualized bowel gas pattern are unchanged in the interval. The osseous structures are unremarkable.  "    Impression: IMPRESSION: Nasogastric tube in satisfactory position.       ECG/Telemetry  I have independently reviewed the telemetry. No events for the last 24 hours.    ASSESSMENT AND PLAN      * PE (pulmonary thromboembolism) (CMS/[facility])  Overview  LLE DVT/PE in 2019-> Xarelto for 6 months then changed to prophylactic dose-> DVT 2020    Assessment & Plan  - on admission reports mild central chest pain x 2-3 days  - no SOB  - CTA chest - Left upper and lower lobe pulmonary emboli.  - Patient reports stopping Xarelto 2 days ago after listening to some one talk about Xarelto and Metoprolol interaction. On Med Dispense List he hasn't had any Xarelto fills since July.     P:  - continue heparin gtt  - eventual transition to Xarelto once we are sure there are no procedures  - will need outpatient follow up    Pleural effusion  Assessment & Plan  - h/o recurrent pleural effusion requiring thoracentesis. AS/p \"Right VATS pleural biopsy, insertion of Pleurx catheter \" by Dr. Sergey Dennis on 7/8/24. Patient states catheter had been removed 2 months ago  - He was admitted to [place] in August 2024 for fall. Looks like at the time had PET-CT which was negative for malignancy  - CT chest  -Loculated RIGHT pleural effusion with pleural thickening. Empyema not excluded.  - Pulse x stable on room air. Chronic dyspnea    P:  - Thoracic surgery consulted. Recommending IR consult for pleurx drain placement    SBO (small bowel obstruction) (CMS/[facility])  Assessment & Plan  - Abd pain around area of colostomy since Sunday night  - CT A/P - Dilated small bowel with transition point in the left midabdomen, concerning for bowel obstruction. 5.  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   - normal lactate at 1.2, WBC 10.87K  - No n/v    P:  - Surgery following  - NGT per surgery  - NPO. Continue IV fluids  - pain control as needed            VTE Assessment: Padua    VTE Prophylaxis:  Current " anticoagulants:  heparin (porcine) bolus from bag 2,700-5,450 Units, intravenous, q6h PRN  heparin infusion in D5W 100 units/mL, intravenous, Titrated      Code Status: Full Code      Estimated Discharge Date: 10/16/2024   Disposition Planning: resides with [facility] in [place]     VALENCIA Adkins  10/14/2024

## 2024-10-14 NOTE — CONSULTS
General Surgery Consult    Subjective     Dave Arredondo is a 88 y.o. male who was admitted for PE (pulmonary thromboembolism) (CMS/HCC) [I26.99]. Patient was seen in consultation at the request of referring physician for management recommendations.     88M hx of CAD, DVT, rectal cancer s/p APR around 2013 who presented to ER for SOB, chest pain and ?abdominal pain. Patient is a poor historian and it is difficult to gather what happened. He reports he woke up today with SOB and chest pain so he came here. When asked about abdominal pain, he frequently changes his answers from yes to no, and will then begin discussing chronic abdominal pain. He denies nausea/vomiting. He reprots that as of 9p, his ostomy was making stool. He seemed quite confident about this, even when asked multiple times.    PSHx: L IHR 1970s, APR 2013, perineal hernia repair 2017, R VATS/pleural bx/pleurex catheter placement.  Meds: limited by patient knowledge; reports he takes xarelto but stopped it 2 days ago beucase it is poison    CT notable for segmental and subsegmental PE. CTAP notable for some dilated loops of bowel concerning for SBO with some infiltration of the mesentery. On my review, I do not see any mesenteric swirling.    Hypothermic to 96.6, VSS stable otherwise. Cr 0.7, lactate 1.2, WBC 11.     Medical History:   Past Medical History:   Diagnosis Date    Colon cancer (CMS/HCC) 2012    Coronary artery disease     DVT (deep venous thrombosis) (CMS/HCC)     Hypertension     Lipid disorder     Pleural effusion     Pulmonary embolism (CMS/HCC)        Surgical History:   Past Surgical History   Procedure Laterality Date    Colostomy      Hernia repair      Thoracentesis      THORACOSCOPY--VATS-PLEURAL BIOPSY, PLEURX CATHETER PLACEMENT Right 7/8/2024    Performed by Sergey Dennis MD at [facility]       Social History:   Social History     Social History Narrative    Lives in the [place] in [facility]  History:   Family History   Problem Relation Name Age of Onset    Heart disease Biological Mother         Allergies: Patient has no known allergies.    Home Medications:   glucose chewable tablet 16-32 g of dextrose  16-32 g of dextrose oral PRN    Or    dextrose 40 % oral gel 15-30 g of dextrose  15-30 g of dextrose oral PRN    Or    glucagon (GLUCAGEN) injection 1 mg  1 mg intramuscular PRN    Or    dextrose 50 % in water (D50) injection 12.5 g  25 mL intravenous PRN    dextrose 5 % and lactated Ringer's infusion   intravenous Continuous    heparin (porcine) bolus from bag 2,700-5,450 Units  40-80 Units/kg intravenous q6h PRN    heparin infusion in D5W 100 units/mL  100-4,000 Units/hr intravenous Titrated    nitroglycerin (NITROSTAT) SL tablet 0.4 mg  0.4 mg sublingual q5 min PRN       Current Medications:  Current Facility-Administered Medications   Medication Dose Route Frequency Provider Last Rate Last Admin    glucose chewable tablet 16-32 g of dextrose  16-32 g of dextrose oral PRN Skariah, Alfreda, DO        Or    dextrose 40 % oral gel 15-30 g of dextrose  15-30 g of dextrose oral PRN Skariah, Alfreda, DO        Or    glucagon (GLUCAGEN) injection 1 mg  1 mg intramuscular PRN Skariah, Alfreda, DO        Or    dextrose 50 % in water (D50) injection 12.5 g  25 mL intravenous PRN Skariah, Alfreda, DO        dextrose 5 % and lactated Ringer's infusion   intravenous Continuous Skariah, Alfreda, DO        heparin (porcine) bolus from bag 2,700-5,450 Units  40-80 Units/kg intravenous q6h PRN Skariah, Alfreda, DO        heparin infusion in D5W 100 units/mL  100-4,000 Units/hr intravenous Titrated Skariah, Alfreda, DO 12 mL/hr at 10/14/24 0404 1,200 Units/hr at 10/14/24 0404    nitroglycerin (NITROSTAT) SL tablet 0.4 mg  0.4 mg sublingual q5 min PRN Skariah, Aflreda, DO         Current Outpatient Medications   Medication Sig Dispense Refill    albuterol HFA 90 mcg/actuation inhaler INHALE 2 PUFF USING INHALER EVERY EIGHT HOURS AS NEEDED    "   atorvastatin (LIPITOR) 20 mg tablet Take 20 mg by mouth daily.      cyanocobalamin (VITAMIN B-12) 1,000 mcg/mL injection Inject 1,000 mcg into the shoulder, thigh, or buttocks every 30 (thirty) days.      furosemide (LASIX) 20 mg tablet Take 20 mg by mouth daily.      metoprolol succinate XL (TOPROL-XL) 100 mg 24 hr tablet Take 100 mg by mouth daily.      nitroglycerin (NITROSTAT) 0.4 mg SL tablet Place 0.4 mg under the tongue every 5 (five) minutes as needed.        pantoprazole (PROTONIX) 40 mg EC tablet Take 1 tablet (40 mg total) by mouth nightly Indications: GI ppx w/Xarelto. 30 tablet 0    traMADoL (ULTRAM) 50 mg tablet Take 1 tablet (50 mg total) by mouth every 6 (six) hours as needed for severe pain. 10 tablet 0    XARELTO 10 mg tablet Take 1 tablet (10 mg total) by mouth daily. 30 tablet 0       Review of Systems  Pertinent items are noted in HPI.    Objective     Physicial Exam  Visit Vitals  BP (!) 169/93 (BP Location: Right upper arm, Patient Position: Lying)   Pulse 96   Temp (!) 35.9 °C (96.6 °F) (Tympanic)   Resp 20   Ht 1.702 m (5' 7\")   Wt 68 kg (150 lb)   SpO2 97%   BMI 23.49 kg/m²     Physical Exam  Constitutional:       General: He is awake.   Cardiovascular:      Rate and Rhythm: Normal rate.   Pulmonary:      Effort: Pulmonary effort is normal.   Abdominal:      General: There is distension (mild).      Palpations: Abdomen is soft.      Tenderness: There is abdominal tenderness in the right lower quadrant and left lower quadrant.      Comments: Parastomal hernia   Psychiatric:         Behavior: Behavior is cooperative.         Labs  reviewed    Imaging  I have reviewed the Imaging from the last 24 hrs.    Assessment     88M hx of CAD, DVT, rectal cancer s/p APR around 2013 who presented to ER w chest and abdominal pain, found to have PE and SBO        Plan     - patient poor historian; hard to get story from him  - would place NGT, NPO  - please change ostomy bag so we can determine when / " if he is having output form his colostomy  - remainder per primary    Surinder Dennis MD    Attending Addendum:  I examined the patient with the resident team and agree with above documentation with the following additions:  88M presenting with chest pain and SOB, as well as some abdominal pain. He was evaluated in the ER where he was afebrile, iniitlaly mildly tachycardic but otherwise HD stable. His labs were significant for a WBC of 13 but otherwise unremarkable. He underwent at CTA of the chest, which was positive for PE as well as thickening of the pleura on the R with effusion. He also had a CT of the abdomen and pelvis which I reviewed along with the CTA, and shows dilated loops of small bowel in the L abdomen with a transition point in the LLQ, consistent with SBO. He has an ostomy in the LLQ with a parastomal hernia containing bowel, however this is not the transition point seen on CT. On exam, his abdomen is mildly distende dbut soft, non-tender. He has a LLW colostomy which is pink and patent, with a moderate parastomal hernia that is reducible on exam. His appliance was recently changed and there is no output in the new bag. He had an NGT placed at the time of admission which has since put out 600cc of dark bilious fluid. He is being anticoagulated for his PE. For now, continue NGT decompression and monitor for return of bowel function. PPI for gastritis prophylaxis in the setting of NGT and anticoagulation.    Mireya Harris MD

## 2024-10-14 NOTE — PLAN OF CARE
Care Coordination Admission Assessment Note    General Information:  Readmission Within the last 30 days: no previous admission in last 30 days  Does patient have a : No  Patient-Specific Goals (include timeframe): Feel better, pt weak, not able to answer questions, deffered to Eliezer his emerg. contact to answer    Living Arrangements:  Arrived From: home  Current Living Arrangements: home ([facility])  People in Home: roommate(s)  Home Accessibility: stairs to enter home (Group), stairs within home (Group)  Living Arrangement Comments: Pt resides on the [facility], accessible by 12 steps, [address] building, no elevator access    Housing Stability and Utility Access (SDOH):  In the last 12 months, was there a time when you were not able to pay the mortgage or rent on time?: No  In the past 12 months, how many times have you moved?: 1  At any time in the past 12 months, were you homeless or living in a shelter (including now)?: No  In the past 12 months has the electric, gas, oil, or water company threatened to shut off services in your home?: No    Functional Status Prior to Admission:   Assistive Device/Animal Currently Used at Home: grab bar, shower chair  Functional Status Comments: Normally pt is ambulatory and independent but per emerg. contact Eliezer pt has been very weak and unable to ambulate like he has in the past, only walks 30 ft to dining table and otherwise stays in room  IADL Comments:       Supports and Services:  Current Outpatient/Agency/Support Group: none  Type of Current Home Care Services:    History of home care episode or rehab stay: H/O [facility] and [facility] (adrienne more recent but per Eliezer d/jorge luis pt 2 weeks ago)    Discharge Needs Assessment:   Concerns to be Addressed: discharge planning, care coordination/care conferences  Current Discharge Risk: chronically ill, lack of support system/caregiver, other (see comments) (Pt resides with other residents who are also elderly and may  not understand his care needs.  Eliezer in emerg. contact but he is also not understanding pt's care needs but does try to help pt by taking him to pharmacy.)  Anticipated Changes Related to Illness: inability to care for self    Patient/Family Anticipated Discharge Plan:  Patient/Family Anticipates Transition To: other (see comments) (To be determine)  Patient/Family Anticipated Services at Transition: other (see comments) (Pt too weak to answer, disposition is to be determined.)    Connection to Community  Not applicable    Patient Choice:   Offered/Gave Vendor List: no  Patient's Choice of Community Agency(s): TBD       Anticipated Discharge Plan:  Met with patient. Provided education and contact information for Care Coordination services.: yes (Pt too weak to answer, agreeable to CC speaking with emerg contact Eliezer)  Anticipated Discharge Disposition: other (see comments) (To be determined, pt may need SNF pending medical course due to care needs.)     Transportation Needs (SDOH):  Transportation Concerns: none  Transportation Anticipated: family or friend will provide, health plan transportation  Is Out of Hospital DNR needed at discharge?: no    In the past 12 months, has lack of transportation kept you from medical appointments or from getting medications?: No (Eliezer pt's emerg contact assists but sounds like pt had issues w/ meds)  In the past 12 months, has lack of transportation kept you from meetings, work, or from getting things needed for daily living?: No    Concerns - comments: [facility]CS attempted to meet w/ pt in room, pt has NGT and was too weak to answer questions, pt did agree to this provider contacting Eliezer on emerg. contact list for more information.  CCS called Eliezer, identified self and role, CMA completed with Eliezer, pt has [facility] seeing him but per Eliezer recently d/c'd pt about 2 weeks ago, Eliezer stated there are issues w/ meds, confirmed pt's pharmacy as [facility] in [place] as listed in EPIC   "but Eliezer said for some reason pt was not taking a med and he did not knwo why.  Eliezer also stated that pt has been ordered a B12 shot but they cannot obtain that eithe- that was sent to the [facility] on [facility].  Eliezer said they have the \"needles\" but need the \"juice.\"  Pt and Eliezer are part of [facility] associated with the [facility], Eliezer is the main contact at this facility, no nurse or any other contacts regarding d/c from hospital.  Pt also has no PCP, per notes pt has not been taking xarelto.       DCP;  TBD- pt very weak, unable to complete assessment w/ him, CMA done w/ Eliezer, pt's emerg. Contact.    H/o Rectal c/a, s/p chemo/XRT, has colostomy, h/o pleurvax drain and multiple Pleural effusions, drain has been removed.   Pt from [facility], Eliezer is contact   Eliezer can drive at d/c if needed and pt is able to go by car  Pt not taking xarelto for a few days and was ordered B12 shots but Eliezer has not been able to get the shorts.    Pt has [facility]/o [facility] - recently d/c'd from service  PT NEEDS PCP appt prior to discharge- using pulm and cardiology docs for care needs- would benefit from a PCP w/ a EDUARDO and complex care RN.    Surgery consult for SBO  Discussed PT/OT abraham w/ Dr. Whipple once pt is eating and has strength -pt has no elevator in [facility]      Addendum @ 3144 hrs:  Contacted [facility] on [address]- B12 prescription is a year old per pharmacistEliezer pt's friend who helps him at [facility] stated they could not get medication.   Dr. Whipple updated regarding the B12 med.  Pt has had a 20 lb weight loss and if unable to use GI tract in 5-7 days may need TPN per the RD.      "

## 2024-10-14 NOTE — CONSULTS
Thoracic Surgery Consult    Subjective     Dave Arredondo is a 88 y.o. male who was admitted for PE (pulmonary thromboembolism) (CMS/HCC) [I26.99]. Patient was seen in consultation at the request of referring physician for management recommendations.     88M hx of CAD, DVT, rectal cancer s/p APR around 2013 who presented to ER for SOB, chest pain and ?abdominal pain. Patient is a poor historian and it is difficult to gather what happened. He reports he woke up today with SOB and chest pain so he came here.     He was last seen by our service 7/2024. At that time, he had a pleural effusion that required pigtail placement as well as tPA/dornase. CT after 6 doses of tpa/dornase showed persistent effusion. After discussing with patient, decision made for pleural bx and pleurex catheter. Pleurodesis was not done per patient preference it seems. Pleural bx negative for malignancy. Pleural fluids did not grow any cultures.    Pleurex catheter removed on 8/9.    PSHx: L IHR 1970s, APR 2013, perineal hernia repair 2017, R VATS/pleural bx/pleurex catheter placement.  Meds: limited by patient knowledge; reports he takes xarelto but stopped it 2 days ago beucase it is poison    CT notable for segmental and subsegmental PE. Recurrent R sided pleural effusion    Hypothermic to 96.6, VSS stable otherwise. Cr 0.7, lactate 1.2, WBC 11.     Medical History:   Past Medical History:   Diagnosis Date    Colon cancer (CMS/HCC) 2012    Coronary artery disease     DVT (deep venous thrombosis) (CMS/HCC)     Hypertension     Lipid disorder     Pleural effusion     Pulmonary embolism (CMS/HCC)        Surgical History:   Past Surgical History   Procedure Laterality Date    Colostomy      Hernia repair      Thoracentesis      THORACOSCOPY--VATS-PLEURAL BIOPSY, PLEURX CATHETER PLACEMENT Right 7/8/2024    Performed by Sergey Dennis MD at [facility] OR [facility]       Social History:   Social History     Social History Narrative    [facility]        Family History:   Family History   Problem Relation Name Age of Onset    Heart disease Biological Mother         Allergies: Patient has no known allergies.    Home Medications:   glucose chewable tablet 16-32 g of dextrose  16-32 g of dextrose oral PRN    Or    dextrose 40 % oral gel 15-30 g of dextrose  15-30 g of dextrose oral PRN    Or    glucagon (GLUCAGEN) injection 1 mg  1 mg intramuscular PRN    Or    dextrose 50 % in water (D50) injection 12.5 g  25 mL intravenous PRN    dextrose 5 % and lactated Ringer's infusion   intravenous Continuous    heparin (porcine) bolus from bag 2,700-5,450 Units  40-80 Units/kg intravenous q6h PRN    heparin infusion in D5W 100 units/mL  100-4,000 Units/hr intravenous Titrated    metoprolol (LOPRESSOR) injection 5 mg  5 mg intravenous q8h INT    nitroglycerin (NITROSTAT) SL tablet 0.4 mg  0.4 mg sublingual q5 min PRN       Current Medications:  Current Facility-Administered Medications   Medication Dose Route Frequency Provider Last Rate Last Admin    glucose chewable tablet 16-32 g of dextrose  16-32 g of dextrose oral PRN Skariah, Alfreda, DO        Or    dextrose 40 % oral gel 15-30 g of dextrose  15-30 g of dextrose oral PRN Skariah, Alfreda, DO        Or    glucagon (GLUCAGEN) injection 1 mg  1 mg intramuscular PRN Skariah, Alfreda, DO        Or    dextrose 50 % in water (D50) injection 12.5 g  25 mL intravenous PRN Skariah, Alfreda, DO        dextrose 5 % and lactated Ringer's infusion   intravenous Continuous Skariah, Alfreda, DO        heparin (porcine) bolus from bag 2,700-5,450 Units  40-80 Units/kg intravenous q6h PRN Skariah, Alfreda, DO        heparin infusion in D5W 100 units/mL  100-4,000 Units/hr intravenous Titrated Skariah, Alfreda, DO 12 mL/hr at 10/14/24 0404 1,200 Units/hr at 10/14/24 0404    metoprolol (LOPRESSOR) injection 5 mg  5 mg intravenous q8h INT Skariah, Alfreda, DO        nitroglycerin (NITROSTAT) SL tablet 0.4 mg  0.4 mg sublingual q5  "Alfreda Palm, DO         Current Outpatient Medications   Medication Sig Dispense Refill    albuterol HFA 90 mcg/actuation inhaler INHALE 2 PUFF USING INHALER EVERY EIGHT HOURS AS NEEDED      atorvastatin (LIPITOR) 20 mg tablet Take 20 mg by mouth daily.      cyanocobalamin (VITAMIN B-12) 1,000 mcg/mL injection Inject 1,000 mcg into the shoulder, thigh, or buttocks every 30 (thirty) days.      furosemide (LASIX) 20 mg tablet Take 20 mg by mouth daily.      metoprolol succinate XL (TOPROL-XL) 100 mg 24 hr tablet Take 100 mg by mouth daily.      nitroglycerin (NITROSTAT) 0.4 mg SL tablet Place 0.4 mg under the tongue every 5 (five) minutes as needed.        pantoprazole (PROTONIX) 40 mg EC tablet Take 1 tablet (40 mg total) by mouth nightly Indications: GI ppx w/Xarelto. 30 tablet 0    traMADoL (ULTRAM) 50 mg tablet Take 1 tablet (50 mg total) by mouth every 6 (six) hours as needed for severe pain. 10 tablet 0    XARELTO 10 mg tablet Take 1 tablet (10 mg total) by mouth daily. 30 tablet 0       Review of Systems  Pertinent items are noted in HPI.    Objective     Physicial Exam  Visit Vitals  BP (!) 143/95   Pulse (!) 106   Temp (!) 35.9 °C (96.6 °F) (Tympanic)   Resp 20   Ht 1.702 m (5' 7\")   Wt 68 kg (150 lb)   SpO2 97%   BMI 23.49 kg/m²     Physical Exam  Constitutional:       General: He is awake.   Cardiovascular:      Rate and Rhythm: Normal rate.   Pulmonary:      Effort: Pulmonary effort is normal.   Chest:      Comments: Thoracoscopy scars healed. No pleurx catheter  Abdominal:      General: There is distension (mild).      Palpations: Abdomen is soft.      Tenderness: There is abdominal tenderness in the right lower quadrant and left lower quadrant.      Comments: Parastomal hernia   Psychiatric:         Behavior: Behavior is cooperative.         Labs  reviewed    Imaging  I have reviewed the Imaging from the last 24 hrs.    Assessment     88M hx of CAD, DVT, rectal cancer s/p APR around 2013 who " presented to ER w chest and abdominal pain, found to have PE and SBO        Plan     - patient poor historian; hard to get story from him  - needs pleurx drain placement by IR  - discussed w Dr Sonny Dennis MD

## 2024-10-14 NOTE — Clinical Note
dry, intact, no bleeding and no hematoma. 4x4/tegaderm Cyclosporine Pregnancy And Lactation Text: This medication is Pregnancy Category C and it isn't know if it is safe during pregnancy. This medication is excreted in breast milk.

## 2024-10-14 NOTE — Clinical Note
Patient placed on procedure table in supine position. Positioning devices: arm board under arms and safety strap applied. X2/ right side up lateral

## 2024-10-14 NOTE — PLAN OF CARE
Problem: Adult Inpatient Plan of Care  Goal: Plan of Care Review  Flowsheets (Taken 10/14/2024 3861)  Progress: no change  Outcome Evaluation: Pt is very sleepy but able to wake up and answer some questions. Pt stated he has had no appetite for the past 6months and has lost over 20lbs. Pt has NGT in place with moderate output. Pt has colostomy with some output. Based on EMR, pt was 160lbs in July and is now 135lbs which is a 15% weight oss which is significant.    Plan of Care Reviewed With:   patient   caregiver  Pt meets criteria for severe malnutrition of chronic illness based on eating less than 75% energy needs for greater than 1 month, >5% weight loss x 3 months (lost 15% of his body weight) and moderate muscle loss with slight depression of temples.    Goal: initiate parenteral nutrition support if anticipate being unable to use GI tract for greater than 5-7 days  Recommendations:  Monitor NPO status and labs,  Await return of GI function.  If unable to use GI tract within 5-7 days, suggest initiating TPN for nutrition support with 70gms protein, 150gms dextrose and 25gms lipids.

## 2024-10-15 ENCOUNTER — APPOINTMENT (INPATIENT)
Dept: RADIOLOGY | Facility: HOSPITAL | Age: 89
DRG: 186 | End: 2024-10-15
Attending: PHYSICIAN ASSISTANT
Payer: COMMERCIAL

## 2024-10-15 LAB
ALBUMIN SERPL-MCNC: 3.1 G/DL (ref 3.5–5.7)
ALP SERPL-CCNC: 81 IU/L (ref 34–125)
ALT SERPL-CCNC: 17 IU/L (ref 7–52)
ANION GAP SERPL CALC-SCNC: 6 MEQ/L (ref 3–15)
APTT PPP: 114 SEC (ref 23–35)
APTT PPP: 122 SEC (ref 23–35)
APTT PPP: 125 SEC (ref 23–35)
APTT PPP: 95 SEC (ref 23–35)
AST SERPL-CCNC: 14 IU/L (ref 13–39)
BILIRUB SERPL-MCNC: 0.9 MG/DL (ref 0.3–1.2)
BUN SERPL-MCNC: 21 MG/DL (ref 7–25)
CALCIUM SERPL-MCNC: 9.6 MG/DL (ref 8.6–10.3)
CHLORIDE SERPL-SCNC: 101 MEQ/L (ref 98–107)
CO2 SERPL-SCNC: 31 MEQ/L (ref 21–31)
CREAT SERPL-MCNC: 0.9 MG/DL (ref 0.7–1.3)
EGFRCR SERPLBLD CKD-EPI 2021: >60 ML/MIN/1.73M*2
ERYTHROCYTE [DISTWIDTH] IN BLOOD BY AUTOMATED COUNT: 15.2 % (ref 11.6–14.4)
GLUCOSE SERPL-MCNC: 254 MG/DL (ref 70–99)
HCT VFR BLD AUTO: 45.7 % (ref 40.1–51)
HGB BLD-MCNC: 15.1 G/DL (ref 13.7–17.5)
MCH RBC QN AUTO: 29.8 PG (ref 28–33.2)
MCHC RBC AUTO-ENTMCNC: 33 G/DL (ref 32.2–36.5)
MCV RBC AUTO: 90.3 FL (ref 83–98)
PLATELET # BLD AUTO: 212 K/UL (ref 150–350)
PMV BLD AUTO: 9.6 FL (ref 9.4–12.4)
POTASSIUM SERPL-SCNC: 4 MEQ/L (ref 3.5–5.1)
PROT SERPL-MCNC: 5.3 G/DL (ref 6–8.2)
RBC # BLD AUTO: 5.06 M/UL (ref 4.5–5.8)
SODIUM SERPL-SCNC: 138 MEQ/L (ref 136–145)
WBC # BLD AUTO: 11.71 K/UL (ref 3.8–10.5)

## 2024-10-15 PROCEDURE — 85730 THROMBOPLASTIN TIME PARTIAL: CPT | Performed by: STUDENT IN AN ORGANIZED HEALTH CARE EDUCATION/TRAINING PROGRAM

## 2024-10-15 PROCEDURE — 63600000 HC DRUGS/DETAIL CODE: Mod: JZ | Performed by: NURSE PRACTITIONER

## 2024-10-15 PROCEDURE — 63600000 HC DRUGS/DETAIL CODE: Mod: JZ | Performed by: HOSPITALIST

## 2024-10-15 PROCEDURE — 0W9930Z DRAINAGE OF RIGHT PLEURAL CAVITY WITH DRAINAGE DEVICE, PERCUTANEOUS APPROACH: ICD-10-PCS | Performed by: RADIOLOGY

## 2024-10-15 PROCEDURE — 99233 SBSQ HOSP IP/OBS HIGH 50: CPT | Performed by: STUDENT IN AN ORGANIZED HEALTH CARE EDUCATION/TRAINING PROGRAM

## 2024-10-15 PROCEDURE — 99223 1ST HOSP IP/OBS HIGH 75: CPT | Performed by: SURGERY

## 2024-10-15 PROCEDURE — 25000000 HC PHARMACY GENERAL: Performed by: INTERNAL MEDICINE

## 2024-10-15 PROCEDURE — 85027 COMPLETE CBC AUTOMATED: CPT | Performed by: INTERNAL MEDICINE

## 2024-10-15 PROCEDURE — 97166 OT EVAL MOD COMPLEX 45 MIN: CPT | Mod: GO

## 2024-10-15 PROCEDURE — 20600000 HC ROOM AND CARE INTERMEDIATE/TELEMETRY

## 2024-10-15 PROCEDURE — 74250 X-RAY XM SM INT 1CNTRST STD: CPT

## 2024-10-15 PROCEDURE — 63600000 HC DRUGS/DETAIL CODE: Mod: JZ | Performed by: PHYSICIAN ASSISTANT

## 2024-10-15 PROCEDURE — 97162 PT EVAL MOD COMPLEX 30 MIN: CPT | Mod: GP

## 2024-10-15 PROCEDURE — 80053 COMPREHEN METABOLIC PANEL: CPT | Performed by: INTERNAL MEDICINE

## 2024-10-15 PROCEDURE — 99231 SBSQ HOSP IP/OBS SF/LOW 25: CPT | Performed by: SURGERY

## 2024-10-15 PROCEDURE — 36415 COLL VENOUS BLD VENIPUNCTURE: CPT | Performed by: STUDENT IN AN ORGANIZED HEALTH CARE EDUCATION/TRAINING PROGRAM

## 2024-10-15 RX ORDER — PANTOPRAZOLE SODIUM 40 MG/10ML
40 INJECTION, POWDER, LYOPHILIZED, FOR SOLUTION INTRAVENOUS EVERY 24 HOURS
Status: DISCONTINUED | OUTPATIENT
Start: 2024-10-15 | End: 2024-10-25 | Stop reason: HOSPADM

## 2024-10-15 RX ORDER — DEXTROSE, SODIUM CHLORIDE, SODIUM LACTATE, POTASSIUM CHLORIDE, AND CALCIUM CHLORIDE 5; .6; .31; .03; .02 G/100ML; G/100ML; G/100ML; G/100ML; G/100ML
INJECTION, SOLUTION INTRAVENOUS CONTINUOUS
Status: ACTIVE | OUTPATIENT
Start: 2024-10-15 | End: 2024-10-16

## 2024-10-15 RX ORDER — DIATRIZOATE MEGLUMINE AND DIATRIZOATE SODIUM 660; 100 MG/ML; MG/ML
120 SOLUTION ORAL; RECTAL ONCE
Status: COMPLETED | OUTPATIENT
Start: 2024-10-15 | End: 2024-10-15

## 2024-10-15 RX ADMIN — SODIUM CHLORIDE, SODIUM LACTATE, POTASSIUM CHLORIDE, CALCIUM CHLORIDE AND DEXTROSE MONOHYDRATE: 5; 600; 310; 30; 20 INJECTION, SOLUTION INTRAVENOUS at 06:30

## 2024-10-15 RX ADMIN — DIATRIZOATE MEGLUMINE AND DIATRIZOATE SODIUM 120 ML: 660; 100 LIQUID ORAL; RECTAL at 09:14

## 2024-10-15 RX ADMIN — PANTOPRAZOLE SODIUM 40 MG: 40 INJECTION, POWDER, LYOPHILIZED, FOR SOLUTION INTRAVENOUS at 09:28

## 2024-10-15 RX ADMIN — METOPROLOL TARTRATE 5 MG: 1 INJECTION, SOLUTION INTRAVENOUS at 12:31

## 2024-10-15 RX ADMIN — METOPROLOL TARTRATE 5 MG: 1 INJECTION, SOLUTION INTRAVENOUS at 20:02

## 2024-10-15 RX ADMIN — METOPROLOL TARTRATE 5 MG: 1 INJECTION, SOLUTION INTRAVENOUS at 04:37

## 2024-10-15 RX ADMIN — SODIUM CHLORIDE, SODIUM LACTATE, POTASSIUM CHLORIDE, CALCIUM CHLORIDE AND DEXTROSE MONOHYDRATE: 5; 600; 310; 30; 20 INJECTION, SOLUTION INTRAVENOUS at 19:47

## 2024-10-15 ASSESSMENT — COGNITIVE AND FUNCTIONAL STATUS - GENERAL
HELP NEEDED FOR BATHING: 2 - A LOT
AFFECT: ANXIOUS;CONFUSED
MOVING TO AND FROM BED TO CHAIR: 2 - A LOT
STANDING UP FROM CHAIR USING ARMS: 3 - A LITTLE
WALKING IN HOSPITAL ROOM: 2 - A LOT
TOILETING: 3 - A LITTLE
MOVING TO AND FROM BED TO CHAIR: 3 - A LITTLE
EATING MEALS: 1 - TOTAL
HELP NEEDED FOR PERSONAL GROOMING: 3 - A LITTLE
DRESSING REGULAR UPPER BODY CLOTHING: 3 - A LITTLE
AFFECT: ANXIOUS;CONFUSED
WALKING IN HOSPITAL ROOM: 3 - A LITTLE
CLIMB 3 TO 5 STEPS WITH RAILING: 2 - A LOT
STANDING UP FROM CHAIR USING ARMS: 2 - A LOT
DRESSING REGULAR LOWER BODY CLOTHING: 3 - A LITTLE
CLIMB 3 TO 5 STEPS WITH RAILING: 1 - TOTAL

## 2024-10-15 ASSESSMENT — ENCOUNTER SYMPTOMS
CHEST TIGHTNESS: 0
WEAKNESS: 1
FATIGUE: 1
ABDOMINAL PAIN: 1
FEVER: 0
CHILLS: 0
NAUSEA: 0
SHORTNESS OF BREATH: 1
VOMITING: 0

## 2024-10-15 NOTE — HOSPITAL COURSE
Dave is a 88 y.o. male admitted on 10/14/2024 with SBO (small bowel obstruction) (CMS/HCC) [K56.609]  PE (pulmonary thromboembolism) (CMS/HCC) [I26.99]  Pleural effusion on right [J90]. Principal problem is PE (pulmonary thromboembolism) (CMS/HCC).    Past Medical History  Dave has a past medical history of Colon cancer (CMS/HCC) (2012), Coronary artery disease, DVT (deep venous thrombosis) (CMS/HCC), Hypertension, Lipid disorder, Pleural effusion, and Pulmonary embolism (CMS/HCC).    History of Present Illness  88 y.o. male with a past medical history of Dvt/PE, HTN, CAD, rectal cancer s/p chemoXRT and APR with end colostomy, recurrent pleural effusion, who presents with abdominal pain found to have PE (on hep gtt) and SBO     CXR IMPRESSION:  Loculated right pleural effusion and basilar airspace opacity.    CT Abd IMPRESSION  Loculated RIGHT pleural effusion with pleural thickening. Empyema not excluded.  Subtotal atelectasis of the RIGHT LOWER and MIDDLE lobes. The left lung is clear.     10/16: s/p  right pigtail chest tube placement.      NGT for suction now removed; ST consult.

## 2024-10-15 NOTE — PROGRESS NOTES
Physical Therapy -  Initial Evaluation     Patient: Dave Arredondo  Location: [facility] 3C 0359  MRN: [id number]  Today's date: 10/15/2024    HISTORY OF PRESENT ILLNESS     Dave is a 88 y.o. male admitted on 10/14/2024 with SBO (small bowel obstruction) (CMS/HCC) [K56.609]  PE (pulmonary thromboembolism) (CMS/HCC) [I26.99]  Pleural effusion on right [J90]. Principal problem is PE (pulmonary thromboembolism) (CMS/HCC).    Past Medical History  Dave has a past medical history of Colon cancer (CMS/HCC) (2012), Coronary artery disease, DVT (deep venous thrombosis) (CMS/HCC), Hypertension, Lipid disorder, Pleural effusion, and Pulmonary embolism (CMS/HCC).    History of Present Illness   88 y.o. male with a past medical history of Dvt/PE, HTN, CAD, rectal cancer s/p chemoXRT and APR with end colostomy, recurrent pleural effusion, who presents with abdominal pain found to have PE (on hep gtt) and SBO     CXR IMPRESSION:  Loculated right pleural effusion and basilar airspace opacity.    CT Abd IMPRESSION   Loculated RIGHT pleural effusion with pleural thickening. Empyema not excluded.  Subtotal atelectasis of the RIGHT LOWER and MIDDLE lobes. The left lung is clear.   PRIOR LEVEL OF FUNCTION AND LIVING ENVIRONMENT     Prior Level of Function      Flowsheet Row Most Recent Value   Dominant Hand right   Ambulation independent   Transferring independent   Toileting independent   Bathing independent   Dressing independent   Eating independent   Prior Level of Function Comment Per last EMR pt IND w/ mobility/adls w/o AD. But recently has only been able to ambulate short distances   Assistive Device Currently Used at Home grab bar, shower chair             Prior Living Environment      Flowsheet Row Most Recent Value   People in Home roommate(s)   Current Living Arrangements home   Home Accessibility stairs to enter home (Group), stairs within home (Group)   Living Environment Comment Pt is a poor historian  and stated he lives w/ his 4 brothers. Per EMR pt lives w/ roommate(s) on the 2nd fl, accessible by 12 steps, [address] building, no elevator access          VITALS AND PAIN     PT Vitals      Date/Time Pulse HR Source SpO2 Pt Activity O2 Therapy BP BP Location BP Method Pt Position [facility]   10/15/24 1058 98 Monitor 95 % At rest None (Room air) 133/86 Right upper arm Automatic Lying AMT   10/15/24 1105 -- -- -- -- -- 121/83 +LH Right upper arm Automatic Sitting AMT          PT Pain      Date/Time Pain Type Location Rating: Rest Rating: Activity Interventions [facility]   10/15/24 1058 Pain Assessment abdomen 8 8 care clustered AMT             Objective   OBJECTIVE     Start time:  1054  End time:  1106  Session Length: 12 min  Mode of Treatment: co-treatment  Reason for co-treatment: cotx c OT AT to expedite DC planning    General Observations  Patient received reclined, in bed. He was agreeable to therapy, no issues or concerns identified by nurse prior to session. RN cleared, pt agreeable to limited session due to fatigue    Precautions: fall, NPO       Limitations/Impairments: safety/cognitive   Services  Do You Speak a Language Other Than English at Home?: no      PT Eval and Treat - 10/15/24 1054          Cognition    Orientation Status oriented to;person;time;verbal cues/prompts needed for orientation;place     Affect/Mental Status anxious;confused     Behavioral Issues overwhelmed easily     Follows Commands follows one-step commands;over 90% accuracy;delayed response/completion;increased processing time needed;physical/tactile prompts required;verbal cues/prompting required;repetition of directions required     Cognitive Function attention deficit;executive function deficit;safety deficit     Attention Deficit arousal/alertness;concentration;requires cues/redirection to task     Executive Function Deficit judgment;self-monitoring/self-correction;information processing;insight/awareness of deficits     Safety  Deficit awareness of need for assistance;insight into deficits/self-awareness;ability to follow commands     Comment, Cognition pt mildly lethargic, reporting fatigue but agreeable to limited session, oriented but confused? reporting he lives c his brothers and other reported information often unintelligible        Vision Assessment/Intervention    Vision Assessment unable/difficult to assess        Hearing Assessment    Hearing Status --   grossly WFL       Sensory Assessment    Sensory Assessment sensation intact, lower extremities        Lower Extremity Assessment    LE Assessment ROM and Strength WFL except     General Observations formal assessment limited by pt fatigue and mild confusion, appears grossly 3+/5 BLE        Bed Mobility    Bed Mobility Activities supine to sit;sit to supine;left     [place] 2 person assist;minimum assist (75% or more patient effort);close supervision     Safety/Cues increased time to complete;moderate;verbal cues;tactile cues;technique;sequencing;hand placement     Assistive Device bed rails;head of bed elevated;draw sheet     Comment OOB to L c min A x 2 at trunk and hips, reported mild dizziness that resolved c time, cl S to supine        Mobility Belt    Mobility Belt Used During Session no - medical contraindication     Reason Mobility Belt Not Used (+) ostomy        Sit/Stand Transfer    Surface edge of bed     [place] 2 person assist;minimum assist (75% or more patient effort)     Safety/Cues increased time to complete;minimal;verbal cues;hand placement;preparatory posture     Assistive Device other (see comments)     Transfer Comments HHA x 2 at min A level for side by side/LE blocking, assist to rise and control descent to sit        Gait Training    [place], Gait 2 person assist;minimum assist (75% or more patient effort)     Safety/Cues increased time to complete;minimal;verbal cues;tactile cues;technique;sequencing     Assistive Device other (see  comments)     Distance in Feet 2 feet     Pattern step-to     Deviations/Abnormal Patterns gait speed decreased;step length decreased;stride length decreased;weight shifting decreased     Comment side steps to HOB c min A x 2 c [id number] assist, unsteady, limited by strength, balance and fatigue        Balance    Static Sitting Balance WFL;mild impairment;unsupported;sitting, edge of bed     Dynamic Sitting Balance mild impairment;unsupported;sitting, edge of bed     Sit to Stand Dynamic Balance mild impairment;supported     Static Standing Balance mild impairment;supported     Dynamic Standing Balance mild impairment;supported        Impairments/Safety Issues    Impairments Affecting Function balance;cognition;endurance/activity tolerance;strength     Cognitive Impairments, Safety/Performance insight into deficits/self-awareness;awareness, need for assistance   arousal/fatigue                                   Education Documentation  Transfers, taught by Ele Vivas PT at 10/15/2024  3:24 PM.  Learner: Patient  Readiness: Acceptance  Method: Explanation  Response: Verbalizes Understanding, Needs Reinforcement, No Evidence of Learning  Comment: Pt edu on role of PT/POC, safety and sequencing c mobility    Gait Training, taught by Ele Vivas PT at 10/15/2024  3:24 PM.  Learner: Patient  Readiness: Acceptance  Method: Explanation  Response: Verbalizes Understanding, Needs Reinforcement, No Evidence of Learning  Comment: Pt edu on role of PT/POC, safety and sequencing c mobility    Bed Mobility, taught by Ele Vivas PT at 10/15/2024  3:24 PM.  Learner: Patient  Readiness: Acceptance  Method: Explanation  Response: Verbalizes Understanding, Needs Reinforcement, No Evidence of Learning  Comment: Pt edu on role of PT/POC, safety and sequencing c mobility        Session Outcome  Patient upright, in bed at end of session, all needs met, call light in reach, personal items in reach,  bed alarm on. Nursing notified about change in vital signs, patient's performance, patient's position, and patient's response to therapy/activity.    AM-PAC™ - Mobility (Current Function)     Turning form your back to your side while in flat bed without using bedrails 2 - A Lot   Moving from lying on your back to sitting on the side of a flat bed without using bedrails 2 - A Lot   Moving to and from a bed to a chair 2 - A Lot   Standing up from a chair using your arms 2 - A Lot   To walk in a hospital room 2 - A Lot   Climbing 3-5 steps with a railing 1 - Total   AM-PAC™ Mobility Score 11      ASSESSMENT AND PLAN     Progress Summary  Pt is an 88 yom seen for initial eval p admission for PE (on hep gtt) and SBO. Completed bed mob, transfers, and amb 2' c HHA at min A  x2. Session today limited by pt's fatigue. Pt limited by strength, balance, pain, fatigue, confusion, and linda to activity. Cont services to inc mobility and dec need for assist. Recommend SNF pending progress.    Patient/Family Therapy Goals Statement: unable to state 2* fatigue/confusion    PT Plan      Flowsheet Row Most Recent Value   Rehab Potential good, to achieve stated therapy goals at 10/15/2024 1054   Therapy Frequency 4 times/wk at 10/15/2024 1054   Planned Therapy Interventions neuromuscular re-education, bed mobility training, transfer training, balance training, stair training, gait training, patient/family education, home exercise program, strengthening at 10/15/2024 1054            PT Discharge Recommendations      Flowsheet Row Most Recent Value   PT Recommended Discharge Disposition skilled nursing facility at 10/15/2024 1054   Anticipated Equipment Needs if Discharged Home (PT) --  [TBD at next level of care] at 10/15/2024 1054                 PT Goals      Flowsheet Row Most Recent Value   Bed Mobility Goal 1    Activity/Assistive Device bed mobility activities, all at 10/15/2024 1054   [place] modified independence at  10/15/2024 1054   Time Frame by discharge at 10/15/2024 1054   Progress/Outcome goal ongoing at 10/15/2024 1054   Transfer Goal 1    Activity/Assistive Device all transfers, other (see comments)  [LRAD] at 10/15/2024 1054   [place] modified independence at 10/15/2024 1054   Time Frame by discharge at 10/15/2024 1054   Progress/Outcome goal ongoing at 10/15/2024 1054   Gait Training Goal 1    Activity/Assistive Device gait (walking locomotion), other (see comments)  [LRAD] at 10/15/2024 1054   [place] modified independence at 10/15/2024 1054   Distance 150' at 10/15/2024 1054   Time Frame by discharge at 10/15/2024 1054   Progress/Outcome goal ongoing at 10/15/2024 1054   Stairs Goal 1    Activity/Assistive Device stairs, all skills at 10/15/2024 1054   [place] modified independence at 10/15/2024 1054   Number of Stairs 4 at 10/15/2024 1054   Time Frame by discharge at 10/15/2024 1054   Progress/Outcome goal ongoing at 10/15/2024 1054

## 2024-10-15 NOTE — PLAN OF CARE
Care Coordination Discharge Plan Note     Discharge Needs Assessment  Concerns to be Addressed: discharge planning, care coordination/care conferences  Current Discharge Risk: chronically ill, lack of support system/caregiver, other (see comments) (Pt resides with other residents who are also elderly and may not understand his care needs.  Eliezer in emerg. contact but he is also not understanding pt's care needs but does try to help pt by taking him to pharmacy.)    Anticipated Discharge Plan  Anticipated Discharge Disposition: other (see comments) (To be determined, pt may need SNF pending medical course due to care needs.)       Patient Choice  Offered/Gave Vendor List: no  Patient's Choice of Community Agency(s): TBD         ---------------------------------------------------------------------------------------------------------------------    Interdisciplinary Discharge Plan Review:  Participants:     Concerns Comments:     Discharge Plan:   Disposition/Destination:   /    Discharge Facility:    Community Resources:      Discharge Transportation:  Is Out of Hospital DNR needed at Discharge: no  Does patient need discharge transport?          ADD-11:10am  SW left PCP list at bedside along with [facility] information.     D/c plan   -No PCP.  Following PT/OT recs  Return to [facility]  RN report?  Transport-Friend, Eliezer Beltre

## 2024-10-15 NOTE — PLAN OF CARE
Problem: Adult Inpatient Plan of Care  Goal: Plan of Care Review  Outcome: Progressing  Flowsheets (Taken 10/15/2024 1520)  Progress: improving  Outcome Evaluation: PT eval complete  Plan of Care Reviewed With: patient     Problem: Mobility Impairment  Goal: Optimal Mobility  Outcome: Progressing

## 2024-10-15 NOTE — ED PROVIDER NOTES
Emergency Medicine Note  HPI   HISTORY OF PRESENT ILLNESS     HPI  This is an 88-year-old male with a PMH of PE on Xarelto, DVT, CAD, lipid disorder, pleural effusion, HTN presents to the ED for evaluation of weakness, chest pain, abdominal pain.  Patient reports he had a thoracentesis 5 months ago and is still experiencing right-sided chest pain.  Patient also reports he has developed abdominal pain as well.  Patient reports he feels like he is getting weaker with each day.      Patient History   PAST HISTORY     Reviewed from Nursing Triage:  Tobacco  Problems  Med Hx  Surg Hx  Fam Hx  Soc Hx      Past Medical History:   Diagnosis Date    Colon cancer (CMS/HCC) 2012    Coronary artery disease     DVT (deep venous thrombosis) (CMS/HCC)     Hypertension     Lipid disorder     Pleural effusion     Pulmonary embolism (CMS/HCC)        Past Surgical History   Procedure Laterality Date    Colostomy      Hernia repair      Thoracentesis      THORACOSCOPY--VATS-PLEURAL BIOPSY, PLEURX CATHETER PLACEMENT Right 7/8/2024    Performed by Sergey Dennis MD at [facility] OR [facility]       Family History   Problem Relation Name Age of Onset    Heart disease Biological Mother         Social History     Tobacco Use    Smoking status: Never    Smokeless tobacco: Never   Substance Use Topics    Alcohol use: Not Currently    Drug use: Never         Review of Systems   REVIEW OF SYSTEMS     Review of Systems   Constitutional:  Positive for fatigue. Negative for chills and fever.   Respiratory:  Positive for shortness of breath. Negative for chest tightness.    Cardiovascular:  Positive for chest pain.   Gastrointestinal:  Positive for abdominal pain. Negative for nausea and vomiting.   Neurological:  Positive for weakness.         VITALS     ED Vitals      Date/Time Temp Pulse Resp BP SpO2 [facility]   10/14/24 0433 -- 106 20 143/95 97 % SR   10/14/24 0218 -- 96 20 169/93 97 % SR   10/14/24 0051 -- 90 22 156/89 95 % SR   10/13/24 2156 35.9 °C (96.6  °F) 92 18 113/74 94 % KCB                         Physical Exam   PHYSICAL EXAM     Physical Exam  Vitals and nursing note reviewed.   Constitutional:       General: He is not in acute distress.     Appearance: Normal appearance. He is not toxic-appearing.   Cardiovascular:      Rate and Rhythm: Normal rate and regular rhythm.      Pulses: Normal pulses.      Heart sounds: Normal heart sounds.   Pulmonary:      Effort: Pulmonary effort is normal. No respiratory distress.      Breath sounds: Normal breath sounds.   Abdominal:      General: There is no distension.      Palpations: Abdomen is soft.      Tenderness: There is generalized abdominal tenderness. There is no guarding or rebound.   Skin:     General: Skin is warm and dry.   Neurological:      General: No focal deficit present.      Mental Status: He is lethargic.      GCS: GCS eye subscore is 4. GCS verbal subscore is 5. GCS motor subscore is 6.      Cranial Nerves: No cranial nerve deficit, dysarthria or facial asymmetry.           PROCEDURES     Procedures     DATA     Results       Procedure Component Value Units Date/Time    UA w/ reflex culture (ED Only) [[id number]]  (Abnormal) Collected: 10/14/24 1113    Specimen: Urine, Clean Catch Updated: 10/14/24 1204    Narrative:      The following orders were created for panel order UA w/ reflex culture (ED Only).  Procedure                               Abnormality         Status                     ---------                               -----------         ------                     UA Reflex to Culture (Ma...[[id number]]  Abnormal            Final result               UA Microscopic[[id number]]               Abnormal            Final result                 Please view results for these tests on the individual orders.    UA Reflex to Culture (Macroscopic) [[id number]]  (Abnormal) Collected: 10/14/24 1113    Specimen: Urine, Clean Catch Updated: 10/14/24 1200     Color, Urine Yellow     Clarity, Urine Cloudy      Specific Gravity, Urine >1.035     pH, Urine 7.0     Leukocyte Esterase Negative     Comment: Results can be falsely negative due to high specific gravity, some antibiotics, glucose >3 g/dl, or WBC other than neutrophils.        Nitrite, Urine Negative     Protein, Urine Trace     Comment: Trace False Positive Protein can be seen with alkaline or highly buffered urines or urine with high specific gravity. Suggest clinical correlation.        Glucose, Urine Negative mg/dL      Ketones, Urine +1 mg/dL      Comment: Free sulfhydryl drugs such as Mesna, Capoten, and Acetylcysteine (Mucomyst) may cause false positive ketonuria.        Urobilinogen, Urine 2.0 EU/dL      Bilirubin, Urine Negative mg/dL      Blood, Urine Negative     Comment: The sensitivity of the occult blood test is equivalent to approximately 4 intact RBC/HPF.       PTT [[id number]]  (Abnormal) Collected: 10/14/24 0849    Specimen: Blood, Venous Updated: 10/14/24 0923      sec      Comment: The Standard Therapeutic Range for Heparin is 74 to 106 seconds.       Protime-INR [[id number]]  (Normal) Collected: 10/14/24 0849    Specimen: Blood, Venous Updated: 10/14/24 0923     PT 14.0 sec      INR 1.1     Comment: INR has no defined significance when PT is within Reference Range.       Lactate, w/ reflex repeat if > 2.0 [[id number]]  (Normal) Collected: 10/14/24 0402    Specimen: Blood, Venous Updated: 10/14/24 0415     Lactate 1.2 mmol/L     Protime-INR [[id number]]  (Normal) Collected: 10/14/24 0231    Specimen: Blood, Venous Updated: 10/14/24 0345     PT 13.0 sec      INR 1.0     Comment: INR has no defined significance when PT is within Reference Range.       PTT [[id number]]  (Normal) Collected: 10/14/24 0231    Specimen: Blood, Venous Updated: 10/14/24 0344     PTT 23 sec     B-type natriuretic peptide [[id number]]  (Abnormal) Collected: 10/13/24 2208    Specimen: Blood, Venous Updated: 10/14/24 0200      pg/mL     SARS-COV-2 (COVID-19)/ FLU  A/B, AND RSV, PCR Nasopharynx [[id number]]  (Normal) Collected: 10/14/24 0048    Specimen: Nasopharyngeal Swab from Nasopharynx Updated: 10/14/24 0142     SARS-CoV-2 (COVID-19) Negative     Influenza A Negative     Influenza B Negative     Respiratory Syncytial Virus Negative    Narrative:      Testing performed using real-time PCR for detection of COVID-19. EUA approved validation studies performed on site.     Magnesium [[id number]]  (Abnormal) Collected: 10/13/24 2208    Specimen: Blood, Venous Updated: 10/14/24 0141     Magnesium 1.7 mg/dL     Lipase [[id number]]  (Normal) Collected: 10/13/24 2208    Specimen: Blood, Venous Updated: 10/14/24 0141     Lipase 31 U/L     HS Troponin I (with 2 hour reflex) [[id number]]  (Abnormal) Collected: 10/13/24 2208    Specimen: Blood, Venous Updated: 10/13/24 2253     High Sens Troponin I 20.0 pg/mL     Comprehensive metabolic panel [[id number]]  (Abnormal) Collected: 10/13/24 2208    Specimen: Blood, Venous Updated: 10/13/24 2241     Sodium 135 mEQ/L      Potassium 3.8 mEQ/L      Comment: Results obtained on plasma. Plasma Potassium values may be up to 0.4 mEQ/L less than serum values. The differences may be greater for patients with high platelet or white cell counts.        Chloride 100 mEQ/L      CO2 25 mEQ/L      BUN 15 mg/dL      Creatinine 0.7 mg/dL      Glucose 151 mg/dL      Calcium 9.7 mg/dL      AST (SGOT) 16 IU/L      ALT (SGPT) 18 IU/L      Alkaline Phosphatase 90 IU/L      Total Protein 6.3 g/dL      Comment: Test performed on plasma which typically contains approximately 0.4 g/dL more protein than serum.        Albumin 3.6 g/dL      Bilirubin, Total 1.1 mg/dL      eGFR >60.0 mL/min/1.73m*2      Comment: Calculation based on the Chronic Kidney Disease Epidemiology Collaboration (CKD-EPI) equation refit without adjustment for race.        Anion Gap 10 mEQ/L     CBC and differential [[id number]]  (Abnormal) Collected: 10/13/24 2208    Specimen: Blood, Venous Updated:  10/13/24 2221     WBC 10.87 K/uL      RBC 4.72 M/uL      Hemoglobin 14.0 g/dL      Hematocrit 41.7 %      MCV 88.3 fL      MCH 29.7 pg      MCHC 33.6 g/dL      RDW 14.7 %      Platelets 205 K/uL      MPV 9.1 fL      Differential Type Auto     nRBC 0.0 %      Immature Granulocytes 0.5 %      Neutrophils 82.3 %      Lymphocytes 5.4 %      Monocytes 11.3 %      Eosinophils 0.3 %      Basophils 0.2 %      Immature Granulocytes, Absolute 0.05 K/uL      Neutrophils, Absolute 8.95 K/uL      Lymphocytes, Absolute 0.59 K/uL      Monocytes, Absolute 1.23 K/uL      Eosinophils, Absolute 0.03 K/uL      Basophils, Absolute 0.02 K/uL             Imaging Results              CT ANGIOGRAPHY CHEST PULMONARY EMBOLISM WITH IV CONTRAST (Edited Result - FINAL)  Result time 10/14/24 13:00:14      Addendum (preliminary) 2 of 2    Correction to  IMPRESSION ADDENDUM:    3. Subtotal atelectasis of the RIGHT LOWER and MIDDLE lobes.    The left lung is clear.                   Addendum (preliminary) 1 of 2    Correction of a speech recognition/transcription error in the IMPRESSION section  of the report.    2. Loculated RIGHT pleural effusion with pleural thickening. Empyema not  excluded.                 Final result                   Impression:    IMPRESSION:  1.  Left upper and lower lobe pulmonary emboli.  2.  Loculated left pleural effusion with pleural thickening. Empyema not  excluded.  3.  Subtotal atelectasis in the left upper and lower lobe.  4.  Dilated small bowel with transition point in the left midabdomen, concerning  for bowel obstruction.  5.  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      Critical findings were documented in the preliminary interpretation by the  overnight radiologist. Findings were acknowledged by emergency department staff,  as documented in the EMR on 10/14/2024 at 2:48 AM               Narrative:    CLINICAL HISTORY: Chest pain. Suspected pulmonary embolism. Left lower  quadrant  pain.    COMPARISON: Chest x-ray dated 8/20/2024, CT dated 7/7/2024, and other studies    TECHNIQUE: CT pulmonary angiography of the chest was performed after contrast  administration. Maximum intensity projection (MIP) reconstructions were included  for evaluation of pulmonary embolism. Three-dimensional maximum intensity  projection imaging was also performed of the vasculature.  Then, a contrast enhanced CT of the abdomen and pelvis was performed.  Reformatted axial, sagittal and coronal images were reviewed.  Dose reduction techniques such as automated exposure control were used in this  study where applicable.  Administered contrast and dose:  100mL of iopamidoL (ISOVUE-370) 370 mg iodine /mL (76 %) injection 100 mL    COMMENT:    Thorax:  The main pulmonary artery is normal in caliber.  Multiple pulmonary arterial filling defects are seen involving lobar, segmental  subsegmental branches in the left upper and left lower lobes. No right-sided  pulmonary arterial filling defects are appreciated.    The visualized thyroid gland is unremarkable.  No thoracic lymphadenopathy.    No aortic aneurysm.    Mild cardiomegaly.  No pericardial effusion. Coronary calcifications noted.    The central airways are grossly patent.    There is a moderate-sized loculated right pleural effusion with associated  pleural thickening, most pronounced at the lung bases.  There is overall volume loss in the right lung with subtotal atelectasis in the  right lower middle lobes. These findings are similar to the prior study.    There is a stellate nodular opacity in the left upper lobe, series 4 image 88.  This has been stable since at least 2020.    There are dependent hypoventilatory changes in the left lung base.  No left pleural effusion or pneumothorax.    The bones of the chest are stable.    Abdomen/Pelvis:  The liver, gallbladder, pancreas, spleen and adrenal glands are unremarkable.    There is a single nonobstructing  stone in the upper pole of the left kidney.  Multiple bilateral renal sinus cysts are noted, larger on the left. There are  also several subcentimeter cortical hypodensities both kidneys, too small to  characterize.    The major abdominal vasculature is patent.    There is a left lower quadrant end colostomy. There is a parastomal hernia  containing loops of small bowel.    The proximal to mid small bowel is mildly dilated with layering air-fluid  levels. A transition point is seen in the left midabdomen, axial image 114  through 109. Findings are concerning for bowel obstruction.  The change with a separate from the above-noted parastomal hernia.    No large volume ascites, intra-abdominal abscess or pneumoperitoneum. No  lymphadenopathy by size criteria.    Small fat-containing left inguinal hernia.    The urinary bladder is unremarkable. The prostate is enlarged.    Mild degenerative changes in the spine and hips.                          Preliminary Interpretation    Patient Name: LUIZA RANGEL  Exam(s): chest CT  MR#: [id number]       History: CHEST PAIN AND P[AIN    Preliminary Impression:    PE in left upper lobe segmental and subsegmental vessels. PE in left lower lobe segmental and subsegmental vessels.    Right pleural effusion, which has increased in amount in comparison with the patient's previous study. Right upper lobe and right lower lobe compressive atelectasis versus infiltrates.    Cardiomegaly.      Interpreted by: Kayli Baird MD, Oct 14, 2024 02:39 AM                          Final result                                         CT ABDOMEN PELVIS WITH IV CONTRAST (Edited Result - FINAL)  Result time 10/14/24 13:00:14      Addendum (preliminary) 2 of 2    Correction to  IMPRESSION ADDENDUM:    3. Subtotal atelectasis of the RIGHT LOWER and MIDDLE lobes.    The left lung is clear.                   Addendum (preliminary) 1 of 2    Correction of a speech recognition/transcription error in the  IMPRESSION section  of the report.    2. Loculated RIGHT pleural effusion with pleural thickening. Empyema not  excluded.                 Final result                   Impression:    IMPRESSION:  1.  Left upper and lower lobe pulmonary emboli.  2.  Loculated left pleural effusion with pleural thickening. Empyema not  excluded.  3.  Subtotal atelectasis in the left upper and lower lobe.  4.  Dilated small bowel with transition point in the left midabdomen, concerning  for bowel obstruction.  5.  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      Critical findings were documented in the preliminary interpretation by the  overnight radiologist. Findings were acknowledged by emergency department staff,  as documented in the EMR on 10/14/2024 at 2:48 AM               Narrative:    CLINICAL HISTORY: Chest pain. Suspected pulmonary embolism. Left lower quadrant  pain.    COMPARISON: Chest x-ray dated 8/20/2024, CT dated 7/7/2024, and other studies    TECHNIQUE: CT pulmonary angiography of the chest was performed after contrast  administration. Maximum intensity projection (MIP) reconstructions were included  for evaluation of pulmonary embolism. Three-dimensional maximum intensity  projection imaging was also performed of the vasculature.  Then, a contrast enhanced CT of the abdomen and pelvis was performed.  Reformatted axial, sagittal and coronal images were reviewed.  Dose reduction techniques such as automated exposure control were used in this  study where applicable.  Administered contrast and dose:  100mL of iopamidoL (ISOVUE-370) 370 mg iodine /mL (76 %) injection 100 mL    COMMENT:    Thorax:  The main pulmonary artery is normal in caliber.  Multiple pulmonary arterial filling defects are seen involving lobar, segmental  subsegmental branches in the left upper and left lower lobes. No right-sided  pulmonary arterial filling defects are appreciated.    The visualized thyroid gland is  unremarkable.  No thoracic lymphadenopathy.    No aortic aneurysm.    Mild cardiomegaly.  No pericardial effusion. Coronary calcifications noted.    The central airways are grossly patent.    There is a moderate-sized loculated right pleural effusion with associated  pleural thickening, most pronounced at the lung bases.  There is overall volume loss in the right lung with subtotal atelectasis in the  right lower middle lobes. These findings are similar to the prior study.    There is a stellate nodular opacity in the left upper lobe, series 4 image 88.  This has been stable since at least 2020.    There are dependent hypoventilatory changes in the left lung base.  No left pleural effusion or pneumothorax.    The bones of the chest are stable.    Abdomen/Pelvis:  The liver, gallbladder, pancreas, spleen and adrenal glands are unremarkable.    There is a single nonobstructing stone in the upper pole of the left kidney.  Multiple bilateral renal sinus cysts are noted, larger on the left. There are  also several subcentimeter cortical hypodensities both kidneys, too small to  characterize.    The major abdominal vasculature is patent.    There is a left lower quadrant end colostomy. There is a parastomal hernia  containing loops of small bowel.    The proximal to mid small bowel is mildly dilated with layering air-fluid  levels. A transition point is seen in the left midabdomen, axial image 114  through 109. Findings are concerning for bowel obstruction.  The change with a separate from the above-noted parastomal hernia.    No large volume ascites, intra-abdominal abscess or pneumoperitoneum. No  lymphadenopathy by size criteria.    Small fat-containing left inguinal hernia.    The urinary bladder is unremarkable. The prostate is enlarged.    Mild degenerative changes in the spine and hips.                          Preliminary Interpretation    Patient Name: LUIZA RANGEL  Exam(s): a/p standard  CT  MR#: [id number]       History: CHEST PAIN AND P[AIN    Preliminary Impression:    Fluid-filled, distended proximal small bowel loops. Nondistended distal small bowel loops. Nondistended stomach and proximal jejunum. The findings are suspicious for mid small bowel obstruction, perhaps due to an internal hernia. There is mild infiltration of the associated mesentery.    No free fluid or free air. No hydronephrosis.    No colonic distention.    Left-sided ileostomy. Herniated bowel within the ileostomy site.      Interpreted by: Kayli Baird MD, Oct 14, 2024 02:44 AM                          Final result                                         X-RAY CHEST 2 VIEWS (Final result)  Result time 10/14/24 12:58:48      Final result                   Impression:    IMPRESSION:  Loculated right pleural effusion and basilar airspace opacity.               Narrative:    CLINICAL HISTORY:  Chest Pain/Arrhythmia    TECHNIQUE: Frontal and lateral views of the chest were obtained    COMPARISON: Concurrent CT, chest x-ray dated 8/2/2024, and other studies    COMMENT:  There is right-sided diffuse pleural thickening and evidence of a loculated  effusion. There is also right basilar airspace opacity. Findings are better  evaluated on concurrent chest CT.  The left lung is clear.  The cardiomediastinal silhouette is within normal limits.                                      ECG 12 lead          Scoring tools                                  ED Course & MDM   MDM / ED COURSE / CLINICAL IMPRESSION / DISPO     Medical Decision Making  This is an 88-year-old male with a PMH of PE on Xarelto, DVT, CAD, lipid disorder, pleural effusion, HTN presents to the ED for evaluation of weakness, chest pain, abdominal pain.  Patient found to have PE and started on heparin.  Patient admitted for further evaluation and management.    Problems Addressed:  PE (pulmonary thromboembolism) (CMS/HCC): acute illness or injury  SBO (small bowel obstruction)  (CMS/HCC): acute illness or injury    Amount and/or Complexity of Data Reviewed  Labs: ordered.     Details: All lab work reviewed  Radiology: ordered and independent interpretation performed.  ECG/medicine tests: ordered.    Risk  Prescription drug management.  Decision regarding hospitalization.        ED Course as of 10/15/24 0416   Mon Oct 14, 2024   0049 ECG as interpreted by ED physician:  Sinus rhythm with first-degree AV block at 100 bpm, normal axis, nonspecific ST-T wave changes, QTc 443. [MONICA]   0119 Chest x-ray on my evaluation with right-sided pleural effusion, no pneumothorax. [MONICA]   0303 Call from radiology, patient has PE as well as SBO [VR]   0306 Patient started on heparin [VR]   0306 Surgery paged [VR]      ED Course User Index  [MONICA] Gm Flores DO  [VR] Telma Hannah PA C     Clinical Impression      PE (pulmonary thromboembolism) (CMS/HCC)   SBO (small bowel obstruction) (CMS/HCC)     _________________       ED Disposition   Admit / Observation                       Telma Hannah PA C  10/15/24 0417

## 2024-10-15 NOTE — PROGRESS NOTES
Occupational Therapy -  Initial Evaluation     Patient: Dave Arredondo  Location: [facility] 3C 0359  MRN: [id number]  Today's date: 10/15/2024    HISTORY OF PRESENT ILLNESS     Dave is a 88 y.o. male admitted on 10/14/2024 with SBO (small bowel obstruction) (CMS/HCC) [K56.609]  PE (pulmonary thromboembolism) (CMS/HCC) [I26.99]  Pleural effusion on right [J90]. Principal problem is PE (pulmonary thromboembolism) (CMS/HCC).    Past Medical History  Dave has a past medical history of Colon cancer (CMS/HCC) (2012), Coronary artery disease, DVT (deep venous thrombosis) (CMS/HCC), Hypertension, Lipid disorder, Pleural effusion, and Pulmonary embolism (CMS/HCC).    History of Present Illness   88 y.o. male with a past medical history of Dvt/PE, HTN, CAD, rectal cancer s/p chemoXRT and APR with end colostomy, recurrent pleural effusion, who presents with abdominal pain found to have PE (on hep gtt) and SBO     CXR IMPRESSION:  Loculated right pleural effusion and basilar airspace opacity.    CT Abd IMPRESSION   Loculated RIGHT pleural effusion with pleural thickening. Empyema not excluded.  Subtotal atelectasis of the RIGHT LOWER and MIDDLE lobes. The left lung is clear.   PRIOR LEVEL OF FUNCTION AND LIVING ENVIRONMENT     Prior Level of Function      Flowsheet Row Most Recent Value   Dominant Hand right   Ambulation independent   Transferring independent   Toileting independent   Bathing independent   Dressing independent   Eating independent   Prior Level of Function Comment Per last EMR pt IND w/ mobility/adls w/o AD. But recently has only been able to ambulate short distances   Assistive Device Currently Used at Home grab bar, shower chair             Prior Living Environment      Flowsheet Row Most Recent Value   People in Home roommate(s)   Current Living Arrangements home   Home Accessibility stairs to enter home (Group), stairs within home (Group)   Living Environment Comment Pt  historian and  stated he lives w/ his 4 brothers. Per EMR pt lives w/ roommate(s) on the 2nd fl, accessible by 12 steps, [address] building, no elevator access          Occupational Profile      Flowsheet Row Most Recent Value   Environmental Supports and Barriers Lives w/ roommate(s) per EMR          VITALS AND PAIN     OT Vitals      Date/Time Pulse HR Source SpO2 Pt Activity O2 Therapy BP BP Location BP Method Pt Position [facility]   10/15/24 1058 98 Monitor 95 % At rest None (Room air) 133/86 Right upper arm Automatic Lying AMT   10/15/24 1105 -- -- -- -- -- 121/83 +LH Right upper arm Automatic Sitting AMT          OT Pain      Date/Time Pain Type Side/Orientation Rating: Rest Rating: Activity Interventions [facility]   10/15/24 1058 Pain Assessment abdomen 8 8 care clustered AMT             Objective   OBJECTIVE     Start time:  1055  End time:  1107  Session Length: 12 min  Mode of Treatment: co-treatment  Reason for co-treatment: expedite dispo    General Observations  Patient received supine, in bed. He was agreeable to therapy, no issues or concerns identified by nurse prior to session. RN cleared pt for therapy.    Precautions: fall, NPO       Limitations/Impairments: safety/cognitive      OT Eval and Treat - 10/15/24 1055          Cognition    Orientation Status oriented to;person;time;verbal cues/prompts needed for orientation;place     Affect/Mental Status anxious;confused     Behavioral Issues overwhelmed easily     Follows Commands follows one-step commands;over 90% accuracy;delayed response/completion;increased processing time needed;physical/tactile prompts required;verbal cues/prompting required;repetition of directions required     Cognitive Function attention deficit;executive function deficit;safety deficit     Attention Deficit arousal/alertness;concentration;requires cues/redirection to task     Executive Function Deficit judgment;self-monitoring/self-correction;information processing;insight/awareness of deficits     Safety  Deficit awareness of need for assistance;insight into deficits/self-awareness;ability to follow commands     Comment, Cognition Pt mildly lethargic and reporting fatigue. Agreeable to therapy however limited d/t decr activity tolerance and pain. He was oriented to self, time, and hospital. But unable to name hospital. Appeared confused at times. Incr time for processing, delayed responses, decr problem solving, and decr insight into deficits/safety awareness.        Vision Assessment/Intervention    Vision Assessment corrective lenses for reading        Hearing Assessment    Hearing Status WFL        Sensory Assessment    Sensory Assessment sensation intact, upper extremities        Upper Extremity Assessment    UE Assessment ROM and Strength WFL     General Observations BUE rom wfl. Grossly 4-/5        Motor Skills    Coordination WFL;opposition        Bed Mobility    Bed Mobility Activities supine to sit;sit to supine     [place] minimum assist (75% or more patient effort);2 person assist;close supervision     Safety/Cues increased time to complete;tactile cues;sequencing;technique     Assistive Device head of bed elevated;bed rails;draw sheet     Comment Bed exit L w/ HOB elevated, draw sheet, and bed rail. Incr time and effort required d/t pain, weakness, and decr balance. Min (A)x2 for trunk and hips for supine to sit. Mild unsteadiness and pt reporting lightheadedness/dizziness at EOB. 133/86 to 121/83 sitting. Symptoms improved w/ ankle pumps and time. CLS to return supine        Mobility Belt    Mobility Belt Used During Session yes        Sit/Stand Transfer    Surface edge of bed     [place] minimum assist (75% or more patient effort);2 person assist     Safety/Cues increased time to complete;hand placement;verbal cues;technique     Assistive Device other (see comments)   HHAx2    Transfer Comments Incr time to stand from EOB d/t pain, weakness, and decr balance. Min (A)x2 w/ B/L support. (A) to  control descent        Functional Mobility    Distance few steps at bedside     Functional Mobility [place] minimum assist (75% or more patient effort);2 person assist     Safety/Cues increased time to complete     Assistive Device other (see comments)   HHAx2    Functional Mobility Comments HHAx2 for lateral side steps towards HOB. Limited d/t fatigue, weakness, pain, and decr balance        Balance    Static Sitting Balance mild impairment;unsupported;sitting, edge of bed     Dynamic Sitting Balance mild impairment;unsupported;sitting, edge of bed     Sit to Stand Dynamic Balance mild impairment;supported     Static Standing Balance mild impairment;supported     Dynamic Standing Balance mild impairment;supported        Impairments/Safety Issues    Impairments Affecting Function balance;cognition;endurance/activity tolerance;strength;pain     Cognitive Impairments, Safety/Performance insight into deficits/self-awareness;awareness, need for assistance                                    Education Documentation  Self-Care, taught by Maylin Roland OT at 10/15/2024  3:25 PM.  Learner: Patient  Readiness: Acceptance  Method: Explanation  Response: Verbalizes Understanding, Needs Reinforcement  Comment: Discussed the role of OT, POC, proper body mechanics for func mobility/transfers, techniques for adls, DME/AE, and safety.    Transfers, taught by Maylin Roland OT at 10/15/2024  3:25 PM.  Learner: Patient  Readiness: Acceptance  Method: Explanation  Response: Verbalizes Understanding, Needs Reinforcement  Comment: Discussed the role of OT, POC, proper body mechanics for func mobility/transfers, techniques for adls, DME/AE, and safety.    Bed Mobility, taught by Maylin Roland OT at 10/15/2024  3:25 PM.  Learner: Patient  Readiness: Acceptance  Method: Explanation  Response: Verbalizes Understanding, Needs Reinforcement  Comment: Discussed the role of OT, POC, proper body mechanics for func  mobility/transfers, techniques for adls, DME/AE, and safety.        Session Outcome  Patient reclined, in bed at end of session, all needs met, call light in reach, personal items in reach, bed alarm on. Nursing notified about patient's performance, patient's position, patient's response to therapy/activity, and change in vital signs.    AM-PAC™ - ADL (Current Function)     Putting on/taking off regular lower body clothing 3 - A Little   Bathing 2 - A Lot   Toileting 3 - A Little   Putting on/taking off regular upper body clothing 3 - A Little   Help for taking care of personal grooming 3 - A Little   Eating meals 1 - Total   AM-PAC™ ADL Score 15      ASSESSMENT AND PLAN     Progress Summary  OT IE completed. Pt is an 88y.o. M who presented w/ abd pain found to have PE and SBO. Limited by decr activity tolerance, decr endurance, LH, weakness, balance deficits, pain, and decr safety/cog. He required min (A)x2 for supine to sit, CLS for sit to supine, min (A)x2 for STS, and min (A)x2 for lateral steps. Unable to assess standing adls this date or further mobility d/t decr activity tolerance. Education provided on role of OT, proper body mechanics for func mobility/transfers, techniques for adls, DME/AE, and safety. Rec ongoing. Pt may require stay at snf to address deficits, maximize safety, and improve performance w/ functional tasks.    Patient/Family Therapy Goal Statement: none stated    OT Plan      Flowsheet Row Most Recent Value   Rehab Potential good, to achieve stated therapy goals at 10/15/2024 1055   Therapy Frequency 4 times/wk at 10/15/2024 1055   Planned Therapy Interventions activity tolerance training, adaptive equipment training, BADL retraining, patient/caregiver education/training, transfer/mobility retraining, functional balance retraining, occupation/activity based interventions, ROM/therapeutic exercise, strengthening exercise at 10/15/2024 1055            OT Discharge Recommendations       Flowsheet Row Most Recent Value   OT Recommended Discharge Disposition skilled nursing facility at 10/15/2024 1055   Anticipated Equipment Needs if Discharged Home (OT) other (see comments)  [TBD] at 10/15/2024 1055                 OT Goals      Flowsheet Row Most Recent Value   Bed Mobility Goal 1    Activity/Assistive Device bed mobility activities, all at 10/15/2024 1055   [place] modified independence at 10/15/2024 1055   Time Frame by discharge at 10/15/2024 1055   Progress/Outcome goal ongoing at 10/15/2024 1055   Transfer Goal 1    Activity/Assistive Device all transfers, sit-to-stand/stand-to-sit at 10/15/2024 1055   [place] modified independence at 10/15/2024 1055   Time Frame by discharge at 10/15/2024 1055   Progress/Outcome goal ongoing at 10/15/2024 1055   Dressing Goal 1    Activity/Adaptive Equipment dressing skills, all at 10/15/2024 1055   [place] modified independence at 10/15/2024 1055   Time Frame by discharge at 10/15/2024 1055   Progress/Outcome goal ongoing at 10/15/2024 1055   Toileting Goal 1    Activity/Assistive Device toileting skills, all at 10/15/2024 1055   [place] modified independence at 10/15/2024 1055   Time Frame by discharge at 10/15/2024 1055   Progress/Outcome goal ongoing at 10/15/2024 1055

## 2024-10-15 NOTE — PLAN OF CARE
Plan of Care Review  Plan of Care Reviewed With: patient  Progress: no change  Outcome Evaluation: VVS on RA, pt lethargic, arouses to voice, oriented to self only; NG tube to low, cont suction, clamped at noon for floro study, still clamped, awaiting instructions from x-ray; heparin gtt cont., D5/LR@80 cont; ostomy with very small stool and flatus; plan is for chest tube tmr, heparin gtt to be held at 0900 on 10/16 for placement; bed alarm on, tasneem bell in reach

## 2024-10-15 NOTE — PLAN OF CARE
Problem: Adult Inpatient Plan of Care  Goal: Plan of Care Review  Outcome: Progressing  Flowsheets (Taken 10/15/2024 1521)  Progress: improving  Outcome Evaluation: OT abraham completed  Plan of Care Reviewed With: patient     Problem: Self-Care Deficit  Goal: Improved Ability to Complete Activities of Daily Living  Outcome: Progressing

## 2024-10-15 NOTE — PROGRESS NOTES
[facility]    Progress Note    Subjective     Interval History:  No acute events overnight.  Doing well this morning.  NG remains to [facility].  Patient reports abdominal pain improved.  Denies fevers, chills, nausea, vomiting. Some gas in ostomy bag but no stool yet.    Objective     Vitals:    10/15/24 0435   BP: (!) 120/90   Pulse: (!) 107   Resp: 16   Temp: 36.3 °C (97.4 °F)   SpO2: 95%         Intake/Output Summary (Last 24 hours) at 10/15/2024 0633  Last data filed at 10/15/2024 0615  Gross per 24 hour   Intake 10 ml   Output 1900 ml   Net -1890 ml         I/O this shift:  In: 10 [I.V.:10]  Out: 1550 [Urine:200; Emesis/NG output:1350]    Physical Exam  Constitutional: No distress.   Cardiovascular: Normal rate and regular rhythm.   Pulmonary/Chest: Effort normal. No respiratory distress.   Abdominal: Soft, non distended, mildly TTP, stoma pink and patent with gas in ostomy bag, no stool  Neurological: AAOx3  Skin: Skin is warm and dry.     Labs:  Results from last 7 days   Lab Units 10/14/24  0849 10/13/24  2208   WBC K/uL 13.35* 10.87*   HEMOGLOBIN g/dL 15.8 14.0   HEMATOCRIT % 47.6 41.7   PLATELETS K/uL 209 205     Results from last 7 days   Lab Units 10/14/24  0849 10/13/24  2208   SODIUM mEQ/L 136 135*   POTASSIUM mEQ/L 4.2 3.8   CHLORIDE mEQ/L 98 100   CO2 mEQ/L 31 25   BUN mg/dL 16 15   CREATININE mg/dL 0.8 0.7   CALCIUM mg/dL 10.1 9.7   ALBUMIN g/dL 3.8 3.6   BILIRUBIN TOTAL mg/dL 1.1 1.1   ALK PHOS IU/L 98 90   ALT IU/L 19 18   AST IU/L 16 16   GLUCOSE mg/dL 162* 151*     Recent Labs   Lab 10/15/24  0017 10/14/24  1803 10/14/24  1111 10/14/24  0849 10/14/24  0231   INR  --   --   --  1.1 1.0   * 86* 47* 161* 23           Imaging:  I have reviewed the Imaging from the last 24 hrs.  X-RAY ABDOMEN 1 VIEW    Result Date: 10/14/2024  CLINICAL HISTORY: NGt placement COMPARISON: CT dated 10/14/2024 COMMENT: A single frontal view of the abdomen was obtained. There is a nasogastric tube terminating  within the stomach. The visualized lung bases are stable. Incomplete visualization of the bowel gas pattern. No findings of pneumoperitoneum. The osseous structures are unremarkable.     IMPRESSION: Nasogastric tube in satisfactory position.     CT ANGIOGRAPHY CHEST PULMONARY EMBOLISM WITH IV CONTRAST    Addendum Date: 10/14/2024    Correction to IMPRESSION ADDENDUM: 3. Subtotal atelectasis of the RIGHT LOWER and MIDDLE lobes. The left lung is clear.     Addendum Date: 10/14/2024    Correction of a speech recognition/transcription error in the IMPRESSION section of the report. 2. Loculated RIGHT pleural effusion with pleural thickening. Empyema not excluded.    Result Date: 10/14/2024  CLINICAL HISTORY: Chest pain. Suspected pulmonary embolism. Left lower quadrant pain. COMPARISON: Chest x-ray dated 8/20/2024, CT dated 7/7/2024, and other studies TECHNIQUE: CT pulmonary angiography of the chest was performed after contrast administration. Maximum intensity projection (MIP) reconstructions were included for evaluation of pulmonary embolism. Three-dimensional maximum intensity projection imaging was also performed of the vasculature. Then, a contrast enhanced CT of the abdomen and pelvis was performed. Reformatted axial, sagittal and coronal images were reviewed. Dose reduction techniques such as automated exposure control were used in this study where applicable. Administered contrast and dose: 100mL of iopamidoL (ISOVUE-370) 370 mg iodine /mL (76 %) injection 100 mL COMMENT: Thorax: The main pulmonary artery is normal in caliber. Multiple pulmonary arterial filling defects are seen involving lobar, segmental subsegmental branches in the left upper and left lower lobes. No right-sided pulmonary arterial filling defects are appreciated. The visualized thyroid gland is unremarkable. No thoracic lymphadenopathy. No aortic aneurysm. Mild cardiomegaly.  No pericardial effusion. Coronary calcifications noted. The central  airways are grossly patent. There is a moderate-sized loculated right pleural effusion with associated pleural thickening, most pronounced at the lung bases. There is overall volume loss in the right lung with subtotal atelectasis in the right lower middle lobes. These findings are similar to the prior study. There is a stellate nodular opacity in the left upper lobe, series 4 image 88. This has been stable since at least 2020. There are dependent hypoventilatory changes in the left lung base. No left pleural effusion or pneumothorax. The bones of the chest are stable. Abdomen/Pelvis: The liver, gallbladder, pancreas, spleen and adrenal glands are unremarkable. There is a single nonobstructing stone in the upper pole of the left kidney. Multiple bilateral renal sinus cysts are noted, larger on the left. There are also several subcentimeter cortical hypodensities both kidneys, too small to characterize. The major abdominal vasculature is patent. There is a left lower quadrant end colostomy. 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 The proximal to mid small bowel is mildly dilated with layering air-fluid levels. A transition point is seen in the left midabdomen, axial image 114 through 109. Findings are concerning for bowel obstruction. The change with a separate from the above-noted parastomal hernia. No large volume ascites, intra-abdominal abscess or pneumoperitoneum. No lymphadenopathy by size criteria. Small fat-containing left inguinal hernia. The urinary bladder is unremarkable. The prostate is enlarged. Mild degenerative changes in the spine and hips.     IMPRESSION: 1.  Left upper and lower lobe pulmonary emboli. 2.  Loculated left pleural effusion with pleural thickening. Empyema not excluded. 3.  Subtotal atelectasis in the left upper and lower lobe. 4.  Dilated small bowel with transition point in the left midabdomen, concerning for bowel obstruction. 5.  Left lower quadrant colostomy  with small bowel containing parastomal hernia. Critical findings were documented in the preliminary interpretation by the overnight radiologist. Findings were acknowledged by emergency department staff, as documented in the EMR on 10/14/2024 at 2:48 AM    CT ABDOMEN PELVIS WITH IV CONTRAST    Addendum Date: 10/14/2024    Correction to IMPRESSION ADDENDUM: 3. Subtotal atelectasis of the RIGHT LOWER and MIDDLE lobes. The left lung is clear.     Addendum Date: 10/14/2024    Correction of a speech recognition/transcription error in the IMPRESSION section of the report. 2. Loculated RIGHT pleural effusion with pleural thickening. Empyema not excluded.    Result Date: 10/14/2024  CLINICAL HISTORY: Chest pain. Suspected pulmonary embolism. Left lower quadrant pain. COMPARISON: Chest x-ray dated 8/20/2024, CT dated 7/7/2024, and other studies TECHNIQUE: CT pulmonary angiography of the chest was performed after contrast administration. Maximum intensity projection (MIP) reconstructions were included for evaluation of pulmonary embolism. Three-dimensional maximum intensity projection imaging was also performed of the vasculature. Then, a contrast enhanced CT of the abdomen and pelvis was performed. Reformatted axial, sagittal and coronal images were reviewed. Dose reduction techniques such as automated exposure control were used in this study where applicable. Administered contrast and dose: 100mL of iopamidoL (ISOVUE-370) 370 mg iodine /mL (76 %) injection 100 mL COMMENT: Thorax: The main pulmonary artery is normal in caliber. Multiple pulmonary arterial filling defects are seen involving lobar, segmental subsegmental branches in the left upper and left lower lobes. No right-sided pulmonary arterial filling defects are appreciated. The visualized thyroid gland is unremarkable. No thoracic lymphadenopathy. No aortic aneurysm. Mild cardiomegaly.  No pericardial effusion. Coronary calcifications noted. The central airways are  grossly patent. There is a moderate-sized loculated right pleural effusion with associated pleural thickening, most pronounced at the lung bases. There is overall volume loss in the right lung with subtotal atelectasis in the right lower middle lobes. These findings are similar to the prior study. There is a stellate nodular opacity in the left upper lobe, series 4 image 88. This has been stable since at least 2020. There are dependent hypoventilatory changes in the left lung base. No left pleural effusion or pneumothorax. The bones of the chest are stable. Abdomen/Pelvis: The liver, gallbladder, pancreas, spleen and adrenal glands are unremarkable. There is a single nonobstructing stone in the upper pole of the left kidney. Multiple bilateral renal sinus cysts are noted, larger on the left. There are also several subcentimeter cortical hypodensities both kidneys, too small to characterize. The major abdominal vasculature is patent. There is a left lower quadrant end colostomy. 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 The proximal to mid small bowel is mildly dilated with layering air-fluid levels. A transition point is seen in the left midabdomen, axial image 114 through 109. Findings are concerning for bowel obstruction. The change with a separate from the above-noted parastomal hernia. No large volume ascites, intra-abdominal abscess or pneumoperitoneum. No lymphadenopathy by size criteria. Small fat-containing left inguinal hernia. The urinary bladder is unremarkable. The prostate is enlarged. Mild degenerative changes in the spine and hips.     IMPRESSION: 1.  Left upper and lower lobe pulmonary emboli. 2.  Loculated left pleural effusion with pleural thickening. Empyema not excluded. 3.  Subtotal atelectasis in the left upper and lower lobe. 4.  Dilated small bowel with transition point in the left midabdomen, concerning for bowel obstruction. 5.  Left lower quadrant colostomy with small  bowel containing parastomal hernia. Critical findings were documented in the preliminary interpretation by the overnight radiologist. Findings were acknowledged by emergency department staff, as documented in the EMR on 10/14/2024 at 2:48 AM    X-RAY ABDOMEN 1 VIEW    Result Date: 10/14/2024  CLINICAL HISTORY: tube placement COMPARISON: Abdominal x-ray performed 3 hours prior COMMENT: A single frontal view of the abdomen was obtained. The nasogastric tube has been advanced in the interval, terminating in the region of the stomach. The visualized lung bases and visualized bowel gas pattern are unchanged in the interval. The osseous structures are unremarkable.     IMPRESSION: Nasogastric tube in satisfactory position.     Assessment:  Dave Arredondo 88 y.o. male with history of CAD, DVT on Xarelto, rectal cancer status post APR in 2013 presenting with PE and dilated loops of small bowel concerning for an SBO.    Plan:  -Consider NG clamp trial versus SBFT today  -Monitor for ROBF  -Continue to hold Xarelto  -Continue PPI for GI prophylaxis  -Remainder of care per primary    Mike Merida MD  General Surgery PGY-5  [facility]    Please page  [phone]  with any questions or concerns.    Attending Addendum:  I examined the patient with the resident team and agree with above documentation with the following additions:  No complaints this morning.  NG tube volume of output has decreased and he is having some gas in his ostomy appliance.  On exam his abdomen is soft, nontender, nondistended.  We will trial a small bowel follow-through today.  Encourage out of bed and ambulation.    Mireya Harris MD

## 2024-10-16 ENCOUNTER — APPOINTMENT (INPATIENT)
Dept: RADIOLOGY | Facility: HOSPITAL | Age: 89
DRG: 186 | End: 2024-10-16
Attending: NURSE PRACTITIONER
Payer: COMMERCIAL

## 2024-10-16 LAB
ALBUMIN SERPL-MCNC: 3.3 G/DL (ref 3.5–5.7)
ALP SERPL-CCNC: 90 IU/L (ref 34–125)
ALT SERPL-CCNC: 16 IU/L (ref 7–52)
ANION GAP SERPL CALC-SCNC: 6 MEQ/L (ref 3–15)
APPEARANCE FLD: CLEAR
APTT PPP: 75 SEC (ref 23–35)
APTT PPP: 93 SEC (ref 23–35)
AST SERPL-CCNC: 15 IU/L (ref 13–39)
BILIRUB SERPL-MCNC: 1.1 MG/DL (ref 0.3–1.2)
BODY FLD TYPE: NORMAL
BUN SERPL-MCNC: 25 MG/DL (ref 7–25)
CALCIUM SERPL-MCNC: 9.7 MG/DL (ref 8.6–10.3)
CHLORIDE SERPL-SCNC: 103 MEQ/L (ref 98–107)
CO2 SERPL-SCNC: 32 MEQ/L (ref 21–31)
COLOR FLD: YELLOW
CREAT SERPL-MCNC: 0.8 MG/DL (ref 0.7–1.3)
EGFRCR SERPLBLD CKD-EPI 2021: >60 ML/MIN/1.73M*2
ERYTHROCYTE [DISTWIDTH] IN BLOOD BY AUTOMATED COUNT: 15.5 % (ref 11.6–14.4)
GLUCOSE FLD-MCNC: <10 MG/DL
GLUCOSE SERPL-MCNC: 150 MG/DL (ref 70–99)
HCT VFR BLD AUTO: 46.5 % (ref 40.1–51)
HGB BLD-MCNC: 15.2 G/DL (ref 13.7–17.5)
LDH FLD L TO P-CCNC: 1137 U/L
MCH RBC QN AUTO: 29.5 PG (ref 28–33.2)
MCHC RBC AUTO-ENTMCNC: 32.7 G/DL (ref 32.2–36.5)
MCV RBC AUTO: 90.1 FL (ref 83–98)
PH FLD: 8 [PH]
PLATELET # BLD AUTO: 191 K/UL (ref 150–350)
PMV BLD AUTO: 9.4 FL (ref 9.4–12.4)
POTASSIUM SERPL-SCNC: 3.7 MEQ/L (ref 3.5–5.1)
PROT FLD-MCNC: <3 G/DL
PROT SERPL-MCNC: 5.7 G/DL (ref 6–8.2)
RBC # BLD AUTO: 5.16 M/UL (ref 4.5–5.8)
RBC # SNV: NORMAL CELLS/CU MM (ref 0–10000)
SODIUM SERPL-SCNC: 141 MEQ/L (ref 136–145)
SPECIMEN SOURCE: NORMAL
WBC # BLD AUTO: 12.11 K/UL (ref 3.8–10.5)
WBC # SNV AUTO: <200 CELLS/CU MM (ref 0–200)

## 2024-10-16 PROCEDURE — 87206 SMEAR FLUORESCENT/ACID STAI: CPT | Performed by: STUDENT IN AN ORGANIZED HEALTH CARE EDUCATION/TRAINING PROGRAM

## 2024-10-16 PROCEDURE — 27200000 HC STERILE SUPPLY

## 2024-10-16 PROCEDURE — 76942 ECHO GUIDE FOR BIOPSY: CPT

## 2024-10-16 PROCEDURE — 85730 THROMBOPLASTIN TIME PARTIAL: CPT

## 2024-10-16 PROCEDURE — 84157 ASSAY OF PROTEIN OTHER: CPT | Performed by: STUDENT IN AN ORGANIZED HEALTH CARE EDUCATION/TRAINING PROGRAM

## 2024-10-16 PROCEDURE — 25000000 HC PHARMACY GENERAL: Performed by: RADIOLOGY

## 2024-10-16 PROCEDURE — 87116 MYCOBACTERIA CULTURE: CPT | Performed by: STUDENT IN AN ORGANIZED HEALTH CARE EDUCATION/TRAINING PROGRAM

## 2024-10-16 PROCEDURE — C1769 GUIDE WIRE: HCPCS

## 2024-10-16 PROCEDURE — 82945 GLUCOSE OTHER FLUID: CPT | Performed by: STUDENT IN AN ORGANIZED HEALTH CARE EDUCATION/TRAINING PROGRAM

## 2024-10-16 PROCEDURE — 83615 LACTATE (LD) (LDH) ENZYME: CPT | Performed by: STUDENT IN AN ORGANIZED HEALTH CARE EDUCATION/TRAINING PROGRAM

## 2024-10-16 PROCEDURE — 36100345 IR PLEURAL CATHETER PLACEMENT

## 2024-10-16 PROCEDURE — 87102 FUNGUS ISOLATION CULTURE: CPT | Performed by: STUDENT IN AN ORGANIZED HEALTH CARE EDUCATION/TRAINING PROGRAM

## 2024-10-16 PROCEDURE — 25000000 HC PHARMACY GENERAL: Performed by: INTERNAL MEDICINE

## 2024-10-16 PROCEDURE — 85730 THROMBOPLASTIN TIME PARTIAL: CPT | Performed by: STUDENT IN AN ORGANIZED HEALTH CARE EDUCATION/TRAINING PROGRAM

## 2024-10-16 PROCEDURE — 20600000 HC ROOM AND CARE INTERMEDIATE/TELEMETRY

## 2024-10-16 PROCEDURE — 85027 COMPLETE CBC AUTOMATED: CPT | Performed by: INTERNAL MEDICINE

## 2024-10-16 PROCEDURE — 88112 CYTOPATH CELL ENHANCE TECH: CPT | Performed by: STUDENT IN AN ORGANIZED HEALTH CARE EDUCATION/TRAINING PROGRAM

## 2024-10-16 PROCEDURE — 83986 ASSAY PH BODY FLUID NOS: CPT | Performed by: STUDENT IN AN ORGANIZED HEALTH CARE EDUCATION/TRAINING PROGRAM

## 2024-10-16 PROCEDURE — 80053 COMPREHEN METABOLIC PANEL: CPT | Performed by: INTERNAL MEDICINE

## 2024-10-16 PROCEDURE — 89050 BODY FLUID CELL COUNT: CPT | Performed by: STUDENT IN AN ORGANIZED HEALTH CARE EDUCATION/TRAINING PROGRAM

## 2024-10-16 PROCEDURE — 63600000 HC DRUGS/DETAIL CODE: Mod: JZ | Performed by: INTERNAL MEDICINE

## 2024-10-16 PROCEDURE — C1729 CATH, DRAINAGE: HCPCS

## 2024-10-16 PROCEDURE — 99233 SBSQ HOSP IP/OBS HIGH 50: CPT | Performed by: STUDENT IN AN ORGANIZED HEALTH CARE EDUCATION/TRAINING PROGRAM

## 2024-10-16 PROCEDURE — 63700000 HC SELF-ADMINISTRABLE DRUG: Performed by: NURSE PRACTITIONER

## 2024-10-16 PROCEDURE — 87205 SMEAR GRAM STAIN: CPT | Performed by: STUDENT IN AN ORGANIZED HEALTH CARE EDUCATION/TRAINING PROGRAM

## 2024-10-16 PROCEDURE — 63600000 HC DRUGS/DETAIL CODE: Mod: JZ | Performed by: NURSE PRACTITIONER

## 2024-10-16 PROCEDURE — 36415 COLL VENOUS BLD VENIPUNCTURE: CPT | Performed by: INTERNAL MEDICINE

## 2024-10-16 RX ORDER — LIDOCAINE HYDROCHLORIDE 10 MG/ML
INJECTION, SOLUTION INFILTRATION; PERINEURAL
Status: COMPLETED | OUTPATIENT
Start: 2024-10-16 | End: 2024-10-16

## 2024-10-16 RX ORDER — DEXTROSE, SODIUM CHLORIDE, SODIUM LACTATE, POTASSIUM CHLORIDE, AND CALCIUM CHLORIDE 5; .6; .31; .03; .02 G/100ML; G/100ML; G/100ML; G/100ML; G/100ML
INJECTION, SOLUTION INTRAVENOUS CONTINUOUS
Status: DISCONTINUED | OUTPATIENT
Start: 2024-10-16 | End: 2024-10-17

## 2024-10-16 RX ORDER — POTASSIUM CHLORIDE 20 MEQ/1
20 TABLET, EXTENDED RELEASE ORAL ONCE
Status: COMPLETED | OUTPATIENT
Start: 2024-10-16 | End: 2024-10-16

## 2024-10-16 RX ADMIN — METOPROLOL TARTRATE 5 MG: 1 INJECTION, SOLUTION INTRAVENOUS at 04:54

## 2024-10-16 RX ADMIN — METOPROLOL TARTRATE 5 MG: 1 INJECTION, SOLUTION INTRAVENOUS at 21:18

## 2024-10-16 RX ADMIN — POTASSIUM CHLORIDE 20 MEQ: 1500 TABLET, EXTENDED RELEASE ORAL at 12:20

## 2024-10-16 RX ADMIN — HEPARIN SODIUM 850 UNITS/HR: 10000 INJECTION, SOLUTION INTRAVENOUS at 03:41

## 2024-10-16 RX ADMIN — LIDOCAINE HYDROCHLORIDE 10 ML: 10 INJECTION, SOLUTION INFILTRATION; PERINEURAL at 15:12

## 2024-10-16 RX ADMIN — METOPROLOL TARTRATE 5 MG: 1 INJECTION, SOLUTION INTRAVENOUS at 12:20

## 2024-10-16 RX ADMIN — PANTOPRAZOLE SODIUM 40 MG: 40 INJECTION, POWDER, LYOPHILIZED, FOR SOLUTION INTRAVENOUS at 09:23

## 2024-10-16 RX ADMIN — SODIUM CHLORIDE, SODIUM LACTATE, POTASSIUM CHLORIDE, CALCIUM CHLORIDE AND DEXTROSE MONOHYDRATE: 5; 600; 310; 30; 20 INJECTION, SOLUTION INTRAVENOUS at 09:22

## 2024-10-16 RX ADMIN — SODIUM CHLORIDE, SODIUM LACTATE, POTASSIUM CHLORIDE, CALCIUM CHLORIDE AND DEXTROSE MONOHYDRATE: 5; 600; 310; 30; 20 INJECTION, SOLUTION INTRAVENOUS at 23:08

## 2024-10-16 ASSESSMENT — COGNITIVE AND FUNCTIONAL STATUS - GENERAL
STANDING UP FROM CHAIR USING ARMS: 2 - A LOT
STANDING UP FROM CHAIR USING ARMS: 2 - A LOT
CLIMB 3 TO 5 STEPS WITH RAILING: 2 - A LOT
MOVING TO AND FROM BED TO CHAIR: 2 - A LOT
WALKING IN HOSPITAL ROOM: 2 - A LOT
CLIMB 3 TO 5 STEPS WITH RAILING: 2 - A LOT
MOVING TO AND FROM BED TO CHAIR: 2 - A LOT
WALKING IN HOSPITAL ROOM: 2 - A LOT

## 2024-10-16 NOTE — PLAN OF CARE
Care Coordination Discharge Plan Note     Discharge Needs Assessment  Concerns to be Addressed: discharge planning, care coordination/care conferences  Current Discharge Risk: dependent with mobility/activities of daily living    Anticipated Discharge Plan  Anticipated Discharge Disposition: skilled nursing facility  Type of Skilled Nursing Care Services: nursing, OT, PT      Patient Choice  Offered/Gave Vendor List: yes  Patient's Choice of Community Agency(s): Alexsander Marina Roger to decide for pt.    Patient and/or patient guardian/advocate was made aware of their right to choose a provider. A list of eligible providers was presented and reviewed with the patient and/or patient guardian/advocate in written and/or verbal form. The list delineates providers in the patient’s desired geographic area who are participating in the Medicare program and/or providers contracted with the patient’s primary insurance. The Medicare list and quality ratings were obtained from the Medicare.gov [medicare.gov] website.    ---------------------------------------------------------------------------------------------------------------------    Interdisciplinary Discharge Plan Review:  Participants:family/lay caregiver    Concerns Comments: PT/OT rec. SNF. SW left SNF referral list and medicare.gov at bedside. Pt asleep. EDUARDO called pt`s Alexsander marina Roger to inform of PT/OT recs and to discuss d/c plan. Eliezer Marina reports he will be here at [facility] on 10/17 around 10am to review the STR list. Friend does not have any current preferences at this time.      D/c plan SNF (ref. sent out). AUTH needed prior to d/c. Auth #. [id number]    Discharge Plan:   Disposition/Destination: Skilled Nursing Facility - Other  / Skilled Nursing Facility  Discharge Facility:    Community Resources:      Discharge Transportation:  Is Out of Hospital DNR needed at Discharge: no  Does patient need discharge transport? Yes          D/c plan   Alexsander Marina Roger to  visit pt on 10/17 to review SNF list left at bedside.   SNF (ref. sent out).   AUTH needed prior to d/c.   Auth #  BLS

## 2024-10-16 NOTE — PROGRESS NOTES
Patient: Dave Arredondo  Location: [facility] 3C 2099  MRN:  [id number]  Today's date:  10/16/2024    Attempted to see patient for therapy. Unable due to medical hold (Planned for right chest pigtail catheter  - Heparin gtt on hold prior to chest tube placement. Will follow up as appropriate once pt back on AC.).

## 2024-10-16 NOTE — PLAN OF CARE
Problem: Adult Inpatient Plan of Care  Goal: Plan of Care Review  Outcome: Progressing  Flowsheets (Taken 10/16/2024 0804)  Progress: improving  Outcome Evaluation: more alert that has been, d/o to situation, no c/o pain, IVF continued, heparin gtt continued PTT theraputic next check will be for am, NG tube to low continuous suction after SBFT, colostomy with large amounts of output, urinal at the bedside, NPO, plan for chest tube placement with IR today, asst. X1 to the chair, NSR with 1st degree AV block on tele  Plan of Care Reviewed With: patient   Plan of Care Review  Plan of Care Reviewed With: patient  Progress: improving  Outcome Evaluation: more alert that has been, d/o to situation, no c/o pain, IVF continued, heparin gtt continued PTT theraputic next check will be for am, NG tube to low continuous suction after SBFT, colostomy with large amounts of output, urinal at the bedside, NPO, plan for chest tube placement with IR today, asst. X1 to the chair, NSR with 1st degree AV block on tele

## 2024-10-16 NOTE — PROGRESS NOTES
Hospital Medicine     Daily Progress Note       SUBJECTIVE   Interval History:   Patient seen and examined. NGT tube just discontinued.  Voice is hoarse which is not unexpected.  Had some muscle spasms in right chest when tube pulled but now resolved.  Denies pain, chest pain, nausea, abdominal discomfort.  Discussed plan for chest tube today.       OBJECTIVE      Vital signs in last 24 hours:  Temp:  [36.2 °C (97.2 °F)-36.9 °C (98.4 °F)] 36.4 °C (97.6 °F)  Heart Rate:  [] 94  Resp:  [16-18] 16  BP: (137-153)/(81-94) 153/81    Intake/Output Summary (Last 24 hours) at 10/16/2024 1231  Last data filed at 10/16/2024 1000  Gross per 24 hour   Intake 2680.25 ml   Output 1900 ml   Net 780.25 ml       PHYSICAL EXAMINATION      Physical Exam  Constitutional: sleepy. No distress.   HENT: mm dry  Head: Normocephalic and atraumatic.   Eyes: No scleral icterus.   Cardiovascular: S1, S2. Normal rate and regular rhythm. No m/r/g appreciated.    Pulmonary/Chest: CTAB, decreased in baes. No r/r/w. Respirations unlabored.  Abdominal: Soft. +BS. Ostomy stoma pink, brown stool in bag.  Musculoskeletal:  no edema.   Neurological: Grossly nonfocal  Skin: Skin is warm and dry.  No rash on exposed skin.  Psychiatric: Calm, cooperative.     LINES, CATHETERS, DRAINS, AIRWAYS, AND WOUNDS   Lines, Drains, and Airways:  Wounds (agree with documentation and present on admission):  Peripheral IV (Adult) 10/14/24 Anterior;Right Forearm (Active)   Number of days: 2       Peripheral IV (Adult) 10/14/24 Left Antecubital (Active)   Number of days: 2       Peripheral IV (Adult) 10/14/24 Anterior;Left;Proximal;Upper Arm (Active)   Number of days: 2       Colostomy Unknown LUQ (Active)   Number of days: 5318         Comments:    LABS / IMAGING / TELE      Labs  Results from last 7 days   Lab Units 10/16/24  0316 10/15/24  0639 10/14/24  0849   SODIUM mEQ/L 141 138 136   POTASSIUM mEQ/L 3.7 4.0 4.2   CHLORIDE mEQ/L 103 101 98   CO2 mEQ/L 32* 31  31   BUN mg/dL 25 21 16   CREATININE mg/dL 0.8 0.9 0.8   GLUCOSE mg/dL 150* 254* 162*   CALCIUM mg/dL 9.7 9.6 10.1     Results from last 7 days   Lab Units 10/13/24  2208   MAGNESIUM mg/dL 1.7*     Results from last 7 days   Lab Units 10/16/24  0316 10/15/24  0639 10/14/24  0849   WBC K/uL 12.11* 11.71* 13.35*   HEMOGLOBIN g/dL 15.2 15.1 15.8   HEMATOCRIT % 46.5 45.7 47.6   PLATELETS K/uL 191 212 209         Imaging  FLUOROSCOPY SMALL BOWEL STUDY THERAPEUTIC    Result Date: 10/15/2024  CLINICAL HISTORY: Abdominal pain.  Dilated small bowel, ileus versus small bowel obstruction COMMENT: A therapeutic protocol small bowel examination is performed with serial images obtained over the course of 12 hours after the administration of 120 mL of Gastrografin water-soluble oral contrast. Fluoroscopy time--0 minutes Exposures--9 Dose--not specified  views show residual contrast in the bladder from a preceding CT scan.  A nasogastric tube tip projects over the left upper quadrant of the abdomen, in keeping with a position in the proximal stomach. Initial images show contrast in the proximal stomach.  Subsequent images show faint density, probably very dilute oral contrast, in small bowel loops and eventually, on the 12 hour image, in what is probably the right colon.  Although definitive assessment is limited due to dilution of the administered contrast, suspected faint contrast in the right colon favors ileus or partial obstruction and mitigates against high-grade obstruction.  Please correlate clinically. Short interval follow-up abdominal radiograph recommended.     IMPRESSION: Limited assessment due to dilution of the administered contrast. Suspected faint contrast in loops of small bowel and eventually in what is probably the right colon on the 12 hour image, favoring ileus or partial obstruction and mitigating against high-grade obstruction. Clinical correlation and radiographic follow-up recommended.  "      ECG/Telemetry  I have independently reviewed the telemetry. No events for the last 24 hours.    ASSESSMENT AND PLAN      * PE (pulmonary thromboembolism) (CMS/HCC)  Overview  LLE DVT/PE in 2019-> Xarelto for 6 months then changed to prophylactic dose-> DVT 2020    Assessment & Plan  - on admission reports mild central chest pain x 2-3 days  - no SOB  - CTA chest - Left upper and lower lobe pulmonary emboli.  - Patient reports stopping Xarelto 2 days ago after listening to some one talk about Xarelto and Metoprolol interaction. On Med Dispense List he hasn't had any Xarelto fills since July.  He denies difficulty with cost.    P:  - continue heparin gtt  - eventual transition to Xarelto once we are sure there are no procedures  - needs better follow up    Pleural effusion  Assessment & Plan  - h/o recurrent pleural effusion requiring thoracentesis. S/p \"Right VATS pleural biopsy, insertion of Pleurx catheter \" by Dr. Sergey Dennis on 7/8/24. Pathology was benign. Catheter removed in office 8/9/24 after minimal drainage.  Patient states catheter had been removed 2 months ago  - He was admitted to [place] in August 2024 for fall. Looks like at the time had PET-CT which was negative for malignancy  - CT chest  -Loculated RIGHT pleural effusion with pleural thickening. Empyema not excluded.  - Pulse x stable on room air. Chronic dyspnea    P:  - Thoracic surgery consulted appreciated. Recommending IR consult for pigtail catheter placement.  May need tPa/dornase if effusion is loculated    SBO (small bowel obstruction) (CMS/HCC)  Assessment & Plan  - Abd pain around area of colostomy since Sunday night  - CT A/P - Dilated small bowel with transition point in the left midabdomen, concerning for bowel obstruction. 5.  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   - normal lactate at 1.2, WBC 10.87K  - No n/v    P:  - Surgery following  - SBFT 10/15 showed contrast in right colon ruling " out high grade bowel obstruction, likely ileus vs pSBO  - stool output in ostomy bag  - NGT removed this morning  - okay for CLD  - pain control as needed, pain has been minimal    Grade I diastolic dysfunction  Assessment & Plan  ECHO 6/2024 : Left Ventricle: Normal ventricle size. Concentric left ventricular hypertrophy. Normal systolic function. Estimated EF 65%. No regional wall motion abnormalities. Grade I diastolic dysfunction   He has Lasix on his med list but has not been filled since 5/16/2024 for 90 day supply  Reportedly taking Metoprolol XL until recently  Looks euvolemic    P:  - HOLD diuretics for now  - will d/c fluids once tolerating PO  - cotninue Lopressor IV 5mg every 8 hours while NPO    Severe malnutrition (CMS/HCC)  Assessment & Plan  Patient meets criteria for severe malnutrition of chronic illness based on eating less than 75% energy needs for greater than 1 month, >5% weight loss x 3 months (lost 15% of his body weight) and moderate muscle loss with slight depression of temples.    Nutrition consult appreciated.    Medication management  Assessment & Plan  - Patient is not able to provide accurate list of medications  - He doesn't  have a PCP. Seems like he has been getting refills through cardiologist Dr. Campbell but they have noted he hasn't seen them in over a year.  - He states stopped Xarelto and Metoprolol couple days ago because he was told there is a drug-drug interaction.     P:  - care management following  He was given a list of [facility] PCPs    History of rectal cancer  Overview  T2 N1 M0 rectal cancer, neoadjuvant chemotherapy and  radiotherapy followed by surgery (APR), then had multiple  complications after the surgery,   Colostomy    Assessment & Plan  - unknown who he follows with        VTE Assessment: Padua    VTE Prophylaxis:  Current anticoagulants:  heparin (porcine) bolus from bag 2,700-5,450 Units, intravenous, q6h PRN  heparin infusion in D5W 100 units/mL, intravenous,  Titrated      Code Status: Full Code      Estimated Discharge Date: 10/18/2024   Disposition Planning: [place]     VALENCIA Adkins  10/16/2024

## 2024-10-16 NOTE — POST-PROCEDURE NOTE
Interventional Radiology Brief Postprocedure Note    Dave Arredondo    Attending: Yovana    Assistant: None    Diagnosis: Loculated right pleural effusion    Description of procedure: Chest tube placement    Contrast: None     Anesthesia:  Local (Lidocaine)    Volume of Lidocaine Utilized (ml): 5     Medications: None     Complications: None    Estimated Blood Loss: 0-10 ml    Anticoagulation: May resume heparin drip now    Specimens: Right pleural fluid    Findings: 12F right pigtail chest tube placement.      Cj Yen MD

## 2024-10-16 NOTE — PROGRESS NOTES
[facility]    Progress Note    Subjective     Interval History:  No acute events overnight.  Doing well this morning. SBFT yesterday showed some contrast in R colon ruling out high grade bowel obstruction, likely ileus vs pSBO. Patient passing stool in ostomy, 1050 cc thick brown stool with gas. NGT output 550 cc after return to suction overnight, gastric appearing. Patient denies nausea.    Objective     Vitals:    10/16/24 0430   BP: (!) 153/88   Pulse: (!) 103   Resp: 18   Temp: 36.3 °C (97.4 °F)   SpO2: 94%         Intake/Output Summary (Last 24 hours) at 10/16/2024 0730  Last data filed at 10/16/2024 0515  Gross per 24 hour   Intake 1280.92 ml   Output 1800 ml   Net -519.08 ml         No intake/output data recorded.    Physical Exam  Constitutional: No distress.   Cardiovascular: Normal rate and regular rhythm.   Pulmonary/Chest: Effort normal. No respiratory distress.   Abdominal: Soft, non distended, TTP especially around the stoma, stoma pink and patent with gas and stool in ostomy bag  Neurological: AAOx3  Skin: Skin is warm and dry.     Labs:  Results from last 7 days   Lab Units 10/16/24  0316 10/15/24  0639 10/14/24  0849   WBC K/uL 12.11* 11.71* 13.35*   HEMOGLOBIN g/dL 15.2 15.1 15.8   HEMATOCRIT % 46.5 45.7 47.6   PLATELETS K/uL 191 212 209     Results from last 7 days   Lab Units 10/16/24  0316 10/15/24  0639 10/14/24  0849   SODIUM mEQ/L 141 138 136   POTASSIUM mEQ/L 3.7 4.0 4.2   CHLORIDE mEQ/L 103 101 98   CO2 mEQ/L 32* 31 31   BUN mg/dL 25 21 16   CREATININE mg/dL 0.8 0.9 0.8   CALCIUM mg/dL 9.7 9.6 10.1   ALBUMIN g/dL 3.3* 3.1* 3.8   BILIRUBIN TOTAL mg/dL 1.1 0.9 1.1   ALK PHOS IU/L 90 81 98   ALT IU/L 16 17 19   AST IU/L 15 14 16   GLUCOSE mg/dL 150* 254* 162*     Recent Labs   Lab 10/16/24  0316 10/15/24  2027 10/15/24  1411 10/14/24  1111 10/14/24  0849 10/14/24  0231   INR  --   --   --   --  1.1 1.0   PTT 93* 95* 114*   < > 161* 23    < > = values in this interval not displayed.            Imaging:  I have reviewed the Imaging from the last 24 hrs.  FLUOROSCOPY SMALL BOWEL STUDY THERAPEUTIC    Result Date: 10/15/2024  CLINICAL HISTORY: Abdominal pain.  Dilated small bowel, ileus versus small bowel obstruction COMMENT: A therapeutic protocol small bowel examination is performed with serial images obtained over the course of 12 hours after the administration of 120 mL of Gastrografin water-soluble oral contrast. Fluoroscopy time--0 minutes Exposures--9 Dose--not specified  views show residual contrast in the bladder from a preceding CT scan.  A nasogastric tube tip projects over the left upper quadrant of the abdomen, in keeping with a position in the proximal stomach. Initial images show contrast in the proximal stomach.  Subsequent images show faint density, probably very dilute oral contrast, in small bowel loops and eventually, on the 12 hour image, in what is probably the right colon.  Although definitive assessment is limited due to dilution of the administered contrast, suspected faint contrast in the right colon favors ileus or partial obstruction and mitigates against high-grade obstruction.  Please correlate clinically. Short interval follow-up abdominal radiograph recommended.     IMPRESSION: Limited assessment due to dilution of the administered contrast. Suspected faint contrast in loops of small bowel and eventually in what is probably the right colon on the 12 hour image, favoring ileus or partial obstruction and mitigating against high-grade obstruction. Clinical correlation and radiographic follow-up recommended.     Assessment:  Dave Arredondo 88 y.o. male with history of CAD, DVT on Xarelto, rectal cancer status post APR in 2013 presenting with PE and dilated loops of small bowel concerning for an SBO.    Plan:  -consider clamp trial today  -Monitor stool output  -Continue PPI for GI prophylaxis  -Remainder of care per primary    Rylee Ceuto MD  General Surgery  PGY2  [facility]    Please page  [phone]  with any questions or concerns.    Attending Addendum:  I examined the patient with the resident team and agree with above documentation with the following additions:  Patient has had return of bowel function following contrast administration yesterday with passage to the colon on delayed films.  Okay to remove NG tube and start clear liquids as tolerated.    Mireya Harris MD

## 2024-10-17 ENCOUNTER — APPOINTMENT (INPATIENT)
Dept: RADIOLOGY | Facility: HOSPITAL | Age: 89
DRG: 186 | End: 2024-10-17
Attending: PHYSICIAN ASSISTANT
Payer: COMMERCIAL

## 2024-10-17 LAB
ANION GAP SERPL CALC-SCNC: 6 MEQ/L (ref 3–15)
APTT PPP: 79 SEC (ref 23–35)
APTT PPP: 87 SEC (ref 23–35)
BUN SERPL-MCNC: 17 MG/DL (ref 7–25)
CALCIUM SERPL-MCNC: 9.3 MG/DL (ref 8.6–10.3)
CASE RPRT: NORMAL
CHLORIDE SERPL-SCNC: 106 MEQ/L (ref 98–107)
CO2 SERPL-SCNC: 30 MEQ/L (ref 21–31)
CREAT SERPL-MCNC: 0.7 MG/DL (ref 0.7–1.3)
EGFRCR SERPLBLD CKD-EPI 2021: >60 ML/MIN/1.73M*2
ERYTHROCYTE [DISTWIDTH] IN BLOOD BY AUTOMATED COUNT: 15 % (ref 11.6–14.4)
FUNGUS STAIN: NORMAL
GLUCOSE SERPL-MCNC: 109 MG/DL (ref 70–99)
HCT VFR BLD AUTO: 40.5 % (ref 40.1–51)
HGB BLD-MCNC: 13.1 G/DL (ref 13.7–17.5)
LDH SERPL L TO P-CCNC: 190 IU/L (ref 98–271)
MAGNESIUM SERPL-MCNC: 1.9 MG/DL (ref 1.8–2.5)
MCH RBC QN AUTO: 29.6 PG (ref 28–33.2)
MCHC RBC AUTO-ENTMCNC: 32.3 G/DL (ref 32.2–36.5)
MCV RBC AUTO: 91.4 FL (ref 83–98)
PATH REPORT.FINAL DX SPEC: NORMAL
PATH REPORT.GROSS SPEC: NORMAL
PLATELET # BLD AUTO: 176 K/UL (ref 150–350)
PMV BLD AUTO: 10 FL (ref 9.4–12.4)
POTASSIUM SERPL-SCNC: 3.5 MEQ/L (ref 3.5–5.1)
PROT SERPL-MCNC: 5.2 G/DL (ref 6–8.2)
RBC # BLD AUTO: 4.43 M/UL (ref 4.5–5.8)
RHODAMINE-AURAMINE STN SPEC: NORMAL
SODIUM SERPL-SCNC: 142 MEQ/L (ref 136–145)
WBC # BLD AUTO: 9.35 K/UL (ref 3.8–10.5)

## 2024-10-17 PROCEDURE — 80048 BASIC METABOLIC PNL TOTAL CA: CPT | Performed by: NURSE PRACTITIONER

## 2024-10-17 PROCEDURE — 99233 SBSQ HOSP IP/OBS HIGH 50: CPT | Performed by: STUDENT IN AN ORGANIZED HEALTH CARE EDUCATION/TRAINING PROGRAM

## 2024-10-17 PROCEDURE — 85730 THROMBOPLASTIN TIME PARTIAL: CPT

## 2024-10-17 PROCEDURE — 97535 SELF CARE MNGMENT TRAINING: CPT | Mod: GO

## 2024-10-17 PROCEDURE — 63600000 HC DRUGS/DETAIL CODE: Mod: JZ | Performed by: NURSE PRACTITIONER

## 2024-10-17 PROCEDURE — 85027 COMPLETE CBC AUTOMATED: CPT | Performed by: NURSE PRACTITIONER

## 2024-10-17 PROCEDURE — 25800000 HC PHARMACY IV SOLUTIONS: Performed by: PHYSICIAN ASSISTANT

## 2024-10-17 PROCEDURE — 25000000 HC PHARMACY GENERAL: Performed by: PHYSICIAN ASSISTANT

## 2024-10-17 PROCEDURE — 20600000 HC ROOM AND CARE INTERMEDIATE/TELEMETRY

## 2024-10-17 PROCEDURE — 71045 X-RAY EXAM CHEST 1 VIEW: CPT

## 2024-10-17 PROCEDURE — 63700000 HC SELF-ADMINISTRABLE DRUG: Performed by: NURSE PRACTITIONER

## 2024-10-17 PROCEDURE — 84155 ASSAY OF PROTEIN SERUM: CPT | Performed by: NURSE PRACTITIONER

## 2024-10-17 PROCEDURE — 83735 ASSAY OF MAGNESIUM: CPT | Performed by: NURSE PRACTITIONER

## 2024-10-17 PROCEDURE — 99231 SBSQ HOSP IP/OBS SF/LOW 25: CPT | Performed by: SURGERY

## 2024-10-17 PROCEDURE — 36415 COLL VENOUS BLD VENIPUNCTURE: CPT | Performed by: NURSE PRACTITIONER

## 2024-10-17 PROCEDURE — 25000000 HC PHARMACY GENERAL: Performed by: INTERNAL MEDICINE

## 2024-10-17 PROCEDURE — 97530 THERAPEUTIC ACTIVITIES: CPT | Mod: GP

## 2024-10-17 PROCEDURE — 63600000 HC DRUGS/DETAIL CODE: Mod: JZ,TB | Performed by: PHYSICIAN ASSISTANT

## 2024-10-17 PROCEDURE — 83615 LACTATE (LD) (LDH) ENZYME: CPT | Performed by: NURSE PRACTITIONER

## 2024-10-17 PROCEDURE — 63600000 HC DRUGS/DETAIL CODE: Mod: JZ | Performed by: STUDENT IN AN ORGANIZED HEALTH CARE EDUCATION/TRAINING PROGRAM

## 2024-10-17 PROCEDURE — 85730 THROMBOPLASTIN TIME PARTIAL: CPT | Performed by: INTERNAL MEDICINE

## 2024-10-17 PROCEDURE — 92610 EVALUATE SWALLOWING FUNCTION: CPT | Mod: GN

## 2024-10-17 RX ORDER — POTASSIUM CHLORIDE 20 MEQ/1
20 TABLET, EXTENDED RELEASE ORAL ONCE
Status: COMPLETED | OUTPATIENT
Start: 2024-10-17 | End: 2024-10-17

## 2024-10-17 RX ORDER — METOPROLOL SUCCINATE 100 MG/1
100 TABLET, EXTENDED RELEASE ORAL DAILY
Status: DISCONTINUED | OUTPATIENT
Start: 2024-10-17 | End: 2024-10-25 | Stop reason: HOSPADM

## 2024-10-17 RX ADMIN — ALTEPLASE 5 MG: 2.2 INJECTION, POWDER, LYOPHILIZED, FOR SOLUTION INTRAVENOUS at 18:32

## 2024-10-17 RX ADMIN — METOPROLOL SUCCINATE 100 MG: 100 TABLET, EXTENDED RELEASE ORAL at 08:15

## 2024-10-17 RX ADMIN — METOPROLOL TARTRATE 5 MG: 1 INJECTION, SOLUTION INTRAVENOUS at 04:10

## 2024-10-17 RX ADMIN — PANTOPRAZOLE SODIUM 40 MG: 40 INJECTION, POWDER, LYOPHILIZED, FOR SOLUTION INTRAVENOUS at 08:15

## 2024-10-17 RX ADMIN — DORNASE ALFA 2.5 MG: 1 SOLUTION RESPIRATORY (INHALATION) at 18:32

## 2024-10-17 RX ADMIN — HEPARIN SODIUM 850 UNITS/HR: 10000 INJECTION, SOLUTION INTRAVENOUS at 19:33

## 2024-10-17 RX ADMIN — POTASSIUM CHLORIDE 20 MEQ: 1500 TABLET, EXTENDED RELEASE ORAL at 08:15

## 2024-10-17 ASSESSMENT — COGNITIVE AND FUNCTIONAL STATUS - GENERAL
DRESSING REGULAR UPPER BODY CLOTHING: 3 - A LITTLE
CLIMB 3 TO 5 STEPS WITH RAILING: 2 - A LOT
CLIMB 3 TO 5 STEPS WITH RAILING: 2 - A LOT
MOVING TO AND FROM BED TO CHAIR: 3 - A LITTLE
UNDERSTANDING 10 TO 15 MIN SPEECH: 3 - A LITTLE
WALKING IN HOSPITAL ROOM: 2 - A LOT
MOVING TO AND FROM BED TO CHAIR: 2 - A LOT
HELP NEEDED FOR BATHING: 2 - A LOT
AFFECT: FLAT/BLUNTED AFFECT
CLIMB 3 TO 5 STEPS WITH RAILING: 2 - A LOT
AFFECT: FLAT/BLUNTED AFFECT
WALKING IN HOSPITAL ROOM: 2 - A LOT
REMEMBERING WHERE THINGS ARE: 4 - NONE
STANDING UP FROM CHAIR USING ARMS: 3 - A LITTLE
STANDING UP FROM CHAIR USING ARMS: 2 - A LOT
TOILETING: 3 - A LITTLE
MOVING TO AND FROM BED TO CHAIR: 2 - A LOT
STANDING UP FROM CHAIR USING ARMS: 3 - A LITTLE
DRESSING REGULAR LOWER BODY CLOTHING: 2 - A LOT
REMEMBERING 5 ERRANDS WITH NO LIST: 3 - A LITTLE
REMEMBERING TO TAKE MEDICATION: 4 - NONE
AFFECT: WFL
FOLLOWS FAMILIAR CONVERSATION: 4 - NONE
WALKING IN HOSPITAL ROOM: 2 - A LOT
TAKING CARE OF COMPLICATED TASKS: 2 - A LOT
HELP NEEDED FOR PERSONAL GROOMING: 3 - A LITTLE
EATING MEALS: 3 - A LITTLE

## 2024-10-17 NOTE — PLAN OF CARE
Problem: Adult Inpatient Plan of Care  Goal: Plan of Care Review  10/17/2024 1539 by Adele Cline  Flowsheets (Taken 10/17/2024 1538)  Plan of Care Reviewed With:   patient   other (see comments)    Progress: improving  Outcome Evaluation: Pt follow up, pt has PE and was NPO since last visit. Pt was a hard historian but stated he is eating now. Asked nursed about meal consumption, pt consumed 100% of clear liquid breakfast and about 75% of his lunch. Pt is tolerating food at the moment, continue monitoring patient progress.     Nutrition Goals: Pt meet >/= 75% of energy requirements via PO intake as tolerated.     Nutrition Recommendations/Interventions  Continue regular, low fiber diet as tolerated.  Supplement Ensure Plus HP (strawberry flavor) once a day.  Monitor wt loss, labs (glucose), and food tolerance.

## 2024-10-17 NOTE — PROGRESS NOTES
Physical Therapy -  Daily Treatment/Progress Note     Patient: Dave Arredondo  Location: [facility] 3C 0359  MRN: [id number]  Today's date: 10/17/2024    HISTORY OF PRESENT ILLNESS     Dave is a 88 y.o. male admitted on 10/14/2024 with SBO (small bowel obstruction) (CMS/HCC) [K56.609]  PE (pulmonary thromboembolism) (CMS/HCC) [I26.99]  Pleural effusion on right [J90]. Principal problem is PE (pulmonary thromboembolism) (CMS/HCC).    Past Medical History  Dave has a past medical history of Colon cancer (CMS/HCC) (2012), Coronary artery disease, DVT (deep venous thrombosis) (CMS/HCC), Hypertension, Lipid disorder, Pleural effusion, and Pulmonary embolism (CMS/HCC).    History of Present Illness   88 y.o. male with a past medical history of Dvt/PE, HTN, CAD, rectal cancer s/p chemoXRT and APR with end colostomy, recurrent pleural effusion, who presents with abdominal pain found to have PE (on hep gtt) and SBO     CXR IMPRESSION:  Loculated right pleural effusion and basilar airspace opacity.    CT Abd IMPRESSION   Loculated RIGHT pleural effusion with pleural thickening. Empyema not excluded.  Subtotal atelectasis of the RIGHT LOWER and MIDDLE lobes. The left lung is clear.     10/16: s/p  right pigtail chest tube placement.    PRIOR LEVEL OF FUNCTION AND LIVING ENVIRONMENT     Prior Level of Function      Flowsheet Row Most Recent Value   Dominant Hand right   Ambulation independent   Transferring independent   Toileting independent   Bathing independent   Dressing independent   Eating independent   Prior Level of Function Comment Per last EMR pt IND w/ mobility/adls w/o AD. But recently has only been able to ambulate short distances   Assistive Device Currently Used at Home grab bar, shower chair             Prior Living Environment      Flowsheet Row Most Recent Value   People in Home roommate(s)   Current Living Arrangements home   Home Accessibility stairs to enter home (Group), stairs within  "home (Group)   Living Environment Comment Pt  historian and stated he lives w/ his 4 brothers. Per EMR pt lives w/ roommate(s) on the 2nd fl, accessible by 12 steps, [address] building, no elevator access          VITALS AND PAIN     PT Vitals      Date/Time Pulse SpO2 Pt Activity O2 Therapy BP BP Method Pt Position Observations [facility]   10/17/24 1134 66 97 % At rest None (Room air) 167/79 Automatic Lying vitals taken by PCT just prior to therapy session EEW          PT Pain      Date/Time Pain Type Location Rating: Rest Rating: Activity Interventions [facility]   10/17/24 1134 Pain Assessment other (see comments) chest tube site 3 4 care clustered EEW             Objective   OBJECTIVE     Start time:  1132  End time:  1146  Session Length: 14 min  Mode of Treatment: co-treatment  Reason for co-treatment: PT assessing balance/transfers    General Observations  Patient received reclined, in bed. He was agreeable to therapy, no issues or concerns identified by nurse prior to session. + R chest tube to suction    Precautions: fall (activity as tolerated, CT to continuous suction)       Limitations/Impairments: safety/cognitive      PT Eval and Treat - 10/17/24 1132          Cognition    Orientation Status oriented to;person;place;time;verbal cues/prompts needed for orientation   place=hospital, could not completely state name of hospital but did state \"[facility]\", time=month & year    Affect/Mental Status flat/blunted affect     Follows 1 step commands, verbal cues/prompting req'd    Overall cooperative/receptive, req'd cues for safety at times. See OT note for full cog assessment       Bed Mobility    Bed Mobility Activities left;supine to sit     [place] minimum assist (75% or more patient effort);2 person assist     Safety/Cues increased time to complete;verbal cues;hand placement;technique     Assistive Device head of bed elevated;bed rails     Comment inc time/effort req'd, cues to reach across for bedrail/PT's hand        " Mobility Belt    Mobility Belt Used During Session no - medical contraindication     Reason Mobility Belt Not Used chest tube and ostomy        Sit/Stand Transfer    Surface edge of bed;chair with arm rests     [place] minimum assist (75% or more patient effort);2 person assist     Safety/Cues verbal cues;technique     Assistive Device other (see comments)   HHA x2    Transfer Comments from bed and to chair, slightly impulsive to attempt to stand initially before therapists had lines/tubes safety placed for transfer        Surface-to-Surface Transfers    Transfer Location bed to chair     Transfer Technique stand step     [place] minimum assist (75% or more patient effort);2 person assist     Assistive Device other (see comments)   HHA x2    Transfer Comments to the left, mildly unsteady        Gait Training    [place], Gait minimum assist (75% or more patient effort);2 person assist     Assistive Device other (see comments)   HHA x2    Distance in Feet 2 feet     Pattern step-to     Deviations/Abnormal Patterns step length decreased;stride length decreased;gait speed decreased     Comment short distance bed to chair, limited by CT to suction       Balance    Static Sitting Balance WFL;sitting in chair;sitting, edge of bed     Sit to Stand Dynamic Balance mild impairment     Static Standing Balance mild impairment;supported     Dynamic Standing Balance mild impairment;supported     Comment, Balance HHA, may benefit from RW for longer amb trials        Lower Extremity (Therapeutic Exercise)    Exercise Position/Type supine;AROM (active range of motion)     General Exercise ankle pumps;bilateral     Reps and Sets 1x10     Comment prior to mobility        Impairments/Safety Issues    Impairments Affecting Function balance;cognition;endurance/activity tolerance;strength;pain     Functional Endurance below baseline     Safety Issues Affecting Function insight into deficits/self-awareness                                     Education Documentation  Joint Mobility/Strength, taught by Nel Saldivar, PT at 10/17/2024 12:58 PM.  Learner: Patient  Readiness: Acceptance  Method: Explanation  Response: Verbalizes Understanding  Comment: safety during mobility, call bell        Session Outcome  Patient upright, in chair at end of session, chair alarm on, all needs met, call light in reach, personal items in reach (R chest tube in place and to suction). Nursing notified about patient's performance and patient's position.    AM-PAC™ - Mobility (Current Function)     Turning form your back to your side while in flat bed without using bedrails 3 - A Little   Moving from lying on your back to sitting on the side of a flat bed without using bedrails 2 - A Lot   Moving to and from a bed to a chair 3 - A Little   Standing up from a chair using your arms 3 - A Little   To walk in a hospital room 2 - A Lot   Climbing 3-5 steps with a railing 2 - A Lot   AM-PAC™ Mobility Score 15      ASSESSMENT AND PLAN     Progress Summary  PT treatment session complete. [facility] 15/24. Pt req [place] x2 for bed mobility, STS & transfer to chair w/ HHA d/t pain as well as balance, strength, endurance & cog deficits. He req cont'd skilled PT to progress mobility and address noted deficits in order to max fxnl independence and dec fall risk. Cont. to rec SNF at d/c.    Patient/Family Therapy Goals Statement: unable to state 2* fatigue/confusion    PT Plan      Flowsheet Row Most Recent Value   Rehab Potential good, to achieve stated therapy goals at 10/15/2024 1054   Therapy Frequency 4 times/wk at 10/15/2024 1054   Planned Therapy Interventions neuromuscular re-education, bed mobility training, transfer training, balance training, stair training, gait training, patient/family education, home exercise program, strengthening at 10/15/2024 1054            PT Discharge Recommendations      Flowsheet Row Most Recent Value   PT Recommended Discharge Disposition  skilled nursing facility at 10/15/2024 1054   Anticipated Equipment Needs if Discharged Home (PT) --  [TBD at next level of care] at 10/15/2024 1054                 PT Goals      Flowsheet Row Most Recent Value   Bed Mobility Goal 1    Activity/Assistive Device bed mobility activities, all at 10/15/2024 1054   [place] modified independence at 10/15/2024 1054   Time Frame by discharge at 10/15/2024 1054   Progress/Outcome goal ongoing at 10/15/2024 1054   Transfer Goal 1    Activity/Assistive Device all transfers, other (see comments)  [LRAD] at 10/15/2024 1054   [place] modified independence at 10/15/2024 1054   Time Frame by discharge at 10/15/2024 1054   Progress/Outcome goal ongoing at 10/15/2024 1054   Gait Training Goal 1    Activity/Assistive Device gait (walking locomotion), other (see comments)  [LRAD] at 10/15/2024 1054   [place] modified independence at 10/15/2024 1054   Distance 150' at 10/15/2024 1054   Time Frame by discharge at 10/15/2024 1054   Progress/Outcome goal ongoing at 10/15/2024 1054   Stairs Goal 1    Activity/Assistive Device stairs, all skills at 10/15/2024 1054   [place] modified independence at 10/15/2024 1054   Number of Stairs 4 at 10/15/2024 1054   Time Frame by discharge at 10/15/2024 1054   Progress/Outcome goal ongoing at 10/15/2024 1054

## 2024-10-17 NOTE — PLAN OF CARE
Plan of Care Review  Plan of Care Reviewed With: patient  Progress: no change  Outcome Evaluation: AAOx3, VSS, c/o pain at chest tube insertion site. Site clean, dry, and intact. Heparin gtt and IV fluids continue. Clear liquid diet maintained. Call bell within reach, fall precautions in place

## 2024-10-17 NOTE — PROGRESS NOTES
[facility]    Progress Note    Subjective     Interval History:  Patient with gas and stool in his ostomy. Doing well overnight. Patient denies nausea, vomiting or abdominal pain.     Objective     Vitals:    10/17/24 0730   BP: (!) 186/86   Pulse: 78   Resp: 18   Temp: 36.7 °C (98 °F)   SpO2: 93%         Intake/Output Summary (Last 24 hours) at 10/17/2024 0745  Last data filed at 10/17/2024 0642  Gross per 24 hour   Intake 2456.96 ml   Output 980 ml   Net 1476.96 ml         No intake/output data recorded.    Physical Exam  Constitutional: No distress.   Cardiovascular: Normal rate and regular rhythm.   Pulmonary/Chest: Effort normal. No respiratory distress.   Abdominal: Soft, non distended, TTP especially around the stoma, stoma pink and patent with gas and stool in ostomy bag  Neurological: AAOx3  Skin: Skin is warm and dry.     Labs:  Results from last 7 days   Lab Units 10/17/24  0332 10/16/24  0316 10/15/24  0639   WBC K/uL 9.35 12.11* 11.71*   HEMOGLOBIN g/dL 13.1* 15.2 15.1   HEMATOCRIT % 40.5 46.5 45.7   PLATELETS K/uL 176 191 212     Results from last 7 days   Lab Units 10/17/24  0332 10/16/24  0316 10/15/24  0639 10/14/24  0849   SODIUM mEQ/L 142 141 138 136   POTASSIUM mEQ/L 3.5 3.7 4.0 4.2   CHLORIDE mEQ/L 106 103 101 98   CO2 mEQ/L 30 32* 31 31   BUN mg/dL 17 25 21 16   CREATININE mg/dL 0.7 0.8 0.9 0.8   CALCIUM mg/dL 9.3 9.7 9.6 10.1   ALBUMIN g/dL  --  3.3* 3.1* 3.8   BILIRUBIN TOTAL mg/dL  --  1.1 0.9 1.1   ALK PHOS IU/L  --  90 81 98   ALT IU/L  --  16 17 19   AST IU/L  --  15 14 16   GLUCOSE mg/dL 109* 150* 254* 162*     Recent Labs   Lab 10/17/24  0611 10/17/24  0332 10/16/24  2306 10/14/24  1111 10/14/24  0849 10/14/24  0231   INR  --   --   --   --  1.1 1.0   PTT 87* 79* 75*   < > 161* 23    < > = values in this interval not displayed.           Imaging:  I have reviewed the Imaging from the last 24 hrs.  IR PLEURAL CATHETER PLACEMENT    Result Date: 10/16/2024  CLINICAL HISTORY:     88-year-old male with recurrent loculated right pleural effusion.     IMPRESSION:   Satisfactory 12 [place] right pigtail chest tube placement. COMMENT: PROCEDURE:  Chest tube placement. ANESTHESIA:  Lidocaine 1% local. FLUORO TIME:  0.1 minutes. REFERENCE AIR KERMA: 0.2 mGy. CONTRAST:  None. MEDICATIONS:  None. DESCRIPTION OF PROCEDURE:    Written informed consent was obtained.  Ultrasound demonstrates complex, multiloculated right pleural effusion.  The patient was prepped and draped in the usual sterile manner.  Using real-time ultrasound guidance, a Yueh needle was inserted into the right pleural effusion.  Following serial dilation, a 12 [place] pigtail chest tube was inserted.  Pleural fluid sample was collected and sent for requested laboratory analysis.  The catheter was secured with suture and connected to Pleur-evac drainage.  The patient tolerated the procedure well without immediate complication.     ULTRASOUND GUIDED NEEDLE PLACEMENT    Result Date: 10/16/2024  CLINICAL HISTORY:    88-year-old male with recurrent loculated right pleural effusion.     IMPRESSION:   Satisfactory 12 [place] right pigtail chest tube placement. COMMENT: PROCEDURE:  Chest tube placement. ANESTHESIA:  Lidocaine 1% local. FLUORO TIME:  0.1 minutes. REFERENCE AIR KERMA: 0.2 mGy. CONTRAST:  None. MEDICATIONS:  None. DESCRIPTION OF PROCEDURE:    Written informed consent was obtained.  Ultrasound demonstrates complex, multiloculated right pleural effusion.  The patient was prepped and draped in the usual sterile manner.  Using real-time ultrasound guidance, a Yueh needle was inserted into the right pleural effusion.  Following serial dilation, a 12 [place] pigtail chest tube was inserted.  Pleural fluid sample was collected and sent for requested laboratory analysis.  The catheter was secured with suture and connected to Pleur-evac drainage.  The patient tolerated the procedure well without immediate complication.       Assessment:  Dave Arredondo 88 y.o. male with history of CAD, DVT on Xarelto, rectal cancer status post APR in 2013 presenting with PE and dilated loops of small bowel concerning for an SBO.    Plan:  -  -Monitor stool output  -Tolerating CLD; advance as tolerated   -Continue PPI for GI prophylaxis  -Remainder of care per primary    Gumaro Greenwood MD  General Surgery PGY2  [facility]    Please page  [phone]  with any questions or concerns.    Attending Addendum:  I examined the patient with the resident team and agree with above documentation with the following additions:  Continues to have excellent ostomy function.  Denies nausea.  Tolerated clear liquids.  Okay to advance to low residue diet as tolerated.  General surgery will sign off.  Please call with questions or for change in clinical status.    Mireya Harris MD

## 2024-10-17 NOTE — PROGRESS NOTES
Occupational Therapy -  Daily Treatment/Progress Note     Patient: Dave Arredondo  Location: [facility] 3C 0359  MRN: [id number]  Today's date: 10/17/2024    HISTORY OF PRESENT ILLNESS     Dave is a 88 y.o. male admitted on 10/14/2024 with SBO (small bowel obstruction) (CMS/HCC) [K56.609]  PE (pulmonary thromboembolism) (CMS/HCC) [I26.99]  Pleural effusion on right [J90]. Principal problem is PE (pulmonary thromboembolism) (CMS/HCC).    Past Medical History  Dave has a past medical history of Colon cancer (CMS/HCC) (2012), Coronary artery disease, DVT (deep venous thrombosis) (CMS/HCC), Hypertension, Lipid disorder, Pleural effusion, and Pulmonary embolism (CMS/HCC).    History of Present Illness   88 y.o. male with a past medical history of Dvt/PE, HTN, CAD, rectal cancer s/p chemoXRT and APR with end colostomy, recurrent pleural effusion, who presents with abdominal pain found to have PE (on hep gtt) and SBO     CXR IMPRESSION:  Loculated right pleural effusion and basilar airspace opacity.    CT Abd IMPRESSION   Loculated RIGHT pleural effusion with pleural thickening. Empyema not excluded.  Subtotal atelectasis of the RIGHT LOWER and MIDDLE lobes. The left lung is clear.     10/16: s/p  right pigtail chest tube placement.    PRIOR LEVEL OF FUNCTION AND LIVING ENVIRONMENT     Prior Level of Function      Flowsheet Row Most Recent Value   Dominant Hand right   Ambulation independent   Transferring independent   Toileting independent   Bathing independent   Dressing independent   Eating independent   Prior Level of Function Comment Per last EMR pt IND w/ mobility/adls w/o AD. But recently has only been able to ambulate short distances   Assistive Device Currently Used at Home grab bar, shower chair             Prior Living Environment      Flowsheet Row Most Recent Value   People in Home roommate(s)   Current Living Arrangements home   Home Accessibility stairs to enter home (Group), stairs  within home (Group)   Living Environment Comment Pt  historian and stated he lives w/ his 4 brothers. Per EMR pt lives w/ roommate(s) on the 2nd fl, accessible by 12 steps, [address] building, no elevator access          Occupational Profile      Flowsheet Row Most Recent Value   Environmental Supports and Barriers Lives w/ roommate(s) per EMR          VITALS AND PAIN     OT Vitals      Date/Time Pulse SpO2 Pt Activity O2 Therapy BP BP Method Pt Position Observations [facility]   10/17/24 1134 66 97 % At rest None (Room air) 167/79 Automatic Lying vitals taken by PCT just prior to therapy session EEW          OT Pain      Date/Time Pain Type Side/Orientation Rating: Rest Rating: Activity Interventions [facility]   10/17/24 1134 Pain Assessment other (see comments) chest tube site 3 4 care clustered EEW             Objective   OBJECTIVE     Start time:  1133  End time:  1146  Session Length: 13 min  Mode of Treatment: co-treatment  Reason for co-treatment: req skilled Ax2    General Observations  Patient received reclined, in bed. He was no issues or concerns identified by nurse prior to session, agreeable to therapy. friends present. (+) ostomy, + R CT to suction    Precautions: fall, other (see comments) (R CT to continuous suction, +ostomy)       Limitations/Impairments: safety/cognitive      OT Eval and Treat - 10/17/24 1133          Cognition    Orientation Status oriented x 3;other (see comments)   req incr time for hosp    Affect/Mental Status flat/blunted affect     Follows Commands follows one-step commands;over 90% accuracy;increased processing time needed;physical/tactile prompts required;repetition of directions required;verbal cues/prompting required     Cognitive Function safety deficit;executive function deficit     Executive Function Deficit minimal deficit;insight/awareness of deficits;information processing;self-monitoring/self-correction;organization/sequencing     Safety Deficit awareness of need for  assistance;insight into deficits/self-awareness;ability to follow commands;minimal deficit     Comment, Cognition Pt A&O, following commands and meaningfully participating in OT session. flat affect, req repetitive cues and incr processing time        Bed Mobility    Bed Mobility Activities supine to sit;left     [place] minimum assist (75% or more patient effort);2 person assist     Safety/Cues increased time to complete;moderate;verbal cues;tactile cues;hand placement;technique;initiation     Assistive Device head of bed elevated;bed rails     Comment OOB to L side with cues to sequence reaching across for bed rail. Min x2 for torso mgmt and to scoot fully EOB. Endorsing mild discomfort at R CT placement site        Mobility Belt    Mobility Belt Used During Session no - medical contraindication     Reason Mobility Belt Not Used chest tube and ostomy        Sit/Stand Transfer    Surface edge of bed;chair with arm rests     [place] minimum assist (75% or more patient effort);2 person assist     Safety/Cues increased time to complete;minimal;verbal cues;hand placement;technique;maintaining center of gravity over base of support     Assistive Device other (see comments)     Transfer Comments from EOB/to chair with HHAx2. SLow to rise, req incr time to find supported standing CLAUS        Surface-to-Surface Transfers    Transfer Location bed to chair     Transfer Technique stand step     [place] minimum assist (75% or more patient effort);2 person assist     Safety/Cues increased time to complete;minimal;verbal cues;hand placement;technique     Assistive Device other (see comments)     Transfer Comments EOB to chair toward L side with HHAx2. Performing ~ 5 side steps.        Lower Body Dressing    Tasks don;socks     [place] maximum assist (25-49% patient effort)     Safety/Cues increased time to complete     Position edge of bed sitting     Adaptive Equipment none     Comment Pt lifting BLEs to  assist with sock donning in prep for OOB activity, func reach limited by pain at CT site        Toileting    Comment +ostomy        Balance    Static Sitting Balance WFL;sitting, edge of bed     Dynamic Sitting Balance mild impairment;sitting, edge of bed     Sit to Stand Dynamic Balance mild impairment;supported     Static Standing Balance mild impairment;supported     Dynamic Standing Balance mild impairment;supported     Balance Interventions occupation based/functional task     Comment, Balance (+) standing balance deficits. will benefit from RW trials for further OOB activity        Impairments/Safety Issues    Impairments Affecting Function balance;cognition;endurance/activity tolerance;strength;pain     Cognitive Impairments, Safety/Performance insight into deficits/self-awareness;awareness, need for assistance;sequencing abilities     Functional Endurance decline from baseline     Safety Issues Affecting Function insight into deficits/self-awareness;sequencing abilities                                       Session Outcome  Patient upright, in chair at end of session, chair alarm on, all needs met, call light in reach, personal items in reach. Nursing notified about change in vital signs, patient's performance, patient's position, and patient's response to therapy/activity.    AM-PAC™ - ADL (Current Function)     Putting on/taking off regular lower body clothing 2 - A Lot   Bathing 2 - A Lot   Toileting 3 - A Little   Putting on/taking off regular upper body clothing 3 - A Little   Help for taking care of personal grooming 3 - A Little   Eating meals 3 - A Little   AM-PAC™ ADL Score 16      ASSESSMENT AND PLAN     Progress Summary  OT tx complete.  Dave cont to progress meaningfully toward OT goals. Now with R CT to suction. At time of session Dave req Min x2 with HHA for bed mob, sit<>stands, limited side steps to L side with HHA. Max A LBD. His func performance is impacted by decr activity  tolerance/endurance/strength and pain. SNF remains indicated at d/c. Cont with OT POC.    Patient/Family Therapy Goal Statement: to get some rest    OT Plan      Flowsheet Row Most Recent Value   Rehab Potential good, to achieve stated therapy goals at 10/17/2024 1133   Therapy Frequency 4 times/wk at 10/17/2024 1133   Planned Therapy Interventions activity tolerance training, adaptive equipment training, BADL retraining, patient/caregiver education/training, transfer/mobility retraining, functional balance retraining, occupation/activity based interventions, ROM/therapeutic exercise, strengthening exercise at 10/17/2024 1133            OT Discharge Recommendations      Flowsheet Row Most Recent Value   OT Recommended Discharge Disposition skilled nursing facility at 10/17/2024 1133   Anticipated Equipment Needs if Discharged Home (OT) other (see comments)  [TBD] at 10/15/2024 1055                 OT Goals      Flowsheet Row Most Recent Value   Bed Mobility Goal 1    Activity/Assistive Device bed mobility activities, all at 10/15/2024 1055   Excel modified independence at 10/15/2024 1055   Time Frame by discharge at 10/15/2024 1055   Progress/Outcome goal ongoing at 10/15/2024 1055   Transfer Goal 1    Activity/Assistive Device all transfers, sit-to-stand/stand-to-sit at 10/15/2024 1055   Excel modified independence at 10/15/2024 1055   Time Frame by discharge at 10/15/2024 1055   Progress/Outcome goal ongoing at 10/15/2024 1055   Dressing Goal 1    Activity/Adaptive Equipment dressing skills, all at 10/15/2024 1055   Excel modified independence at 10/15/2024 1055   Time Frame by discharge at 10/15/2024 1055   Progress/Outcome goal ongoing at 10/15/2024 1055   Toileting Goal 1    Activity/Assistive Device toileting skills, all at 10/15/2024 1055   Excel modified independence at 10/15/2024 1055   Time Frame by discharge at 10/15/2024 1055   Progress/Outcome goal ongoing at 10/15/2024 1055

## 2024-10-17 NOTE — PLAN OF CARE
Plan of Care Review  Outcome Evaluation: Patient conts on heparin drip at 850units /HR, paient tolerating lregular diet so far, Patient more forgetful and confused at times early evening. Fall bundle in place. R chest tube to sunctioned. Will get TPA at 1900, IV tem aware,  Problem: Adult Inpatient Plan of Care  Goal: Plan of Care Review  Outcome: Progressing  Flowsheets (Taken 10/17/2024 1711)  Outcome Evaluation: Patient conts on heparin drip at 850units /HR, paient tolerating lregular diet so far, Patient more forgetful and confused at times early evening. Fall bundle in place. R chest tube to sunctioned. Will get TPA at 1900, IV tem aware,

## 2024-10-17 NOTE — PROGRESS NOTES
Patient:  Dave Arredondo  Location:  [facility] 3C 5466  MRN:  [id number]  Today's date:  10/17/2024    SLP Diagnosis  Patient presents with functional oral stage; subtle pharyngeal stage ss/x however low clinical indicators for aspiration.    Swallowing Recommendations   SLP Swallowing Recommendations - 10/17/24 1028       Diet Consistency Recommendations regular;thin liquids     Medication Administration whole with liquid     Supervision Level for Intake patient independent     Feeding/Delivery Recommendations small bites/sips;eliminate all distractions     Posture Recommendations fully upright in chair/chair mode of bed     Instrumental Assessment Recommendations reassess via non-instrumental clinical swallow evaluation     Oral Hygiene standard toothbrush/toothpaste;twice daily teeth brushing     Prognosis for Return to Safe Oral Intake good;anticipate improvement as medical/mental status improves     Comment, Swallowing Recommendations throat clearing behaviors at baseline; chest tube placement; could have some throat irritation d/t NGT.                     Summary/Handoff  SLP completed Dysphagia Eval per consult received.  Patient admitted with acute abdominal pain at site of colostomy and found to have SBO; NGT placed for suction and has been removed.  Patient also with chest tube placement 10/16 for pleural effusions.  Patient awake and sitting up in chair with confusion to place, time but benefits from cues.  Patient observed to to have chronic throat clearing which observed in absence of PO trials; did not enhanced with trials of thin liquids although throat clearing behavior remained.  Patient is stable on room air, no remarkable WBC count and imaging not supportive to aspiration therefore would continue with prescribed diet.  Patient recommended CLD per General Surgery recommendations at this time.  SLP to follow case.    Active Diet Orders (From admission, onward)       Start     Ordered     10/17/24 0942  Adult Diet Regular; Low Fiber/Residue; RD/LDN may adjust order  Diet effective now        Question Answer Comment   Diet Texture Regular    Other Restriction(s): Low Fiber/Residue    Delegation of Authority. Diet orders written by PA/CRChelsy may not be adjusted by RD/LDNs. RD/LDN may adjust order        10/17/24 0941                    Dave is a 88 y.o. male admitted on 10/14/2024 with SBO (small bowel obstruction) (CMS/HCC) [K56.609]  PE (pulmonary thromboembolism) (CMS/HCC) [I26.99]  Pleural effusion on right [J90]. Principal problem is PE (pulmonary thromboembolism) (CMS/HCC).    Past Medical History  Dave has a past medical history of Colon cancer (CMS/HCC) (2012), Coronary artery disease, DVT (deep venous thrombosis) (CMS/HCC), Hypertension, Lipid disorder, Pleural effusion, and Pulmonary embolism (CMS/HCC).    History of Present Illness  88 y.o. male with a past medical history of Dvt/PE, HTN, CAD, rectal cancer s/p chemoXRT and APR with end colostomy, recurrent pleural effusion, who presents with abdominal pain found to have PE (on hep gtt) and SBO     CXR IMPRESSION:  Loculated right pleural effusion and basilar airspace opacity.    CT Abd IMPRESSION  Loculated RIGHT pleural effusion with pleural thickening. Empyema not excluded.  Subtotal atelectasis of the RIGHT LOWER and MIDDLE lobes. The left lung is clear.     10/16: s/p  right pigtail chest tube placement.      NGT for suction now removed; ST consult.    SLP Pain      Date/Time Pain Type Rating: Rest Rating: Activity [facility]   10/17/24 1028 Pain Assessment 0 - no pain 0 - no pain [place]            Prior Level of Function      Flowsheet Row Most Recent Value   Dominant Hand right   Ambulation independent   Transferring independent   Toileting independent   Bathing independent   Dressing independent   Eating independent   Communication understands/communicates without difficulty   Swallowing swallows foods/liquids without difficulty    Baseline Diet/Method of Nutritional Intake no diet restrictions, thin liquids, regular   Past History of Dysphagia patient denies history however reports recent decline in appetite with associated weightloss.   Prior Level of Function Comment Per last EMR pt IND w/ mobility/adls w/o AD. But recently has only been able to ambulate short distances   Assistive Device Currently Used at Home grab bar, shower chair           Prior Living Environment      Flowsheet Row Most Recent Value   People in Home roommate(s)   Current Living Arrangements home   Home Accessibility stairs to enter home (Group), stairs within home (Group)   Living Environment Comment Pt  historian and stated he lives w/ his 4 brothers. Per EMR pt lives w/ roommate(s) on the 2nd fl, accessible by 12 steps, 1 [address] building, no elevator access             SLP Evaluation and Treatment - 10/17/24 1028          SLP Time Calculation    Start Time 1028     Stop Time 1040     Time Calculation (min) 12 min     Document Type Initial Evaluation        General Information    Position at Start of Session upright;in chair     Status at Start of Session agreeable to therapy;no issues or concerns identified by nurse prior to session     General Observations of Patient clear by RN for PO trials; endorses throat clearing behaviors; patient now with chest tube.        Precautions/Limitations/Impairments    Existing Precautions/Restrictions aspiration     Limitations/Impairments swallowing         Services    Do You Speak a Language Other Than English at Home? no        Cognition/Psychosocial    Affect/Mental Status WFL     Orientation Status oriented to;person;verbal cues/prompts needed for orientation;place;situation;time     Follows Commands follows one-step commands;over 90% accuracy        Dentition (Oral Motor)    Dentition (Oral Motor) natural dentition;adequate dentition        Facial Symmetry (Oral Motor)    Facial Symmetry (Oral Motor) WNL        Lip  Function (Oral Motor)    Lip Range of Motion (Oral Motor) WNL        Tongue Function (Oral Motor)    Tongue ROM (Oral Motor) WNL        Vocal Quality/Secretion Management (Oral Motor)    Vocal Quality (Oral Motor) WFL     Secretion Management (Oral Motor) WNL        GRBAS    Grade 0-->none     Roughness 0-->none     Breathiness 0-->none     Asthenia 0-->none     Strain 0-->none        Motor Speech    Speech Intelligibility (Motor Speech) WFL;conversational level        Functional Communication Measures    FCM: Swallowing 6-->Level 6        General Swallowing Observations    Current Diet/Method of Nutritional Intake thin liquids;regular     Signs/Symptoms of Aspiration (Current Diet) throat clearing     Respiratory Support (General Swallowing Observations) none     Comment, Secretions/Suctioning unremarkable        Food and Liquid Trials (NIS)    Patient Positioning upright in chair     Oral Intake/Feeding Performance independent/appropriate self-feeding skills     Liquid Consistencies Evaluated thin liquids     Thin Liquids intact;straw sips     Comment, Thin Liquids throat clearing noted however also at baseline; no further overt ss/x to suggest prandial aspiration risk.     Food Consistencies Evaluated pureed (PU4)     Pureed (PU4) intact;patient controlled amounts     Comment, Pureed (PU4) minimal amounts of jello given lack of taste; currently on clear liquid diet.     Oral Preparatory Phase of Swallow WFL     Oral Phase of Swallow WFL     Pharyngeal Phase of Swallow throat clearing noted after the swallow     Esophageal Phase of Swallow no clinical symptoms        Swallowing Intervention    Dysphagia/Swallowing Interventions monitor tolerance of;current diet without evidence of aspiration        AM-PAC™ - Cognition (Current Function)    Following/understanding a 10-15 minute speech or presentation? 3 - A little     Understanding familiar people during ordinary conversations? 4 - None     Remembering to take  medications at the appropriate time? 4 - None     Remembering where things were placed or put away? 4 - None     Remembering a list of 3 or 4 errands without writing it down? 3 - A little     Taking care of complicated tasks? 2 - A lot     AM-PAC™ Cognition Score 20        SLP Goals    Swallowing Goal Selection oral nutrition/hydration, SLP goal (free text)        Session Outcome    Position at End of Session upright;in chair     Nursing Notified patient's response to therapy/activity        Plan    Rehab Potential good, to achieve stated therapy goals     Therapy Frequency 3 times/wk     Planned Therapy Interventions dysphagia therapy                   SLP Discharge Recommendations      Flowsheet Row Most Recent Value   Recommended SLP Services After Discharge no follow-up speech therapy recommended at 10/17/2024 1028                 Education Documentation  No documentation found.        SLP Goals      Flowsheet Row Most Recent Value   Oral Nutrition/Hydration Goal    Oral Nutrition/Hydration Goal Patient will consume least restrctive PO diet without overt ss/x of aspiration. at 10/17/2024 1028   Time Frame long-term goal (LTG), by discharge at 10/17/2024 1028

## 2024-10-17 NOTE — NURSING NOTE
VAT: 19:32 Chest tube unclamped, suction turned on, 3 way stopcock opened to atrium/drainage, patient remains without complaints primary RN at bedside and aware.

## 2024-10-17 NOTE — PLAN OF CARE
Care Coordination Discharge Plan Note     Discharge Needs Assessment  Concerns to be Addressed: discharge planning, care coordination/care conferences  Current Discharge Risk: dependent with mobility/activities of daily living    Anticipated Discharge Plan  Anticipated Discharge Disposition: skilled nursing facility  Type of Skilled Nursing Care Services: nursing, OT, PT      Patient Choice  Offered/Gave Vendor List: yes  Patient's Choice of Community Agency(s): [facility] is #1 top and only choice.    Patient and/or patient guardian/advocate was made aware of their right to choose a provider. A list of eligible providers was presented and reviewed with the patient and/or patient guardian/advocate in written and/or verbal form. The list delineates providers in the patient’s desired geographic area who are participating in the Medicare program and/or providers contracted with the patient’s primary insurance. The Medicare list and quality ratings were obtained from the Medicare.gov [medicare.gov] website.    ---------------------------------------------------------------------------------------------------------------------    Interdisciplinary Discharge Plan Review:  Participants:family/lay caregiver    Concerns Comments: FriendAlexsander Roger has chosen a SNF. [facility] is the #1 and only SNF pick at this time. C.C. team supoervisor reports pt can d/c to [facility] due to friend choice. Friend aware pt`s insurance will need to approve for pt to d/c to a SNF with an insurnace AUTH.            Per RN Lexi from today, no current behaviors for pt. Current Ostomy. No current cancer treatments per RN. SW informed SNF, [facility] of friend`s choice in SNF, Ostomy, no cancer treatments, and how pt has not had any behaviors.                     D/c plan   [facility]   AUTH needed prior to d/c.   Auth #  [id number]    Discharge Plan:   Disposition/Destination: Skilled Nursing Facility - Other  /  Skilled Nursing Facility  Discharge Facility:    Community Resources:      Discharge Transportation:  Is Out of Hospital DNR needed at Discharge: no  Does patient need discharge transport? Yes       ADD-4:16pm   SW spoke to [facility] admission, SW notified SNF of new ALESHA. SNF- [facility] can accept pt and start AUTH when appropriate.      D/c plan   [facility] (accepted)   RN report #   AUTH needed prior to d/c.   Auth #  BLS

## 2024-10-17 NOTE — NURSING NOTE
VAT: 18:32 chest tube clamped, wall suction turned off, alteplase/dornase administered per empyema protocol into right posterior chest tube via 3 way stopcock, patient denied pain and discomfort, will return in one hr to unclamp chest tube, primary RN aware.

## 2024-10-17 NOTE — PLAN OF CARE
Problem: Adult Inpatient Plan of Care  Goal: Plan of Care Review  Outcome: Progressing  Flowsheets (Taken 10/17/2024 1511)  Progress: improving  Outcome Evaluation:   Dysphagia Eval completed, ST to follow case, OK for Reg/ Thin liquids.  Plan of Care Reviewed With: patient

## 2024-10-18 PROBLEM — F05 DELIRIUM DUE TO GENERAL MEDICAL CONDITION: Status: ACTIVE | Noted: 2024-10-18

## 2024-10-18 LAB
ANION GAP SERPL CALC-SCNC: 6 MEQ/L (ref 3–15)
APTT PPP: 92 SEC (ref 23–35)
BUN SERPL-MCNC: 16 MG/DL (ref 7–25)
CALCIUM SERPL-MCNC: 9.1 MG/DL (ref 8.6–10.3)
CHLORIDE SERPL-SCNC: 104 MEQ/L (ref 98–107)
CO2 SERPL-SCNC: 30 MEQ/L (ref 21–31)
CREAT SERPL-MCNC: 0.8 MG/DL (ref 0.7–1.3)
EGFRCR SERPLBLD CKD-EPI 2021: >60 ML/MIN/1.73M*2
ERYTHROCYTE [DISTWIDTH] IN BLOOD BY AUTOMATED COUNT: 15 % (ref 11.6–14.4)
GLUCOSE SERPL-MCNC: 97 MG/DL (ref 70–99)
HCT VFR BLD AUTO: 40.8 % (ref 40.1–51)
HGB BLD-MCNC: 13.2 G/DL (ref 13.7–17.5)
MAGNESIUM SERPL-MCNC: 1.9 MG/DL (ref 1.8–2.5)
MCH RBC QN AUTO: 29.5 PG (ref 28–33.2)
MCHC RBC AUTO-ENTMCNC: 32.4 G/DL (ref 32.2–36.5)
MCV RBC AUTO: 91.3 FL (ref 83–98)
PLATELET # BLD AUTO: 183 K/UL (ref 150–350)
PMV BLD AUTO: 10 FL (ref 9.4–12.4)
POTASSIUM SERPL-SCNC: 3.3 MEQ/L (ref 3.5–5.1)
RBC # BLD AUTO: 4.47 M/UL (ref 4.5–5.8)
SODIUM SERPL-SCNC: 140 MEQ/L (ref 136–145)
WBC # BLD AUTO: 9.38 K/UL (ref 3.8–10.5)

## 2024-10-18 PROCEDURE — 63700000 HC SELF-ADMINISTRABLE DRUG: Performed by: INTERNAL MEDICINE

## 2024-10-18 PROCEDURE — 85730 THROMBOPLASTIN TIME PARTIAL: CPT | Performed by: INTERNAL MEDICINE

## 2024-10-18 PROCEDURE — 36415 COLL VENOUS BLD VENIPUNCTURE: CPT | Performed by: INTERNAL MEDICINE

## 2024-10-18 PROCEDURE — 63600000 HC DRUGS/DETAIL CODE: Mod: JZ,TB | Performed by: PHYSICIAN ASSISTANT

## 2024-10-18 PROCEDURE — 99233 SBSQ HOSP IP/OBS HIGH 50: CPT | Performed by: STUDENT IN AN ORGANIZED HEALTH CARE EDUCATION/TRAINING PROGRAM

## 2024-10-18 PROCEDURE — 63600000 HC DRUGS/DETAIL CODE: Mod: JZ | Performed by: NURSE PRACTITIONER

## 2024-10-18 PROCEDURE — 83735 ASSAY OF MAGNESIUM: CPT | Performed by: NURSE PRACTITIONER

## 2024-10-18 PROCEDURE — 25000000 HC PHARMACY GENERAL: Performed by: PHYSICIAN ASSISTANT

## 2024-10-18 PROCEDURE — 63700000 HC SELF-ADMINISTRABLE DRUG: Performed by: NURSE PRACTITIONER

## 2024-10-18 PROCEDURE — 85027 COMPLETE CBC AUTOMATED: CPT | Performed by: NURSE PRACTITIONER

## 2024-10-18 PROCEDURE — 63700000 HC SELF-ADMINISTRABLE DRUG: Performed by: STUDENT IN AN ORGANIZED HEALTH CARE EDUCATION/TRAINING PROGRAM

## 2024-10-18 PROCEDURE — 25800000 HC PHARMACY IV SOLUTIONS: Performed by: PHYSICIAN ASSISTANT

## 2024-10-18 PROCEDURE — 93005 ELECTROCARDIOGRAM TRACING: CPT | Performed by: NURSE PRACTITIONER

## 2024-10-18 PROCEDURE — 20600000 HC ROOM AND CARE INTERMEDIATE/TELEMETRY

## 2024-10-18 PROCEDURE — 80048 BASIC METABOLIC PNL TOTAL CA: CPT | Performed by: NURSE PRACTITIONER

## 2024-10-18 RX ORDER — DOCUSATE SODIUM 100 MG/1
100 CAPSULE, LIQUID FILLED ORAL 2 TIMES DAILY
Status: DISCONTINUED | OUTPATIENT
Start: 2024-10-18 | End: 2024-10-25 | Stop reason: HOSPADM

## 2024-10-18 RX ORDER — HALOPERIDOL 0.5 MG/1
0.5 TABLET ORAL ONCE
Status: COMPLETED | OUTPATIENT
Start: 2024-10-18 | End: 2024-10-18

## 2024-10-18 RX ORDER — POTASSIUM CHLORIDE 20 MEQ/1
40 TABLET, EXTENDED RELEASE ORAL ONCE
Status: COMPLETED | OUTPATIENT
Start: 2024-10-18 | End: 2024-10-18

## 2024-10-18 RX ORDER — IBUPROFEN/PSEUDOEPHEDRINE HCL 200MG-30MG
3 TABLET ORAL NIGHTLY
Status: DISCONTINUED | OUTPATIENT
Start: 2024-10-18 | End: 2024-10-25 | Stop reason: HOSPADM

## 2024-10-18 RX ORDER — LANOLIN ALCOHOL/MO/W.PET/CERES
400 CREAM (GRAM) TOPICAL ONCE
Status: COMPLETED | OUTPATIENT
Start: 2024-10-18 | End: 2024-10-18

## 2024-10-18 RX ADMIN — METOPROLOL SUCCINATE 100 MG: 100 TABLET, EXTENDED RELEASE ORAL at 08:39

## 2024-10-18 RX ADMIN — DOCUSATE SODIUM 100 MG: 100 CAPSULE, LIQUID FILLED ORAL at 20:26

## 2024-10-18 RX ADMIN — Medication 3 MG: at 20:26

## 2024-10-18 RX ADMIN — HALOPERIDOL 0.5 MG: 0.5 TABLET ORAL at 13:19

## 2024-10-18 RX ADMIN — PANTOPRAZOLE SODIUM 40 MG: 40 INJECTION, POWDER, LYOPHILIZED, FOR SOLUTION INTRAVENOUS at 08:39

## 2024-10-18 RX ADMIN — ALTEPLASE 5 MG: 2.2 INJECTION, POWDER, LYOPHILIZED, FOR SOLUTION INTRAVENOUS at 07:42

## 2024-10-18 RX ADMIN — Medication 400 MG: at 08:39

## 2024-10-18 RX ADMIN — DOCUSATE SODIUM 100 MG: 100 CAPSULE, LIQUID FILLED ORAL at 09:38

## 2024-10-18 RX ADMIN — HALOPERIDOL 0.5 MG: 0.5 TABLET ORAL at 06:21

## 2024-10-18 RX ADMIN — ALTEPLASE 5 MG: 2.2 INJECTION, POWDER, LYOPHILIZED, FOR SOLUTION INTRAVENOUS at 18:03

## 2024-10-18 RX ADMIN — DORNASE ALFA 2.5 MG: 1 SOLUTION RESPIRATORY (INHALATION) at 07:42

## 2024-10-18 RX ADMIN — POTASSIUM CHLORIDE 40 MEQ: 1500 TABLET, EXTENDED RELEASE ORAL at 08:39

## 2024-10-18 RX ADMIN — DORNASE ALFA 2.5 MG: 1 SOLUTION RESPIRATORY (INHALATION) at 18:03

## 2024-10-18 ASSESSMENT — COGNITIVE AND FUNCTIONAL STATUS - GENERAL
MOVING TO AND FROM BED TO CHAIR: 2 - A LOT
CLIMB 3 TO 5 STEPS WITH RAILING: 2 - A LOT
WALKING IN HOSPITAL ROOM: 2 - A LOT
CLIMB 3 TO 5 STEPS WITH RAILING: 2 - A LOT
STANDING UP FROM CHAIR USING ARMS: 2 - A LOT
WALKING IN HOSPITAL ROOM: 2 - A LOT
MOVING TO AND FROM BED TO CHAIR: 2 - A LOT
STANDING UP FROM CHAIR USING ARMS: 2 - A LOT

## 2024-10-18 NOTE — PROGRESS NOTES
Hospital Medicine     Daily Progress Note       SUBJECTIVE   Interval History:   Patient seen and examined. Overnight he became confused and delirious. Has mitts in place because he was pulling at his chest tube.  He is a little better this morning. Can answer orientation questions appropriately. Some of his confusion is appropriate but he still has some confusion.    Patient pulled of the ostomy bag around 5/5:30 AM - he has had no  output of stool or gas since. He denies any nausea or abdominal pain.  Chest tube is currently clamped as he is receiving a dose of tpa/dornase.     OBJECTIVE      Vital signs in last 24 hours:  Temp:  [36.1 °C (97 °F)-37.2 °C (99 °F)] 37.2 °C (99 °F)  Heart Rate:  [68-97] 97  Resp:  [16-18] 18  BP: (154-170)/(74-88) 155/88    Intake/Output Summary (Last 24 hours) at 10/18/2024 1347  Last data filed at 10/18/2024 0942  Gross per 24 hour   Intake 324.5 ml   Output 1040 ml   Net -715.5 ml       PHYSICAL EXAMINATION      Physical Exam  Constitutional: Elderly male. Appears well-developed. No distress.   HENT:   Head: Normocephalic and atraumatic.   Eyes: No scleral icterus.   Cardiovascular: S1, S2. Normal rate and regular rhythm. No m/r/g appreciated.    Pulmonary/Chest: CTAB. No r/r/w. Respirations unlabored.  Abdominal: Soft. Bowel sounds are normal. No tenderness or distension.  Musculoskeletal:  no edema.   Neurological: alert and oriented to person, place, and time.  No gross focal deficits. Some confused conversation. Redirectable.  Skin: Skin is warm and dry.  No rash on exposed skin.  Psychiatric: Calm,     LINES, CATHETERS, DRAINS, AIRWAYS, AND WOUNDS   Lines, Drains, and Airways:  Wounds (agree with documentation and present on admission):  Peripheral IV (Adult) 10/14/24 Anterior;Left;Proximal;Upper Arm (Active)   Number of days: 4       Peripheral IV (Adult) 10/17/24 Posterior;Right Forearm (Active)   Number of days: 1       Chest Tube 1 Right 12 Fr (Active)   Number of  "days: 2       Colostomy Unknown LUQ (Active)   Number of days: 5320         Comments:    LABS / IMAGING / TELE      Labs  Results from last 7 days   Lab Units 10/18/24  0444 10/17/24  0332 10/16/24  0316   SODIUM mEQ/L 140 142 141   POTASSIUM mEQ/L 3.3* 3.5 3.7   CHLORIDE mEQ/L 104 106 103   CO2 mEQ/L 30 30 32*   BUN mg/dL 16 17 25   CREATININE mg/dL 0.8 0.7 0.8   GLUCOSE mg/dL 97 109* 150*   CALCIUM mg/dL 9.1 9.3 9.7     Results from last 7 days   Lab Units 10/18/24  0444 10/17/24  0332 10/13/24  2208   MAGNESIUM mg/dL 1.9 1.9 1.7*     Results from last 7 days   Lab Units 10/18/24  0444 10/17/24  0332 10/16/24  0316   WBC K/uL 9.38 9.35 12.11*   HEMOGLOBIN g/dL 13.2* 13.1* 15.2   HEMATOCRIT % 40.8 40.5 46.5   PLATELETS K/uL 183 176 191           Imaging  X-RAY CHEST 1 VIEW    Result Date: 10/17/2024  CLINICAL HISTORY: S/p pigtail placement for effusion COMPARISON: CT performed 10/14/2024, x-ray performed 10/13/2024 COMMENT: TECHNIQUE: Single frontal view of the chest was performed. Right pleural pigtail catheter terminates at the lung base. Fluid component of the loculated pleural effusion has decreased. Some locules of gas in the right basilar likely due to interval catheter placement, loculation and an element of \"trapped lung\". Pleural fluid along the lateral upper and mid right hemithorax remains unchanged. Associated right basilar atelectasis/infiltrate is improving. Left lung grossly clear. A skin fold projects over the left hemithorax; there is no left pneumothorax. No alveolar edema. Stable cardiomegaly.     IMPRESSION: Loculated right hydropneumothorax at the lung base as described above; fluid component has decreased and aeration improved.        ECG/Telemetry  I have independently reviewed the telemetry. No events for the last 24 hours.    ASSESSMENT AND PLAN      * PE (pulmonary thromboembolism) (CMS/HCC)  Overview  LLE DVT/PE in 2019-> Xarelto for 6 months then changed to prophylactic dose-> DVT " "2020    Assessment & Plan  - on admission reports mild central chest pain x 2-3 days  - no SOB  - CTA chest - Left upper and lower lobe pulmonary emboli.  - Patient reports stopping Xarelto 2 days ago after listening to some one talk about Xarelto and Metoprolol interaction. On Med Dispense List he hasn't had any Xarelto fills since July.  He denies difficulty with cost.    P:  - continue heparin gtt  - eventual transition to Xarelto once we are sure there are no procedures  - needs better follow up after discharge    Pleural effusion on right  Assessment & Plan  - h/o recurrent pleural effusion requiring thoracentesis. S/p \"Right VATS pleural biopsy, insertion of Pleurx catheter \" by Dr. Sergey Dennis on 7/8/24. Pathology was benign. Catheter removed in office 8/9/24 after minimal drainage.  Patient states catheter had been removed 2 months ago  - He was admitted to [facility] in August 2024 for fall. Looks like at the time had PET-CT which was negative for malignancy  - CT chest  -Loculated RIGHT pleural effusion with pleural thickening. Empyema not excluded.  - Pulse ox stable on room air. Chronic dyspnea    P:  - Thoracic surgery consulted appreciated.   - s/p IR placment of 12F pigtail catheter. Fluid is exudative by Light's criteria. Cultures pending. Cytology - No malignant cells identified   - management of chest tube per surgery - Patient to get 1/2 dose  tpa/dornase x 6 doses (1st dose given 10/17 pm). No need for follow up imaging.  tube can come out when the tube puts out less than 200ml/day     SBO (small bowel obstruction) (CMS/HCC)  Assessment & Plan  - Abd pain around area of colostomy since Sunday night  - CT A/P - Dilated small bowel with transition point in the left midabdomen, concerning for bowel obstruction. 5.  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   - normal lactate at 1.2, WBC 10.87K  - No n/v    P:  - Surgery following  - SBFT 10/15 showed contrast in right " colon ruling out high grade bowel obstruction, likely ileus vs pSBO  - d/w Surgery PA this morning, only 50 mL of output yesterday. She states he previously reported he doesn't have output daily. Okay to start Colace.  - monitor stool output in ostomy bag  - diet advanced to low residue  - pain control as needed, pain has been minimal    Grade I diastolic dysfunction  Assessment & Plan  ECHO 6/2024 : Left Ventricle: Normal ventricle size. Concentric left ventricular hypertrophy. Normal systolic function. Estimated EF 65%. No regional wall motion abnormalities. Grade I diastolic dysfunction   He has Lasix on his med list but has not been filled since 5/16/2024 for 90 day supply  Reportedly taking Metoprolol XL until recently  Looks euvolemic    P:  - appears euvolemic  - HOLD diuretics for now  - cont outpatient toprol xl    Delirium due to general medical condition  Assessment & Plan  Noted overnight and continued this morning  D/w RN, continue to reorient as needed  Trying to mobilize patient during day, lights on during the day, lights off/minimize noise at night  Sleep protocol    Severe malnutrition (CMS/[facility])  Assessment & Plan  Patient meets criteria for severe malnutrition of chronic illness based on eating less than 75% energy needs for greater than 1 month, >5% weight loss x 3 months (lost 15% of his body weight) and moderate muscle loss with slight depression of temples.    Nutrition consult appreciated.    Medication management  Assessment & Plan  - Patient is not able to provide accurate list of medications  - He doesn't  have a PCP. Seems like he has been getting refills through cardiologist Dr. Campbell but they have noted he hasn't seen them in over a year.  - He states stopped Xarelto and Metoprolol couple days ago because he was told there is a drug-drug interaction.     P:  - care management following  He was given a list of [facility] PCPs    History of rectal cancer  Overview  T2 N1 M0 rectal cancer,  neoadjuvant chemotherapy and  radiotherapy followed by surgery (APR), then had multiple  complications after the surgery,   Colostomy    Assessment & Plan  - unknown who he follows with        VTE Assessment: Padua    VTE Prophylaxis:  Current anticoagulants:  heparin (porcine) bolus from bag 2,700-5,450 Units, intravenous, q6h PRN  heparin infusion in D5W 100 units/mL, intravenous, Titrated      Code Status: Full Code      Estimated Discharge Date: 10/21/2024   Disposition Planning: PT/OT rec SNF when stable for d/c     VALENCIA Adkins  10/18/2024

## 2024-10-18 NOTE — NURSING NOTE
Pt cooperative with care. Was able to feed self breakfast without trying to pull at iv or chest tube for 1/2 hour.  Mitts discontinued for now and will cont to monitor patient activity and orientation

## 2024-10-18 NOTE — PROGRESS NOTES
[facility]    Progress Note    Subjective     Interval History:  Patient with gas and stool in his ostomy. Doing well overnight. Patient denies nausea, vomiting or abdominal pain. Received first TPA/dornase yesterday. CT to suction, no air leak.    Objective     Vitals:    10/18/24 0700   BP: (!) 155/88   Pulse: 91   Resp: 18   Temp: 37.2 °C (99 °F)   SpO2: 95%         Intake/Output Summary (Last 24 hours) at 10/18/2024 0746  Last data filed at 10/18/2024 0630  Gross per 24 hour   Intake 414.5 ml   Output 1190 ml   Net -775.5 ml         No intake/output data recorded.    Physical Exam  Constitutional: No distress.   Cardiovascular: Normal rate and regular rhythm.   Pulmonary/Chest: Effort normal. No respiratory distress. CT to suction, no air leak  Abdominal: Soft, non distended, TTP especially around the stoma, stoma pink and patent with gas and stool in ostomy bag  Neurological: AAOx3  Skin: Skin is warm and dry.     Labs:  Results from last 7 days   Lab Units 10/18/24  0444 10/17/24  0332 10/16/24  0316   WBC K/uL 9.38 9.35 12.11*   HEMOGLOBIN g/dL 13.2* 13.1* 15.2   HEMATOCRIT % 40.8 40.5 46.5   PLATELETS K/uL 183 176 191     Results from last 7 days   Lab Units 10/18/24  0444 10/17/24  0332 10/16/24  0316 10/15/24  0639 10/14/24  0849   SODIUM mEQ/L 140 142 141 138 136   POTASSIUM mEQ/L 3.3* 3.5 3.7 4.0 4.2   CHLORIDE mEQ/L 104 106 103 101 98   CO2 mEQ/L 30 30 32* 31 31   BUN mg/dL 16 17 25 21 16   CREATININE mg/dL 0.8 0.7 0.8 0.9 0.8   CALCIUM mg/dL 9.1 9.3 9.7 9.6 10.1   ALBUMIN g/dL  --   --  3.3* 3.1* 3.8   BILIRUBIN TOTAL mg/dL  --   --  1.1 0.9 1.1   ALK PHOS IU/L  --   --  90 81 98   ALT IU/L  --   --  16 17 19   AST IU/L  --   --  15 14 16   GLUCOSE mg/dL 97 109* 150* 254* 162*     Recent Labs   Lab 10/18/24  0444 10/17/24  0611 10/17/24  0332 10/14/24  1111 10/14/24  0849 10/14/24  0231   INR  --   --   --   --  1.1 1.0   PTT 92* 87* 79*   < > 161* 23    < > = values in this interval not  "displayed.           Imaging:  I have reviewed the Imaging from the last 24 hrs.  X-RAY CHEST 1 VIEW    Result Date: 10/17/2024  CLINICAL HISTORY: S/p pigtail placement for effusion COMPARISON: CT performed 10/14/2024, x-ray performed 10/13/2024 COMMENT: TECHNIQUE: Single frontal view of the chest was performed. Right pleural pigtail catheter terminates at the lung base. Fluid component of the loculated pleural effusion has decreased. Some locules of gas in the right basilar likely due to interval catheter placement, loculation and an element of \"trapped lung\". Pleural fluid along the lateral upper and mid right hemithorax remains unchanged. Associated right basilar atelectasis/infiltrate is improving. Left lung grossly clear. A skin fold projects over the left hemithorax; there is no left pneumothorax. No alveolar edema. Stable cardiomegaly.     IMPRESSION: Loculated right hydropneumothorax at the lung base as described above; fluid component has decreased and aeration improved.      Assessment:  Dave Arredondo 88 y.o. male with history of CAD, DVT on Xarelto, rectal cancer status post APR in 2013 presenting with PE and dilated loops of small bowel concerning for an SBO.    Plan:    - Monitor stool output  -Tolerating CLD; advance as tolerated   - Continue PPI for GI prophylaxis  - Remainder of care per primary  - Continue CT to suction  - Continue TPA/dornase - CT chest to follow completion    Wyatt Lugo MD  General Surgery PGY2  [facility]    Please page  [phone]  with any questions or concerns.        "

## 2024-10-18 NOTE — NURSING NOTE
VAT 1805 Chest tube clamped, wall suction turned off ,and stopcock pointing to atrium. Intrapleural tpa/ Dornase administered via right posterior chest tube. Patient denies pain or discomfort at this time.  Primary RN aware.     1910: Chest tube unclamped. Wall suction turned back on and stopcock opened to drainage. Patient remains without pain and discomfort. Primary RN aware.

## 2024-10-18 NOTE — PLAN OF CARE
Problem: Adult Inpatient Plan of Care  Goal: Plan of Care Review  Outcome: Progressing  Flowsheets (Taken 10/18/2024 1825)  Progress: no change  Outcome Evaluation: still confused most of the day, did have mitts on this morning but took off this morning and was able to keep him safe with no pulling at chest tube and iv, bed alarm on,  takes pills and is cooperate.  incont care/using urinal. ostomy with no BM given colace. mg and K replaced.  he gtt at 850 therapeutic.  chest tube with 180cc out during this shift.  Plan of Care Reviewed With: patient   Plan of Care Review  Plan of Care Reviewed With: patient  Progress: no change  Outcome Evaluation: still confused most of the day, did have mitts on this morning but took off this morning and was able to keep him safe with no pulling at chest tube and iv, bed alarm on,  takes pills and is cooperate.  incont care/using urinal. ostomy with no BM given colace. mg and K replaced.  he gtt at 850 therapeutic.  chest tube with 180cc out during this shift.

## 2024-10-18 NOTE — NURSING NOTE
VAT:  07:42 Intrapleural alteplase/dornase administered into right posterior chest tube  per empyema protocol, patient denies pain, will let medication to dwell for one hr, and unclamped chest tube. Primary RN aware and at bedside.     08:42 Chest tube unclamped and returned to suction, stopcock opened to drainage, patent remains without pain and discomfort. RN aware

## 2024-10-18 NOTE — PLAN OF CARE
Plan of Care Review  Plan of Care Reviewed With: patient  Progress: no change  Outcome Evaluation: pt AOX3, progressively more confused as shift went on, hep gtt running at 850u/h, chest tube intact and maintained to suction, TPA administered with output recorded, next dose TPA due at 0700

## 2024-10-19 LAB
ANION GAP SERPL CALC-SCNC: 6 MEQ/L (ref 3–15)
APTT PPP: 89 SEC (ref 23–35)
ATRIAL RATE: 85
BUN SERPL-MCNC: 17 MG/DL (ref 7–25)
CALCIUM SERPL-MCNC: 9 MG/DL (ref 8.6–10.3)
CHLORIDE SERPL-SCNC: 104 MEQ/L (ref 98–107)
CO2 SERPL-SCNC: 28 MEQ/L (ref 21–31)
CREAT SERPL-MCNC: 0.8 MG/DL (ref 0.7–1.3)
EGFRCR SERPLBLD CKD-EPI 2021: >60 ML/MIN/1.73M*2
ERYTHROCYTE [DISTWIDTH] IN BLOOD BY AUTOMATED COUNT: 15.1 % (ref 11.6–14.4)
GLUCOSE SERPL-MCNC: 128 MG/DL (ref 70–99)
HCT VFR BLD AUTO: 41 % (ref 40.1–51)
HGB BLD-MCNC: 13.3 G/DL (ref 13.7–17.5)
MAGNESIUM SERPL-MCNC: 1.9 MG/DL (ref 1.8–2.5)
MCH RBC QN AUTO: 29.6 PG (ref 28–33.2)
MCHC RBC AUTO-ENTMCNC: 32.4 G/DL (ref 32.2–36.5)
MCV RBC AUTO: 91.1 FL (ref 83–98)
P AXIS: 46
PLATELET # BLD AUTO: 177 K/UL (ref 150–350)
PMV BLD AUTO: 10.6 FL (ref 9.4–12.4)
POTASSIUM SERPL-SCNC: 3.4 MEQ/L (ref 3.5–5.1)
PR INTERVAL: 236
QRS DURATION: 94
QT INTERVAL: 392
QTC CALCULATION(BAZETT): 466
R AXIS: 9
RBC # BLD AUTO: 4.5 M/UL (ref 4.5–5.8)
SODIUM SERPL-SCNC: 138 MEQ/L (ref 136–145)
T WAVE AXIS: 23
VENTRICULAR RATE: 85
WBC # BLD AUTO: 11.05 K/UL (ref 3.8–10.5)

## 2024-10-19 PROCEDURE — 85730 THROMBOPLASTIN TIME PARTIAL: CPT | Performed by: INTERNAL MEDICINE

## 2024-10-19 PROCEDURE — 25800000 HC PHARMACY IV SOLUTIONS: Performed by: PHYSICIAN ASSISTANT

## 2024-10-19 PROCEDURE — 63600000 HC DRUGS/DETAIL CODE: Mod: JZ | Performed by: NURSE PRACTITIONER

## 2024-10-19 PROCEDURE — 63600000 HC DRUGS/DETAIL CODE: Mod: JZ | Performed by: STUDENT IN AN ORGANIZED HEALTH CARE EDUCATION/TRAINING PROGRAM

## 2024-10-19 PROCEDURE — 80048 BASIC METABOLIC PNL TOTAL CA: CPT | Performed by: NURSE PRACTITIONER

## 2024-10-19 PROCEDURE — 20600000 HC ROOM AND CARE INTERMEDIATE/TELEMETRY

## 2024-10-19 PROCEDURE — 93010 ELECTROCARDIOGRAM REPORT: CPT | Performed by: INTERNAL MEDICINE

## 2024-10-19 PROCEDURE — 63600000 HC DRUGS/DETAIL CODE: Mod: JZ,TB | Performed by: PHYSICIAN ASSISTANT

## 2024-10-19 PROCEDURE — 99233 SBSQ HOSP IP/OBS HIGH 50: CPT | Performed by: STUDENT IN AN ORGANIZED HEALTH CARE EDUCATION/TRAINING PROGRAM

## 2024-10-19 PROCEDURE — 25000000 HC PHARMACY GENERAL: Performed by: PHYSICIAN ASSISTANT

## 2024-10-19 PROCEDURE — 63700000 HC SELF-ADMINISTRABLE DRUG: Performed by: STUDENT IN AN ORGANIZED HEALTH CARE EDUCATION/TRAINING PROGRAM

## 2024-10-19 PROCEDURE — 63700000 HC SELF-ADMINISTRABLE DRUG: Performed by: NURSE PRACTITIONER

## 2024-10-19 PROCEDURE — 36415 COLL VENOUS BLD VENIPUNCTURE: CPT | Performed by: NURSE PRACTITIONER

## 2024-10-19 PROCEDURE — 85027 COMPLETE CBC AUTOMATED: CPT | Performed by: NURSE PRACTITIONER

## 2024-10-19 PROCEDURE — 83735 ASSAY OF MAGNESIUM: CPT | Performed by: NURSE PRACTITIONER

## 2024-10-19 RX ORDER — POTASSIUM CHLORIDE 14.9 MG/ML
20 INJECTION INTRAVENOUS ONCE
Status: COMPLETED | OUTPATIENT
Start: 2024-10-19 | End: 2024-10-19

## 2024-10-19 RX ADMIN — POTASSIUM CHLORIDE 20 MEQ: 200 INJECTION, SOLUTION INTRAVENOUS at 13:39

## 2024-10-19 RX ADMIN — POTASSIUM CHLORIDE 20 MEQ: 200 INJECTION, SOLUTION INTRAVENOUS at 11:47

## 2024-10-19 RX ADMIN — Medication 3 MG: at 22:41

## 2024-10-19 RX ADMIN — HEPARIN SODIUM 850 UNITS/HR: 10000 INJECTION, SOLUTION INTRAVENOUS at 01:33

## 2024-10-19 RX ADMIN — PANTOPRAZOLE SODIUM 40 MG: 40 INJECTION, POWDER, LYOPHILIZED, FOR SOLUTION INTRAVENOUS at 09:47

## 2024-10-19 RX ADMIN — DORNASE ALFA 2.5 MG: 1 SOLUTION RESPIRATORY (INHALATION) at 07:46

## 2024-10-19 RX ADMIN — DOCUSATE SODIUM 100 MG: 100 CAPSULE, LIQUID FILLED ORAL at 09:46

## 2024-10-19 RX ADMIN — DORNASE ALFA 2.5 MG: 1 SOLUTION RESPIRATORY (INHALATION) at 18:04

## 2024-10-19 RX ADMIN — ALTEPLASE 5 MG: 2.2 INJECTION, POWDER, LYOPHILIZED, FOR SOLUTION INTRAVENOUS at 18:04

## 2024-10-19 RX ADMIN — METOPROLOL SUCCINATE 100 MG: 100 TABLET, EXTENDED RELEASE ORAL at 09:46

## 2024-10-19 RX ADMIN — ALTEPLASE 5 MG: 2.2 INJECTION, POWDER, LYOPHILIZED, FOR SOLUTION INTRAVENOUS at 07:46

## 2024-10-19 ASSESSMENT — COGNITIVE AND FUNCTIONAL STATUS - GENERAL
CLIMB 3 TO 5 STEPS WITH RAILING: 2 - A LOT
WALKING IN HOSPITAL ROOM: 2 - A LOT
WALKING IN HOSPITAL ROOM: 2 - A LOT
STANDING UP FROM CHAIR USING ARMS: 2 - A LOT
MOVING TO AND FROM BED TO CHAIR: 2 - A LOT
STANDING UP FROM CHAIR USING ARMS: 2 - A LOT
MOVING TO AND FROM BED TO CHAIR: 2 - A LOT
CLIMB 3 TO 5 STEPS WITH RAILING: 2 - A LOT

## 2024-10-19 NOTE — PROGRESS NOTES
"    Hospital Medicine     Daily Progress Note       SUBJECTIVE   Interval History: patient seen and examined at bedside. NAEO. Continues to have a right sided chest tube. Has intermittent discomfort at the site. Denies sob, cp, n/v.     OBJECTIVE      Vital signs in last 24 hours:  Temp:  [36.3 °C (97.4 °F)-36.6 °C (97.9 °F)] 36.3 °C (97.4 °F)  Heart Rate:  [66-94] 76  Resp:  [16-17] 16  BP: (137-166)/(68-89) 156/89    Intake/Output Summary (Last 24 hours) at 10/19/2024 1446  Last data filed at 10/19/2024 1300  Gross per 24 hour   Intake 120 ml   Output 810 ml   Net -690 ml       PHYSICAL EXAMINATION      Physical Exam    General: chronically ill appearing, appears comfortable, NAD  HEENT: EOMI, NCAT, sclerae anicteric, no conjunctival pallor, no stridor  CV: RRR, no mrg  Pulm: +right sided pigtail catheter, decreased BS  GI: +ostomy, +BS, abd soft, nontender to palpation, nondistended  MSK: normal bulk and tone, normal ROM  Extremities: no b/l LE edema, NT, warm, perfused  Neuro: CN 2-12 grossly intact by nonfocal exam  Psych: normal mood and affect. No psychomotor agitation     LINES, CATHETERS, DRAINS, AIRWAYS, AND WOUNDS   Lines, Drains, and Airways:  Wounds (agree with documentation and present on admission):  Peripheral IV (Adult) 10/17/24 Posterior;Right Forearm (Active)   Number of days: 2       Chest Tube 1 Right 12 Fr (Active)   Number of days: 3       Colostomy Unknown LUQ (Active)   Number of days: 5321         Comments:    LABS / IMAGING / TELE      Labs  I have reviewed the patient's pertinent labs.  Significant abnormals are K 3.4, WBC 11.05.      Imaging  Not applicable    ECG/Telemetry  I have independently reviewed the telemetry. No events for the last 24 hours.    ASSESSMENT AND PLAN      * Pleural effusion on right  Assessment & Plan  - h/o recurrent pleural effusion requiring thoracentesis. S/p \"Right VATS pleural biopsy, insertion of Pleurx catheter \" by Dr. Sergey Dennis on 7/8/24. Pathology " was benign. Catheter removed in office 8/9/24 after minimal drainage.  Patient states catheter had been removed 2 months ago  - He was admitted to [place] in August 2024 for fall. Looks like at the time had PET-CT which was negative for malignancy  - CT chest  -Loculated RIGHT pleural effusion with pleural thickening. Empyema not excluded.  - Pulse ox stable on room air. Chronic dyspnea    P:  - Thoracic surgery consulted appreciated.   - s/p IR placment of 12F pigtail catheter. Fluid is exudative by Light's criteria. Cultures pending. Cytology - No malignant cells identified   - management of chest tube per surgery - Patient to get 1/2 dose  tpa/dornase x 6 doses (1st dose given 10/17 pm). No need for follow up imaging.  tube can come out when the tube puts out less than 200ml/day     Delirium due to general medical condition  Assessment & Plan  Noted overnight and continued this morning  D/w RN, continue to reorient as needed  Trying to mobilize patient during day, lights on during the day, lights off/minimize noise at night  Sleep protocol    Severe malnutrition (CMS/HCC)  Assessment & Plan  Patient meets criteria for severe malnutrition of chronic illness based on eating less than 75% energy needs for greater than 1 month, >5% weight loss x 3 months (lost 15% of his body weight) and moderate muscle loss with slight depression of temples.    Nutrition consult appreciated.    Grade I diastolic dysfunction  Assessment & Plan  ECHO 6/2024 : Left Ventricle: Normal ventricle size. Concentric left ventricular hypertrophy. Normal systolic function. Estimated EF 65%. No regional wall motion abnormalities. Grade I diastolic dysfunction   He has Lasix on his med list but has not been filled since 5/16/2024 for 90 day supply  Reportedly taking Metoprolol XL until recently  Looks euvolemic    P:  - appears euvolemic  - HOLD diuretics for now  - cont outpatient toprol xl    Medication management  Assessment & Plan  - Patient is  not able to provide accurate list of medications  - He doesn't  have a PCP. Seems like he has been getting refills through cardiologist Dr. Campbell but they have noted he hasn't seen them in over a year.  - He states stopped Xarelto and Metoprolol couple days ago because he was told there is a drug-drug interaction.     P:  - care management following  He was given a list of [facility] PCPs    SBO (small bowel obstruction) (CMS/[facility])  Assessment & Plan  - Abd pain around area of colostomy since Sunday night  - CT A/P - Dilated small bowel with transition point in the left midabdomen, concerning for bowel obstruction. 5.  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   - normal lactate at 1.2, WBC 10.87K  - No n/v    P:  - Surgery following  - SBFT 10/15 showed contrast in right colon ruling out high grade bowel obstruction, likely ileus vs pSBO  - d/w Surgery PA with only 50 mL of output. She states he previously reported he doesn't have output daily. Okay to start Colace.  - monitor stool output in ostomy bag  - diet advanced to low residue  - pain control as needed, pain has been minimal    PE (pulmonary thromboembolism) (CMS/[facility])  Overview  LLE DVT/PE in 2019-> Xarelto for 6 months then changed to prophylactic dose-> DVT 2020    Assessment & Plan  - on admission reports mild central chest pain x 2-3 days  - no SOB  - CTA chest - Left upper and lower lobe pulmonary emboli.  - Patient reports stopping Xarelto 2 days ago after listening to some one talk about Xarelto and Metoprolol interaction. On Med Dispense List he hasn't had any Xarelto fills since July.  He denies difficulty with cost.    P:  - continue heparin gtt  - eventual transition to Xarelto once we are sure there are no procedures  - needs better follow up after discharge    History of rectal cancer  Overview  T2 N1 M0 rectal cancer, neoadjuvant chemotherapy and  radiotherapy followed by surgery (APR), then had  multiple  complications after the surgery,   Colostomy    Assessment & Plan  - unknown who he follows with        VTE Assessment: Padua    VTE Prophylaxis:  Current anticoagulants:  heparin (porcine) bolus from bag 2,700-5,450 Units, intravenous, q6h PRN  heparin infusion in D5W 100 units/mL, intravenous, Titrated      Code Status: Full Code      Estimated Discharge Date: 10/21/2024   Disposition Planning: pending clinical course     Fany Malave DO  10/19/2024

## 2024-10-19 NOTE — PLAN OF CARE
Plan of Care Review  Outcome Evaluation: Patient has his night day all mixed up, sleeps day, awake  Problem: Adult Inpatient Plan of Care  Goal: Plan of Care Review  Outcome: Progressing  Flowsheets (Taken 10/19/2024 4929)  Outcome Evaluation: Patient has his night and day all mixed up, sleeps during day, awake and confused at nights. Patient oob in chair for a short time. Patient encouraged to stay up in chair for longer periods of time. R chest tube to -20cm H2O, draining serous drainage, TPA/Dornase adm. Heparin drip at 850, for PE, Patient with small amt stool in colostomy, stool softener given, appetite fair. Needs encouragement to eat. K 3.4, repletion done. Patient 1-2 asst oob. Uses urinal, at times inc urine. Vitals stable NSR on the monitor. Fall bundle in place.

## 2024-10-19 NOTE — PLAN OF CARE
Plan of Care Review  Plan of Care Reviewed With: patient  Progress: no change  Outcome Evaluation: pt slept well most of night, chest tube maintained at -20cm after TPA administration, LUQ colostomy no output, hep gtt maintained at 850u/h

## 2024-10-19 NOTE — NURSING NOTE
VAT 07:48 Chest tube clamped, wall suction turned off ,and stopcock pointing to atrium. Intrapleural tpa/ Dornase administered via right posterior chest tube. Patient denies pain or discomfort at this time.  Primary RN aware.      08:48: Chest tube unclamped. Wall suction turned back on and stopcock opened to drainage. Patient remains without pain and discomfort. Primary RN aware.

## 2024-10-19 NOTE — NURSING NOTE
Patient TPA/Dornase, se epic flowsheet documentation for hourly output. Patient with no c/o ain. Chest tube intact  and patent. Care ongoing.

## 2024-10-19 NOTE — NURSING NOTE
VAT 18:04 Chest tube clamped, wall suction turned off ,and stopcock pointing to atrium. Intrapleural tpa/ Dornase administered via right posterior chest tube. Patient denies pain or discomfort at this time.  Primary RN aware.     19:04 Chest tube unclamped. Wall suction turned back on and stopcock opened to drainage. Patient remains without pain and discomfort. Primary RN aware.

## 2024-10-20 LAB
ANION GAP SERPL CALC-SCNC: 6 MEQ/L (ref 3–15)
APTT PPP: 100 SEC (ref 23–35)
APTT PPP: 63 SEC (ref 23–35)
APTT PPP: 74 SEC (ref 23–35)
BUN SERPL-MCNC: 16 MG/DL (ref 7–25)
CALCIUM SERPL-MCNC: 9.2 MG/DL (ref 8.6–10.3)
CHLORIDE SERPL-SCNC: 105 MEQ/L (ref 98–107)
CO2 SERPL-SCNC: 27 MEQ/L (ref 21–31)
CREAT SERPL-MCNC: 0.7 MG/DL (ref 0.7–1.3)
EGFRCR SERPLBLD CKD-EPI 2021: >60 ML/MIN/1.73M*2
ERYTHROCYTE [DISTWIDTH] IN BLOOD BY AUTOMATED COUNT: 15.1 % (ref 11.6–14.4)
GLUCOSE SERPL-MCNC: 108 MG/DL (ref 70–99)
HCT VFR BLD AUTO: 40 % (ref 40.1–51)
HGB BLD-MCNC: 13.3 G/DL (ref 13.7–17.5)
MCH RBC QN AUTO: 29.6 PG (ref 28–33.2)
MCHC RBC AUTO-ENTMCNC: 33.3 G/DL (ref 32.2–36.5)
MCV RBC AUTO: 88.9 FL (ref 83–98)
PLATELET # BLD AUTO: 172 K/UL (ref 150–350)
PMV BLD AUTO: 10 FL (ref 9.4–12.4)
POTASSIUM SERPL-SCNC: 3.5 MEQ/L (ref 3.5–5.1)
RBC # BLD AUTO: 4.5 M/UL (ref 4.5–5.8)
SODIUM SERPL-SCNC: 138 MEQ/L (ref 136–145)
WBC # BLD AUTO: 11.76 K/UL (ref 3.8–10.5)

## 2024-10-20 PROCEDURE — 63700000 HC SELF-ADMINISTRABLE DRUG: Performed by: STUDENT IN AN ORGANIZED HEALTH CARE EDUCATION/TRAINING PROGRAM

## 2024-10-20 PROCEDURE — 20600000 HC ROOM AND CARE INTERMEDIATE/TELEMETRY

## 2024-10-20 PROCEDURE — 25000000 HC PHARMACY GENERAL: Performed by: PHYSICIAN ASSISTANT

## 2024-10-20 PROCEDURE — 63600000 HC DRUGS/DETAIL CODE: Mod: JZ | Performed by: STUDENT IN AN ORGANIZED HEALTH CARE EDUCATION/TRAINING PROGRAM

## 2024-10-20 PROCEDURE — 99233 SBSQ HOSP IP/OBS HIGH 50: CPT | Performed by: STUDENT IN AN ORGANIZED HEALTH CARE EDUCATION/TRAINING PROGRAM

## 2024-10-20 PROCEDURE — 85730 THROMBOPLASTIN TIME PARTIAL: CPT

## 2024-10-20 PROCEDURE — 25800000 HC PHARMACY IV SOLUTIONS: Performed by: PHYSICIAN ASSISTANT

## 2024-10-20 PROCEDURE — 63600000 HC DRUGS/DETAIL CODE: Mod: JZ,TB | Performed by: PHYSICIAN ASSISTANT

## 2024-10-20 PROCEDURE — 80048 BASIC METABOLIC PNL TOTAL CA: CPT | Performed by: STUDENT IN AN ORGANIZED HEALTH CARE EDUCATION/TRAINING PROGRAM

## 2024-10-20 PROCEDURE — 63600000 HC DRUGS/DETAIL CODE: Mod: JZ | Performed by: NURSE PRACTITIONER

## 2024-10-20 PROCEDURE — 36415 COLL VENOUS BLD VENIPUNCTURE: CPT | Performed by: STUDENT IN AN ORGANIZED HEALTH CARE EDUCATION/TRAINING PROGRAM

## 2024-10-20 PROCEDURE — 63700000 HC SELF-ADMINISTRABLE DRUG: Performed by: NURSE PRACTITIONER

## 2024-10-20 PROCEDURE — 85027 COMPLETE CBC AUTOMATED: CPT | Performed by: STUDENT IN AN ORGANIZED HEALTH CARE EDUCATION/TRAINING PROGRAM

## 2024-10-20 RX ORDER — MORPHINE SULFATE 2 MG/ML
2 INJECTION, SOLUTION INTRAMUSCULAR; INTRAVENOUS
Status: DISCONTINUED | OUTPATIENT
Start: 2024-10-20 | End: 2024-10-23

## 2024-10-20 RX ORDER — POLYETHYLENE GLYCOL 3350 17 G/17G
17 POWDER, FOR SOLUTION ORAL DAILY
Status: DISCONTINUED | OUTPATIENT
Start: 2024-10-20 | End: 2024-10-25 | Stop reason: HOSPADM

## 2024-10-20 RX ADMIN — DOCUSATE SODIUM 100 MG: 100 CAPSULE, LIQUID FILLED ORAL at 21:20

## 2024-10-20 RX ADMIN — Medication 3 MG: at 21:20

## 2024-10-20 RX ADMIN — PANTOPRAZOLE SODIUM 40 MG: 40 INJECTION, POWDER, LYOPHILIZED, FOR SOLUTION INTRAVENOUS at 08:49

## 2024-10-20 RX ADMIN — DORNASE ALFA 2.5 MG: 1 SOLUTION RESPIRATORY (INHALATION) at 08:21

## 2024-10-20 RX ADMIN — POLYETHYLENE GLYCOL 3350 17 G: 17 POWDER, FOR SOLUTION ORAL at 11:10

## 2024-10-20 RX ADMIN — METOPROLOL SUCCINATE 100 MG: 100 TABLET, EXTENDED RELEASE ORAL at 08:49

## 2024-10-20 RX ADMIN — DOCUSATE SODIUM 100 MG: 100 CAPSULE, LIQUID FILLED ORAL at 08:49

## 2024-10-20 RX ADMIN — MORPHINE SULFATE 2 MG: 2 INJECTION, SOLUTION INTRAMUSCULAR; INTRAVENOUS at 11:10

## 2024-10-20 RX ADMIN — ALTEPLASE 5 MG: 2.2 INJECTION, POWDER, LYOPHILIZED, FOR SOLUTION INTRAVENOUS at 08:21

## 2024-10-20 ASSESSMENT — COGNITIVE AND FUNCTIONAL STATUS - GENERAL
STANDING UP FROM CHAIR USING ARMS: 2 - A LOT
MOVING TO AND FROM BED TO CHAIR: 2 - A LOT
WALKING IN HOSPITAL ROOM: 2 - A LOT
WALKING IN HOSPITAL ROOM: 2 - A LOT
STANDING UP FROM CHAIR USING ARMS: 2 - A LOT
CLIMB 3 TO 5 STEPS WITH RAILING: 2 - A LOT
CLIMB 3 TO 5 STEPS WITH RAILING: 2 - A LOT
MOVING TO AND FROM BED TO CHAIR: 2 - A LOT

## 2024-10-20 NOTE — PLAN OF CARE
Plan of Care Review  Outcome Evaluation: Patient AAo2, forgetful, can be confused timothy, at nights. R Chest tube to sunction. draining serous drainage. dsg Dsg with small spot old blood, hEPARIN DRIP AT 850ML/HR, next PTT at 2015. Patient with no stool poutput in colostomy, Abd soft nontender, bowel sounds audible, Dr Malave aware, Bowel protocol initiated.  intact. No sob or MARY. OOb to chair with 1 asst. Mobilty encouraged. Fall in place. Patient remians NSR 1avb on the monitor. Morphine given once for R sided pain, patient reports full relief . Very nice gentleman. Patient uses ruinal/inc at times.  Problem: Adult Inpatient Plan of Care  Goal: Plan of Care Review  Outcome: Progressing  Flowsheets (Taken 10/20/2024 1843)  Outcome Evaluation: Patient AAo2, forgetful, can be confused timothy, at nights. R Chest tube to sunction. draining serous drainage. dsg Dsg with small spot old blood, hEPARIN DRIP AT 850ML/HR, next PTT at 2015. Patient with no stool poutput in colostomy, Abd soft nontender, bowel sounds audible, Dr Malave aware, Bowel protocol initiated.  intact. No sob or MARY. OOb to chair with 1 asst. Mobilty encouraged. Fall precautions in place. Patient remians NSR 1st avb on the monitor. Morphine given once for R sided pain, patient reports full relief . Very nice gentleman. Patient uses ruinal/inc at times.

## 2024-10-20 NOTE — NURSING NOTE
Vascular Access Therapy: Alteplase and Dornase therapy completed this AM after 7am dose. Thank you for this consultation.

## 2024-10-20 NOTE — PROGRESS NOTES
"    Hospital Medicine     Daily Progress Note       SUBJECTIVE   Interval History: patient seen and examined at bedside chair. IANO. States he has shortness of breath when he is sitting and is asking to be moved to the bed as he can breath better. Has a sore throat as well. Doesn't offer any other complaints.      OBJECTIVE      Vital signs in last 24 hours:  Temp:  [36.3 °C (97.4 °F)-36.8 °C (98.2 °F)] 36.6 °C (97.9 °F)  Heart Rate:  [69-78] 72  Resp:  [16] 16  BP: (138-179)/(72-83) 169/78    Intake/Output Summary (Last 24 hours) at 10/20/2024 2008  Last data filed at 10/20/2024 1830  Gross per 24 hour   Intake 1037 ml   Output 850 ml   Net 187 ml       PHYSICAL EXAMINATION      Physical Exam    General: chronically ill appearing, appears comfortable, NAD  HEENT: EOMI, NCAT, sclerae anicteric, no conjunctival pallor, no stridor  CV: RRR, no mrg  Pulm: +right sided pigtail catheter, decreased BS  GI: +ostomy, +BS, abd soft, nontender to palpation, nondistended  MSK: normal bulk and tone, normal ROM  Extremities: no b/l LE edema, NT, warm, perfused  Neuro: CN 2-12 grossly intact by nonfocal exam  Psych: normal mood and affect. No psychomotor agitation     LINES, CATHETERS, DRAINS, AIRWAYS, AND WOUNDS   Lines, Drains, and Airways:  Wounds (agree with documentation and present on admission):  Peripheral IV (Adult) 10/17/24 Posterior;Right Forearm (Active)   Number of days: 2       Chest Tube 1 Right 12 Fr (Active)   Number of days: 3       Colostomy Unknown LUQ (Active)   Number of days: 5321         Comments:    LABS / IMAGING / TELE      Labs  I have reviewed the patient's pertinent labs.  Significant abnormals are K 3.5, WBC 11.76.      Imaging  Not applicable    ECG/Telemetry  I have independently reviewed the telemetry. No events for the last 24 hours.    ASSESSMENT AND PLAN      * Pleural effusion on right  Assessment & Plan  - h/o recurrent pleural effusion requiring thoracentesis. S/p \"Right VATS pleural " "biopsy, insertion of Pleurx catheter \" by Dr. Sergey Dennis on 7/8/24. Pathology was benign. Catheter removed in office 8/9/24 after minimal drainage.  Patient states catheter had been removed 2 months ago  - He was admitted to [facility] in August 2024 for fall. Looks like at the time had PET-CT which was negative for malignancy  - CT chest  -Loculated RIGHT pleural effusion with pleural thickening. Empyema not excluded.  - Pulse ox stable on room air. Chronic dyspnea    P:  - Thoracic surgery consulted appreciated.   - s/p IR placment of 12F pigtail catheter. Fluid is exudative by Light's criteria. Cultures pending - NGTD x72hrs. Cytology - No malignant cells identified   - management of chest tube per surgery - completed 6 doses of tpa/dornase. No imaging per surgery. If the output is <200mL/day surgery recs IR remove pigtail catheter     SBO (small bowel obstruction) (CMS/HCC)  Assessment & Plan  - Abd pain around area of colostomy since Sunday night  - CT A/P - Dilated small bowel with transition point in the left midabdomen, concerning for bowel obstruction. 5.  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   - normal lactate at 1.2, WBC 10.87K  - No n/v    P:  - Surgery following  - SBFT 10/15 showed contrast in right colon ruling out high grade bowel obstruction, likely ileus vs pSBO  - d/w Surgery PA with only 50 mL of output. She states he previously reported he doesn't have output daily. Okay to start Colace. Will also start Miralax  - monitor stool output in ostomy bag  - diet advanced to low residue  - pain control as needed, pain has been minimal    PE (pulmonary thromboembolism) (CMS/HCC)  Overview  LLE DVT/PE in 2019-> Xarelto for 6 months then changed to prophylactic dose-> DVT 2020    Assessment & Plan  - on admission reports mild central chest pain x 2-3 days  - no SOB  - CTA chest - Left upper and lower lobe pulmonary emboli.  - Patient reports stopping Xarelto 2 days " ago after listening to some one talk about Xarelto and Metoprolol interaction. On Med Dispense List he hasn't had any Xarelto fills since July.  He denies difficulty with cost.    P:  - continue heparin gtt  - eventual transition to Xarelto once we are sure there are no procedures  - needs better follow up after discharge    Delirium due to general medical condition  Assessment & Plan  Noted overnight and continued this morning  D/w RN, continue to reorient as needed  Trying to mobilize patient during day, lights on during the day, lights off/minimize noise at night  Sleep protocol    Severe malnutrition (CMS/HCC)  Assessment & Plan  Patient meets criteria for severe malnutrition of chronic illness based on eating less than 75% energy needs for greater than 1 month, >5% weight loss x 3 months (lost 15% of his body weight) and moderate muscle loss with slight depression of temples.    Nutrition consult appreciated.    Grade I diastolic dysfunction  Assessment & Plan  ECHO 6/2024 : Left Ventricle: Normal ventricle size. Concentric left ventricular hypertrophy. Normal systolic function. Estimated EF 65%. No regional wall motion abnormalities. Grade I diastolic dysfunction   He has Lasix on his med list but has not been filled since 5/16/2024 for 90 day supply  Reportedly taking Metoprolol XL until recently  Looks euvolemic    P:  - appears euvolemic  - HOLD diuretics for now  - cont outpatient toprol xl    Medication management  Assessment & Plan  - Patient is not able to provide accurate list of medications  - He doesn't  have a PCP. Seems like he has been getting refills through cardiologist Dr. Campbell but they have noted he hasn't seen them in over a year.  - He states stopped Xarelto and Metoprolol couple days ago because he was told there is a drug-drug interaction.     P:  - care management following  He was given a list of [facility] PCPs    History of rectal cancer  Overview  T2 N1 M0 rectal cancer, neoadjuvant  chemotherapy and  radiotherapy followed by surgery (APR), then had multiple  complications after the surgery,   Colostomy    Assessment & Plan  - unknown who he follows with        VTE Assessment: [facility]    VTE Prophylaxis:  Current anticoagulants:  heparin (porcine) bolus from bag 2,700-5,450 Units, intravenous, q6h PRN  heparin infusion in D5W 100 units/mL, intravenous, Titrated      Code Status: Full Code      Estimated Discharge Date: 10/21/2024   Disposition Planning: pending clinical course     Fany Malave DO  10/20/2024

## 2024-10-20 NOTE — PLAN OF CARE
Plan of Care Review  Plan of Care Reviewed With: patient  Progress: no change  Outcome Evaluation: pt aaox2 confused and impulsive throughout the night attempting to get oob. Pt deneis pain, N/V, SoB, or chest pain. Pt toerating CT -120 and insertion site CDI with output marked. Pt incont of urin however little to no output from ostom bag. All alarms set and audible. Pt tolerating hep gt at 8.5/hr well with iv sites intact. VSS RN will continue to monitor.

## 2024-10-21 ENCOUNTER — APPOINTMENT (INPATIENT)
Dept: RADIOLOGY | Facility: HOSPITAL | Age: 89
DRG: 186 | End: 2024-10-21
Attending: NURSE PRACTITIONER
Payer: COMMERCIAL

## 2024-10-21 LAB
ANION GAP SERPL CALC-SCNC: 7 MEQ/L (ref 3–15)
APTT PPP: 68 SEC (ref 23–35)
APTT PPP: 81 SEC (ref 23–35)
APTT PPP: 96 SEC (ref 23–35)
BUN SERPL-MCNC: 20 MG/DL (ref 7–25)
CALCIUM SERPL-MCNC: 8.8 MG/DL (ref 8.6–10.3)
CHLORIDE SERPL-SCNC: 104 MEQ/L (ref 98–107)
CO2 SERPL-SCNC: 26 MEQ/L (ref 21–31)
CREAT SERPL-MCNC: 0.6 MG/DL (ref 0.7–1.3)
EGFRCR SERPLBLD CKD-EPI 2021: >60 ML/MIN/1.73M*2
ERYTHROCYTE [DISTWIDTH] IN BLOOD BY AUTOMATED COUNT: 15.3 % (ref 11.6–14.4)
GLUCOSE SERPL-MCNC: 128 MG/DL (ref 70–99)
GRAM STN SPEC: NORMAL
GRAM STN SPEC: NORMAL
HCT VFR BLD AUTO: 38.9 % (ref 40.1–51)
HGB BLD-MCNC: 12.9 G/DL (ref 13.7–17.5)
MCH RBC QN AUTO: 29.7 PG (ref 28–33.2)
MCHC RBC AUTO-ENTMCNC: 33.2 G/DL (ref 32.2–36.5)
MCV RBC AUTO: 89.6 FL (ref 83–98)
MICROORGANISM SPEC CULT: NORMAL
PLATELET # BLD AUTO: 190 K/UL (ref 150–350)
PMV BLD AUTO: 10.5 FL (ref 9.4–12.4)
POTASSIUM SERPL-SCNC: 3.6 MEQ/L (ref 3.5–5.1)
RBC # BLD AUTO: 4.34 M/UL (ref 4.5–5.8)
SODIUM SERPL-SCNC: 137 MEQ/L (ref 136–145)
WBC # BLD AUTO: 9.46 K/UL (ref 3.8–10.5)

## 2024-10-21 PROCEDURE — 20600000 HC ROOM AND CARE INTERMEDIATE/TELEMETRY

## 2024-10-21 PROCEDURE — 85730 THROMBOPLASTIN TIME PARTIAL: CPT

## 2024-10-21 PROCEDURE — 92526 ORAL FUNCTION THERAPY: CPT | Mod: GN

## 2024-10-21 PROCEDURE — 85027 COMPLETE CBC AUTOMATED: CPT | Performed by: STUDENT IN AN ORGANIZED HEALTH CARE EDUCATION/TRAINING PROGRAM

## 2024-10-21 PROCEDURE — 97116 GAIT TRAINING THERAPY: CPT | Mod: GP,CQ

## 2024-10-21 PROCEDURE — 97535 SELF CARE MNGMENT TRAINING: CPT | Mod: GO,CO

## 2024-10-21 PROCEDURE — 71046 X-RAY EXAM CHEST 2 VIEWS: CPT

## 2024-10-21 PROCEDURE — 80048 BASIC METABOLIC PNL TOTAL CA: CPT | Performed by: STUDENT IN AN ORGANIZED HEALTH CARE EDUCATION/TRAINING PROGRAM

## 2024-10-21 PROCEDURE — 63700000 HC SELF-ADMINISTRABLE DRUG: Performed by: NURSE PRACTITIONER

## 2024-10-21 PROCEDURE — 99233 SBSQ HOSP IP/OBS HIGH 50: CPT | Performed by: STUDENT IN AN ORGANIZED HEALTH CARE EDUCATION/TRAINING PROGRAM

## 2024-10-21 PROCEDURE — 85730 THROMBOPLASTIN TIME PARTIAL: CPT | Performed by: STUDENT IN AN ORGANIZED HEALTH CARE EDUCATION/TRAINING PROGRAM

## 2024-10-21 PROCEDURE — 36415 COLL VENOUS BLD VENIPUNCTURE: CPT | Performed by: STUDENT IN AN ORGANIZED HEALTH CARE EDUCATION/TRAINING PROGRAM

## 2024-10-21 PROCEDURE — 63700000 HC SELF-ADMINISTRABLE DRUG: Performed by: STUDENT IN AN ORGANIZED HEALTH CARE EDUCATION/TRAINING PROGRAM

## 2024-10-21 PROCEDURE — 200200 PR NO CHARGE: Performed by: SURGERY

## 2024-10-21 PROCEDURE — 63600000 HC DRUGS/DETAIL CODE: Mod: JZ | Performed by: NURSE PRACTITIONER

## 2024-10-21 RX ORDER — HEPARIN SODIUM 10000 [USP'U]/100ML
100-4000 INJECTION, SOLUTION INTRAVENOUS
Status: DISCONTINUED | OUTPATIENT
Start: 2024-10-21 | End: 2024-10-25

## 2024-10-21 RX ADMIN — METOPROLOL SUCCINATE 100 MG: 100 TABLET, EXTENDED RELEASE ORAL at 09:08

## 2024-10-21 RX ADMIN — Medication 3 MG: at 21:50

## 2024-10-21 RX ADMIN — POLYETHYLENE GLYCOL 3350 17 G: 17 POWDER, FOR SOLUTION ORAL at 09:08

## 2024-10-21 RX ADMIN — DOCUSATE SODIUM 100 MG: 100 CAPSULE, LIQUID FILLED ORAL at 09:08

## 2024-10-21 RX ADMIN — DOCUSATE SODIUM 100 MG: 100 CAPSULE, LIQUID FILLED ORAL at 21:50

## 2024-10-21 RX ADMIN — PANTOPRAZOLE SODIUM 40 MG: 40 INJECTION, POWDER, LYOPHILIZED, FOR SOLUTION INTRAVENOUS at 09:08

## 2024-10-21 ASSESSMENT — COGNITIVE AND FUNCTIONAL STATUS - GENERAL
TAKING CARE OF COMPLICATED TASKS: 3 - A LITTLE
MOVING TO AND FROM BED TO CHAIR: 3 - A LITTLE
WALKING IN HOSPITAL ROOM: 3 - A LITTLE
EATING MEALS: 4 - NONE
CLIMB 3 TO 5 STEPS WITH RAILING: 1 - TOTAL
MOVING TO AND FROM BED TO CHAIR: 2 - A LOT
FOLLOWS FAMILIAR CONVERSATION: 4 - NONE
MOVING TO AND FROM BED TO CHAIR: 3 - A LITTLE
CLIMB 3 TO 5 STEPS WITH RAILING: 1 - TOTAL
STANDING UP FROM CHAIR USING ARMS: 3 - A LITTLE
REMEMBERING WHERE THINGS ARE: 3 - A LITTLE
REMEMBERING 5 ERRANDS WITH NO LIST: 3 - A LITTLE
TOILETING: 3 - A LITTLE
HELP NEEDED FOR PERSONAL GROOMING: 3 - A LITTLE
DRESSING REGULAR UPPER BODY CLOTHING: 2 - A LOT
CLIMB 3 TO 5 STEPS WITH RAILING: 2 - A LOT
WALKING IN HOSPITAL ROOM: 3 - A LITTLE
REMEMBERING TO TAKE MEDICATION: 4 - NONE
AFFECT: FLAT/BLUNTED AFFECT
STANDING UP FROM CHAIR USING ARMS: 2 - A LOT
STANDING UP FROM CHAIR USING ARMS: 3 - A LITTLE
WALKING IN HOSPITAL ROOM: 2 - A LOT
HELP NEEDED FOR BATHING: 2 - A LOT
UNDERSTANDING 10 TO 15 MIN SPEECH: 4 - NONE
AFFECT: FLAT/BLUNTED AFFECT
DRESSING REGULAR LOWER BODY CLOTHING: 2 - A LOT

## 2024-10-21 NOTE — PROGRESS NOTES
[facility]    Progress Note    Subjective     Interval History:  Patient with gas and stool in his ostomy. Doing well overnight. Patient denies nausea, vomiting or abdominal pain. Completed last dose of TPA/dornase yesterday morning. CT output 270 cc/24h. CT to suction without air leak.     Objective     Vitals:    10/21/24 0407   BP:    Pulse: 88   Resp:    Temp:    SpO2:          Intake/Output Summary (Last 24 hours) at 10/21/2024 0738  Last data filed at 10/21/2024 0632  Gross per 24 hour   Intake 1047 ml   Output 770 ml   Net 277 ml         No intake/output data recorded.    Physical Exam  Constitutional: No distress.   Cardiovascular: Normal rate and regular rhythm.   Pulmonary/Chest: Effort normal. No respiratory distress. CT to suction, no air leak  Abdominal: Soft, non distended, nondistended  Neurological: AAOx3  Skin: Skin is warm and dry.     Labs:  Results from last 7 days   Lab Units 10/21/24  0312 10/20/24  0239 10/19/24  0307   WBC K/uL 9.46 11.76* 11.05*   HEMOGLOBIN g/dL 12.9* 13.3* 13.3*   HEMATOCRIT % 38.9* 40.0* 41.0   PLATELETS K/uL 190 172 177     Results from last 7 days   Lab Units 10/21/24  0312 10/20/24  0239 10/19/24  0307 10/17/24  0332 10/16/24  0316 10/15/24  0639 10/14/24  0849   SODIUM mEQ/L 137 138 138   < > 141 138 136   POTASSIUM mEQ/L 3.6 3.5 3.4*   < > 3.7 4.0 4.2   CHLORIDE mEQ/L 104 105 104   < > 103 101 98   CO2 mEQ/L 26 27 28   < > 32* 31 31   BUN mg/dL 20 16 17   < > 25 21 16   CREATININE mg/dL 0.6* 0.7 0.8   < > 0.8 0.9 0.8   CALCIUM mg/dL 8.8 9.2 9.0   < > 9.7 9.6 10.1   ALBUMIN g/dL  --   --   --   --  3.3* 3.1* 3.8   BILIRUBIN TOTAL mg/dL  --   --   --   --  1.1 0.9 1.1   ALK PHOS IU/L  --   --   --   --  90 81 98   ALT IU/L  --   --   --   --  16 17 19   AST IU/L  --   --   --   --  15 14 16   GLUCOSE mg/dL 128* 108* 128*   < > 150* 254* 162*    < > = values in this interval not displayed.     Recent Labs   Lab 10/21/24  0312 10/20/24  2013 10/20/24  1135  10/14/24  1111 10/14/24  0849 10/14/24  0231   INR  --   --   --   --  1.1 1.0   PTT 68* 74* 100*   < > 161* 23    < > = values in this interval not displayed.           Imaging:  I have reviewed the Imaging from the last 24 hrs.  No results found.    Assessment:  Dave Arredondo 88 y.o. male with history of CAD, DVT on Xarelto, rectal cancer status post APR in 2013 presenting with PE and dilated loops of small bowel concerning for an SBO.    Plan:    - CT can be removed when CT output < 200 cc/day  - daily CXR when patient still has chest tube  - No need to repeat CT Chest    Rylee Cueto MD  General Surgery PGY2  [facility]    Please page  [phone]  with any questions or concerns.

## 2024-10-21 NOTE — PROGRESS NOTES
Patient:  Dave Arredondo  Location:  [facility] 3C 4044  MRN:  [id number]  Today's date:  10/21/2024    SLP Diagnosis  Patient presents with functional oral stage; subtle pharyngeal stage ss/x however low clinical indicators for aspiration.    Swallowing Recommendations   SLP Swallowing Recommendations - 10/21/24 1052       Diet Consistency Recommendations regular;thin liquids     Medication Administration whole with liquid     Supervision Level for Intake patient independent     Feeding/Delivery Recommendations small bites/sips;eliminate all distractions     Posture Recommendations fully upright in chair/chair mode of bed     Instrumental Assessment Recommendations --   ST will sign off on case-    Oral Hygiene standard toothbrush/toothpaste;twice daily teeth brushing     Prognosis for Return to Safe Oral Intake good;anticipate improvement as medical/mental status improves     Comment, Swallowing Recommendations throat clearing behaviors at baseline; chest tube placement; could have some throat irritation d/t NGT.                     Summary/Handoff  SLP followed up with patient bedside for ongoing assessment re swallow function S/p Eval.  No concerns re diet tolerance per speaking with his RN today.  Patient remains on room air; chest tube placed to suction.  Last CXR taken 10/17.  Patient denies difficulty with diet to SLP however C/o dry mouth/ throat this morning which is causing soreness.  Patient agreeable to sit up in bed for sips of water which he tolerated without significant overt ss/x of aspiration; less frequency of throat clearing although does remain subtle.  WBC stable as well.  Patient appears appropriate to remain on prescribed diet; no clinical indicators for dysphagia at this time.  SLP will sign off on case however may reconsult if re- engagement is needed.  D/w RN.    Active Diet Orders (From admission, onward)       Start     Ordered    10/17/24 7628  Dietary nutrition supplements   Once        Question Answer Comment   Select Supplement ([facility]): Ensure Plus High Protein Strawberry    Meal period Dinner        10/17/24 1544    10/17/24 0942  Adult Diet Regular; Low Fiber/Residue; RD/LDN may adjust order  Diet effective now        Question Answer Comment   Diet Texture Regular    Other Restriction(s): Low Fiber/Residue    Delegation of Authority. Diet orders written by PA/CRNPs may not be adjusted by RD/LDNs. RD/LDN may adjust order        10/17/24 0941                    Dave is a 88 y.o. male admitted on 10/14/2024 with SBO (small bowel obstruction) (CMS/HCC) [K56.609]  PE (pulmonary thromboembolism) (CMS/HCC) [I26.99]  Pleural effusion on right [J90]. Principal problem is PE (pulmonary thromboembolism) (CMS/HCC).    Past Medical History  Dave has a past medical history of Colon cancer (CMS/HCC) (2012), Coronary artery disease, DVT (deep venous thrombosis) (CMS/HCC), Hypertension, Lipid disorder, Pleural effusion, and Pulmonary embolism (CMS/HCC).    History of Present Illness  88 y.o. male with a past medical history of Dvt/PE, HTN, CAD, rectal cancer s/p chemoXRT and APR with end colostomy, recurrent pleural effusion, who presents with abdominal pain found to have PE (on hep gtt) and SBO     CXR IMPRESSION:  Loculated right pleural effusion and basilar airspace opacity.    CT Abd IMPRESSION  Loculated RIGHT pleural effusion with pleural thickening. Empyema not excluded.  Subtotal atelectasis of the RIGHT LOWER and MIDDLE lobes. The left lung is clear.     10/16: s/p  right pigtail chest tube placement.      NGT for suction now removed; ST consult.    SLP Pain      Date/Time Pain Type Rating: Rest Rating: Activity [facility]   10/21/24 1052 Pain Assessment 0 - no pain 0 - no pain KS            Prior Level of Function      Flowsheet Row Most Recent Value   Dominant Hand right   Ambulation independent   Transferring independent   Toileting independent   Bathing independent   Dressing independent    Eating independent   Communication understands/communicates without difficulty   Swallowing swallows foods/liquids without difficulty   Baseline Diet/Method of Nutritional Intake no diet restrictions, thin liquids, regular   Past History of Dysphagia patient denies history however reports recent decline in appetite with associated weightloss.   Prior Level of Function Comment Per last EMR pt IND w/ mobility/adls w/o AD. But recently has only been able to ambulate short distances   Assistive Device Currently Used at Home grab bar, shower chair           Prior Living Environment      Flowsheet Row Most Recent Value   People in Home roommate(s)   Current Living Arrangements home   Home Accessibility stairs to enter home (Group), stairs within home (Group)   Living Environment Comment Pt  historian and stated he lives w/ his 4 brothers. Per EMR pt lives w/ roommate(s) on the 2nd fl, accessible by 12 steps, [address] building, no elevator access             SLP Evaluation and Treatment - 10/21/24 1052          SLP Time Calculation    Start Time 1052     Stop Time 1101     Time Calculation (min) 9 min     Document Type Daily Treatment/Progress Note        General Information    Position at Start of Session upright;in bed     Status at Start of Session agreeable to therapy;no issues or concerns identified by nurse prior to session     General Observations of Patient patient seen laying down in bed; chest tube remains.  No concerns re diet tolerance per speaking with his RN.        Precautions/Limitations/Impairments    Existing Precautions/Restrictions fall     Limitations/Impairments swallowing         Services    Do You Speak a Language Other Than English at Home? no        Cognition/Psychosocial    Affect/Mental Status flat/blunted affect     Orientation Status oriented x 3     Follows Commands follows one-step commands;over 90% accuracy;increased processing time needed        Dentition (Oral Motor)     Dentition (Oral Motor) natural dentition;adequate dentition        Vocal Quality/Secretion Management (Oral Motor)    Vocal Quality (Oral Motor) hypophonic;WFL     Secretion Management (Oral Motor) WNL     Comment, Vocal Quality/Secretion Management (Oral Motor) baseline?        GRBAS    Grade 0-->none     Roughness 0-->none     Breathiness 0-->none     Asthenia 1-->slight abnormality     Strain 0-->none        Functional Communication Measures    FCM: Swallowing 7-->Level 7        General Swallowing Observations    Current Diet/Method of Nutritional Intake thin liquids;regular     Signs/Symptoms of Aspiration (Current Diet) throat clearing     Respiratory Support (General Swallowing Observations) none     Comment, Secretions/Suctioning unremarkable no suction has been needed.        Food and Liquid Trials (NIS)    Oral Intake/Feeding Performance set-up of food/liquid needed;independent/appropriate self-feeding skills     Liquid Consistencies Evaluated thin liquids     Thin Liquids intact;straw sips     Oral Preparatory Phase of Swallow WFL     Pharyngeal Phase of Swallow throat clearing noted after the swallow     Comment, Pharyngeal Phase subtle; likely baseline     Esophageal Phase of Swallow no clinical symptoms     Comment breathing stable despite pleural effusions; chest tube, remains on room air.        Swallowing Intervention    Dysphagia/Swallowing Interventions monitor tolerance of;current diet without evidence of aspiration        AM-PAC™ - Cognition (Current Function)    Following/understanding a 10-15 minute speech or presentation? 4 - None     Understanding familiar people during ordinary conversations? 4 - None     Remembering to take medications at the appropriate time? 4 - None     Remembering where things were placed or put away? 3 - A little     Remembering a list of 3 or 4 errands without writing it down? 3 - A little     Taking care of complicated tasks? 3 - A little     AM-PAC™ Cognition Score  21        Session Outcome    Position at End of Session upright;in bed     Nursing Notified patient's response to therapy/activity                   SLP Discharge Recommendations      Flowsheet Row Most Recent Value   Recommended SLP Services After Discharge no follow-up speech therapy recommended at 10/21/2024 1052                 Education Documentation  No documentation found.        SLP Goals      Flowsheet Row Most Recent Value   Oral Nutrition/Hydration Goal    Oral Nutrition/Hydration Goal Patient will consume least restrctive PO diet without overt ss/x of aspiration. at 10/17/2024 1028   Time Frame long-term goal (LTG), by discharge at 10/17/2024 1028   Progress/Outcome goal met at 10/21/2024 1052

## 2024-10-21 NOTE — PROGRESS NOTES
Physical Therapy -  Daily Treatment/Progress Note     Patient: Dave Arredondo  Location: [facility] 3C 0359  MRN: [id number]  Today's date: 10/21/2024    HISTORY OF PRESENT ILLNESS     Dave is a 88 y.o. male admitted on 10/14/2024 with SBO (small bowel obstruction) (CMS/HCC) [K56.609]  PE (pulmonary thromboembolism) (CMS/HCC) [I26.99]  Pleural effusion on right [J90]. Principal problem is PE (pulmonary thromboembolism) (CMS/HCC).    Past Medical History  Dave has a past medical history of Colon cancer (CMS/HCC) (2012), Coronary artery disease, DVT (deep venous thrombosis) (CMS/HCC), Hypertension, Lipid disorder, Pleural effusion, and Pulmonary embolism (CMS/HCC).    History of Present Illness  88 y.o. male with a past medical history of Dvt/PE, HTN, CAD, rectal cancer s/p chemoXRT and APR with end colostomy, recurrent pleural effusion, who presents with abdominal pain found to have PE (on hep gtt) and SBO     CXR IMPRESSION:  Loculated right pleural effusion and basilar airspace opacity.    CT Abd IMPRESSION  Loculated RIGHT pleural effusion with pleural thickening. Empyema not excluded.  Subtotal atelectasis of the RIGHT LOWER and MIDDLE lobes. The left lung is clear.     10/16: s/p  right pigtail chest tube placement.      NGT for suction now removed; ST consult.  PRIOR LEVEL OF FUNCTION AND LIVING ENVIRONMENT     Prior Level of Function      Flowsheet Row Most Recent Value   Dominant Hand right   Ambulation independent   Transferring independent   Toileting independent   Bathing independent   Dressing independent   Eating independent   Communication understands/communicates without difficulty   Swallowing swallows foods/liquids without difficulty   Baseline Diet/Method of Nutritional Intake no diet restrictions, thin liquids, regular   Past History of Dysphagia patient denies history however reports recent decline in appetite with associated weightloss.   Prior Level of Function Comment Per  last EMR pt IND w/ mobility/adls w/o AD. But recently has only been able to ambulate short distances   Assistive Device Currently Used at Home grab bar, shower chair             Prior Living Environment      Flowsheet Row Most Recent Value   People in Home roommate(s)   Current Living Arrangements home   Home Accessibility stairs to enter home (Group), stairs within home (Group)   Living Environment Comment Pt  historian and stated he lives w/ his 4 brothers. Per EMR pt lives w/ roommate(s) on the 2nd fl, accessible by 12 steps, [address] building, no elevator access          VITALS AND PAIN     PT Vitals      Date/Time Pulse HR Source Resp SpO2 Pt Activity O2 Therapy BP BP Location BP Method Pt Position [facility]   10/21/24 1120 73 Monitor -- 96 % At rest None (Room air) 141/79 Left upper arm Automatic Lying MTC   10/21/24 1122 84 Monitor 16 96 % At rest None (Room air) 141/79 Left upper arm Automatic Lying BES          PT Pain      Date/Time Location Rating: Rest Rating: Activity Interventions [facility]   10/21/24 1120 back 2 4 care clustered MTC             Objective   OBJECTIVE     Start time:  1116  End time:  1136  Session Length: 20 min  Mode of Treatment: co-treatment  Reason for co-treatment: Co-Treat to expedite d/c    General Observations  Patient received in chair. He was agreeable to therapy, no issues or concerns identified by nurse prior to session. Pt rec'd at bedside, cleared for therapy by RN    Precautions: fall, other (see comments) (chest tube to suction)       Limitations/Impairments: safety/cognitive      PT Eval and Treat - 10/21/24 1116          Cognition    Orientation Status oriented x 3     Affect/Mental Status flat/blunted affect     Behavioral Issues overwhelmed easily     Follows Commands follows one-step commands        Bed Mobility    Bed Mobility Activities supine to sit     [place] minimum assist (75% or more patient effort)     Safety/Cues increased time to complete;sequencing;technique      Comment Min Ax1 to get to EOB, limited due to weakness and fatigue, req'd increased time to perform + VC's for techniques. Log roll OOB to the L side. Min Ax1 in and out of bed.        Mobility Belt    Mobility Belt Used During Session no - medical contraindication     Reason Mobility Belt Not Used chest tube and ostomy        Sit/Stand Transfer    [place] minimum assist (75% or more patient effort)     Safety/Cues increased time to complete;sequencing;technique     Assistive Device walker, front-wheeled     Transfer Comments Min Ax1 due to weakness and acitivity tolerance. VC's for safe techniques + body mechanics, feet under CLAUS. VC’s for hand placement.        Gait Training    [place], Gait minimum assist (75% or more patient effort);1 person assist     Safety/Cues increased time to complete;sequencing;technique     Assistive Device walker, front-wheeled     Distance in Feet 4 feet   x3    Pattern step-through;step-to     Deviations/Abnormal Patterns gait speed decreased;step length decreased;stride length decreased;weight shifting decreased     Comment Min Ax1 due to weakness and activity tolerance. Vc's for sequencing techniques, but limited due to weakness and activity tolerance. Distance limited due to chest tube to suction.        Stairs Training    Number of Stairs 0     Comment unable to tolerate        Balance    Static Sitting Balance WFL     Dynamic Sitting Balance WFL     Sit to Stand Dynamic Balance mild impairment     Static Standing Balance mild impairment     Dynamic Standing Balance mild impairment     Comment, Balance Ax1 for functional mobility, limited due to weakness and unbalance.        Lower Extremity (Therapeutic Exercise)    Exercise Position/Type seated     General Exercise bilateral;ankle pumps;LAQ (long arc quad);marching while seated     Reps and Sets x10     Comment Pt educated on TE's to perform to increase functional ROM and strength of BLE's. Print out given with  instructions for all TE techniques.        Impairments/Safety Issues    Impairments Affecting Function balance;cognition;endurance/activity tolerance;strength;pain     Cognitive Impairments, Safety/Performance insight into deficits/self-awareness;awareness, need for assistance;sequencing abilities     Safety Issues Affecting Function insight into deficits/self-awareness;sequencing abilities                                       Session Outcome  Patient upright, in bed at end of session, bed alarm on. Nursing notified about patient's performance.    AM-PAC™ - Mobility (Current Function)     Turning form your back to your side while in flat bed without using bedrails 3 - A Little   Moving from lying on your back to sitting on the side of a flat bed without using bedrails 3 - A Little   Moving to and from a bed to a chair 3 - A Little   Standing up from a chair using your arms 3 - A Little   To walk in a hospital room 3 - A Little   Climbing 3-5 steps with a railing 1 - Total   AM-PAC™ Mobility Score 16      ASSESSMENT AND PLAN     Progress Summary  Ax1 for functional mobility, limited due to pain, weakness and fatigue. Distance limited with amb due to chest tube to suction. Training conducted with negotiation of transfers, gait w/ RW and transfer techniques. Pt. demo fair carryover with instructions. Continue to recommend ongoing PT services to maximize (I) prior to return home. Recommending D/c to subacute rehab facility, unless pt's family able to provide appropriate assistance at home / hire HHA. Will continue to assess pt's functional mobility throughout hospitalization stay and update d/c recommendations as indicated.    Patient/Family Therapy Goals Statement: unable to state 2* fatigue/confusion    PT Plan      Flowsheet Row Most Recent Value   Rehab Potential good, to achieve stated therapy goals at 10/15/2024 1054   Therapy Frequency 4 times/wk at 10/15/2024 1054   Planned Therapy Interventions neuromuscular  re-education, bed mobility training, transfer training, balance training, stair training, gait training, patient/family education, home exercise program, strengthening at 10/15/2024 1054            PT Discharge Recommendations      Flowsheet Row Most Recent Value   PT Recommended Discharge Disposition skilled nursing facility at 10/15/2024 1054   Anticipated Equipment Needs if Discharged Home (PT) --  [TBD at next level of care] at 10/15/2024 1054                 PT Goals      Flowsheet Row Most Recent Value   Bed Mobility Goal 1    Activity/Assistive Device bed mobility activities, all at 10/15/2024 1054   [place] modified independence at 10/15/2024 1054   Time Frame by discharge at 10/15/2024 1054   Progress/Outcome goal ongoing at 10/15/2024 1054   Transfer Goal 1    Activity/Assistive Device all transfers, other (see comments)  [LRAD] at 10/15/2024 1054   [place] modified independence at 10/15/2024 1054   Time Frame by discharge at 10/15/2024 1054   Progress/Outcome goal ongoing at 10/15/2024 1054   Gait Training Goal 1    Activity/Assistive Device gait (walking locomotion), other (see comments)  [LRAD] at 10/15/2024 1054   [place] modified independence at 10/15/2024 1054   Distance 150' at 10/15/2024 1054   Time Frame by discharge at 10/15/2024 1054   Progress/Outcome goal ongoing at 10/15/2024 1054   Stairs Goal 1    Activity/Assistive Device stairs, all skills at 10/15/2024 1054   [place] modified independence at 10/15/2024 1054   Number of Stairs 4 at 10/15/2024 1054   Time Frame by discharge at 10/15/2024 1054   Progress/Outcome goal ongoing at 10/15/2024 1054

## 2024-10-21 NOTE — PROGRESS NOTES
Occupational Therapy -  Daily Treatment/Progress Note     Patient: Dave Arredondo  Location: [facility] 3C 0359  MRN: [id number]  Today's date: 10/21/2024    HISTORY OF PRESENT ILLNESS     Dave is a 88 y.o. male admitted on 10/14/2024 with SBO (small bowel obstruction) (CMS/HCC) [K56.609]  PE (pulmonary thromboembolism) (CMS/HCC) [I26.99]  Pleural effusion on right [J90]. Principal problem is PE (pulmonary thromboembolism) (CMS/HCC).    Past Medical History  Dave has a past medical history of Colon cancer (CMS/HCC) (2012), Coronary artery disease, DVT (deep venous thrombosis) (CMS/HCC), Hypertension, Lipid disorder, Pleural effusion, and Pulmonary embolism (CMS/HCC).    History of Present Illness  88 y.o. male with a past medical history of Dvt/PE, HTN, CAD, rectal cancer s/p chemoXRT and APR with end colostomy, recurrent pleural effusion, who presents with abdominal pain found to have PE (on hep gtt) and SBO     CXR IMPRESSION:  Loculated right pleural effusion and basilar airspace opacity.    CT Abd IMPRESSION  Loculated RIGHT pleural effusion with pleural thickening. Empyema not excluded.  Subtotal atelectasis of the RIGHT LOWER and MIDDLE lobes. The left lung is clear.     10/16: s/p  right pigtail chest tube placement.      NGT for suction now removed; ST consult.  PRIOR LEVEL OF FUNCTION AND LIVING ENVIRONMENT     Prior Level of Function      Flowsheet Row Most Recent Value   Dominant Hand right   Ambulation independent   Transferring independent   Toileting independent   Bathing independent   Dressing independent   Eating independent   Communication understands/communicates without difficulty   Swallowing swallows foods/liquids without difficulty   Baseline Diet/Method of Nutritional Intake no diet restrictions, thin liquids, regular   Past History of Dysphagia patient denies history however reports recent decline in appetite with associated weightloss.   Prior Level of Function Comment  Per last EMR pt IND w/ mobility/adls w/o AD. But recently has only been able to ambulate short distances   Assistive Device Currently Used at Home grab bar, shower chair             Prior Living Environment      Flowsheet Row Most Recent Value   People in Home roommate(s)   Current Living Arrangements home   Home Accessibility stairs to enter home (Group), stairs within home (Group)   Living Environment Comment Pt  historian and stated he lives w/ his 4 brothers. Per EMR pt lives w/ roommate(s) on the 2nd fl, accessible by 12 steps, 1 [address] building, no elevator access          Occupational Profile      Flowsheet Row Most Recent Value   Environmental Supports and Barriers Lives w/ roommate(s) per EMR          VITALS AND PAIN     OT Vitals      Date/Time Pulse HR Source Resp SpO2 Pt Activity O2 Therapy BP BP Location BP Method Pt Position [facility]   10/21/24 1120 73 Monitor -- 96 % At rest None (Room air) 141/79 Left upper arm Automatic Lying MTC   10/21/24 1122 84 Monitor 16 96 % At rest None (Room air) 141/79 Left upper arm Automatic Lying BES          OT Pain      Date/Time Side/Orientation Rating: Rest Rating: Activity Interventions [facility]   10/21/24 1120 back 2 4 care clustered MTC             Objective   OBJECTIVE     Start time:  1117  End time:  1136  Session Length: 19 min  Mode of Treatment: co-treatment  Reason for co-treatment: Co-Treat to expedite d/c    General Observations  Patient received in bed. He was no issues or concerns identified by nurse prior to session, agreeable to therapy.      Precautions:  (chest tube to suction)       Limitations/Impairments: safety/cognitive      OT Eval and Treat - 10/21/24 1117          Cognition    Orientation Status oriented x 4     Follows Commands follows one-step commands        Bed Mobility    Bed Mobility Activities supine to sit;sit to supine     [place] minimum assist (75% or more patient effort)     Safety/Cues increased time to complete;verbal cues;technique      Comment Min A for bed mobility with use of bed featuers. Cues provided for LOG roll technique and hand placement.        Mobility Belt    Mobility Belt Used During Session no - medical contraindication     Reason Mobility Belt Not Used chest tube and ostomy        Sit/Stand Transfer    Surface edge of bed     [place] minimum assist (75% or more patient effort)     Safety/Cues increased time to complete;preparatory posture;technique;proper use of assistive device;verbal cues     Transfer Comments Min A for STS from EOB with use of RW. Cues provided for technique, hand placement and prep. posture.        Functional Mobility    Distance in room/bathroom     Functional Mobility [place] minimum assist (75% or more patient effort)     Safety/Cues increased time to complete;sequencing;proper use of assistive device;verbal cues     Assistive Device walker, front-wheeled     Functional Mobility Comments Min A for mobility with use of RW. Limited d/t CT to wall suction at this time. Several trials of anterior/posterior stepping, slight posterior LOB with stepping. Poor activity tolerance with significant LE weakness.        Balance    Static Sitting Balance WFL     Dynamic Sitting Balance WFL     Sit to Stand Dynamic Balance mild impairment     Static Standing Balance mild impairment     Dynamic Standing Balance mild impairment        Upper Extremity (Therapeutic Exercise)    Exercise Position/Type seated     General Exercise bilateral     Range of Motion Exercises elbow flexion/extension;shoulder flexion/extension   Shoulder Shruggs    Reps and Sets 10                                       Session Outcome  Patient in bed at end of session, bed alarm on, all needs met. Nursing notified about change in vital signs and patient's performance.    AM-PAC™ - ADL (Current Function)     Putting on/taking off regular lower body clothing 2 - A Lot   Bathing 2 - A Lot   Toileting 3 - A Little   Putting on/taking off regular  upper body clothing 2 - A Lot   Help for taking care of personal grooming 3 - A Little   Eating meals 4 - None   AM-PAC™ ADL Score 16      ASSESSMENT AND PLAN     Progress Summary  Pt agreeable to therapy this AM. Pt continued to demonstrate deficits in strength, mobility, transfers, endurance, balance and safety awareness. Pt is functioing at an overall Min A level with transfers/mobility. Limited mobility performed d/t CT to wall suction at this time. Poor toelrance to actiivity at this time. Pt agreeable to seated UE exercise to increase strength and general endurance. Therapy recommending SNF at d/c for further care and increased functional (I) prior to return home. OT services to continue current POC.    Patient/Family Therapy Goal Statement: to get some rest    OT Plan      Flowsheet Row Most Recent Value   Rehab Potential good, to achieve stated therapy goals at 10/17/2024 1133   Therapy Frequency 4 times/wk at 10/17/2024 1133   Planned Therapy Interventions activity tolerance training, adaptive equipment training, BADL retraining, patient/caregiver education/training, transfer/mobility retraining, functional balance retraining, occupation/activity based interventions, ROM/therapeutic exercise, strengthening exercise at 10/17/2024 1133            OT Discharge Recommendations      Flowsheet Row Most Recent Value   OT Recommended Discharge Disposition skilled nursing facility at 10/17/2024 1133   Anticipated Equipment Needs if Discharged Home (OT) other (see comments)  [TBD] at 10/15/2024 1055                 OT Goals      Flowsheet Row Most Recent Value   Bed Mobility Goal 1    Activity/Assistive Device bed mobility activities, all at 10/15/2024 1055   [place] modified independence at 10/15/2024 1055   Time Frame by discharge at 10/15/2024 1055   Progress/Outcome goal ongoing at 10/15/2024 1055   Transfer Goal 1    Activity/Assistive Device all transfers, sit-to-stand/stand-to-sit at 10/15/2024 1055    [place] modified independence at 10/15/2024 1055   Time Frame by discharge at 10/15/2024 1055   Progress/Outcome goal ongoing at 10/15/2024 1055   Dressing Goal 1    Activity/Adaptive Equipment dressing skills, all at 10/15/2024 1055   [place] modified independence at 10/15/2024 1055   Time Frame by discharge at 10/15/2024 1055   Progress/Outcome goal ongoing at 10/15/2024 1055   Toileting Goal 1    Activity/Assistive Device toileting skills, all at 10/15/2024 1055   [place] modified independence at 10/15/2024 1055   Time Frame by discharge at 10/15/2024 1055   Progress/Outcome goal ongoing at 10/15/2024 1055

## 2024-10-21 NOTE — PLAN OF CARE
Plan of Care Review  Progress: no change  Outcome Evaluation: Patient is AOx3, but can be confused at times. Chest tube to sunction, and put out 20cc for shift. Pt denies any shortness of breath at rest or discomfort. Heparin gtt maintained, AM PTT untherapeutic, now running at 950 units/hr, next PTT 1030. No BM. Fall bundle in place. NSR on the monitor.

## 2024-10-21 NOTE — PROGRESS NOTES
Hospital Medicine     Daily Progress Note       SUBJECTIVE   Interval History:   Patient seen and examined. Sitting up in bed eating breakfast.  Calm and cooperative.  Denies nausea, abdominal pain. Some pain around chest tube site when he moves. No shortness of breath.  Chest tube with 270 cc output in past 24 hours.  Small amount of stool in ostomy bag.     OBJECTIVE      Vital signs in last 24 hours:  Temp:  [36.3 °C (97.4 °F)-36.7 °C (98.1 °F)] 36.6 °C (97.8 °F)  Heart Rate:  [72-88] 84  Resp:  [16] 16  BP: (141-172)/(70-85) 141/79    Intake/Output Summary (Last 24 hours) at 10/21/2024 1334  Last data filed at 10/21/2024 0858  Gross per 24 hour   Intake 847 ml   Output 645 ml   Net 202 ml       PHYSICAL EXAMINATION      Physical Exam  Constitutional: No acute distress.   HENT:   Head: Normocephalic and atraumatic.   Eyes: No scleral icterus.   Cardiovascular: S1, S2. Normal rate and regular rhythm. No m/r/g appreciated.    Pulmonary/Chest: CTAB, decreased. Respirations unlabored. Right pigtail chest tube, serous fluid in tubing.  Abdominal: Soft. Bowel sounds are normal. No tenderness or distension. + ostomy, stoma pink. Small amount brown stool in bag. +hernia.  Musculoskeletal:  no edema.   Neurological: Grossly nonfocal. Poor historian.  Skin: Skin is warm and dry.  No rash on exposed skin.  Psychiatric: Calm, cooperative.       LINES, CATHETERS, DRAINS, AIRWAYS, AND WOUNDS   Lines, Drains, and Airways:  Wounds (agree with documentation and present on admission):  Peripheral IV (Adult) 10/17/24 Posterior;Right Forearm (Active)   Number of days: 4       Peripheral IV (Adult) 10/19/24 Anterior;Left Forearm (Active)   Number of days: 2       Chest Tube 1 Right 12 Fr (Active)   Number of days: 5       Colostomy Unknown LUQ (Active)   Number of days: 5323         Comments:    LABS / IMAGING / TELE      Labs  Results from last 7 days   Lab Units 10/21/24  0312 10/20/24  0239 10/19/24  0307   SODIUM mEQ/L 137  138 138   POTASSIUM mEQ/L 3.6 3.5 3.4*   CHLORIDE mEQ/L 104 105 104   CO2 mEQ/L 26 27 28   BUN mg/dL 20 16 17   CREATININE mg/dL 0.6* 0.7 0.8   GLUCOSE mg/dL 128* 108* 128*   CALCIUM mg/dL 8.8 9.2 9.0     Results from last 7 days   Lab Units 10/21/24  0312 10/20/24  0239 10/19/24  0307   WBC K/uL 9.46 11.76* 11.05*   HEMOGLOBIN g/dL 12.9* 13.3* 13.3*   HEMATOCRIT % 38.9* 40.0* 41.0   PLATELETS K/uL 190 172 177     Results from last 7 days   Lab Units 10/19/24  0307 10/18/24  0444 10/17/24  0332   MAGNESIUM mg/dL 1.9 1.9 1.9           Imaging  No results found.      ECG/Telemetry  Sinus rhythm on telemetry    ASSESSMENT AND PLAN      PE (pulmonary thromboembolism) (CMS/[facility])  Overview  LLE DVT/PE in 2019-> Xarelto for 6 months then changed to prophylactic dose-> DVT 2020    Assessment & Plan  - on admission reports mild central chest pain x 2-3 days  - no SOB  - CTA chest - Left upper and lower lobe pulmonary emboli.  - Patient reports stopping Xarelto 2 days ago after listening to some one talk about Xarelto and Metoprolol interaction. On Med Dispense List he hasn't had any Xarelto fills since July.  He denies difficulty with cost.    P:  - continue heparin gtt  - eventual transition to Xarelto once we are sure there are no procedures and cleared by surgery  - needs better follow up after discharge    SBO (small bowel obstruction) (CMS/[facility])  Assessment & Plan  - Abd pain around area of colostomy since Sunday night  - CT A/P - Dilated small bowel with transition point in the left midabdomen, concerning for bowel obstruction. 5.  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   - normal lactate at 1.2, WBC 10.87K  - No n/v    P:  - Surgery following  - SBFT 10/15 showed contrast in right colon ruling out high grade bowel obstruction, likely ileus vs pSBO  - d/w Surgery PA with only 50 mL of output. She states he previously reported he doesn't have output daily. Okay to start Colace.   -  "miralax started over the weekend  - continue to monitor stool output in ostomy bag  - tolerating low residue diet. No nausea or pain    * Pleural effusion on right  Assessment & Plan  - h/o recurrent pleural effusion requiring thoracentesis. S/p \"Right VATS pleural biopsy, insertion of Pleurx catheter \" by Dr. Sergey Dennis on 7/8/24. Pathology was benign. Catheter removed in office 8/9/24 after minimal drainage.  Patient states catheter had been removed 2 months ago  - He was admitted to [place] in August 2024 for fall. Looks like at the time had PET-CT which was negative for malignancy  - CT chest  -Loculated RIGHT pleural effusion with pleural thickening. Empyema not excluded.  - Pulse ox stable on room air. Chronic dyspnea    P:  - Thoracic surgery consulted appreciated.   - s/p IR placment of 12F pigtail catheter. Fluid is exudative by Light's criteria. Cultures pending - NGTD x72hrs. Cytology - No malignant cells identified   - management of chest tube per surgery - completed 6 doses of tpa/dornase.  If the output is <200mL/day surgery recs IR remove pigtail catheter   - d/w Surgery, okay to check chest x-ray today. No need for CT imaging    Grade I diastolic dysfunction  Assessment & Plan  ECHO 6/2024 : Left Ventricle: Normal ventricle size. Concentric left ventricular hypertrophy. Normal systolic function. Estimated EF 65%. No regional wall motion abnormalities. Grade I diastolic dysfunction   He has Lasix on his med list but has not been filled since 5/16/2024 for 90 day supply  Reportedly taking Metoprolol XL until recently  Looks euvolemic    P:  - appears euvolemic  - HOLD diuretics for now  - cont outpatient toprol xl    Delirium due to general medical condition  Assessment & Plan  Better today  D/w RN, continue to reorient as needed  Trying to mobilize patient during day, lights on during the day, lights off/minimize noise at night  Sleep protocol    Severe malnutrition (CMS/HCC)  Assessment & " Plan  Patient meets criteria for severe malnutrition of chronic illness based on eating less than 75% energy needs for greater than 1 month, >5% weight loss x 3 months (lost 15% of his body weight) and moderate muscle loss with slight depression of temples.    Nutrition consult appreciated.    Medication management  Assessment & Plan  - Patient is not able to provide accurate list of medications  - He doesn't  have a PCP. Seems like he has been getting refills through cardiologist Dr. Campbell but they have noted he hasn't seen them in over a year.  - He states stopped Xarelto and Metoprolol couple days ago because he was told there is a drug-drug interaction.     P:  - care management following  He was given a list of [facility] PCPs    History of rectal cancer  Overview  T2 N1 M0 rectal cancer, neoadjuvant chemotherapy and  radiotherapy followed by surgery (APR), then had multiple  complications after the surgery,   Colostomy    Assessment & Plan  - unknown who he follows with        VTE Assessment: Padua    VTE Prophylaxis:  Current anticoagulants:  heparin (porcine) bolus from bag 2,700-5,450 Units, intravenous, q6h PRN  heparin infusion in D5W 100 units/mL, intravenous, Titrated      Code Status: Full Code      Estimated Discharge Date: 10/22/2024   Disposition Planning: PT/OT rec SNF. Care management following - follow CT output, hopefully can be pulled soon and then we can d/c to SNF     VALENCIA Adkins  10/21/2024

## 2024-10-21 NOTE — PLAN OF CARE
Problem: Adult Inpatient Plan of Care  Goal: Plan of Care Review  Outcome: Progressing  Flowsheets (Taken 10/21/2024 9680)  Progress: no change  Outcome Evaluation: Had chest xray done that showed improvemen in right pleural effusion.  hep gtt at 950.  tolerating some food, ostomy with some gas and very little liquid stool. using urinal, alert and just confused to situation,  cooperative with care.  turned and repositioned for comfort.  Plan of Care Reviewed With:   patient   friend   Plan of Care Review  Plan of Care Reviewed With: patient, friend  Progress: no change  Outcome Evaluation: Had chest xray done that showed improvemen in right pleural effusion.  hep gtt at 950.  tolerating some food, ostomy with some gas and very little liquid stool. using urinal, alert and just confused to situation,  cooperative with care.  turned and repositioned for comfort.

## 2024-10-21 NOTE — PLAN OF CARE
Care Coordination Discharge Plan Note     Discharge Needs Assessment  Concerns to be Addressed: discharge planning, care coordination/care conferences  Current Discharge Risk: dependent with mobility/activities of daily living    Anticipated Discharge Plan  Anticipated Discharge Disposition: skilled nursing facility  Type of Skilled Nursing Care Services: nursing, OT, PT      Patient Choice  Offered/Gave Vendor List: yes  Patient's Choice of Community Agency(s): [facility] is #1 top and only choice.    Patient and/or patient guardian/advocate was made aware of their right to choose a provider. A list of eligible providers was presented and reviewed with the patient and/or patient guardian/advocate in written and/or verbal form. The list delineates providers in the patient’s desired geographic area who are participating in the Medicare program and/or providers contracted with the patient’s primary insurance. The Medicare list and quality ratings were obtained from the Medicare.gov [medicare.gov] website.    ---------------------------------------------------------------------------------------------------------------------    Interdisciplinary Discharge Plan Review:  Participants:advanced practice provider, , nursing    Concerns Comments: per DCPR pt not medically clear for d/c. Pt still has Chest tube with drainage    Discharge Plan:   Disposition/Destination: Skilled Nursing Facility - Other  / Skilled Nursing Facility  Discharge Facility:   Destination - Admitted Since 10/14/2024       Service Provider Services Address Phone Fax Patient Preferred Last Updated    [facility] [address] [id number] [phone] -- India Mena, RN 10/21/2024 1133          Community Resources:      Discharge Transportation:  Is Out of Hospital DNR needed at Discharge: no  Does patient need discharge transport? Yes        DCP: To  SNF [facility]    CC following thru SNF transition

## 2024-10-22 PROBLEM — E87.8 ELECTROLYTE ABNORMALITY: Status: ACTIVE | Noted: 2024-10-22

## 2024-10-22 LAB
ANION GAP SERPL CALC-SCNC: 5 MEQ/L (ref 3–15)
APTT PPP: 85 SEC (ref 23–35)
BUN SERPL-MCNC: 14 MG/DL (ref 7–25)
CALCIUM SERPL-MCNC: 8.9 MG/DL (ref 8.6–10.3)
CHLORIDE SERPL-SCNC: 101 MEQ/L (ref 98–107)
CO2 SERPL-SCNC: 30 MEQ/L (ref 21–31)
CREAT SERPL-MCNC: 0.7 MG/DL (ref 0.7–1.3)
EGFRCR SERPLBLD CKD-EPI 2021: >60 ML/MIN/1.73M*2
ERYTHROCYTE [DISTWIDTH] IN BLOOD BY AUTOMATED COUNT: 15.2 % (ref 11.6–14.4)
GLUCOSE SERPL-MCNC: 107 MG/DL (ref 70–99)
HCT VFR BLD AUTO: 38 % (ref 40.1–51)
HGB BLD-MCNC: 12.5 G/DL (ref 13.7–17.5)
MAGNESIUM SERPL-MCNC: 1.8 MG/DL (ref 1.8–2.5)
MCH RBC QN AUTO: 29.6 PG (ref 28–33.2)
MCHC RBC AUTO-ENTMCNC: 32.9 G/DL (ref 32.2–36.5)
MCV RBC AUTO: 90 FL (ref 83–98)
PLATELET # BLD AUTO: 195 K/UL (ref 150–350)
PMV BLD AUTO: 9.2 FL (ref 9.4–12.4)
POTASSIUM SERPL-SCNC: 3.4 MEQ/L (ref 3.5–5.1)
RBC # BLD AUTO: 4.22 M/UL (ref 4.5–5.8)
SODIUM SERPL-SCNC: 136 MEQ/L (ref 136–145)
WBC # BLD AUTO: 8.4 K/UL (ref 3.8–10.5)

## 2024-10-22 PROCEDURE — 63700000 HC SELF-ADMINISTRABLE DRUG: Performed by: STUDENT IN AN ORGANIZED HEALTH CARE EDUCATION/TRAINING PROGRAM

## 2024-10-22 PROCEDURE — 80048 BASIC METABOLIC PNL TOTAL CA: CPT | Performed by: NURSE PRACTITIONER

## 2024-10-22 PROCEDURE — 83735 ASSAY OF MAGNESIUM: CPT | Performed by: NURSE PRACTITIONER

## 2024-10-22 PROCEDURE — 63600000 HC DRUGS/DETAIL CODE: Mod: JZ | Performed by: HOSPITALIST

## 2024-10-22 PROCEDURE — 20600000 HC ROOM AND CARE INTERMEDIATE/TELEMETRY

## 2024-10-22 PROCEDURE — 85027 COMPLETE CBC AUTOMATED: CPT | Performed by: NURSE PRACTITIONER

## 2024-10-22 PROCEDURE — 36415 COLL VENOUS BLD VENIPUNCTURE: CPT | Performed by: NURSE PRACTITIONER

## 2024-10-22 PROCEDURE — 63700000 HC SELF-ADMINISTRABLE DRUG: Performed by: NURSE PRACTITIONER

## 2024-10-22 PROCEDURE — 85730 THROMBOPLASTIN TIME PARTIAL: CPT | Performed by: STUDENT IN AN ORGANIZED HEALTH CARE EDUCATION/TRAINING PROGRAM

## 2024-10-22 PROCEDURE — 63600000 HC DRUGS/DETAIL CODE: Mod: JZ | Performed by: NURSE PRACTITIONER

## 2024-10-22 PROCEDURE — 99233 SBSQ HOSP IP/OBS HIGH 50: CPT | Performed by: STUDENT IN AN ORGANIZED HEALTH CARE EDUCATION/TRAINING PROGRAM

## 2024-10-22 RX ORDER — POTASSIUM CHLORIDE 20 MEQ/1
40 TABLET, EXTENDED RELEASE ORAL ONCE
Status: COMPLETED | OUTPATIENT
Start: 2024-10-22 | End: 2024-10-22

## 2024-10-22 RX ORDER — POTASSIUM CHLORIDE 20 MEQ/1
20 TABLET, EXTENDED RELEASE ORAL AS NEEDED
Status: DISCONTINUED | OUTPATIENT
Start: 2024-10-22 | End: 2024-10-22

## 2024-10-22 RX ORDER — LANOLIN ALCOHOL/MO/W.PET/CERES
400 CREAM (GRAM) TOPICAL ONCE
Status: COMPLETED | OUTPATIENT
Start: 2024-10-22 | End: 2024-10-22

## 2024-10-22 RX ORDER — POTASSIUM CHLORIDE 20 MEQ/1
40 TABLET, EXTENDED RELEASE ORAL AS NEEDED
Status: DISCONTINUED | OUTPATIENT
Start: 2024-10-22 | End: 2024-10-22

## 2024-10-22 RX ADMIN — POLYETHYLENE GLYCOL 3350 17 G: 17 POWDER, FOR SOLUTION ORAL at 08:54

## 2024-10-22 RX ADMIN — Medication 3 MG: at 20:05

## 2024-10-22 RX ADMIN — PANTOPRAZOLE SODIUM 40 MG: 40 INJECTION, POWDER, LYOPHILIZED, FOR SOLUTION INTRAVENOUS at 08:55

## 2024-10-22 RX ADMIN — DOCUSATE SODIUM 100 MG: 100 CAPSULE, LIQUID FILLED ORAL at 20:05

## 2024-10-22 RX ADMIN — Medication 400 MG: at 08:51

## 2024-10-22 RX ADMIN — DOCUSATE SODIUM 100 MG: 100 CAPSULE, LIQUID FILLED ORAL at 08:52

## 2024-10-22 RX ADMIN — POTASSIUM CHLORIDE 40 MEQ: 1500 TABLET, EXTENDED RELEASE ORAL at 08:51

## 2024-10-22 RX ADMIN — METOPROLOL SUCCINATE 100 MG: 100 TABLET, EXTENDED RELEASE ORAL at 08:52

## 2024-10-22 RX ADMIN — HEPARIN SODIUM 950 UNITS/HR: 10000 INJECTION, SOLUTION INTRAVENOUS at 02:20

## 2024-10-22 ASSESSMENT — COGNITIVE AND FUNCTIONAL STATUS - GENERAL
STANDING UP FROM CHAIR USING ARMS: 3 - A LITTLE
MOVING TO AND FROM BED TO CHAIR: 3 - A LITTLE
CLIMB 3 TO 5 STEPS WITH RAILING: 1 - TOTAL
WALKING IN HOSPITAL ROOM: 3 - A LITTLE

## 2024-10-22 NOTE — PLAN OF CARE
Problem: Adult Inpatient Plan of Care  Goal: Plan of Care Review  Flowsheets (Taken 10/22/2024 3623)  Outcome Evaluation: Pt is on a low fiber diet, RN stated he has been eating okay, approximately 50-75% of food since last RD visit. Asked pt what he ate for lunch and he said he could not remember, pt is a hard historian. RN stated he had 75% of his sandwich for lunch and he finishes his Ensure supplement.    Plan of Care Reviewed With: patient, other (RN)    Nutrition Goals  Pt consumes >/= 75% of energy needs via PO.     Nutrition Recommendations/Interventions  Continue on low fiber diet as medically feasible.   Continue Ensure Plus HP strawberry supplementation.   Monitor wt and labs (glucose, potassium).

## 2024-10-22 NOTE — PROGRESS NOTES
Hospital Medicine     Daily Progress Note       SUBJECTIVE   Interval History:   Patient seen and examined. He says he is feeling better. Having some pain around chest tube site but it's tolerable.     OBJECTIVE      Vital signs in last 24 hours:  Temp:  [36.2 °C (97.2 °F)-36.9 °C (98.4 °F)] 36.2 °C (97.2 °F)  Heart Rate:  [72-94] 72  Resp:  [16-18] 16  BP: (127-172)/(66-81) 154/76    Intake/Output Summary (Last 24 hours) at 10/22/2024 1211  Last data filed at 10/21/2024 2000  Gross per 24 hour   Intake 70 ml   Output 380 ml   Net -310 ml       PHYSICAL EXAMINATION      Physical Exam  Constitutional: No acute distress.   HENT:   Head: Normocephalic and atraumatic.   Eyes: No scleral icterus.   Cardiovascular: S1, S2. Normal rate and regular rhythm. No m/r/g appreciated.    Pulmonary/Chest: CTAB, decreased in bases. Respirations unlabored. Right pigtail chest tube, serous fluid in tubing. Tenderness around chest tube site. No crepitus.  Abdominal: Soft. Bowel sounds are normal. No tenderness or distension. + ostomy, stoma pink. Small amount brown stool in bag. +hernia.  Musculoskeletal:  no edema.   Neurological: Grossly nonfocal. Poor historian.  Skin: Skin is warm and dry.  No rash on exposed skin.  Psychiatric: Calm, cooperative.     LINES, CATHETERS, DRAINS, AIRWAYS, AND WOUNDS   Lines, Drains, and Airways:  Wounds (agree with documentation and present on admission):  Peripheral IV (Adult) 10/17/24 Posterior;Right Forearm (Active)   Number of days: 5       Peripheral IV (Adult) 10/19/24 Anterior;Left Forearm (Active)   Number of days: 3       Chest Tube 1 Right 12 Fr (Active)   Number of days: 6       Colostomy Unknown LUQ (Active)   Number of days: 5324         Comments:    LABS / IMAGING / TELE      Labs  Results from last 7 days   Lab Units 10/22/24  0545 10/21/24  0312 10/20/24  0239   SODIUM mEQ/L 136 137 138   POTASSIUM mEQ/L 3.4* 3.6 3.5   CHLORIDE mEQ/L 101 104 105   CO2 mEQ/L 30 26 27   BUN mg/dL 14  "20 16   CREATININE mg/dL 0.7 0.6* 0.7   GLUCOSE mg/dL 107* 128* 108*   CALCIUM mg/dL 8.9 8.8 9.2     Results from last 7 days   Lab Units 10/22/24  0545 10/19/24  0307 10/18/24  0444   MAGNESIUM mg/dL 1.8 1.9 1.9     Results from last 7 days   Lab Units 10/22/24  0545 10/21/24  0312 10/20/24  0239   WBC K/uL 8.40 9.46 11.76*   HEMOGLOBIN g/dL 12.5* 12.9* 13.3*   HEMATOCRIT % 38.0* 38.9* 40.0*   PLATELETS K/uL 195 190 172           Imaging  X-RAY CHEST 2 VIEWS    Result Date: 10/21/2024  CLINICAL HISTORY:   f/u right pleural effusion s/p tpa/dornase COMMENT: Technique:  2 x-ray views of the chest. Comparison date: 10/17/2024. A loculated right hydropneumothorax is slightly improved. Decreased volume loss at the right lung base. A right basilar pigtail catheter remains. Left lung is clear. Cardiac silhouette is stable.     IMPRESSION: Improving right hydropneumothorax and basilar volume loss.       ECG/Telemetry  Sinus rhythm on telemetry    ASSESSMENT AND PLAN      * Pleural effusion on right  Assessment & Plan  - h/o recurrent pleural effusion requiring thoracentesis. S/p \"Right VATS pleural biopsy, insertion of Pleurx catheter \" by Dr. Sergey Dennis on 7/8/24. Pathology was benign. Catheter removed in office 8/9/24 after minimal drainage.  Patient states catheter had been removed 2 months ago  - He was admitted to [facility] in August 2024 for fall. Looks like at the time had PET-CT which was negative for malignancy  - CT chest  -Loculated RIGHT pleural effusion with pleural thickening. Empyema not excluded.  - Pulse ox stable on room air. Chronic dyspnea    P:  - Thoracic surgery consulted appreciated.   - s/p IR placment of 12F pigtail catheter. Fluid is exudative by Light's criteria. Cultures negative. Cytology - No malignant cells identified   - completed 6 doses of tpa/dornase.    - Repeat Chest x-ray 10/21 -improving right hydropneumothorax and basilar volume loss  - If the output is <200mL/day surgery recs IR " remove pigtail catheter - surgery team to discuss with Dr. Dennis if this continues to be the recommendation  - d/w Surgery, okay to check chest x-ray today. No need for CT imaging    SBO (small bowel obstruction) (CMS/[facility])  Assessment & Plan  - Abd pain around area of colostomy since Sunday night  - CT A/P - Dilated small bowel with transition point in the left midabdomen, concerning for bowel obstruction. 5.  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   - normal lactate at 1.2, WBC 10.87K  - No n/v    P:  - Surgery recommendations appreciated.  - SBFT 10/15 showed contrast in right colon ruling out high grade bowel obstruction, likely ileus vs pSBO  - continue Colace, Miralax   - continue to monitor stool output in ostomy bag  - tolerating low residue diet. No nausea or pain    PE (pulmonary thromboembolism) (CMS/HCC)  Overview  LLE DVT/PE in 2019-> Xarelto for 6 months then changed to prophylactic dose-> DVT 2020    Assessment & Plan  - on admission reports mild central chest pain x 2-3 days  - no SOB  - CTA chest - Left upper and lower lobe pulmonary emboli.  - Patient reports stopping Xarelto 2 days ago after listening to some one talk about Xarelto and Metoprolol interaction. On Med Dispense List he hasn't had any Xarelto fills since July.  He denies difficulty with cost.    P:  - continue heparin gtt  - eventual transition to Xarelto once we are sure there are no procedures and cleared by surgery  - needs better follow up after discharge    Grade I diastolic dysfunction  Assessment & Plan  ECHO 6/2024 : Left Ventricle: Normal ventricle size. Concentric left ventricular hypertrophy. Normal systolic function. Estimated EF 65%. No regional wall motion abnormalities. Grade I diastolic dysfunction   He has Lasix on his med list but has not been filled since 5/16/2024 for 90 day supply  Reportedly taking Metoprolol XL until recently  Looks euvolemic    P:  - appears euvolemic  -  HOLD diuretics for now  - cont outpatient toprol xl    Electrolyte abnormality  Assessment & Plan  Monitor K, Mg - replete per protocol    Delirium due to general medical condition  Assessment & Plan  Better overall  D/w RN, continue to reorient as needed  Trying to mobilize patient during day, lights on during the day, lights off/minimize noise at night  Sleep protocol    Severe malnutrition (CMS/HCC)  Assessment & Plan  Patient meets criteria for severe malnutrition of chronic illness based on eating less than 75% energy needs for greater than 1 month, >5% weight loss x 3 months (lost 15% of his body weight) and moderate muscle loss with slight depression of temples.    Nutrition consult appreciated.    Medication management  Assessment & Plan  - Patient is not able to provide accurate list of medications  - He doesn't  have a PCP. Seems like he has been getting refills through cardiologist Dr. Campbell but they have noted he hasn't seen them in over a year.  - He states stopped Xarelto and Metoprolol couple days ago because he was told there is a drug-drug interaction.     P:  - care management following  He was given a list of [facility] PCPs    History of rectal cancer  Overview  T2 N1 M0 rectal cancer, neoadjuvant chemotherapy and  radiotherapy followed by surgery (APR), then had multiple  complications after the surgery,   Colostomy    Assessment & Plan  - unknown who he follows with        VTE Assessment: Padua    VTE Prophylaxis:  Current anticoagulants:  heparin (porcine) bolus from bag 2,700-5,450 Units, intravenous, q6h PRN  heparin infusion in D5W 100 units/mL, intravenous, Titrated      Code Status: Full Code      Estimated Discharge Date: 10/23/2024   Disposition Planning: d/c pending surgery recommendations RE: chest tube management.    Per , plan is for [facility] at discharge     VALENCIA Adkins  10/22/2024

## 2024-10-22 NOTE — PLAN OF CARE
Problem: Adult Inpatient Plan of Care  Goal: Plan of Care Review  Outcome: Progressing  Flowsheets (Taken 10/22/2024 1702)  Progress: no change  Outcome Evaluation: trying to climb out of bed more often today, more confused today than yesterday.  chest tube to -20 cm suction with yellow output, hep gtt at 0950 therapeutic,  left colostomy with very little output. incont of urine at times.  k replaced with PO.  Plan of Care Reviewed With: patient   Plan of Care Review  Plan of Care Reviewed With: patient  Progress: no change  Outcome Evaluation: trying to climb out of bed more often today, more confused today than yesterday.  chest tube to -20 cm suction with yellow output, hep gtt at 0950 therapeutic,  left colostomy with very little output. incont of urine at times.  k replaced with PO.

## 2024-10-22 NOTE — PLAN OF CARE
Care Coordination Discharge Plan Note     Discharge Needs Assessment  Concerns to be Addressed: discharge planning, care coordination/care conferences  Current Discharge Risk: dependent with mobility/activities of daily living    Anticipated Discharge Plan  Anticipated Discharge Disposition: skilled nursing facility  Type of Skilled Nursing Care Services: nursing, OT, PT      Patient Choice  Offered/Gave Vendor List: yes  Patient's Choice of Community Agency(s): [facility] is #1 top and only choice.    Patient and/or patient guardian/advocate was made aware of their right to choose a provider. A list of eligible providers was presented and reviewed with the patient and/or patient guardian/advocate in written and/or verbal form. The list delineates providers in the patient’s desired geographic area who are participating in the Medicare program and/or providers contracted with the patient’s primary insurance. The Medicare list and quality ratings were obtained from the Medicare.gov [medicare.gov] website.    ---------------------------------------------------------------------------------------------------------------------    Interdisciplinary Discharge Plan Review:  Participants:advanced practice provider, , nursing    Concerns Comments: cc notified Manisha [place] to start for ins auth Manisha will notify cc when auth obtained    Discharge Plan:   Disposition/Destination: Skilled Nursing Facility - Other  / Skilled Nursing Facility  Discharge Facility:   Destination - Admitted Since 10/14/2024       Service Provider Services Address Phone Fax Patient Preferred Last Updated    [facility] Sk[facility] [address] [place] [id number] [phone] -- India Mena, RN 10/21/2024 1133          Community Resources:      Discharge Transportation:  Is Out of Hospital DNR needed at Discharge: no  Does patient need discharge transport? Yes

## 2024-10-23 LAB
ANION GAP SERPL CALC-SCNC: 6 MEQ/L (ref 3–15)
APTT PPP: 118 SEC (ref 23–35)
APTT PPP: 121 SEC (ref 23–35)
APTT PPP: 60 SEC (ref 23–35)
BUN SERPL-MCNC: 17 MG/DL (ref 7–25)
CALCIUM SERPL-MCNC: 9 MG/DL (ref 8.6–10.3)
CHLORIDE SERPL-SCNC: 103 MEQ/L (ref 98–107)
CO2 SERPL-SCNC: 27 MEQ/L (ref 21–31)
CREAT SERPL-MCNC: 0.7 MG/DL (ref 0.7–1.3)
EGFRCR SERPLBLD CKD-EPI 2021: >60 ML/MIN/1.73M*2
ERYTHROCYTE [DISTWIDTH] IN BLOOD BY AUTOMATED COUNT: 15.4 % (ref 11.6–14.4)
GLUCOSE SERPL-MCNC: 99 MG/DL (ref 70–99)
HCT VFR BLD AUTO: 38.5 % (ref 40.1–51)
HGB BLD-MCNC: 12.6 G/DL (ref 13.7–17.5)
MAGNESIUM SERPL-MCNC: 1.9 MG/DL (ref 1.8–2.5)
MCH RBC QN AUTO: 29.4 PG (ref 28–33.2)
MCHC RBC AUTO-ENTMCNC: 32.7 G/DL (ref 32.2–36.5)
MCV RBC AUTO: 90 FL (ref 83–98)
PLATELET # BLD AUTO: 234 K/UL (ref 150–350)
PMV BLD AUTO: 10.3 FL (ref 9.4–12.4)
POTASSIUM SERPL-SCNC: 4 MEQ/L (ref 3.5–5.1)
RBC # BLD AUTO: 4.28 M/UL (ref 4.5–5.8)
SODIUM SERPL-SCNC: 136 MEQ/L (ref 136–145)
WBC # BLD AUTO: 9.36 K/UL (ref 3.8–10.5)

## 2024-10-23 PROCEDURE — 63700000 HC SELF-ADMINISTRABLE DRUG: Performed by: STUDENT IN AN ORGANIZED HEALTH CARE EDUCATION/TRAINING PROGRAM

## 2024-10-23 PROCEDURE — 63700000 HC SELF-ADMINISTRABLE DRUG

## 2024-10-23 PROCEDURE — 97530 THERAPEUTIC ACTIVITIES: CPT | Mod: GP,CQ

## 2024-10-23 PROCEDURE — 85027 COMPLETE CBC AUTOMATED: CPT | Performed by: NURSE PRACTITIONER

## 2024-10-23 PROCEDURE — 85730 THROMBOPLASTIN TIME PARTIAL: CPT | Performed by: STUDENT IN AN ORGANIZED HEALTH CARE EDUCATION/TRAINING PROGRAM

## 2024-10-23 PROCEDURE — 83735 ASSAY OF MAGNESIUM: CPT | Performed by: NURSE PRACTITIONER

## 2024-10-23 PROCEDURE — 80048 BASIC METABOLIC PNL TOTAL CA: CPT | Performed by: NURSE PRACTITIONER

## 2024-10-23 PROCEDURE — 36415 COLL VENOUS BLD VENIPUNCTURE: CPT | Performed by: NURSE PRACTITIONER

## 2024-10-23 PROCEDURE — 63700000 HC SELF-ADMINISTRABLE DRUG: Performed by: NURSE PRACTITIONER

## 2024-10-23 PROCEDURE — 97535 SELF CARE MNGMENT TRAINING: CPT | Mod: GO,CO

## 2024-10-23 PROCEDURE — 63600000 HC DRUGS/DETAIL CODE: Mod: JZ | Performed by: HOSPITALIST

## 2024-10-23 PROCEDURE — 20600000 HC ROOM AND CARE INTERMEDIATE/TELEMETRY

## 2024-10-23 PROCEDURE — 99233 SBSQ HOSP IP/OBS HIGH 50: CPT | Performed by: STUDENT IN AN ORGANIZED HEALTH CARE EDUCATION/TRAINING PROGRAM

## 2024-10-23 PROCEDURE — 63600000 HC DRUGS/DETAIL CODE: Mod: JZ | Performed by: NURSE PRACTITIONER

## 2024-10-23 RX ORDER — LORAZEPAM 0.5 MG/1
0.5 TABLET ORAL ONCE
Status: COMPLETED | OUTPATIENT
Start: 2024-10-23 | End: 2024-10-23

## 2024-10-23 RX ORDER — HALOPERIDOL 0.5 MG/1
0.5 TABLET ORAL ONCE
Status: COMPLETED | OUTPATIENT
Start: 2024-10-23 | End: 2024-10-23

## 2024-10-23 RX ORDER — SENNOSIDES 8.6 MG/1
2 TABLET ORAL 2 TIMES DAILY
Status: DISCONTINUED | OUTPATIENT
Start: 2024-10-23 | End: 2024-10-25 | Stop reason: HOSPADM

## 2024-10-23 RX ADMIN — PANTOPRAZOLE SODIUM 40 MG: 40 INJECTION, POWDER, LYOPHILIZED, FOR SOLUTION INTRAVENOUS at 08:59

## 2024-10-23 RX ADMIN — LORAZEPAM 0.5 MG: 0.5 TABLET ORAL at 03:09

## 2024-10-23 RX ADMIN — SENNOSIDES 2 TABLET: 8.6 TABLET, FILM COATED ORAL at 21:27

## 2024-10-23 RX ADMIN — POLYETHYLENE GLYCOL 3350 17 G: 17 POWDER, FOR SOLUTION ORAL at 08:59

## 2024-10-23 RX ADMIN — Medication 3 MG: at 21:27

## 2024-10-23 RX ADMIN — HALOPERIDOL 0.5 MG: 0.5 TABLET ORAL at 09:40

## 2024-10-23 RX ADMIN — HEPARIN SODIUM 950 UNITS/HR: 10000 INJECTION, SOLUTION INTRAVENOUS at 03:31

## 2024-10-23 RX ADMIN — METOPROLOL SUCCINATE 100 MG: 100 TABLET, EXTENDED RELEASE ORAL at 09:00

## 2024-10-23 RX ADMIN — SENNOSIDES 2 TABLET: 8.6 TABLET, FILM COATED ORAL at 08:59

## 2024-10-23 RX ADMIN — DOCUSATE SODIUM 100 MG: 100 CAPSULE, LIQUID FILLED ORAL at 21:26

## 2024-10-23 ASSESSMENT — COGNITIVE AND FUNCTIONAL STATUS - GENERAL
WALKING IN HOSPITAL ROOM: 1 - TOTAL
STANDING UP FROM CHAIR USING ARMS: 1 - TOTAL
MOVING TO AND FROM BED TO CHAIR: 2 - A LOT
DRESSING REGULAR UPPER BODY CLOTHING: 2 - A LOT
TOILETING: 2 - A LOT
CLIMB 3 TO 5 STEPS WITH RAILING: 1 - TOTAL
STANDING UP FROM CHAIR USING ARMS: 2 - A LOT
HELP NEEDED FOR BATHING: 2 - A LOT
CLIMB 3 TO 5 STEPS WITH RAILING: 1 - TOTAL
WALKING IN HOSPITAL ROOM: 1 - TOTAL
EATING MEALS: 3 - A LITTLE
DRESSING REGULAR LOWER BODY CLOTHING: 2 - A LOT
HELP NEEDED FOR PERSONAL GROOMING: 3 - A LITTLE
AFFECT: CONFUSED
MOVING TO AND FROM BED TO CHAIR: 2 - A LOT

## 2024-10-23 NOTE — NURSING NOTE
Patient swallowed one pill then he refused the rest of am pills spit out his senokot said no meds and refused to open mouth    Statement Selected

## 2024-10-23 NOTE — PLAN OF CARE
Plan of Care Review  Plan of Care Reviewed With: patient  Progress: declining  Outcome Evaluation: patient confused pulling at chest tube and IV.  trying to get out of bed spitting pills out.  inc urine. ate 25% of meals. agitated when needs to be reposiioned.  CHD at 10

## 2024-10-23 NOTE — PLAN OF CARE
Problem: Adult Inpatient Plan of Care  Goal: Plan of Care Review  Outcome: Progressing  Flowsheets (Taken 10/23/2024 0652)  Progress: no change  Outcome Evaluation: Pt confused this shfit, restless, pulling at chest tube and IV. PO ativan given x1 with sme releif overngiht. Chest tube intact, clear yellow drainage in tubing. Heparin gtt adjusted due to subtherapeutic PTT, next PTT due at 1250. Urinal at bedside. Will ctm  Plan of Care Reviewed With: patient   Plan of Care Review  Plan of Care Reviewed With: patient  Progress: no change  Outcome Evaluation: Pt confused this shfit, restless, pulling at chest tube and IV. PO ativan given x1 with sme releif overngiht. Chest tube intact, clear yellow drainage in tubing. Heparin gtt adjusted due to subtherapeutic PTT, next PTT due at 1250. Urinal at bedside. Will ctm

## 2024-10-23 NOTE — PROGRESS NOTES
Hospital Medicine     Daily Progress Note       SUBJECTIVE   Interval History:   Patient seen and examined. Sleepy, restless. Conversation confused.  Denies pain or shortness of breath.  Needs reorientation.     Chest tube with 280 cc out in past 24 hours.     OBJECTIVE      Vital signs in last 24 hours:  Temp:  [36.3 °C (97.3 °F)-36.5 °C (97.7 °F)] 36.4 °C (97.5 °F)  Heart Rate:  [] 84  Resp:  [16-18] 18  BP: (132-164)/(79-89) 132/89    Intake/Output Summary (Last 24 hours) at 10/23/2024 1308  Last data filed at 10/23/2024 0645  Gross per 24 hour   Intake 10 ml   Output 430 ml   Net -420 ml       PHYSICAL EXAMINATION      Physical Exam  Constitutional: No acute distress.   HENT:   Head: Normocephalic and atraumatic.   Eyes: No scleral icterus.   Cardiovascular: S1, S2. Normal rate and regular rhythm. No m/r/g appreciated.    Pulmonary/Chest: CTAB, decreased in bases. Respirations unlabored. Right pigtail chest tube, serous fluid in tubing. No crepitus.  Abdominal: Soft. Bowel sounds are normal. No tenderness or distension. + ostomy, stoma pink. Small amount brown stool in bag. +hernia.  Musculoskeletal:  no edema.   Neurological: confused conversation.  Skin: Skin is warm and dry.  No rash on exposed skin.  Psychiatric: Calm, restless     LINES, CATHETERS, DRAINS, AIRWAYS, AND WOUNDS   Lines, Drains, and Airways:  Wounds (agree with documentation and present on admission):  Peripheral IV (Adult) 10/17/24 Posterior;Right Forearm (Active)   Number of days: 6       Peripheral IV (Adult) 10/19/24 Anterior;Left Forearm (Active)   Number of days: 4       Chest Tube 1 Right 12 Fr (Active)   Number of days: 7       Colostomy Unknown LUQ (Active)   Number of days: 5325         Comments:    LABS / IMAGING / TELE      Labs  Results from last 7 days   Lab Units 10/23/24  0458 10/22/24  0545 10/21/24  0312   SODIUM mEQ/L 136 136 137   POTASSIUM mEQ/L 4.0 3.4* 3.6   CHLORIDE mEQ/L 103 101 104   CO2 mEQ/L 27 30 26   BUN  "mg/dL 17 14 20   CREATININE mg/dL 0.7 0.7 0.6*   GLUCOSE mg/dL 99 107* 128*   CALCIUM mg/dL 9.0 8.9 8.8     Results from last 7 days   Lab Units 10/23/24  0458 10/22/24  0545 10/21/24  0312   WBC K/uL 9.36 8.40 9.46   HEMOGLOBIN g/dL 12.6* 12.5* 12.9*   HEMATOCRIT % 38.5* 38.0* 38.9*   PLATELETS K/uL 234 195 190           Imaging  No results found.      ECG/Telemetry  I have independently reviewed the telemetry. No events for the last 24 hours.    ASSESSMENT AND PLAN      * Pleural effusion on right  Assessment & Plan  - h/o recurrent pleural effusion requiring thoracentesis. S/p \"Right VATS pleural biopsy, insertion of Pleurx catheter \" by Dr. Sergey Dennis on 7/8/24. Pathology was benign. Catheter removed in office 8/9/24 after minimal drainage.  Patient states catheter had been removed 2 months ago  - He was admitted to [place] in August 2024 for fall. Looks like at the time had PET-CT which was negative for malignancy  - CT chest  -Loculated RIGHT pleural effusion with pleural thickening. Empyema not excluded.  - Pulse ox stable on room air. Chronic dyspnea    P:  - Thoracic surgery consulted appreciated.   - s/p IR placment of 12F pigtail catheter. Fluid is exudative by Light's criteria. Cultures negative. Cytology - No malignant cells identified   - completed 6 doses of tpa/dornase.    - Repeat Chest x-ray 10/21 -improving right hydropneumothorax and basilar volume loss  - If the output is <200mL/day surgery recs IR remove pigtail catheter - surgery team to discuss with Dr. Dennis if this continues to be the recommendation  - continue to monitor output    SBO (small bowel obstruction) (CMS/HCC)  Assessment & Plan  - Abd pain around area of colostomy since Sunday night  - CT A/P - Dilated small bowel with transition point in the left midabdomen, concerning for bowel obstruction. 5.  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   - normal lactate at 1.2, WBC 10.87K  - No " n/v    P:  - Surgery recommendations appreciated.  - SBFT 10/15 showed contrast in right colon ruling out high grade bowel obstruction, likely ileus vs pSBO  - continue Colace, Miralax   - continue to monitor stool output in ostomy bag  - tolerating low residue diet. No nausea or pain    PE (pulmonary thromboembolism) (CMS/HCC)  Overview  LLE DVT/PE in 2019-> Xarelto for 6 months then changed to prophylactic dose-> DVT 2020    Assessment & Plan  - on admission reports mild central chest pain x 2-3 days  - no SOB  - CTA chest - Left upper and lower lobe pulmonary emboli.  - Patient reports stopping Xarelto 2 days ago after listening to some one talk about Xarelto and Metoprolol interaction. On Med Dispense List he hasn't had any Xarelto fills since July.  He denies difficulty with cost.    P:  - continue heparin gtt  - eventual transition to Xarelto once we are sure there are no procedures and cleared by surgery  - needs better follow up after discharge    Grade I diastolic dysfunction  Assessment & Plan  ECHO 6/2024 : Left Ventricle: Normal ventricle size. Concentric left ventricular hypertrophy. Normal systolic function. Estimated EF 65%. No regional wall motion abnormalities. Grade I diastolic dysfunction   He has Lasix on his med list but has not been filled since 5/16/2024 for 90 day supply  Reportedly taking Metoprolol XL until recently  Looks euvolemic    P:  - appears euvolemic  - HOLD diuretics for now  - cont outpatient toprol xl    Electrolyte abnormality  Assessment & Plan  Monitor K, Mg - replete per protocol    Delirium due to general medical condition  Assessment & Plan  Had been doing better  D/w RN, continue to reorient as needed  Trying to mobilize patient during day, lights on during the day, lights off/minimize noise at night  Sleep protocol  AVOID benzodiazepines - worse this morning after receiving ativan overnight.  Haldol 0.5 mg PO x 1 his morning - did well with this in the past    Severe  malnutrition (CMS/HCC)  Assessment & Plan  Patient meets criteria for severe malnutrition of chronic illness based on eating less than 75% energy needs for greater than 1 month, >5% weight loss x 3 months (lost 15% of his body weight) and moderate muscle loss with slight depression of temples.    Nutrition consult appreciated.    Medication management  Assessment & Plan  - Patient is not able to provide accurate list of medications  - He doesn't  have a PCP. Seems like he has been getting refills through cardiologist Dr. Campbell but they have noted he hasn't seen them in over a year.  - He states stopped Xarelto and Metoprolol couple days ago because he was told there is a drug-drug interaction.     P:  - care management following  He was given a list of [facility] PCPs    History of rectal cancer  Overview  T2 N1 M0 rectal cancer, neoadjuvant chemotherapy and  radiotherapy followed by surgery (APR), then had multiple  complications after the surgery,   Colostomy    Assessment & Plan  - unknown who he follows with        VTE Assessment: [place]    VTE Prophylaxis:  Current anticoagulants:  heparin (porcine) bolus from bag 2,700-5,450 Units, intravenous, q6h PRN  heparin infusion in D5W 100 units/mL, intravenous, Titrated      Code Status: Full Code      Estimated Discharge Date: 10/24/2024   Disposition Planning: eventual plan for SNF. SW following.     VALENCIA Adkins  10/23/2024

## 2024-10-23 NOTE — PROGRESS NOTES
Occupational Therapy -  Daily Treatment/Progress Note     Patient: Dave Arredondo  Location: [facility] 3C 0359  MRN: [id number]  Today's date: 10/23/2024    HISTORY OF PRESENT ILLNESS     Dave is a 88 y.o. male admitted on 10/14/2024 with SBO (small bowel obstruction) (CMS/HCC) [K56.609]  PE (pulmonary thromboembolism) (CMS/HCC) [I26.99]  Pleural effusion on right [J90]. Principal problem is PE (pulmonary thromboembolism) (CMS/HCC).    Past Medical History  Dave has a past medical history of Colon cancer (CMS/HCC) (2012), Coronary artery disease, DVT (deep venous thrombosis) (CMS/HCC), Hypertension, Lipid disorder, Pleural effusion, and Pulmonary embolism (CMS/HCC).    History of Present Illness  88 y.o. male with a past medical history of Dvt/PE, HTN, CAD, rectal cancer s/p chemoXRT and APR with end colostomy, recurrent pleural effusion, who presents with abdominal pain found to have PE (on hep gtt) and SBO     CXR IMPRESSION:  Loculated right pleural effusion and basilar airspace opacity.    CT Abd IMPRESSION  Loculated RIGHT pleural effusion with pleural thickening. Empyema not excluded.  Subtotal atelectasis of the RIGHT LOWER and MIDDLE lobes. The left lung is clear.     10/16: s/p  right pigtail chest tube placement.      NGT for suction now removed; ST consult.  PRIOR LEVEL OF FUNCTION AND LIVING ENVIRONMENT     Prior Level of Function      Flowsheet Row Most Recent Value   Dominant Hand right   Ambulation independent   Transferring independent   Toileting independent   Bathing independent   Dressing independent   Eating independent   Communication understands/communicates without difficulty   Swallowing swallows foods/liquids without difficulty   Baseline Diet/Method of Nutritional Intake no diet restrictions, thin liquids, regular   Past History of Dysphagia patient denies history however reports recent decline in appetite with associated weightloss.   Prior Level of Function Comment  Per last EMR pt IND w/ mobility/adls w/o AD. But recently has only been able to ambulate short distances   Assistive Device Currently Used at Home grab bar, shower chair             Prior Living Environment      Flowsheet Row Most Recent Value   People in Home roommate(s)   Current Living Arrangements home   Home Accessibility stairs to enter home (Group), stairs within home (Group)   Living Environment Comment Pt  historian and stated he lives w/ his 4 brothers. Per EMR pt lives w/ roommate(s) on the 2nd fl, accessible by 12 steps, 1 [address] building, no elevator access          Occupational Profile      Flowsheet Row Most Recent Value   Environmental Supports and Barriers Lives w/ roommate(s) per EMR          VITALS AND PAIN     OT Vitals      Date/Time Pulse HR Source SpO2 Pt Activity O2 Therapy BP BP Location BP Method Pt Position [facility]   10/23/24 1345 75 Monitor 95 % At rest None (Room air) 116/85 Left upper arm Automatic Lying MTC          OT Pain      Date/Time Rating: Rest Rating: Activity [facility]   10/23/24 1345 0 0 MTC             Objective   OBJECTIVE     Start time:  1339  End time:  1355  Session Length: 16 min  Mode of Treatment: co-treatment  Reason for co-treatment: Co-Treat to expedite d/c    General Observations  Patient received in bed, supine. He was agreeable to therapy, no issues or concerns identified by nurse prior to session.      Precautions: fall, other (see comments)       Limitations/Impairments: safety/cognitive      OT Eval and Treat - 10/23/24 1339          Cognition    Orientation Status oriented to;person     Follows Commands follows one-step commands;repetition of directions required;verbal cues/prompting required;physical/tactile prompts required;0-24% accuracy        Bed Mobility    Bed Mobility Activities supine to sit;sit to supine     [place] minimum assist (75% or more patient effort);2 person assist     Safety/Cues increased time to complete;technique;verbal cues     Comment Min  A x2 persons for bed mobility with use of bed features. Cues required for initiation, technique and hand placement. Increased time required. Min A required for seated balance at EOB stat/dyn.        Mobility Belt    Mobility Belt Used During Session no - patient did not mobilize out of bed or stand        Grooming    Tasks washes, rinses and dries face     [place] minimum assist (75% or more patient effort)     Comment Min A with set up assist provided for face washing task while seated at EOB. Pt requring increased multimodal cues to initial and carry out task. Increased time required for task completion.        Balance    Static Sitting Balance mild impairment     Dynamic Sitting Balance mild impairment;moderate impairment     Sit to Stand Dynamic Balance not tested     Static Standing Balance not tested     Dynamic Standing Balance not tested                                   Session Outcome  Patient in bed, leg(s) elevated at end of session, all needs met, bed alarm on. Nursing notified about change in vital signs and patient's performance.    AM-PAC™ - ADL (Current Function)     Putting on/taking off regular lower body clothing 2 - A Lot   Bathing 2 - A Lot   Toileting 2 - A Lot   Putting on/taking off regular upper body clothing 2 - A Lot   Help for taking care of personal grooming 3 - A Little   Eating meals 3 - A Little   AM-PAC™ ADL Score 14      ASSESSMENT AND PLAN     Progress Summary  Pt agreeable to therapy this PM. Pt continued to demonstrate deficits in strength, mobility, transfers, endurance, balance and cognition. 1:1 at bedside upon arrival. Min A x2 persons for bed mobility, Min A for seated balance. Pt with eyes closed 60% of session despite cues. Unable to perfrom fucntional STS transfer/mobility. Pt remains with CT to wall suction, no pain reported. Poor toelrance to actiivity with limited command following. Pt trialed with seated grooming task, increased time and cues required for  intiation and carry out of task. Pt distractible, cues for ATT. Therapy recommending SNF at d/c for further care prior to return home d/t current functional deficits listed. OT services to continue current POC.    Patient/Family Therapy Goal Statement: to get some rest    OT Plan      Flowsheet Row Most Recent Value   Rehab Potential good, to achieve stated therapy goals at 10/17/2024 1133   Therapy Frequency 4 times/wk at 10/17/2024 1133   Planned Therapy Interventions activity tolerance training, adaptive equipment training, BADL retraining, patient/caregiver education/training, transfer/mobility retraining, functional balance retraining, occupation/activity based interventions, ROM/therapeutic exercise, strengthening exercise at 10/17/2024 1133            OT Discharge Recommendations      Flowsheet Row Most Recent Value   OT Recommended Discharge Disposition skilled nursing facility at 10/17/2024 1133   Anticipated Equipment Needs if Discharged Home (OT) other (see comments)  [TBD] at 10/15/2024 1055                 OT Goals      Flowsheet Row Most Recent Value   Bed Mobility Goal 1    Activity/Assistive Device bed mobility activities, all at 10/15/2024 1055   [place] modified independence at 10/15/2024 1055   Time Frame by discharge at 10/15/2024 1055   Progress/Outcome goal ongoing at 10/15/2024 1055   Transfer Goal 1    Activity/Assistive Device all transfers, sit-to-stand/stand-to-sit at 10/15/2024 1055   [place] modified independence at 10/15/2024 1055   Time Frame by discharge at 10/15/2024 1055   Progress/Outcome goal ongoing at 10/15/2024 1055   Dressing Goal 1    Activity/Adaptive Equipment dressing skills, all at 10/15/2024 1055   [place] modified independence at 10/15/2024 1055   Time Frame by discharge at 10/15/2024 1055   Progress/Outcome goal ongoing at 10/15/2024 1055   Toileting Goal 1    Activity/Assistive Device toileting skills, all at 10/15/2024 1055   [place] modified  independence at 10/15/2024 1055   Time Frame by discharge at 10/15/2024 1055   Progress/Outcome goal ongoing at 10/15/2024 1055

## 2024-10-23 NOTE — PROGRESS NOTES
Physical Therapy -  Daily Treatment/Progress Note     Patient: Dave Arredondo  Location: [facility] 3C 0359  MRN: [id number]  Today's date: 10/23/2024    HISTORY OF PRESENT ILLNESS     Dave is a 88 y.o. male admitted on 10/14/2024 with SBO (small bowel obstruction) (CMS/HCC) [K56.609]  PE (pulmonary thromboembolism) (CMS/HCC) [I26.99]  Pleural effusion on right [J90]. Principal problem is PE (pulmonary thromboembolism) (CMS/HCC).    Past Medical History  Dave has a past medical history of Colon cancer (CMS/HCC) (2012), Coronary artery disease, DVT (deep venous thrombosis) (CMS/HCC), Hypertension, Lipid disorder, Pleural effusion, and Pulmonary embolism (CMS/HCC).    History of Present Illness  88 y.o. male with a past medical history of Dvt/PE, HTN, CAD, rectal cancer s/p chemoXRT and APR with end colostomy, recurrent pleural effusion, who presents with abdominal pain found to have PE (on hep gtt) and SBO     CXR IMPRESSION:  Loculated right pleural effusion and basilar airspace opacity.    CT Abd IMPRESSION  Loculated RIGHT pleural effusion with pleural thickening. Empyema not excluded.  Subtotal atelectasis of the RIGHT LOWER and MIDDLE lobes. The left lung is clear.     10/16: s/p  right pigtail chest tube placement.      NGT for suction now removed; ST consult.  PRIOR LEVEL OF FUNCTION AND LIVING ENVIRONMENT     Prior Level of Function      Flowsheet Row Most Recent Value   Dominant Hand right   Ambulation independent   Transferring independent   Toileting independent   Bathing independent   Dressing independent   Eating independent   Communication understands/communicates without difficulty   Swallowing swallows foods/liquids without difficulty   Baseline Diet/Method of Nutritional Intake no diet restrictions, thin liquids, regular   Past History of Dysphagia patient denies history however reports recent decline in appetite with associated weightloss.   Prior Level of Function Comment Per  last EMR pt IND w/ mobility/adls w/o AD. But recently has only been able to ambulate short distances   Assistive Device Currently Used at Home grab bar, shower chair             Prior Living Environment      Flowsheet Row Most Recent Value   People in Home roommate(s)   Current Living Arrangements home   Home Accessibility stairs to enter home (Group), stairs within home (Group)   Living Environment Comment Pt  historian and stated he lives w/ his 4 brothers. Per EMR pt lives w/ roommate(s) on the 2nd fl, accessible by 12 steps, 1 [address] building, no elevator access          VITALS AND PAIN     PT Vitals      Date/Time Pulse HR Source SpO2 Pt Activity O2 Therapy BP BP Location BP Method Pt Position [facility]   10/23/24 1345 75 Monitor 95 % At rest None (Room air) 116/85 Left upper arm Automatic Lying MTC          PT Pain      Date/Time Rating: Rest Rating: Activity [facility]   10/23/24 1345 0 0 MTC             Objective   OBJECTIVE     Start time:  1338  End time:  1355  Session Length: 17 min  Mode of Treatment: co-treatment  Reason for co-treatment: Co-Treat to expedite d/c    General Observations  Patient received in bed. He was agreeable to therapy, no issues or concerns identified by nurse prior to session. Pt rec'd at bedside, cleared for therapy by RN    Precautions: fall, other (see comments) (chest tube, 1:1)       Limitations/Impairments: safety/cognitive      PT Eval and Treat - 10/23/24 1338          Cognition    Orientation Status oriented to;person     Affect/Mental Status confused     Follows Commands follows one-step commands        Bed Mobility    Bed Mobility Activities supine to sit     [place] minimum assist (75% or more patient effort);2 person assist     Safety/Cues increased time to complete;sequencing;technique     Comment Min Ax2 to get to EOB, limited due to weakness and fatigue, req'd increased time to perform + VC's for techniques. Min Ax1 to remain on EOB. Limited due to cognition.        Mobility  Belt    Mobility Belt Used During Session no - medical contraindication        Sit/Stand Transfer    Transfer Comments unable to tolerate due to cogntition and confusion        Balance    Static Sitting Balance mild impairment     Dynamic Sitting Balance moderate impairment     Sit to Stand Dynamic Balance unable to perform activity     Comment, Balance Ax2 for functional mobility, limited due to cognition.        Lower Extremity (Therapeutic Exercise)    General Exercise LAQ (long arc quad)     Reps and Sets x5     Comment Attempted but limited due to cognition and weakness        Impairments/Safety Issues    Impairments Affecting Function balance;cognition;endurance/activity tolerance;strength;pain     Cognitive Impairments, Safety/Performance insight into deficits/self-awareness;awareness, need for assistance;sequencing abilities     Safety Issues Affecting Function insight into deficits/self-awareness;sequencing abilities                                       Session Outcome  Patient in bed at end of session, bed alarm on. Nursing notified about patient's performance. (1:1 present during session / at end of session.)    AM-PAC™ - Mobility (Current Function)     Turning form your back to your side while in flat bed without using bedrails 2 - A Lot   Moving from lying on your back to sitting on the side of a flat bed without using bedrails 2 - A Lot   Moving to and from a bed to a chair 2 - A Lot   Standing up from a chair using your arms 2 - A Lot   To walk in a hospital room 1 - Total   Climbing 3-5 steps with a railing 1 - Total   AM-PAC™ Mobility Score 10      ASSESSMENT AND PLAN     Progress Summary  Ax2 for functional mobility, limited due to cognition, confusion, weakness and fatigue. Training conducted with negotiation of bed mobility techniques. Pt. demo no carryover with instructions. Continue to recommend ongoing PT services to maximize (I) prior to return home. Recommending D/c to subacute rehab  facility, unless pt's family able to provide appropriate assistance at home / hire HHA. Will continue to assess pt's functional mobility throughout hospitalization stay and update d/c recommendations as indicated.    Patient/Family Therapy Goals Statement: unable to state 2* fatigue/confusion    PT Plan      Flowsheet Row Most Recent Value   Rehab Potential good, to achieve stated therapy goals at 10/15/2024 1054   Therapy Frequency 4 times/wk at 10/15/2024 1054   Planned Therapy Interventions neuromuscular re-education, bed mobility training, transfer training, balance training, stair training, gait training, patient/family education, home exercise program, strengthening at 10/15/2024 1054            PT Discharge Recommendations      Flowsheet Row Most Recent Value   PT Recommended Discharge Disposition skilled nursing facility at 10/15/2024 1054   Anticipated Equipment Needs if Discharged Home (PT) --  [TBD at next level of care] at 10/15/2024 1054                 PT Goals      Flowsheet Row Most Recent Value   Bed Mobility Goal 1    Activity/Assistive Device bed mobility activities, all at 10/15/2024 1054   [place] modified independence at 10/15/2024 1054   Time Frame by discharge at 10/15/2024 1054   Progress/Outcome goal ongoing at 10/15/2024 1054   Transfer Goal 1    Activity/Assistive Device all transfers, other (see comments)  [LRAD] at 10/15/2024 1054   [place] modified independence at 10/15/2024 1054   Time Frame by discharge at 10/15/2024 1054   Progress/Outcome goal ongoing at 10/15/2024 1054   Gait Training Goal 1    Activity/Assistive Device gait (walking locomotion), other (see comments)  [LRAD] at 10/15/2024 1054   [place] modified independence at 10/15/2024 1054   Distance 150' at 10/15/2024 1054   Time Frame by discharge at 10/15/2024 1054   Progress/Outcome goal ongoing at 10/15/2024 1054   Stairs Goal 1    Activity/Assistive Device stairs, all skills at 10/15/2024 1054    [place] modified independence at 10/15/2024 1054   Number of Stairs 4 at 10/15/2024 1054   Time Frame by discharge at 10/15/2024 1054   Progress/Outcome goal ongoing at 10/15/2024 1054

## 2024-10-24 LAB
ANION GAP SERPL CALC-SCNC: 7 MEQ/L (ref 3–15)
APTT PPP: 100 SEC (ref 23–35)
APTT PPP: 94 SEC (ref 23–35)
BUN SERPL-MCNC: 13 MG/DL (ref 7–25)
CALCIUM SERPL-MCNC: 9.2 MG/DL (ref 8.6–10.3)
CHLORIDE SERPL-SCNC: 102 MEQ/L (ref 98–107)
CO2 SERPL-SCNC: 28 MEQ/L (ref 21–31)
CREAT SERPL-MCNC: 0.6 MG/DL (ref 0.7–1.3)
EGFRCR SERPLBLD CKD-EPI 2021: >60 ML/MIN/1.73M*2
ERYTHROCYTE [DISTWIDTH] IN BLOOD BY AUTOMATED COUNT: 15.2 % (ref 11.6–14.4)
GLUCOSE SERPL-MCNC: 92 MG/DL (ref 70–99)
HCT VFR BLD AUTO: 40.2 % (ref 40.1–51)
HGB BLD-MCNC: 13.2 G/DL (ref 13.7–17.5)
MAGNESIUM SERPL-MCNC: 2 MG/DL (ref 1.8–2.5)
MCH RBC QN AUTO: 29.2 PG (ref 28–33.2)
MCHC RBC AUTO-ENTMCNC: 32.8 G/DL (ref 32.2–36.5)
MCV RBC AUTO: 88.9 FL (ref 83–98)
PLATELET # BLD AUTO: 222 K/UL (ref 150–350)
PMV BLD AUTO: 9.6 FL (ref 9.4–12.4)
POTASSIUM SERPL-SCNC: 3.9 MEQ/L (ref 3.5–5.1)
RBC # BLD AUTO: 4.52 M/UL (ref 4.5–5.8)
SODIUM SERPL-SCNC: 137 MEQ/L (ref 136–145)
WBC # BLD AUTO: 9.54 K/UL (ref 3.8–10.5)

## 2024-10-24 PROCEDURE — 85730 THROMBOPLASTIN TIME PARTIAL: CPT | Performed by: STUDENT IN AN ORGANIZED HEALTH CARE EDUCATION/TRAINING PROGRAM

## 2024-10-24 PROCEDURE — 20600000 HC ROOM AND CARE INTERMEDIATE/TELEMETRY

## 2024-10-24 PROCEDURE — 80048 BASIC METABOLIC PNL TOTAL CA: CPT | Performed by: NURSE PRACTITIONER

## 2024-10-24 PROCEDURE — 85027 COMPLETE CBC AUTOMATED: CPT | Performed by: NURSE PRACTITIONER

## 2024-10-24 PROCEDURE — 63700000 HC SELF-ADMINISTRABLE DRUG: Performed by: NURSE PRACTITIONER

## 2024-10-24 PROCEDURE — 63700000 HC SELF-ADMINISTRABLE DRUG: Performed by: STUDENT IN AN ORGANIZED HEALTH CARE EDUCATION/TRAINING PROGRAM

## 2024-10-24 PROCEDURE — 83735 ASSAY OF MAGNESIUM: CPT | Performed by: NURSE PRACTITIONER

## 2024-10-24 PROCEDURE — 36415 COLL VENOUS BLD VENIPUNCTURE: CPT | Performed by: STUDENT IN AN ORGANIZED HEALTH CARE EDUCATION/TRAINING PROGRAM

## 2024-10-24 PROCEDURE — 85730 THROMBOPLASTIN TIME PARTIAL: CPT | Performed by: INTERNAL MEDICINE

## 2024-10-24 PROCEDURE — 27200127 HC BAG COLOSTOMY

## 2024-10-24 PROCEDURE — 63600000 HC DRUGS/DETAIL CODE: Mod: JZ | Performed by: NURSE PRACTITIONER

## 2024-10-24 PROCEDURE — 99233 SBSQ HOSP IP/OBS HIGH 50: CPT | Performed by: STUDENT IN AN ORGANIZED HEALTH CARE EDUCATION/TRAINING PROGRAM

## 2024-10-24 RX ADMIN — Medication 3 MG: at 21:28

## 2024-10-24 RX ADMIN — SENNOSIDES 2 TABLET: 8.6 TABLET, FILM COATED ORAL at 21:28

## 2024-10-24 RX ADMIN — HEPARIN SODIUM 950 UNITS/HR: 10000 INJECTION, SOLUTION INTRAVENOUS at 01:28

## 2024-10-24 RX ADMIN — POLYETHYLENE GLYCOL 3350 17 G: 17 POWDER, FOR SOLUTION ORAL at 10:04

## 2024-10-24 RX ADMIN — SENNOSIDES 2 TABLET: 8.6 TABLET, FILM COATED ORAL at 10:02

## 2024-10-24 RX ADMIN — METOPROLOL SUCCINATE 100 MG: 100 TABLET, EXTENDED RELEASE ORAL at 10:02

## 2024-10-24 RX ADMIN — PANTOPRAZOLE SODIUM 40 MG: 40 INJECTION, POWDER, LYOPHILIZED, FOR SOLUTION INTRAVENOUS at 10:00

## 2024-10-24 RX ADMIN — DOCUSATE SODIUM 100 MG: 100 CAPSULE, LIQUID FILLED ORAL at 21:29

## 2024-10-24 RX ADMIN — DOCUSATE SODIUM 100 MG: 100 CAPSULE, LIQUID FILLED ORAL at 10:02

## 2024-10-24 ASSESSMENT — COGNITIVE AND FUNCTIONAL STATUS - GENERAL
CLIMB 3 TO 5 STEPS WITH RAILING: 1 - TOTAL
MOVING TO AND FROM BED TO CHAIR: 2 - A LOT
STANDING UP FROM CHAIR USING ARMS: 1 - TOTAL
WALKING IN HOSPITAL ROOM: 1 - TOTAL

## 2024-10-24 NOTE — PLAN OF CARE
Plan of Care Review  Outcome Evaluation: Patient AAo2, disoriented to situation. confused at times. [place]sey through the day, however woke up and ate all meals with assistance. Oral fluids offered. Patient takes pills whole with water. Heparin drip at 950units/hr.to he held at 0500 am 10/25/2024 for pigtail placement in IR. Chest tube conts to -20cm H2o sunction. Dsg reinforced. No c/o pain at this time.  Patient used urinal/ INC at times. Colostomy care done/ bag change.. moderate amt stool. Preventative foam dsg on sacrum. Fall bundle in place. Care management following patient for discharge plans.  Problem: Adult Inpatient Plan of Care  Goal: Plan of Care Review  Outcome: Progressing  Flowsheets (Taken 10/24/2024 1712)  Outcome Evaluation: Patient AAo2, disoriented to situation. confused at times. [place]sey through the day, however woke up and ate all meals with assistance. Oral fluids offered. Patient takes pills whole with water. Heparin drip at 950units/hr.to he held at 0500 am 10/25/2024 for chest tube removal in IR. Chest tube conts to -20cm H2o sunction. Dsg reinforced. No c/o pain at this time.  Patient used urinal/ INC at times. Colostomy care done/ bag change.. moderate amt stool. Preventative foam dsg on sacrum. Fall bundle in place. Care management following patient for discharge plans.

## 2024-10-24 NOTE — PROGRESS NOTES
[facility] Medicine     Daily Progress Note       SUBJECTIVE   Interval History:   Patient seen and examined. Sleepy.  Denies pain, shortness of breath.   375 output from chest tube. Serous fluid     OBJECTIVE      Vital signs in last 24 hours:  Temp:  [35.9 °C (96.7 °F)-37.1 °C (98.7 °F)] 36.3 °C (97.4 °F)  Heart Rate:  [72-88] 86  Resp:  [16-20] 16  BP: (116-188)/(76-97) 156/82    Intake/Output Summary (Last 24 hours) at 10/24/2024 1242  Last data filed at 10/24/2024 0015  Gross per 24 hour   Intake 304 ml   Output 425 ml   Net -121 ml       PHYSICAL EXAMINATION      Physical Exam  Constitutional: Sleepy but easily awakens. No acute distress.   HENT:   Head: Normocephalic and atraumatic.   Eyes: No scleral icterus.   Cardiovascular: S1, S2. Normal rate and regular rhythm. No m/r/g appreciated.    Pulmonary/Chest: CTAB, decreased in bases. Respirations unlabored. Right pigtail chest tube, serous fluid in tubing. No crepitus.  Abdominal: Soft. Bowel sounds are normal. No tenderness or distension. + ostomy, stoma pink. brown stool in bag. +hernia.  Musculoskeletal:  no edema.   Neurological: confused conversation.  Skin: Skin is warm and dry.  No rash on exposed skin.  Psychiatric: calm.     LINES, CATHETERS, DRAINS, AIRWAYS, AND WOUNDS   Lines, Drains, and Airways:  Wounds (agree with documentation and present on admission):  Peripheral IV (Adult) 10/17/24 Posterior;Right Forearm (Active)   Number of days: 7       Peripheral IV (Adult) 10/19/24 Anterior;Left Forearm (Active)   Number of days: 5       Chest Tube 1 Right 12 Fr (Active)   Number of days: 8       Colostomy Unknown LUQ (Active)   Number of days: 5326         Comments:    LABS / IMAGING / TELE      Labs  Results from last 7 days   Lab Units 10/24/24  0306 10/23/24  0458 10/22/24  0545   SODIUM mEQ/L 137 136 136   POTASSIUM mEQ/L 3.9 4.0 3.4*   CHLORIDE mEQ/L 102 103 101   CO2 mEQ/L 28 27 30   BUN mg/dL 13 17 14   CREATININE mg/dL 0.6* 0.7 0.7   GLUCOSE  "mg/dL 92 99 107*   CALCIUM mg/dL 9.2 9.0 8.9     Results from last 7 days   Lab Units 10/24/24  0306 10/23/24  0458 10/22/24  0545   WBC K/uL 9.54 9.36 8.40   HEMOGLOBIN g/dL 13.2* 12.6* 12.5*   HEMATOCRIT % 40.2 38.5* 38.0*   PLATELETS K/uL 222 234 195           Imaging  No new imaging    ECG/Telemetry  I have independently reviewed the telemetry. No events for the last 24 hours.    ASSESSMENT AND PLAN      * Pleural effusion on right  Assessment & Plan  - h/o recurrent pleural effusion requiring thoracentesis. S/p \"Right VATS pleural biopsy, insertion of Pleurx catheter \" by Dr. Sergey Dennis on 7/8/24. Pathology was benign. Catheter removed in office 8/9/24 after minimal drainage.  Patient states catheter had been removed 2 months ago  - He was admitted to [facility] in August 2024 for fall. Looks like at the time had PET-CT which was negative for malignancy  - CT chest  -Loculated RIGHT pleural effusion with pleural thickening. Empyema not excluded.  - Pulse ox stable on room air. Chronic dyspnea    P:  - Thoracic surgery consulted appreciated.   - s/p IR placment of 12F pigtail catheter. Fluid is exudative by Light's criteria. Cultures negative. Cytology - No malignant cells identified   - completed 6 doses of tpa/dornase.    - Repeat Chest x-ray 10/21 -improving right hydropneumothorax and basilar volume loss  - still with more than > 200 mL of fluid out/day  - IR following  - Consider removal of current pigtail and return for PleurX if he becomes symptomatic.     SBO (small bowel obstruction) (CMS/HCC)  Assessment & Plan  - Abd pain around area of colostomy since Sunday night  - CT A/P - Dilated small bowel with transition point in the left midabdomen, concerning for bowel obstruction. 5.  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   - normal lactate at 1.2, WBC 10.87K  - No n/v. No pain    P:  - Surgery recommendations appreciated.  - SBFT 10/15 showed contrast in right colon " ruling out high grade bowel obstruction, likely ileus vs pSBO  - continue Colace, Miralax   - continue to monitor stool output in ostomy bag  - tolerating low residue diet.     PE (pulmonary thromboembolism) (CMS/[facility])  Overview  LLE DVT/PE in 2019-> Xarelto for 6 months then changed to prophylactic dose-> DVT 2020    Assessment & Plan  - on admission reports mild central chest pain x 2-3 days  - no SOB  - CTA chest - Left upper and lower lobe pulmonary emboli.  - Patient reports stopping Xarelto 2 days ago after listening to some one talk about Xarelto and Metoprolol interaction. On Med Dispense List he hasn't had any Xarelto fills since July.  He denies difficulty with cost.    P:  - continue heparin gtt  - eventual transition to Xarelto once we are sure there are no procedures and cleared by surgery  - needs better follow up after discharge    Delirium due to general medical condition  Assessment & Plan  Had been doing better  D/w RN, continue to reorient as needed  Trying to mobilize patient during day, lights on during the day, lights off/minimize noise at night  Sleep protocol  AVOID benzodiazepines - worse after receiving   Has not required any meds for agitation > 24 hours    Grade I diastolic dysfunction  Assessment & Plan  ECHO 6/2024 : Left Ventricle: Normal ventricle size. Concentric left ventricular hypertrophy. Normal systolic function. Estimated EF 65%. No regional wall motion abnormalities. Grade I diastolic dysfunction   He has Lasix on his med list but has not been filled since 5/16/2024 for 90 day supply  Reportedly taking Metoprolol XL until recently  Looks euvolemic    P:  - appears euvolemic  - HOLD diuretics for now  - cont outpatient toprol xl    Electrolyte abnormality  Assessment & Plan  Monitor K, Mg - replete per protocol    Severe malnutrition (CMS/[facility])  Assessment & Plan  Patient meets criteria for severe malnutrition of chronic illness based on eating less than 75% energy needs for  greater than 1 month, >5% weight loss x 3 months (lost 15% of his body weight) and moderate muscle loss with slight depression of temples.    Nutrition consult appreciated.    Medication management  Assessment & Plan  - Patient is not able to provide accurate list of medications  - He doesn't  have a PCP. Seems like he has been getting refills through cardiologist Dr. Campbell but they have noted he hasn't seen them in over a year.  - He states stopped Xarelto and Metoprolol prior to admission because he was told there is a drug-drug interaction.     P:  - care management following  He was given a list of [facility] PCPs    History of rectal cancer  Overview  T2 N1 M0 rectal cancer, neoadjuvant chemotherapy and  radiotherapy followed by surgery (APR), then had multiple  complications after the surgery,   Colostomy    Assessment & Plan  - unknown who he follows with        VTE Assessment: [facility]    VTE Prophylaxis:  Current anticoagulants:  heparin (porcine) bolus from bag 2,700-5,450 Units, intravenous, q6h PRN  heparin infusion in D5W 100 units/mL, intravenous, Titrated      Code Status: Full Code      Estimated Discharge Date: 10/25/2024   Disposition Planning: plan is for SNF at d/c - [facility]     VALENCIA Adkins  10/24/2024

## 2024-10-24 NOTE — PLAN OF CARE
Care Coordination Discharge Plan Note     Discharge Needs Assessment  Concerns to be Addressed: discharge planning, care coordination/care conferences  Current Discharge Risk: dependent with mobility/activities of daily living    Anticipated Discharge Plan  Anticipated Discharge Disposition: skilled nursing facility  Type of Skilled Nursing Care Services: nursing, OT, PT      Patient Choice  Offered/Gave Vendor List: yes  Patient's Choice of Community Agency(s): [facility] g[facility] is #1 top and only choice.    Patient and/or patient guardian/advocate was made aware of their right to choose a provider. A list of eligible providers was presented and reviewed with the patient and/or patient guardian/advocate in written and/or verbal form. The list delineates providers in the patient’s desired geographic area who are participating in the Medicare program and/or providers contracted with the patient’s primary insurance. The Medicare list and quality ratings were obtained from the Medicare.gov [medicare.gov] website.    ---------------------------------------------------------------------------------------------------------------------    Interdisciplinary Discharge Plan Review:  Participants:advanced practice provider, , nursing    Concerns Comments: pt not medically clear for d/c due to chest tube still present    Discharge Plan:   Disposition/Destination: Skilled Nursing Facility - Other  / Skilled Nursing Facility  Discharge Facility:   Destination - Admitted Since 10/14/2024       Service Provider Services Address Phone Fax Patient Preferred Last Updated    [facility] [address] [place] [phone] -- India Mena, RN 10/21/2024 1133          Community Resources:      Discharge Transportation:  Is Out of Hospital DNR needed at Discharge: no  Does patient need discharge transport? Yes        DCP: To SNF EC    CC  following thru SNF transition

## 2024-10-25 ENCOUNTER — APPOINTMENT (INPATIENT)
Dept: RADIOLOGY | Facility: HOSPITAL | Age: 89
DRG: 186 | End: 2024-10-25
Attending: PHYSICIAN ASSISTANT
Payer: COMMERCIAL

## 2024-10-25 VITALS
TEMPERATURE: 97.3 F | SYSTOLIC BLOOD PRESSURE: 116 MMHG | BODY MASS INDEX: 21.19 KG/M2 | OXYGEN SATURATION: 95 % | HEIGHT: 67 IN | RESPIRATION RATE: 14 BRPM | WEIGHT: 135 LBS | DIASTOLIC BLOOD PRESSURE: 64 MMHG | HEART RATE: 84 BPM

## 2024-10-25 LAB
ANION GAP SERPL CALC-SCNC: 8 MEQ/L (ref 3–15)
APTT PPP: 106 SEC (ref 23–35)
BASOPHILS # BLD: 0.01 K/UL (ref 0.01–0.1)
BASOPHILS NFR BLD: 0.1 %
BUN SERPL-MCNC: 14 MG/DL (ref 7–25)
CALCIUM SERPL-MCNC: 9.2 MG/DL (ref 8.6–10.3)
CHLORIDE SERPL-SCNC: 103 MEQ/L (ref 98–107)
CO2 SERPL-SCNC: 26 MEQ/L (ref 21–31)
CREAT SERPL-MCNC: 0.7 MG/DL (ref 0.7–1.3)
DIFFERENTIAL METHOD BLD: ABNORMAL
EGFRCR SERPLBLD CKD-EPI 2021: >60 ML/MIN/1.73M*2
EOSINOPHIL # BLD: 0.13 K/UL (ref 0.04–0.54)
EOSINOPHIL NFR BLD: 1.2 %
ERYTHROCYTE [DISTWIDTH] IN BLOOD BY AUTOMATED COUNT: 15.3 % (ref 11.6–14.4)
GLUCOSE SERPL-MCNC: 96 MG/DL (ref 70–99)
HCT VFR BLD AUTO: 38.8 % (ref 40.1–51)
HGB BLD-MCNC: 13.1 G/DL (ref 13.7–17.5)
IMM GRANULOCYTES # BLD AUTO: 0.09 K/UL (ref 0–0.08)
IMM GRANULOCYTES NFR BLD AUTO: 0.8 %
LYMPHOCYTES # BLD: 0.72 K/UL (ref 1.2–3.5)
LYMPHOCYTES NFR BLD: 6.6 %
MAGNESIUM SERPL-MCNC: 2 MG/DL (ref 1.8–2.5)
MCH RBC QN AUTO: 29.7 PG (ref 28–33.2)
MCHC RBC AUTO-ENTMCNC: 33.8 G/DL (ref 32.2–36.5)
MCV RBC AUTO: 88 FL (ref 83–98)
MONOCYTES # BLD: 1.09 K/UL (ref 0.3–1)
MONOCYTES NFR BLD: 9.9 %
NEUTROPHILS # BLD: 8.93 K/UL (ref 1.7–7)
NEUTS SEG NFR BLD: 81.4 %
NRBC BLD-RTO: 0 %
PLATELET # BLD AUTO: 244 K/UL (ref 150–350)
PMV BLD AUTO: 9.2 FL (ref 9.4–12.4)
POTASSIUM SERPL-SCNC: 3.8 MEQ/L (ref 3.5–5.1)
RBC # BLD AUTO: 4.41 M/UL (ref 4.5–5.8)
SODIUM SERPL-SCNC: 137 MEQ/L (ref 136–145)
WBC # BLD AUTO: 10.97 K/UL (ref 3.8–10.5)

## 2024-10-25 PROCEDURE — 63700000 HC SELF-ADMINISTRABLE DRUG: Performed by: NURSE PRACTITIONER

## 2024-10-25 PROCEDURE — 85730 THROMBOPLASTIN TIME PARTIAL: CPT

## 2024-10-25 PROCEDURE — 36100330 IR CHEST TUBE REMOVAL(BEDSIDE)

## 2024-10-25 PROCEDURE — 85025 COMPLETE CBC W/AUTO DIFF WBC: CPT | Performed by: STUDENT IN AN ORGANIZED HEALTH CARE EDUCATION/TRAINING PROGRAM

## 2024-10-25 PROCEDURE — 200200 PR NO CHARGE

## 2024-10-25 PROCEDURE — 80048 BASIC METABOLIC PNL TOTAL CA: CPT | Performed by: STUDENT IN AN ORGANIZED HEALTH CARE EDUCATION/TRAINING PROGRAM

## 2024-10-25 PROCEDURE — 99239 HOSP IP/OBS DSCHRG MGMT >30: CPT

## 2024-10-25 PROCEDURE — 97535 SELF CARE MNGMENT TRAINING: CPT | Mod: GO

## 2024-10-25 PROCEDURE — 63600000 HC DRUGS/DETAIL CODE: Mod: JZ | Performed by: NURSE PRACTITIONER

## 2024-10-25 PROCEDURE — 27200122 HC DRAIN CHEST TUBE

## 2024-10-25 PROCEDURE — 36415 COLL VENOUS BLD VENIPUNCTURE: CPT

## 2024-10-25 PROCEDURE — 83735 ASSAY OF MAGNESIUM: CPT | Performed by: STUDENT IN AN ORGANIZED HEALTH CARE EDUCATION/TRAINING PROGRAM

## 2024-10-25 PROCEDURE — 63700000 HC SELF-ADMINISTRABLE DRUG: Performed by: STUDENT IN AN ORGANIZED HEALTH CARE EDUCATION/TRAINING PROGRAM

## 2024-10-25 RX ORDER — DOCUSATE SODIUM 100 MG/1
100 CAPSULE, LIQUID FILLED ORAL 2 TIMES DAILY
Start: 2024-10-25 | End: 2024-11-24

## 2024-10-25 RX ORDER — SENNOSIDES 8.6 MG/1
2 TABLET ORAL 2 TIMES DAILY
Start: 2024-10-25 | End: 2024-11-24

## 2024-10-25 RX ORDER — ACETAMINOPHEN 325 MG/1
650 TABLET ORAL EVERY 6 HOURS PRN
Status: DISCONTINUED | OUTPATIENT
Start: 2024-10-25 | End: 2024-10-25 | Stop reason: HOSPADM

## 2024-10-25 RX ORDER — ACETAMINOPHEN 325 MG/1
650 TABLET ORAL EVERY 6 HOURS PRN
Start: 2024-10-25 | End: 2024-11-27 | Stop reason: HOSPADM

## 2024-10-25 RX ORDER — POLYETHYLENE GLYCOL 3350 17 G/17G
17 POWDER, FOR SOLUTION ORAL DAILY
Start: 2024-10-26 | End: 2024-11-07 | Stop reason: ENTERED-IN-ERROR

## 2024-10-25 RX ORDER — IBUPROFEN/PSEUDOEPHEDRINE HCL 200MG-30MG
3 TABLET ORAL NIGHTLY
Start: 2024-10-25 | End: 2024-11-24

## 2024-10-25 RX ADMIN — ACETAMINOPHEN 650 MG: 325 TABLET ORAL at 08:53

## 2024-10-25 RX ADMIN — METOPROLOL SUCCINATE 100 MG: 100 TABLET, EXTENDED RELEASE ORAL at 08:53

## 2024-10-25 RX ADMIN — POLYETHYLENE GLYCOL 3350 17 G: 17 POWDER, FOR SOLUTION ORAL at 08:59

## 2024-10-25 RX ADMIN — RIVAROXABAN 20 MG: 20 TABLET, FILM COATED ORAL at 15:41

## 2024-10-25 RX ADMIN — PANTOPRAZOLE SODIUM 40 MG: 40 INJECTION, POWDER, LYOPHILIZED, FOR SOLUTION INTRAVENOUS at 08:59

## 2024-10-25 RX ADMIN — SENNOSIDES 2 TABLET: 8.6 TABLET, FILM COATED ORAL at 08:59

## 2024-10-25 RX ADMIN — DOCUSATE SODIUM 100 MG: 100 CAPSULE, LIQUID FILLED ORAL at 08:53

## 2024-10-25 ASSESSMENT — COGNITIVE AND FUNCTIONAL STATUS - GENERAL
DRESSING REGULAR LOWER BODY CLOTHING: 2 - A LOT
HELP NEEDED FOR BATHING: 2 - A LOT
HELP NEEDED FOR PERSONAL GROOMING: 3 - A LITTLE
DRESSING REGULAR UPPER BODY CLOTHING: 3 - A LITTLE
AFFECT: FLAT/BLUNTED AFFECT
EATING MEALS: 3 - A LITTLE
TOILETING: 2 - A LOT

## 2024-10-25 NOTE — DISCHARGE SUMMARY
"   Hospital Medicine    Inpatient Discharge Summary        BRIEF OVERVIEW   Patient: Dave Arredondo (1935)    Admitting Provider: Alfreda Zamora DO  Attending Provider: Franco Ibrahim, Jackson abdalla phone: [phone]    PCP: Pako Vega, No Pcp [phone]    Admission Date: 10/14/2024  Discharge Date: 10/25/2024     DISCHARGE DIAGNOSES      Primary Discharge Diagnosis  Pleural effusion on right    Secondary Discharge Diagnoses  Active Hospital Problems    Diagnosis Date Noted    Pleural effusion on right 10/14/2024     Priority: High    PE (pulmonary thromboembolism) (CMS/[facility]) 10/14/2024     Priority: Medium    SBO (small bowel obstruction) (CMS/[facility]) 10/14/2024     Priority: Medium    Delirium due to general medical condition 10/18/2024     Priority: Low    Grade I diastolic dysfunction 10/14/2024     Priority: Low    Electrolyte abnormality 10/22/2024    Medication management 10/14/2024    Severe malnutrition (CMS/[facility]) 10/14/2024    History of rectal cancer 06/15/2024      Resolved Hospital Problems   No resolved problems to display.          Problem List on Day of Discharge  * Pleural effusion on right  Assessment & Plan  - h/o recurrent pleural effusion requiring thoracentesis. S/p \"Right VATS pleural biopsy, insertion of Pleurx catheter \" by Dr. Seregy Dennis on 7/8/24. Pathology was benign. Catheter removed in office 8/9/24 after minimal drainage.  Patient states catheter had been removed 2 months ago  - He was admitted to [facility] in August 2024 for fall. Looks like at the time had PET-CT which was negative for malignancy  - CT chest  -Loculated RIGHT pleural effusion with pleural thickening. Empyema not excluded.  - Pulse ox stable on room air. Chronic dyspnea    P:  Recurrent pleural effusion - unknown cause   - Thoracic surgery consulted appreciated.   - s/p IR placment of 12F pigtail catheter. Fluid is exudative by Light's criteria. Cultures negative. Cytology - No malignant cells " identified   - completed 6 doses of tpa/dornase.    - Repeat Chest x-ray 10/21 -improving right hydropneumothorax and basilar volume loss  - chest tube removed by IR at bedside    Previous discussions with IR and thoracic surgery, if patient reaccumulate's fluid in the future a Pleurx catheter can be considered    SBO (small bowel obstruction) (CMS/[facility])  Assessment & Plan  -      SBO unspecified etiology   - Abd pain around area of colostomy since Sunday night  - CT A/P - Dilated small bowel with transition point in the left midabdomen, concerning for bowel obstruction. 5.  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   - normal lactate at 1.2, WBC 10.87K  - No n/v. No pain    P:  - Surgery recommendations appreciated.  - SBFT 10/15 showed contrast in right colon ruling out high grade bowel obstruction, likely ileus vs pSBO  - continue Colace, Miralax   - tolerating low residue diet.     PE (pulmonary thromboembolism) (CMS/[facility])  Overview  LLE DVT/PE in 2019-> Xarelto for 6 months then changed to prophylactic dose-> DVT 2020    Assessment & Plan  - on admission reports mild central chest pain x 2-3 days  - no SOB  - CTA chest - Left upper and lower lobe pulmonary emboli.  - Patient reports stopping Xarelto 2 days ago after listening to some one talk about Xarelto and Metoprolol interaction. On Med Dispense List he hasn't had any Xarelto fills since July.  He denies difficulty with cost.    P:  - maintained on heparin gtt  -Chest tube has been removed.  There are no further procedures planned.  Will resume Xarelto.    Delirium due to general medical condition  Assessment & Plan  Had been doing better  D/w RN, continue to reorient as needed  Trying to mobilize patient during day, lights on during the day, lights off/minimize noise at night  Sleep protocol  AVOID benzodiazepines - worse after receiving   Has not required any meds for agitation > 48 hours    Getting him to rehab will  help    Grade I diastolic dysfunction  Assessment & Plan  ECHO 6/2024 : Left Ventricle: Normal ventricle size. Concentric left ventricular hypertrophy. Normal systolic function. Estimated EF 65%. No regional wall motion abnormalities. Grade I diastolic dysfunction   He has Lasix on his med list but has not been filled since 5/16/2024 for 90 day supply  Reportedly taking Metoprolol XL until recently  Looks euvolemic    P:  - appears euvolemic  - can resume lasix at d/c  - cont outpatient toprol xl    Electrolyte abnormality  Assessment & Plan  repleted    Severe malnutrition (CMS/HCC)  Assessment & Plan  Patient meets criteria for severe malnutrition of chronic illness based on eating less than 75% energy needs for greater than 1 month, >5% weight loss x 3 months (lost 15% of his body weight) and moderate muscle loss with slight depression of temples.    Nutrition consult appreciated.    Medication management  Assessment & Plan  - Patient is not able to provide accurate list of medications  - He doesn't  have a PCP. Seems like he has been getting refills through cardiologist Dr. Campbell but they have noted he hasn't seen them in over a year.  - He states stopped Xarelto and Metoprolol prior to admission because he was told there is a drug-drug interaction.     P:  - care management following  He was given a list of [facility] PCPs    For now he is going to SNF for short term rehab    History of rectal cancer  Overview  T2 N1 M0 rectal cancer, neoadjuvant chemotherapy and  radiotherapy followed by surgery (APR), then had multiple  complications after the surgery,   Colostomy    Assessment & Plan  - unknown who he follows with          SUMMARY OF HOSPITALIZATION     Presenting Problem/History of Present Illness  This is a 88 y.o. year-old male admitted on 10/14/2024 with Pleural effusion on right.    Dave Arredondo is a 88 y.o. male with a past medical history of Dvt/PE, HTN, CAD, rectal cancer s/p chemoXRT and APR with  end colostomy, recurrent pleural effusion, who presents with abdominal pain     In the ED, CT imaging was concerning for small bowel obstruction, PE (due to xarelto noncompliance), recurrent pleural effusion.    Hospital Course    The patient was admitted for further management of small bowel obstruction, PE, recurrent pleural effusion.  Per ED, patient had been on Xarelto prior to admission.  He reported stopping the medication 2 days prior to admission.  Due to the need for procedures, he was maintained on a heparin drip during the hospitalization and will transition back to Xarelto 20 mg daily at dinner at discharge.    For small bowel obstruction, an NG tube was placed for decompression and he was n.p.o. for bowel rest.  He was seen in consultation with surgery.  He underwent small bowel follow-through study on 10/15 which showed contrast in right colon ruling out high-grade bowel obstruction, less likely ileus Versus partial small bowel obstruction.  His diet was slowly advanced to clear liquids then low residue which she is tolerating.  He does not have a lot of output from his ostomy which she reports is chronic.  He was started on stool softeners and laxatives.    For recurrent right pleural effusion, thoracic surgery was consulted.  IR placed a pigtail catheter and he completed 6 doses of tPA/dornase.  Repeat chest x-ray on 10/21 showed improving right hydropneumothorax and basilar volume loss.  His chest tube continued to have serous output.  Chest tube was discontinued this morning.  If he develops any respiratory symptoms in the future and the fluid reaccumulate's, will need to consider Pleurx catheter.    The patient was evaluated by physical therapy and Occupational Therapy who recommended skilled nursing facility for discharge.    The patient is medically stable for discharge today.  Per social work, patient is going to [facility].  No physician handoff Per social work.    Exam on Day of  Discharge  Physical Exam  Constitutional: Awake, alert. No acute distress.   HENT:   Head: Normocephalic and atraumatic.   Eyes: No scleral icterus.   Cardiovascular: S1, S2. Normal rate and regular rhythm. No murmur appreciated.    Pulmonary/Chest: CTAB, decreased in bases. Respirations unlabored. Right pigtail chest tube, serous fluid in tubing - tender around site  Abdominal: Soft. Bowel sounds are normal. No tenderness or distension. + ostomy, stoma pink. Hernia.  Musculoskeletal:  no edema.   Neurological: Awake, alert.  Skin: Skin is warm and dry.  No rash on exposed skin.  Psychiatric: calm.    Consults During Admission  IP CONSULT TO GENERAL SURGERY  IP CONSULT TO SOCIAL WORK  IP CONSULT TO CASE MANAGEMENT  IP CONSULT TO IV TEAM    DISCHARGE MEDICATIONS               Medication List        START taking these medications      acetaminophen 325 mg tablet  Commonly known as: TYLENOL  Take 2 tablets (650 mg total) by mouth every 6 (six) hours as needed for pain.  Dose: 650 mg     docusate sodium 100 mg capsule  Commonly known as: COLACE  Take 1 capsule (100 mg total) by mouth 2 (two) times a day.  Dose: 100 mg     melatonin ODT  Take 1 tablet (3 mg total) by mouth nightly.  Dose: 3 mg     polyethylene glycol 17 gram packet  Commonly known as: MIRALAX  Start taking on: October 26, 2024  Take 17 g by mouth daily for 3 days.  Dose: 17 g     senna 8.6 mg tablet  Commonly known as: SENOKOT  Take 2 tablets by mouth 2 (two) times a day.  Dose: 2 tablet            CHANGE how you take these medications      rivaroxaban 20 mg tablet  Commonly known as: XARELTO  Take 1 tablet (20 mg total) by mouth daily with dinner Indications: a clot in the lung, blood clot in a deep vein of the extremities.  Dose: 20 mg  What changed:   medication strength  how much to take  when to take this            CONTINUE taking these medications      albuterol HFA 90 mcg/actuation inhaler  INHALE 2 PUFF USING INHALER EVERY EIGHT HOURS AS  NEEDED     atorvastatin 20 mg tablet  Commonly known as: LIPITOR  Take 20 mg by mouth daily.  Dose: 20 mg     cyanocobalamin 1,000 mcg/mL injection  Commonly known as: VITAMIN B-12  Inject 1,000 mcg into the shoulder, thigh, or buttocks every 30 (thirty) days.  Dose: 1,000 mcg     furosemide 20 mg tablet  Commonly known as: LASIX  Take 20 mg by mouth daily.  Dose: 20 mg     metoprolol succinate  mg 24 hr tablet  Commonly known as: TOPROL-XL  Take 100 mg by mouth daily.  Dose: 100 mg     nitroglycerin 0.4 mg SL tablet  Commonly known as: NITROSTAT  Place 0.4 mg under the tongue every 5 (five) minutes as needed.  Dose: 0.4 mg     pantoprazole 40 mg EC tablet  Commonly known as: PROTONIX  Take 1 tablet (40 mg total) by mouth nightly Indications: GI ppx w/Xarelto.  Dose: 40 mg            STOP taking these medications      traMADoL 50 mg tablet  Commonly known as: ULTRAM               Instructions for after discharge       Activity:  As Tolerated      Admission H&P Valid:  Yes      Discharge Condtion:  Stabilized      Discharge diet      Diet Type / Texture: Regular    Follow Up:  With Primary MD within 1 week discharge from facility      Follow-up with Provider:      In 4 weeks    Sergey Dennis MD   [phone]    [address] [facility] [address]  [place] [address]       Future Lab Orders at [facility]      Labs include: BMP, Mg, CBC in 5 days    Level of Care:  Skilled      Occupational Therapy Eval and Treat      Physical Therapy Eval and Treat      Treatment Options: Full Resuscitation      Vital Signs Per Facility Standard                 PROCEDURES / LABS / IMAGING      Operative Procedures  N/A    Other Procedures  N/A    Pertinent Labs  Results from last 7 days   Lab Units 10/25/24  0402 10/24/24  0306 10/23/24  0458   SODIUM mEQ/L 137 137 136   POTASSIUM mEQ/L 3.8 3.9 4.0   CHLORIDE mEQ/L 103 102 103   CO2 mEQ/L 26 28 27   BUN mg/dL 14 13 17   CREATININE mg/dL 0.7 0.6* 0.7   GLUCOSE mg/dL 96 92 99    CALCIUM mg/dL 9.2 9.2 9.0     Results from last 7 days   Lab Units 10/25/24  0402 10/24/24  0306 10/23/24  0458   WBC K/uL 10.97* 9.54 9.36   HEMOGLOBIN g/dL 13.1* 13.2* 12.6*   HEMATOCRIT % 38.8* 40.2 38.5*   PLATELETS K/uL 244 222 234           Pertinent Imaging  X-RAY CHEST 2 VIEWS    Result Date: 10/21/2024  IMPRESSION: Improving right hydropneumothorax and basilar volume loss.    X-RAY CHEST 1 VIEW    Result Date: 10/17/2024  IMPRESSION: Loculated right hydropneumothorax at the lung base as described above; fluid component has decreased and aeration improved.     IR PLEURAL CATHETER PLACEMENT    Result Date: 10/16/2024  IMPRESSION:   Satisfactory 12 Salvadorean right pigtail chest tube placement. COMMENT: PROCEDURE:  Chest tube placement. ANESTHESIA:  Lidocaine 1% local. FLUORO TIME:  0.1 minutes. REFERENCE AIR KERMA: 0.2 mGy. CONTRAST:  None. MEDICATIONS:  None. DESCRIPTION OF PROCEDURE:    Written informed consent was obtained.  Ultrasound demonstrates complex, multiloculated right pleural effusion.  The patient was prepped and draped in the usual sterile manner.  Using real-time ultrasound guidance, a Yueh needle was inserted into the right pleural effusion.  Following serial dilation, a 12 Salvadorean pigtail chest tube was inserted.  Pleural fluid sample was collected and sent for requested laboratory analysis.  The catheter was secured with suture and connected to Pleur-evac drainage.  The patient tolerated the procedure well without immediate complication.     ULTRASOUND GUIDED NEEDLE PLACEMENT    Result Date: 10/16/2024  IMPRESSION:   Satisfactory 12 Salvadorean right pigtail chest tube placement. COMMENT: PROCEDURE:  Chest tube placement. ANESTHESIA:  Lidocaine 1% local. FLUORO TIME:  0.1 minutes. REFERENCE AIR KERMA: 0.2 mGy. CONTRAST:  None. MEDICATIONS:  None. DESCRIPTION OF PROCEDURE:    Written informed consent was obtained.  Ultrasound demonstrates complex, multiloculated right pleural effusion.  The patient  was prepped and draped in the usual sterile manner.  Using real-time ultrasound guidance, a Yueh needle was inserted into the right pleural effusion.  Following serial dilation, a 12 Barbadian pigtail chest tube was inserted.  Pleural fluid sample was collected and sent for requested laboratory analysis.  The catheter was secured with suture and connected to Pleur-evac drainage.  The patient tolerated the procedure well without immediate complication.     FLUOROSCOPY SMALL BOWEL STUDY THERAPEUTIC    Result Date: 10/15/2024  IMPRESSION: Limited assessment due to dilution of the administered contrast. Suspected faint contrast in loops of small bowel and eventually in what is probably the right colon on the 12 hour image, favoring ileus or partial obstruction and mitigating against high-grade obstruction. Clinical correlation and radiographic follow-up recommended.    X-RAY ABDOMEN 1 VIEW    Result Date: 10/14/2024  IMPRESSION: Nasogastric tube in satisfactory position.     CT ANGIOGRAPHY CHEST PULMONARY EMBOLISM WITH IV CONTRAST    Addendum Date: 10/14/2024    Correction to IMPRESSION ADDENDUM: 3. Subtotal atelectasis of the RIGHT LOWER and MIDDLE lobes. The left lung is clear.     Addendum Date: 10/14/2024    Correction of a speech recognition/transcription error in the IMPRESSION section of the report. 2. Loculated RIGHT pleural effusion with pleural thickening. Empyema not excluded.    Result Date: 10/14/2024  IMPRESSION: 1.  Left upper and lower lobe pulmonary emboli. 2.  Loculated left pleural effusion with pleural thickening. Empyema not excluded. 3.  Subtotal atelectasis in the left upper and lower lobe. 4.  Dilated small bowel with transition point in the left midabdomen, concerning for bowel obstruction. 5.  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 Critical findings were documented in the preliminary interpretation by the overnight radiologist. Findings were acknowledged by  emergency department staff, as documented in the EMR on 10/14/2024 at 2:48 AM    CT ABDOMEN PELVIS WITH IV CONTRAST    Addendum Date: 10/14/2024    Correction to IMPRESSION ADDENDUM: 3. Subtotal atelectasis of the RIGHT LOWER and MIDDLE lobes. The left lung is clear.     Addendum Date: 10/14/2024    Correction of a speech recognition/transcription error in the IMPRESSION section of the report. 2. Loculated RIGHT pleural effusion with pleural thickening. Empyema not excluded.    Result Date: 10/14/2024  IMPRESSION: 1.  Left upper and lower lobe pulmonary emboli. 2.  Loculated left pleural effusion with pleural thickening. Empyema not excluded. 3.  Subtotal atelectasis in the left upper and lower lobe. 4.  Dilated small bowel with transition point in the left midabdomen, concerning for bowel obstruction. 5.  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 Critical findings were documented in the preliminary interpretation by the overnight radiologist. Findings were acknowledged by emergency department staff, as documented in the EMR on 10/14/2024 at 2:48 AM    X-RAY CHEST 2 VIEWS    Result Date: 10/14/2024  IMPRESSION: Loculated right pleural effusion and basilar airspace opacity.    X-RAY ABDOMEN 1 VIEW    Result Date: 10/14/2024  IMPRESSION: Nasogastric tube in satisfactory position.     OUTPATIENT  FOLLOW-UP / REFERRALS / PENDING TESTS        Outpatient Follow-Up Appointments  PCP  Cardiology  Thoracic surgery      Referrals  PT/OT    Test Results Pending at Discharge  Pending Inpatient Labs       Order Current Status    AFB Culture Pleural Fluid, Right Preliminary result    AFB culture Pleural Fluid, Right Preliminary result    Fungal Culture Pleural Fluid, Right Preliminary result    Fungal culture / smear Pleural Fluid, Right Preliminary result            Important Issues to Address in Follow-Up  As above    DISCHARGE DISPOSITION AND DESTINATION      Disposition: Skilled Nursing  Facility - Other   Destination: Skilled Nursing Facility                            Code Status At Discharge: Full Code    Physician Order for Life-Sustaining Treatment Document Status        No documents found

## 2024-10-25 NOTE — OR SURGEON
Pre-Procedure patient identification:  I am the primary operating surgeon/proceduralist and I have reviewed the applicable pathology reports and radiology studies for this procedure. I have identified the patient and confirmed laterality is right on 10/25/24 at 10:01 AM BENJY Carrizales C

## 2024-10-25 NOTE — POST-PROCEDURE NOTE
Interventional Radiology Brief Postprocedure Note    Dave Arredondo     Attending: BENJY Carrizales MD    Assistant: none    Diagnosis: resolving right pleural effusion    Description of procedure: removal of right pigtail pleural catheter    Contrast: none     Anesthesia:  None    Volume of Lidocaine Utilized (ml): none     Medications: none     Complications: None      Estimated Blood Loss: Estimated Blood Loss: None    Anticoagulation: off hep gtt, okay to resume Xarelto tonight    Specimens: none    Findings:     The patient had a pleural pigtail catheter placed in IR on 10/16/2024.  He received tPA dornase treatments.  Fluid cultures were negative.  No malignancy was found in the fluid.  The patient had had a Pleurx catheter placed by the thoracic surgery team and was subsequently removed secondary to no output.    He does have a right hydropneumothorax.  He is not currently using supplemental oxygen.  No pain.      At bedside, he is correctly identified.  No air leak is seen with the Pleur-evac set to waterseal.    The patient is turned onto his left side.  The dressing is removed from the right pleural pigtail catheter site.  The area is cleansed with ChloraPrep.  The retention suture removed.  On expiration, the catheter is transected and removed in its entirety.  A sterile Vaseline gauze dressing is placed over the site.    The patient tolerated the procedure well.  No imaging.    If he should continue to develop pleural effusions that become symptomatic, consideration could be made to reinsert a Pleurx type catheter in the future.  I did discuss this with the patient.    He may resume his Xarelto tonight.        10/25/2024 10:20 AM

## 2024-10-25 NOTE — PLAN OF CARE
Care Coordination Discharge Plan Summary    Admission Assessment Summary    General Information  Readmission Within the last 30 days: no previous admission in last 30 days  Does patient have a : No  Patient-Specific Goals (include timeframe): Feel better, pt weak, not able to answer questions, deffered to Eliezer his emerg. contact to answer    Living Arrangements  Arrived From: home  Current Living Arrangements: home  People in Home: roommate(s)  Home Accessibility: stairs to enter home (Group), stairs within home (Group)  Living Arrangement Comments: Pt resides on [address] [facility], accessible by 12 steps, [address] building, no elevator access    Social Drivers of Health - Screenings  Housing Stability  In the last 12 months, was there a time when you were not able to pay the mortgage or rent on time?: No  In the past 12 months, how many times have you moved where you were living?: 1  At any time in the past 12 months, were you homeless or living in a shelter (including now)?: No  Utility Access  In the past 12 months has the electric, gas, oil, or water company threatened to shut off services in your home?: No  Transportation Needs  In the past 12 months, has lack of transportation kept you from medical appointments or from getting medications?: No (Eliezer pt's emerg contact assists but sounds like pt had issues w/ meds)  In the past 12 months, has lack of transportation kept you from meetings, work, or from getting things needed for daily living?: No    Functional Status Prior to Admission  Assistive Device/Animal Currently Used at Home: grab bar, shower chair  Functional Status Comments: Normally pt is ambulatory and independent but per emerg. contact Eliezer pt has been very weak and unable to ambulate like he has in the past, only walks 30 ft to dining table and otherwise stays in room  IADL Comments:      Discharge Needs Assessment    Concerns to be Addressed: discharge planning, care coordination/care  conferences  Current Discharge Risk: dependent with mobility/activities of daily living  Anticipated Changes Related to Illness: inability to care for self    Discharge Plan Summary    Patient Choice  Offered/Gave Vendor List: yes  Patient's Choice of Community Agency(s): [facility] care is #1 top and only choice.  Patient and/or patient guardian/advocate was made aware of their right to choose a provider. A list of eligible providers was presented and reviewed with the patient and/or patient guardian/advocate in written and/or verbal form. The list delineates providers in the patient’s desired geographic area who are participating in the Medicare program and/or providers contracted with the patient’s primary insurance. The Medicare list and quality ratings were obtained from the Medicare.gov [medicare.gov] website.    Concerns / Comments: per DCPR pt medically clear for d/c to [facility] and pt notified of 4:30 ambulance p/u for [facility] cc also notified Eliezer Beltre    Discharge Plan:  Disposition/Destination: Skilled Nursing Facility - Other  / Skilled Nursing Facility  Destination - Admitted Since 10/14/2024       Service Provider Services Address Phone Fax Patient Preferred Last Updated    [facility] [address] [place] [phone] -- India Mena, RN 10/21/2024 1133            Connection to Community  Not applicable  Community Resources:      Discharge Transportation:  Is Out of Hospital DNR needed at Discharge: no  Does patient need discharge transport? Yes  Discharge Transportation Vendor: [facility] ([phone])  Type of Transportation: basic life support  What day is the transport expected?: 10/25/24  What time is the transport expected?: 1630     DCP: To SNF [facility]    CC following thru SNF transition

## 2024-10-25 NOTE — DISCHARGE INSTRUCTIONS
Dave Arredondo is a 88 y.o. male with a past medical history of Dvt/PE, HTN, CAD, rectal cancer s/p chemoXRT and APR with end colostomy, recurrent pleural effusion, who presents with abdominal pain     In the ED, CT imaging was concerning for small bowel obstruction, PE (due to xarelto noncompliance), recurrent pleural effusion.    Hospital Course    The patient was admitted for further management of small bowel obstruction, PE, recurrent pleural effusion.  Per ED, patient had been on Xarelto prior to admission.  He reported stopping the medication 2 days prior to admission.  Due to the need for procedures, he was maintained on a heparin drip during the hospitalization and will transition back to Xarelto 20 mg daily at dinner at discharge.    For small bowel obstruction, an NG tube was placed for decompression and he was n.p.o. for bowel rest.  He was seen in consultation with surgery.  He underwent small bowel follow-through study on 10/15 which showed contrast in right colon ruling out high-grade bowel obstruction, less likely ileus Versus partial small bowel obstruction.  His diet was slowly advanced to clear liquids then low residue which she is tolerating.  He does not have a lot of output from his ostomy which she reports is chronic.  He was started on stool softeners and laxatives.    For recurrent right pleural effusion, thoracic surgery was consulted.  IR placed a pigtail catheter and he completed 6 doses of tPA/dornase.  Repeat chest x-ray on 10/21 showed improving right hydropneumothorax and basilar volume loss.  His chest tube continued to have serous output.  Chest tube was discontinued this morning.  If he develops any respiratory symptoms in the future and the fluid reaccumulate's, will need to consider Pleurx catheter.

## 2024-10-25 NOTE — PROGRESS NOTES
Occupational Therapy -  Daily Treatment/Progress Note     Patient: Dave Arredondo  Location: [facility] 3C 0359  MRN: [id number]  Today's date: 10/25/2024    HISTORY OF PRESENT ILLNESS     Dave is a 88 y.o. male admitted on 10/14/2024 with SBO (small bowel obstruction) (CMS/HCC) [K56.609]  PE (pulmonary thromboembolism) (CMS/HCC) [I26.99]  Pleural effusion on right [J90]. Principal problem is PE (pulmonary thromboembolism) (CMS/HCC).    Past Medical History  Dave has a past medical history of Colon cancer (CMS/HCC) (2012), Coronary artery disease, DVT (deep venous thrombosis) (CMS/HCC), Hypertension, Lipid disorder, Pleural effusion, and Pulmonary embolism (CMS/HCC).    History of Present Illness  88 y.o. male with a past medical history of Dvt/PE, HTN, CAD, rectal cancer s/p chemoXRT and APR with end colostomy, recurrent pleural effusion, who presents with abdominal pain found to have PE (on hep gtt) and SBO     CXR IMPRESSION:  Loculated right pleural effusion and basilar airspace opacity.    CT Abd IMPRESSION  Loculated RIGHT pleural effusion with pleural thickening. Empyema not excluded.  Subtotal atelectasis of the RIGHT LOWER and MIDDLE lobes. The left lung is clear.     10/16: s/p  right pigtail chest tube placement.      NGT for suction now removed; ST consult.  PRIOR LEVEL OF FUNCTION AND LIVING ENVIRONMENT     Prior Level of Function      Flowsheet Row Most Recent Value   Dominant Hand right   Ambulation independent   Transferring independent   Toileting independent   Bathing independent   Dressing independent   Eating independent   Communication understands/communicates without difficulty   Swallowing swallows foods/liquids without difficulty   Baseline Diet/Method of Nutritional Intake no diet restrictions, thin liquids, regular   Past History of Dysphagia patient denies history however reports recent decline in appetite with associated weightloss.   Prior Level of Function Comment  Per last EMR pt IND w/ mobility/adls w/o AD. But recently has only been able to ambulate short distances   Assistive Device Currently Used at Home grab bar, shower chair             Prior Living Environment      Flowsheet Row Most Recent Value   People in Home roommate(s)   Current Living Arrangements home   Home Accessibility stairs to enter home (Group), stairs within home (Group)   Living Environment Comment Pt  historian and stated he lives w/ his 4 brothers. Per EMR pt lives w/ roommate(s) on the 2nd fl, accessible by 12 steps, [address] building, no elevator access          Occupational Profile      Flowsheet Row Most Recent Value   Environmental Supports and Barriers Lives w/ roommate(s) per EMR          VITALS AND PAIN     OT Vitals      Date/Time Pulse HR Source Resp SpO2 Pt Activity O2 Therapy BP BP Location BP Method Pt Position [facility]   10/25/24 0800 86 Monitor 16 96 % At rest None (Room air) 151/81 Left upper arm Automatic Lying TAW          OT Pain      Date/Time Pain Type Side/Orientation Location Rating: Rest Rating: Activity Interventions [facility]   10/25/24 0800 Pain Assessment other (see comments) Chest tube site right 2 - mild pain 2 - mild pain care clustered LS             Objective   OBJECTIVE     Start time:  0754  End time:  0811  Session Length: 17 min       General Observations  Patient received reclined, in bed. He was no issues or concerns identified by nurse prior to session, agreeable to therapy. rcv'd resting comfortably, agreeable to OOB activity    Precautions: fall, other (see comments) (R CT to suction)       Limitations/Impairments: safety/cognitive      OT Eval and Treat - 10/25/24 0754          Cognition    Orientation Status oriented x 3     Affect/Mental Status flat/blunted affect     Follows Commands follows one-step commands;over 90% accuracy;delayed response/completion;increased processing time needed;initiation impaired;physical/tactile prompts required;repetition of directions  required;verbal cues/prompting required     Cognitive Function safety deficit;executive function deficit     Attention Deficit arousal/alertness;concentration;requires cues/redirection to task     Comment, Attention somewhat lethargic     Executive Function Deficit minimal deficit;insight/awareness of deficits;information processing;self-monitoring/self-correction;organization/sequencing;initiation;planning/decision-making     Comment, Executive Function req simple cues to initiate BLEs off of bed.     Safety Deficit awareness of need for assistance;insight into deficits/self-awareness;ability to follow commands;minimal deficit     Comment, Cognition Pt A&O, following commands and meaningfully participating in OT session. flat affect, req repetitive cues and incr processing time        Bed Mobility    Bed Mobility Activities supine to sit     [place] minimum assist (75% or more patient effort)     Safety/Cues increased time to complete;moderate;verbal cues;tactile cues;hand placement;technique;initiation     Assistive Device head of bed elevated;bed rails     Comment OOB to L side with incr processing time and mulitmodal cues to initiate BLEs off of bed. Min A for BLE/trunk mgmt. denies dizziness with positional change. good EOB sitting balance during unuspported sitting ADLs        Mobility Belt    Mobility Belt Used During Session no - medical contraindication     Reason Mobility Belt Not Used chest tube and ostomy        Sit/Stand Transfer    Surface edge of bed     [place] minimum assist (75% or more patient effort)     Safety/Cues increased time to complete;minimal;verbal cues;hand placement;technique;proper use of assistive device;maintaining center of gravity over base of support;preparatory posture     Assistive Device walker, front-wheeled     Transfer Comments from EOB/to chair. Slow to rise with incr time to find supporte standing CLAUS within RW. Good carry over of edu for safe hand placement  during stand>sit. good- eccentric control upon descent        Surface-to-Surface Transfers    Transfer Location bed to chair     [place] minimum assist (75% or more patient effort)     Safety/Cues increased time to complete;minimal;verbal cues;hand placement;technique;proper use of assistive device     Transfer Comments EOB to chair to L side with RW. ~ 5 steps. slow to perform. no overt LOB        Grooming    Tasks washes, rinses and dries face;washes, rinses and dries hands;brushes/gupta hair     [place] supervision     Position edge of bed sitting;unsupported sitting     Setup Assistance obtain supplies     Comment using LUE to reach/grasp/bring utensils to face &hair.        Toileting    Comment not observed, urinal within reach at end of session. +ostomy        Balance    Static Sitting Balance WFL;sitting, edge of bed     Dynamic Sitting Balance WFL;sitting, edge of bed     Sit to Stand Dynamic Balance mild impairment;supported     Static Standing Balance mild impairment;supported     Dynamic Standing Balance mild impairment;supported     Balance Interventions occupation based/functional task     Comment, Balance func balance meaningfully progressing. Demo'ing good EOB sitting balance during ADL routine. Benefits from RW. Balance impacted by decr activity tolerance/endurance/strength        Impairments/Safety Issues    Impairments Affecting Function balance;cognition;endurance/activity tolerance;strength;pain     Cognitive Impairments, Safety/Performance insight into deficits/self-awareness;sequencing abilities     Functional Endurance progressing, decline from baseline     Safety Issues Affecting Function insight into deficits/self-awareness;sequencing abilities        Upper Extremity (Therapeutic Exercise)    Exercise Position/Type seated     General Exercise bilateral     Range of Motion Exercises shoulder flexion/extension     Reps and Sets 1x10        Lower Extremity (Therapeutic Exercise)     Exercise Position/Type seated     General Exercise marching while seated     Reps and Sets 1x10                                       Session Outcome  Patient upright, in chair at end of session, chair alarm on, all needs met, call light in reach, personal items in reach. Nursing notified about change in vital signs, patient's performance, patient's position, and patient's response to therapy/activity.    AM-PAC™ - ADL (Current Function)     Putting on/taking off regular lower body clothing 2 - A Lot   Bathing 2 - A Lot   Toileting 2 - A Lot (+ostomy)   Putting on/taking off regular upper body clothing 3 - A Little   Help for taking care of personal grooming 3 - A Little   Eating meals 3 - A Little   AM-PAC™ ADL Score 15      ASSESSMENT AND PLAN     Progress Summary  OT tx complete. Dave cont to progressing meaningfully toward OT goals. He req Minx1 for bed mob/sit<>stands/limited side steps to chair. Good EOB sitting balance during morning in-context ADLs. A&Ox3 this morning, somewhat lethargic. Benefits from multimodal cues and incr processing time. His func performance cont to be impacted by decr activity tolerance/endurance/strength/cog. SNF remains indiciated at d/c. OT to follow    Patient/Family Therapy Goal Statement: to get some rest    OT Plan      Flowsheet Row Most Recent Value   Rehab Potential good, to achieve stated therapy goals at 10/17/2024 1133   Therapy Frequency 4 times/wk at 10/17/2024 1133   Planned Therapy Interventions activity tolerance training, adaptive equipment training, BADL retraining, patient/caregiver education/training, transfer/mobility retraining, functional balance retraining, occupation/activity based interventions, ROM/therapeutic exercise, strengthening exercise at 10/17/2024 1133            OT Discharge Recommendations      Flowsheet Row Most Recent Value   OT Recommended Discharge Disposition skilled nursing facility at 10/25/2024 0754   Anticipated Equipment Needs if  Discharged Home (OT) other (see comments)  [TBD] at 10/15/2024 1055                 OT Goals      Flowsheet Row Most Recent Value   Bed Mobility Goal 1    Activity/Assistive Device bed mobility activities, all at 10/15/2024 1055   [place] modified independence at 10/15/2024 1055   Time Frame by discharge at 10/15/2024 1055   Progress/Outcome goal ongoing at 10/15/2024 1055   Transfer Goal 1    Activity/Assistive Device all transfers, sit-to-stand/stand-to-sit at 10/15/2024 1055   [place] modified independence at 10/15/2024 1055   Time Frame by discharge at 10/15/2024 1055   Progress/Outcome goal ongoing at 10/15/2024 1055   Dressing Goal 1    Activity/Adaptive Equipment dressing skills, all at 10/15/2024 1055   [place] modified independence at 10/15/2024 1055   Time Frame by discharge at 10/15/2024 1055   Progress/Outcome goal ongoing at 10/15/2024 1055   Toileting Goal 1    Activity/Assistive Device toileting skills, all at 10/15/2024 1055   [place] modified independence at 10/15/2024 1055   Time Frame by discharge at 10/15/2024 1055   Progress/Outcome goal ongoing at 10/15/2024 1055

## 2024-11-01 NOTE — ASSESSMENT & PLAN NOTE
Cont statin   Detail Level: Detailed Products Recommended: *Eucerin Sensitive Mineral SPF 50, with Zinc Oxide Protection\\n*Blue Lizard Sensitive sunscreen\\n*[place] brands of sunscreen, particularly Kendra Sunscreen Adventure Sport Spf 50 With Clear Zinc General Sunscreen Counseling: Sun screen (Preferably Zinc or Titanium products) should be applied daily to exposed skin and reapplied after exercise or swimming. Wide brimmed hats as well as clothing that covers well are also good means of sun protection to prevent skin cancer.\\nThis is a three-part skin cancer prevention technique -- Slip, Slop, Slap.  Slip on clothing, Slop on sunscreen, and Slap on a hat.

## 2024-11-07 ENCOUNTER — HOSPITAL ENCOUNTER (OUTPATIENT)
Facility: HOSPITAL | Age: 89
Setting detail: OBSERVATION
Discharge: NURSING, REHAB OR LONG-TERM CARE | DRG: 187 | End: 2024-11-15
Attending: STUDENT IN AN ORGANIZED HEALTH CARE EDUCATION/TRAINING PROGRAM | Admitting: HOSPITALIST
Payer: COMMERCIAL

## 2024-11-07 ENCOUNTER — APPOINTMENT (EMERGENCY)
Dept: RADIOLOGY | Facility: HOSPITAL | Age: 89
DRG: 187 | End: 2024-11-07
Attending: STUDENT IN AN ORGANIZED HEALTH CARE EDUCATION/TRAINING PROGRAM
Payer: COMMERCIAL

## 2024-11-07 ENCOUNTER — APPOINTMENT (EMERGENCY)
Dept: RADIOLOGY | Facility: HOSPITAL | Age: 89
DRG: 187 | End: 2024-11-07
Payer: COMMERCIAL

## 2024-11-07 DIAGNOSIS — J94.8 HYDROPNEUMOTHORAX: Primary | ICD-10-CM

## 2024-11-07 LAB
ALBUMIN SERPL-MCNC: 2.8 G/DL (ref 3.5–5.7)
ALP SERPL-CCNC: 122 IU/L (ref 34–125)
ALT SERPL-CCNC: 26 IU/L (ref 7–52)
ANION GAP SERPL CALC-SCNC: 5 MEQ/L (ref 3–15)
AST SERPL-CCNC: 21 IU/L (ref 13–39)
BASOPHILS # BLD: 0.01 K/UL (ref 0.01–0.1)
BASOPHILS NFR BLD: 0.1 %
BILIRUB SERPL-MCNC: 0.5 MG/DL (ref 0.3–1.2)
BUN SERPL-MCNC: 17 MG/DL (ref 7–25)
CALCIUM SERPL-MCNC: 8.9 MG/DL (ref 8.6–10.3)
CHLORIDE SERPL-SCNC: 103 MEQ/L (ref 98–107)
CO2 SERPL-SCNC: 29 MEQ/L (ref 21–31)
CREAT SERPL-MCNC: 0.6 MG/DL (ref 0.7–1.3)
DIFFERENTIAL METHOD BLD: ABNORMAL
EGFRCR SERPLBLD CKD-EPI 2021: >60 ML/MIN/1.73M*2
EOSINOPHIL # BLD: 0.04 K/UL (ref 0.04–0.54)
EOSINOPHIL NFR BLD: 0.3 %
ERYTHROCYTE [DISTWIDTH] IN BLOOD BY AUTOMATED COUNT: 15 % (ref 11.6–14.4)
FLUAV RNA SPEC QL NAA+PROBE: NEGATIVE
FLUBV RNA SPEC QL NAA+PROBE: NEGATIVE
GLUCOSE SERPL-MCNC: 126 MG/DL (ref 70–99)
HCT VFR BLD AUTO: 37.2 % (ref 40.1–51)
HGB BLD-MCNC: 12.1 G/DL (ref 13.7–17.5)
IMM GRANULOCYTES # BLD AUTO: 0.07 K/UL (ref 0–0.08)
IMM GRANULOCYTES NFR BLD AUTO: 0.6 %
LYMPHOCYTES # BLD: 0.59 K/UL (ref 1.2–3.5)
LYMPHOCYTES NFR BLD: 4.8 %
MCH RBC QN AUTO: 29.5 PG (ref 28–33.2)
MCHC RBC AUTO-ENTMCNC: 32.5 G/DL (ref 32.2–36.5)
MCV RBC AUTO: 90.7 FL (ref 83–98)
MONOCYTES # BLD: 1.38 K/UL (ref 0.3–1)
MONOCYTES NFR BLD: 11.2 %
NEUTROPHILS # BLD: 10.28 K/UL (ref 1.7–7)
NEUTS SEG NFR BLD: 83 %
NRBC BLD-RTO: 0 %
PLATELET # BLD AUTO: 297 K/UL (ref 150–350)
PMV BLD AUTO: 8.7 FL (ref 9.4–12.4)
POTASSIUM SERPL-SCNC: 3.5 MEQ/L (ref 3.5–5.1)
PROT SERPL-MCNC: 5.4 G/DL (ref 6–8.2)
RBC # BLD AUTO: 4.1 M/UL (ref 4.5–5.8)
RSV RNA SPEC QL NAA+PROBE: NEGATIVE
SARS-COV-2 RNA RESP QL NAA+PROBE: NEGATIVE
SODIUM SERPL-SCNC: 137 MEQ/L (ref 136–145)
TROPONIN I SERPL HS-MCNC: 20.1 PG/ML
TROPONIN I SERPL HS-MCNC: 22.3 PG/ML
WBC # BLD AUTO: 12.37 K/UL (ref 3.8–10.5)

## 2024-11-07 PROCEDURE — 93005 ELECTROCARDIOGRAM TRACING: CPT

## 2024-11-07 PROCEDURE — 12000000 HC ROOM AND CARE MED/SURG

## 2024-11-07 PROCEDURE — 71260 CT THORAX DX C+: CPT

## 2024-11-07 PROCEDURE — G0378 HOSPITAL OBSERVATION PER HR: HCPCS

## 2024-11-07 PROCEDURE — 63700000 HC SELF-ADMINISTRABLE DRUG: Performed by: HOSPITALIST

## 2024-11-07 PROCEDURE — 84484 ASSAY OF TROPONIN QUANT: CPT

## 2024-11-07 PROCEDURE — 71045 X-RAY EXAM CHEST 1 VIEW: CPT

## 2024-11-07 PROCEDURE — 96365 THER/PROPH/DIAG IV INF INIT: CPT | Mod: 59

## 2024-11-07 PROCEDURE — 63600000 HC DRUGS/DETAIL CODE: Performed by: PHYSICIAN ASSISTANT

## 2024-11-07 PROCEDURE — 99223 1ST HOSP IP/OBS HIGH 75: CPT | Performed by: HOSPITALIST

## 2024-11-07 PROCEDURE — 25000000 HC PHARMACY GENERAL: Performed by: PHYSICIAN ASSISTANT

## 2024-11-07 PROCEDURE — 80053 COMPREHEN METABOLIC PANEL: CPT

## 2024-11-07 PROCEDURE — 84484 ASSAY OF TROPONIN QUANT: CPT | Mod: 91

## 2024-11-07 PROCEDURE — 93005 ELECTROCARDIOGRAM TRACING: CPT | Performed by: HOSPITALIST

## 2024-11-07 PROCEDURE — 25800000 HC PHARMACY IV SOLUTIONS: Performed by: PHYSICIAN ASSISTANT

## 2024-11-07 PROCEDURE — 99285 EMERGENCY DEPT VISIT HI MDM: CPT | Mod: 25

## 2024-11-07 PROCEDURE — 63600105 HC IODINE BASED CONTRAST: Mod: JW

## 2024-11-07 PROCEDURE — 36415 COLL VENOUS BLD VENIPUNCTURE: CPT

## 2024-11-07 PROCEDURE — 87637 SARSCOV2&INF A&B&RSV AMP PRB: CPT

## 2024-11-07 PROCEDURE — 96375 TX/PRO/DX INJ NEW DRUG ADDON: CPT | Mod: 59

## 2024-11-07 PROCEDURE — 85025 COMPLETE CBC W/AUTO DIFF WBC: CPT

## 2024-11-07 PROCEDURE — 87040 BLOOD CULTURE FOR BACTERIA: CPT | Mod: 91 | Performed by: PHYSICIAN ASSISTANT

## 2024-11-07 RX ORDER — IBUPROFEN 200 MG
16-32 TABLET ORAL AS NEEDED
Status: DISCONTINUED | OUTPATIENT
Start: 2024-11-07 | End: 2024-11-15 | Stop reason: HOSPADM

## 2024-11-07 RX ORDER — FUROSEMIDE 20 MG/1
20 TABLET ORAL DAILY
Status: DISCONTINUED | OUTPATIENT
Start: 2024-11-08 | End: 2024-11-15 | Stop reason: HOSPADM

## 2024-11-07 RX ORDER — ATORVASTATIN CALCIUM 20 MG/1
20 TABLET, FILM COATED ORAL
Status: DISCONTINUED | OUTPATIENT
Start: 2024-11-08 | End: 2024-11-15 | Stop reason: HOSPADM

## 2024-11-07 RX ORDER — IBUPROFEN/PSEUDOEPHEDRINE HCL 200MG-30MG
3 TABLET ORAL NIGHTLY
Status: DISCONTINUED | OUTPATIENT
Start: 2024-11-07 | End: 2024-11-15 | Stop reason: HOSPADM

## 2024-11-07 RX ORDER — METOPROLOL SUCCINATE 100 MG/1
100 TABLET, EXTENDED RELEASE ORAL DAILY
Status: DISCONTINUED | OUTPATIENT
Start: 2024-11-08 | End: 2024-11-15 | Stop reason: HOSPADM

## 2024-11-07 RX ORDER — ALBUTEROL SULFATE 90 UG/1
2 INHALANT RESPIRATORY (INHALATION) EVERY 8 HOURS PRN
Status: DISCONTINUED | OUTPATIENT
Start: 2024-11-07 | End: 2024-11-15 | Stop reason: HOSPADM

## 2024-11-07 RX ORDER — ACETAMINOPHEN 325 MG/1
650 TABLET ORAL EVERY 4 HOURS PRN
Status: DISCONTINUED | OUTPATIENT
Start: 2024-11-07 | End: 2024-11-15 | Stop reason: HOSPADM

## 2024-11-07 RX ORDER — IOPAMIDOL 755 MG/ML
75 INJECTION, SOLUTION INTRAVASCULAR
Status: COMPLETED | OUTPATIENT
Start: 2024-11-07 | End: 2024-11-07

## 2024-11-07 RX ORDER — DEXTROSE 40 %
15-30 GEL (GRAM) ORAL AS NEEDED
Status: DISCONTINUED | OUTPATIENT
Start: 2024-11-07 | End: 2024-11-15 | Stop reason: HOSPADM

## 2024-11-07 RX ORDER — VANCOMYCIN HYDROCHLORIDE
20 ONCE
Status: COMPLETED | OUTPATIENT
Start: 2024-11-07 | End: 2024-11-07

## 2024-11-07 RX ORDER — DEXTROSE 50 % IN WATER (D50W) INTRAVENOUS SYRINGE
25 AS NEEDED
Status: DISCONTINUED | OUTPATIENT
Start: 2024-11-07 | End: 2024-11-15 | Stop reason: HOSPADM

## 2024-11-07 RX ORDER — SENNOSIDES 8.6 MG/1
2 TABLET ORAL 2 TIMES DAILY
Status: DISCONTINUED | OUTPATIENT
Start: 2024-11-07 | End: 2024-11-15 | Stop reason: HOSPADM

## 2024-11-07 RX ORDER — DOCUSATE SODIUM 100 MG/1
100 CAPSULE, LIQUID FILLED ORAL 2 TIMES DAILY
Status: DISCONTINUED | OUTPATIENT
Start: 2024-11-07 | End: 2024-11-15 | Stop reason: HOSPADM

## 2024-11-07 RX ORDER — PANTOPRAZOLE SODIUM 40 MG/1
40 TABLET, DELAYED RELEASE ORAL NIGHTLY
Status: DISCONTINUED | OUTPATIENT
Start: 2024-11-07 | End: 2024-11-15 | Stop reason: HOSPADM

## 2024-11-07 RX ADMIN — CEFTAZIDIME 2 G: 2 INJECTION, POWDER, FOR SOLUTION INTRAVENOUS at 20:07

## 2024-11-07 RX ADMIN — IOPAMIDOL 75 ML: 755 INJECTION, SOLUTION INTRAVENOUS at 16:22

## 2024-11-07 RX ADMIN — WATER 1500 MG: 100 INJECTION, SOLUTION INTRAVENOUS at 20:44

## 2024-11-07 RX ADMIN — DOCUSATE SODIUM 100 MG: 100 CAPSULE, LIQUID FILLED ORAL at 23:56

## 2024-11-07 RX ADMIN — PANTOPRAZOLE SODIUM 40 MG: 40 TABLET, DELAYED RELEASE ORAL at 23:56

## 2024-11-07 RX ADMIN — Medication 3 MG: at 23:56

## 2024-11-07 ASSESSMENT — COGNITIVE AND FUNCTIONAL STATUS - GENERAL
CLIMB 3 TO 5 STEPS WITH RAILING: 1 - TOTAL
MOVING TO AND FROM BED TO CHAIR: 2 - A LOT
STANDING UP FROM CHAIR USING ARMS: 2 - A LOT
WALKING IN HOSPITAL ROOM: 2 - A LOT

## 2024-11-07 ASSESSMENT — ENCOUNTER SYMPTOMS
DIAPHORESIS: 0
DIZZINESS: 0
SHORTNESS OF BREATH: 1
NAUSEA: 0
WEAKNESS: 0
FEVER: 0
CHILLS: 0
LIGHT-HEADEDNESS: 1
BACK PAIN: 0
VOMITING: 0
ABDOMINAL PAIN: 0

## 2024-11-07 NOTE — ED PROVIDER NOTES
Emergency Medicine Note  HPI   HISTORY OF PRESENT ILLNESS     Patient is an 88-year-old male with a past medical history of colon cancer s/p colostomy, CAD, DVT, hypertension, dyslipidemia, pleural effusion, pulmonary embolism (on Xarelto), SBO, recurrent pleural effusion presenting for shortness of breath.  Patient was recently admitted to the hospital during October and was found to have an SBO along with PE and recurrent pleural effusion.  He was placed on a heparin drip during his stay and was transition back to Xarelto.  Patient also had an NG tube placed during his stay for small bowel obstruction and was able to tolerate oral after some time with normal output from his colostomy.  Patient also hospitalized for recurrent right-sided pleural effusion for which thoracic surgery saw him for.  He had a pigtail catheter placed at that time and improvement of the pleural effusion was seen.  He was told that if the fluid continued to build up again or if he were to develop symptoms, a Pleurx catheter would have to be inserted.  Trey Ortiz [facility] sent the patient into the ER today due to concerns of worsening effusion seen on outpatient imaging from 11/5 along with possible pneumonia in addition to patient experiencing dyspnea on exertion as well as lightheadedness.  He denies any fever, chills, diaphoresis, chest pain, leg swelling, abdominal pain, nausea vomiting, back pain, dizziness, weakness, syncope.          Patient History   PAST HISTORY     Reviewed from Nursing Triage:  Meds       Past Medical History:   Diagnosis Date    Coronary artery disease     DVT (deep venous thrombosis) (CMS/HCC)     Hypertension     Lipid disorder     Pleural effusion     Pulmonary embolism (CMS/HCC)     Rectal cancer (CMS/HCC)     SBO (small bowel obstruction) (CMS/HCC)        Past Surgical History   Procedure Laterality Date    Colostomy      Hernia repair      Pleura biopsy      Thoracentesis       THORACOSCOPY--VATS-PLEURAL BIOPSY, PLEURX CATHETER PLACEMENT Right 7/8/2024    Performed by Sergey Dennis MD at [facility] OR [facility]       Family History   Problem Relation Name Age of Onset    Heart disease Biological Mother         Social History     Tobacco Use    Smoking status: Never    Smokeless tobacco: Never   Substance Use Topics    Alcohol use: Not Currently    Drug use: Never         Review of Systems   REVIEW OF SYSTEMS     Review of Systems   Constitutional:  Negative for chills, diaphoresis and fever.   Respiratory:  Positive for shortness of breath.    Cardiovascular:  Negative for chest pain.   Gastrointestinal:  Negative for abdominal pain, nausea and vomiting.   Musculoskeletal:  Negative for back pain.   Neurological:  Positive for light-headedness. Negative for dizziness, syncope and weakness.         VITALS     ED Vitals      Date/Time Temp Pulse Resp BP SpO2 [facility]   11/07/24 2030 -- 72 19 -- 96 % BM   11/07/24 1830 -- 72 17 -- 96 % RKF   11/07/24 1611 -- 78 20 141/75 95 % RKF   11/07/24 1541 -- 70 17 119/70 97 % RKF   11/07/24 1417 -- -- 20 -- -- KB   11/07/24 1410 -- 73 -- 136/71 95 % KB   11/07/24 1408 36.6 °C (97.8 °F) -- -- -- -- KB          Pulse Ox %: 96 % (11/07/24 2030)  Pulse Ox Interpretation: Normal (11/07/24 2030)  Heart Rate: 72 (11/07/24 2030)        Physical Exam   PHYSICAL EXAM     Physical Exam  Constitutional:       General: He is not in acute distress.     Appearance: Normal appearance. He is not ill-appearing, toxic-appearing or diaphoretic.   HENT:      Head: Normocephalic and atraumatic.      Mouth/Throat:      Mouth: Mucous membranes are moist.      Pharynx: Oropharynx is clear.   Eyes:      Extraocular Movements: Extraocular movements intact.      Conjunctiva/sclera: Conjunctivae normal.      Pupils: Pupils are equal, round, and reactive to light.   Cardiovascular:      Rate and Rhythm: Normal rate and regular rhythm.   Pulmonary:      Effort: Pulmonary effort is normal. No  tachypnea, bradypnea, accessory muscle usage or respiratory distress.      Breath sounds: Examination of the right-middle field reveals decreased breath sounds. Examination of the right-lower field reveals decreased breath sounds. Examination of the left-lower field reveals rales. Decreased breath sounds and rales present. No wheezing or rhonchi.   Musculoskeletal:      Cervical back: Normal range of motion and neck supple.   Skin:     General: Skin is warm and dry.      Capillary Refill: Capillary refill takes less than 2 seconds.   Neurological:      Mental Status: He is alert and oriented to person, place, and time.           PROCEDURES     Procedures     DATA     Results       Procedure Component Value Units Date/Time    Blood Culture Blood, Venous [[id number]] Collected: 11/07/24 1942    Specimen: Blood, Venous Updated: 11/07/24 1949    Blood Culture Blood, Venous [[id number]] Collected: 11/07/24 1942    Specimen: Blood, Venous Updated: 11/07/24 1949    SARS-COV-2 (COVID-19)/ FLU A/B, AND RSV, PCR Nasopharynx [[id number]]  (Normal) Collected: 11/07/24 1527    Specimen: Nasopharyngeal Swab from Nasopharynx Updated: 11/07/24 1614     SARS-CoV-2 (COVID-19) Negative     Influenza A Negative     Influenza B Negative     Respiratory Syncytial Virus Negative    Narrative:      Testing performed using real-time PCR for detection of COVID-19. EUA approved validation studies performed on site.     HS Troponin I (with 2 hour reflex) [[id number]]  (Abnormal) Collected: 11/07/24 1417    Specimen: Blood, Venous Updated: 11/07/24 1452     High Sens Troponin I 22.3 pg/mL     Comprehensive metabolic panel [[id number]]  (Abnormal) Collected: 11/07/24 1417    Specimen: Blood, Venous Updated: 11/07/24 1446     Sodium 137 mEQ/L      Potassium 3.5 mEQ/L      Comment: Results obtained on plasma. Plasma Potassium values may be up to 0.4 mEQ/L less than serum values. The differences may be greater for patients with high platelet or white  cell counts.        Chloride 103 mEQ/L      CO2 29 mEQ/L      BUN 17 mg/dL      Creatinine 0.6 mg/dL      Glucose 126 mg/dL      Calcium 8.9 mg/dL      AST (SGOT) 21 IU/L      ALT (SGPT) 26 IU/L      Alkaline Phosphatase 122 IU/L      Total Protein 5.4 g/dL      Comment: Test performed on plasma which typically contains approximately 0.4 g/dL more protein than serum.        Albumin 2.8 g/dL      Bilirubin, Total 0.5 mg/dL      eGFR >60.0 mL/min/1.73m*2      Comment: Calculation based on the Chronic Kidney Disease Epidemiology Collaboration (CKD-EPI) equation refit without adjustment for race.        Anion Gap 5 mEQ/L     CBC and differential [[id number]]  (Abnormal) Collected: 11/07/24 1417    Specimen: Blood, Venous Updated: 11/07/24 1427     WBC 12.37 K/uL      RBC 4.10 M/uL      Hemoglobin 12.1 g/dL      Hematocrit 37.2 %      MCV 90.7 fL      MCH 29.5 pg      MCHC 32.5 g/dL      RDW 15.0 %      Platelets 297 K/uL      MPV 8.7 fL      Differential Type Auto     nRBC 0.0 %      Immature Granulocytes 0.6 %      Neutrophils 83.0 %      Lymphocytes 4.8 %      Monocytes 11.2 %      Eosinophils 0.3 %      Basophils 0.1 %      Immature Granulocytes, Absolute 0.07 K/uL      Neutrophils, Absolute 10.28 K/uL      Lymphocytes, Absolute 0.59 K/uL      Monocytes, Absolute 1.38 K/uL      Eosinophils, Absolute 0.04 K/uL      Basophils, Absolute 0.01 K/uL     Extra Tubes [[id number]] Collected: 11/07/24 1417    Specimen: Blood, Venous Updated: 11/07/24 1421    Narrative:      The following orders were created for panel order Extra Tubes.  Procedure                               Abnormality         Status                     ---------                               -----------         ------                     Extra Tube Lt Blue[[id number]]                               In process                   Please view results for these tests on the individual orders.    Extra Tube Lt Blue [[id number]] Collected: 11/07/24 1417    Specimen:  Blood, Venous Updated: 11/07/24 1421            Imaging Results              CT CHEST WITH IV CONTRAST (Final result)  Result time 11/07/24 18:44:17      Final result                   Impression:    IMPRESSION:    1.  Redemonstration of nodular right pleural thickening consistent with  malignancy.  Large right hydropneumothorax.  Fluid component is similar to the  recent prior study.  Pneumothorax component is new.  There is a potentially  loculated 5.6 cm air and fluid component medially in the right mid hemithorax.  2.  Slightly increased posterior right upper lobe atelectasis compared to prior.  No significant change in right middle and right lower lobe volume loss and  consolidation.      Finding:    New or increased pneumothorax   Acuity: Critical  Status:  CLOSED    Critical read back was performed and results were read back by Dr. Acosta,  on  11/7/2024 6:34 PM .                 Narrative:    CLINICAL HISTORY: Pleural effusion, malignancy suspected    COMMENT:  Computed tomography of the chest is performed.  CT Dose:  One or more dose reduction techniques (e.g. automated exposure  control, adjustment of the mA and/or kV according to patient size, use of  iterative reconstruction technique) utilized for this examination.    Contrast: 75 mL Isovue 370 nonionic IV contrast  Comparison studies:   10/14/2024 CTA chest    FINDINGS:    LUNG PARENCHYMA AND PLEURA: Redemonstration of nodular right pleural thickening  suspicious for malignancy.  Right hydropneumothorax.  Fluid volume is similar to  the prior study from one month ago.  However, the pneumothorax component is new.  Potentially loculated 5.6 cm air and fluid component medially in the right mid  hemithorax.  Right lower lobe and right middle lobe volume loss and  consolidation similar to prior.  New posterior right upper lobe atelectasis.  Trace left pleural effusion.  No new left lung consolidation.    JENNIFER AND MEDIASTINUM: Stable    HEART AND  PERICARDIUM: Cardiomegaly.  Coronary artery calcifications.  No  pericardial effusion.    CHEST WALL: Unremarkable    AXILLA: Unremarkable    BONY ARCHITECTURE: Stable    UPPER ABDOMEN: Tiny nonobstructing right upper pole renal calculus.  Partially  imaged hypodensities in the renal sinuses, consistent with parapelvic cysts  and/or pelvocaliectasis.                                           X-RAY CHEST 1 VIEW (Final result)  Result time 11/07/24 14:58:42      Final result                   Impression:    IMPRESSION: Small right effusion.    COMMENT: AP radiograph the chest reveals a small right effusion. Previously  noted right basal chest tube has been removed. There is patchy right basal  consolidation. Left lung is well-aerated. There is no pneumothorax. Comparison  is made to previous study dated 10/21/2024               Narrative:    CLINICAL HISTORY: Shortness of breath.                                      ECG 12 lead      ECG 12 lead    (Results Pending)       Scoring tools                                  ED Course & MDM   MDM / ED COURSE / CLINICAL IMPRESSION / DISPO     Medical Decision Making  Problems Addressed:  Hydropneumothorax: acute illness or injury    Amount and/or Complexity of Data Reviewed  Independent Historian: caregiver and EMS  External Data Reviewed: radiology and notes.  Labs: ordered.  Radiology: ordered.  ECG/medicine tests: ordered.    Risk  Prescription drug management.  Decision regarding hospitalization.        ED Course as of 11/08/24 0554   Thu Nov 07, 2024   1421 Paged BM extended care [AB]   1913 Case received in signout, second troponin was pending no significant delta troponin.  CT scan was also pending [TC]   1917 CT CHEST WITH IV CONTRAST  IMPRESSION:     1.  Redemonstration of nodular right pleural thickening consistent with  malignancy.  Large right hydropneumothorax.  Fluid component is similar to the  recent prior study.  Pneumothorax component is new.  There is a  potentially  loculated 5.6 cm air and fluid component medially in the right mid hemithorax.  2.  Slightly increased posterior right upper lobe atelectasis compared to prior.  No significant change in right middle and right lower lobe volume loss and  consolidation.   [TC]   1922 RN weigh patient so antibiotics could be ordered and will page [facility] to hospitalize patient for IR consult [TC]   1924 Spoke with [facility], request surgery consult as well, surgery paged [TC]   1934 surgery resident aware of consult [TC]   2003 Signed out to [facility] [TC]   2005 Surgery at bedside [TC]      ED Course User Index  [AB] Samina Baird PA C  [TC] Ritu Walton PA C     Clinical Impression      Hydropneumothorax     _________________       ED Disposition   Admit / Observation                       Samina Baird PA C  11/08/24 0554

## 2024-11-07 NOTE — ED ATTESTATION NOTE
I have personally seen and examined Dave Arredondo.  I was involved in the care and medical decision making for this patient.    I reviewed and agree with physician assistant / nurse practitioners assessment and plan of care; any exceptions are documented below.      My focused history, examination, assessment and plan of care of Dave Arredondo is as follows:    Brief History:  HPI  88 year old male presenting to the emergency department for evaluation of shortness of breath.  Patient arrives from care facility.  He told them that he was having shortness of breath.  He has a history of a pleural effusion and was sent to the emergency department for further evaluation.        Focused Physical Exam:  Physical Exam  On arrival, patient is awake and alert.  He is afebrile and hemodynamically stable.  No respiratory distress.  Lung sounds decreased bilaterally, likely secondary to patient effort.  Abdomen is soft, nondistended, nontender.      Assessment / Plan:        Medical Decision Making  Patient presents to the emergency department from nursing home for evaluation of shortness of breath.  Patient has a history of pleural effusion.  Patient is afebrile and hemodynamically stable.  No respiratory distress.  No hypoxia.  Laboratory results demonstrate mild leukocytosis, labs otherwise baseline for the patient.  Chest x-ray does redemonstrate right lower lobe pleural effusion with potential new consolidation.  CT chest was obtained that demonstrates reoccurrence of the same fluid level, but now with new air above it.  Radiology called me to let me know about the hydropneumothorax.  He also has a separate loculated area again with air-fluid level.  For possible empyema.  Will start IV antibiotics.  Patient will need admission to the hospital with likely IR placement of drains due to the separate loculated areas.    Amount and/or Complexity of Data Reviewed  Labs: ordered.  Radiology: ordered.  ECG/medicine tests:  ordered.    Risk  Prescription drug management.  Decision regarding hospitalization.        I was physically present for the key/critical portions of the following procedures:  Procedures          Bhaskar Acosta MD  11/07/24 1946

## 2024-11-07 NOTE — Clinical Note
Patient placed on procedure table in with left arm extended with right arm extended. Positioning devices: arm board under arms and safety strap applied. Left recumbent

## 2024-11-08 ENCOUNTER — APPOINTMENT (INPATIENT)
Dept: RADIOLOGY | Facility: HOSPITAL | Age: 89
DRG: 187 | End: 2024-11-08
Attending: HOSPITALIST
Payer: COMMERCIAL

## 2024-11-08 LAB
ANION GAP SERPL CALC-SCNC: 7 MEQ/L (ref 3–15)
ATRIAL RATE: 71
ATRIAL RATE: 74
BUN SERPL-MCNC: 15 MG/DL (ref 7–25)
CALCIUM SERPL-MCNC: 8.9 MG/DL (ref 8.6–10.3)
CHLORIDE SERPL-SCNC: 104 MEQ/L (ref 98–107)
CO2 SERPL-SCNC: 28 MEQ/L (ref 21–31)
CREAT SERPL-MCNC: 0.6 MG/DL (ref 0.7–1.3)
EGFRCR SERPLBLD CKD-EPI 2021: >60 ML/MIN/1.73M*2
ERYTHROCYTE [DISTWIDTH] IN BLOOD BY AUTOMATED COUNT: 15.1 % (ref 11.6–14.4)
GLUCOSE SERPL-MCNC: 98 MG/DL (ref 70–99)
HCT VFR BLD AUTO: 37.7 % (ref 40.1–51)
HGB BLD-MCNC: 12.3 G/DL (ref 13.7–17.5)
INR PPP: 1.1
MCH RBC QN AUTO: 29.1 PG (ref 28–33.2)
MCHC RBC AUTO-ENTMCNC: 32.6 G/DL (ref 32.2–36.5)
MCV RBC AUTO: 89.3 FL (ref 83–98)
P AXIS: 30
P AXIS: 37
PLATELET # BLD AUTO: 267 K/UL (ref 150–350)
PMV BLD AUTO: 8.7 FL (ref 9.4–12.4)
POTASSIUM SERPL-SCNC: 3.4 MEQ/L (ref 3.5–5.1)
PR INTERVAL: 234
PR INTERVAL: 256
PROTHROMBIN TIME: 13.8 SEC (ref 12.2–14.5)
QRS DURATION: 88
QRS DURATION: 88
QT INTERVAL: 398
QT INTERVAL: 400
QTC CALCULATION(BAZETT): 434
QTC CALCULATION(BAZETT): 441
R AXIS: 28
R AXIS: 28
RBC # BLD AUTO: 4.22 M/UL (ref 4.5–5.8)
SODIUM SERPL-SCNC: 139 MEQ/L (ref 136–145)
T WAVE AXIS: 43
T WAVE AXIS: 46
VENTRICULAR RATE: 71
VENTRICULAR RATE: 74
WBC # BLD AUTO: 10.71 K/UL (ref 3.8–10.5)

## 2024-11-08 PROCEDURE — 1123F ACP DISCUSS/DSCN MKR DOCD: CPT | Performed by: STUDENT IN AN ORGANIZED HEALTH CARE EDUCATION/TRAINING PROGRAM

## 2024-11-08 PROCEDURE — 93010 ELECTROCARDIOGRAM REPORT: CPT | Performed by: INTERNAL MEDICINE

## 2024-11-08 PROCEDURE — 36415 COLL VENOUS BLD VENIPUNCTURE: CPT | Performed by: HOSPITALIST

## 2024-11-08 PROCEDURE — 93010 ELECTROCARDIOGRAM REPORT: CPT | Mod: 76 | Performed by: INTERNAL MEDICINE

## 2024-11-08 PROCEDURE — G0378 HOSPITAL OBSERVATION PER HR: HCPCS

## 2024-11-08 PROCEDURE — 0W9930Z DRAINAGE OF RIGHT PLEURAL CAVITY WITH DRAINAGE DEVICE, PERCUTANEOUS APPROACH: ICD-10-PCS | Performed by: STUDENT IN AN ORGANIZED HEALTH CARE EDUCATION/TRAINING PROGRAM

## 2024-11-08 PROCEDURE — 63600000 HC DRUGS/DETAIL CODE: Mod: JZ | Performed by: PHYSICIAN ASSISTANT

## 2024-11-08 PROCEDURE — 27200000 HC STERILE SUPPLY

## 2024-11-08 PROCEDURE — 63600000 HC DRUGS/DETAIL CODE: Mod: JZ | Performed by: STUDENT IN AN ORGANIZED HEALTH CARE EDUCATION/TRAINING PROGRAM

## 2024-11-08 PROCEDURE — 25000000 HC PHARMACY GENERAL: Performed by: STUDENT IN AN ORGANIZED HEALTH CARE EDUCATION/TRAINING PROGRAM

## 2024-11-08 PROCEDURE — 63700000 HC SELF-ADMINISTRABLE DRUG: Performed by: HOSPITALIST

## 2024-11-08 PROCEDURE — 85027 COMPLETE CBC AUTOMATED: CPT | Performed by: HOSPITALIST

## 2024-11-08 PROCEDURE — 99233 SBSQ HOSP IP/OBS HIGH 50: CPT | Performed by: STUDENT IN AN ORGANIZED HEALTH CARE EDUCATION/TRAINING PROGRAM

## 2024-11-08 PROCEDURE — 80048 BASIC METABOLIC PNL TOTAL CA: CPT | Performed by: HOSPITALIST

## 2024-11-08 PROCEDURE — 85610 PROTHROMBIN TIME: CPT | Performed by: HOSPITALIST

## 2024-11-08 PROCEDURE — 63700000 HC SELF-ADMINISTRABLE DRUG: Performed by: STUDENT IN AN ORGANIZED HEALTH CARE EDUCATION/TRAINING PROGRAM

## 2024-11-08 PROCEDURE — 36100360 IR TUNNELED PLEURAL CATHETER PLACEMENT

## 2024-11-08 PROCEDURE — C1769 GUIDE WIRE: HCPCS

## 2024-11-08 PROCEDURE — 76942 ECHO GUIDE FOR BIOPSY: CPT

## 2024-11-08 PROCEDURE — C1729 CATH, DRAINAGE: HCPCS

## 2024-11-08 PROCEDURE — 12000000 HC ROOM AND CARE MED/SURG

## 2024-11-08 PROCEDURE — 200200 PR NO CHARGE: Performed by: SURGERY

## 2024-11-08 PROCEDURE — 97162 PT EVAL MOD COMPLEX 30 MIN: CPT | Mod: GP

## 2024-11-08 RX ORDER — FENTANYL CITRATE 50 UG/ML
INJECTION, SOLUTION INTRAMUSCULAR; INTRAVENOUS
Status: COMPLETED | OUTPATIENT
Start: 2024-11-08 | End: 2024-11-08

## 2024-11-08 RX ORDER — LIDOCAINE HYDROCHLORIDE 10 MG/ML
INJECTION, SOLUTION INFILTRATION; PERINEURAL
Status: COMPLETED | OUTPATIENT
Start: 2024-11-08 | End: 2024-11-08

## 2024-11-08 RX ORDER — CEFAZOLIN SODIUM 2 G/50ML
2 SOLUTION INTRAVENOUS ONCE
Status: COMPLETED | OUTPATIENT
Start: 2024-11-08 | End: 2024-11-08

## 2024-11-08 RX ORDER — LIDOCAINE HYDROCHLORIDE AND EPINEPHRINE 10; 10 UG/ML; MG/ML
INJECTION, SOLUTION INFILTRATION; PERINEURAL
Status: COMPLETED | OUTPATIENT
Start: 2024-11-08 | End: 2024-11-08

## 2024-11-08 RX ORDER — POTASSIUM CHLORIDE 20 MEQ/1
40 TABLET, EXTENDED RELEASE ORAL ONCE
Status: COMPLETED | OUTPATIENT
Start: 2024-11-08 | End: 2024-11-08

## 2024-11-08 RX ADMIN — PANTOPRAZOLE SODIUM 40 MG: 40 TABLET, DELAYED RELEASE ORAL at 21:00

## 2024-11-08 RX ADMIN — METOPROLOL SUCCINATE 100 MG: 100 TABLET, EXTENDED RELEASE ORAL at 09:17

## 2024-11-08 RX ADMIN — DOCUSATE SODIUM 100 MG: 100 CAPSULE, LIQUID FILLED ORAL at 21:00

## 2024-11-08 RX ADMIN — Medication 3 MG: at 21:00

## 2024-11-08 RX ADMIN — ATORVASTATIN CALCIUM 20 MG: 20 TABLET, FILM COATED ORAL at 18:57

## 2024-11-08 RX ADMIN — SENNOSIDES 2 TABLET: 8.6 TABLET, FILM COATED ORAL at 13:43

## 2024-11-08 RX ADMIN — SENNOSIDES 2 TABLET: 8.6 TABLET, FILM COATED ORAL at 21:00

## 2024-11-08 RX ADMIN — CEFAZOLIN SODIUM 2 G: 2 SOLUTION INTRAVENOUS at 16:07

## 2024-11-08 RX ADMIN — FUROSEMIDE 20 MG: 20 TABLET ORAL at 09:17

## 2024-11-08 RX ADMIN — DOCUSATE SODIUM 100 MG: 100 CAPSULE, LIQUID FILLED ORAL at 13:43

## 2024-11-08 RX ADMIN — LIDOCAINE HYDROCHLORIDE,EPINEPHRINE BITARTRATE 10 ML: 10; .01 INJECTION, SOLUTION INFILTRATION; PERINEURAL at 16:21

## 2024-11-08 RX ADMIN — LIDOCAINE HYDROCHLORIDE 10 ML: 10 INJECTION, SOLUTION INFILTRATION; PERINEURAL at 16:21

## 2024-11-08 RX ADMIN — FENTANYL CITRATE 50 MCG: 50 INJECTION, SOLUTION INTRAMUSCULAR; INTRAVENOUS at 16:20

## 2024-11-08 RX ADMIN — POTASSIUM CHLORIDE 40 MEQ: 1500 TABLET, EXTENDED RELEASE ORAL at 18:56

## 2024-11-08 ASSESSMENT — COGNITIVE AND FUNCTIONAL STATUS - GENERAL
MOVING TO AND FROM BED TO CHAIR: 2 - A LOT
STANDING UP FROM CHAIR USING ARMS: 2 - A LOT
MOVING TO AND FROM BED TO CHAIR: 2 - A LOT
STANDING UP FROM CHAIR USING ARMS: 2 - A LOT
STANDING UP FROM CHAIR USING ARMS: 2 - A LOT
WALKING IN HOSPITAL ROOM: 2 - A LOT
MOVING TO AND FROM BED TO CHAIR: 2 - A LOT
CLIMB 3 TO 5 STEPS WITH RAILING: 1 - TOTAL
AFFECT: CONFUSED;FLAT/BLUNTED AFFECT
WALKING IN HOSPITAL ROOM: 2 - A LOT
WALKING IN HOSPITAL ROOM: 2 - A LOT
STANDING UP FROM CHAIR USING ARMS: 2 - A LOT
WALKING IN HOSPITAL ROOM: 2 - A LOT
CLIMB 3 TO 5 STEPS WITH RAILING: 1 - TOTAL
MOVING TO AND FROM BED TO CHAIR: 2 - A LOT

## 2024-11-08 NOTE — HOSPITAL COURSE
"Dave is a 88 y.o. male admitted on 11/7/2024 with Hydropneumothorax [J94.8]. Principal problem is Hydropneumothorax.    Past Medical History  Dave has a past medical history of Coronary artery disease, DVT (deep venous thrombosis) (CMS/HCC), Hypertension, Lipid disorder, Pleural effusion, Pulmonary embolism (CMS/HCC), Rectal cancer (CMS/HCC), and SBO (small bowel obstruction) (CMS/HCC).    History of Present Illness   88 year old male adm with SOB    History of recurrent right pleural effusio requiring thoracentesis S/p \"Right VATS pleural biopsy, insertion of pigtail catheter catheter 7/8/24.  Readmitted last month with placement of pigtail catheter 10/16 which was removed later.  no acute surgical intervention recommended  IR consultation for Pleurx catheter as patient will need long term drainage for his recurrent effusion  "

## 2024-11-08 NOTE — POST-PROCEDURE NOTE
nterventional Radiology Brief Operative Note  Intervention: Right tunneled pleural drain placement, large volume thoracentesis  Faculty: KARAN Obregon  Indication: recurrent malignant pleural effusion  Summary of procedure and findings:  -15.5 Fr modified PleurX catheter placement into right pleural space, 400 mL sanguinous pleural fluid removed     Contrast: None  Medications:  Intradermal lidocaine 1% w/ & w/o epi  IV Fentanyl 50 mcg     Specimens: None  EBL: Minimal (0-10 mL)  Complications: None immediate     Recommendations:  -Drain right pleural space PRN  -May restart home xarelto tomorrow morning  -Ok to shower. Do not submerge tube under body of water    Juan Obregon MD  Pager # [phone]  Interventional Radiologist at [facility]  4:49 PM 11/08/24

## 2024-11-08 NOTE — PLAN OF CARE
Problem: Adult Inpatient Plan of Care  Goal: Plan of Care Review  Flowsheets (Taken 11/8/2024 6834)  Outcome Evaluation: Patient evaluated per MST score 3 (unsure weight loss, with decreased appetite/intakes).  Per Chart, there's evidence for significant weight loss of 13 lb since 9/12, 18 lb since 8/15, 32 lb since 6/15.  Patient with +2-3 BLE edema, on Lasix here and PTA per Chart review, medical/surgical history reviewed in Chart, and note frequent Hospitalizations, procedures in past year, that may all be contributing to weight loss.  Patient on Regular diet with 2 Cesar Med Pass TID at SNF/Rehab PTA.  Cardiac diet ordered here.  Suspect intakes meeting 75% or less of estimated needs.      Goal: Consume at least 75% of estimated nutritional needs  Recommendations:  Cardiac diet as ordered.  Ensure Compact BID.  Encourage protein intakes, sufficient intakes, balanced healthful meals, avoiding high sodium foods/fluids.  Monitor diet/PO, weight, Labs, skin.

## 2024-11-08 NOTE — PLAN OF CARE
Plan of Care Review  Plan of Care Reviewed With: patient  Progress: no change  Outcome Evaluation: Pt. admitted overnight. Denies significant pain. Denies SOB, saturating well on room air. Fall precautions in place. IR consulted for pigtail placement.

## 2024-11-08 NOTE — PROGRESS NOTES
"   Physical Therapy -  Initial Evaluation     Patient: Dave Arredondo  Location: [facility] 5PAV 5406  MRN: [id number]  Today's date: 11/8/2024    HISTORY OF PRESENT ILLNESS     Dave is a 88 y.o. male admitted on 11/7/2024 with Hydropneumothorax [J94.8]. Principal problem is Hydropneumothorax.    Past Medical History  Dave has a past medical history of Coronary artery disease, DVT (deep venous thrombosis) (CMS/HCC), Hypertension, Lipid disorder, Pleural effusion, Pulmonary embolism (CMS/HCC), Rectal cancer (CMS/HCC), and SBO (small bowel obstruction) (CMS/[facility]).    History of Present Illness   88 year old male adm with SOB    History of recurrent right pleural effusio requiring thoracentesis S/p \"Right VATS pleural biopsy, insertion of pigtail catheter catheter 7/8/24.  Readmitted last month with placement of pigtail catheter 10/16 which was removed later.  no acute surgical intervention recommended  IR consultation for Pleurx catheter as patient will need long term drainage for his recurrent effusion  PRIOR LEVEL OF FUNCTION AND LIVING ENVIRONMENT     Prior Level of Function      Flowsheet Row Most Recent Value   Dominant Hand right   Ambulation independent   Transferring independent   Toileting independent   Bathing independent   Dressing independent   Eating independent   IADLs independent   Driving/Transportation    Communication understands/communicates without difficulty   Prior Level of Function Comment Per last EMR pt IND w/ mobility/adls w/o AD. But recently has only been able to ambulate short distances,  recent admission last month- DC to SNF   Assistive Device Currently Used at Home grab bar, shower chair             Prior Living Environment      Flowsheet Row Most Recent Value   People in Home roommate(s)   Current Living Arrangements home   Living Environment Comment Pt  historian and stated he lives w/ his 4 brothers. Per EMR pt lives w/ roommate(s) on the 2nd fl, " accessible by 12 steps, 1 [address] building, no elevator access,  pt currently being admitted from short term rehab          VITALS AND PAIN     PT Vitals      Date/Time BP [facility]   11/08/24 0840 168/88 LA          PT Pain      Date/Time Pain Type Rating: Rest Rating: Activity [facility]   11/08/24 0840 Pain Assessment 0 - no pain 0 - no pain LA             Objective   OBJECTIVE     Start time:  0840  End time:  0851  Session Length: 11 min       General Observations  Patient received supine, in bed. He was no issues or concerns identified by nurse prior to session, agreeable to therapy.      Precautions: fall       Limitations/Impairments: safety/cognitive      PT Eval and Treat - 11/08/24 0840          Cognition    Orientation Status oriented to;person;place     Affect/Mental Status confused;flat/blunted affect     Follows Commands follows one-step commands;physical/tactile prompts required;repetition of directions required;delayed response/completion;increased processing time needed     Cognitive Function safety deficit;executive function deficit     Executive Function Deficit planning/decision-making;information processing;initiation;insight/awareness of deficits;problem-solving/reasoning;judgment     Safety Deficit awareness of need for assistance;insight into deficits/self-awareness;judgment        Vision Assessment/Intervention    Vision Assessment WFL        Hearing Assessment    Hearing Status WFL        Sensory Assessment    Sensory Assessment sensation intact, lower extremities        Lower Extremity Assessment    LE Assessment ROM and Strength WFL        Bed Mobility    Bed Mobility Activities supine to sit;sit to supine     [place] 1 person assist;minimum assist (75% or more patient effort)     Safety/Cues increased time to complete;hand placement     Assistive Device head of bed elevated;bed rails     Comment exit to the L, min (A) for trunk and LE management        Mobility Belt    Mobility Belt Used During  Session yes        Sit/Stand Transfer    Surface edge of bed     [place] 1 person assist;moderate assist (50-74% patient effort)     Safety/Cues increased time to complete;verbal cues;initiation;proper use of assistive device     Assistive Device walker, front-wheeled     Transfer Comments mod (A) from EOB due to decreased safety awareness and weakness, pleasantly confused, cues for hand placement        Gait Training    [place], Gait 1 person assist;moderate assist (50-74% patient effort)     Assistive Device walker, front-wheeled     Distance in Feet 2 feet     Pattern step-to     Deviations/Abnormal Patterns catracho decreased;weight shifting decreased;step length decreased;stride length decreased     Comment mod (A) w/ RW, 2 lateral side steps along HOB, unsteady gait        Balance    Static Sitting Balance WFL     Sit to Stand Dynamic Balance moderate impairment     Static Standing Balance moderate impairment     Dynamic Standing Balance moderate impairment     Comment, Balance min/mod (A) with RW        Impairments/Safety Issues    Impairments Affecting Function balance;cognition;strength;endurance/activity tolerance     Cognitive Impairments, Safety/Performance attention;awareness, need for assistance;insight into deficits/self-awareness     Safety Issues Affecting Function problem-solving;judgment;awareness of need for assistance;insight into deficits/self-awareness;positioning of assistive device                                    Education Documentation  Unresolved/Worsening Symptoms, taught by Caity oWlf PT at 11/8/2024  1:22 PM.  Learner: Patient  Readiness: Acceptance  Method: Explanation, Demonstration  Response: Needs Reinforcement  Comment: reviewed PT POC, role of PT    Joint Mobility/Strength, taught by Caity Wolf PT at 11/8/2024  1:22 PM.  Learner: Patient  Readiness: Acceptance  Method: Explanation, Demonstration  Response: Needs Reinforcement  Comment: reviewed PT POC,  role of PT    Home Safety, taught by Caity Wolf PT at 11/8/2024  1:22 PM.  Learner: Patient  Readiness: Acceptance  Method: Explanation, Demonstration  Response: Needs Reinforcement  Comment: reviewed PT POC, role of PT    Energy Conservation, taught by Caity Wolf PT at 11/8/2024  1:22 PM.  Learner: Patient  Readiness: Acceptance  Method: Explanation, Demonstration  Response: Needs Reinforcement  Comment: reviewed PT POC, role of PT    Assistive/Adaptive Devices, taught by Caity Wolf PT at 11/8/2024  1:22 PM.  Learner: Patient  Readiness: Acceptance  Method: Explanation, Demonstration  Response: Needs Reinforcement  Comment: reviewed PT POC, role of PT    Signs/Symptoms, taught by Caity Wolf PT at 11/8/2024  1:22 PM.  Learner: Patient  Readiness: Acceptance  Method: Explanation, Demonstration  Response: Needs Reinforcement  Comment: reviewed PT POC, role of PT    Risk Factors, taught by Caity Wolf PT at 11/8/2024  1:22 PM.  Learner: Patient  Readiness: Acceptance  Method: Explanation, Demonstration  Response: Needs Reinforcement  Comment: reviewed PT POC, role of PT        Session Outcome  Patient supine, in bed at end of session, call light in reach, personal items in reach, all needs met, bed alarm on. Nursing notified about patient's performance, patient's position, and patient's response to therapy/activity.    AM-PAC - Mobility (Current Function)     Turning form your back to your side while in flat bed without using bedrails 2 - A Lot   Moving from lying on your back to sitting on the side of a flat bed without using bedrails 3 - A Little   Moving to and from a bed to a chair 2 - A Lot   Standing up from a chair using your arms 2 - A Lot   To walk in a hospital room 2 - A Lot   Climbing 3-5 steps with a railing 1 - Total   AM-PAC Mobility Score 12      ASSESSMENT AND PLAN     Progress Summary  11/8/24 Pt is a 88 year old male adm from short term rehab for SOB. Pt  pleasantly confused, oriented to self and place. Req min (A) for bed mobility, mod (A) for functional txs. Pt req (A) for amb due to gait and balance impairments. Given pt's current mobility deficits, rec ongoing inpatient PT and SNF at [facility]    Patient/Family Therapy Goals Statement: NA    PT Plan      Flowsheet Row Most Recent Value   Rehab Potential fair, will monitor progress closely at 11/08/2024 0840   Therapy Frequency 3 times/wk at 11/08/2024 0840   Planned Therapy Interventions strengthening, gait training, transfer training at 11/08/2024 0840            PT Discharge Recommendations      Flowsheet Row Most Recent Value   PT Recommended Discharge Disposition skilled nursing facility at 11/08/2024 0840   Anticipated Equipment Needs if Discharged Home (PT) none at 11/08/2024 0840                 PT Goals      Flowsheet Row Most Recent Value   Transfer Goal 1    Activity/Assistive Device all transfers at 11/08/2024 0840   [place] modified independence at 11/08/2024 0840   Time Frame short-term goal (STG) at 11/08/2024 0840   Progress/Outcome goal ongoing at 11/08/2024 0840   Gait Training Goal 1    Activity/Assistive Device gait (walking locomotion) at 11/08/2024 0840   [place] modified independence at 11/08/2024 0840   Distance 100 at 11/08/2024 0840   Time Frame short-term goal (STG) at 11/08/2024 0840   Progress/Outcome goal ongoing at 11/08/2024 0840

## 2024-11-08 NOTE — ASSESSMENT & PLAN NOTE
"History of recurrent right pleural effusion requiring thoracentesis S/p \"Right VATS pleural biopsy, insertion of pigtail catheter catheter 7/8/24.  Readmitted last month with placement of pigtail catheter 10/16 which was removed later.  no acute surgical intervention recommended  IR consultation for Pleurx catheter as patient will need long term drainage for his recurrent effusion  previous pleural biopsy and cytology washings have been negative for malignancy  CT chest no findings of new pneumonia     S/p pleurx placement on 11/8/24  Restart eliquis in AM  Remains afebrile, no hypoxia, and overall asxs  "

## 2024-11-08 NOTE — CONSULTS
"Behavioral Health Crisis Consult     Reason for Note    + depression screen on admission questions     Admitting Diagnoses  Hydropneumothorax [J94.8]     Interval History  Consult received due to positive suicide risk screening. Pt answered yes to question \"within the past month, have you wished to be dead or wished to go to sleep and not wake up?\" Following questions were answered no. Pt scored low on depression screening.   [facility] attempted to complete consult over the phone. Several unsuccessful attempts were made. After thorough chart review, pt does not seem to be exhibiting risky behaviors, nor has expressed current suicidality. Discussed case with RN who stated pt is somewhat confused and hard of hearing. At this time, behavioral health consult does not seem appropriate.     Plan   consult not appropriate at this time. Please re-consult if pt were to express current SI or if there are acute psychiatric concerns.     Monica Dodson M.A.  Pgr: [phone]   "

## 2024-11-08 NOTE — PLAN OF CARE
Problem: Adult Inpatient Plan of Care  Goal: Plan of Care Review  Outcome: Progressing  Flowsheets (Taken 11/8/2024 1321)  Progress: improving  Outcome Evaluation: 11/8/24 Pt is a 88 year old male adm from short term rehab for SOB. Pt pleasantly confused, oriented to self and place. Req min (A) for bed mobility, mod (A) for functional txs. Pt req (A) for amb due to gait and balance impairments. Given pt's current mobility deficits, rec ongoing inpatient PT and SNF at [facility]  Plan of Care Reviewed With: patient

## 2024-11-08 NOTE — CONSULTS
Thoracic Surgery Consult    Subjective     Dave Arredondo is a 88 y.o. male who was admitted for Hydropneumothorax [J94.8]. Patient was seen in consultation at the request of referring physician for management recommendations.     88M hx of CAD, DVT, rectal cancer s/p APR around 2013 who presented to ER for ?SOB (patient does not know why he is here).      He is well known to the thoracic surgery service. Last seen by our service 10/2024. At that time, he had a pleural effusion that required pigtail placement as well as tPA/dornase. CT after 6 doses of tpa/dornase showed persistent effusion. Prior ot that, in 7/2024, he had a similar course (pigtail, tPA dornase). After discussing with patient, decision made for pleural bx and pleurex catheter. Pleurodesis was not done per patient preference it seems. Pleural bx negative for malignancy. Pleural fluids did not grow any cultures.     Pigtail removed 10/25 by IR.     PSHx: L IHR 1970s, APR 2013, perineal hernia repair 2017, R VATS/pleural bx/pleurex catheter placement.  Meds: limited by patient knowledge; reports he takes xarelto but stopped it 2 days ago beucase it is poison     CT: Large R sided hydropneumothorax    AFVSS. Comfortable on RA. WBC 12.4.    Medical History:   Past Medical History:   Diagnosis Date    Colon cancer (CMS/HCC) 2012    Coronary artery disease     DVT (deep venous thrombosis) (CMS/HCC)     Hypertension     Lipid disorder     Pleural effusion     Pulmonary embolism (CMS/HCC)        Surgical History:   Past Surgical History   Procedure Laterality Date    Colostomy      Hernia repair      Thoracentesis      THORACOSCOPY--VATS-PLEURAL BIOPSY, PLEURX CATHETER PLACEMENT Right 7/8/2024    Performed by Sergey Dennis MD at [facility] OR [facility]       Social History:   Social History     Social History Narrative    Lives in [facility] in [place]        Family History:   Family History   Problem Relation Name Age of Onset    Heart disease  Biological Mother         Allergies: Patient has no known allergies.    Home Medications:   cefTAZidime (FORTAZ) IVPB 2 g/100 mL NSS vial in bag  2 g intravenous Once    vancomycin 1.5 gram/250 mL IVPB in NSS  20 mg/kg intravenous Once       Current Medications:  Current Facility-Administered Medications   Medication Dose Route Frequency Provider Last Rate Last Admin    cefTAZidime (FORTAZ) IVPB 2 g/100 mL NSS vial in bag  2 g intravenous Once Ritu Walton PA C 200 mL/hr at 11/07/24 2007 2 g at 11/07/24 2007    vancomycin 1.5 gram/250 mL IVPB in NSS  20 mg/kg intravenous Once Ritu Walton PA C         Current Outpatient Medications   Medication Sig Dispense Refill    acetaminophen (TYLENOL) 325 mg tablet Take 2 tablets (650 mg total) by mouth every 6 (six) hours as needed for pain.      albuterol HFA 90 mcg/actuation inhaler INHALE 2 PUFF USING INHALER EVERY EIGHT HOURS AS NEEDED      atorvastatin (LIPITOR) 20 mg tablet Take 20 mg by mouth daily.      cyanocobalamin (VITAMIN B-12) 1,000 mcg/mL injection Inject 1,000 mcg into the shoulder, thigh, or buttocks every 30 (thirty) days.      docusate sodium (COLACE) 100 mg capsule Take 1 capsule (100 mg total) by mouth 2 (two) times a day.      furosemide (LASIX) 20 mg tablet Take 20 mg by mouth daily.      melatonin ODT Take 1 tablet (3 mg total) by mouth nightly.      metoprolol succinate XL (TOPROL-XL) 100 mg 24 hr tablet Take 100 mg by mouth daily.      nitroglycerin (NITROSTAT) 0.4 mg SL tablet Place 0.4 mg under the tongue every 5 (five) minutes as needed.        pantoprazole (PROTONIX) 40 mg EC tablet Take 1 tablet (40 mg total) by mouth nightly Indications: GI ppx w/Xarelto. 30 tablet 0    polyethylene glycol (MIRALAX) 17 gram packet Take 17 g by mouth daily for 3 days.      rivaroxaban (XARELTO) 20 mg tablet Take 1 tablet (20 mg total) by mouth daily with dinner Indications: a clot in the lung, blood clot in a deep vein of the extremities.      senna  "(SENOKOT) 8.6 mg tablet Take 2 tablets by mouth 2 (two) times a day.         Review of Systems  Pertinent items are noted in HPI.    Objective     Physicial Exam  Visit Vitals  BP (!) 141/75 (BP Location: Right upper arm, Patient Position: Lying)   Pulse 72   Temp 36.6 °C (97.8 °F) (Tympanic)   Resp 17   Ht 1.702 m (5' 7\")   Wt 70.3 kg (155 lb)   SpO2 96%   BMI 24.28 kg/m²     Physical Exam  Constitutional:       General: He is awake.   Cardiovascular:      Rate and Rhythm: Normal rate.   Pulmonary:      Effort: Pulmonary effort is normal.   Neurological:      Mental Status: He is alert.   Psychiatric:         Behavior: Behavior is cooperative.           Labs  reviewed    Imaging  I have reviewed the Imaging from the last 24 hrs.    Assessment     88M hx of CAD, DVT, rectal cancer s/p APR around 2013, recurrent pleural effusions s/p VATS and pleural biopsy 7/2024. He returns with recurrent pleural effusion with R hydropneumothorax noted on CT        Plan     - admit to medicine  - no acute surgical intervention  - recommend pleurX catheter by IR, not pigtail catheter; patient will need long term drainage for his recurrent effusion  - previous pleural biopsy and cytology washings have been negative for malignancy  - Would anticipate DC with pleurX catheter in place  - discussed w Dr Sonny Dennis MD  "

## 2024-11-08 NOTE — DISCHARGE INSTR - OTHER ORDERS
PleurX DISCHARGE INSTRUCTIONS  You have had an PleurX drainage catheter placed in right chest.    If you received conscious/moderate sedation or analgesia with fentanyl for this procedure, you are not permitted to drive/operate machinery for      24 hours. Abstain from alcohol for the remainder of the day. Do not undertake in business matters.    USE ONLY PleurX DRAIN KIT SUPPLIES.    You may connect to PleurX drainage bag every 3-7 days, as needed, for relief of respiratory symptoms. You may drain up to 1 liter/s.  Initially, if fluid reaccummulates quickly, there may be leaking around the catheter site and more frequent drainage may be necessary.   It may also help to rest on the side opposite the catheter.    Follow PleurX drain protocol - clean hub with alcohol before connecting drainage bottle.  Place new cap over catheter hub when finished.    Keep a clean, dry dressing on the area at all times. Change once weekly, or sooner, if wet.   It is normal to experience some pain at the site over the next few days. You may take Tylenol for that pain.    Do not soak in a tub with the tube. Shower with the tube/bandage covered in plastic wrap.    Notify your primary physician and/or Interventional Radiologist IMMEDIATELY if any of the following occur:  Fever of 101 degrees F or chills   Swelling or redness, pain or discharge around the catheter   Sudden decrease in drainage volume   Lightheadedness or dizziness upon standing    Physician Contact Information  If you have a problem that you believe requires immediate attention, you should go to the Emergency Room, either at [facility] [address] or the closest hospital. If you believe that your problem can safely wait until you speak to a physician, you should contact your doctor or Interventional Radiologist.   It is usually easier to contact your own physician by calling the office, or after hours through the answering service. If you do not know the number, the hospital   can connect you by calling [phone].   If you have concerns that need to be answered by the Interventional Radiologist, you can reach him/ her as follows:   During regular weekday hours (8AM to 5PM), call [phone] and ask the technologist to connect you with the Interventional Radiologist.   During off hours for emergencies call [phone] and ask the hospital  to contact the Interventional Radiologist on-call.      [facility] strongly recommends that you visit a Primary Care Provider (PCP) regularly. Your PCP can help you implement the recommendations we gave you today, coordinate care among your specialists, as well as make sure you are up to date with wellness exams,immunizations and preventive screenings. Your PCP can also help when you are feeling sick, potentially avoiding the need for urgent care or emergency department visits. For these reasons, it is important that you follow up with your PCP at least annually or more often based upon your medical conditions. If you do not have a PCP, please [phone] ([phone]) or go to [email or web address] help with finding one.

## 2024-11-08 NOTE — OR SURGEON
Pre-Procedure patient identification:  I am the primary operating surgeon/proceduralist and I have reviewed the applicable pathology reports and radiology studies for this procedure. 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    Juan Obregon MD  Pager # [phone]  Interventional Radiologist at [facility]  3:50 PM 11/08/24

## 2024-11-08 NOTE — H&P
Hospital Medicine Service -  History & Physical        CHIEF COMPLAINT     Shortness of breath     HISTORY OF PRESENT ILLNESS        Dave Arredondo is a 88 y.o. male patient past medical history of rectal CA status post colostomy, CAD recurrent right pleural effusion status post VATS, thoracentesis small bowel obstruction presents with complaints of shortness of breath.  Patient poor historian [facility],, recurrent right-sided pleural effusion.  He currently denies any shortness of breath or chest pain.  Denies fever or chills or cough.  Patient was recently admitted to [facility] 10/14-10/25/2024 for right pleural effusion had IR placement of pigtail catheter, fluid cultures and cytology were negative.  Chest tube was removed by IR after repeat chest x-ray showed improved right hydropneumothorax.  Patient had a follow-up chest x-ray on 11/5 at the facility with findings of patchy bronchopneumonia subsequent atelectasis chronic right pleural effusion.    Vitals on admission temperature 97.8 blood pressure 136/71 pulse ox 95% room air.  He had a CAT scan of the chest with IV contrast in the ED with findings of nodular right pleural thickening large right hydropneumothorax clear to 5.16 for ear and fluid complaint medially in the right mid hemithorax.  Increased posterior right upper lobe atelectasis.    PAST MEDICAL AND SURGICAL HISTORY        Past Medical History:   Diagnosis Date    Rectal cancer (CMS/HCC) 2012    Coronary artery disease     DVT (deep venous thrombosis) (CMS/HCC)     Hypertension     Lipid disorder     Pleural effusion     Pulmonary embolism (CMS/HCC)     SBO (small bowel obstruction) (CMS/HCC)        Past Surgical History   Procedure Laterality Date    Colostomy      Hernia repair      Pleura biopsy      Thoracentesis      THORACOSCOPY--VATS-PLEURAL BIOPSY, PLEURX CATHETER PLACEMENT Right 7/8/2024    Performed by Sergey Dennis,  MD at [facility] [place]       MEDICATIONS        Medications Prior to Admission   Medication Sig Dispense Refill Last Dose/Taking    acetaminophen (TYLENOL) 325 mg tablet Take 2 tablets (650 mg total) by mouth every 6 (six) hours as needed for pain.   Taking As Needed    albuterol HFA 90 mcg/actuation inhaler Inhale 2 puffs every 8 (eight) hours as needed for shortness of breath or wheezing.   Taking As Needed    atorvastatin (LIPITOR) 20 mg tablet Take 20 mg by mouth daily.   Taking    cyanocobalamin (VITAMIN B-12) 1,000 mcg/mL injection Inject 1,000 mcg into the shoulder, thigh, or buttocks every 30 (thirty) days. Every 1st Monday of the month   Taking    docusate sodium (COLACE) 100 mg capsule Take 1 capsule (100 mg total) by mouth 2 (two) times a day.   Taking    furosemide (LASIX) 20 mg tablet Take 20 mg by mouth daily.   Taking    melatonin ODT Take 1 tablet (3 mg total) by mouth nightly.   Taking    metoprolol succinate XL (TOPROL-XL) 100 mg 24 hr tablet Take 100 mg by mouth daily.   Taking    nitroglycerin (NITROSTAT) 0.4 mg SL tablet Place 0.4 mg under the tongue every 5 (five) minutes as needed.     Taking As Needed    pantoprazole (PROTONIX) 40 mg EC tablet Take 1 tablet (40 mg total) by mouth nightly Indications: GI ppx w/Xarelto. 30 tablet 0 Taking    rivaroxaban (XARELTO) 20 mg tablet Take 1 tablet (20 mg total) by mouth daily with dinner Indications: a clot in the lung, blood clot in a deep vein of the extremities.   Taking    senna (SENOKOT) 8.6 mg tablet Take 2 tablets by mouth 2 (two) times a day.   Taking       ALLERGIES        Patient has no known allergies.    FAMILY HISTORY        Family History   Problem Relation Name Age of Onset    Heart disease Biological Mother         SOCIAL HISTORY        Social History     Socioeconomic History    Marital status:    Tobacco Use    Smoking status: Never    Smokeless tobacco: Never   Substance and Sexual Activity    Alcohol use: Not Currently    Drug use:  "Never    Sexual activity: Defer   Social History Narrative    Lives in the [facility] in [place]      Social Drivers of Health     Food Insecurity: No Food Insecurity (11/7/2024)    Hunger Vital Sign     Worried About Running Out of Food in the Last Year: Never true     Ran Out of Food in the Last Year: Never true   Transportation Needs: No Transportation Needs (10/14/2024)    PRAPARE - Transportation     Lack of Transportation (Medical): No     Lack of Transportation (Non-Medical): No   Housing Stability: Low Risk  (10/14/2024)    Housing Stability Vital Sign     Unable to Pay for Housing in the Last Year: No     Number of Times Moved in the Last Year: 1     Homeless in the Last Year: No       REVIEW OF SYSTEMS      All other systems reviewed and negative except as noted in HPI    PHYSICAL EXAMINATION        Visit Vitals  BP (!) 167/82 (BP Location: Right upper arm, Patient Position: Lying)   Pulse 73   Temp 36.6 °C (97.9 °F) (Oral)   Resp 18   Ht 1.702 m (5' 7\")   Wt 59.1 kg (130 lb 6.4 oz)   SpO2 94%   BMI 20.42 kg/m²     Body mass index is 20.42 kg/m².  Physical Exam  Vitals reviewed.   HENT:      Head: Normocephalic and atraumatic.   Eyes:      Extraocular Movements: Extraocular movements intact.   Cardiovascular:      Rate and Rhythm: Normal rate and regular rhythm.   Pulmonary:      Comments: Diminished right breath sounds  Abdominal:      General: Bowel sounds are normal.      Palpations: Abdomen is soft.      Tenderness: There is no abdominal tenderness.   Musculoskeletal:      Cervical back: Neck supple.      Right lower leg: No edema.      Left lower leg: No edema.   Neurological:      General: No focal deficit present.      Mental Status: He is alert and oriented to person, place, and time.         LABS / IMAGING / EKG        Labs  I have reviewed the patient's pertinent labs. Pertinent labs are within normal limits.  Results from last 7 days   Lab Units 11/07/24  1417   WBC K/uL 12.37* "   HEMOGLOBIN g/dL 12.1*   HEMATOCRIT % 37.2*   PLATELETS K/uL 297      Results from last 7 days   Lab Units 11/07/24  1417   SODIUM mEQ/L 137   POTASSIUM mEQ/L 3.5   CHLORIDE mEQ/L 103   CO2 mEQ/L 29   BUN mg/dL 17   CREATININE mg/dL 0.6*   GLUCOSE mg/dL 126*   CALCIUM mg/dL 8.9      Imaging  I have independently reviewed the pertinent imaging from the last 24 hrs.  CT CHEST WITH IV CONTRAST  Narrative: CLINICAL HISTORY: Pleural effusion, malignancy suspected    COMMENT:  Computed tomography of the chest is performed.  CT Dose:  One or more dose reduction techniques (e.g. automated exposure  control, adjustment of the mA and/or kV according to patient size, use of  iterative reconstruction technique) utilized for this examination.    Contrast: 75 mL Isovue 370 nonionic IV contrast  Comparison studies:   10/14/2024 CTA chest    FINDINGS:    LUNG PARENCHYMA AND PLEURA: Redemonstration of nodular right pleural thickening  suspicious for malignancy.  Right hydropneumothorax.  Fluid volume is similar to  the prior study from one month ago.  However, the pneumothorax component is new.  Potentially loculated 5.6 cm air and fluid component medially in the right mid  hemithorax.  Right lower lobe and right middle lobe volume loss and  consolidation similar to prior.  New posterior right upper lobe atelectasis.  Trace left pleural effusion.  No new left lung consolidation.    JENNIFER AND MEDIASTINUM: Stable    HEART AND PERICARDIUM: Cardiomegaly.  Coronary artery calcifications.  No  pericardial effusion.    CHEST WALL: Unremarkable    AXILLA: Unremarkable    BONY ARCHITECTURE: Stable    UPPER ABDOMEN: Tiny nonobstructing right upper pole renal calculus.  Partially  imaged hypodensities in the renal sinuses, consistent with parapelvic cysts  and/or pelvocaliectasis.  Impression: IMPRESSION:    1.  Redemonstration of nodular right pleural thickening consistent with  malignancy.  Large right hydropneumothorax.  Fluid component is  "similar to the  recent prior study.  Pneumothorax component is new.  There is a potentially  loculated 5.6 cm air and fluid component medially in the right mid hemithorax.  2.  Slightly increased posterior right upper lobe atelectasis compared to prior.  No significant change in right middle and right lower lobe volume loss and  consolidation.    Finding:    New or increased pneumothorax   Acuity: Critical  Status:  CLOSED    Critical read back was performed and results were read back by Dr. Acosta,  on  11/7/2024 6:34 PM .  X-RAY CHEST 1 VIEW  Narrative: CLINICAL HISTORY: Shortness of breath.  Impression: IMPRESSION: Small right effusion.    COMMENT: AP radiograph the chest reveals a small right effusion. Previously  noted right basal chest tube has been removed. There is patchy right basal  consolidation. Left lung is well-aerated. There is no pneumothorax. Comparison  is made to previous study dated 10/21/2024     ECG/Telemetry  EKG personally reviewed normal sinus rhythm    ASSESSMENT AND PLAN           * Hydropneumothorax  Assessment & Plan  History of recurrent right pleural effusio requiring thoracentesis S/p \"Right VATS pleural biopsy, insertion of pigtail catheter catheter 7/8/24.  Readmitted last month with placement of pigtail catheter 10/16 which was removed later.  no acute surgical intervention recommended  IR consultation for Pleurx catheter as patient will need long term drainage for his recurrent effusion  previous pleural biopsy and cytology washings have been negative for malignancy  CT chest no findings of new pneumonia   patient afebrile  Will hold off on antibiotics  Hold Xarelto for IR procedure tomorrow    PE (pulmonary thromboembolism) (CMS/HCC)  Assessment & Plan  On Xarelto  Will hold for now for Pleurx catheter placement    History of rectal cancer  Assessment & Plan  2 N1 M0 rectal cancer 2012, neoadjuvant chemotherapy and radiotherapy followed by surgery (APR), then had " multiple  complications after the surgery,     HLD (hyperlipidemia)  Assessment & Plan  Continue Lipitor         VTE Assessment: Padua VTE Score: 4  Code Status: Full Code  Estimated discharge date: 11/9/2024     Lauren Man MD  11/7/2024  10:51 PM

## 2024-11-08 NOTE — PLAN OF CARE
Care Coordination Admission Assessment Note    General Information:  Readmission Within the last 30 days: previous discharge plan unsuccessful  Does patient have a : No  Patient-Specific Goals (include timeframe):      Living Arrangements:  Arrived From: short term rehab (pt came from [facility]. Pt's family would like to explore different options on d/c.)  Current Living Arrangements: short-term skilled nursing facility  People in Home: facility resident  Home Accessibility: wheelchair accessible  Living Arrangement Comments: Pt has been living at [facility]    Housing Stability and Utility Access (SDOH):  In the last 12 months, was there a time when you were not able to pay the mortgage or rent on time?: No  In the past 12 months, how many times have you moved?: 0  At any time in the past 12 months, were you homeless or living in a shelter (including now)?: No  In the past 12 months has the electric, gas, oil, or water company threatened to shut off services in your home?: No    Functional Status Prior to Admission:   Assistive Device/Animal Currently Used at Home: shower chair, inhaler  Functional Status Comments: Pt is now an assist for ADLs and a MAX assist for IADLs. Pt is not driving.  IADL Comments:       Supports and Services:  Current Outpatient/Agency/Support Group: none  Type of Current Home Care Services:    History of home care episode or rehab stay: Pt came to [facility] from [facility].    Discharge Needs Assessment:   Concerns to be Addressed: care coordination/care conferences, discharge planning  Current Discharge Risk: chronically ill, cognitively impaired, dependent with mobility/activities of daily living  Anticipated Changes Related to Illness: inability to care for self    Patient/Family Anticipated Discharge Plan:  Patient/Family Anticipates Transition To: skilled nursing facility  Patient/Family Anticipated Services at Transition: skilled nursing    Connection to Community  Not  "applicable    Patient Choice:   Offered/Gave Vendor List: yes  Patient's Choice of Community Agency(s):    Patient and/or patient guardian/advocate was made aware of their right to choose a provider. A list of eligible providers was presented and reviewed with the patient and/or patient guardian/advocate in written and/or verbal form. The list delineates providers in the patients desired geographic area who are participating in the Medicare program and/or providers contracted with the patients primary insurance. The Medicare list and quality ratings were obtained from the Medicare.gov [medicare.gov] website.    Anticipated Discharge Plan:  Met with patient. Provided education and contact information for Care Coordination services.: yes  Anticipated Discharge Disposition: skilled nursing facility  Type of Skilled Nursing Care Services: nursing, PT, OT    Transportation Needs (SDOH):  Transportation Concerns: none  Transportation Anticipated: agency  Is Out of Hospital DNR needed at discharge?: no    In the past 12 months, has lack of transportation kept you from medical appointments or from getting medications?: No  In the past 12 months, has lack of transportation kept you from meetings, work, or from getting things needed for daily living?: No    Concerns - comments: EDUARDO attempted to complete CMA w/ pt. Pt unable to state where he came to hospital from. Pt also unable to state where he was and which hospital system he was in. EDUARDO called Eliezer, pt's brother, who answered CMA questions and askekd for a new facility. Pt does not have a designated POA. Pt's brother asking for SNF options on d/c. EDUARDO sent return referral to [facility] and requested other facilities to review for admission.      Addendum 1606: EDUARDO informed my attending that pt's \"brother\", Eliezer, is not an actual blood relation, but a friend from [employer]. Eliezer said to this SW that pt is his \"older brother,\" but Eliezer did not mean biologically, which this EDUARDO " originally thought he meant.    DCP: from [facility] but family would like to explore other options on d/c  Return referral to [facility] sent  Additional SNF referrals sent

## 2024-11-08 NOTE — PROGRESS NOTES
Hospital Medicine    Daily Progress Note       SUBJECTIVE   Interval History: Patient was seen and examined at bedside. No acute events overnight. No complaints    I personally called and spoke with patient's friend, Eliezer, and provided him with an update.  He was given the opportunity to have all of his questions answered to his satisfaction. He stated that he does NOT believe the patient has an advanced directive or living will. The patient does not have any family, so Eliezer is the closest that he has. The patient does not have a designated medical POA, but Eliezer has been acting as the patient medical healthcare proxy for some decisions. He states that the patient did not like [facility] and requested referrals be sent to other facilities.     Patient's paperwork from [facility] shows a DNR status. Our EMR shows FULL CODE, as it has the past several hospitalization. I discussed code status with Eliezer as the patient is not currently oriented to make medical decisions. He states that he does not feel comfortable making a decision on code status. Patient will remain FULL CODE.       OBJECTIVE      Vital signs in last 24 hours:  Temp:  [36.3 °C (97.3 °F)-36.7 °C (98 °F)] 36.3 °C (97.3 °F)  Heart Rate:  [70-82] 82  Resp:  [16-20] 16  BP: (135-189)/(68-88) 144/74    Intake/Output Summary (Last 24 hours) at 11/8/2024 1754  Last data filed at 11/8/2024 1649  Gross per 24 hour   Intake 50 ml   Output 975 ml   Net -925 ml       Current Facility-Administered Medications   Medication Dose Route Frequency    acetaminophen  650 mg oral q4h PRN    albuterol HFA  2 puff inhalation q8h PRN    atorvastatin  20 mg oral Daily (6p)    glucose  16-32 g of dextrose oral PRN    Or    dextrose  15-30 g of dextrose oral PRN    Or    glucagon  1 mg intramuscular PRN    Or    dextrose 50 % in water (D50)  25 mL intravenous PRN    docusate sodium  100 mg oral BID    furosemide  20 mg oral Daily    melatonin  3 mg oral Nightly    metoprolol succinate  XL  100 mg oral Daily    pantoprazole  40 mg oral Nightly    potassium  40 mEq oral Once    rivaroxaban  20 mg oral Daily with dinner    senna  2 tablet oral BID        PHYSICAL EXAMINATION      Physical Exam  General: NAD, pleasant, nontoxic, elderly, chronically ill appearing, sitting up in chair comfortably on RA  HEENT: atraumatic, normocephalic, mmm, sclera anicteric  Cardiovascular: RRR, no murmurs; S1/S2+  Pulmonary: CTAB; no rales, rhonchi or wheezing  Gi: Soft, nontender, nondistended, BS present, colostomy status  Ext: trace peripheral edema  Neuro: Alert and oriented x2; sensations and motor function grossly intact  Psych: Calm, cooperative, appropriate answers to simple questions       LINES, CATHETERS, DRAINS, AIRWAYS, AND WOUNDS   Lines, Drains, and Airways:  Wounds (agree with documentation and present on admission):  Peripheral IV (Adult) 11/07/24 Anterior;Distal;Right;Upper Arm (Active)   Number of days: 1       Colostomy Unknown LUQ (Active)   Number of days: 5341         Comments:      LABS / IMAGING / TELE      Labs  I have reviewed the patient's pertinent labs.  Significant abnormals are as below.  Results from last 7 days   Lab Units 11/08/24  0458 11/07/24  1417   WBC K/uL 10.71* 12.37*   HEMOGLOBIN g/dL 12.3* 12.1*   HEMATOCRIT % 37.7* 37.2*   PLATELETS K/uL 267 297    Results from last 7 days   Lab Units 11/08/24  0458 11/07/24  1417   SODIUM mEQ/L 139 137   POTASSIUM mEQ/L 3.4* 3.5   CHLORIDE mEQ/L 104 103   CO2 mEQ/L 28 29   BUN mg/dL 15 17   CREATININE mg/dL 0.6* 0.6*   GLUCOSE mg/dL 98 126*                        Results from last 7 days   Lab Units 11/07/24  1417   AST IU/L 21   ALT IU/L 26   ALK PHOS IU/L 122   BILIRUBIN TOTAL mg/dL 0.5   PROTEIN TOTAL g/dL 5.4*   ALBUMIN g/dL 2.8*    Results from last 7 days   Lab Units 11/08/24  0458   PROTIME sec 13.8   INR  1.1         Results from last 7 days   Lab Units 11/07/24  1630 11/07/24  1417   HIGH SENSITIVE TROPONIN I pg/mL 20.1* 22.3*         UA Results         10/14/24     1113    Color Yellow    Clarity Cloudy    Glucose Negative    Bilirubin Negative    Ketones +1    Sp Grav >1.035    Blood Negative    Ph 7.0    Protein Trace    Urobilinogen 2.0    Nitrite Negative    Leuk Est Negative    WBC 0 TO 3    RBC 0 TO 4    Bacteria None Seen           Comment for Ketones at 1113 on 10/14/24: Free sulfhydryl drugs such as Mesna, Capoten, and Acetylcysteine (Mucomyst) may cause false positive ketonuria.    Comment for Blood at 1113 on 10/14/24: The sensitivity of the occult blood test is equivalent to approximately 4 intact RBC/HPF.    Comment for Protein at 1113 on 10/14/24: Trace False Positive Protein can be seen with alkaline or highly buffered urines or urine with high specific gravity. Suggest clinical correlation.    Comment for Leuk Est at 1113 on 10/14/24: Results can be falsely negative due to high specific gravity, some antibiotics, glucose >3 g/dl, or WBC other than neutrophils.           Microbiology Results       Procedure Component Value Units Date/Time    SARS-COV-2 (COVID-19)/ FLU A/B, AND RSV, PCR Nasopharynx [[id number]]  (Normal) Collected: 11/07/24 1527    Specimen: Nasopharyngeal Swab from Nasopharynx Updated: 11/07/24 1614     SARS-CoV-2 (COVID-19) Negative     Influenza A Negative     Influenza B Negative     Respiratory Syncytial Virus Negative    Narrative:      Testing performed using real-time PCR for detection of COVID-19. EUA approved validation studies performed on site.     Body Fluid Culture/Smear Pleural Fluid, Right [[id number]] Collected: 10/16/24 1519    Specimen: Pleural Fluid, Right Updated: 10/21/24 0822     Culture No growth at 96 hours     Gram Stain Result No WBC Seen      No organisms seen    Fungal Stain Pleural Fluid, Right [[id number]] Collected: 10/16/24 1519    Specimen: Pleural Fluid, Right Updated: 10/17/24 0645     Fungus Stain No fungal elements seen    Fungal Culture Pleural Fluid, Right [[id number]]   (Normal) Collected: 10/16/24 1519    Specimen: Pleural Fluid, Right Updated: 10/18/24 0300     Fungal Culture No Fungus Isolated to Date    AFB stain Pleural Fluid, Right [[id number]]  (Normal) Collected: 10/16/24 1519    Specimen: Pleural Fluid, Right Updated: 10/17/24 1026     AFB Stain No acid fast bacilli seen    AFB Culture Pleural Fluid, Right [[id number]]  (Normal) Collected: 10/16/24 1519    Specimen: Pleural Fluid, Right Updated: 10/18/24 0801     AFB Culture No Acid Fast Bacilli Isolated to Date    SARS-COV-2 (COVID-19)/ FLU A/B, AND RSV, PCR Nasopharynx [[id number]]  (Normal) Collected: 10/14/24 0048    Specimen: Nasopharyngeal Swab from Nasopharynx Updated: 10/14/24 0142     SARS-CoV-2 (COVID-19) Negative     Influenza A Negative     Influenza B Negative     Respiratory Syncytial Virus Negative    Narrative:      Testing performed using real-time PCR for detection of COVID-19. EUA approved validation studies performed on site.                Imaging  I have independently reviewed the patient's pertinent imaging for this hospital visit.   CT CHEST WITH IV CONTRAST    Result Date: 11/7/2024  CLINICAL HISTORY: Pleural effusion, malignancy suspected COMMENT:  Computed tomography of the chest is performed. CT Dose:  One or more dose reduction techniques (e.g. automated exposure control, adjustment of the mA and/or kV according to patient size, use of iterative reconstruction technique) utilized for this examination. Contrast: 75 mL Isovue 370 nonionic IV contrast Comparison studies:   10/14/2024 CTA chest FINDINGS: LUNG PARENCHYMA AND PLEURA: Redemonstration of nodular right pleural thickening suspicious for malignancy.  Right hydropneumothorax.  Fluid volume is similar to the prior study from one month ago.  However, the pneumothorax component is new. Potentially loculated 5.6 cm air and fluid component medially in the right mid hemithorax.  Right lower lobe and right middle lobe volume loss and consolidation  similar to prior.  New posterior right upper lobe atelectasis. Trace left pleural effusion.  No new left lung consolidation. JENNIFER AND MEDIASTINUM: Stable HEART AND PERICARDIUM: Cardiomegaly.  Coronary artery calcifications.  No pericardial effusion. CHEST WALL: Unremarkable AXILLA: Unremarkable BONY ARCHITECTURE: Stable UPPER ABDOMEN: Tiny nonobstructing right upper pole renal calculus.  Partially imaged hypodensities in the renal sinuses, consistent with parapelvic cysts and/or pelvocaliectasis.     IMPRESSION: 1.  Redemonstration of nodular right pleural thickening consistent with malignancy.  Large right hydropneumothorax.  Fluid component is similar to the recent prior study.  Pneumothorax component is new.  There is a potentially loculated 5.6 cm air and fluid component medially in the right mid hemithorax. 2.  Slightly increased posterior right upper lobe atelectasis compared to prior. No significant change in right middle and right lower lobe volume loss and consolidation. Finding:    New or increased pneumothorax   Acuity: Critical  Status:  CLOSED Critical read back was performed and results were read back by Dr. Acosta,  on 11/7/2024 6:34 PM .     X-RAY CHEST 1 VIEW    Result Date: 11/7/2024  CLINICAL HISTORY: Shortness of breath.     IMPRESSION: Small right effusion. COMMENT: AP radiograph the chest reveals a small right effusion. Previously noted right basal chest tube has been removed. There is patchy right basal consolidation. Left lung is well-aerated. There is no pneumothorax. Comparison is made to previous study dated 10/21/2024    IR CHEST TUBE REMOVAL(BEDSIDE)    Result Date: 10/25/2024  CLINICAL HISTORY: Resolution of loculated right pleural effusion following pleural pigtail catheter placement and TPA dornase treatment. COMMENT: A request is received to remove the right pleural pigtail catheter as there has been decreasing output from the chest tube following TPA dornase treatment. The patient  "had had a previous Pleurx catheter which was removed secondary to low output. His effusion may recur and consideration for repeat Pleurx catheter if he becomes symptomatic. At bedside, the patient is correctly identified. No air leak is seen in the pleura vac placed to waterseal. He is turned onto his left side. The dressing is removed from the catheter. The areas cleansed with ChloraPrep. The retention suture removed and on expiration, the catheter transected and removed in its entirety. A sterile Vaseline gauze dressing is placed over the site. The patient tolerated the procedure well. No imaging. This service rendered by Melissa Tsang PA-C.     IMPRESSION: Successful bedside removal of right pleural pigtail catheter following TPA dornase treatments for loculated effusion. If effusion recurs and patient's symptomatic, could consider repeat Pleurx catheter placement. I certify that I have personally reviewed this examination and agree with Melissa Tsang's report. Cj Yen M.D.    X-RAY CHEST 2 VIEWS    Result Date: 10/21/2024  CLINICAL HISTORY:   f/u right pleural effusion s/p tpa/dornase COMMENT: Technique:  2 x-ray views of the chest. Comparison date: 10/17/2024. A loculated right hydropneumothorax is slightly improved. Decreased volume loss at the right lung base. A right basilar pigtail catheter remains. Left lung is clear. Cardiac silhouette is stable.     IMPRESSION: Improving right hydropneumothorax and basilar volume loss.    X-RAY CHEST 1 VIEW    Result Date: 10/17/2024  CLINICAL HISTORY: S/p pigtail placement for effusion COMPARISON: CT performed 10/14/2024, x-ray performed 10/13/2024 COMMENT: TECHNIQUE: Single frontal view of the chest was performed. Right pleural pigtail catheter terminates at the lung base. Fluid component of the loculated pleural effusion has decreased. Some locules of gas in the right basilar likely due to interval catheter placement, loculation and an element of \"trapped lung\". " Pleural fluid along the lateral upper and mid right hemithorax remains unchanged. Associated right basilar atelectasis/infiltrate is improving. Left lung grossly clear. A skin fold projects over the left hemithorax; there is no left pneumothorax. No alveolar edema. Stable cardiomegaly.     IMPRESSION: Loculated right hydropneumothorax at the lung base as described above; fluid component has decreased and aeration improved.     IR PLEURAL CATHETER PLACEMENT    Result Date: 10/16/2024  CLINICAL HISTORY:    88-year-old male with recurrent loculated right pleural effusion.     IMPRESSION:   Satisfactory 12 Norwegian right pigtail chest tube placement. COMMENT: PROCEDURE:  Chest tube placement. ANESTHESIA:  Lidocaine 1% local. FLUORO TIME:  0.1 minutes. REFERENCE AIR KERMA: 0.2 mGy. CONTRAST:  None. MEDICATIONS:  None. DESCRIPTION OF PROCEDURE:    Written informed consent was obtained.  Ultrasound demonstrates complex, multiloculated right pleural effusion.  The patient was prepped and draped in the usual sterile manner.  Using real-time ultrasound guidance, a Yueh needle was inserted into the right pleural effusion.  Following serial dilation, a 12 Norwegian pigtail chest tube was inserted.  Pleural fluid sample was collected and sent for requested laboratory analysis.  The catheter was secured with suture and connected to Pleur-evac drainage.  The patient tolerated the procedure well without immediate complication.     ULTRASOUND GUIDED NEEDLE PLACEMENT    Result Date: 10/16/2024  CLINICAL HISTORY:    88-year-old male with recurrent loculated right pleural effusion.     IMPRESSION:   Satisfactory 12 Norwegian right pigtail chest tube placement. COMMENT: PROCEDURE:  Chest tube placement. ANESTHESIA:  Lidocaine 1% local. FLUORO TIME:  0.1 minutes. REFERENCE AIR KERMA: 0.2 mGy. CONTRAST:  None. MEDICATIONS:  None. DESCRIPTION OF PROCEDURE:    Written informed consent was obtained.  Ultrasound demonstrates complex, multiloculated  right pleural effusion.  The patient was prepped and draped in the usual sterile manner.  Using real-time ultrasound guidance, a Yueh needle was inserted into the right pleural effusion.  Following serial dilation, a 12 Turkish pigtail chest tube was inserted.  Pleural fluid sample was collected and sent for requested laboratory analysis.  The catheter was secured with suture and connected to Pleur-evac drainage.  The patient tolerated the procedure well without immediate complication.     FLUOROSCOPY SMALL BOWEL STUDY THERAPEUTIC    Result Date: 10/15/2024  CLINICAL HISTORY: Abdominal pain.  Dilated small bowel, ileus versus small bowel obstruction COMMENT: A therapeutic protocol small bowel examination is performed with serial images obtained over the course of 12 hours after the administration of 120 mL of Gastrografin water-soluble oral contrast. Fluoroscopy time--0 minutes Exposures--9 Dose--not specified  views show residual contrast in the bladder from a preceding CT scan.  A nasogastric tube tip projects over the left upper quadrant of the abdomen, in keeping with a position in the proximal stomach. Initial images show contrast in the proximal stomach.  Subsequent images show faint density, probably very dilute oral contrast, in small bowel loops and eventually, on the 12 hour image, in what is probably the right colon.  Although definitive assessment is limited due to dilution of the administered contrast, suspected faint contrast in the right colon favors ileus or partial obstruction and mitigates against high-grade obstruction.  Please correlate clinically. Short interval follow-up abdominal radiograph recommended.     IMPRESSION: Limited assessment due to dilution of the administered contrast. Suspected faint contrast in loops of small bowel and eventually in what is probably the right colon on the 12 hour image, favoring ileus or partial obstruction and mitigating against high-grade obstruction.  Clinical correlation and radiographic follow-up recommended.    X-RAY ABDOMEN 1 VIEW    Result Date: 10/14/2024  CLINICAL HISTORY: NGt placement COMPARISON: CT dated 10/14/2024 COMMENT: A single frontal view of the abdomen was obtained. There is a nasogastric tube terminating within the stomach. The visualized lung bases are stable. Incomplete visualization of the bowel gas pattern. No findings of pneumoperitoneum. The osseous structures are unremarkable.     IMPRESSION: Nasogastric tube in satisfactory position.     CT ANGIOGRAPHY CHEST PULMONARY EMBOLISM WITH IV CONTRAST    Addendum Date: 10/14/2024    Correction to IMPRESSION ADDENDUM: 3. Subtotal atelectasis of the RIGHT LOWER and MIDDLE lobes. The left lung is clear.     Addendum Date: 10/14/2024    Correction of a speech recognition/transcription error in the IMPRESSION section of the report. 2. Loculated RIGHT pleural effusion with pleural thickening. Empyema not excluded.    Result Date: 10/14/2024  CLINICAL HISTORY: Chest pain. Suspected pulmonary embolism. Left lower quadrant pain. COMPARISON: Chest x-ray dated 8/20/2024, CT dated 7/7/2024, and other studies TECHNIQUE: CT pulmonary angiography of the chest was performed after contrast administration. Maximum intensity projection (MIP) reconstructions were included for evaluation of pulmonary embolism. Three-dimensional maximum intensity projection imaging was also performed of the vasculature. Then, a contrast enhanced CT of the abdomen and pelvis was performed. Reformatted axial, sagittal and coronal images were reviewed. Dose reduction techniques such as automated exposure control were used in this study where applicable. Administered contrast and dose: 100mL of iopamidoL (ISOVUE-370) 370 mg iodine /mL (76 %) injection 100 mL COMMENT: Thorax: The main pulmonary artery is normal in caliber. Multiple pulmonary arterial filling defects are seen involving lobar, segmental subsegmental branches in the left  upper and left lower lobes. No right-sided pulmonary arterial filling defects are appreciated. The visualized thyroid gland is unremarkable. No thoracic lymphadenopathy. No aortic aneurysm. Mild cardiomegaly.  No pericardial effusion. Coronary calcifications noted. The central airways are grossly patent. There is a moderate-sized loculated right pleural effusion with associated pleural thickening, most pronounced at the lung bases. There is overall volume loss in the right lung with subtotal atelectasis in the right lower middle lobes. These findings are similar to the prior study. There is a stellate nodular opacity in the left upper lobe, series 4 image 88. This has been stable since at least 2020. There are dependent hypoventilatory changes in the left lung base. No left pleural effusion or pneumothorax. The bones of the chest are stable. Abdomen/Pelvis: The liver, gallbladder, pancreas, spleen and adrenal glands are unremarkable. There is a single nonobstructing stone in the upper pole of the left kidney. Multiple bilateral renal sinus cysts are noted, larger on the left. There are also several subcentimeter cortical hypodensities both kidneys, too small to characterize. The major abdominal vasculature is patent. There is a left lower quadrant end colostomy. 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 The proximal to mid small bowel is mildly dilated with layering air-fluid levels. A transition point is seen in the left midabdomen, axial image 114 through 109. Findings are concerning for bowel obstruction. The change with a separate from the above-noted parastomal hernia. No large volume ascites, intra-abdominal abscess or pneumoperitoneum. No lymphadenopathy by size criteria. Small fat-containing left inguinal hernia. The urinary bladder is unremarkable. The prostate is enlarged. Mild degenerative changes in the spine and hips.     IMPRESSION: 1.  Left upper and lower lobe pulmonary emboli. 2.   Loculated left pleural effusion with pleural thickening. Empyema not excluded. 3.  Subtotal atelectasis in the left upper and lower lobe. 4.  Dilated small bowel with transition point in the left midabdomen, concerning for bowel obstruction. 5.  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 Critical findings were documented in the preliminary interpretation by the overnight radiologist. Findings were acknowledged by emergency department staff, as documented in the EMR on 10/14/2024 at 2:48 AM    CT ABDOMEN PELVIS WITH IV CONTRAST    Addendum Date: 10/14/2024    Correction to IMPRESSION ADDENDUM: 3. Subtotal atelectasis of the RIGHT LOWER and MIDDLE lobes. The left lung is clear.     Addendum Date: 10/14/2024    Correction of a speech recognition/transcription error in the IMPRESSION section of the report. 2. Loculated RIGHT pleural effusion with pleural thickening. Empyema not excluded.    Result Date: 10/14/2024  CLINICAL HISTORY: Chest pain. Suspected pulmonary embolism. Left lower quadrant pain. COMPARISON: Chest x-ray dated 8/20/2024, CT dated 7/7/2024, and other studies TECHNIQUE: CT pulmonary angiography of the chest was performed after contrast administration. Maximum intensity projection (MIP) reconstructions were included for evaluation of pulmonary embolism. Three-dimensional maximum intensity projection imaging was also performed of the vasculature. Then, a contrast enhanced CT of the abdomen and pelvis was performed. Reformatted axial, sagittal and coronal images were reviewed. Dose reduction techniques such as automated exposure control were used in this study where applicable. Administered contrast and dose: 100mL of iopamidoL (ISOVUE-370) 370 mg iodine /mL (76 %) injection 100 mL COMMENT: Thorax: The main pulmonary artery is normal in caliber. Multiple pulmonary arterial filling defects are seen involving lobar, segmental subsegmental branches in the left upper and left  lower lobes. No right-sided pulmonary arterial filling defects are appreciated. The visualized thyroid gland is unremarkable. No thoracic lymphadenopathy. No aortic aneurysm. Mild cardiomegaly.  No pericardial effusion. Coronary calcifications noted. The central airways are grossly patent. There is a moderate-sized loculated right pleural effusion with associated pleural thickening, most pronounced at the lung bases. There is overall volume loss in the right lung with subtotal atelectasis in the right lower middle lobes. These findings are similar to the prior study. There is a stellate nodular opacity in the left upper lobe, series 4 image 88. This has been stable since at least 2020. There are dependent hypoventilatory changes in the left lung base. No left pleural effusion or pneumothorax. The bones of the chest are stable. Abdomen/Pelvis: The liver, gallbladder, pancreas, spleen and adrenal glands are unremarkable. There is a single nonobstructing stone in the upper pole of the left kidney. Multiple bilateral renal sinus cysts are noted, larger on the left. There are also several subcentimeter cortical hypodensities both kidneys, too small to characterize. The major abdominal vasculature is patent. There is a left lower quadrant end colostomy. 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 The proximal to mid small bowel is mildly dilated with layering air-fluid levels. A transition point is seen in the left midabdomen, axial image 114 through 109. Findings are concerning for bowel obstruction. The change with a separate from the above-noted parastomal hernia. No large volume ascites, intra-abdominal abscess or pneumoperitoneum. No lymphadenopathy by size criteria. Small fat-containing left inguinal hernia. The urinary bladder is unremarkable. The prostate is enlarged. Mild degenerative changes in the spine and hips.     IMPRESSION: 1.  Left upper and lower lobe pulmonary emboli. 2.  Loculated  "left pleural effusion with pleural thickening. Empyema not excluded. 3.  Subtotal atelectasis in the left upper and lower lobe. 4.  Dilated small bowel with transition point in the left midabdomen, concerning for bowel obstruction. 5.  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 Critical findings were documented in the preliminary interpretation by the overnight radiologist. Findings were acknowledged by emergency department staff, as documented in the EMR on 10/14/2024 at 2:48 AM    X-RAY CHEST 2 VIEWS    Result Date: 10/14/2024  CLINICAL HISTORY: Chest Pain/Arrhythmia TECHNIQUE: Frontal and lateral views of the chest were obtained COMPARISON: Concurrent CT, chest x-ray dated 8/2/2024, and other studies COMMENT: There is right-sided diffuse pleural thickening and evidence of a loculated effusion. There is also right basilar airspace opacity. Findings are better evaluated on concurrent chest CT. The left lung is clear. The cardiomediastinal silhouette is within normal limits.     IMPRESSION: Loculated right pleural effusion and basilar airspace opacity.    X-RAY ABDOMEN 1 VIEW    Result Date: 10/14/2024  CLINICAL HISTORY: tube placement COMPARISON: Abdominal x-ray performed 3 hours prior COMMENT: A single frontal view of the abdomen was obtained. The nasogastric tube has been advanced in the interval, terminating in the region of the stomach. The visualized lung bases and visualized bowel gas pattern are unchanged in the interval. The osseous structures are unremarkable.     IMPRESSION: Nasogastric tube in satisfactory position.       ECG/Telemetry  Patient is not on telemetry.    ASSESSMENT AND PLAN           Assessment & Plan  Hydropneumothorax  History of recurrent right pleural effusion requiring thoracentesis S/p \"Right VATS pleural biopsy, insertion of pigtail catheter catheter 7/8/24.  Readmitted last month with placement of pigtail catheter 10/16 which was removed later.  no " acute surgical intervention recommended  IR consultation for Pleurx catheter as patient will need long term drainage for his recurrent effusion  previous pleural biopsy and cytology washings have been negative for malignancy  CT chest no findings of new pneumonia     S/p pleurx placement on 11/8/24  Restart eliquis in AM  Remains afebrile, no hypoxia, and overall asxs  HLD (hyperlipidemia)  Continue Lipitor  History of rectal cancer  2 N1 M0 rectal cancer 2012, neoadjuvant chemotherapy and radiotherapy followed by surgery (APR), then had multiple  complications after the surgery,   PE (pulmonary thromboembolism) (CMS/HCC)  Xarelto held in anticipation of procedure  Restart with tomorrow's dose    VTE Assessment: Padua VTE Score: 4  VTE Prophylaxis:  Current anticoagulants:  rivaroxaban (XARELTO) tablet 20 mg, oral, Daily with dinner      Code Status: Full Code      Estimated Discharge Date: 11/9/2024   Disposition Planning: possible dc tomorrow, pending [facility]     Solitario Mckeon, DO  11/8/2024       This patient note has been dictated using speech recognition software. Inadvertent speech recognition errors should be disregarded.

## 2024-11-08 NOTE — ASSESSMENT & PLAN NOTE
2 N1 M0 rectal cancer 2012, neoadjuvant chemotherapy and radiotherapy followed by surgery (APR), then had multiple  complications after the surgery,

## 2024-11-09 LAB
ANION GAP SERPL CALC-SCNC: 4 MEQ/L (ref 3–15)
BUN SERPL-MCNC: 14 MG/DL (ref 7–25)
CALCIUM SERPL-MCNC: 8.9 MG/DL (ref 8.6–10.3)
CHLORIDE SERPL-SCNC: 102 MEQ/L (ref 98–107)
CO2 SERPL-SCNC: 29 MEQ/L (ref 21–31)
CREAT SERPL-MCNC: 0.6 MG/DL (ref 0.7–1.3)
EGFRCR SERPLBLD CKD-EPI 2021: >60 ML/MIN/1.73M*2
ERYTHROCYTE [DISTWIDTH] IN BLOOD BY AUTOMATED COUNT: 14.8 % (ref 11.6–14.4)
GLUCOSE SERPL-MCNC: 120 MG/DL (ref 70–99)
HCT VFR BLD AUTO: 36.2 % (ref 40.1–51)
HGB BLD-MCNC: 11.9 G/DL (ref 13.7–17.5)
MCH RBC QN AUTO: 29.5 PG (ref 28–33.2)
MCHC RBC AUTO-ENTMCNC: 32.9 G/DL (ref 32.2–36.5)
MCV RBC AUTO: 89.6 FL (ref 83–98)
PLATELET # BLD AUTO: 269 K/UL (ref 150–350)
PMV BLD AUTO: 8.7 FL (ref 9.4–12.4)
POTASSIUM SERPL-SCNC: 4.1 MEQ/L (ref 3.5–5.1)
RBC # BLD AUTO: 4.04 M/UL (ref 4.5–5.8)
SODIUM SERPL-SCNC: 135 MEQ/L (ref 136–145)
WBC # BLD AUTO: 10.73 K/UL (ref 3.8–10.5)

## 2024-11-09 PROCEDURE — 85027 COMPLETE CBC AUTOMATED: CPT | Performed by: HOSPITALIST

## 2024-11-09 PROCEDURE — G0378 HOSPITAL OBSERVATION PER HR: HCPCS

## 2024-11-09 PROCEDURE — 63700000 HC SELF-ADMINISTRABLE DRUG: Performed by: STUDENT IN AN ORGANIZED HEALTH CARE EDUCATION/TRAINING PROGRAM

## 2024-11-09 PROCEDURE — 63700000 HC SELF-ADMINISTRABLE DRUG: Performed by: HOSPITALIST

## 2024-11-09 PROCEDURE — 36415 COLL VENOUS BLD VENIPUNCTURE: CPT | Performed by: HOSPITALIST

## 2024-11-09 PROCEDURE — 80048 BASIC METABOLIC PNL TOTAL CA: CPT | Performed by: HOSPITALIST

## 2024-11-09 PROCEDURE — 12000000 HC ROOM AND CARE MED/SURG

## 2024-11-09 PROCEDURE — 99233 SBSQ HOSP IP/OBS HIGH 50: CPT | Performed by: HOSPITALIST

## 2024-11-09 RX ADMIN — RIVAROXABAN 20 MG: 20 TABLET, FILM COATED ORAL at 17:25

## 2024-11-09 RX ADMIN — FUROSEMIDE 20 MG: 20 TABLET ORAL at 08:14

## 2024-11-09 RX ADMIN — ATORVASTATIN CALCIUM 20 MG: 20 TABLET, FILM COATED ORAL at 17:25

## 2024-11-09 RX ADMIN — METOPROLOL SUCCINATE 100 MG: 100 TABLET, EXTENDED RELEASE ORAL at 08:14

## 2024-11-09 RX ADMIN — PANTOPRAZOLE SODIUM 40 MG: 40 TABLET, DELAYED RELEASE ORAL at 21:04

## 2024-11-09 RX ADMIN — Medication 3 MG: at 21:04

## 2024-11-09 ASSESSMENT — COGNITIVE AND FUNCTIONAL STATUS - GENERAL
CLIMB 3 TO 5 STEPS WITH RAILING: 1 - TOTAL
MOVING TO AND FROM BED TO CHAIR: 2 - A LOT
WALKING IN HOSPITAL ROOM: 2 - A LOT
WALKING IN HOSPITAL ROOM: 2 - A LOT
MOVING TO AND FROM BED TO CHAIR: 2 - A LOT
CLIMB 3 TO 5 STEPS WITH RAILING: 1 - TOTAL
STANDING UP FROM CHAIR USING ARMS: 2 - A LOT
STANDING UP FROM CHAIR USING ARMS: 2 - A LOT

## 2024-11-09 NOTE — PLAN OF CARE
Plan of Care Review  Plan of Care Reviewed With: patient  Progress: no change  Outcome Evaluation: Assumed care of patient at 0700. VSS, pt forgetful but otherwise oriented to time, situation, person. Pt tolerating meals, R side pleurex catheter side dry, intact, no drainage or concerns during shift. Patient denies pain.

## 2024-11-09 NOTE — PROGRESS NOTES
"    Hospital Medicine     Daily Progress Note       SUBJECTIVE   Interval History: Patient seen and examined this morning  S/p right Pleurx catheter placement postop day 1  Pain under control  Denies any chest pain or shortness of breath  My colleague Dr. Mckeon spoke to the friend Eliezer who wanted him to continue patient full code  Xarelto restarted last night  We will check a repeat chest x-ray tomorrow and the plan is to send him back to [place] extended-care   OBJECTIVE      Vital signs in last 24 hours:  Temp:  [36.3 °C (97.3 °F)-36.8 °C (98.3 °F)] 36.8 °C (98.3 °F)  Heart Rate:  [72-84] 76  Resp:  [15-20] 18  BP: (135-189)/(68-79) 146/73    Intake/Output Summary (Last 24 hours) at 11/9/2024 0915  Last data filed at 11/8/2024 1649  Gross per 24 hour   Intake 50 ml   Output 550 ml   Net -500 ml       PHYSICAL EXAMINATION      Physical Exam    Alert and orientated x 3 heart sounds are normal chest revealed decreased air entry at the right base abdomen is soft nontender no organomegaly CNS examination is grossly nonfocal   LINES, CATHETERS, DRAINS, AIRWAYS, AND WOUNDS   Lines, Drains, and Airways:  Wounds (agree with documentation and present on admission):  Peripheral IV (Adult) 11/07/24 Anterior;Distal;Right;Upper Arm (Active)   Number of days: 2       Drain 1 Lateral;Right Back 15.5 Fr. (Active)   Number of days: 1       Colostomy Unknown LUQ (Active)   Number of days: 5342         Comments:    LABS / IMAGING / TELE      Labs  I have reviewed the patient's pertinent labs. Pertinent labs are within normal limits.      Imaging  I have independently reviewed the pertinent imaging from the last 24 hrs.    ECG/Telemetry  I have independently reviewed the telemetry. No events for the last 24 hours.    ASSESSMENT AND PLAN        Assessment & Plan  Hydropneumothorax  History of recurrent right pleural effusion requiring thoracentesis S/p \"Right VATS pleural biopsy, insertion of pigtail catheter catheter " 7/8/24.  Readmitted last month with placement of pigtail catheter 10/16 which was removed later.  no acute surgical intervention recommended  IR consultation for Pleurx catheter as patient will need long term drainage for his recurrent effusion  previous pleural biopsy and cytology washings have been negative for malignancy  CT chest no findings of new pneumonia     S/p pleurx placement on 11/8/24  Restart eliquis in AM  Remains afebrile, no hypoxia, and overall asxs  HLD (hyperlipidemia)  Continue Lipitor  History of rectal cancer  2 N1 M0 rectal cancer 2012, neoadjuvant chemotherapy and radiotherapy followed by surgery (APR), then had multiple  complications after the surgery,   PE (pulmonary thromboembolism) (CMS/HCC)  Xarelto held in anticipation of procedure  Restart with tomorrow's dose    VTE Assessment: Padua VTE Score: 4  VTE Prophylaxis:  Current anticoagulants:  rivaroxaban (XARELTO) tablet 20 mg, oral, Daily with dinner      Code Status: Full Code      Estimated Discharge Date: 11/10/2024   Disposition Planning: snf when stable     Simon Adams MD  11/9/2024

## 2024-11-09 NOTE — PLAN OF CARE
Plan of Care Review  Plan of Care Reviewed With: patient  Progress: no change  Outcome Evaluation: Pt not sure why he was here.  he can answer name, birthday, year. Not sure of name of hospital. Appetite fair. Needed assistance with meal set up.  He thought the roll for his hamburger was eggs.  Unsteady on feet with transfer from chair to bed. Ostomy patent.  Voiding brad urine. PO fluids encouraged. Patient would prefer to sleep. Possible IR intervention today. Plan return to SNF when medically ready.

## 2024-11-09 NOTE — PLAN OF CARE
Plan of Care Review  Plan of Care Reviewed With: patient  Outcome Evaluation: Patient sleeping in bed. Continues to oriented to self but not oriented to place, time or situation. Ostomy patent. Voiding Bed alarm on. Plan is for discharge back to [facility] care

## 2024-11-10 ENCOUNTER — APPOINTMENT (INPATIENT)
Dept: RADIOLOGY | Facility: HOSPITAL | Age: 89
DRG: 187 | End: 2024-11-10
Attending: HOSPITALIST
Payer: COMMERCIAL

## 2024-11-10 LAB
ERYTHROCYTE [DISTWIDTH] IN BLOOD BY AUTOMATED COUNT: 14.7 % (ref 11.6–14.4)
HCT VFR BLD AUTO: 39.4 % (ref 40.1–51)
HGB BLD-MCNC: 12.8 G/DL (ref 13.7–17.5)
MCH RBC QN AUTO: 29.4 PG (ref 28–33.2)
MCHC RBC AUTO-ENTMCNC: 32.5 G/DL (ref 32.2–36.5)
MCV RBC AUTO: 90.6 FL (ref 83–98)
PLATELET # BLD AUTO: 280 K/UL (ref 150–350)
PMV BLD AUTO: 8.9 FL (ref 9.4–12.4)
RBC # BLD AUTO: 4.35 M/UL (ref 4.5–5.8)
WBC # BLD AUTO: 10.23 K/UL (ref 3.8–10.5)

## 2024-11-10 PROCEDURE — 12000000 HC ROOM AND CARE MED/SURG

## 2024-11-10 PROCEDURE — 99233 SBSQ HOSP IP/OBS HIGH 50: CPT | Performed by: HOSPITALIST

## 2024-11-10 PROCEDURE — G0378 HOSPITAL OBSERVATION PER HR: HCPCS

## 2024-11-10 PROCEDURE — 63700000 HC SELF-ADMINISTRABLE DRUG: Performed by: HOSPITALIST

## 2024-11-10 PROCEDURE — 63700000 HC SELF-ADMINISTRABLE DRUG: Performed by: STUDENT IN AN ORGANIZED HEALTH CARE EDUCATION/TRAINING PROGRAM

## 2024-11-10 PROCEDURE — 71045 X-RAY EXAM CHEST 1 VIEW: CPT

## 2024-11-10 PROCEDURE — 36415 COLL VENOUS BLD VENIPUNCTURE: CPT | Performed by: HOSPITALIST

## 2024-11-10 PROCEDURE — 85027 COMPLETE CBC AUTOMATED: CPT | Performed by: HOSPITALIST

## 2024-11-10 RX ADMIN — FUROSEMIDE 20 MG: 20 TABLET ORAL at 08:47

## 2024-11-10 RX ADMIN — ATORVASTATIN CALCIUM 20 MG: 20 TABLET, FILM COATED ORAL at 16:43

## 2024-11-10 RX ADMIN — Medication 3 MG: at 21:27

## 2024-11-10 RX ADMIN — DOCUSATE SODIUM 100 MG: 100 CAPSULE, LIQUID FILLED ORAL at 08:47

## 2024-11-10 RX ADMIN — SENNOSIDES 2 TABLET: 8.6 TABLET, FILM COATED ORAL at 08:47

## 2024-11-10 RX ADMIN — ACETAMINOPHEN 650 MG: 325 TABLET ORAL at 12:26

## 2024-11-10 RX ADMIN — METOPROLOL SUCCINATE 100 MG: 100 TABLET, EXTENDED RELEASE ORAL at 08:47

## 2024-11-10 RX ADMIN — PANTOPRAZOLE SODIUM 40 MG: 40 TABLET, DELAYED RELEASE ORAL at 21:27

## 2024-11-10 RX ADMIN — RIVAROXABAN 20 MG: 20 TABLET, FILM COATED ORAL at 16:43

## 2024-11-10 ASSESSMENT — COGNITIVE AND FUNCTIONAL STATUS - GENERAL
WALKING IN HOSPITAL ROOM: 2 - A LOT
WALKING IN HOSPITAL ROOM: 2 - A LOT
STANDING UP FROM CHAIR USING ARMS: 2 - A LOT
CLIMB 3 TO 5 STEPS WITH RAILING: 1 - TOTAL
MOVING TO AND FROM BED TO CHAIR: 3 - A LITTLE
CLIMB 3 TO 5 STEPS WITH RAILING: 1 - TOTAL
STANDING UP FROM CHAIR USING ARMS: 2 - A LOT
MOVING TO AND FROM BED TO CHAIR: 3 - A LITTLE

## 2024-11-10 NOTE — PLAN OF CARE
Plan of Care Review  Plan of Care Reviewed With: patient  Progress: no change  Outcome Evaluation: Assumed care of patient at 0700, VSS, pt intermittently forgetful, reoriented. Pt up in chair several hours during the day, x2 assist with walker. R side pleurex catheter remains in place, dry and intact.

## 2024-11-10 NOTE — PROGRESS NOTES
"    [facility] Hospital Medicine     Daily Progress Note       SUBJECTIVE   Interval History: Patient seen and examined this morning  Stable overnight  Denies any chest pain or shortness of breath  Scheduled for chest x-ray this morning  Spoke to his designated caregiver Mr. Eliezer Hennessy who wants him to go to a different place other than the [facility]     OBJECTIVE      Vital signs in last 24 hours:  Temp:  [36.5 °C (97.7 °F)-36.9 °C (98.5 °F)] 36.5 °C (97.7 °F)  Heart Rate:  [71-75] 71  Resp:  [18] 18  BP: (113-150)/(65-83) 150/83  No intake or output data in the 24 hours ending 11/10/24 0846    PHYSICAL EXAMINATION      Physical Exam    Alert and orientated x 3 heart sounds are normal chest revealed decreased air entry at the right base abdomen is soft nontender no organomegaly CNS examination is grossly nonfocal extremities reveal no edema   LINES, CATHETERS, DRAINS, AIRWAYS, AND WOUNDS   Lines, Drains, and Airways:  Wounds (agree with documentation and present on admission):  Peripheral IV (Adult) 11/07/24 Anterior;Distal;Right;Upper Arm (Active)   Number of days: 3       Drain 1 Lateral;Right Back 15.5 Fr. (Active)   Number of days: 2       Colostomy Unknown LUQ (Active)   Number of days: 5343         Comments:    LABS / IMAGING / TELE      Labs  I have reviewed the patient's pertinent labs. Pertinent labs are within normal limits.      Imaging  I have independently reviewed the pertinent imaging from the last 24 hrs.    ECG/Telemetry  Patient is not on telemetry.    ASSESSMENT AND PLAN        Assessment & Plan  Hydropneumothorax  History of recurrent right pleural effusion requiring thoracentesis S/p \"Right VATS pleural biopsy, insertion of pigtail catheter catheter 7/8/24.  Readmitted last month with placement of pigtail catheter 10/16 which was removed later.  no acute surgical intervention recommended  IR consultation for Pleurx catheter as patient will need long term drainage for his recurrent " effusion  previous pleural biopsy and cytology washings have been negative for malignancy  CT chest no findings of new pneumonia     S/p pleurx placement on 11/8/24  Restart eliquis in AM  Remains afebrile, no hypoxia, and overall asxs  HLD (hyperlipidemia)  Continue Lipitor  History of rectal cancer  2 N1 M0 rectal cancer 2012, neoadjuvant chemotherapy and radiotherapy followed by surgery (APR), then had multiple  complications after the surgery,   PE (pulmonary thromboembolism) (CMS/HCC)  Xarelto held in anticipation of procedure  Restart with tomorrow's dose    VTE Assessment: Padua VTE Score: 4  VTE Prophylaxis:  Current anticoagulants:  rivaroxaban (XARELTO) tablet 20 mg, oral, Daily with dinner      Code Status: Full Code      Estimated Discharge Date: 11/11/2024   Disposition Planning: snf when bed available     Simon Adams MD  11/10/2024

## 2024-11-10 NOTE — PLAN OF CARE
Care Coordination Discharge Plan Note     Discharge Needs Assessment  Concerns to be Addressed: care coordination/care conferences, discharge planning  Current Discharge Risk: chronically ill, cognitively impaired, dependent with mobility/activities of daily living    Anticipated Discharge Plan  Anticipated Discharge Disposition: skilled nursing facility  Type of Skilled Nursing Care Services: nursing, PT, OT      Patient Choice  Offered/Gave Vendor List: yes  Patient's Choice of Community Agency(s):      Patient and/or patient guardian/advocate was made aware of their right to choose a provider. A list of eligible providers was presented and reviewed with the patient and/or patient guardian/advocate in written and/or verbal form. The list delineates providers in the patients desired geographic area who are participating in the Medicare program and/or providers contracted with the patients primary insurance. The Medicare list and quality ratings were obtained from the Medicare.gov [medicare.gov] website.    ---------------------------------------------------------------------------------------------------------------------    Interdisciplinary Discharge Plan Review:  Participants:     Concerns Comments: WE/CC; Revieved snf acceptances in ecin. Pt has multiple bed offers. Called pts' designated contact Lea Beltre @[phone]. Provided verbal list of facilities willing to accept pt. Yefri Beltre chose [facility]. Informed him that CC wll update the facility via ecin. Mr Beltre will go to [facility] and retrieve patients' personal belongings.CC to follow for dc needs.    Discharge Plan:   Disposition/Destination:   /    Discharge Facility:    Community Resources:      Discharge Transportation:  Is Out of Hospital DNR needed at Discharge: no  Does patient need discharge transport?

## 2024-11-11 LAB
BACTERIA BLD CULT: NORMAL
BACTERIA BLD CULT: NORMAL

## 2024-11-11 PROCEDURE — 63700000 HC SELF-ADMINISTRABLE DRUG: Performed by: STUDENT IN AN ORGANIZED HEALTH CARE EDUCATION/TRAINING PROGRAM

## 2024-11-11 PROCEDURE — G0378 HOSPITAL OBSERVATION PER HR: HCPCS

## 2024-11-11 PROCEDURE — 97530 THERAPEUTIC ACTIVITIES: CPT | Mod: GP

## 2024-11-11 PROCEDURE — 63700000 HC SELF-ADMINISTRABLE DRUG: Performed by: HOSPITALIST

## 2024-11-11 PROCEDURE — 99233 SBSQ HOSP IP/OBS HIGH 50: CPT | Performed by: HOSPITALIST

## 2024-11-11 PROCEDURE — 12000000 HC ROOM AND CARE MED/SURG

## 2024-11-11 RX ADMIN — ATORVASTATIN CALCIUM 20 MG: 20 TABLET, FILM COATED ORAL at 18:29

## 2024-11-11 RX ADMIN — METOPROLOL SUCCINATE 100 MG: 100 TABLET, EXTENDED RELEASE ORAL at 08:42

## 2024-11-11 RX ADMIN — Medication 3 MG: at 21:23

## 2024-11-11 RX ADMIN — PANTOPRAZOLE SODIUM 40 MG: 40 TABLET, DELAYED RELEASE ORAL at 21:23

## 2024-11-11 RX ADMIN — RIVAROXABAN 20 MG: 20 TABLET, FILM COATED ORAL at 18:29

## 2024-11-11 RX ADMIN — DOCUSATE SODIUM 100 MG: 100 CAPSULE, LIQUID FILLED ORAL at 19:20

## 2024-11-11 RX ADMIN — SENNOSIDES 2 TABLET: 8.6 TABLET, FILM COATED ORAL at 19:20

## 2024-11-11 RX ADMIN — FUROSEMIDE 20 MG: 20 TABLET ORAL at 08:42

## 2024-11-11 ASSESSMENT — COGNITIVE AND FUNCTIONAL STATUS - GENERAL
AFFECT: CONFUSED;FLAT/BLUNTED AFFECT
STANDING UP FROM CHAIR USING ARMS: 3 - A LITTLE
WALKING IN HOSPITAL ROOM: 3 - A LITTLE
STANDING UP FROM CHAIR USING ARMS: 3 - A LITTLE
MOVING TO AND FROM BED TO CHAIR: 3 - A LITTLE
CLIMB 3 TO 5 STEPS WITH RAILING: 2 - A LOT
WALKING IN HOSPITAL ROOM: 2 - A LOT
MOVING TO AND FROM BED TO CHAIR: 3 - A LITTLE
CLIMB 3 TO 5 STEPS WITH RAILING: 2 - A LOT
WALKING IN HOSPITAL ROOM: 2 - A LOT
STANDING UP FROM CHAIR USING ARMS: 3 - A LITTLE
CLIMB 3 TO 5 STEPS WITH RAILING: 2 - A LOT
MOVING TO AND FROM BED TO CHAIR: 3 - A LITTLE

## 2024-11-11 NOTE — PROGRESS NOTES
Hospital Medicine     Daily Progress Note       SUBJECTIVE   Interval History:   Patient resting comfortably in bed.   He denies CP, dyspnea, abdominal pain, N/V.  Medically stable for discharge- awaiting insurance auth/SNF placement.     OBJECTIVE      Vital signs in last 24 hours:  Temp:  [36.4 °C (97.5 °F)-36.9 °C (98.5 °F)] 36.5 °C (97.7 °F)  Heart Rate:  [64-88] 88  Resp:  [18] 18  BP: (145-159)/(79-90) 159/87    Intake/Output Summary (Last 24 hours) at 11/11/2024 1118  Last data filed at 11/11/2024 0600  Gross per 24 hour   Intake 10 ml   Output 25 ml   Net -15 ml       PHYSICAL EXAMINATION      Physical Exam  Vitals and nursing note reviewed.   Constitutional:       General: He is not in acute distress.     Appearance: He is not toxic-appearing.      Comments: Chronically Ill Elderly Male   HENT:      Head: Normocephalic and atraumatic.   Eyes:      Conjunctiva/sclera: Conjunctivae normal.   Cardiovascular:      Rate and Rhythm: Normal rate and regular rhythm.      Heart sounds: Normal heart sounds.   Pulmonary:      Effort: Pulmonary effort is normal. No respiratory distress.      Breath sounds: Normal breath sounds.      Comments: PleurX Cath noted  Abdominal:      General: Bowel sounds are normal.      Palpations: Abdomen is soft.      Tenderness: There is no abdominal tenderness.      Comments: Colostomy noted   Musculoskeletal:      Cervical back: Neck supple.   Skin:     General: Skin is warm and dry.   Neurological:      General: No focal deficit present.   Psychiatric:         Mood and Affect: Mood normal.        LINES, CATHETERS, DRAINS, AIRWAYS, AND WOUNDS   Lines, Drains, and Airways:  Wounds (agree with documentation and present on admission):  Peripheral IV (Adult) 11/10/24 Posterior;Right Forearm (Active)   Number of days: 1       Drain 1 Lateral;Right Back 15.5 Fr. (Active)   Number of days: 3       Colostomy Unknown LUQ (Active)   Number of days: 5344         Comments:    LABS / IMAGING /  TELE      Labs  Results from last 7 days   Lab Units 11/09/24  0801 11/08/24  0458 11/07/24  1417   SODIUM mEQ/L 135* 139 137   POTASSIUM mEQ/L 4.1 3.4* 3.5   CHLORIDE mEQ/L 102 104 103   CO2 mEQ/L 29 28 29   BUN mg/dL 14 15 17   CREATININE mg/dL 0.6* 0.6* 0.6*   CALCIUM mg/dL 8.9 8.9 8.9   ALBUMIN g/dL  --   --  2.8*   BILIRUBIN TOTAL mg/dL  --   --  0.5   ALK PHOS IU/L  --   --  122   ALT IU/L  --   --  26   AST IU/L  --   --  21   GLUCOSE mg/dL 120* 98 126*     Results from last 7 days   Lab Units 11/10/24  0755 11/09/24  0801 11/08/24  0458   WBC K/uL 10.23 10.73* 10.71*   HEMOGLOBIN g/dL 12.8* 11.9* 12.3*   HEMATOCRIT % 39.4* 36.2* 37.7*   PLATELETS K/uL 280 269 267     Microbiology Results       Procedure Component Value Units Date/Time    Blood Culture Blood, Venous [[id number]]  (Normal) Collected: 11/07/24 1942    Specimen: Blood, Venous Updated: 11/10/24 2300     Culture No growth at 72 hours    Blood Culture Blood, Venous [[id number]]  (Normal) Collected: 11/07/24 1942    Specimen: Blood, Venous Updated: 11/10/24 2300     Culture No growth at 72 hours    SARS-COV-2 (COVID-19)/ FLU A/B, AND RSV, PCR Nasopharynx [[id number]]  (Normal) Collected: 11/07/24 1527    Specimen: Nasopharyngeal Swab from Nasopharynx Updated: 11/07/24 1614     SARS-CoV-2 (COVID-19) Negative     Influenza A Negative     Influenza B Negative     Respiratory Syncytial Virus Negative    Narrative:      Testing performed using real-time PCR for detection of COVID-19. EUA approved validation studies performed on site.     Body Fluid Culture/Smear Pleural Fluid, Right [[id number]] Collected: 10/16/24 1519    Specimen: Pleural Fluid, Right Updated: 10/21/24 0822     Culture No growth at 96 hours     Gram Stain Result No WBC Seen      No organisms seen    Fungal Stain Pleural Fluid, Right [[id number]] Collected: 10/16/24 1519    Specimen: Pleural Fluid, Right Updated: 10/17/24 0645     Fungus Stain No fungal elements seen    Fungal Culture  Pleural Fluid, Right [[id number]]  (Normal) Collected: 10/16/24 1519    Specimen: Pleural Fluid, Right Updated: 10/18/24 0300     Fungal Culture No Fungus Isolated to Date    AFB stain Pleural Fluid, Right [[id number]]  (Normal) Collected: 10/16/24 1519    Specimen: Pleural Fluid, Right Updated: 10/17/24 1026     AFB Stain No acid fast bacilli seen    AFB Culture Pleural Fluid, Right [[id number]]  (Normal) Collected: 10/16/24 1519    Specimen: Pleural Fluid, Right Updated: 10/18/24 0801     AFB Culture No Acid Fast Bacilli Isolated to Date    SARS-COV-2 (COVID-19)/ FLU A/B, AND RSV, PCR Nasopharynx [[id number]]  (Normal) Collected: 10/14/24 0048    Specimen: Nasopharyngeal Swab from Nasopharynx Updated: 10/14/24 0142     SARS-CoV-2 (COVID-19) Negative     Influenza A Negative     Influenza B Negative     Respiratory Syncytial Virus Negative    Narrative:      Testing performed using real-time PCR for detection of COVID-19. EUA approved validation studies performed on site.           Above labs have been personally reviewed.     Imaging  I have independently reviewed the pertinent imaging from the last 24 hrs.    ECG/Telemetry  Patient is not on telemetry.    ASSESSMENT AND PLAN        Assessment & Plan  Hydropneumothorax  History of recurrent right pleural effusion requiring thoracentesis S/p Right VATS pleural biopsy, insertion of pigtail catheter catheter 7/8/24.  Readmitted last month with placement of pigtail catheter 10/16 which was removed later  CT Chest reviewed  - Surgery consult appreciated, recommend IR consult for Pleurx catheter placement for long term drainage of recurrent effusion  previous pleural biopsy and cytology washings have been negative for malignancy  - IR consult appreciated  - s/p Pleurx catheter placement w/ IR on 11/8/24  HLD (hyperlipidemia)  - Continue Lipitor  History of rectal cancer  H/o Rectal Cancer: 2 N1 M0 rectal cancer 2012, neoadjuvant chemotherapy and radiotherapy followed by  surgery (APR), then had multiple complications after the surgery  - Outpatient follow-up  PE (pulmonary thromboembolism) (CMS/HCC)  - Continue Xarelto  CAD (coronary artery disease)  Presumed CAR based on abnormal stress test ing 2018, declined cardiac cath  - Continue statin    VTE Assessment: Padua VTE Score: 4  VTE Prophylaxis:  Current anticoagulants:  rivaroxaban (XARELTO) tablet 20 mg, oral, Daily with dinner      Code Status: Full Code      Estimated Discharge Date: 11/11/2024       Disposition Planning: medically stable for discharge; awaiting insurance auth/SNF placement     BENJY Vegas  11/11/2024

## 2024-11-11 NOTE — PLAN OF CARE
Care Coordination Discharge Plan Note     Discharge Needs Assessment  Concerns to be Addressed: care coordination/care conferences, discharge planning  Current Discharge Risk: chronically ill, cognitively impaired, dependent with mobility/activities of daily living    Anticipated Discharge Plan  Anticipated Discharge Disposition: skilled nursing facility  Type of Skilled Nursing Care Services: nursing, PT, OT      Patient Choice  Offered/Gave Vendor List: yes  Patient's Choice of Community Agency(s):  [facility]    Patient and/or patient guardian/advocate was made aware of their right to choose a provider. A list of eligible providers was presented and reviewed with the patient and/or patient guardian/advocate in written and/or verbal form. The list delineates providers in the patients desired geographic area who are participating in the Medicare program and/or providers contracted with the patients primary insurance. The Medicare list and quality ratings were obtained from the Medicare.gov [medicare.gov] website.    ---------------------------------------------------------------------------------------------------------------------    Interdisciplinary Discharge Plan Review:  Participants:     Concerns Comments: Per discharge planning rounds cleared for discharge today. Contacted [facility] Admissions. Bed available. [facility] will start insurance  authorization. Called emergency contact Eliezer. Gracie will go to [facility] when Insurance authorization obtained. Case management will continue to follow for discharge planning needs.    Discharge Plan:   Disposition/Destination: Skilled Nursing Facility - Other  /    Discharge Facility:    Community Resources:      Discharge Transportation:  Is Out of Hospital DNR needed at Discharge: no  Does patient need discharge transport?

## 2024-11-11 NOTE — ASSESSMENT & PLAN NOTE
Patient with presumed CAD based on abnormal stress test in 2018, declined cardiac cath   - Continue statin

## 2024-11-11 NOTE — PLAN OF CARE
Care Coordination Discharge Plan Note     Discharge Needs Assessment  Concerns to be Addressed: care coordination/care conferences, discharge planning  Current Discharge Risk: chronically ill, cognitively impaired, dependent with mobility/activities of daily living    Anticipated Discharge Plan  Anticipated Discharge Disposition: skilled nursing facility  Type of Skilled Nursing Care Services: nursing, PT, OT      Patient Choice  Offered/Gave Vendor List: yes  Patient's Choice of Community Agency(s):  [facility]    Patient and/or patient guardian/advocate was made aware of their right to choose a provider. A list of eligible providers was presented and reviewed with the patient and/or patient guardian/advocate in written and/or verbal form. The list delineates providers in the patients desired geographic area who are participating in the Medicare program and/or providers contracted with the patients primary insurance. The Medicare list and quality ratings were obtained from the Medicare.gov [medicare.gov] website.    ---------------------------------------------------------------------------------------------------------------------    Interdisciplinary Discharge Plan Review:  Participants:     Concerns Comments: Per discharge planning rounds cleared for discharge today. Contacted [facility] Admissions. Bed available. Nicole will start insurance  authorization. Called emergency contact Eliezer. Gracie will go to [facility] when Insurance authorization obtained. Case management will continue to follow for discharge planning needs.    Discharge Plan:   Disposition/Destination: Skilled Nursing Facility - Other  /    Discharge Facility:    Community Resources:      Discharge Transportation:  Is Out of Hospital DNR needed at Discharge: no  Does patient need discharge transport?

## 2024-11-11 NOTE — PLAN OF CARE
Plan of Care Review  Plan of Care Reviewed With: patient  Progress: improving  Outcome Evaluation: pt seen by PT today.  OOB X 2 assist with walker to chair.  pt AAOX1.  colostomy with minimal output.  voids with urinal.  R side pleurex catheter in place.  awaiting insurance authorization for d/c to [facility].

## 2024-11-11 NOTE — ASSESSMENT & PLAN NOTE
H/o Rectal Cancer: 2 N1 M0 rectal cancer 2012, neoadjuvant chemotherapy and radiotherapy followed by surgery (APR), then had multiple complications after the surgery  - Outpatient follow-up

## 2024-11-11 NOTE — DISCHARGE SUMMARY
Hospital Medicine    Inpatient Discharge Summary        BRIEF OVERVIEW   Patient: Dave Arredondo (1935)    Admitting Provider: Lauren Man MD  Attending Provider: Simon Adams MD Attending phys phone: [phone]    PCP: Keith Miller [phone]    Admission Date: 11/7/2024  Discharge Date: 11/15/2024     DISCHARGE DIAGNOSES      Primary Discharge Diagnosis  Hydropneumothorax    Secondary Discharge Diagnoses  Active Hospital Problems    Diagnosis Date Noted    Hydropneumothorax 11/07/2024    PE (pulmonary thromboembolism) (CMS/HCC) 10/14/2024    History of rectal cancer 06/15/2024    HLD (hyperlipidemia) 04/25/2020    CAD (coronary artery disease) 04/25/2020      Resolved Hospital Problems   No resolved problems to display.          Problem List on Day of Discharge  Assessment & Plan  Hydropneumothorax  History of recurrent right pleural effusion requiring thoracentesis S/p Right VATS pleural biopsy, insertion of pigtail catheter catheter 7/8/24.  Readmitted last month with placement of pigtail catheter 10/16 which was removed later  CT Chest reviewed  - Surgery consult appreciated, recommend IR consult for Pleurx catheter placement for long term drainage of recurrent effusion  previous pleural biopsy and cytology washings have been negative for malignancy  - IR consult appreciated  - s/p Pleurx catheter placement w/ IR on 11/8/24  HLD (hyperlipidemia)  - Continue Lipitor  History of rectal cancer  H/o Rectal Cancer: 2 N1 M0 rectal cancer 2012, neoadjuvant chemotherapy and radiotherapy followed by surgery (APR), then had multiple complications after the surgery  Colostomy present  - Outpatient follow-up  PE (pulmonary thromboembolism) (CMS/HCC)  - Continue Xarelto  CAD (coronary artery disease)  Patient with presumed CAD based on abnormal stress test in 2018, declined cardiac cath   - Continue statin       SUMMARY OF HOSPITALIZATION     Presenting Problem/History of Present  Illness  This is a 88 y.o. year-old male admitted on 11/7/2024 with Hydropneumothorax.    Hospital Course  Patient is a 88 y.o. male with medical history of Rectal CA (s/p colostomy), h/o SBO, CAD (decline cardiac cath, medical management), h/o PE (on Xarelto) Recurrent Pleural Effusion (s/p VATS and prior pigtail catheter placement/removal) who presented with OP imaging with recurrent pleural effusion and hydropneumothorax.   Surgery consult appreciated, recommended PleurX Catheter placement by IR for long term drainage of recurrent pleural effusion. Patient underwent 15.5 Fr modified PleurX catheter placement into right pleural space, 400 mL sanguinous pleural fluid removed on 11/8/24; he tolerated the procedure well. Repeat CXR 11/10/24 w/ stable, noted slight decrease in effusion size. Repeat CXR 11/13/24 noted slight increase in effusion size. Patient with increased dyspnea on 11/14/24, he had 300mL drained via PleurX catheter. Improved dyspnea today.    Patient has been, and remains, medically stable for discharge to SNF. Unfortunately there was a discrepancy in his insurance authorization that has delayed completion of authorization. Care coordination is aware and following on case. Insurance authorization was Hopeful that the authorization was completed today.    Called [id number] report to CRNP at the [facility], unable to  and message was left.    Exam on Day of Discharge  Physical Exam  Vitals and nursing note reviewed.   Constitutional:       General: He is not in acute distress.     Appearance: He is not toxic-appearing.      Comments: Chronically Ill Elderly Male   HENT:      Head: Normocephalic and atraumatic.   Eyes:      Conjunctiva/sclera: Conjunctivae normal.   Cardiovascular:      Rate and Rhythm: Normal rate and regular rhythm.      Heart sounds: Normal heart sounds.   Pulmonary:      Effort: Pulmonary effort is normal. No respiratory distress.      Comments: PleurX catheter  noted  Abdominal:      Palpations: Abdomen is soft.      Tenderness: There is no abdominal tenderness.      Comments: Colostomy noted   Musculoskeletal:         General: Normal range of motion.      Cervical back: Neck supple.   Skin:     General: Skin is warm and dry.   Neurological:      General: No focal deficit present.   Psychiatric:         Mood and Affect: Mood normal.       Consults During Admission  IP CONSULT TO SOCIAL WORK  IP CONSULT TO BEHAVIORAL HEALTH CRISIS    DISCHARGE MEDICATIONS               Medication List        CONTINUE taking these medications      acetaminophen 325 mg tablet  Commonly known as: TYLENOL  Take 2 tablets (650 mg total) by mouth every 6 (six) hours as needed for pain.  Dose: 650 mg     albuterol HFA 90 mcg/actuation inhaler  Inhale 2 puffs every 8 (eight) hours as needed for shortness of breath or wheezing.  Dose: 2 puff     atorvastatin 20 mg tablet  Commonly known as: LIPITOR  Take 20 mg by mouth daily.  Dose: 20 mg     cyanocobalamin 1,000 mcg/mL injection  Commonly known as: VITAMIN B-12  Inject 1,000 mcg into the shoulder, thigh, or buttocks every 30 (thirty) days. Every 1st Monday of the month  Dose: 1,000 mcg     docusate sodium 100 mg capsule  Commonly known as: COLACE  Take 1 capsule (100 mg total) by mouth 2 (two) times a day.  Dose: 100 mg     furosemide 20 mg tablet  Commonly known as: LASIX  Take 20 mg by mouth daily.  Dose: 20 mg     melatonin ODT  Take 1 tablet (3 mg total) by mouth nightly.  Dose: 3 mg     metoprolol succinate  mg 24 hr tablet  Commonly known as: TOPROL-XL  Take 100 mg by mouth daily.  Dose: 100 mg     nitroglycerin 0.4 mg SL tablet  Commonly known as: NITROSTAT  Place 0.4 mg under the tongue every 5 (five) minutes as needed.  Dose: 0.4 mg     pantoprazole 40 mg EC tablet  Commonly known as: PROTONIX  Take 1 tablet (40 mg total) by mouth nightly Indications: GI ppx w/Xarelto.  Dose: 40 mg     rivaroxaban 20 mg tablet  Commonly known as:  XARELTO  Take 1 tablet (20 mg total) by mouth daily with dinner Indications: a clot in the lung, blood clot in a deep vein of the extremities.  Dose: 20 mg     senna 8.6 mg tablet  Commonly known as: SENOKOT  Take 2 tablets by mouth 2 (two) times a day.  Dose: 2 tablet               Instructions for after discharge       Activity:  As Tolerated      Admission H&P Valid:  Yes      Discharge Condition:  Improving      Discharge Summary:  Enclosed      Discharge diet      Ensure Compact Vanilla w/ Lunch and Dinner    Diet Type / Texture:  Regular  Cardiac (Low Sodium, Low Fat)       Fluid Consistency: Thin Liquids    Follow Up:  Update Status Report to Physician Within 1 Week      Follow Up:  With Primary MD within 1 week discharge from facility      Follow-up with Provider:      Please follow-up with Thoracic Surgery in 4 weeks, please call to schedule this appointment and make sure to mention it is for hospital follow-up.    Sergey Dennis MD   [phone]    [address]  [facility]  [place] [address]       Future Lab Orders at [facility]      Labs include: BMP/CBC in 3-5 days    Level of Care:  Skilled      Nutritionist      Occupational Therapy Eval and Treat      Patient Aware of Diagnosis: Yes      Physical Therapy Eval and Treat      Social Work Services      Treatment Options: Full Resuscitation      Vital Signs Per Facility Standard      Weights Per Facility Standard                 PROCEDURES / LABS / IMAGING      Procedures  S/p 15.5 Fr modified PleurX catheter placement into right pleural space, 400 mL sanguinous pleural fluid removed on 11/8/24    Pertinent Labs  Results from last 7 days   Lab Units 11/09/24  0801 11/08/24  0458 11/07/24  1417   SODIUM mEQ/L 135* 139 137   POTASSIUM mEQ/L 4.1 3.4* 3.5   CHLORIDE mEQ/L 102 104 103   CO2 mEQ/L 29 28 29   BUN mg/dL 14 15 17   CREATININE mg/dL 0.6* 0.6* 0.6*   CALCIUM mg/dL 8.9 8.9 8.9   ALBUMIN g/dL  --   --  2.8*   BILIRUBIN TOTAL mg/dL  --   --  0.5    ALK PHOS IU/L  --   --  122   ALT IU/L  --   --  26   AST IU/L  --   --  21   GLUCOSE mg/dL 120* 98 126*     Results from last 7 days   Lab Units 11/10/24  0755 11/09/24  0801 11/08/24  0458   WBC K/uL 10.23 10.73* 10.71*   HEMOGLOBIN g/dL 12.8* 11.9* 12.3*   HEMATOCRIT % 39.4* 36.2* 37.7*   PLATELETS K/uL 280 269 267     Microbiology Results       Procedure Component Value Units Date/Time    Blood Culture Blood, Venous [[id number]]  (Normal) Collected: 11/07/24 1942    Specimen: Blood, Venous Updated: 11/10/24 2300     Culture No growth at 72 hours    Blood Culture Blood, Venous [[id number]]  (Normal) Collected: 11/07/24 1942    Specimen: Blood, Venous Updated: 11/10/24 2300     Culture No growth at 72 hours    SARS-COV-2 (COVID-19)/ FLU A/B, AND RSV, PCR Nasopharynx [[id number]]  (Normal) Collected: 11/07/24 1527    Specimen: Nasopharyngeal Swab from Nasopharynx Updated: 11/07/24 1614     SARS-CoV-2 (COVID-19) Negative     Influenza A Negative     Influenza B Negative     Respiratory Syncytial Virus Negative    Narrative:      Testing performed using real-time PCR for detection of COVID-19. EUA approved validation studies performed on site.     Body Fluid Culture/Smear Pleural Fluid, Right [[id number]] Collected: 10/16/24 1519    Specimen: Pleural Fluid, Right Updated: 10/21/24 0822     Culture No growth at 96 hours     Gram Stain Result No WBC Seen      No organisms seen    Fungal Stain Pleural Fluid, Right [[id number]] Collected: 10/16/24 1519    Specimen: Pleural Fluid, Right Updated: 10/17/24 0645     Fungus Stain No fungal elements seen    Fungal Culture Pleural Fluid, Right [[id number]]  (Normal) Collected: 10/16/24 1519    Specimen: Pleural Fluid, Right Updated: 10/18/24 0300     Fungal Culture No Fungus Isolated to Date    AFB stain Pleural Fluid, Right [[id number]]  (Normal) Collected: 10/16/24 1519    Specimen: Pleural Fluid, Right Updated: 10/17/24 1026     AFB Stain No acid fast bacilli seen    AFB Culture  Pleural Fluid, Right [[id number]]  (Normal) Collected: 10/16/24 1519    Specimen: Pleural Fluid, Right Updated: 10/18/24 0801     AFB Culture No Acid Fast Bacilli Isolated to Date    SARS-COV-2 (COVID-19)/ FLU A/B, AND RSV, PCR Nasopharynx [[id number]]  (Normal) Collected: 10/14/24 0048    Specimen: Nasopharyngeal Swab from Nasopharynx Updated: 10/14/24 0142     SARS-CoV-2 (COVID-19) Negative     Influenza A Negative     Influenza B Negative     Respiratory Syncytial Virus Negative    Narrative:      Testing performed using real-time PCR for detection of COVID-19. EUA approved validation studies performed on site.           Above labs have been personally reviewed.     Pertinent Imaging  X-RAY CHEST 1 VIEW    Result Date: 11/13/2024  IMPRESSION: As above    X-RAY CHEST 1 VIEW    Result Date: 11/10/2024  IMPRESSION: Interval right chest tube placement terminating over the medial aspect of the right mid to lower hemithorax. Redemonstration of a right hydropneumothorax with slightly intervally decreased pleural fluid and similar appearance to slightly intervally increased pleural air projecting over the inferior right hemithorax. COMMENT: Comparison: Chest x-ray and CT chest 11/7/2024. Technique: A single frontal AP portable upright projection of the chest was obtained. FINDINGS: There has been interval placement of a right-sided chest tube terminating over the medial aspect of the right mid to lower hemithorax. Again noted is a right hydropneumothorax with slight interval decrease in the amount of pleural fluid and similar appearance to slight interval increase in the amount of pleural air projecting over the inferior right hemithorax. The left lung remains clear. The cardiomediastinal silhouette is unchanged. The upper abdomen is unremarkable. There is no acute osseous abnormality.    IR TUNNELED PLEURAL CATHETER PLACEMENT    Result Date: 11/8/2024  IMPRESSION: 1. Uncomplicated right tunneled pleural drain (Pleur-X)  placement followed by 400 mL large volume thoracentesis of serosanguineous fluid.    ULTRASOUND GUIDED NEEDLE PLACEMENT    Result Date: 11/8/2024  IMPRESSION: 1. Uncomplicated right tunneled pleural drain (Pleur-X) placement followed by 400 mL large volume thoracentesis of serosanguineous fluid.    CT CHEST WITH IV CONTRAST    Result Date: 11/7/2024  IMPRESSION: 1.  Redemonstration of nodular right pleural thickening consistent with malignancy.  Large right hydropneumothorax.  Fluid component is similar to the recent prior study.  Pneumothorax component is new.  There is a potentially loculated 5.6 cm air and fluid component medially in the right mid hemithorax. 2.  Slightly increased posterior right upper lobe atelectasis compared to prior. No significant change in right middle and right lower lobe volume loss and consolidation. Finding:    New or increased pneumothorax   Acuity: Critical  Status:  CLOSED Critical read back was performed and results were read back by Dr. Acosta,  on 11/7/2024 6:34 PM .     X-RAY CHEST 1 VIEW    Result Date: 11/7/2024  IMPRESSION: Small right effusion. COMMENT: AP radiograph the chest reveals a small right effusion. Previously noted right basal chest tube has been removed. There is patchy right basal consolidation. Left lung is well-aerated. There is no pneumothorax. Comparison is made to previous study dated 10/21/2024    IR CHEST TUBE REMOVAL(BEDSIDE)    Result Date: 10/25/2024  IMPRESSION: Successful bedside removal of right pleural pigtail catheter following TPA dornase treatments for loculated effusion. If effusion recurs and patient's symptomatic, could consider repeat Pleurx catheter placement. I certify that I have personally reviewed this examination and agree with Melissa Tsang's report. Cj Yen M.D.    X-RAY CHEST 2 VIEWS    Result Date: 10/21/2024  IMPRESSION: Improving right hydropneumothorax and basilar volume loss.    X-RAY CHEST 1 VIEW    Result Date:  10/17/2024  IMPRESSION: Loculated right hydropneumothorax at the lung base as described above; fluid component has decreased and aeration improved.     IR PLEURAL CATHETER PLACEMENT    Result Date: 10/16/2024  IMPRESSION:   Satisfactory 12 Bulgarian right pigtail chest tube placement. COMMENT: PROCEDURE:  Chest tube placement. ANESTHESIA:  Lidocaine 1% local. FLUORO TIME:  0.1 minutes. REFERENCE AIR KERMA: 0.2 mGy. CONTRAST:  None. MEDICATIONS:  None. DESCRIPTION OF PROCEDURE:    Written informed consent was obtained.  Ultrasound demonstrates complex, multiloculated right pleural effusion.  The patient was prepped and draped in the usual sterile manner.  Using real-time ultrasound guidance, a Yueh needle was inserted into the right pleural effusion.  Following serial dilation, a 12 Bulgarian pigtail chest tube was inserted.  Pleural fluid sample was collected and sent for requested laboratory analysis.  The catheter was secured with suture and connected to Pleur-evac drainage.  The patient tolerated the procedure well without immediate complication.     ULTRASOUND GUIDED NEEDLE PLACEMENT    Result Date: 10/16/2024  IMPRESSION:   Satisfactory 12 Bulgarian right pigtail chest tube placement. COMMENT: PROCEDURE:  Chest tube placement. ANESTHESIA:  Lidocaine 1% local. FLUORO TIME:  0.1 minutes. REFERENCE AIR KERMA: 0.2 mGy. CONTRAST:  None. MEDICATIONS:  None. DESCRIPTION OF PROCEDURE:    Written informed consent was obtained.  Ultrasound demonstrates complex, multiloculated right pleural effusion.  The patient was prepped and draped in the usual sterile manner.  Using real-time ultrasound guidance, a Yueh needle was inserted into the right pleural effusion.  Following serial dilation, a 12 Bulgarian pigtail chest tube was inserted.  Pleural fluid sample was collected and sent for requested laboratory analysis.  The catheter was secured with suture and connected to Pleur-evac drainage.  The patient tolerated the procedure well  without immediate complication.     FLUOROSCOPY SMALL BOWEL STUDY THERAPEUTIC    Result Date: 10/15/2024  IMPRESSION: Limited assessment due to dilution of the administered contrast. Suspected faint contrast in loops of small bowel and eventually in what is probably the right colon on the 12 hour image, favoring ileus or partial obstruction and mitigating against high-grade obstruction. Clinical correlation and radiographic follow-up recommended.     OUTPATIENT  FOLLOW-UP / REFERRALS / PENDING TESTS        Outpatient Follow-Up Appointments            In 4 days Sergey Dennis MD [facility] Thoracic Surgery at [facility]            Referrals  No orders of the defined types were placed in this encounter.      Test Results Pending at Discharge  Pending Inpatient Labs       Order Current Status    Blood Culture Blood, Venous Preliminary result    Blood Culture Blood, Venous Preliminary result            Important Issues to Address in Follow-Up  Dc to SNF    Follow-up with your PCP within 1 week.     Please follow-up with Thoracic Surgery within 4 weeks.     Recommend calling to schedule these appointments.    Follow discharge instructions from IR for the PleurX catheter.     DISCHARGE DISPOSITION AND DESTINATION      Disposition: Skilled Nursing Facility - Other   Destination:                              Code Status At Discharge: Full Code    Physician Order for Life-Sustaining Treatment Document Status        No documents found                       >30 mins spent for coordination/counselling related to discharge plan, including final examination of patient, discussion regarding discharge instructions, reconciliation/prescription of medications, preparation of discharge records, etc.

## 2024-11-11 NOTE — ASSESSMENT & PLAN NOTE
History of recurrent right pleural effusion requiring thoracentesis S/p Right VATS pleural biopsy, insertion of pigtail catheter catheter 7/8/24.  Readmitted last month with placement of pigtail catheter 10/16 which was removed later  CT Chest reviewed  - Surgery consult appreciated, recommend IR consult for Pleurx catheter placement for long term drainage of recurrent effusion  previous pleural biopsy and cytology washings have been negative for malignancy  - IR consult appreciated  - s/p Pleurx catheter placement w/ IR on 11/8/24

## 2024-11-11 NOTE — PROGRESS NOTES
"   Physical Therapy -  Daily Treatment/Progress Note     Patient: Dave Arredondo  Location: [facility] 5PAV 5406  MRN: [id number]  Today's date: 11/11/2024    HISTORY OF PRESENT ILLNESS     Dave is a 88 y.o. male admitted on 11/7/2024 with Hydropneumothorax [J94.8]. Principal problem is Hydropneumothorax.    Past Medical History  Dave has a past medical history of Coronary artery disease, DVT (deep venous thrombosis) (CMS/HCC), Hypertension, Lipid disorder, Pleural effusion, Pulmonary embolism (CMS/HCC), Rectal cancer (CMS/HCC), and SBO (small bowel obstruction) (CMS/[facility]).    History of Present Illness   88 year old male adm with SOB    History of recurrent right pleural effusio requiring thoracentesis S/p \"Right VATS pleural biopsy, insertion of pigtail catheter catheter 7/8/24.  Readmitted last month with placement of pigtail catheter 10/16 which was removed later.  no acute surgical intervention recommended  IR consultation for Pleurx catheter as patient will need long term drainage for his recurrent effusion  PRIOR LEVEL OF FUNCTION AND LIVING ENVIRONMENT     Prior Level of Function      Flowsheet Row Most Recent Value   Dominant Hand right   Ambulation independent   Transferring independent   Toileting independent   Bathing independent   Dressing independent   Eating independent   IADLs independent   Driving/Transportation    Communication understands/communicates without difficulty   Prior Level of Function Comment Per last EMR pt IND w/ mobility/adls w/o AD. But recently has only been able to ambulate short distances,  recent admission last month- DC to SNF   Assistive Device Currently Used at Home shower chair, inhaler             Prior Living Environment      Flowsheet Row Most Recent Value   People in Home facility resident   Current Living Arrangements short-term skilled nursing facility   Home Accessibility wheelchair accessible   Living Environment Comment Pt  " historian and stated he lives w/ his 4 brothers. Per EMR pt lives w/ roommate(s) on the 2nd fl, accessible by 12 steps, [address] building, no elevator access,  pt currently being admitted from short term rehab          VITALS AND PAIN     PT Vitals      Date/Time Pulse SpO2 Pt Activity O2 Therapy [facility]   11/11/24 0828 88 95 % At rest None (Room air) LA          PT Pain      Date/Time Pain Type Rating: Rest Rating: Activity [facility]   11/11/24 0828 Pain Assessment 0 - no pain 0 - no pain LA             Objective   OBJECTIVE     Start time:  0828  End time:  0839  Session Length: 11 min       General Observations  Patient received supine, in bed. He was no issues or concerns identified by nurse prior to session, agreeable to therapy. RN at bedside    Precautions: fall       Limitations/Impairments: safety/cognitive      PT Eval and Treat - 11/11/24 0828          Cognition    Orientation Status oriented x 3     Affect/Mental Status confused;flat/blunted affect     Follows Commands follows one-step commands;physical/tactile prompts required;repetition of directions required;delayed response/completion;increased processing time needed     Cognitive Function safety deficit;executive function deficit     Executive Function Deficit planning/decision-making;information processing;initiation;insight/awareness of deficits;problem-solving/reasoning;judgment     Safety Deficit awareness of need for assistance;insight into deficits/self-awareness;judgment        Bed Mobility    Bed Mobility Activities supine to sit     [place] 1 person assist;minimum assist (75% or more patient effort)     Safety/Cues increased time to complete;hand placement     Assistive Device head of bed elevated;draw sheet;bed rails     Comment exit to the L, min (A) for trunk and LE management        Mobility Belt    Mobility Belt Used During Session no - medical contraindication     Reason Mobility Belt Not Used ostomy bag        Sit/Stand Transfer    Surface  edge of bed     [place] 1 person assist;minimum assist (75% or more patient effort)     Safety/Cues increased time to complete;verbal cues;proper use of assistive device     Assistive Device walker, front-wheeled     Transfer Comments min (A) from EOB, cues for hand placement        Gait Training    [place], Gait minimum assist (75% or more patient effort);1 person assist     Assistive Device walker, front-wheeled     Distance in Feet 10 feet     Pattern step-to     Deviations/Abnormal Patterns weight shifting decreased;catracho decreased;step length decreased;stride length decreased     Comment min (A)x2 with RW, cues for step sequencing, intermittent posterior LOB        Balance    Static Sitting Balance WFL     Sit to Stand Dynamic Balance mild impairment     Static Standing Balance mild impairment     Dynamic Standing Balance moderate impairment     Comment, Balance min (A) w/ RW        Impairments/Safety Issues    Impairments Affecting Function balance;cognition;strength;endurance/activity tolerance     Cognitive Impairments, Safety/Performance attention;awareness, need for assistance;insight into deficits/self-awareness     Safety Issues Affecting Function problem-solving;judgment;awareness of need for assistance;insight into deficits/self-awareness;positioning of assistive device                                    Education Documentation  Unresolved/Worsening Symptoms, taught by Caity Wolf PT at 11/11/2024  8:52 AM.  Learner: Patient  Readiness: Acceptance  Method: Explanation, Demonstration  Response: Demonstrated Understanding, Verbalizes Understanding, Needs Reinforcement  Comment: reviewed PT POC, role of PT    Joint Mobility/Strength, taught by Caity Wolf, PT at 11/11/2024  8:52 AM.  Learner: Patient  Readiness: Acceptance  Method: Explanation, Demonstration  Response: Demonstrated Understanding, Verbalizes Understanding, Needs Reinforcement  Comment: reviewed PT POC, role of  PT    Home Safety, taught by Caity Wolf PT at 11/11/2024  8:52 AM.  Learner: Patient  Readiness: Acceptance  Method: Explanation, Demonstration  Response: Demonstrated Understanding, Verbalizes Understanding, Needs Reinforcement  Comment: reviewed PT POC, role of PT    Energy Conservation, taught by Caity Wolf PT at 11/11/2024  8:52 AM.  Learner: Patient  Readiness: Acceptance  Method: Explanation, Demonstration  Response: Demonstrated Understanding, Verbalizes Understanding, Needs Reinforcement  Comment: reviewed PT POC, role of PT    Assistive/Adaptive Devices, taught by Caity Wolf PT at 11/11/2024  8:52 AM.  Learner: Patient  Readiness: Acceptance  Method: Explanation, Demonstration  Response: Demonstrated Understanding, Verbalizes Understanding, Needs Reinforcement  Comment: reviewed PT POC, role of PT    Signs/Symptoms, taught by Caity Wolf PT at 11/11/2024  8:52 AM.  Learner: Patient  Readiness: Acceptance  Method: Explanation, Demonstration  Response: Demonstrated Understanding, Verbalizes Understanding, Needs Reinforcement  Comment: reviewed PT POC, role of PT    Risk Factors, taught by Caity Wolf PT at 11/11/2024  8:52 AM.  Learner: Patient  Readiness: Acceptance  Method: Explanation, Demonstration  Response: Demonstrated Understanding, Verbalizes Understanding, Needs Reinforcement  Comment: reviewed PT POC, role of PT        Session Outcome  Patient upright, in chair at end of session, call light in reach, personal items in reach, all needs met, chair alarm on. Nursing notified about patient's performance, patient's position, and patient's response to therapy/activity.    AM-PAC - Mobility (Current Function)     Turning form your back to your side while in flat bed without using bedrails 3 - A Little   Moving from lying on your back to sitting on the side of a flat bed without using bedrails 3 - A Little   Moving to and from a bed to a chair 3 - A Little   Standing  up from a chair using your arms 3 - A Little   To walk in a hospital room 3 - A Little   Climbing 3-5 steps with a railing 2 - A Lot   AM-PAC Mobility Score 17      ASSESSMENT AND PLAN     Progress Summary  11/11/24 Pt is a 88 year old male who presents with SOB. Pt seen for PT session. Pt AAOx3. Pleasantly confused. Pt req min (A) for bed mobility, min (A) for functional transfers, min (A)x1 for amb of 10ft. Pt presents with impairments in balanace, gait, and endurance. Rec ongoing inpatient PT and SNF at [facility]    Patient/Family Therapy Goals Statement: NA    PT Plan      Flowsheet Row Most Recent Value   Rehab Potential fair, will monitor progress closely at 11/11/2024 0828   Therapy Frequency 3 times/wk at 11/11/2024 0828   Planned Therapy Interventions strengthening, gait training, transfer training at 11/11/2024 0828            PT Discharge Recommendations      Flowsheet Row Most Recent Value   PT Recommended Discharge Disposition skilled nursing facility at 11/11/2024 0828   Anticipated Equipment Needs if Discharged Home (PT) none at 11/11/2024 0828                 PT Goals      Flowsheet Row Most Recent Value   Transfer Goal 1    Activity/Assistive Device all transfers at 11/08/2024 0840   [place] modified independence at 11/08/2024 0840   Time Frame short-term goal (STG) at 11/08/2024 0840   Progress/Outcome goal ongoing at 11/08/2024 0840   Gait Training Goal 1    Activity/Assistive Device gait (walking locomotion) at 11/08/2024 0840   [place] modified independence at 11/08/2024 0840   Distance 100 at 11/08/2024 0840   Time Frame short-term goal (STG) at 11/08/2024 0840   Progress/Outcome goal ongoing at 11/08/2024 0840

## 2024-11-11 NOTE — UM PHYSICIAN REVIEW NOTE
Patient Name: Dave Arredondo      MRN: [id number]    11/7- current  Shortness of breath h/o rectal cancer , large R hydropnemothorax  11/8 thorasic surgery saw said place Pleurax     Plurex placed 400 cc taken out      11/10 xray with improved fluid levels sob improved      A phone call will be placed to payor to request Peer to Peer phone appeal.    Zeyad Whipple MD  11/11/2024

## 2024-11-11 NOTE — ASSESSMENT & PLAN NOTE
H/o Rectal Cancer: 2 N1 M0 rectal cancer 2012, neoadjuvant chemotherapy and radiotherapy followed by surgery (APR), then had multiple complications after the surgery  Colostomy present  - Outpatient follow-up

## 2024-11-12 PROCEDURE — 63700000 HC SELF-ADMINISTRABLE DRUG: Performed by: STUDENT IN AN ORGANIZED HEALTH CARE EDUCATION/TRAINING PROGRAM

## 2024-11-12 PROCEDURE — 97530 THERAPEUTIC ACTIVITIES: CPT | Mod: GP

## 2024-11-12 PROCEDURE — 99232 SBSQ HOSP IP/OBS MODERATE 35: CPT | Performed by: HOSPITALIST

## 2024-11-12 PROCEDURE — G0378 HOSPITAL OBSERVATION PER HR: HCPCS

## 2024-11-12 PROCEDURE — 63700000 HC SELF-ADMINISTRABLE DRUG: Performed by: HOSPITALIST

## 2024-11-12 PROCEDURE — 12000000 HC ROOM AND CARE MED/SURG

## 2024-11-12 RX ADMIN — FUROSEMIDE 20 MG: 20 TABLET ORAL at 10:32

## 2024-11-12 RX ADMIN — ATORVASTATIN CALCIUM 20 MG: 20 TABLET, FILM COATED ORAL at 17:41

## 2024-11-12 RX ADMIN — METOPROLOL SUCCINATE 100 MG: 100 TABLET, EXTENDED RELEASE ORAL at 10:32

## 2024-11-12 RX ADMIN — SENNOSIDES 2 TABLET: 8.6 TABLET, FILM COATED ORAL at 19:30

## 2024-11-12 RX ADMIN — SENNOSIDES 2 TABLET: 8.6 TABLET, FILM COATED ORAL at 10:32

## 2024-11-12 RX ADMIN — PANTOPRAZOLE SODIUM 40 MG: 40 TABLET, DELAYED RELEASE ORAL at 22:33

## 2024-11-12 RX ADMIN — Medication 3 MG: at 22:33

## 2024-11-12 RX ADMIN — DOCUSATE SODIUM 100 MG: 100 CAPSULE, LIQUID FILLED ORAL at 10:32

## 2024-11-12 RX ADMIN — DOCUSATE SODIUM 100 MG: 100 CAPSULE, LIQUID FILLED ORAL at 19:30

## 2024-11-12 RX ADMIN — RIVAROXABAN 20 MG: 20 TABLET, FILM COATED ORAL at 17:41

## 2024-11-12 RX ADMIN — ACETAMINOPHEN 650 MG: 325 TABLET ORAL at 10:32

## 2024-11-12 ASSESSMENT — COGNITIVE AND FUNCTIONAL STATUS - GENERAL
WALKING IN HOSPITAL ROOM: 3 - A LITTLE
CLIMB 3 TO 5 STEPS WITH RAILING: 2 - A LOT
CLIMB 3 TO 5 STEPS WITH RAILING: 2 - A LOT
WALKING IN HOSPITAL ROOM: 2 - A LOT
MOVING TO AND FROM BED TO CHAIR: 3 - A LITTLE
STANDING UP FROM CHAIR USING ARMS: 3 - A LITTLE
AFFECT: CONFUSED;FLAT/BLUNTED AFFECT
MOVING TO AND FROM BED TO CHAIR: 3 - A LITTLE
STANDING UP FROM CHAIR USING ARMS: 3 - A LITTLE

## 2024-11-12 NOTE — PLAN OF CARE
Plan of Care Review  Plan of Care Reviewed With: patient  Progress: no change  Outcome Evaluation: Pt OOB assist x2 with walker, unsteady. AAOx2-3. Minimal c/o pain R back managed with PRN tylenol. Colostomy with minimal output. Voiding in the urinal. R side pleurx cath in place. Plan for d/c to [facility] pending insurance auth.

## 2024-11-12 NOTE — DISCHARGE INSTRUCTIONS
Follow-up with your PCP within 1 week.     Please follow-up with Thoracic Surgery within 4 weeks.     Recommend calling to schedule these appointments.    Follow discharge instructions from IR for the PleurX catheter.

## 2024-11-12 NOTE — PLAN OF CARE
Care Coordination Discharge Plan Note     Discharge Needs Assessment  Concerns to be Addressed: care coordination/care conferences, discharge planning  Current Discharge Risk: chronically ill, cognitively impaired, dependent with mobility/activities of daily living    Anticipated Discharge Plan  Anticipated Discharge Disposition: skilled nursing facility  Type of Skilled Nursing Care Services: nursing, PT, OT    Patient Choice  Offered/Gave Vendor List: yes  Patient's Choice of Community Agency(s):    Patient and/or patient guardian/advocate was made aware of their right to choose a provider. A list of eligible providers was presented and reviewed with the patient and/or patient guardian/advocate in written and/or verbal form. The list delineates providers in the patients desired geographic area who are participating in the Medicare program and/or providers contracted with the patients primary insurance. The Medicare list and quality ratings were obtained from the Medicare.gov [medicare.gov] website.  ---------------------------------------------------------------------------------------------------------------------    Interdisciplinary Discharge Plan Review:  Participants:advanced practice provider, physical therapy, , nursing, social work/services    Discharge Plan:   Disposition/Destination: Skilled Nursing Facility - Other  / Skilled Nursing Facility  Discharge Facility:   Destination - Admitted Since 11/7/2024       Service Provider Services Address Phone Fax Patient Preferred Last Updated    [facility] [address] [place] [phone] -- Nuris García LSW 11/12/2024 1110          Discharge Transportation:  Is Out of Hospital DNR needed at Discharge: no  Does patient need discharge transport? Yes            Spoke with admissions liaconrad Rivera with [facility] - confirmed pt's [id number] & [facility] insurance auth remains pending at this  time. Will continue to follow.     DCP:  [facility] SN[facility] - pending auth  RN Report: [phone]   MD handoff: [phone]  ask for TRISTAN Ross or Marce valdez

## 2024-11-12 NOTE — PROGRESS NOTES
"   Physical Therapy -  Daily Treatment/Progress Note     Patient: Dave Arredondo  Location: [facility] 5PAV 5406  MRN: [id number]  Today's date: 11/12/2024    HISTORY OF PRESENT ILLNESS     Dave is a 88 y.o. male admitted on 11/7/2024 with Hydropneumothorax [J94.8]. Principal problem is Hydropneumothorax.    Past Medical History  Dave has a past medical history of Coronary artery disease, DVT (deep venous thrombosis) (CMS/HCC), Hypertension, Lipid disorder, Pleural effusion, Pulmonary embolism (CMS/HCC), Rectal cancer (CMS/HCC), and SBO (small bowel obstruction) (CMS/[facility]).    History of Present Illness   88 year old male adm with SOB    History of recurrent right pleural effusio requiring thoracentesis S/p \"Right VATS pleural biopsy, insertion of pigtail catheter catheter 7/8/24.  Readmitted last month with placement of pigtail catheter 10/16 which was removed later.  no acute surgical intervention recommended  IR consultation for Pleurx catheter as patient will need long term drainage for his recurrent effusion  PRIOR LEVEL OF FUNCTION AND LIVING ENVIRONMENT     Prior Level of Function      Flowsheet Row Most Recent Value   Dominant Hand right   Ambulation independent   Transferring independent   Toileting independent   Bathing independent   Dressing independent   Eating independent   IADLs independent   Driving/Transportation    Communication understands/communicates without difficulty   Prior Level of Function Comment Per last EMR pt IND w/ mobility/adls w/o AD. But recently has only been able to ambulate short distances,  recent admission last month- DC to SNF   Assistive Device Currently Used at Home shower chair, inhaler             Prior Living Environment      Flowsheet Row Most Recent Value   People in Home facility resident   Current Living Arrangements short-term skilled nursing facility   Home Accessibility wheelchair accessible   Living Environment Comment Pt  " historian and stated he lives w/ his 4 brothers. Per EMR pt lives w/ roommate(s) on the 2nd fl, accessible by 12 steps, 1 [address] building, no elevator access,  pt currently being admitted from short term rehab          VITALS AND PAIN     PT Vitals      Date/Time Pulse SpO2 Pt Activity O2 Therapy [facility]   11/12/24 1112 86 96 % At rest None (Room air) LA          PT Pain      Date/Time Pain Type Location Rating: Rest Rating: Rest Rating: Activity [facility]   11/12/24 1112 Pain Assessment -- -- 0 - no pain 0 - no pain LA   11/12/24 1117 Pain Reassessment back 4 -- -- SRW             Objective   OBJECTIVE     Start time:  1112  End time:  1128  Session Length: 16 min       General Observations  Patient received supine, in bed. He was no issues or concerns identified by nurse prior to session, agreeable to therapy.      Precautions: fall       Limitations/Impairments: safety/cognitive      PT Eval and Treat - 11/12/24 1112          Cognition    Orientation Status oriented to;person;place;time;verbal cues/prompts needed for orientation     Affect/Mental Status confused;flat/blunted affect     Follows Commands follows one-step commands;physical/tactile prompts required;repetition of directions required;delayed response/completion;increased processing time needed     Cognitive Function safety deficit;executive function deficit     Executive Function Deficit planning/decision-making;information processing;initiation;insight/awareness of deficits;problem-solving/reasoning;judgment     Safety Deficit awareness of need for assistance;insight into deficits/self-awareness;judgment        Bed Mobility    Bed Mobility Activities supine to sit     [place] 1 person assist;minimum assist (75% or more patient effort)     Safety/Cues increased time to complete;proper use of assistive device     Assistive Device head of bed elevated;bed rails     Comment exit to the L        Mobility Belt    Mobility Belt Used During Session no - medical  contraindication     Reason Mobility Belt Not Used ostomy bag        Sit/Stand Transfer    Surface edge of bed     [place] 1 person assist;minimum assist (75% or more patient effort)     Safety/Cues verbal cues;initiation;proper use of assistive device     Assistive Device walker, front-wheeled     Transfer Comments min (A) from EOB, cues for hand placement and technique        Gait Training    [place], Gait minimum assist (75% or more patient effort);1 person assist     Assistive Device walker, front-wheeled     Distance in Feet 15 feet     Pattern step-to     Deviations/Abnormal Patterns weight shifting decreased;catracho decreased;step length decreased;stride length decreased     Comment min (A) with RW, cues for step sequencing, unsteady gait        Balance    Static Sitting Balance WFL     Sit to Stand Dynamic Balance mild impairment     Static Standing Balance mild impairment     Dynamic Standing Balance moderate impairment     Comment, Balance min (A) w/ RW        Impairments/Safety Issues    Impairments Affecting Function balance;cognition;strength;endurance/activity tolerance     Cognitive Impairments, Safety/Performance attention;awareness, need for assistance;insight into deficits/self-awareness     Safety Issues Affecting Function problem-solving;judgment;awareness of need for assistance;insight into deficits/self-awareness;positioning of assistive device                                    Education Documentation  Unresolved/Worsening Symptoms, taught by Caity Wolf, PT at 11/12/2024 12:29 PM.  Learner: Patient  Readiness: Acceptance  Method: Explanation, Demonstration  Response: Verbalizes Understanding, Demonstrated Understanding, Needs Reinforcement  Comment: reviewed PT POC, role of PT    Joint Mobility/Strength, taught by Caity Wolf, PT at 11/12/2024 12:29 PM.  Learner: Patient  Readiness: Acceptance  Method: Explanation, Demonstration  Response: Verbalizes Understanding,  Demonstrated Understanding, Needs Reinforcement  Comment: reviewed PT POC, role of PT    Home Safety, taught by Caity Wolf PT at 11/12/2024 12:29 PM.  Learner: Patient  Readiness: Acceptance  Method: Explanation, Demonstration  Response: Verbalizes Understanding, Demonstrated Understanding, Needs Reinforcement  Comment: reviewed PT POC, role of PT    Energy Conservation, taught by Caity Wolf PT at 11/12/2024 12:29 PM.  Learner: Patient  Readiness: Acceptance  Method: Explanation, Demonstration  Response: Verbalizes Understanding, Demonstrated Understanding, Needs Reinforcement  Comment: reviewed PT POC, role of PT    Assistive/Adaptive Devices, taught by Caity Wolf PT at 11/12/2024 12:29 PM.  Learner: Patient  Readiness: Acceptance  Method: Explanation, Demonstration  Response: Verbalizes Understanding, Demonstrated Understanding, Needs Reinforcement  Comment: reviewed PT POC, role of PT    Signs/Symptoms, taught by Caity Wolf PT at 11/12/2024 12:29 PM.  Learner: Patient  Readiness: Acceptance  Method: Explanation, Demonstration  Response: Verbalizes Understanding, Demonstrated Understanding, Needs Reinforcement  Comment: reviewed PT POC, role of PT    Risk Factors, taught by Caity Wolf PT at 11/12/2024 12:29 PM.  Learner: Patient  Readiness: Acceptance  Method: Explanation, Demonstration  Response: Verbalizes Understanding, Demonstrated Understanding, Needs Reinforcement  Comment: reviewed PT POC, role of PT        Session Outcome  Patient upright, in chair at end of session, call light in reach, personal items in reach, all needs met, chair alarm on. Nursing notified about patient's performance, patient's position, and patient's response to therapy/activity.    AM-PAC - Mobility (Current Function)     Turning form your back to your side while in flat bed without using bedrails 3 - A Little   Moving from lying on your back to sitting on the side of a flat bed without using  bedrails 3 - A Little   Moving to and from a bed to a chair 3 - A Little   Standing up from a chair using your arms 3 - A Little   To walk in a hospital room 3 - A Little   Climbing 3-5 steps with a railing 2 - A Lot   AM-PAC Mobility Score 17      ASSESSMENT AND PLAN     Progress Summary  11/12/24 88 year old male adm with SOB. Pt seen for PT session. Pt oriented to self, place, month. Pleasantly confused and cooperative. Pt continues to req min (A) for functional transfers and ambulation. He presents with impairments in balance, gait, and endurance. Rec SNF at [facility]    Patient/Family Therapy Goals Statement: NA    PT Plan      Flowsheet Row Most Recent Value   Rehab Potential good, to achieve stated therapy goals at 11/12/2024 1112   Therapy Frequency 3 times/wk at 11/12/2024 1112   Planned Therapy Interventions strengthening, gait training, transfer training at 11/12/2024 1112            PT Discharge Recommendations      Flowsheet Row Most Recent Value   PT Recommended Discharge Disposition skilled nursing facility at 11/12/2024 1112   Anticipated Equipment Needs if Discharged Home (PT) none at 11/12/2024 1112                 PT Goals      Flowsheet Row Most Recent Value   Transfer Goal 1    Activity/Assistive Device all transfers at 11/08/2024 0840   [place] modified independence at 11/08/2024 0840   Time Frame short-term goal (STG) at 11/08/2024 0840   Progress/Outcome goal ongoing at 11/08/2024 0840   Gait Training Goal 1    Activity/Assistive Device gait (walking locomotion) at 11/08/2024 0840   [place] modified independence at 11/08/2024 0840   Distance 100 at 11/08/2024 0840   Time Frame short-term goal (STG) at 11/08/2024 0840   Progress/Outcome goal ongoing at 11/08/2024 0840

## 2024-11-12 NOTE — PROGRESS NOTES
Hospital Medicine     Daily Progress Note       SUBJECTIVE   Interval History: patient seen and examined  Stable overnight   Denies any chest pain or sob  Still awaiting insurance auth for snf placement     OBJECTIVE      Vital signs in last 24 hours:  Temp:  [36.3 °C (97.4 °F)-36.4 °C (97.5 °F)] 36.4 °C (97.5 °F)  Heart Rate:  [60-86] 71  Resp:  [16-18] 18  BP: (121-139)/(65-69) 139/69    Intake/Output Summary (Last 24 hours) at 11/12/2024 1623  Last data filed at 11/12/2024 1545  Gross per 24 hour   Intake 128 ml   Output 100 ml   Net 28 ml       PHYSICAL EXAMINATION      Physical Exam    Alert and orientated x 3, chest decreased air entry right siide, heart sounds normal, abd soft nontender, cns nonfocal   LINES, CATHETERS, DRAINS, AIRWAYS, AND WOUNDS   Lines, Drains, and Airways:  Wounds (agree with documentation and present on admission):  Peripheral IV (Adult) 11/10/24 Posterior;Right Forearm (Active)   Number of days: 2       Drain 1 Lateral;Right Back 15.5 Fr. (Active)   Number of days: 4       Colostomy Unknown LUQ (Active)   Number of days: 5345         Comments:    LABS / IMAGING / TELE      Labs  I have reviewed the patient's pertinent labs. Pertinent labs are within normal limits.      Imaging  I have independently reviewed the pertinent imaging from the last 24 hrs.    ECG/Telemetry  Patient is not on telemetry.    ASSESSMENT AND PLAN        Assessment & Plan  Hydropneumothorax  History of recurrent right pleural effusion requiring thoracentesis S/p Right VATS pleural biopsy, insertion of pigtail catheter catheter 7/8/24.  Readmitted last month with placement of pigtail catheter 10/16 which was removed later  CT Chest reviewed  - Surgery consult appreciated, recommend IR consult for Pleurx catheter placement for long term drainage of recurrent effusion  previous pleural biopsy and cytology washings have been negative for malignancy  - IR consult appreciated  - s/p Pleurx catheter placement w/ IR on  11/8/24  HLD (hyperlipidemia)  - Continue Lipitor  History of rectal cancer  H/o Rectal Cancer: 2 N1 M0 rectal cancer 2012, neoadjuvant chemotherapy and radiotherapy followed by surgery (APR), then had multiple complications after the surgery  Colostomy present  - Outpatient follow-up  PE (pulmonary thromboembolism) (CMS/HCC)  - Continue Xarelto  CAD (coronary artery disease)  Presumed CAR based on abnormal stress test ing 2018, declined cardiac cath  - Continue statin    VTE Assessment: Padua VTE Score: 4  VTE Prophylaxis:  Current anticoagulants:  rivaroxaban (XARELTO) tablet 20 mg, oral, Daily with dinner      Code Status: Full Code      Estimated Discharge Date: 11/12/2024     Disposition Planning: snf when bed available     Simon Adams MD  11/12/2024

## 2024-11-13 ENCOUNTER — APPOINTMENT (INPATIENT)
Dept: RADIOLOGY | Facility: HOSPITAL | Age: 89
DRG: 187 | End: 2024-11-13
Attending: PHYSICIAN ASSISTANT
Payer: COMMERCIAL

## 2024-11-13 PROCEDURE — G0378 HOSPITAL OBSERVATION PER HR: HCPCS

## 2024-11-13 PROCEDURE — 63700000 HC SELF-ADMINISTRABLE DRUG: Performed by: HOSPITALIST

## 2024-11-13 PROCEDURE — 63700000 HC SELF-ADMINISTRABLE DRUG: Performed by: STUDENT IN AN ORGANIZED HEALTH CARE EDUCATION/TRAINING PROGRAM

## 2024-11-13 PROCEDURE — 71045 X-RAY EXAM CHEST 1 VIEW: CPT

## 2024-11-13 PROCEDURE — 12000000 HC ROOM AND CARE MED/SURG

## 2024-11-13 PROCEDURE — 99232 SBSQ HOSP IP/OBS MODERATE 35: CPT | Performed by: HOSPITALIST

## 2024-11-13 RX ADMIN — PANTOPRAZOLE SODIUM 40 MG: 40 TABLET, DELAYED RELEASE ORAL at 20:32

## 2024-11-13 RX ADMIN — SENNOSIDES 2 TABLET: 8.6 TABLET, FILM COATED ORAL at 20:32

## 2024-11-13 RX ADMIN — Medication 3 MG: at 20:32

## 2024-11-13 RX ADMIN — ATORVASTATIN CALCIUM 20 MG: 20 TABLET, FILM COATED ORAL at 18:06

## 2024-11-13 RX ADMIN — RIVAROXABAN 20 MG: 20 TABLET, FILM COATED ORAL at 18:06

## 2024-11-13 RX ADMIN — DOCUSATE SODIUM 100 MG: 100 CAPSULE, LIQUID FILLED ORAL at 20:32

## 2024-11-13 RX ADMIN — METOPROLOL SUCCINATE 100 MG: 100 TABLET, EXTENDED RELEASE ORAL at 09:52

## 2024-11-13 RX ADMIN — FUROSEMIDE 20 MG: 20 TABLET ORAL at 09:52

## 2024-11-13 ASSESSMENT — COGNITIVE AND FUNCTIONAL STATUS - GENERAL
MOVING TO AND FROM BED TO CHAIR: 3 - A LITTLE
CLIMB 3 TO 5 STEPS WITH RAILING: 2 - A LOT
CLIMB 3 TO 5 STEPS WITH RAILING: 2 - A LOT
MOVING TO AND FROM BED TO CHAIR: 3 - A LITTLE
STANDING UP FROM CHAIR USING ARMS: 3 - A LITTLE
WALKING IN HOSPITAL ROOM: 2 - A LOT
STANDING UP FROM CHAIR USING ARMS: 3 - A LITTLE
WALKING IN HOSPITAL ROOM: 2 - A LOT

## 2024-11-13 NOTE — PROGRESS NOTES
Hospital Medicine     Daily Progress Note       SUBJECTIVE   Interval History: Patient seen and examined  Stable overnight  Repeat chest x-ray from this morning shows small recommendation of right pleural fluid but patient is completely asymptomatic  Still waiting on insurance authorization for SNF placement     OBJECTIVE      Vital signs in last 24 hours:  Temp:  [36.4 °C (97.5 °F)-36.8 °C (98.3 °F)] 36.6 °C (97.8 °F)  Heart Rate:  [72-91] 78  Resp:  [16-18] 18  BP: (118-149)/(66-80) 118/66    Intake/Output Summary (Last 24 hours) at 11/13/2024 1615  Last data filed at 11/13/2024 1200  Gross per 24 hour   Intake 596 ml   Output 577 ml   Net 19 ml       PHYSICAL EXAMINATION      Physical Exam    Alert and orientated x 3 heart sounds are normal chest revealed decreased air entry right base abdomen is soft nontender no organomegaly CNS examination is grossly nonfocal extremities reveal no edema   LINES, CATHETERS, DRAINS, AIRWAYS, AND WOUNDS   Lines, Drains, and Airways:  Wounds (agree with documentation and present on admission):  Peripheral IV (Adult) 11/10/24 Posterior;Right Forearm (Active)   Number of days: 3       Drain 1 Lateral;Right Back 15.5 Fr. (Active)   Number of days: 5       Colostomy Unknown LUQ (Active)   Number of days: 5346         Comments:    LABS / IMAGING / TELE      Labs  No new labs.      Imaging  I have independently reviewed the pertinent imaging from the last 24 hrs.    ECG/Telemetry  Patient is not on telemetry.    ASSESSMENT AND PLAN        Assessment & Plan  Hydropneumothorax  History of recurrent right pleural effusion requiring thoracentesis S/p Right VATS pleural biopsy, insertion of pigtail catheter catheter 7/8/24.  Readmitted last month with placement of pigtail catheter 10/16 which was removed later  CT Chest reviewed  - Surgery consult appreciated, recommend IR consult for Pleurx catheter placement for long term drainage of recurrent effusion  previous pleural biopsy and  cytology washings have been negative for malignancy  - IR consult appreciated  - s/p Pleurx catheter placement w/ IR on 11/8/24  Chest x-ray from today show small accumulation of fluid on the right side  Waiting for insurance Auth for skilled nursing placement  HLD (hyperlipidemia)  - Continue Lipitor  History of rectal cancer  H/o Rectal Cancer: 2 N1 M0 rectal cancer 2012, neoadjuvant chemotherapy and radiotherapy followed by surgery (APR), then had multiple complications after the surgery  Colostomy present  - Outpatient follow-up  PE (pulmonary thromboembolism) (CMS/HCC)  - Continue Xarelto  CAD (coronary artery disease)  Presumed CAR based on abnormal stress test ing 2018, declined cardiac cath  - Continue statin    VTE Assessment: Padua VTE Score: 4  VTE Prophylaxis:  Current anticoagulants:  rivaroxaban (XARELTO) tablet 20 mg, oral, Daily with dinner      Code Status: Full Code      Estimated Discharge Date: 11/14/2024   Disposition Planning: snf when auth comes through     Simon Adams MD  11/13/2024

## 2024-11-13 NOTE — PROGRESS NOTES
Hospital Medicine     Daily Progress Note       SUBJECTIVE   Interval History:   Patient remains medically stable for discharge. Still awaiting insurance authorization to finalize SNF placement.   Patient resting comfortably in bed.   He denies CP, dyspnea, abdominal pain, N/V.     OBJECTIVE      Vital signs in last 24 hours:  Temp:  [36.4 °C (97.5 °F)-36.8 °C (98.3 °F)] 36.4 °C (97.5 °F)  Heart Rate:  [71-91] 91  Resp:  [16-18] 18  BP: (139-149)/(69-80) 149/80    Intake/Output Summary (Last 24 hours) at 11/13/2024 1423  Last data filed at 11/13/2024 1200  Gross per 24 hour   Intake 596 ml   Output 677 ml   Net -81 ml       PHYSICAL EXAMINATION      Physical Exam  Vitals and nursing note reviewed.   Constitutional:       General: He is not in acute distress.     Appearance: He is not toxic-appearing.      Comments: Chronically Ill Elderly Male    HENT:      Head: Normocephalic and atraumatic.   Eyes:      Conjunctiva/sclera: Conjunctivae normal.   Cardiovascular:      Rate and Rhythm: Normal rate and regular rhythm.      Heart sounds: Normal heart sounds.   Pulmonary:      Effort: Pulmonary effort is normal.      Comments: Decrease R base  PleurX Cath noted  Abdominal:      General: Bowel sounds are normal. There is no distension.      Palpations: Abdomen is soft.      Tenderness: There is no abdominal tenderness.      Comments: Colostomy noted    Musculoskeletal:      Cervical back: Neck supple.   Skin:     General: Skin is warm and dry.   Neurological:      General: No focal deficit present.      Mental Status: He is oriented to person, place, and time.   Psychiatric:         Mood and Affect: Mood normal.        LINES, CATHETERS, DRAINS, AIRWAYS, AND WOUNDS   Lines, Drains, and Airways:  Wounds (agree with documentation and present on admission):  Peripheral IV (Adult) 11/10/24 Posterior;Right Forearm (Active)   Number of days: 1       Drain 1 Lateral;Right Back 15.5 Fr. (Active)   Number of days: 3        Colostomy Unknown LUQ (Active)   Number of days: 5344         Comments:    LABS / IMAGING / TELE      Labs  Results from last 7 days   Lab Units 11/09/24  0801 11/08/24  0458 11/07/24  1417   SODIUM mEQ/L 135* 139 137   POTASSIUM mEQ/L 4.1 3.4* 3.5   CHLORIDE mEQ/L 102 104 103   CO2 mEQ/L 29 28 29   BUN mg/dL 14 15 17   CREATININE mg/dL 0.6* 0.6* 0.6*   CALCIUM mg/dL 8.9 8.9 8.9   ALBUMIN g/dL  --   --  2.8*   BILIRUBIN TOTAL mg/dL  --   --  0.5   ALK PHOS IU/L  --   --  122   ALT IU/L  --   --  26   AST IU/L  --   --  21   GLUCOSE mg/dL 120* 98 126*     Results from last 7 days   Lab Units 11/10/24  0755 11/09/24  0801 11/08/24  0458   WBC K/uL 10.23 10.73* 10.71*   HEMOGLOBIN g/dL 12.8* 11.9* 12.3*   HEMATOCRIT % 39.4* 36.2* 37.7*   PLATELETS K/uL 280 269 267     Microbiology Results       Procedure Component Value Units Date/Time    Blood Culture Blood, Venous [[id number]]  (Normal) Collected: 11/07/24 1942    Specimen: Blood, Venous Updated: 11/10/24 2300     Culture No growth at 72 hours    Blood Culture Blood, Venous [[id number]]  (Normal) Collected: 11/07/24 1942    Specimen: Blood, Venous Updated: 11/10/24 2300     Culture No growth at 72 hours    SARS-COV-2 (COVID-19)/ FLU A/B, AND RSV, PCR Nasopharynx [[id number]]  (Normal) Collected: 11/07/24 1527    Specimen: Nasopharyngeal Swab from Nasopharynx Updated: 11/07/24 1614     SARS-CoV-2 (COVID-19) Negative     Influenza A Negative     Influenza B Negative     Respiratory Syncytial Virus Negative    Narrative:      Testing performed using real-time PCR for detection of COVID-19. EUA approved validation studies performed on site.     Body Fluid Culture/Smear Pleural Fluid, Right [[id number]] Collected: 10/16/24 1519    Specimen: Pleural Fluid, Right Updated: 10/21/24 0822     Culture No growth at 96 hours     Gram Stain Result No WBC Seen      No organisms seen    Fungal Stain Pleural Fluid, Right [[id number]] Collected: 10/16/24 1519    Specimen: Pleural Fluid,  Right Updated: 10/17/24 0645     Fungus Stain No fungal elements seen    Fungal Culture Pleural Fluid, Right [[id number]]  (Normal) Collected: 10/16/24 1519    Specimen: Pleural Fluid, Right Updated: 10/18/24 0300     Fungal Culture No Fungus Isolated to Date    AFB stain Pleural Fluid, Right [[id number]]  (Normal) Collected: 10/16/24 1519    Specimen: Pleural Fluid, Right Updated: 10/17/24 1026     AFB Stain No acid fast bacilli seen    AFB Culture Pleural Fluid, Right [[id number]]  (Normal) Collected: 10/16/24 1519    Specimen: Pleural Fluid, Right Updated: 10/18/24 0801     AFB Culture No Acid Fast Bacilli Isolated to Date    SARS-COV-2 (COVID-19)/ FLU A/B, AND RSV, PCR Nasopharynx [[id number]]  (Normal) Collected: 10/14/24 0048    Specimen: Nasopharyngeal Swab from Nasopharynx Updated: 10/14/24 0142     SARS-CoV-2 (COVID-19) Negative     Influenza A Negative     Influenza B Negative     Respiratory Syncytial Virus Negative    Narrative:      Testing performed using real-time PCR for detection of COVID-19. EUA approved validation studies performed on site.           Above labs have been personally reviewed.     Imaging  I have independently reviewed the pertinent imaging from the last 24 hrs.    ECG/Telemetry  Patient is not on telemetry.    ASSESSMENT AND PLAN        Assessment & Plan  Hydropneumothorax  History of recurrent right pleural effusion requiring thoracentesis S/p Right VATS pleural biopsy, insertion of pigtail catheter catheter 7/8/24.  Readmitted last month with placement of pigtail catheter 10/16 which was removed later  CT Chest reviewed  - Surgery consult appreciated, recommend IR consult for Pleurx catheter placement for long term drainage of recurrent effusion  previous pleural biopsy and cytology washings have been negative for malignancy  - IR consult appreciated  - s/p Pleurx catheter placement w/ IR on 11/8/24  HLD (hyperlipidemia)  - Continue Lipitor  History of rectal cancer  H/o Rectal  Cancer: 2 N1 M0 rectal cancer 2012, neoadjuvant chemotherapy and radiotherapy followed by surgery (APR), then had multiple complications after the surgery  Colostomy present  - Outpatient follow-up  PE (pulmonary thromboembolism) (CMS/HCC)  - Continue Xarelto  CAD (coronary artery disease)  Presumed CAR based on abnormal stress test ing 2018, declined cardiac cath  - Continue statin    VTE Assessment: Padua VTE Score: 4  VTE Prophylaxis:  Current anticoagulants:  rivaroxaban (XARELTO) tablet 20 mg, oral, Daily with dinner      Code Status: Full Code      Estimated Discharge Date: 11/13/2024  {Expected Discharge Date is today.  If patient is not being discharged, click here to update.  Then right-click and select Refresh Smartlink to display the new date:[id number]}         Disposition Planning: medically stable for discharge; awaiting insurance auth/SNF placement     BENJY Vegas  11/13/2024

## 2024-11-13 NOTE — PLAN OF CARE
Problem: Adult Inpatient Plan of Care  Goal: Plan of Care Review  Outcome: Progressing  Flowsheets (Taken 11/13/2024 0500)  Progress: no change  Outcome Evaluation: Pt x2 assist in bed. Pt is disoriented to situation. Denies pain. Repositioned for comfort. R pleurx cath c/d/i. Plan for d/c to [facility] pending insurance auth.  Plan of Care Reviewed With: patient

## 2024-11-13 NOTE — ASSESSMENT & PLAN NOTE
History of recurrent right pleural effusion requiring thoracentesis S/p Right VATS pleural biopsy, insertion of pigtail catheter catheter 7/8/24.  Readmitted last month with placement of pigtail catheter 10/16 which was removed later  CT Chest reviewed  - Surgery consult appreciated, recommend IR consult for Pleurx catheter placement for long term drainage of recurrent effusion  previous pleural biopsy and cytology washings have been negative for malignancy  - IR consult appreciated  - s/p Pleurx catheter placement w/ IR on 11/8/24  Chest x-ray from today show small accumulation of fluid on the right side  Waiting for insurance Auth for skilled nursing placement

## 2024-11-13 NOTE — PROGRESS NOTES
Hospital Medicine     Daily Progress Note       SUBJECTIVE   Interval History:   Patient remains medically stable for discharge. Still awaiting insurance authorization to finalize SNF placement.   Patient resting comfortably in bed.   He denies CP, dyspnea, abdominal pain, N/V.     OBJECTIVE      Vital signs in last 24 hours:  Temp:  [36.4 °C (97.5 °F)-36.8 °C (98.3 °F)] 36.4 °C (97.5 °F)  Heart Rate:  [71-91] 91  Resp:  [16-18] 18  BP: (139-149)/(69-80) 149/80    Intake/Output Summary (Last 24 hours) at 11/13/2024 1423  Last data filed at 11/13/2024 1200  Gross per 24 hour   Intake 596 ml   Output 677 ml   Net -81 ml       PHYSICAL EXAMINATION      Physical Exam  Vitals and nursing note reviewed.   Constitutional:       General: He is not in acute distress.     Appearance: He is not toxic-appearing.      Comments: Chronically Ill Elderly Male    HENT:      Head: Normocephalic and atraumatic.   Eyes:      Conjunctiva/sclera: Conjunctivae normal.   Cardiovascular:      Rate and Rhythm: Normal rate and regular rhythm.      Heart sounds: Normal heart sounds.   Pulmonary:      Effort: Pulmonary effort is normal.      Comments: Decreased R base  Abdominal:      General: Bowel sounds are normal. There is no distension.      Palpations: Abdomen is soft.      Comments: Colostomy noted    Musculoskeletal:      Cervical back: Neck supple.   Skin:     General: Skin is warm and dry.   Neurological:      General: No focal deficit present.      Mental Status: He is oriented to person, place, and time.   Psychiatric:         Mood and Affect: Mood normal.        LINES, CATHETERS, DRAINS, AIRWAYS, AND WOUNDS   Lines, Drains, and Airways:  Wounds (agree with documentation and present on admission):  Peripheral IV (Adult) 11/10/24 Posterior;Right Forearm (Active)   Number of days: 1       Drain 1 Lateral;Right Back 15.5 Fr. (Active)   Number of days: 3       Colostomy Unknown LUQ (Active)   Number of days: 5344         Comments:     LABS / IMAGING / TELE      Labs  Results from last 7 days   Lab Units 11/09/24  0801 11/08/24  0458 11/07/24  1417   SODIUM mEQ/L 135* 139 137   POTASSIUM mEQ/L 4.1 3.4* 3.5   CHLORIDE mEQ/L 102 104 103   CO2 mEQ/L 29 28 29   BUN mg/dL 14 15 17   CREATININE mg/dL 0.6* 0.6* 0.6*   CALCIUM mg/dL 8.9 8.9 8.9   ALBUMIN g/dL  --   --  2.8*   BILIRUBIN TOTAL mg/dL  --   --  0.5   ALK PHOS IU/L  --   --  122   ALT IU/L  --   --  26   AST IU/L  --   --  21   GLUCOSE mg/dL 120* 98 126*     Results from last 7 days   Lab Units 11/10/24  0755 11/09/24  0801 11/08/24  0458   WBC K/uL 10.23 10.73* 10.71*   HEMOGLOBIN g/dL 12.8* 11.9* 12.3*   HEMATOCRIT % 39.4* 36.2* 37.7*   PLATELETS K/uL 280 269 267     Microbiology Results       Procedure Component Value Units Date/Time    Blood Culture Blood, Venous [[id number]]  (Normal) Collected: 11/07/24 1942    Specimen: Blood, Venous Updated: 11/10/24 2300     Culture No growth at 72 hours    Blood Culture Blood, Venous [[id number]]  (Normal) Collected: 11/07/24 1942    Specimen: Blood, Venous Updated: 11/10/24 2300     Culture No growth at 72 hours    SARS-COV-2 (COVID-19)/ FLU A/B, AND RSV, PCR Nasopharynx [[id number]]  (Normal) Collected: 11/07/24 1527    Specimen: Nasopharyngeal Swab from Nasopharynx Updated: 11/07/24 1614     SARS-CoV-2 (COVID-19) Negative     Influenza A Negative     Influenza B Negative     Respiratory Syncytial Virus Negative    Narrative:      Testing performed using real-time PCR for detection of COVID-19. EUA approved validation studies performed on site.     Body Fluid Culture/Smear Pleural Fluid, Right [[id number]] Collected: 10/16/24 1519    Specimen: Pleural Fluid, Right Updated: 10/21/24 0822     Culture No growth at 96 hours     Gram Stain Result No WBC Seen      No organisms seen    Fungal Stain Pleural Fluid, Right [[id number]] Collected: 10/16/24 1519    Specimen: Pleural Fluid, Right Updated: 10/17/24 0645     Fungus Stain No fungal elements seen     Fungal Culture Pleural Fluid, Right [[id number]]  (Normal) Collected: 10/16/24 1519    Specimen: Pleural Fluid, Right Updated: 10/18/24 0300     Fungal Culture No Fungus Isolated to Date    AFB stain Pleural Fluid, Right [[id number]]  (Normal) Collected: 10/16/24 1519    Specimen: Pleural Fluid, Right Updated: 10/17/24 1026     AFB Stain No acid fast bacilli seen    AFB Culture Pleural Fluid, Right [[id number]]  (Normal) Collected: 10/16/24 1519    Specimen: Pleural Fluid, Right Updated: 10/18/24 0801     AFB Culture No Acid Fast Bacilli Isolated to Date    SARS-COV-2 (COVID-19)/ FLU A/B, AND RSV, PCR Nasopharynx [[id number]]  (Normal) Collected: 10/14/24 0048    Specimen: Nasopharyngeal Swab from Nasopharynx Updated: 10/14/24 0142     SARS-CoV-2 (COVID-19) Negative     Influenza A Negative     Influenza B Negative     Respiratory Syncytial Virus Negative    Narrative:      Testing performed using real-time PCR for detection of COVID-19. EUA approved validation studies performed on site.           Above labs have been personally reviewed.     Imaging  I have independently reviewed the pertinent imaging from the last 24 hrs.    ECG/Telemetry  Patient is not on telemetry.    ASSESSMENT AND PLAN        Assessment & Plan  Hydropneumothorax  History of recurrent right pleural effusion requiring thoracentesis S/p Right VATS pleural biopsy, insertion of pigtail catheter catheter 7/8/24.  Readmitted last month with placement of pigtail catheter 10/16 which was removed later  CT Chest reviewed  - Surgery consult appreciated, recommend IR consult for Pleurx catheter placement for long term drainage of recurrent effusion  previous pleural biopsy and cytology washings have been negative for malignancy  - IR consult appreciated  - s/p Pleurx catheter placement w/ IR on 11/8/24  HLD (hyperlipidemia)  - Continue Lipitor  History of rectal cancer  H/o Rectal Cancer: 2 N1 M0 rectal cancer 2012, neoadjuvant chemotherapy and radiotherapy  followed by surgery (APR), then had multiple complications after the surgery  Colostomy present  - Outpatient follow-up  PE (pulmonary thromboembolism) (CMS/HCC)  - Continue Xarelto  CAD (coronary artery disease)  Presumed CAR based on abnormal stress test ing 2018, declined cardiac cath  - Continue statin    VTE Assessment: Padua VTE Score: 4  VTE Prophylaxis:  Current anticoagulants:  rivaroxaban (XARELTO) tablet 20 mg, oral, Daily with dinner      Code Status: Full Code      Estimated Discharge Date: 11/13/2024         Disposition Planning: medically stable for discharge; awaiting insurance auth/SNF placement     BENJY Vegas  11/13/2024

## 2024-11-13 NOTE — PLAN OF CARE
Care Coordination Discharge Plan Summary    Admission Assessment Summary    General Information  Readmission Within the last 30 days: previous discharge plan unsuccessful  Does patient have a : No  Patient-Specific Goals (include timeframe):      Living Arrangements  Arrived From: short term rehab (pt came from [facility]. Pt's family would like to explore different options on d/c.)  Current Living Arrangements: short-term skilled nursing facility  People in Home: facility resident  Home Accessibility: wheelchair accessible  Living Arrangement Comments: Pt has been living at [facility]    [employer] [facility]  Housing Stability  In the last 12 months, was there a time when you were not able to pay the mortgage or rent on time?: No  In the past 12 months, how many times have you moved where you were living?: 0  At any time in the past 12 months, were you homeless or living in a shelter (including now)?: No  Utility Access  In the past 12 months has the electric, gas, oil, or water company threatened to shut off services in your home?: No  Transportation Needs  In the past 12 months, has lack of transportation kept you from medical appointments or from getting medications?: No  In the past 12 months, has lack of transportation kept you from meetings, work, or from getting things needed for daily living?: No    Functional Status Prior to Admission  Assistive Device/Animal Currently Used at Home: shower chair, inhaler  Functional Status Comments: Pt is now an assist for ADLs and a MAX assist for IADLs. Pt is not driving.  IADL Comments:      Discharge Needs Assessment    Concerns to be Addressed: care coordination/care conferences, discharge planning  Current Discharge Risk: chronically ill, cognitively impaired, dependent with mobility/activities of daily living  Anticipated Changes Related to Illness: inability to care for self    Discharge Plan Summary    Patient Choice  Offered/Gave Vendor  List: yes  Patient and/or patient guardian/advocate was made aware of their right to choose a provider. A list of eligible providers was presented and reviewed with the patient and/or patient guardian/advocate in written and/or verbal form. The list delineates providers in the patients desired geographic area who are participating in the Medicare program and/or providers contracted with the patients primary insurance. The Medicare list and quality ratings were obtained from the Medicare.gov [medicare.gov] website.    Concerns / Comments: see below.    Discharge Plan:  Disposition/Destination: Skilled Nursing Facility - Other  / Skilled Nursing Facility  Destination - Admitted Since 11/7/2024       Service Provider Services Address Phone Fax Patient Preferred Last Updated    [facility] Skilled Nursing [address] [id number] [phone] -- Nuris García LSW 11/12/2024 1113            Connection to Community  Not applicable  Community Resources:      Discharge Transportation:  Is Out of Hospital DNR needed at Discharge: no  Does patient need discharge transport? Yes  Discharge Transportation Vendor: [employer] ([phone])  Type of Transportation: basic life support  What day is the transport expected?: 11/13/24  What time is the transport expected?: 1645         Pt's auth for SNF remains pending at this time. EDUARDO sent auth escalation request to [facility] contacts via email. EDUARDO set up tentative BLS transport for today via [employer] portal for 4:45PM pick today pending auth determination. Medical team and bedside RN updated in rounds.    DCP:  [facility] SNF - pending Auth (ref# [id number])   RN report: [phone]   MD handoff: [phone]  ask for NP Vandana or April   16:45 tentative BLS requested (Trip #[id number])     ADDENDUM 15:24 : EDUARDO received email back from [employer] contact stating authorization with that ref information does not exist. EDUARDO called Nicole schulz  discussed the same. Marsha then spoke with Surjit and SNF request was built under a hospital admission & it is now being switched to a SNF request. This writer called [facility], spoke with Izabel, who was unable to locate previous request. Provided new ref# [id number] and tracking ID: [id number].   EDUARDO spoke with Natacha at [facility] by phone and rescheduled pt's BLS trip for 4:30 tomorrow 11/14.

## 2024-11-14 LAB — FUNGUS SPEC CULT: NORMAL

## 2024-11-14 PROCEDURE — 63700000 HC SELF-ADMINISTRABLE DRUG: Performed by: STUDENT IN AN ORGANIZED HEALTH CARE EDUCATION/TRAINING PROGRAM

## 2024-11-14 PROCEDURE — 99232 SBSQ HOSP IP/OBS MODERATE 35: CPT | Performed by: HOSPITALIST

## 2024-11-14 PROCEDURE — 12000000 HC ROOM AND CARE MED/SURG

## 2024-11-14 PROCEDURE — 63700000 HC SELF-ADMINISTRABLE DRUG: Performed by: HOSPITALIST

## 2024-11-14 PROCEDURE — G0378 HOSPITAL OBSERVATION PER HR: HCPCS

## 2024-11-14 RX ADMIN — RIVAROXABAN 20 MG: 20 TABLET, FILM COATED ORAL at 17:12

## 2024-11-14 RX ADMIN — SENNOSIDES 2 TABLET: 8.6 TABLET, FILM COATED ORAL at 20:19

## 2024-11-14 RX ADMIN — DOCUSATE SODIUM 100 MG: 100 CAPSULE, LIQUID FILLED ORAL at 20:19

## 2024-11-14 RX ADMIN — ATORVASTATIN CALCIUM 20 MG: 20 TABLET, FILM COATED ORAL at 17:12

## 2024-11-14 RX ADMIN — ACETAMINOPHEN 650 MG: 325 TABLET ORAL at 10:57

## 2024-11-14 RX ADMIN — FUROSEMIDE 20 MG: 20 TABLET ORAL at 09:40

## 2024-11-14 RX ADMIN — SENNOSIDES 2 TABLET: 8.6 TABLET, FILM COATED ORAL at 09:40

## 2024-11-14 RX ADMIN — PANTOPRAZOLE SODIUM 40 MG: 40 TABLET, DELAYED RELEASE ORAL at 20:19

## 2024-11-14 RX ADMIN — DOCUSATE SODIUM 100 MG: 100 CAPSULE, LIQUID FILLED ORAL at 09:40

## 2024-11-14 RX ADMIN — METOPROLOL SUCCINATE 100 MG: 100 TABLET, EXTENDED RELEASE ORAL at 09:40

## 2024-11-14 RX ADMIN — Medication 3 MG: at 20:19

## 2024-11-14 ASSESSMENT — COGNITIVE AND FUNCTIONAL STATUS - GENERAL
STANDING UP FROM CHAIR USING ARMS: 2 - A LOT
MOVING TO AND FROM BED TO CHAIR: 2 - A LOT
MOVING TO AND FROM BED TO CHAIR: 2 - A LOT
CLIMB 3 TO 5 STEPS WITH RAILING: 2 - A LOT
WALKING IN HOSPITAL ROOM: 2 - A LOT
STANDING UP FROM CHAIR USING ARMS: 2 - A LOT
WALKING IN HOSPITAL ROOM: 2 - A LOT
CLIMB 3 TO 5 STEPS WITH RAILING: 2 - A LOT

## 2024-11-14 NOTE — ASSESSMENT & PLAN NOTE
History of recurrent right pleural effusion requiring thoracentesis S/p Right VATS pleural biopsy, insertion of pigtail catheter catheter 7/8/24.  Readmitted last month with placement of pigtail catheter 10/16 which was removed later  CT Chest reviewed  - Surgery consult appreciated, recommend IR consult for Pleurx catheter placement for long term drainage of recurrent effusion  previous pleural biopsy and cytology washings have been negative for malignancy  - IR consult appreciated  - s/p Pleurx catheter placement w/ IR on 11/8/24  Chest x-ray from today show small accumulation of fluid on the right side  Status post drainage of 300 cc of serosanguineous fluid today  Waiting for insurance Auth for skilled nursing placement

## 2024-11-14 NOTE — PROGRESS NOTES
Hospital Medicine     Daily Progress Note       SUBJECTIVE   Interval History: Patient seen and examined this morning  Patient appeared to be slightly short of breath this morning  Right pleural fluid about 300 cc of serosanguineous fluid drained because of his symptoms   patient still waiting for insurance authorization     OBJECTIVE      Vital signs in last 24 hours:  Temp:  [36.3 °C (97.4 °F)-36.6 °C (97.8 °F)] 36.3 °C (97.4 °F)  Heart Rate:  [76-82] 76  Resp:  [16-18] 16  BP: (115-154)/(63-79) 154/79    Intake/Output Summary (Last 24 hours) at 11/14/2024 1512  Last data filed at 11/14/2024 1215  Gross per 24 hour   Intake 248 ml   Output 825 ml   Net -577 ml       PHYSICAL EXAMINATION      Physical Exam    Alert and orientated x 3 heart sounds are normal chest revealed decreased air entry at the right base abdomen is soft nontender no organomegaly CNS examination is grossly nonfocal extremities reveal no edema   LINES, CATHETERS, DRAINS, AIRWAYS, AND WOUNDS   Lines, Drains, and Airways:  Wounds (agree with documentation and present on admission):  Peripheral IV (Adult) 11/10/24 Posterior;Right Forearm (Active)   Number of days: 4       Drain 1 Lateral;Right Back 15.5 Fr. (Active)   Number of days: 6       Colostomy Unknown LUQ (Active)   Number of days: 5347         Comments:    LABS / IMAGING / TELE      Labs  I have reviewed the patient's pertinent labs. Pertinent labs are within normal limits.      Imaging  I have independently reviewed the pertinent imaging from the last 24 hrs.    ECG/Telemetry  Patient is not on telemetry.    ASSESSMENT AND PLAN        Assessment & Plan  Hydropneumothorax  History of recurrent right pleural effusion requiring thoracentesis S/p Right VATS pleural biopsy, insertion of pigtail catheter catheter 7/8/24.  Readmitted last month with placement of pigtail catheter 10/16 which was removed later  CT Chest reviewed  - Surgery consult appreciated, recommend IR consult for Pleurx  catheter placement for long term drainage of recurrent effusion  previous pleural biopsy and cytology washings have been negative for malignancy  - IR consult appreciated  - s/p Pleurx catheter placement w/ IR on 11/8/24  Chest x-ray from today show small accumulation of fluid on the right side  Status post drainage of 300 cc of serosanguineous fluid today  Waiting for insurance Auth for skilled nursing placement  HLD (hyperlipidemia)  - Continue Lipitor  History of rectal cancer  H/o Rectal Cancer: 2 N1 M0 rectal cancer 2012, neoadjuvant chemotherapy and radiotherapy followed by surgery (APR), then had multiple complications after the surgery  Colostomy present  - Outpatient follow-up  PE (pulmonary thromboembolism) (CMS/HCC)  - Continue Xarelto  CAD (coronary artery disease)  Presumed CAR based on abnormal stress test ing 2018, declined cardiac cath  - Continue statin    VTE Assessment: Padua VTE Score: 4  VTE Prophylaxis:  Current anticoagulants:  rivaroxaban (XARELTO) tablet 20 mg, oral, Daily with dinner      Code Status: Full Code      Estimated Discharge Date: 11/14/2024     Disposition Planning: awaiting insurance alaina Adams MD  11/14/2024

## 2024-11-14 NOTE — PLAN OF CARE
Care Coordination Discharge Plan Summary    Admission Assessment Summary    General Information  Readmission Within the last 30 days: previous discharge plan unsuccessful  Does patient have a : No  Patient-Specific Goals (include timeframe):      Living Arrangements  Arrived From: short term rehab (pt came from [facility]. Pt's family would like to explore different options on d/c.)  Current Living Arrangements: short-term skilled nursing facility  People in Home: facility resident  Home Accessibility: wheelchair accessible  Living Arrangement Comments: Pt has been living at [facility]    [employer] [facility]  Housing Stability  In the last 12 months, was there a time when you were not able to pay the mortgage or rent on time?: No  In the past 12 months, how many times have you moved where you were living?: 0  At any time in the past 12 months, were you homeless or living in a shelter (including now)?: No  Utility Access  In the past 12 months has the electric, gas, oil, or water company threatened to shut off services in your home?: No  Transportation Needs  In the past 12 months, has lack of transportation kept you from medical appointments or from getting medications?: No  In the past 12 months, has lack of transportation kept you from meetings, work, or from getting things needed for daily living?: No    Functional Status Prior to Admission  Assistive Device/Animal Currently Used at Home: shower chair, inhaler  Functional Status Comments: Pt is now an assist for ADLs and a MAX assist for IADLs. Pt is not driving.  IADL Comments:      Discharge Needs Assessment    Concerns to be Addressed: care coordination/care conferences, discharge planning  Current Discharge Risk: chronically ill, cognitively impaired, dependent with mobility/activities of daily living  Anticipated Changes Related to Illness: inability to care for self    Discharge Plan Summary  Patient Choice  Offered/Gave Vendor List:  yes  Patient and/or patient guardian/advocate was made aware of their right to choose a provider. A list of eligible providers was presented and reviewed with the patient and/or patient guardian/advocate in written and/or verbal form. The list delineates providers in the patients desired geographic area who are participating in the Medicare program and/or providers contracted with the patients primary insurance. The Medicare list and quality ratings were obtained from the Medicare.gov [medicare.gov] website.    Discharge Plan:  Disposition/Destination: Skilled Nursing Facility - Other  / Skilled Nursing Facility  Destination - Admitted Since 11/7/2024       Service Provider Services Address Phone Fax Patient Preferred Last Updated    [facility] Skilled Nursing [address] [place] [phone] -- Nuris García LSW 11/12/2024 1115          Connection to Community  Not applicable  Community Resources:      Discharge Transportation:  Is Out of Hospital DNR needed at Discharge: no  Does patient need discharge transport? Yes  Discharge Transportation Vendor: [employer] ([phone])  Type of Transportation: basic life support  What day is the transport expected?: 11/14/24  What time is the transport expected?: 1800       Pt's auth for SNF remains pending at this time. CC supervisor Carlos aware of case and d/c delays. EDUARDO spoke to pt's emergency contact Eliezer by phone at this time and provided update. Eliezer stated to understand. He remains in agreement of d/c plan once auth determination received. Medical team and bedside RN updated in rounds. Will continue to follow.     DCP:   [facility] SNF - pending Auth (ref# [id number])    RN report: [phone]    MD handoff: [phone]  ask for NP Vandana or April    18:00 tentative BLS  (Trip #[id number])     ADDENDUM 14:30 : EDUARDO spoke with Natacha at [employer] by phone and rescheduled pt's BLS trip for tomorrow 11/15 at 3:00PM. RN updated.

## 2024-11-15 VITALS
TEMPERATURE: 97.8 F | HEIGHT: 67 IN | SYSTOLIC BLOOD PRESSURE: 148 MMHG | HEART RATE: 88 BPM | WEIGHT: 130.4 LBS | DIASTOLIC BLOOD PRESSURE: 87 MMHG | OXYGEN SATURATION: 95 % | RESPIRATION RATE: 16 BRPM | BODY MASS INDEX: 20.47 KG/M2

## 2024-11-15 LAB
ANION GAP SERPL CALC-SCNC: 6 MEQ/L (ref 3–15)
BUN SERPL-MCNC: 13 MG/DL (ref 7–25)
CALCIUM SERPL-MCNC: 8.8 MG/DL (ref 8.6–10.3)
CHLORIDE SERPL-SCNC: 100 MEQ/L (ref 98–107)
CO2 SERPL-SCNC: 28 MEQ/L (ref 21–31)
CREAT SERPL-MCNC: 0.6 MG/DL (ref 0.7–1.3)
EGFRCR SERPLBLD CKD-EPI 2021: >60 ML/MIN/1.73M*2
ERYTHROCYTE [DISTWIDTH] IN BLOOD BY AUTOMATED COUNT: 14.6 % (ref 11.6–14.4)
GLUCOSE SERPL-MCNC: 103 MG/DL (ref 70–99)
HCT VFR BLD AUTO: 36.9 % (ref 40.1–51)
HGB BLD-MCNC: 12.7 G/DL (ref 13.7–17.5)
MCH RBC QN AUTO: 30.8 PG (ref 28–33.2)
MCHC RBC AUTO-ENTMCNC: 34.4 G/DL (ref 32.2–36.5)
MCV RBC AUTO: 89.3 FL (ref 83–98)
PLATELET # BLD AUTO: 279 K/UL (ref 150–350)
PMV BLD AUTO: 8.6 FL (ref 9.4–12.4)
POTASSIUM SERPL-SCNC: 3.9 MEQ/L (ref 3.5–5.1)
RBC # BLD AUTO: 4.13 M/UL (ref 4.5–5.8)
SODIUM SERPL-SCNC: 134 MEQ/L (ref 136–145)
WBC # BLD AUTO: 11.4 K/UL (ref 3.8–10.5)

## 2024-11-15 PROCEDURE — 36415 COLL VENOUS BLD VENIPUNCTURE: CPT | Performed by: HOSPITALIST

## 2024-11-15 PROCEDURE — 85027 COMPLETE CBC AUTOMATED: CPT | Performed by: HOSPITALIST

## 2024-11-15 PROCEDURE — 80048 BASIC METABOLIC PNL TOTAL CA: CPT | Performed by: HOSPITALIST

## 2024-11-15 PROCEDURE — G0378 HOSPITAL OBSERVATION PER HR: HCPCS

## 2024-11-15 PROCEDURE — 99239 HOSP IP/OBS DSCHRG MGMT >30: CPT | Performed by: HOSPITALIST

## 2024-11-15 PROCEDURE — 63700000 HC SELF-ADMINISTRABLE DRUG: Performed by: HOSPITALIST

## 2024-11-15 RX ADMIN — DOCUSATE SODIUM 100 MG: 100 CAPSULE, LIQUID FILLED ORAL at 08:58

## 2024-11-15 RX ADMIN — METOPROLOL SUCCINATE 100 MG: 100 TABLET, EXTENDED RELEASE ORAL at 08:58

## 2024-11-15 RX ADMIN — SENNOSIDES 2 TABLET: 8.6 TABLET, FILM COATED ORAL at 08:58

## 2024-11-15 RX ADMIN — FUROSEMIDE 20 MG: 20 TABLET ORAL at 08:58

## 2024-11-15 ASSESSMENT — COGNITIVE AND FUNCTIONAL STATUS - GENERAL
MOVING TO AND FROM BED TO CHAIR: 2 - A LOT
CLIMB 3 TO 5 STEPS WITH RAILING: 2 - A LOT
STANDING UP FROM CHAIR USING ARMS: 2 - A LOT
WALKING IN HOSPITAL ROOM: 2 - A LOT

## 2024-11-15 NOTE — PLAN OF CARE
Plan of Care Review  Plan of Care Reviewed With: patient  Progress: no change  Outcome Evaluation: pt OOB x2 assist with walker, AAO x3 this AM, denies pain, voidinh in urinal, colostomy bag changed. Plan to D/C to [facility] today.

## 2024-11-15 NOTE — PLAN OF CARE
Plan of Care Review  Plan of Care Reviewed With: patient  Progress: no change  Outcome Evaluation: pt ax2 oob. Can be disoriented to time/situation. Voiding in urinal. Denies any pain. Plan to d/c to [facility] today

## 2024-11-15 NOTE — PLAN OF CARE
Care Coordination Discharge Plan Summary    Admission Assessment Summary    General Information  Readmission Within the last 30 days: previous discharge plan unsuccessful  Does patient have a : No  Patient-Specific Goals (include timeframe):      Living Arrangements  Arrived From: short term rehab (pt came from [facility]. Pt's family would like to explore different options on d/c.)  Current Living Arrangements: short-term skilled nursing facility  People in Home: facility resident  Home Accessibility: wheelchair accessible  Living Arrangement Comments: Pt has been living at [facility]    [employer] [facility]  Housing Stability  In the last 12 months, was there a time when you were not able to pay the mortgage or rent on time?: No  In the past 12 months, how many times have you moved where you were living?: 0  At any time in the past 12 months, were you homeless or living in a shelter (including now)?: No  Utility Access  In the past 12 months has the electric, gas, oil, or water company threatened to shut off services in your home?: No  Transportation Needs  In the past 12 months, has lack of transportation kept you from medical appointments or from getting medications?: No  In the past 12 months, has lack of transportation kept you from meetings, work, or from getting things needed for daily living?: No    Functional Status Prior to Admission  Assistive Device/Animal Currently Used at Home: shower chair, inhaler  Functional Status Comments: Pt is now an assist for ADLs and a MAX assist for IADLs. Pt is not driving.  IADL Comments:      Discharge Needs Assessment    Concerns to be Addressed: care coordination/care conferences, discharge planning  Current Discharge Risk: chronically ill, cognitively impaired, dependent with mobility/activities of daily living  Anticipated Changes Related to Illness: inability to care for self    Discharge Plan Summary    Patient Choice  Offered/Gave Vendor  List: yes  Patient's Choice of Community Agency(s): [facility]  Patient and/or patient guardian/advocate was made aware of their right to choose a provider. A list of eligible providers was presented and reviewed with the patient and/or patient guardian/advocate in written and/or verbal form. The list delineates providers in the patients desired geographic area who are participating in the Medicare program and/or providers contracted with the patients primary insurance. The Medicare list and quality ratings were obtained from the Medicare.gov [medicare.gov] website.    Concerns / Comments: see below.    Discharge Plan:  Disposition/Destination: Skilled Nursing Facility - Other  / Skilled Nursing Facility  Destination - Admitted Since 11/7/2024       Service Provider Services Address Phone Fax Patient Preferred Last Updated    [facility] Skilled Nursing [address] [phone] -- Nuris García LSW 11/12/2024 1114        Connection to Community  Not applicable  Community Resources:      Discharge Transportation:  Is Out of Hospital DNR needed at Discharge: no  Does patient need discharge transport? Yes  Discharge Transportation Vendor: [employer] ([phone])  Type of Transportation: basic life support  What day is the transport expected?: 11/15/24  What time is the transport expected?: 1500         EDUARDO informed by [facility] that pt's auth approved ([id number] NRD 11/20). Pt medically cleared for d/c per medical team. Pt will d/c from [facility] with extra supplies for colostomy and pleurx cath & pt's RN aware of the same. EDUARDO spoke with [facility] who confirmed they are expecting pt today; EDUARDO made liaison Cindy at [facility] aware of transport time & they are in agreement. EDUARDO then spoke with pt's emergency contact Eliezer and made him aware of the same. Eliezer stated to be in agreement of discharge plan and time. IMM reviewed with Eliezer and form signed. Copy placed into medical  records. Eliezer stated to have no additional d/c needs, concerns, or questions from SW standpoint at this time. TOMER and bedside RN updated. All parties aware & in agreement of d/c plan and time today. Will remain available.     DCP:   [facility] approved   RN report: [phone]    MD handoff: [phone]  ask for TRISTAN Ross or April    15:00 BLS   (Trip #[id number])

## 2024-11-22 ENCOUNTER — HOSPITAL ENCOUNTER (OUTPATIENT)
Facility: HOSPITAL | Age: 89
Setting detail: OBSERVATION
Discharge: NURSING, REHAB OR LONG-TERM CARE | DRG: 188 | End: 2024-11-27
Attending: STUDENT IN AN ORGANIZED HEALTH CARE EDUCATION/TRAINING PROGRAM | Admitting: STUDENT IN AN ORGANIZED HEALTH CARE EDUCATION/TRAINING PROGRAM
Payer: COMMERCIAL

## 2024-11-22 DIAGNOSIS — J94.2 BLOODY PLEURAL EFFUSION: Primary | ICD-10-CM

## 2024-11-22 DIAGNOSIS — R60.0 LOWER EXTREMITY EDEMA: ICD-10-CM

## 2024-11-22 PROCEDURE — 85025 COMPLETE CBC W/AUTO DIFF WBC: CPT | Performed by: PHYSICIAN ASSISTANT

## 2024-11-22 PROCEDURE — 80053 COMPREHEN METABOLIC PANEL: CPT | Performed by: PHYSICIAN ASSISTANT

## 2024-11-22 PROCEDURE — 36415 COLL VENOUS BLD VENIPUNCTURE: CPT | Performed by: PHYSICIAN ASSISTANT

## 2024-11-22 PROCEDURE — 93005 ELECTROCARDIOGRAM TRACING: CPT | Performed by: PHYSICIAN ASSISTANT

## 2024-11-22 PROCEDURE — 84484 ASSAY OF TROPONIN QUANT: CPT | Performed by: PHYSICIAN ASSISTANT

## 2024-11-22 PROCEDURE — 99285 EMERGENCY DEPT VISIT HI MDM: CPT | Mod: 25

## 2024-11-22 PROCEDURE — 96372 THER/PROPH/DIAG INJ SC/IM: CPT | Mod: 59

## 2024-11-23 ENCOUNTER — APPOINTMENT (EMERGENCY)
Dept: RADIOLOGY | Facility: HOSPITAL | Age: 89
DRG: 188 | End: 2024-11-23
Payer: COMMERCIAL

## 2024-11-23 PROBLEM — U07.1 COVID-19 VIRUS INFECTION: Status: ACTIVE | Noted: 2024-11-23

## 2024-11-23 PROBLEM — J94.2 HEMOTHORAX ON RIGHT: Status: ACTIVE | Noted: 2024-11-23

## 2024-11-23 PROBLEM — R91.8 ABNORMAL FINDINGS ON DIAGNOSTIC IMAGING OF LUNG: Status: ACTIVE | Noted: 2024-11-23

## 2024-11-23 LAB
ALBUMIN SERPL-MCNC: 2.7 G/DL (ref 3.5–5.7)
ALP SERPL-CCNC: 106 IU/L (ref 34–125)
ALT SERPL-CCNC: 18 IU/L (ref 7–52)
ANION GAP SERPL CALC-SCNC: 7 MEQ/L (ref 3–15)
AST SERPL-CCNC: 20 IU/L (ref 13–39)
ATRIAL RATE: 73
BASOPHILS # BLD: 0.02 K/UL (ref 0.01–0.1)
BASOPHILS NFR BLD: 0.2 %
BILIRUB SERPL-MCNC: 0.7 MG/DL (ref 0.3–1.2)
BUN SERPL-MCNC: 13 MG/DL (ref 7–25)
CALCIUM SERPL-MCNC: 8.5 MG/DL (ref 8.6–10.3)
CHLORIDE SERPL-SCNC: 102 MEQ/L (ref 98–107)
CO2 SERPL-SCNC: 26 MEQ/L (ref 21–31)
CREAT SERPL-MCNC: 0.5 MG/DL (ref 0.7–1.3)
DIFFERENTIAL METHOD BLD: ABNORMAL
EGFRCR SERPLBLD CKD-EPI 2021: >60 ML/MIN/1.73M*2
EOSINOPHIL # BLD: 0.09 K/UL (ref 0.04–0.54)
EOSINOPHIL NFR BLD: 0.8 %
ERYTHROCYTE [DISTWIDTH] IN BLOOD BY AUTOMATED COUNT: 14.7 % (ref 11.6–14.4)
GLUCOSE SERPL-MCNC: 99 MG/DL (ref 70–99)
HCT VFR BLD AUTO: 36.7 % (ref 40.1–51)
HGB BLD-MCNC: 12.2 G/DL (ref 13.7–17.5)
IMM GRANULOCYTES # BLD AUTO: 0.05 K/UL (ref 0–0.08)
IMM GRANULOCYTES NFR BLD AUTO: 0.5 %
LYMPHOCYTES # BLD: 0.79 K/UL (ref 1.2–3.5)
LYMPHOCYTES NFR BLD: 7.3 %
MCH RBC QN AUTO: 29.3 PG (ref 28–33.2)
MCHC RBC AUTO-ENTMCNC: 33.2 G/DL (ref 32.2–36.5)
MCV RBC AUTO: 88.2 FL (ref 83–98)
MONOCYTES # BLD: 1.28 K/UL (ref 0.3–1)
MONOCYTES NFR BLD: 11.9 %
NEUTROPHILS # BLD: 8.55 K/UL (ref 1.7–7)
NEUTS SEG NFR BLD: 79.3 %
NRBC BLD-RTO: 0 %
P AXIS: 40
PLATELET # BLD AUTO: 279 K/UL (ref 150–350)
PMV BLD AUTO: 8.4 FL (ref 9.4–12.4)
POTASSIUM SERPL-SCNC: 3.3 MEQ/L (ref 3.5–5.1)
PR INTERVAL: 222
PROT SERPL-MCNC: 5.2 G/DL (ref 6–8.2)
QRS DURATION: 88
QT INTERVAL: 400
QTC CALCULATION(BAZETT): 440
R AXIS: 22
RBC # BLD AUTO: 4.16 M/UL (ref 4.5–5.8)
SODIUM SERPL-SCNC: 135 MEQ/L (ref 136–145)
T WAVE AXIS: 47
TROPONIN I SERPL HS-MCNC: 15.6 PG/ML
TROPONIN I SERPL HS-MCNC: 20.6 PG/ML
VENTRICULAR RATE: 73
WBC # BLD AUTO: 10.78 K/UL (ref 3.8–10.5)

## 2024-11-23 PROCEDURE — 200200 PR NO CHARGE: Performed by: THORACIC SURGERY (CARDIOTHORACIC VASCULAR SURGERY)

## 2024-11-23 PROCEDURE — 63700000 HC SELF-ADMINISTRABLE DRUG: Performed by: INTERNAL MEDICINE

## 2024-11-23 PROCEDURE — G0378 HOSPITAL OBSERVATION PER HR: HCPCS

## 2024-11-23 PROCEDURE — B32T1ZZ COMPUTERIZED TOMOGRAPHY (CT SCAN) OF LEFT PULMONARY ARTERY USING LOW OSMOLAR CONTRAST: ICD-10-PCS | Performed by: RADIOLOGY

## 2024-11-23 PROCEDURE — 200200 PR NO CHARGE: Performed by: STUDENT IN AN ORGANIZED HEALTH CARE EDUCATION/TRAINING PROGRAM

## 2024-11-23 PROCEDURE — 84484 ASSAY OF TROPONIN QUANT: CPT | Performed by: PHYSICIAN ASSISTANT

## 2024-11-23 PROCEDURE — 99223 1ST HOSP IP/OBS HIGH 75: CPT | Performed by: INTERNAL MEDICINE

## 2024-11-23 PROCEDURE — 36415 COLL VENOUS BLD VENIPUNCTURE: CPT | Performed by: PHYSICIAN ASSISTANT

## 2024-11-23 PROCEDURE — 63600105 HC IODINE BASED CONTRAST: Mod: JZ | Performed by: PHYSICIAN ASSISTANT

## 2024-11-23 PROCEDURE — B32S1ZZ COMPUTERIZED TOMOGRAPHY (CT SCAN) OF RIGHT PULMONARY ARTERY USING LOW OSMOLAR CONTRAST: ICD-10-PCS | Performed by: RADIOLOGY

## 2024-11-23 PROCEDURE — 71275 CT ANGIOGRAPHY CHEST: CPT

## 2024-11-23 PROCEDURE — 71045 X-RAY EXAM CHEST 1 VIEW: CPT

## 2024-11-23 PROCEDURE — 63600000 HC DRUGS/DETAIL CODE: Performed by: INTERNAL MEDICINE

## 2024-11-23 PROCEDURE — 1124F ACP DISCUSS-NO DSCNMKR DOCD: CPT | Performed by: INTERNAL MEDICINE

## 2024-11-23 PROCEDURE — B3201ZZ COMPUTERIZED TOMOGRAPHY (CT SCAN) OF THORACIC AORTA USING LOW OSMOLAR CONTRAST: ICD-10-PCS | Performed by: RADIOLOGY

## 2024-11-23 RX ORDER — ALBUTEROL SULFATE 0.83 MG/ML
2.5 SOLUTION RESPIRATORY (INHALATION) EVERY 6 HOURS PRN
Status: DISCONTINUED | OUTPATIENT
Start: 2024-11-23 | End: 2024-11-27 | Stop reason: HOSPADM

## 2024-11-23 RX ORDER — PANTOPRAZOLE SODIUM 40 MG/1
40 TABLET, DELAYED RELEASE ORAL NIGHTLY
Status: DISCONTINUED | OUTPATIENT
Start: 2024-11-23 | End: 2024-11-27 | Stop reason: HOSPADM

## 2024-11-23 RX ORDER — IBUPROFEN 200 MG
16-32 TABLET ORAL AS NEEDED
Status: DISCONTINUED | OUTPATIENT
Start: 2024-11-23 | End: 2024-11-27 | Stop reason: HOSPADM

## 2024-11-23 RX ORDER — DEXTROSE 40 %
15-30 GEL (GRAM) ORAL AS NEEDED
Status: DISCONTINUED | OUTPATIENT
Start: 2024-11-23 | End: 2024-11-27 | Stop reason: HOSPADM

## 2024-11-23 RX ORDER — SENNOSIDES 8.6 MG/1
2 TABLET ORAL 2 TIMES DAILY
Status: DISCONTINUED | OUTPATIENT
Start: 2024-11-23 | End: 2024-11-27 | Stop reason: HOSPADM

## 2024-11-23 RX ORDER — ONDANSETRON HYDROCHLORIDE 2 MG/ML
4 INJECTION, SOLUTION INTRAVENOUS EVERY 8 HOURS PRN
Status: DISCONTINUED | OUTPATIENT
Start: 2024-11-23 | End: 2024-11-27 | Stop reason: HOSPADM

## 2024-11-23 RX ORDER — ACETAMINOPHEN 325 MG/1
650 TABLET ORAL EVERY 4 HOURS PRN
Status: DISCONTINUED | OUTPATIENT
Start: 2024-11-23 | End: 2024-11-27 | Stop reason: HOSPADM

## 2024-11-23 RX ORDER — ATORVASTATIN CALCIUM 20 MG/1
20 TABLET, FILM COATED ORAL
Status: DISCONTINUED | OUTPATIENT
Start: 2024-11-23 | End: 2024-11-27 | Stop reason: HOSPADM

## 2024-11-23 RX ORDER — ALUMINUM HYDROXIDE, MAGNESIUM HYDROXIDE, AND SIMETHICONE 1200; 120; 1200 MG/30ML; MG/30ML; MG/30ML
30 SUSPENSION ORAL EVERY 4 HOURS PRN
Status: DISCONTINUED | OUTPATIENT
Start: 2024-11-23 | End: 2024-11-27 | Stop reason: HOSPADM

## 2024-11-23 RX ORDER — ACETAMINOPHEN 325 MG/1
650 TABLET ORAL EVERY 6 HOURS PRN
Status: ACTIVE | OUTPATIENT
Start: 2024-11-23 | End: 2024-11-24

## 2024-11-23 RX ORDER — METOPROLOL SUCCINATE 100 MG/1
100 TABLET, EXTENDED RELEASE ORAL DAILY
Status: DISCONTINUED | OUTPATIENT
Start: 2024-11-23 | End: 2024-11-27 | Stop reason: HOSPADM

## 2024-11-23 RX ORDER — SENNOSIDES 8.6 MG/1
1 TABLET ORAL 2 TIMES DAILY PRN
Status: DISCONTINUED | OUTPATIENT
Start: 2024-11-23 | End: 2024-11-27 | Stop reason: HOSPADM

## 2024-11-23 RX ORDER — DEXTROSE 50 % IN WATER (D50W) INTRAVENOUS SYRINGE
25 AS NEEDED
Status: DISCONTINUED | OUTPATIENT
Start: 2024-11-23 | End: 2024-11-27 | Stop reason: HOSPADM

## 2024-11-23 RX ORDER — IOPAMIDOL 755 MG/ML
100 INJECTION, SOLUTION INTRAVASCULAR
Status: COMPLETED | OUTPATIENT
Start: 2024-11-23 | End: 2024-11-23

## 2024-11-23 RX ORDER — IBUPROFEN/PSEUDOEPHEDRINE HCL 200MG-30MG
3 TABLET ORAL NIGHTLY
Status: DISCONTINUED | OUTPATIENT
Start: 2024-11-23 | End: 2024-11-27 | Stop reason: HOSPADM

## 2024-11-23 RX ORDER — ALBUTEROL SULFATE 90 UG/1
2 INHALANT RESPIRATORY (INHALATION) EVERY 8 HOURS PRN
Status: DISCONTINUED | OUTPATIENT
Start: 2024-11-23 | End: 2024-11-27 | Stop reason: HOSPADM

## 2024-11-23 RX ORDER — CYANOCOBALAMIN 1000 UG/ML
1000 INJECTION, SOLUTION INTRAMUSCULAR; SUBCUTANEOUS
Status: DISCONTINUED | OUTPATIENT
Start: 2024-11-23 | End: 2024-11-27 | Stop reason: HOSPADM

## 2024-11-23 RX ORDER — BISACODYL 10 MG/1
10 SUPPOSITORY RECTAL DAILY PRN
Status: DISCONTINUED | OUTPATIENT
Start: 2024-11-23 | End: 2024-11-27 | Stop reason: HOSPADM

## 2024-11-23 RX ORDER — FUROSEMIDE 20 MG/1
20 TABLET ORAL DAILY
Status: DISCONTINUED | OUTPATIENT
Start: 2024-11-23 | End: 2024-11-27 | Stop reason: HOSPADM

## 2024-11-23 RX ADMIN — ATORVASTATIN CALCIUM 20 MG: 20 TABLET, FILM COATED ORAL at 18:22

## 2024-11-23 RX ADMIN — SENNOSIDES 2 TABLET: 8.6 TABLET, FILM COATED ORAL at 21:43

## 2024-11-23 RX ADMIN — SENNOSIDES 2 TABLET: 8.6 TABLET, FILM COATED ORAL at 08:45

## 2024-11-23 RX ADMIN — FUROSEMIDE 20 MG: 20 TABLET ORAL at 08:45

## 2024-11-23 RX ADMIN — Medication 3 MG: at 21:43

## 2024-11-23 RX ADMIN — PANTOPRAZOLE SODIUM 40 MG: 40 TABLET, DELAYED RELEASE ORAL at 21:43

## 2024-11-23 RX ADMIN — IOPAMIDOL 100 ML: 755 INJECTION, SOLUTION INTRAVENOUS at 01:31

## 2024-11-23 RX ADMIN — METOPROLOL SUCCINATE 100 MG: 100 TABLET, EXTENDED RELEASE ORAL at 08:45

## 2024-11-23 RX ADMIN — CYANOCOBALAMIN 1000 MCG: 1000 INJECTION INTRAMUSCULAR; SUBCUTANEOUS at 08:45

## 2024-11-23 ASSESSMENT — COGNITIVE AND FUNCTIONAL STATUS - GENERAL
WALKING IN HOSPITAL ROOM: 1 - TOTAL
MOVING TO AND FROM BED TO CHAIR: 2 - A LOT
MOVING TO AND FROM BED TO CHAIR: 2 - A LOT
STANDING UP FROM CHAIR USING ARMS: 2 - A LOT
STANDING UP FROM CHAIR USING ARMS: 2 - A LOT
CLIMB 3 TO 5 STEPS WITH RAILING: 1 - TOTAL
CLIMB 3 TO 5 STEPS WITH RAILING: 1 - TOTAL
WALKING IN HOSPITAL ROOM: 1 - TOTAL

## 2024-11-23 NOTE — ED PROVIDER NOTES
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 found his \"catheter\" was full of \"blood and clots\" today when they went to do the weekly drainage. Transport notes pt was reported to be at baseline. They are unsure what kind of catheter it is. They were also informed that pt has COVID.   Pt was hospitalized 11/7-11/15 for recurrent pleural effusion and hydropneumothorax. Pt was seen by surgery while in house who recommended a PleurX cath in IR for long term drainage. Pt underwent 15.5 Fr modified PleurX cath placement into R pleural space 11/8/24. On 11/13/24, they noted slight increase in effusion size with dyspnea and had another 300ml drained.           Patient History   PAST HISTORY     Reviewed from Nursing Triage:  Tobacco  Allergies  Problems  Med Hx  Surg Hx  Fam Hx  Soc Hx      Past Medical History:   Diagnosis Date    Coronary artery disease     DVT (deep venous thrombosis) (CMS/HCC)     Hypertension     Lipid disorder     Pleural effusion     Pulmonary embolism (CMS/HCC)     Rectal cancer (CMS/HCC)     SBO (small bowel obstruction) (CMS/HCC)        Past Surgical History   Procedure Laterality Date    Colostomy      Hernia repair      Pleura biopsy      Thoracentesis      THORACOSCOPY--VATS-PLEURAL BIOPSY, PLEURX CATHETER PLACEMENT Right 7/8/2024    Performed by Sergey Dennis MD at [facility] OR [facility]       Family History   Problem Relation Name Age of Onset    Heart disease Biological Mother         Social History     Tobacco Use    Smoking status: Never    Smokeless tobacco: Never   Substance Use Topics    Alcohol use: Not Currently    Drug use: Never         Review of Systems   REVIEW OF SYSTEMS     Review of Systems   Unable to perform ROS: Dementia         VITALS     ED Vitals  "     Date/Time Temp Pulse Resp BP SpO2 [facility]   11/23/24 0255 -- 76 20 137/67 95 % CK   11/23/24 0155 -- 74 20 152/80 95 % CK   11/22/24 2352 -- 76 18 139/76 95 % CK   11/22/24 2347 36.3 °C (97.4 °F) 85 20 139/86 93 % EMU          Pulse Ox %: 95 % (11/23/24 0315)  Pulse Ox Interpretation: Normal (11/23/24 0315)  Heart Rate: 75 (11/23/24 0315)  Rhythm Strip Interpretation: Normal Sinus Rhythm (11/23/24 0315)     Physical Exam   PHYSICAL EXAM     Physical Exam  Vitals and nursing note reviewed.   Constitutional:       Appearance: Normal appearance. He is normal weight.      Comments: Appears chronically ill   HENT:      Head: Normocephalic.   Eyes:      Conjunctiva/sclera: Conjunctivae normal.   Cardiovascular:      Rate and Rhythm: Normal rate and regular rhythm.      Pulses: Normal pulses.      Heart sounds: Normal heart sounds.   Pulmonary:      Effort: Pulmonary effort is normal. No respiratory distress.      Breath sounds: Normal breath sounds.      Comments: Blood in pleurx cath  Abdominal:      Palpations: Abdomen is soft.      Tenderness: There is no abdominal tenderness.   Musculoskeletal:         General: Normal range of motion.      Cervical back: Normal range of motion and neck supple.      Right lower leg: Edema present.      Left lower leg: Edema present.   Skin:     General: Skin is warm and dry.      Capillary Refill: Capillary refill takes less than 2 seconds.      Coloration: Skin is pale.   Neurological:      General: No focal deficit present.      Mental Status: He is alert. Mental status is at baseline.   Psychiatric:         Mood and Affect: Mood normal.         Behavior: Behavior normal.         Thought Content: Thought content normal.         Judgment: Judgment normal.           PROCEDURES     Procedures     DATA     Results       Procedure Component Value Units Date/Time    [place] Draw Panel [[id number]] Collected: 11/22/24 2352    Specimen: Blood, Venous Updated: 11/23/24 0801    Narrative:       The following orders were created for panel order [place] Draw Panel.  Procedure                               Abnormality         Status                     ---------                               -----------         ------                     RAINBOW PINK[[id number]]                                     Final result               LAUREN LAVENDER[[id number]]                                 In process                 LAUREN DAVIS BLUE[[id number]]                                  In process                   Please view results for these tests on the individual orders.    LAUREN PINK [[id number]] Collected: 11/22/24 2352    Specimen: Blood, Venous Updated: 11/23/24 0801    HS Troponin (with 2 hour reflex) [[id number]]  (Abnormal) Collected: 11/22/24 2351    Specimen: Blood, Venous Updated: 11/23/24 0033     High Sens Troponin I 20.6 pg/mL     Comprehensive metabolic panel [[id number]]  (Abnormal) Collected: 11/22/24 2351    Specimen: Blood, Venous Updated: 11/23/24 0027     Sodium 135 mEQ/L      Potassium 3.3 mEQ/L      Comment: Results obtained on plasma. Plasma Potassium values may be up to 0.4 mEQ/L less than serum values. The differences may be greater for patients with high platelet or white cell counts.        Chloride 102 mEQ/L      CO2 26 mEQ/L      BUN 13 mg/dL      Creatinine 0.5 mg/dL      Glucose 99 mg/dL      Calcium 8.5 mg/dL      AST (SGOT) 20 IU/L      ALT (SGPT) 18 IU/L      Alkaline Phosphatase 106 IU/L      Total Protein 5.2 g/dL      Comment: Test performed on plasma which typically contains approximately 0.4 g/dL more protein than serum.        Albumin 2.7 g/dL      Bilirubin, Total 0.7 mg/dL      eGFR >60.0 mL/min/1.73m*2      Comment: Calculation based on the Chronic Kidney Disease Epidemiology Collaboration (CKD-EPI) equation refit without adjustment for race.        Anion Gap 7 mEQ/L     CBC and differential [[id number]]  (Abnormal) Collected: 11/22/24 2351    Specimen: Blood, Venous Updated:  11/23/24 0000     WBC 10.78 K/uL      RBC 4.16 M/uL      Hemoglobin 12.2 g/dL      Hematocrit 36.7 %      MCV 88.2 fL      MCH 29.3 pg      MCHC 33.2 g/dL      RDW 14.7 %      Platelets 279 K/uL      MPV 8.4 fL      Differential Type Auto     nRBC 0.0 %      Immature Granulocytes 0.5 %      Neutrophils 79.3 %      Lymphocytes 7.3 %      Monocytes 11.9 %      Eosinophils 0.8 %      Basophils 0.2 %      Immature Granulocytes, Absolute 0.05 K/uL      Neutrophils, Absolute 8.55 K/uL      Lymphocytes, Absolute 0.79 K/uL      Monocytes, Absolute 1.28 K/uL      Eosinophils, Absolute 0.09 K/uL      Basophils, Absolute 0.02 K/uL     RAINBOW LT BLUE [[id number]] Collected: 11/22/24 2352    Specimen: Blood, Venous Updated: 11/22/24 2356    LAUREN LAVJORGE [[id number]] Collected: 11/22/24 2352    Specimen: Blood, Venous Updated: 11/22/24 2356            Imaging Results              CT ANGIOGRAPHY CHEST PULMONARY EMBOLISM WITH IV CONTRAST (Final result)  Result time 11/23/24 08:28:11      Final result                   Impression:    IMPRESSION:  1.  No acute pulmonary embolism.  2.  Interval decrease in size of loculated right hydropneumothorax following  pleural catheter placement. Effusion contains hyperdense components compatible  with hemorrhage/hemothorax.  3.  Diffuse lobular right pleural thickening again suspicious for malignancy.  4.  Aspirated secretions in the trachea and right lower lobe airways.  5.  Near complete right lower lobe consolidation with changes likely reflecting  combination of atelectasis and developing necrotizing pneumonia (potentially  from aspiration given above). Complete middle lobe consolidation.  6.  Trace left effusion with underlying atelectasis.  7.  Unchanged thoracic compression deformities.                   Narrative:    EXAM: CT ANGIOGRAPHY CHEST PULMONARY EMBOLISM W IV CONTRAST    CLINICAL HISTORY: h/o PE, blood in pleurx cath, COVID, ro PE    COMPARISON: CT CHEST W IV CONTRAST with  report dated 11/7/2024 6:44 PM.    TECHNIQUE: CT chest was performed using thin section reconstructions. Contrast  injection rate and acquisition timing were optimized for opacification of the  pulmonary arteries to evaluate for pulmonary embolism. 3-D RECONSTRUCTION:  Additional 3-D/MIP reconstructions were performed and reviewed. CT DOSE:  One or  more dose reduction techniques (e.g. automated exposure control, adjustment of  the mA and/or kV according to patient size, use of iterative reconstruction  technique) utilized for this examination.    CONTRAST: 100mL of iopamidoL (ISOVUE-370) 370 mg iodine /mL (76 %) injection 100  mL    COMMENT:    PULMONARY ARTERIES: Image Quality is: High noting the following limitations:  Respiratory motion artifact No pulmonary embolism is identified to the proximal  subsegmental level.    AIRWAYS, LUNGS AND PLEURA: Layering secretions are present within the trachea  and right lower lobe airways. Interval decrease in size of a loculated right  hydropneumothorax following Pleurx catheter placement. Pleural effusion contains  hyperdense components compatible with hemorrhage. Redemonstration of diffuse  nodular pleural thickening. Complete right lower lobe consolidation present,  with patchy groundglass opacity and septal thickening in the aerated portions.  Hypoenhancement within a portion of the right basal segments with  intraparenchymal gas likely related to infection. Dependent right upper lobe  atelectasis, and near complete middle lobe consolidation. Trace left effusion  with dependent left lower lobe atelectatic changes. A couple subcentimeter  nodules present in the left upper lobe measuring up to 2 mm on series 3 image  157.    CARDIOMEDIASTINUM: Cardiomegaly.  Multivessel coronary atherosclerosis.  Aortic  annular and valvular calcification.    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  Tortuous great vessels.    LYMPH NODES: No thoracic  lymphadenopathy    IMAGED THYROID: Within normal limits    IMAGED ESOPHAGUS: Nondilated    UPPER ABDOMEN: Bilateral renal cortical scarring. Bilateral renal sinus and  cortical cysts.    CHEST WALL: Within normal limits    BONES: Osteopenia with multilevel compression fractures including T7, T8, and  T12 unchanged.                          Preliminary Interpretation    IMPRESSION:    No evidence of pulmonary embolism.    Interval placement of right chest tube in good position, with moderate reduction of hemopneumothorax. Etiology of hemothorax is uncertain.    Right lung volume loss with thickened pleural rind.    Right lower lobe consolidation with obstructive secretions of the right lower lobe bronchus.    Redemonstration of thoracic vertebral compression fractures.    Interpreted by: Hollis Harvey MD, Nov 23, 2024 03:04 AM                                        X-RAY CHEST 1 VIEW (Final result)  Result time 11/23/24 08:39:00      Final result                   Impression:    IMPRESSION:  1.  Loculated right basilar hydropneumothorax, slightly smaller from prior with  Pleurx in place..  2.  Residual right lower and middle lobe consolidation which is similar.                           Narrative:    CLINICAL HISTORY: blood from pleurx cath on R side, has covid    COMPARISON: November 13, 2024    COMMENT:    TECHNIQUE: Single frontal view of the chest was performed.    LINES/TUBES/HARDWARE: Monitoring leads/wires overlie the patient. Right-sided  Pleurx catheter in similar position.    LUNGS AND PLEURA: See Impression.    CARDIOMEDIASTINUM: Unchanged noting cardiomegaly and aortic atherosclerosis.    SKELETON/OTHER: Degenerative changes of the spine and shoulders.                                      ECG 12 lead   Independent Interpretation by ED Provider   73bpm  Sinus rhythm with 1st degree A-V block with Premature atrial complexes  Anterior infarct , age undetermined  Abnormal ECG              Scoring tools                                   ED Course & MDM   MDM / ED COURSE / CLINICAL IMPRESSION / DISPO     Medical Decision Making      ED Course as of 11/23/24 1957   Fri Nov 22, 2024   [id number] Spoke to pt's RN at [facility]. States he IS on Xarelto, but aren't sure the indication. Does have listed HO VTE, so likely that. Pt is FULL code. They noted bl in pleurx cath today. Report pt is at baseline. Also report he tested + for COVID 11/19. [YOMI]   Sat Nov 23, 2024   0027 WBC(!): 10.78 [YOMI]   0027 Hemoglobin(!): 12.2 [YOMI]   0027 Sodium(!): 135 [YOMI]   0027 Potassium, Bld(!): 3.3 [YOMI]   0307 CT ANGIOGRAPHY CHEST PULMONARY EMBOLISM WITH IV CONTRAST  IMPRESSION:    No evidence of pulmonary embolism.    Interval placement of right chest tube in good position, with moderate reduction of hemopneumothorax. Etiology of hemothorax is uncertain.    Right lung volume loss with thickened pleural rind.    Right lower lobe consolidation with obstructive secretions of the right lower lobe bronchus.    Redemonstration of thoracic vertebral compression fractures.    Interpreted by: Hollis Harvey MD, Nov 23, 2024 03:04 AM    [YOMI]   0303 Paged [facility] [YOMI]   7513 Spoke with Dr. Meza [YOMI]      ED Course User Index  [YOMI] Thu Merritt PA C     Clinical Impression      Bloody pleural effusion     _________________       ED Disposition   Admit / Observation                       Thu Merritt PA C  11/23/24 1957

## 2024-11-23 NOTE — H&P
Hospital Medicine  History & Physical        CHIEF COMPLAINT   Sent from nursing home for concerns for blood and clots in Pleurx catheter during weekly drainage.     HISTORY OF PRESENT ILLNESS      Dave Arredondo is a 88 y.o. 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    Currently unable to obtain much information per patient himself.  Seems to have endorsed chest pain to the ER team initially.  Per information obtained, he has good and bad days and sometimes able to make conversations but mostly confused.  Per nursing staff, he is at baseline and had no change in his mental status.    There has been some sparse cough but nothing major per information obtained.  He has been recently diagnosed with COVID and unclear what prompted the testing at the nursing home.    Unable to obtain much information beyond above.  He is a full code for resuscitation per nursing home records.  Per previous records his friend Jorge Oliveira is his surrogate decision maker.     PAST MEDICAL AND SURGICAL HISTORY      Past Medical History:   Diagnosis Date    Coronary artery disease     DVT (deep venous thrombosis) (CMS/HCC)     Hypertension     Lipid disorder     Pleural effusion     Pulmonary embolism (CMS/HCC)     Rectal cancer (CMS/HCC)     SBO (small bowel obstruction) (CMS/HCC)        Past Surgical History   Procedure Laterality Date    Colostomy      Hernia repair      Pleura biopsy      Thoracentesis      THORACOSCOPY--VATS-PLEURAL BIOPSY, PLEURX CATHETER PLACEMENT Right  7/8/2024    Performed by Sergey Dennis MD at [facility] OR [facility]       PCP: Keith Miller, DO    MEDICATIONS      Prior to Admission medications    Medication Sig Start Date End Date Taking? Authorizing Provider   acetaminophen (TYLENOL) 325 mg tablet Take 2 tablets (650 mg total) by mouth every 6 (six) hours as needed for pain. 10/25/24 11/24/24  Brissa Olvera CRNP   albuterol HFA 90 mcg/actuation inhaler Inhale 2 puffs every 8 (eight) hours as needed for shortness of breath or wheezing. 5/1/24   Juan Urrutia MD   atorvastatin (LIPITOR) 20 mg tablet Take 20 mg by mouth daily. 3/9/20   Nemesio Urrutia MD   cyanocobalamin (VITAMIN B-12) 1,000 mcg/mL injection Inject 1,000 mcg into the shoulder, thigh, or buttocks every 30 (thirty) days. Every 1st Monday of the month 4/21/20   Nemesio Urrutia MD   docusate sodium (COLACE) 100 mg capsule Take 1 capsule (100 mg total) by mouth 2 (two) times a day. 10/25/24 11/24/24  Brissa Olvera CRNP   furosemide (LASIX) 20 mg tablet Take 20 mg by mouth daily. 5/16/24   Juan Urrutia MD   melatonin ODT Take 1 tablet (3 mg total) by mouth nightly. 10/25/24 11/24/24  Brissa Olvera CRNP   metoprolol succinate XL (TOPROL-XL) 100 mg 24 hr tablet Take 100 mg by mouth daily. 10/21/19   Nemesio Urrutia MD   nitroglycerin (NITROSTAT) 0.4 mg SL tablet Place 0.4 mg under the tongue every 5 (five) minutes as needed.      Nemesio Urrutia MD   pantoprazole (PROTONIX) 40 mg EC tablet Take 1 tablet (40 mg total) by mouth nightly Indications: GI ppx w/Xarelto. 6/19/24   Haase, Patricia, CRNP   rivaroxaban (XARELTO) 20 mg tablet Take 1 tablet (20 mg total) by mouth daily with dinner Indications: a clot in the lung, blood clot in a deep vein of the extremities. 10/25/24 11/24/24  Verlinghieri, Brissa E, CRNP   senna (SENOKOT) 8.6 mg tablet Take 2 tablets by mouth 2 (two) times a day. 10/25/24 11/24/24  Brissa Olvera,  CRNP       ALLERGIES      Patient has no known allergies.    FAMILY HISTORY      Family History   Problem Relation Name Age of Onset    Heart disease Biological Mother         SOCIAL HISTORY      Social History     Socioeconomic History    Marital status:      Spouse name: None    Number of children: None    Years of education: None    Highest education level: None   Tobacco Use    Smoking status: Never    Smokeless tobacco: Never   Substance and Sexual Activity    Alcohol use: Not Currently    Drug use: Never    Sexual activity: Defer   Social History Narrative    Lives in the [facility] in [place]      Social Drivers of Health     Food Insecurity: No Food Insecurity (11/23/2024)    Hunger Vital Sign     Worried About Running Out of Food in the Last Year: Never true     Ran Out of Food in the Last Year: Never true   Transportation Needs: No Transportation Needs (11/8/2024)    PRAPARE - Transportation     Lack of Transportation (Medical): No     Lack of Transportation (Non-Medical): No   Housing Stability: Low Risk  (11/8/2024)    Housing Stability Vital Sign     Unable to Pay for Housing in the Last Year: No     Number of Times Moved in the Last Year: 0     Homeless in the Last Year: No       REVIEW OF SYSTEMS      All other systems reviewed and negative except as noted in HPI    PHYSICAL EXAMINATION      Temp:  [36.3 °C (97.4 °F)] 36.3 °C (97.4 °F)  Heart Rate:  [74-85] 76  Resp:  [18-20] 20  BP: (137-152)/(67-86) 137/67  There is no height or weight on file to calculate BMI.    Physical Exam  Constitutional:       Comments: Alert but mildly restless   HENT:      Head: Atraumatic.      Nose: No congestion.   Eyes:      Extraocular Movements: Extraocular movements intact.   Cardiovascular:      Rate and Rhythm: Normal rate.      Pulses: Normal pulses.   Pulmonary:      Comments: Coarse breath sounds on the left side  Abdominal:      Palpations: Abdomen is soft.   Musculoskeletal:      Cervical  back: Neck supple.      Right lower leg: No edema.      Left lower leg: No edema.   Skin:     General: Skin is warm and dry.   Neurological:      Mental Status: He is alert.      Comments: Unable to obtain much information regarding orientation.  Not following much commands         LABS / IMAGING / EKG        Labs  Results from last 7 days   Lab Units 11/22/24  2351   WBC K/uL 10.78*   HEMOGLOBIN g/dL 12.2*   HEMATOCRIT % 36.7*   PLATELETS K/uL 279     Results from last 7 days   Lab Units 11/22/24  2351   SODIUM mEQ/L 135*   POTASSIUM mEQ/L 3.3*   CHLORIDE mEQ/L 102   CO2 mEQ/L 26   BUN mg/dL 13   CREATININE mg/dL 0.5*   GLUCOSE mg/dL 99   CALCIUM mg/dL 8.5*     Recent Labs   Lab 11/22/24  2351 11/07/24  1417   ALKPHOS 106 122   BILITOT 0.7 0.5   ALBUMIN 2.7* 2.8*   ALT 18 26   AST 20 21       Imaging  Significant findings include:   CT angiography chest PE with IV contrast  IMPRESSION:     No evidence of pulmonary embolism.     Interval placement of right chest tube in good position, with moderate reduction of hemopneumothorax. Etiology of hemothorax is uncertain.     Right lung volume loss with thickened pleural rind.     Right lower lobe consolidation with obstructive secretions of the right lower lobe bronchus.     Redemonstration of thoracic vertebral compression fractures.     ECG/Telemetry  I have independently reviewed the telemetry. Significant findings include sinus rhythm.    ASSESSMENT AND PLAN              Assessment & Plan  Hemothorax on right  Sent from nursing home for concerns for significant clots during weekly drain yesterday  Seems to have improved per imaging on preliminary read  But given clots concerns for possibility of a blockage  IR consultation for evaluation of Pleurx catheter  Abnormal findings on diagnostic imaging of lung  CT imaging with concerns for right lower lobe consolidation with obstruction of secretions of the right lower lobe bronchus  Currently without any evidence of  fevers, or hypoxia  Mild leukocytosis with seems to have been elevated over the 4 weeks  Seems to be at baseline per information obtained  Pulmonary input requested for further evaluation  COVID-19 virus infection  Currently without any evidence of hypoxia or fevers  Pulmonary team to evaluate, follow-up recommendations  Continue supportive treatment  PE (pulmonary thromboembolism) (CMS/HCC)  Seems to have had initial left lower extremity DVT and PE in 2019  Has been on rivaroxaban for 6-month and changed to prophylactic dose  Subsequently has had DVT in 2020 & possibly has been on long-term rivaroxaban since then  Holding rivaroxaban at this time  Restart as possible  Normocytic anemia  His hemoglobin seems to be at baseline  Continue to monitor as needed  HLD (hyperlipidemia)  Continue statin    VTE Assessment: [place] VTE Score: 4  VTE Prophylaxis: On rivaroxaban as outpatient, currently holding  Code Status: Full Code  Discussed advanced care planning.  Per previous documentation from October 2024, Dave surrogate decision maker is Eliezer Beltre( friend)  Estimated Discharge Date: 11/23/2024       Yousuf Meza MD  11/23/2024

## 2024-11-23 NOTE — CONSULTS
General Surgery Consult    Subjective     Dave Arredondo is a 88 y.o. male who was admitted for Hemothorax on right [J94.2]  Bloody pleural effusion [J94.2]. Patient was seen in consultation at the request of referring physician for management recommendations.     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    Patient is confused at baseline, unable to provide additional history.    Thoracic surgery hx:  - 6/2024-7/2024: multiple R sided thoracentesis for pleural effusion; cytology x3 without malignant cells. 6 doses of tPA/dornase for effusion; CT w persistent effusion. Decision for pleural bx and pleurx catheter. (Bx showing no malignant cells. Pleurx catheter removed in OP given low output  - late 10/2024: presented w pleural effsusion. IR pigtail placed and removed later for low output  11/2024: readmission w recurrent pleural effsuion; IR placed pleurx    AFVSS. Hgb 12.2 (baseline is in 12 range based on prior labs, so stable). WBC 10.8. He is COVID + based on testing at his nursing home, but it is not clear what prompted him to be tested.    CT negative for PE. Decrease in size R hydropnx, but some hyperdense components that suggest hemothorax. Near complete consolidation of RLL, concerning for atelectasis vs developing necrotizing pna.    He is on Xarelto, this is currently held.    Medical History:   Past Medical History:   Diagnosis Date    Coronary artery disease     DVT (deep venous thrombosis) (CMS/HCC)     Hypertension     Lipid disorder     Pleural effusion     Pulmonary embolism (CMS/HCC)     Rectal cancer (CMS/HCC)     SBO (small bowel obstruction) (CMS/HCC)        Surgical History:   Past Surgical History   Procedure Laterality Date    Colostomy      Hernia repair      Pleura biopsy      Thoracentesis      THORACOSCOPY--VATS-PLEURAL BIOPSY, PLEURX  CATHETER PLACEMENT Right 7/8/2024    Performed by Sergey Dennis MD at [facility] OR [facility]       Social History:   Social History     Social History Narrative    Lives in the [facility] in [place]        Family History:   Family History   Problem Relation Name Age of Onset    Heart disease Biological Mother         Allergies: Patient has no known allergies.    Home Medications:   acetaminophen (TYLENOL) tablet 650 mg  650 mg oral q4h PRN    acetaminophen (TYLENOL) tablet 650 mg  650 mg oral q6h PRN    albuterol HFA 90 mcg/actuation inhaler 2 puff  2 puff inhalation q8h PRN    albuterol nebulizer solution 2.5 mg  2.5 mg nebulization q6h PRN    alum-mag hydroxide-simeth (MAALOX) 200-200-20 mg/5 mL suspension 30 mL  30 mL oral q4h PRN    atorvastatin (LIPITOR) tablet 20 mg  20 mg oral Daily (6p)    bisacodyL (DULCOLAX) 10 mg suppository 10 mg  10 mg rectal Daily PRN    cyanocobalamin (VITAMIN B-12) injection 1,000 mcg  1,000 mcg intramuscular q30 days    glucose chewable tablet 16-32 g of dextrose  16-32 g of dextrose oral PRN    Or    dextrose 40 % oral gel 15-30 g of dextrose  15-30 g of dextrose oral PRN    Or    glucagon (GLUCAGEN) injection 1 mg  1 mg intramuscular PRN    Or    dextrose 50 % in water (D50) injection 12.5 g  25 mL intravenous PRN    furosemide (LASIX) tablet 20 mg  20 mg oral Daily    melatonin ODT 3 mg  3 mg oral Nightly    metoprolol succinate XL (TOPROL-XL) 24 hr ER tablet 100 mg  100 mg oral Daily    ondansetron (ZOFRAN) injection 4 mg  4 mg intravenous q8h PRN    pantoprazole (PROTONIX) tablet,delayed release (DR/EC) 40 mg  40 mg oral Nightly    senna (SENOKOT) tablet 1 tablet  1 tablet oral 2x daily PRN    senna (SENOKOT) tablet 2 tablet  2 tablet oral BID       Current Medications:  Current Facility-Administered Medications   Medication Dose Route Frequency Provider Last Rate Last Admin    acetaminophen (TYLENOL) tablet 650 mg  650 mg oral q4h PRN Yousuf Meza MD         acetaminophen (TYLENOL) tablet 650 mg  650 mg oral q6h PRN Yousuf Meza MD        albuterol HFA 90 mcg/actuation inhaler 2 puff  2 puff inhalation q8h PRN Yousuf Meza MD        albuterol nebulizer solution 2.5 mg  2.5 mg nebulization q6h PRN Yousuf Meza MD        alum-mag hydroxide-simeth (MAALOX) 200-200-20 mg/5 mL suspension 30 mL  30 mL oral q4h PRN Yousuf Meza MD        atorvastatin (LIPITOR) tablet 20 mg  20 mg oral Daily (6p) Yousuf Meza MD        bisacodyL (DULCOLAX) 10 mg suppository 10 mg  10 mg rectal Daily PRN Yousuf Meza MD        cyanocobalamin (VITAMIN B-12) injection 1,000 mcg  1,000 mcg intramuscular q30 days Yousuf Meza MD   1,000 mcg at 11/23/24 0845    glucose chewable tablet 16-32 g of dextrose  16-32 g of dextrose oral PRN Yousuf Meza MD        Or    dextrose 40 % oral gel 15-30 g of dextrose  15-30 g of dextrose oral PRN Yousuf Meza MD        Or    glucagon (GLUCAGEN) injection 1 mg  1 mg intramuscular PRN Yousuf Meza MD        Or    dextrose 50 % in water (D50) injection 12.5 g  25 mL intravenous PRN Yousuf Meza MD        furosemide (LASIX) tablet 20 mg  20 mg oral Daily Yousuf Meza MD   20 mg at 11/23/24 0845    melatonin ODT 3 mg  3 mg oral Nightly Yousuf Meza MD        metoprolol succinate XL (TOPROL-XL) 24 hr ER tablet 100 mg  100 mg oral Daily Yousuf Meza MD   100 mg at 11/23/24 0845    ondansetron (ZOFRAN) injection 4 mg  4 mg intravenous q8h PRN Yousuf Meza MD        pantoprazole (PROTONIX) tablet,delayed release (DR/EC) 40 mg  40 mg oral Nightly Yousuf Meza MD        senna (SENOKOT) tablet 1 tablet  1 tablet oral 2x daily PRN Yousuf Meza MD senna (SENOKOT) tablet 2 tablet  2 tablet oral BID Yousuf Meza MD   2 tablet at 11/23/24 0845       Review of Systems  Pertinent items are noted in HPI.    Objective     Physicial Exam  Visit  "Vitals  /69 (BP Location: Right upper arm, Patient Position: Lying)   Pulse 71   Temp 36.5 °C (97.7 °F) (Axillary)   Resp 18   Ht 1.575 m (5' 2\")   Wt 55.3 kg (122 lb)   SpO2 95%   BMI 22.31 kg/m²       Physical Exam  Constitutional:       General: He is awake.   Pulmonary:      Comments: R sided pleurx catheter w some SS drainage in tubing and some old clots.  Neurological:      Mental Status: He is alert.   Psychiatric:         Behavior: Behavior is cooperative.         Labs  reivewed    Imaging  I have reviewed the Imaging from the last 24 hrs.    Assessment     88M hx of rectal cancer s/p APR, CAD, DVT/PE on xarelto presenting after last drainage of pleurx catheter yielded bloody material. CT chest showing inteval decrease of the hydropnx.        Plan     - no acute surgical intervention  - continue with IR pleurx drainage per IR  - nothing to do for hemothorax. He is stable and hgb is unchanged  - recommend OP fu w Dr. Dennis  - can resume AC per primary team  - discussed w Dr. Robert Dennis  General surgery, PGY3      Surinder Dennis MD  "

## 2024-11-23 NOTE — PROGRESS NOTES
This is an after midnight admission. Assuming care for [facility], patient seen and examined, agree with H&P as per my colleague with following updates:     --Pending IR eval for pleurx catheter  --Consulted thoracic surgery given +hemothorax with pleurx catheter  --Discussed with pulm - monitor off antibiotics    Fany Malave DO  #[id number]

## 2024-11-23 NOTE — ASSESSMENT & PLAN NOTE
CT imaging with concerns for right lower lobe consolidation with obstruction of secretions of the right lower lobe bronchus  Currently without any evidence of fevers, or hypoxia  Mild leukocytosis with seems to have been elevated over the 4 weeks  Seems to be at baseline per information obtained  Pulmonary input requested for further evaluation

## 2024-11-23 NOTE — PLAN OF CARE
"  Problem: Adult Inpatient Plan of Care  Goal: Plan of Care Review  Outcome: Progressing  Flowsheets (Taken 11/23/2024 5981)  Progress: improving  Plan of Care Reviewed With: caregiver  Outcome Evaluation: Patient admitted with Hemothorax on right. He is also confirmed with COVID infection upon admission. Patient was seen for nutrition assessment since patient admitted with MST Score 2.  Patient is currently on Regular diet. Per RN patient is a 1:1 feed and he eats if fed; po 50-75%. Diet /Weight History PTA unknown. Patient sleeping at the time of visit and room dark. No family present at the time of visit.   Patient was recently admitted to [facility] in November and his po intake was at least 75% per RD note.  Will trial Ensure Shakes to encourage po intake and meet increased needs.   Patient is being followed by General Surgery and recommend:   \" -no acute surgical intervention  - continue with IR pleurx drainage per IR  - nothing to do for hemothorax. He is stable and hgb is unchanged  - recommend OP fu w Dr. Dennis  - can resume AC per primary team  - discussed w Dr. Jones\"  Nutrition will continue to follow.    Goal:  -Patient will meet at least 75% or more of the estimated nutrition needs by next follow up (no N/V/D/C)    Recommendations:  -Continue Regular Diet  -Add Ensure Plus High Protein Shakes TID with meals to meet nutrition needs  -Suggest MD to add MVI with minerals daily  -Monitor weights, labs; oral intake/tolerance       "

## 2024-11-23 NOTE — ASSESSMENT & PLAN NOTE
Sent from nursing home for concerns for significant clots during weekly drain yesterday  Seems to have improved per imaging on preliminary read  But given clots concerns for possibility of a blockage  IR consultation for evaluation of Pleurx catheter

## 2024-11-23 NOTE — ASSESSMENT & PLAN NOTE
Seems to have had initial left lower extremity DVT and PE in 2019  Has been on rivaroxaban for 6-month and changed to prophylactic dose  Subsequently has had DVT in 2020 & possibly has been on long-term rivaroxaban since then  Holding rivaroxaban at this time  Restart as possible

## 2024-11-23 NOTE — ED ATTESTATION NOTE
I have personally seen and examined Dave Arredondo.  I was involved in the care and medical decision making for this patient.    I reviewed and agree with physician assistant / nurse practitioner’s assessment and plan of care; any exceptions are documented below.      My focused history, examination, assessment and plan of care of Dave Arredondo is as follows:    Brief History:  HPI    89yo   Hx of recurrent pleural effusions, s/p recent hydropneumothorax with pleurex catheter placement  Here after facilty found blood clots and pooling in pleurex  Covid pos 3d ago    Oriented x 0 (baseline per facility)      Focused Physical Exam:  Physical Exam  Constitutional:       General: He is not in acute distress.     Appearance: He is ill-appearing.   HENT:      Nose: No congestion or rhinorrhea.   Pulmonary:      Effort: Pulmonary effort is normal.      Comments: Right pleurex catheter in place   Skin:     General: Skin is warm and dry.      Coloration: Skin is pale.   Neurological:      Mental Status: He is alert.             Assessment / Plan:  88yoM on xarelto with hx of recent pleurx catheter placement here with acute bloody drainage developing today. CTA chest neg for PE. Recent covid dx 4 days ago.Reduction in hemopneumothorax appreciated. Pt admitted.     MDM    I was physically present for the key/critical portions of the following procedures:  Procedures           Karina Xiao MD  11/23/24 [id number]

## 2024-11-23 NOTE — PROGRESS NOTES
"Dave Arredondo   [id number]    Your doctor has referred you for a CT examination that requires the injection of an iodinated contrast material into your bloodstream. This iodinated contrast material, sometimes referred to as \"x-ray dye\" allows for better interpretation of the x-ray films or CT images and results in a more accurate interpretation of the examination.     Without the use of iodinated contrast (x-ray dye), the examination may be less informative and result in a suboptimal examination, and possibly a delay in diagnosis and, if needed, treatment.     The iodinated contrast material is given through a small needle or catheter placed into a vein, usually on the inside of the elbow, on the back of hand, or in a vein in the foot or lower leg.    The most common, though still rare, potential reaction to an intravenous contrast injection is an allergic-like reaction. Most allergic-like reactions are mild, though a small subset of people can have more severe reactions. Mild reactions include mild / scattered hives, itching, scratchy throat, sneezing and nasal congestion. More severe reactions include facial swelling, severe difficulty breathing, wheezing and anaphylactic shock. Those with a prior history allergic-like reaction to the same class of contrast media (such as CT contrast or MRI contrast) are at the highest risk for an allergic reaction.     If you believe you had an allergic reaction to contrast in the past, please let our staff know. We can determine if this increases your risk for a future reaction and provide steps to decrease the risk. This may delay your examination, but it decreases the risk of having a new and possibly more severe reaction to the contrast injection.    People with a history of prior allergic reactions to other substances (such as unrelated medications and food) and patients with a history of asthma have slightly increased risk for an allergic reaction from contrast " material when compared with the general population, but do not require to be pretreated prior to a contrast injection.    You should notify the physician, nurse or technologist if you have ever had any of these conditions or if you have any questions.

## 2024-11-23 NOTE — CONSULTS
[facility] Pulmonary, Critical Care and Sleep Consult Note       SUBJECTIVE:  Mr Dave Arredondo is an 87 yo M with a PMHx of rectal cancer s/p colostomy, recurrent R pleural effusion s/p pleurx ( pleural biopsy with mixed inflammation, fibrinous debris and focal fibroblastic response), CAD and PE/DVT who initially presented on 11/23/24 after his pleurx catheter was noted to be draining blood and clots. Of note he was hospitalized 11/7/24 to 11/15/24 for R hydropneumothorax and underwent R pleurx placement. On arrival to the ED he was found to be afebrile, hemodynamically stable and saturating well on room air. Initial laboratory analysis was notable for WBC 10.78 w/ L shift, eos 90, Hb 12.2, HCO3 26, Cr 0.5 and hsTN 20.6. He was reportedly found to be COVID-19 positive at his nursing home but tested negative in the hospital. A CTA chest was done and showed  layering secretions in the trachea and right lower lobe airways, interval decrease in size of loculated right hydropneumothorax, hyperdense material in the right pleural space compatible with hemorrhage, diffuse nodular pleural thickening, complete RLL consolidation with patchy groundglass opacities and septal thickening, consolidation of the RML, trace left pleural effusion and a few subcentimeter nodules measuring up to 2 mm.    Outside records reviewed.    Medical History:   Past Medical History:   Diagnosis Date    Coronary artery disease     DVT (deep venous thrombosis) (CMS/HCC)     Hypertension     Lipid disorder     Pleural effusion     Pulmonary embolism (CMS/HCC)     Rectal cancer (CMS/HCC)     SBO (small bowel obstruction) (CMS/HCC)        Surgical History:   Past Surgical History   Procedure Laterality Date    Colostomy      Hernia repair      Pleura biopsy      Thoracentesis      THORACOSCOPY--VATS-PLEURAL BIOPSY, PLEURX CATHETER PLACEMENT Right 7/8/2024    Performed by Sergey Dennis MD at [facility] OR [facility]       Allergies: Patient has no known  allergies.    Med List Reviewed.    Social History:   Social History     Socioeconomic History    Marital status:      Spouse name: None    Number of children: None    Years of education: None    Highest education level: None   Tobacco Use    Smoking status: Never    Smokeless tobacco: Never   Substance and Sexual Activity    Alcohol use: Not Currently    Drug use: Never    Sexual activity: Defer   Social History Narrative    Lives in the [facility] in [place]      Social Drivers of Health     Food Insecurity: No Food Insecurity (11/23/2024)    Hunger Vital Sign     Worried About Running Out of Food in the Last Year: Never true     Ran Out of Food in the Last Year: Never true   Transportation Needs: No Transportation Needs (11/23/2024)    PRAPARE - Transportation     Lack of Transportation (Medical): No     Lack of Transportation (Non-Medical): No   Housing Stability: Low Risk  (11/23/2024)    Housing Stability Vital Sign     Unable to Pay for Housing in the Last Year: No     Number of Times Moved in the Last Year: 0     Homeless in the Last Year: No       Family History:   Family History   Problem Relation Name Age of Onset    Heart disease Biological Mother         Review of Systems  All other systems reviewed and negative except as noted in the HPI.    Vital signs in last 24 hours:  Temp:  [36.1 °C (97 °F)-36.5 °C (97.7 °F)] 36.5 °C (97.7 °F)  Heart Rate:  [71-85] 71  Resp:  [18-20] 18  BP: (127-152)/(63-86) 136/69    OBJECTIVE    Physical Exam:  Gen: WDWN, in NAD  HEENT: PERRLA, neck supple, no LAD  CV: RRR, +s1/s2, no M/R/G  Pulm: CTA b/l without W/R/R  GI: abd soft, NT/ND, normoactive BS  Extremities: no LE edema   Skin: clean, dry and intact without evidence of break down   Neuro: CN II-XII grossly in tact, no focal deficits     Labs:  I have reviewed the patient's pertinent labs.     Imaging:  I have independently reviewed the patient's pertinent imaging for this hospital visit.   X-RAY  CHEST 1 VIEW    Result Date: 11/23/2024  IMPRESSION: 1.  Loculated right basilar hydropneumothorax, slightly smaller from prior with Pleurx in place.. 2.  Residual right lower and middle lobe consolidation which is similar.     CT ANGIOGRAPHY CHEST PULMONARY EMBOLISM WITH IV CONTRAST    Result Date: 11/23/2024  IMPRESSION: 1.  No acute pulmonary embolism. 2.  Interval decrease in size of loculated right hydropneumothorax following pleural catheter placement. Effusion contains hyperdense components compatible with hemorrhage/hemothorax. 3.  Diffuse lobular right pleural thickening again suspicious for malignancy. 4.  Aspirated secretions in the trachea and right lower lobe airways. 5.  Near complete right lower lobe consolidation with changes likely reflecting combination of atelectasis and developing necrotizing pneumonia (potentially from aspiration given above). Complete middle lobe consolidation. 6.  Trace left effusion with underlying atelectasis. 7.  Unchanged thoracic compression deformities.     X-RAY CHEST 1 VIEW    Result Date: 11/13/2024  IMPRESSION: As above    X-RAY CHEST 1 VIEW    Result Date: 11/10/2024  IMPRESSION: Interval right chest tube placement terminating over the medial aspect of the right mid to lower hemithorax. Redemonstration of a right hydropneumothorax with slightly intervally decreased pleural fluid and similar appearance to slightly intervally increased pleural air projecting over the inferior right hemithorax. COMMENT: Comparison: Chest x-ray and CT chest 11/7/2024. Technique: A single frontal AP portable upright projection of the chest was obtained. FINDINGS: There has been interval placement of a right-sided chest tube terminating over the medial aspect of the right mid to lower hemithorax. Again noted is a right hydropneumothorax with slight interval decrease in the amount of pleural fluid and similar appearance to slight interval increase in the amount of pleural air projecting  over the inferior right hemithorax. The left lung remains clear. The cardiomediastinal silhouette is unchanged. The upper abdomen is unremarkable. There is no acute osseous abnormality.    IR TUNNELED PLEURAL CATHETER PLACEMENT    Result Date: 11/8/2024  IMPRESSION: 1. Uncomplicated right tunneled pleural drain (Pleur-X) placement followed by 400 mL large volume thoracentesis of serosanguineous fluid.    ULTRASOUND GUIDED NEEDLE PLACEMENT    Result Date: 11/8/2024  IMPRESSION: 1. Uncomplicated right tunneled pleural drain (Pleur-X) placement followed by 400 mL large volume thoracentesis of serosanguineous fluid.    CT CHEST WITH IV CONTRAST    Result Date: 11/7/2024  IMPRESSION: 1.  Redemonstration of nodular right pleural thickening consistent with malignancy.  Large right hydropneumothorax.  Fluid component is similar to the recent prior study.  Pneumothorax component is new.  There is a potentially loculated 5.6 cm air and fluid component medially in the right mid hemithorax. 2.  Slightly increased posterior right upper lobe atelectasis compared to prior. No significant change in right middle and right lower lobe volume loss and consolidation. Finding:    New or increased pneumothorax   Acuity: Critical  Status:  CLOSED Critical read back was performed and results were read back by Dr. Acosta,  on 11/7/2024 6:34 PM .     X-RAY CHEST 1 VIEW    Result Date: 11/7/2024  IMPRESSION: Small right effusion. COMMENT: AP radiograph the chest reveals a small right effusion. Previously noted right basal chest tube has been removed. There is patchy right basal consolidation. Left lung is well-aerated. There is no pneumothorax. Comparison is made to previous study dated 10/21/2024    IR CHEST TUBE REMOVAL(BEDSIDE)    Result Date: 10/25/2024  IMPRESSION: Successful bedside removal of right pleural pigtail catheter following TPA dornase treatments for loculated effusion. If effusion recurs and patient's symptomatic, could  consider repeat Pleurx catheter placement. I certify that I have personally reviewed this examination and agree with Melissa Tsang's report. Cj Yen M.D.       Assessment and Plan:  Mr Dave Arredondo is an 89 yo M with a PMHx of rectal cancer s/p colostomy, recurrent R pleural effusion s/p pleurx (pleural biopsy with mixed inflammation, fibrinous debris and focal fibroblastic response), CAD and PE/DVT who initially presented on 11/23/24 after his pleurx catheter was noted to be draining blood and clots. Reportedly he tested positive for COVID-19 at his nursing home but is negative in the hospital. CTA chest was done and showed  layering secretions in the trachea and right lower lobe airways, interval decrease in size of loculated right hydropneumothorax, hyperdense material in the right pleural space compatible with hemorrhage, diffuse nodular pleural thickening, complete RLL consolidation with patchy groundglass opacities and septal thickening, consolidation of the RML, trace left pleural effusion and a few subcentimeter nodules measuring up to 2 mm.    #Hemothorax  #Recurrent R pleural effusion s/p pleurx  #?COVID-19  #Rectal cancer s/p colostomy  #Hx of PE    -At this point I am not convinced that there is underlying PNA and I would favor monitoring off abx therapy with low threshold to start for fever or worsening leukocytosis  -Recommend ID consult   -Unclear if he truly has COVID-19 as his test in the hospital is negative   -Sputum culture if able  -IR consult to evaluate pleurx  -Given the presence of hemothorax on CT would consult thoracic surgery  -Recommend connecting pleurx to pleurovac for on going drainage while hospitalized   -Trend CBC  -Hold xarelto for now   -Please call with any questions or concerns you may have      Parker Knott, DO  Pulmonary and Critical Care Medicine

## 2024-11-23 NOTE — ASSESSMENT & PLAN NOTE
Currently without any evidence of hypoxia or fevers  Pulmonary team to evaluate, follow-up recommendations  Continue supportive treatment

## 2024-11-23 NOTE — PLAN OF CARE
Care Coordination Admission Assessment Note    General Information:  Readmission Within the last 30 days: no previous admission in last 30 days, other (see comments) (as per chart review patient was discharged from [facility] on 11/15/2024)  Does patient have a :    Patient-Specific Goals (include timeframe): return to [facility]    Living Arrangements:  Arrived From: short term rehab  Current Living Arrangements: short-term skilled nursing facility  People in Home: facility resident  Home Accessibility: wheelchair accessible  Living Arrangement Comments: patient was a short term resident form [facility]    Housing Stability and Utility Access (SDOH):  In the last 12 months, was there a time when you were not able to pay the mortgage or rent on time?: No  In the past 12 months, how many times have you moved?: 0  At any time in the past 12 months, were you homeless or living in a shelter (including now)?: No  In the past 12 months has the electric, gas, oil, or water company threatened to shut off services in your home?: No    Functional Status Prior to Admission:   Assistive Device/Animal Currently Used at Home: shower chair, inhaler  Functional Status Comments: INDP with devices  IADL Comments: needs assistance with some ADL's     Supports and Services:  Current Outpatient/Agency/Support Group: none  Type of Current Home Care Services:    History of home care episode or rehab stay: [facility]    Discharge Needs Assessment:   Concerns to be Addressed: care coordination/care conferences, discharge planning  Current Discharge Risk:    Anticipated Changes Related to Illness: inability to care for self    Patient/Family Anticipated Discharge Plan:  Patient/Family Anticipates Transition To: skilled nursing facility  Patient/Family Anticipated Services at Transition: skilled nursing    Connection to Community  Not applicable    Patient Choice:   Offered/Gave Vendor List: no  Patient's Choice of  Community Agency(s):         Anticipated Discharge Plan:  Met with patient. Provided education and contact information for Care Coordination services.: yes (spoke with caregiver Eliezer)  Anticipated Discharge Disposition: skilled nursing facility, acute rehab/Inpatient Rehab Facility  Type of Skilled Nursing Care Services: OT, PT, nursing    Transportation Needs (SDOH):  Transportation Concerns: none  Transportation Anticipated: family or friend will provide  Is Out of Hospital DNR needed at discharge?: no    In the past 12 months, has lack of transportation kept you from medical appointments or from getting medications?: No  In the past 12 months, has lack of transportation kept you from meetings, work, or from getting things needed for daily living?: No    Concerns - comments: 88 y.o. 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    Chart reviewed  Spoke with patients friend Eliezer to confirm face sheet information.  Eliezer reported patient has been at [facility] for rehab for about 10 days. Eliezer would like patient to return.   Spoke with [facility]  liaison who reported patient will need PT, OT, and insurance to return.   Medical team made aware of this information.    DCP: [facility]

## 2024-11-24 PROBLEM — J94.2 BLOODY PLEURAL EFFUSION: Status: ACTIVE | Noted: 2024-11-24

## 2024-11-24 PROBLEM — J90 BLOODY PLEURAL EFFUSION: Status: ACTIVE | Noted: 2024-11-24

## 2024-11-24 LAB
ANION GAP SERPL CALC-SCNC: 9 MEQ/L (ref 3–15)
BACTERIA URNS QL MICRO: ABNORMAL /HPF
BILIRUB UR QL STRIP.AUTO: NEGATIVE MG/DL
BUN SERPL-MCNC: 12 MG/DL (ref 7–25)
CALCIUM SERPL-MCNC: 8.4 MG/DL (ref 8.6–10.3)
CHLORIDE SERPL-SCNC: 104 MEQ/L (ref 98–107)
CLARITY UR REFRACT.AUTO: CLEAR
CO2 SERPL-SCNC: 27 MEQ/L (ref 21–31)
COLOR UR AUTO: YELLOW
CREAT SERPL-MCNC: 0.5 MG/DL (ref 0.7–1.3)
EGFRCR SERPLBLD CKD-EPI 2021: >60 ML/MIN/1.73M*2
ERYTHROCYTE [DISTWIDTH] IN BLOOD BY AUTOMATED COUNT: 14.6 % (ref 11.6–14.4)
GLUCOSE SERPL-MCNC: 92 MG/DL (ref 70–99)
GLUCOSE UR STRIP.AUTO-MCNC: NEGATIVE MG/DL
HCT VFR BLD AUTO: 37.1 % (ref 40.1–51)
HGB BLD-MCNC: 12.1 G/DL (ref 13.7–17.5)
HGB UR QL STRIP.AUTO: NEGATIVE
HYALINE CASTS #/AREA URNS LPF: ABNORMAL /LPF
KETONES UR STRIP.AUTO-MCNC: ABNORMAL MG/DL
LEUKOCYTE ESTERASE UR QL STRIP.AUTO: NEGATIVE
MAGNESIUM SERPL-MCNC: 1.9 MG/DL (ref 1.8–2.5)
MCH RBC QN AUTO: 29.1 PG (ref 28–33.2)
MCHC RBC AUTO-ENTMCNC: 32.6 G/DL (ref 32.2–36.5)
MCV RBC AUTO: 89.2 FL (ref 83–98)
MUCOUS THREADS URNS QL MICRO: 2 /LPF
NITRITE UR QL STRIP.AUTO: NEGATIVE
PH UR STRIP.AUTO: 6 [PH]
PLATELET # BLD AUTO: 293 K/UL (ref 150–350)
PMV BLD AUTO: 9 FL (ref 9.4–12.4)
POTASSIUM SERPL-SCNC: 3.1 MEQ/L (ref 3.5–5.1)
PROT UR QL STRIP.AUTO: ABNORMAL
RBC # BLD AUTO: 4.16 M/UL (ref 4.5–5.8)
RBC #/AREA URNS HPF: ABNORMAL /HPF
SODIUM SERPL-SCNC: 140 MEQ/L (ref 136–145)
SP GR UR REFRACT.AUTO: 1.02
SQUAMOUS URNS QL MICRO: ABNORMAL /HPF
UROBILINOGEN UR STRIP-ACNC: 0.2 EU/DL
WBC # BLD AUTO: 11.66 K/UL (ref 3.8–10.5)
WBC #/AREA URNS HPF: ABNORMAL /HPF

## 2024-11-24 PROCEDURE — 63700000 HC SELF-ADMINISTRABLE DRUG: Performed by: STUDENT IN AN ORGANIZED HEALTH CARE EDUCATION/TRAINING PROGRAM

## 2024-11-24 PROCEDURE — 85027 COMPLETE CBC AUTOMATED: CPT | Performed by: STUDENT IN AN ORGANIZED HEALTH CARE EDUCATION/TRAINING PROGRAM

## 2024-11-24 PROCEDURE — 83735 ASSAY OF MAGNESIUM: CPT | Performed by: STUDENT IN AN ORGANIZED HEALTH CARE EDUCATION/TRAINING PROGRAM

## 2024-11-24 PROCEDURE — G0378 HOSPITAL OBSERVATION PER HR: HCPCS

## 2024-11-24 PROCEDURE — 12000000 HC ROOM AND CARE MED/SURG

## 2024-11-24 PROCEDURE — 36415 COLL VENOUS BLD VENIPUNCTURE: CPT | Performed by: STUDENT IN AN ORGANIZED HEALTH CARE EDUCATION/TRAINING PROGRAM

## 2024-11-24 PROCEDURE — 99233 SBSQ HOSP IP/OBS HIGH 50: CPT | Performed by: STUDENT IN AN ORGANIZED HEALTH CARE EDUCATION/TRAINING PROGRAM

## 2024-11-24 PROCEDURE — 63700000 HC SELF-ADMINISTRABLE DRUG: Performed by: INTERNAL MEDICINE

## 2024-11-24 PROCEDURE — 81001 URINALYSIS AUTO W/SCOPE: CPT | Performed by: PHYSICIAN ASSISTANT

## 2024-11-24 PROCEDURE — 80048 BASIC METABOLIC PNL TOTAL CA: CPT | Performed by: STUDENT IN AN ORGANIZED HEALTH CARE EDUCATION/TRAINING PROGRAM

## 2024-11-24 RX ORDER — POTASSIUM CHLORIDE 20 MEQ/1
40 TABLET, EXTENDED RELEASE ORAL ONCE
Status: COMPLETED | OUTPATIENT
Start: 2024-11-24 | End: 2024-11-24

## 2024-11-24 RX ADMIN — ATORVASTATIN CALCIUM 20 MG: 20 TABLET, FILM COATED ORAL at 17:40

## 2024-11-24 RX ADMIN — METOPROLOL SUCCINATE 100 MG: 100 TABLET, EXTENDED RELEASE ORAL at 08:30

## 2024-11-24 RX ADMIN — PANTOPRAZOLE SODIUM 40 MG: 40 TABLET, DELAYED RELEASE ORAL at 22:28

## 2024-11-24 RX ADMIN — RIVAROXABAN 20 MG: 20 TABLET, FILM COATED ORAL at 17:40

## 2024-11-24 RX ADMIN — FUROSEMIDE 20 MG: 20 TABLET ORAL at 08:30

## 2024-11-24 RX ADMIN — Medication 3 MG: at 22:28

## 2024-11-24 RX ADMIN — POTASSIUM CHLORIDE 40 MEQ: 1500 TABLET, EXTENDED RELEASE ORAL at 10:51

## 2024-11-24 RX ADMIN — SENNOSIDES 2 TABLET: 8.6 TABLET, FILM COATED ORAL at 08:30

## 2024-11-24 RX ADMIN — SENNOSIDES 2 TABLET: 8.6 TABLET, FILM COATED ORAL at 22:28

## 2024-11-24 ASSESSMENT — COGNITIVE AND FUNCTIONAL STATUS - GENERAL
CLIMB 3 TO 5 STEPS WITH RAILING: 1 - TOTAL
CLIMB 3 TO 5 STEPS WITH RAILING: 1 - TOTAL
MOVING TO AND FROM BED TO CHAIR: 2 - A LOT
MOVING TO AND FROM BED TO CHAIR: 2 - A LOT
STANDING UP FROM CHAIR USING ARMS: 2 - A LOT
STANDING UP FROM CHAIR USING ARMS: 2 - A LOT
WALKING IN HOSPITAL ROOM: 1 - TOTAL
WALKING IN HOSPITAL ROOM: 1 - TOTAL

## 2024-11-24 NOTE — ASSESSMENT & PLAN NOTE
- Seems to have had initial left lower extremity DVT and PE in 2019  - Has been on rivaroxaban for 6-month and changed to prophylactic dose  - Subsequently has had DVT in 2020 & possibly has been on long-term rivaroxaban since then  - Resumed rivaroxaban

## 2024-11-24 NOTE — ASSESSMENT & PLAN NOTE
- CTA Chest with aspirated secretions in the trachea and RLL. There is a diffuse lobular right pleural thickening noted again suspicious for a malignancy  - Currently without any evidence of fevers, or hypoxia  - Mild leukocytosis with seems to have been elevated over the 4 weeks  - Seems to be at baseline per information obtained  - Appreciate Pulm recs - not convinced about a PNA and recs monitoring off Abx.

## 2024-11-24 NOTE — PROGRESS NOTES
Department of Hospital Medicine  Daily Progress Note       SUBJECTIVE   Interval History: Patient seen and examined at bedside. NAEO. Denies SOB, cp, n/v, abd pain.   Pending IR eval of pleurx cath.        OBJECTIVE      Vital signs in last 24 hours:  Temp:  [36.3 °C (97.3 °F)-36.5 °C (97.7 °F)] 36.3 °C (97.4 °F)  Heart Rate:  [70-75] 75  Resp:  [17-18] 17  BP: (136-164)/(69-78) 164/77    Intake/Output Summary (Last 24 hours) at 11/24/2024 1031  Last data filed at 11/23/2024 2000  Gross per 24 hour   Intake 10 ml   Output --   Net 10 ml       PHYSICAL EXAMINATION      Physical Exam    General: chronically ill appearing, appears comfortable, NAD, aaox1  HEENT: EOMI, NCAT, sclerae anicteric, no conjunctival pallor, no stridor  CV: RRR, no mrg  Pulm: coarse BS on left  GI: +BS, abd soft, nontender to palpation, nondistended  Extremities: no b/l LE edema, NT, warm, perfused  Neuro: CN 2-12 grossly intact by nonfocal exam  Psych: normal mood and affect. No psychomotor agitation       LINES, CATHETERS, DRAINS, AIRWAYS, AND WOUNDS   Lines, Drains, Airways, Wounds:  Peripheral IV (Adult) 11/23/24 Anterior;Left;Upper Arm (Active)   Number of days: 1       Wound Coccyx (Active)   Number of days: 1       Wound Skin Tear Left;Lower;Posterior;Proximal Arm (Active)   Number of days: 1       Comments:      LABS / IMAGING / TELE      I personally rvd the labs, radiographic reports and EKG findings myself.     Labs  Results from last 7 days   Lab Units 11/24/24  0554 11/22/24  2351   WBC K/uL 11.66* 10.78*   HEMOGLOBIN g/dL 12.1* 12.2*   HEMATOCRIT % 37.1* 36.7*   PLATELETS K/uL 293 279       .  Results from last 7 days   Lab Units 11/24/24  0554 11/22/24  2351   SODIUM mEQ/L 140 135*   POTASSIUM mEQ/L 3.1* 3.3*   CHLORIDE mEQ/L 104 102   CO2 mEQ/L 27 26   BUN mg/dL 12 13   CREATININE mg/dL 0.5* 0.5*   CALCIUM mg/dL 8.4* 8.5*   ALBUMIN g/dL  --  2.7*   BILIRUBIN TOTAL mg/dL  --  0.7   ALK PHOS IU/L  --  106   ALT IU/L  --  18    AST IU/L  --  20   GLUCOSE mg/dL 92 99       Results from last 7 days   Lab Units 11/24/24  0554   MAGNESIUM mg/dL 1.9                 Imaging  X-RAY CHEST 1 VIEW    Result Date: 11/23/2024  Narrative: CLINICAL HISTORY: blood from pleurx cath on R side, has covid COMPARISON: November 13, 2024 COMMENT: TECHNIQUE: Single frontal view of the chest was performed. LINES/TUBES/HARDWARE: Monitoring leads/wires overlie the patient. Right-sided Pleurx catheter in similar position. LUNGS AND PLEURA: See Impression. CARDIOMEDIASTINUM: Unchanged noting cardiomegaly and aortic atherosclerosis. SKELETON/OTHER: Degenerative changes of the spine and shoulders.     Impression: IMPRESSION: 1.  Loculated right basilar hydropneumothorax, slightly smaller from prior with Pleurx in place.. 2.  Residual right lower and middle lobe consolidation which is similar.     CT ANGIOGRAPHY CHEST PULMONARY EMBOLISM WITH IV CONTRAST    Result Date: 11/23/2024  Narrative: EXAM: CT ANGIOGRAPHY CHEST PULMONARY EMBOLISM W IV CONTRAST CLINICAL HISTORY: h/o PE, blood in pleurx cath, COVID, ro PE COMPARISON: CT CHEST W IV CONTRAST with report dated 11/7/2024 6:44 PM. TECHNIQUE: CT chest was performed using thin section reconstructions. Contrast injection rate and acquisition timing were optimized for opacification of the pulmonary arteries to evaluate for pulmonary embolism. 3-D RECONSTRUCTION: Additional 3-D/MIP reconstructions were performed and reviewed. CT DOSE:  One or more dose reduction techniques (e.g. automated exposure control, adjustment of the mA and/or kV according to patient size, use of iterative reconstruction technique) utilized for this examination. CONTRAST: 100mL of iopamidoL (ISOVUE-370) 370 mg iodine /mL (76 %) injection 100 mL COMMENT: PULMONARY ARTERIES: Image Quality is: High noting the following limitations: Respiratory motion artifact No pulmonary embolism is identified to the proximal subsegmental level. AIRWAYS, LUNGS AND  PLEURA: Layering secretions are present within the trachea and right lower lobe airways. Interval decrease in size of a loculated right hydropneumothorax following Pleurx catheter placement. Pleural effusion contains hyperdense components compatible with hemorrhage. Redemonstration of diffuse nodular pleural thickening. Complete right lower lobe consolidation present, with patchy groundglass opacity and septal thickening in the aerated portions. Hypoenhancement within a portion of the right basal segments with intraparenchymal gas likely related to infection. Dependent right upper lobe atelectasis, and near complete middle lobe consolidation. Trace left effusion with dependent left lower lobe atelectatic changes. A couple subcentimeter nodules present in the left upper lobe measuring up to 2 mm on series 3 image 157. CARDIOMEDIASTINUM: Cardiomegaly.  Multivessel coronary atherosclerosis.  Aortic annular and valvular calcification. 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 Tortuous great vessels. LYMPH NODES: No thoracic lymphadenopathy IMAGED THYROID: Within normal limits IMAGED ESOPHAGUS: Nondilated UPPER ABDOMEN: Bilateral renal cortical scarring. Bilateral renal sinus and cortical cysts. CHEST WALL: Within normal limits BONES: Osteopenia with multilevel compression fractures including T7, T8, and T12 unchanged.     Impression: IMPRESSION: 1.  No acute pulmonary embolism. 2.  Interval decrease in size of loculated right hydropneumothorax following pleural catheter placement. Effusion contains hyperdense components compatible with hemorrhage/hemothorax. 3.  Diffuse lobular right pleural thickening again suspicious for malignancy. 4.  Aspirated secretions in the trachea and right lower lobe airways. 5.  Near complete right lower lobe consolidation with changes likely reflecting combination of atelectasis and developing necrotizing pneumonia (potentially from aspiration given above).  Complete middle lobe consolidation. 6.  Trace left effusion with underlying atelectasis. 7.  Unchanged thoracic compression deformities.     X-RAY CHEST 1 VIEW    Result Date: 11/13/2024  Narrative: CLINICAL HISTORY: Reassess effusion. AP portable view of the chest. COMPARISON: 11/10/2024 COMMENT: The cardiomediastinal silhouette is stable in appearance. The right chest tube has not significantly changed in position. A right hydropneumothorax is redemonstrated. The pleural fluid has slightly increased. Parenchymal opacity in the right lower lung has not significantly changed. There are degenerative changes of the spine.     Impression: IMPRESSION: As above    X-RAY CHEST 1 VIEW    Result Date: 11/10/2024  Narrative: CLINICAL HISTORY: follow up pleurex catheter placement     Impression: IMPRESSION: Interval right chest tube placement terminating over the medial aspect of the right mid to lower hemithorax. Redemonstration of a right hydropneumothorax with slightly intervally decreased pleural fluid and similar appearance to slightly intervally increased pleural air projecting over the inferior right hemithorax. COMMENT: Comparison: Chest x-ray and CT chest 11/7/2024. Technique: A single frontal AP portable upright projection of the chest was obtained. FINDINGS: There has been interval placement of a right-sided chest tube terminating over the medial aspect of the right mid to lower hemithorax. Again noted is a right hydropneumothorax with slight interval decrease in the amount of pleural fluid and similar appearance to slight interval increase in the amount of pleural air projecting over the inferior right hemithorax. The left lung remains clear. The cardiomediastinal silhouette is unchanged. The upper abdomen is unremarkable. There is no acute osseous abnormality.    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 Patient recently had a pleural pigtail placement on 10/16/2024 for treatment with TPA/dornase which was removed on 10/23/2024. He presents with worsening shortness of breath and persistent right hydropneumothorax in the setting of trapped lung. COMPARISON: CT chest 11/7/2024 FACULTY: KARAN Obregon MD MEDICATIONS: Lidocaine 1% with and without epinephrine intradermal, fentanyl 50 mcg CONTRAST: None RADIATION: 0.2 minutes, 3.4 mGy PROCEDURE SUMMARY: Preprocedure timeout and wedged supine positioning on the angiography table. Preliminary ultrasound of the right hemithorax identified the known hydropneumothorax. The planned pleural puncture site and tunneled tract was marked then evaluated with grayscale and doppler ultrasound to ensure the absence of large intervening vessels. The anterolateral chest was prepped and draped in the usual sterile fashion. Initiation and maintenance of systemic analgesia by IR nursing under personal physician supervision. The planned access sites and dermal tract were anesthetized with 1% lidocaine with and without epinephrine. A 5 Prydeinig Yueh needle was introduced into the pleural space under US-guidance through which a 0.035 Amplatz guidewire was placed. A skin nick was made ~8 cm anteroinferiorly and the 15.5Fr Pleur-X catheter tunneled to the pleural puncture site. The Pleur-X was trimmed and inserted into the pleural space via the provided 16Fr peel-away sheath (which was then removed). Serosanguineous fluid returned from the drain which was placed to vacuum canister drainage. A total of 400 mL was removed before  capping the catheter. The skin overlying the pleural puncture site was closed with resorbable 2-0 Vicryl sutures and skin glue superficially. The catheter was sutured to the adjacent skin with non-resorbable 2-0 monofilament. Sterile dressings were applied. The patient tolerated the procedure well and was transported to the recovery area without incident. DEVICE: Single lumen 15.5 Fr tunneled drain (Pleur-X) SAMPLES: None COMPLICATIONS: None immediate EBL: Minimal (1-10 mL)     Impression: IMPRESSION: 1. Uncomplicated right tunneled pleural drain (Pleur-X) placement followed by 400 mL large volume thoracentesis of serosanguineous fluid.    CT CHEST WITH IV CONTRAST    Result Date: 11/7/2024  Narrative: CLINICAL HISTORY: Pleural effusion, malignancy suspected COMMENT:  Computed tomography of the chest is performed. CT Dose:  One or more dose reduction techniques (e.g. automated exposure control, adjustment of the mA and/or kV according to patient size, use of iterative reconstruction technique) utilized for this examination. Contrast: 75 mL Isovue 370 nonionic IV contrast Comparison studies:   10/14/2024 CTA chest FINDINGS: LUNG PARENCHYMA AND PLEURA: Redemonstration of nodular right pleural thickening suspicious for malignancy.  Right hydropneumothorax.  Fluid volume is similar to the prior study from one month ago.  However, the pneumothorax component is new. Potentially loculated 5.6 cm air and fluid component medially in the right mid hemithorax.  Right lower lobe and right middle lobe volume loss and consolidation similar to prior.  New posterior right upper lobe atelectasis. Trace left pleural effusion.  No new left lung consolidation. JENNIFER AND MEDIASTINUM: Stable HEART AND PERICARDIUM: Cardiomegaly.  Coronary artery calcifications.  No pericardial effusion. CHEST WALL: Unremarkable AXILLA: Unremarkable BONY ARCHITECTURE: Stable UPPER ABDOMEN: Tiny nonobstructing right upper pole renal calculus.  Partially  imaged hypodensities in the renal sinuses, consistent with parapelvic cysts and/or pelvocaliectasis.     Impression: IMPRESSION: 1.  Redemonstration of nodular right pleural thickening consistent with malignancy.  Large right hydropneumothorax.  Fluid component is similar to the recent prior study.  Pneumothorax component is new.  There is a potentially loculated 5.6 cm air and fluid component medially in the right mid hemithorax. 2.  Slightly increased posterior right upper lobe atelectasis compared to prior. No significant change in right middle and right lower lobe volume loss and consolidation. Finding:    New or increased pneumothorax   Acuity: Critical  Status:  CLOSED Critical read back was performed and results were read back by Dr. Acosta,  on 11/7/2024 6:34 PM .     X-RAY CHEST 1 VIEW    Result Date: 11/7/2024  Narrative: CLINICAL HISTORY: Shortness of breath.     Impression: IMPRESSION: Small right effusion. COMMENT: AP radiograph the chest reveals a small right effusion. Previously noted right basal chest tube has been removed. There is patchy right basal consolidation. Left lung is well-aerated. There is no pneumothorax. Comparison is made to previous study dated 10/21/2024    IR CHEST TUBE REMOVAL(BEDSIDE)    Result Date: 10/25/2024  Narrative: CLINICAL HISTORY: Resolution of loculated right pleural effusion following pleural pigtail catheter placement and TPA dornase treatment. COMMENT: A request is received to remove the right pleural pigtail catheter as there has been decreasing output from the chest tube following TPA dornase treatment. The patient had had a previous Pleurx catheter which was removed secondary to low output. His effusion may recur and consideration for repeat Pleurx catheter if he becomes symptomatic. At bedside, the patient is correctly identified. No air leak is seen in the pleura vac placed to waterseal. He is turned onto his left side. The dressing is removed from the catheter.  The areas cleansed with ChloraPrep. The retention suture removed and on expiration, the catheter transected and removed in its entirety. A sterile Vaseline gauze dressing is placed over the site. The patient tolerated the procedure well. No imaging. This service rendered by Melissa Tsang PA-C.     Impression: IMPRESSION: Successful bedside removal of right pleural pigtail catheter following TPA dornase treatments for loculated effusion. If effusion recurs and patient's symptomatic, could consider repeat Pleurx catheter placement. I certify that I have personally reviewed this examination and agree with Melissa Tsang's report. Cj Yen M.D.        ECG/Telemetry  Not on tele      ASSESSMENT AND PLAN           Assessment & Plan  Hemothorax on right  - Sent from nursing home for concerns for significant clots during weekly drain with c/f possible blockage  - CTA Chest with interval decrease in size of loculated right hydropneumothorax  - IR consultation for evaluation of Pleurx catheter  - Appreciate thoracic surgery recs - no acute interventions. F/u with outpt thoracic surgery  Abnormal findings on diagnostic imaging of lung  - CTA Chest with aspirated secretions in the trachea and RLL. There is a diffuse lobular right pleural thickening noted again suspicious for a malignancy  - Currently without any evidence of fevers, or hypoxia  - Mild leukocytosis with seems to have been elevated over the 4 weeks  - Seems to be at baseline per information obtained  - Appreciate Pulm recs - not convinced about a PNA and recs monitoring off Abx.   COVID-19 virus infection  - Currently without any evidence of hypoxia or fevers  - Continue supportive treatment  PE (pulmonary thromboembolism) (CMS/HCC)  - Seems to have had initial left lower extremity DVT and PE in 2019  - Has been on rivaroxaban for 6-month and changed to prophylactic dose  - Subsequently has had DVT in 2020 & possibly has been on long-term rivaroxaban since  then  - Resumed rivaroxaban   Normocytic anemia  - His hemoglobin seems to be at baseline  - Continue to monitor as needed  HLD (hyperlipidemia)  - Continue statin    VTE Assessment: Padua VTE Score: 4  VTE Prophylaxis Plan: Current anticoagulants:    None      Code Status: Full Code  Estimated Discharge Date: 11/24/2024  Disposition Planning: pending clinical course     Fany Malave DO  Pager [phone]  11/24/2024

## 2024-11-24 NOTE — ASSESSMENT & PLAN NOTE
- Sent from nursing home for concerns for significant clots during weekly drain with c/f possible blockage  - CTA Chest with interval decrease in size of loculated right hydropneumothorax  - IR consultation for evaluation of Pleurx catheter  - Appreciate thoracic surgery recs - no acute interventions. F/u with outpt thoracic surgery

## 2024-11-25 LAB
ANION GAP SERPL CALC-SCNC: 5 MEQ/L (ref 3–15)
BUN SERPL-MCNC: 16 MG/DL (ref 7–25)
CALCIUM SERPL-MCNC: 8.8 MG/DL (ref 8.6–10.3)
CHLORIDE SERPL-SCNC: 105 MEQ/L (ref 98–107)
CO2 SERPL-SCNC: 30 MEQ/L (ref 21–31)
CREAT SERPL-MCNC: 0.5 MG/DL (ref 0.7–1.3)
EGFRCR SERPLBLD CKD-EPI 2021: >60 ML/MIN/1.73M*2
ERYTHROCYTE [DISTWIDTH] IN BLOOD BY AUTOMATED COUNT: 14.7 % (ref 11.6–14.4)
GLUCOSE SERPL-MCNC: 116 MG/DL (ref 70–99)
HCT VFR BLD AUTO: 37.1 % (ref 40.1–51)
HGB BLD-MCNC: 12.3 G/DL (ref 13.7–17.5)
MCH RBC QN AUTO: 29.4 PG (ref 28–33.2)
MCHC RBC AUTO-ENTMCNC: 33.2 G/DL (ref 32.2–36.5)
MCV RBC AUTO: 88.8 FL (ref 83–98)
PLATELET # BLD AUTO: 297 K/UL (ref 150–350)
PMV BLD AUTO: 8.8 FL (ref 9.4–12.4)
POTASSIUM SERPL-SCNC: 3.8 MEQ/L (ref 3.5–5.1)
RBC # BLD AUTO: 4.18 M/UL (ref 4.5–5.8)
SODIUM SERPL-SCNC: 140 MEQ/L (ref 136–145)
WBC # BLD AUTO: 11.84 K/UL (ref 3.8–10.5)

## 2024-11-25 PROCEDURE — 36415 COLL VENOUS BLD VENIPUNCTURE: CPT | Performed by: STUDENT IN AN ORGANIZED HEALTH CARE EDUCATION/TRAINING PROGRAM

## 2024-11-25 PROCEDURE — 63700000 HC SELF-ADMINISTRABLE DRUG: Performed by: INTERNAL MEDICINE

## 2024-11-25 PROCEDURE — 99233 SBSQ HOSP IP/OBS HIGH 50: CPT | Performed by: STUDENT IN AN ORGANIZED HEALTH CARE EDUCATION/TRAINING PROGRAM

## 2024-11-25 PROCEDURE — 80048 BASIC METABOLIC PNL TOTAL CA: CPT | Performed by: STUDENT IN AN ORGANIZED HEALTH CARE EDUCATION/TRAINING PROGRAM

## 2024-11-25 PROCEDURE — G0378 HOSPITAL OBSERVATION PER HR: HCPCS

## 2024-11-25 PROCEDURE — 0HBRXZZ EXCISION OF TOE NAIL, EXTERNAL APPROACH: ICD-10-PCS | Performed by: PODIATRIST

## 2024-11-25 PROCEDURE — 85027 COMPLETE CBC AUTOMATED: CPT | Performed by: STUDENT IN AN ORGANIZED HEALTH CARE EDUCATION/TRAINING PROGRAM

## 2024-11-25 PROCEDURE — 12000000 HC ROOM AND CARE MED/SURG

## 2024-11-25 PROCEDURE — 63700000 HC SELF-ADMINISTRABLE DRUG: Performed by: STUDENT IN AN ORGANIZED HEALTH CARE EDUCATION/TRAINING PROGRAM

## 2024-11-25 RX ADMIN — SENNOSIDES 2 TABLET: 8.6 TABLET, FILM COATED ORAL at 09:09

## 2024-11-25 RX ADMIN — Medication 3 MG: at 20:50

## 2024-11-25 RX ADMIN — FUROSEMIDE 20 MG: 20 TABLET ORAL at 09:09

## 2024-11-25 RX ADMIN — METOPROLOL SUCCINATE 100 MG: 100 TABLET, EXTENDED RELEASE ORAL at 09:09

## 2024-11-25 RX ADMIN — SENNOSIDES 2 TABLET: 8.6 TABLET, FILM COATED ORAL at 20:50

## 2024-11-25 RX ADMIN — ATORVASTATIN CALCIUM 20 MG: 20 TABLET, FILM COATED ORAL at 17:11

## 2024-11-25 RX ADMIN — PANTOPRAZOLE SODIUM 40 MG: 40 TABLET, DELAYED RELEASE ORAL at 20:50

## 2024-11-25 RX ADMIN — RIVAROXABAN 20 MG: 20 TABLET, FILM COATED ORAL at 17:11

## 2024-11-25 ASSESSMENT — COGNITIVE AND FUNCTIONAL STATUS - GENERAL
WALKING IN HOSPITAL ROOM: 1 - TOTAL
MOVING TO AND FROM BED TO CHAIR: 2 - A LOT
STANDING UP FROM CHAIR USING ARMS: 2 - A LOT
WALKING IN HOSPITAL ROOM: 1 - TOTAL
STANDING UP FROM CHAIR USING ARMS: 2 - A LOT
CLIMB 3 TO 5 STEPS WITH RAILING: 1 - TOTAL
CLIMB 3 TO 5 STEPS WITH RAILING: 1 - TOTAL
MOVING TO AND FROM BED TO CHAIR: 2 - A LOT

## 2024-11-25 NOTE — CONSULTS
Podiatric Surgery Consult Note    Subjective      Mr. Arredondo is an 89 y/o male that was evaluated bedside today for a left hallux ingrown nail.  Patient states that this nail has been bothering him for a while.  Patient is not the best historian.  Podiatry was consulted by hospitalist team for ingrown nail and fungus to nails. Pt denies any N/V/F/C/CP/SOB    Review of Systems:   Constitutional:  Negative for fevers, chills   Cardiovascular: Negative for chest pain, SOB   Gastrointestinal: Negative for nausea and vomiting.   Musculoskeletal: Negative for tenderness in any yon prominences, joints, tendons, myalgias  Skin:       Medical History:   Past Medical History:   Diagnosis Date    Coronary artery disease     DVT (deep venous thrombosis) (CMS/HCC)     Hypertension     Lipid disorder     Pleural effusion     Pulmonary embolism (CMS/HCC)     Rectal cancer (CMS/HCC)     SBO (small bowel obstruction) (CMS/HCC)        Surgical History:   Past Surgical History   Procedure Laterality Date    Colostomy      Hernia repair      Pleura biopsy      Thoracentesis      THORACOSCOPY--VATS-PLEURAL BIOPSY, PLEURX CATHETER PLACEMENT Right 7/8/2024    Performed by Sergey Dennis MD at [facility] OR [facility]       Social History:   Social History     Socioeconomic History    Marital status:      Spouse name: None    Number of children: None    Years of education: None    Highest education level: None   Tobacco Use    Smoking status: Never    Smokeless tobacco: Never   Substance and Sexual Activity    Alcohol use: Not Currently    Drug use: Never    Sexual activity: Defer   Social History Narrative    Lives in the [facility] (formerly [facility])      Social Drivers of Health     Food Insecurity: No Food Insecurity (11/23/2024)    Hunger Vital Sign     Worried About Running Out of Food in the Last Year: Never true     Ran Out of Food in the Last Year: Never true   Transportation Needs: No Transportation Needs (11/23/2024)    PRAPARE  - Transportation     Lack of Transportation (Medical): No     Lack of Transportation (Non-Medical): No   Housing Stability: Low Risk  (11/23/2024)    Housing Stability Vital Sign     Unable to Pay for Housing in the Last Year: No     Number of Times Moved in the Last Year: 0     Homeless in the Last Year: No       Family History:   Family History   Problem Relation Name Age of Onset    Heart disease Biological Mother         Allergies: No Known Allergies    Home Medications:   acetaminophen (TYLENOL) tablet 650 mg  650 mg oral q4h PRN    albuterol HFA 90 mcg/actuation inhaler 2 puff  2 puff inhalation q8h PRN    albuterol nebulizer solution 2.5 mg  2.5 mg nebulization q6h PRN    alum-mag hydroxide-simeth (MAALOX) 200-200-20 mg/5 mL suspension 30 mL  30 mL oral q4h PRN    atorvastatin (LIPITOR) tablet 20 mg  20 mg oral Daily (6p)    bisacodyL (DULCOLAX) 10 mg suppository 10 mg  10 mg rectal Daily PRN    cyanocobalamin (VITAMIN B-12) injection 1,000 mcg  1,000 mcg intramuscular q30 days    glucose chewable tablet 16-32 g of dextrose  16-32 g of dextrose oral PRN    Or    dextrose 40 % oral gel 15-30 g of dextrose  15-30 g of dextrose oral PRN    Or    glucagon (GLUCAGEN) injection 1 mg  1 mg intramuscular PRN    Or    dextrose 50 % in water (D50) injection 12.5 g  25 mL intravenous PRN    furosemide (LASIX) tablet 20 mg  20 mg oral Daily    melatonin ODT 3 mg  3 mg oral Nightly    metoprolol succinate XL (TOPROL-XL) 24 hr ER tablet 100 mg  100 mg oral Daily    ondansetron (ZOFRAN) injection 4 mg  4 mg intravenous q8h PRN    pantoprazole (PROTONIX) tablet,delayed release (DR/EC) 40 mg  40 mg oral Nightly    rivaroxaban (XARELTO) tablet 20 mg  20 mg oral Daily with dinner    senna (SENOKOT) tablet 1 tablet  1 tablet oral 2x daily PRN    senna (SENOKOT) tablet 2 tablet  2 tablet oral BID       Current Medications:  Current Facility-Administered Medications   Medication Dose Route Frequency Provider Last Rate Last Admin     acetaminophen (TYLENOL) tablet 650 mg  650 mg oral q4h PRN Yousfu Meza MD        albuterol HFA 90 mcg/actuation inhaler 2 puff  2 puff inhalation q8h PRN Yousuf Meza MD        albuterol nebulizer solution 2.5 mg  2.5 mg nebulization q6h PRN Yousuf Meza MD        alum-mag hydroxide-simeth (MAALOX) 200-200-20 mg/5 mL suspension 30 mL  30 mL oral q4h PRN Yousuf Meza MD        atorvastatin (LIPITOR) tablet 20 mg  20 mg oral Daily (6p) Yousuf Meza MD   20 mg at 11/24/24 1740    bisacodyL (DULCOLAX) 10 mg suppository 10 mg  10 mg rectal Daily PRN Yousuf Meza MD        cyanocobalamin (VITAMIN B-12) injection 1,000 mcg  1,000 mcg intramuscular q30 days Yousuf Meza MD   1,000 mcg at 11/23/24 0845    glucose chewable tablet 16-32 g of dextrose  16-32 g of dextrose oral PRN Yousuf Meza MD        Or    dextrose 40 % oral gel 15-30 g of dextrose  15-30 g of dextrose oral PRN Yousuf Meza MD        Or    glucagon (GLUCAGEN) injection 1 mg  1 mg intramuscular PRN Yousuf Meza MD        Or    dextrose 50 % in water (D50) injection 12.5 g  25 mL intravenous PRN Yousuf Meza MD        furosemide (LASIX) tablet 20 mg  20 mg oral Daily Yousuf Meza MD   20 mg at 11/25/24 0909    melatonin ODT 3 mg  3 mg oral Nightly Yousuf Meza MD   3 mg at 11/24/24 2228    metoprolol succinate XL (TOPROL-XL) 24 hr ER tablet 100 mg  100 mg oral Daily Yousuf Meza MD   100 mg at 11/25/24 0909    ondansetron (ZOFRAN) injection 4 mg  4 mg intravenous q8h PRN Yousuf Meza MD        pantoprazole (PROTONIX) tablet,delayed release (DR/EC) 40 mg  40 mg oral Nightly Yousuf Meza MD   40 mg at 11/24/24 2228    rivaroxaban (XARELTO) tablet 20 mg  20 mg oral Daily with dinner Fany Malave DO   20 mg at 11/24/24 1740    senna (SENOKOT) tablet 1 tablet  1 tablet oral 2x daily PRN Yousuf Meza MD        senjabari (SENOKOT) tablet 2 tablet  2  tablet oral BID Yousuf Meza MD   2 tablet at 11/25/24 0909         Last documented Vital Signs:  Vitals    11/24/24 2302 11/25/24 0815 11/25/24 0909 11/25/24 1530   BP: 134/72 (!) 166/76 (!) 142/83 (!) 125/96   Pulse:  76 84 79   Resp: 16 14  14   Temp: 36.6 °C (97.8 °F) 36.1 °C (97 °F)  36.1 °C (97 °F)   SpO2: 96% 96%  96%       Labs:  CBC Results         11/25/24 11/24/24 11/22/24     0526 0554 2351    WBC 11.84 11.66 10.78    RBC 4.18 4.16 4.16    HGB 12.3 12.1 12.2    HCT 37.1 37.1 36.7    MCV 88.8 89.2 88.2    MCH 29.4 29.1 29.3    MCHC 33.2 32.6 33.2     293 279          Microbiology Results       Procedure Component Value Units Date/Time    Blood Culture Blood, Venous [[id number]]  (Normal) Collected: 11/07/24 1942    Specimen: Blood, Venous Updated: 11/11/24 2300     Culture No growth at 96 hours    Blood Culture Blood, Venous [[id number]]  (Normal) Collected: 11/07/24 1942    Specimen: Blood, Venous Updated: 11/11/24 2300     Culture No growth at 96 hours    SARS-COV-2 (COVID-19)/ FLU A/B, AND RSV, PCR Nasopharynx [[id number]]  (Normal) Collected: 11/07/24 1527    Specimen: Nasopharyngeal Swab from Nasopharynx Updated: 11/07/24 1614     SARS-CoV-2 (COVID-19) Negative     Influenza A Negative     Influenza B Negative     Respiratory Syncytial Virus Negative    Narrative:      Testing performed using real-time PCR for detection of COVID-19. EUA approved validation studies performed on site.               Imaging:  I have reviewed the imaging from the last 24 hours    Objective     -Vascular: DP pulses are palpable B/L. PT pulses are palpable B/L.   Marked pitting edema noted to the LLE.  Capillary refill time is less than 3 seconds B/L.   Skin temperature is warm to warm from leg to toes B/L.Pedal hair is not noted B/L.    -Ortho: + active df/pf of ankle. MMT 5/5 in all planes tested.  Pain on palpation over the lateral nail border of the hallux on the left foot.  -Neuro:Light touch and protective  "sensation is diminished.  -Derm: LLE: There are some small scabs and abrasions noted to the upper lateral aspect of the LLE leg.  No drainage appreciated.  No malodor appreciated.  No fluctuance or crepitus appreciated to the wound or the surrounding of the wound.  No purulence appreciated.  Patient has ingrown incurvated nails noted to the LLE.  RLE: No open lesions.  Elongated, dystrophic, hypertrophic nails to the RLE.     See media tab for clinical pictures.     ASSESSMENT/PLAN:  Mr. Smith is a 88 y.o. male being consulted for LLE ingrown nail.    -Plan to  -Patient was assessed, treated, and evaluated with all questions and concerns answered.  -Nails were trimmed using a nail nipper without incidence x 10 to bilateral feet.  -Consult was discussed with primary team who confirmed that the LLE \"growth\" was referring to the hallux ingrown nail.  -Podiatry will sign off at this time.  Please feel free to reach out if there is anything else we can assist with.  -Patient may weight bear to B/L LE.   -Rest of care per primary team.  -Please contact on call podiatry resident with any questions or concerns.     Yessenia Diaz PGY-1  #[id number]    "

## 2024-11-25 NOTE — PROGRESS NOTES
Department of Hospital Medicine  Daily Progress Note       SUBJECTIVE   Interval History: Patient seen and examined at bedside. NAEO. Denies SOB, cp, n/v, abd pain.     Discussed with IR - no need for IR evaluation if the pleurx catheter is draining.      OBJECTIVE      Vital signs in last 24 hours:  Temp:  [36.1 °C (97 °F)-37.1 °C (98.7 °F)] 37.1 °C (98.7 °F)  Heart Rate:  [76-84] 82  Resp:  [14-16] 14  BP: (115-166)/(63-96) 115/63    Intake/Output Summary (Last 24 hours) at 11/25/2024 1659  Last data filed at 11/25/2024 1645  Gross per 24 hour   Intake 310 ml   Output 425 ml   Net -115 ml       PHYSICAL EXAMINATION      Physical Exam    General: chronically ill appearing, appears comfortable, NAD, aaox1  HEENT: EOMI, NCAT, sclerae anicteric, no conjunctival pallor, no stridor  CV: RRR, no mrg  Pulm: coarse BS, +pleurx  GI: +colostomy, +BS, abd soft, nontender to palpation, nondistended  Extremities: no b/l LE edema, NT, warm, perfused  Neuro: CN 2-12 grossly intact by nonfocal exam  Psych: normal mood and affect. No psychomotor agitation       LINES, CATHETERS, DRAINS, AIRWAYS, AND WOUNDS   Lines, Drains, Airways, Wounds:  Peripheral IV (Adult) 11/23/24 Anterior;Left;Upper Arm (Active)   Number of days: 1       Wound Coccyx (Active)   Number of days: 1       Wound Skin Tear Left;Lower;Posterior;Proximal Arm (Active)   Number of days: 1       Comments:      LABS / IMAGING / TELE      I personally rvd the labs, radiographic reports and EKG findings myself.     Labs  Results from last 7 days   Lab Units 11/25/24  0526 11/24/24  0554 11/22/24  2351   WBC K/uL 11.84* 11.66* 10.78*   HEMOGLOBIN g/dL 12.3* 12.1* 12.2*   HEMATOCRIT % 37.1* 37.1* 36.7*   PLATELETS K/uL 297 293 279       .  Results from last 7 days   Lab Units 11/25/24  0526 11/24/24  0554 11/22/24  2351   SODIUM mEQ/L 140 140 135*   POTASSIUM mEQ/L 3.8 3.1* 3.3*   CHLORIDE mEQ/L 105 104 102   CO2 mEQ/L 30 27 26   BUN mg/dL 16 12 13   CREATININE mg/dL  0.5* 0.5* 0.5*   CALCIUM mg/dL 8.8 8.4* 8.5*   ALBUMIN g/dL  --   --  2.7*   BILIRUBIN TOTAL mg/dL  --   --  0.7   ALK PHOS IU/L  --   --  106   ALT IU/L  --   --  18   AST IU/L  --   --  20   GLUCOSE mg/dL 116* 92 99       Results from last 7 days   Lab Units 11/24/24  0554   MAGNESIUM mg/dL 1.9                 Imaging  X-RAY CHEST 1 VIEW    Result Date: 11/23/2024  Narrative: CLINICAL HISTORY: blood from pleurx cath on R side, has covid COMPARISON: November 13, 2024 COMMENT: TECHNIQUE: Single frontal view of the chest was performed. LINES/TUBES/HARDWARE: Monitoring leads/wires overlie the patient. Right-sided Pleurx catheter in similar position. LUNGS AND PLEURA: See Impression. CARDIOMEDIASTINUM: Unchanged noting cardiomegaly and aortic atherosclerosis. SKELETON/OTHER: Degenerative changes of the spine and shoulders.     Impression: IMPRESSION: 1.  Loculated right basilar hydropneumothorax, slightly smaller from prior with Pleurx in place.. 2.  Residual right lower and middle lobe consolidation which is similar.     CT ANGIOGRAPHY CHEST PULMONARY EMBOLISM WITH IV CONTRAST    Result Date: 11/23/2024  Narrative: EXAM: CT ANGIOGRAPHY CHEST PULMONARY EMBOLISM W IV CONTRAST CLINICAL HISTORY: h/o PE, blood in pleurx cath, COVID, ro PE COMPARISON: CT CHEST W IV CONTRAST with report dated 11/7/2024 6:44 PM. TECHNIQUE: CT chest was performed using thin section reconstructions. Contrast injection rate and acquisition timing were optimized for opacification of the pulmonary arteries to evaluate for pulmonary embolism. 3-D RECONSTRUCTION: Additional 3-D/MIP reconstructions were performed and reviewed. CT DOSE:  One or more dose reduction techniques (e.g. automated exposure control, adjustment of the mA and/or kV according to patient size, use of iterative reconstruction technique) utilized for this examination. CONTRAST: 100mL of iopamidoL (ISOVUE-370) 370 mg iodine /mL (76 %) injection 100 mL COMMENT: PULMONARY ARTERIES:  Image Quality is: High noting the following limitations: Respiratory motion artifact No pulmonary embolism is identified to the proximal subsegmental level. AIRWAYS, LUNGS AND PLEURA: Layering secretions are present within the trachea and right lower lobe airways. Interval decrease in size of a loculated right hydropneumothorax following Pleurx catheter placement. Pleural effusion contains hyperdense components compatible with hemorrhage. Redemonstration of diffuse nodular pleural thickening. Complete right lower lobe consolidation present, with patchy groundglass opacity and septal thickening in the aerated portions. Hypoenhancement within a portion of the right basal segments with intraparenchymal gas likely related to infection. Dependent right upper lobe atelectasis, and near complete middle lobe consolidation. Trace left effusion with dependent left lower lobe atelectatic changes. A couple subcentimeter nodules present in the left upper lobe measuring up to 2 mm on series 3 image 157. CARDIOMEDIASTINUM: Cardiomegaly.  Multivessel coronary atherosclerosis.  Aortic annular and valvular calcification. 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 Tortuous great vessels. LYMPH NODES: No thoracic lymphadenopathy IMAGED THYROID: Within normal limits IMAGED ESOPHAGUS: Nondilated UPPER ABDOMEN: Bilateral renal cortical scarring. Bilateral renal sinus and cortical cysts. CHEST WALL: Within normal limits BONES: Osteopenia with multilevel compression fractures including T7, T8, and T12 unchanged.     Impression: IMPRESSION: 1.  No acute pulmonary embolism. 2.  Interval decrease in size of loculated right hydropneumothorax following pleural catheter placement. Effusion contains hyperdense components compatible with hemorrhage/hemothorax. 3.  Diffuse lobular right pleural thickening again suspicious for malignancy. 4.  Aspirated secretions in the trachea and right lower lobe airways. 5.  Near  complete right lower lobe consolidation with changes likely reflecting combination of atelectasis and developing necrotizing pneumonia (potentially from aspiration given above). Complete middle lobe consolidation. 6.  Trace left effusion with underlying atelectasis. 7.  Unchanged thoracic compression deformities.     X-RAY CHEST 1 VIEW    Result Date: 11/13/2024  Narrative: CLINICAL HISTORY: Reassess effusion. AP portable view of the chest. COMPARISON: 11/10/2024 COMMENT: The cardiomediastinal silhouette is stable in appearance. The right chest tube has not significantly changed in position. A right hydropneumothorax is redemonstrated. The pleural fluid has slightly increased. Parenchymal opacity in the right lower lung has not significantly changed. There are degenerative changes of the spine.     Impression: IMPRESSION: As above    X-RAY CHEST 1 VIEW    Result Date: 11/10/2024  Narrative: CLINICAL HISTORY: follow up pleurex catheter placement     Impression: IMPRESSION: Interval right chest tube placement terminating over the medial aspect of the right mid to lower hemithorax. Redemonstration of a right hydropneumothorax with slightly intervally decreased pleural fluid and similar appearance to slightly intervally increased pleural air projecting over the inferior right hemithorax. COMMENT: Comparison: Chest x-ray and CT chest 11/7/2024. Technique: A single frontal AP portable upright projection of the chest was obtained. FINDINGS: There has been interval placement of a right-sided chest tube terminating over the medial aspect of the right mid to lower hemithorax. Again noted is a right hydropneumothorax with slight interval decrease in the amount of pleural fluid and similar appearance to slight interval increase in the amount of pleural air projecting over the inferior right hemithorax. The left lung remains clear. The cardiomediastinal silhouette is unchanged. The upper abdomen is unremarkable. There is no  acute osseous abnormality.    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 Patient recently had a pleural pigtail placement on 10/16/2024 for treatment with TPA/dornase which was removed on 10/23/2024. He presents with worsening shortness of breath and persistent right hydropneumothorax in the setting of trapped lung. COMPARISON: CT chest 11/7/2024 FACULTY: KARAN Obregon MD MEDICATIONS: Lidocaine 1% with and without epinephrine intradermal, fentanyl 50 mcg CONTRAST: None RADIATION: 0.2 minutes, 3.4 mGy PROCEDURE SUMMARY: Preprocedure timeout and wedged supine positioning on the angiography table. Preliminary ultrasound of the right hemithorax identified the known hydropneumothorax. The planned pleural puncture site and tunneled tract was marked then evaluated with grayscale and doppler ultrasound to ensure the absence of large intervening vessels. The anterolateral chest was prepped and draped in the usual sterile fashion. Initiation and maintenance of systemic analgesia by IR nursing under personal physician supervision. The planned access sites and dermal tract were anesthetized with 1% lidocaine with and without epinephrine. A 5 Thai Yueh needle was introduced into the pleural space under US-guidance through which a 0.035 Amplatz guidewire was placed. A skin nick was made ~8 cm anteroinferiorly and the 15.5Fr Pleur-X catheter tunneled to the pleural puncture site. The Pleur-X was trimmed and inserted into the pleural space via the provided  16Fr peel-away sheath (which was then removed). Serosanguineous fluid returned from the drain which was placed to vacuum canister drainage. A total of 400 mL was removed before capping the catheter. The skin overlying the pleural puncture site was closed with resorbable 2-0 Vicryl sutures and skin glue superficially. The catheter was sutured to the adjacent skin with non-resorbable 2-0 monofilament. Sterile dressings were applied. The patient tolerated the procedure well and was transported to the recovery area without incident. DEVICE: Single lumen 15.5 Fr tunneled drain (Pleur-X) SAMPLES: None COMPLICATIONS: None immediate EBL: Minimal (1-10 mL)     Impression: IMPRESSION: 1. Uncomplicated right tunneled pleural drain (Pleur-X) placement followed by 400 mL large volume thoracentesis of serosanguineous fluid.    CT CHEST WITH IV CONTRAST    Result Date: 11/7/2024  Narrative: CLINICAL HISTORY: Pleural effusion, malignancy suspected COMMENT:  Computed tomography of the chest is performed. CT Dose:  One or more dose reduction techniques (e.g. automated exposure control, adjustment of the mA and/or kV according to patient size, use of iterative reconstruction technique) utilized for this examination. Contrast: 75 mL Isovue 370 nonionic IV contrast Comparison studies:   10/14/2024 CTA chest FINDINGS: LUNG PARENCHYMA AND PLEURA: Redemonstration of nodular right pleural thickening suspicious for malignancy.  Right hydropneumothorax.  Fluid volume is similar to the prior study from one month ago.  However, the pneumothorax component is new. Potentially loculated 5.6 cm air and fluid component medially in the right mid hemithorax.  Right lower lobe and right middle lobe volume loss and consolidation similar to prior.  New posterior right upper lobe atelectasis. Trace left pleural effusion.  No new left lung consolidation. JENINFER AND MEDIASTINUM: Stable HEART AND PERICARDIUM: Cardiomegaly.  Coronary artery calcifications.  No  pericardial effusion. CHEST WALL: Unremarkable AXILLA: Unremarkable BONY ARCHITECTURE: Stable UPPER ABDOMEN: Tiny nonobstructing right upper pole renal calculus.  Partially imaged hypodensities in the renal sinuses, consistent with parapelvic cysts and/or pelvocaliectasis.     Impression: IMPRESSION: 1.  Redemonstration of nodular right pleural thickening consistent with malignancy.  Large right hydropneumothorax.  Fluid component is similar to the recent prior study.  Pneumothorax component is new.  There is a potentially loculated 5.6 cm air and fluid component medially in the right mid hemithorax. 2.  Slightly increased posterior right upper lobe atelectasis compared to prior. No significant change in right middle and right lower lobe volume loss and consolidation. Finding:    New or increased pneumothorax   Acuity: Critical  Status:  CLOSED Critical read back was performed and results were read back by Dr. Acosta,  on 11/7/2024 6:34 PM .     X-RAY CHEST 1 VIEW    Result Date: 11/7/2024  Narrative: CLINICAL HISTORY: Shortness of breath.     Impression: IMPRESSION: Small right effusion. COMMENT: AP radiograph the chest reveals a small right effusion. Previously noted right basal chest tube has been removed. There is patchy right basal consolidation. Left lung is well-aerated. There is no pneumothorax. Comparison is made to previous study dated 10/21/2024    IR CHEST TUBE REMOVAL(BEDSIDE)    Result Date: 10/25/2024  Narrative: CLINICAL HISTORY: Resolution of loculated right pleural effusion following pleural pigtail catheter placement and TPA dornase treatment. COMMENT: A request is received to remove the right pleural pigtail catheter as there has been decreasing output from the chest tube following TPA dornase treatment. The patient had had a previous Pleurx catheter which was removed secondary to low output. His effusion may recur and consideration for repeat Pleurx catheter if he becomes symptomatic. At  bedside, the patient is correctly identified. No air leak is seen in the pleura vac placed to waterseal. He is turned onto his left side. The dressing is removed from the catheter. The areas cleansed with ChloraPrep. The retention suture removed and on expiration, the catheter transected and removed in its entirety. A sterile Vaseline gauze dressing is placed over the site. The patient tolerated the procedure well. No imaging. This service rendered by Melissa Tsang PA-C.     Impression: IMPRESSION: Successful bedside removal of right pleural pigtail catheter following TPA dornase treatments for loculated effusion. If effusion recurs and patient's symptomatic, could consider repeat Pleurx catheter placement. I certify that I have personally reviewed this examination and agree with Mleissa Tsang's report. Cj Yen M.D.        ECG/Telemetry  Not on tele      ASSESSMENT AND PLAN           Assessment & Plan  Hemothorax on right  - Sent from nursing home for concerns for significant clots during weekly drain with c/f possible blockage  - CTA Chest with interval decrease in size of loculated right hydropneumothorax  - Discussed with IR. If pleurx is draining properly, there is no need for an IR evaluation.   - Appreciate thoracic surgery recs - no acute interventions. F/u with outpt thoracic surgery  Abnormal findings on diagnostic imaging of lung  - CTA Chest with aspirated secretions in the trachea and RLL. There is a diffuse lobular right pleural thickening noted again suspicious for a malignancy  - Currently without any evidence of fevers, or hypoxia  - Mild leukocytosis with seems to have been elevated over the 4 weeks  - Seems to be at baseline per information obtained  - Appreciate Pulm recs - not convinced about a PNA and recs monitoring off Abx.   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  - Has been on rivaroxaban for 6-month and changed to prophylactic dose  - Subsequently has had DVT in 2020 & possibly has been on long-term rivaroxaban since then  - Resumed rivaroxaban   Normocytic anemia  - His hemoglobin seems to be at baseline  - Continue to monitor as needed  HLD (hyperlipidemia)  - Continue statin    VTE Assessment: Padua VTE Score: 4  VTE Prophylaxis Plan: Current anticoagulants:  rivaroxaban (XARELTO) tablet 20 mg, oral, Daily with dinner      Code Status: Full Code  Estimated Discharge Date: 11/23/2024  Disposition Planning: pending clinical course     Fany Malave DO  Pager [phone]  11/25/2024

## 2024-11-25 NOTE — PLAN OF CARE
Care Coordination Discharge Plan Note     Discharge Needs Assessment  Concerns to be Addressed: care coordination/care conferences, discharge planning  Current Discharge Risk:      Anticipated Discharge Plan  Anticipated Discharge Disposition: skilled nursing facility, acute rehab/Inpatient Rehab Facility  Type of Skilled Nursing Care Services: OT, PT, nursing      Patient Choice  Offered/Gave Vendor List: no  Patient's Choice of Community Agency(s):           ---------------------------------------------------------------------------------------------------------------------    Interdisciplinary Discharge Plan Review:  Participants:nursing, social work/services    Concerns Comments: Admitted from [facility].    Discharge Plan:   Disposition/Destination: Skilled Nursing Facility - Other  / Skilled Nursing Facility  Discharge Facility:   Destination - Admitted Since 11/22/2024       Service Provider Services Address Phone Fax Patient Preferred Last Updated    [facility] [address] [place] [id number] [phone] -- Jonna Suazo MSW 11/25/2024 1506            Discharge Transportation:  Is Out of Hospital DNR needed at Discharge: no  Does patient need discharge transport? Yes     Chart reviewed and case discussed in care rounds. IR eval of pleurx cath. PT/OT eval ordered. Patient will need new insurance auth to return to [facility]. SW placed return referral for [facility] via ecin. EDUARDO to follow.     DCP  Return to [facility]  Needs insurance auth

## 2024-11-25 NOTE — ASSESSMENT & PLAN NOTE
- Sent from nursing home for concerns for significant clots during weekly drain with c/f possible blockage  - CTA Chest with interval decrease in size of loculated right hydropneumothorax  - Discussed with IR. If pleurx is draining properly, there is no need for an IR evaluation.   - Appreciate thoracic surgery recs - no acute interventions. F/u with outpt thoracic surgery

## 2024-11-25 NOTE — NURSING NOTE
PleurX catheter drained 325cc bloody in color. Patient tolerated well. New dressing placed with gauze and Tegaderm. Per Dr. Malave and IR, nursing will drain every 3-5 days no more than 1000cc at once.

## 2024-11-26 LAB
ANION GAP SERPL CALC-SCNC: 6 MEQ/L (ref 3–15)
BUN SERPL-MCNC: 20 MG/DL (ref 7–25)
CALCIUM SERPL-MCNC: 8.5 MG/DL (ref 8.6–10.3)
CHLORIDE SERPL-SCNC: 104 MEQ/L (ref 98–107)
CO2 SERPL-SCNC: 28 MEQ/L (ref 21–31)
CREAT SERPL-MCNC: 0.5 MG/DL (ref 0.7–1.3)
EGFRCR SERPLBLD CKD-EPI 2021: >60 ML/MIN/1.73M*2
ERYTHROCYTE [DISTWIDTH] IN BLOOD BY AUTOMATED COUNT: 14.8 % (ref 11.6–14.4)
GLUCOSE SERPL-MCNC: 129 MG/DL (ref 70–99)
HCT VFR BLD AUTO: 34.4 % (ref 40.1–51)
HGB BLD-MCNC: 11.8 G/DL (ref 13.7–17.5)
MCH RBC QN AUTO: 30.7 PG (ref 28–33.2)
MCHC RBC AUTO-ENTMCNC: 34.3 G/DL (ref 32.2–36.5)
MCV RBC AUTO: 89.6 FL (ref 83–98)
PLATELET # BLD AUTO: 267 K/UL (ref 150–350)
PMV BLD AUTO: 8.7 FL (ref 9.4–12.4)
POTASSIUM SERPL-SCNC: 3.7 MEQ/L (ref 3.5–5.1)
RBC # BLD AUTO: 3.84 M/UL (ref 4.5–5.8)
SODIUM SERPL-SCNC: 138 MEQ/L (ref 136–145)
WBC # BLD AUTO: 11.84 K/UL (ref 3.8–10.5)

## 2024-11-26 PROCEDURE — 12000000 HC ROOM AND CARE MED/SURG

## 2024-11-26 PROCEDURE — 97530 THERAPEUTIC ACTIVITIES: CPT | Mod: GO

## 2024-11-26 PROCEDURE — 99232 SBSQ HOSP IP/OBS MODERATE 35: CPT | Performed by: STUDENT IN AN ORGANIZED HEALTH CARE EDUCATION/TRAINING PROGRAM

## 2024-11-26 PROCEDURE — 97166 OT EVAL MOD COMPLEX 45 MIN: CPT | Mod: GO

## 2024-11-26 PROCEDURE — 80048 BASIC METABOLIC PNL TOTAL CA: CPT | Performed by: STUDENT IN AN ORGANIZED HEALTH CARE EDUCATION/TRAINING PROGRAM

## 2024-11-26 PROCEDURE — 63700000 HC SELF-ADMINISTRABLE DRUG: Performed by: INTERNAL MEDICINE

## 2024-11-26 PROCEDURE — 85027 COMPLETE CBC AUTOMATED: CPT | Performed by: STUDENT IN AN ORGANIZED HEALTH CARE EDUCATION/TRAINING PROGRAM

## 2024-11-26 PROCEDURE — 36415 COLL VENOUS BLD VENIPUNCTURE: CPT | Performed by: STUDENT IN AN ORGANIZED HEALTH CARE EDUCATION/TRAINING PROGRAM

## 2024-11-26 PROCEDURE — 97162 PT EVAL MOD COMPLEX 30 MIN: CPT | Mod: GP

## 2024-11-26 PROCEDURE — 63700000 HC SELF-ADMINISTRABLE DRUG: Performed by: STUDENT IN AN ORGANIZED HEALTH CARE EDUCATION/TRAINING PROGRAM

## 2024-11-26 PROCEDURE — G0378 HOSPITAL OBSERVATION PER HR: HCPCS

## 2024-11-26 RX ADMIN — PANTOPRAZOLE SODIUM 40 MG: 40 TABLET, DELAYED RELEASE ORAL at 21:46

## 2024-11-26 RX ADMIN — RIVAROXABAN 20 MG: 20 TABLET, FILM COATED ORAL at 17:37

## 2024-11-26 RX ADMIN — Medication 3 MG: at 21:46

## 2024-11-26 RX ADMIN — FUROSEMIDE 20 MG: 20 TABLET ORAL at 09:28

## 2024-11-26 RX ADMIN — METOPROLOL SUCCINATE 100 MG: 100 TABLET, EXTENDED RELEASE ORAL at 09:28

## 2024-11-26 RX ADMIN — SENNOSIDES 2 TABLET: 8.6 TABLET, FILM COATED ORAL at 09:28

## 2024-11-26 RX ADMIN — ATORVASTATIN CALCIUM 20 MG: 20 TABLET, FILM COATED ORAL at 17:37

## 2024-11-26 RX ADMIN — SENNOSIDES 2 TABLET: 8.6 TABLET, FILM COATED ORAL at 21:46

## 2024-11-26 ASSESSMENT — COGNITIVE AND FUNCTIONAL STATUS - GENERAL
STANDING UP FROM CHAIR USING ARMS: 2 - A LOT
HELP NEEDED FOR PERSONAL GROOMING: 3 - A LITTLE
MOVING TO AND FROM BED TO CHAIR: 2 - A LOT
EATING MEALS: 3 - A LITTLE
HELP NEEDED FOR BATHING: 2 - A LOT
MOVING TO AND FROM BED TO CHAIR: 2 - A LOT
CLIMB 3 TO 5 STEPS WITH RAILING: 1 - TOTAL
DRESSING REGULAR LOWER BODY CLOTHING: 2 - A LOT
CLIMB 3 TO 5 STEPS WITH RAILING: 1 - TOTAL
AFFECT: FLAT/BLUNTED AFFECT;LOW AROUSAL/LETHARGIC
TOILETING: 1 - TOTAL
MOVING TO AND FROM BED TO CHAIR: 2 - A LOT
WALKING IN HOSPITAL ROOM: 2 - A LOT
DRESSING REGULAR UPPER BODY CLOTHING: 3 - A LITTLE
WALKING IN HOSPITAL ROOM: 1 - TOTAL
STANDING UP FROM CHAIR USING ARMS: 2 - A LOT
WALKING IN HOSPITAL ROOM: 2 - A LOT
STANDING UP FROM CHAIR USING ARMS: 2 - A LOT
CLIMB 3 TO 5 STEPS WITH RAILING: 1 - TOTAL
AFFECT: FLAT/BLUNTED AFFECT;LOW AROUSAL/LETHARGIC

## 2024-11-26 NOTE — PLAN OF CARE
Care Coordination Discharge Plan Note     Discharge Needs Assessment  Concerns to be Addressed: care coordination/care conferences, discharge planning  Current Discharge Risk:      Anticipated Discharge Plan  Anticipated Discharge Disposition: skilled nursing facility  Type of Skilled Nursing Care Services: OT, PT, nursing      Patient Choice  Offered/Gave Vendor List: yes  Patient's Choice of Community Agency(s): [facility]    Patient and/or patient guardian/advocate was made aware of their right to choose a provider. A list of eligible providers was presented and reviewed with the patient and/or patient guardian/advocate in written and/or verbal form. The list delineates providers in the patient’s desired geographic area who are participating in the Medicare program and/or providers contracted with the patient’s primary insurance. The Medicare list and quality ratings were obtained from the Medicare.gov [medicare.gov] website.    ---------------------------------------------------------------------------------------------------------------------    Interdisciplinary Discharge Plan Review:  Participants:nursing, social work/services, physician, physical therapy, occupational therapy    Concerns Comments: [facility] on dc. Auth pending. BLS p/u requested for 11/27 at 1500. Trip # [id number].    Discharge Plan:   Disposition/Destination: Skilled Nursing Facility - Other  / Skilled Nursing Facility  Discharge Facility:   Destination - Admitted Since 11/22/2024       Service Provider Services Address Phone Fax Patient Preferred Last Updated    [facility] [address] [place] [phone] [phone] -- Jonna Suazo MSW 11/25/2024 1506            Discharge Transportation:  Is Out of Hospital DNR needed at Discharge: no  Does patient need discharge transport? Yes  Discharge Transportation Vendor: [email or web address] ([phone])  Type of Transportation: basic life support  What day  is the transport expected?: 11/27/24  What time is the transport expected?: 1500     Patient medically stable for discharge. PT/OT saw patient this morning. EDUARDO spoke with Nicole at [facility].   They have bed available for pt. They have initiated insurance auth and reference number currently pending. Per Nicole, insurance auth is not anticipated to return today. EDUARDO spoke with patient's friend Eliezer. EDUARDO reviewed current plan. He is agreeable to dc plan and aware we are awaiting insurance auth. EDUARDO scheduled BLS p/u on 11/27 for 1500. Awaiting on confirmed time. Treatment team updated.     DCP  [facility] - pending insurance auth  Bls p/u requested for 11/27 @ 1500 - time to be confirmed  Trip # [id number]

## 2024-11-26 NOTE — PROGRESS NOTES
Physical Therapy -  Initial Evaluation     Patient: Dave Arredondo  Location: [facility]  MRN: [id number]  Today's date: 11/26/2024    HISTORY OF PRESENT ILLNESS     Dave is a 88 y.o. male admitted on 11/22/2024 with Hemothorax on right [J94.2]  Bloody pleural effusion [J94.2]. Principal problem is Hemothorax on right.    Past Medical History  Dave has a past medical history of Coronary artery disease, DVT (deep venous thrombosis) (CMS/HCC), Hypertension, Lipid disorder, Pleural effusion, Pulmonary embolism (CMS/HCC), Rectal cancer (CMS/HCC), and SBO (small bowel obstruction) (CMS/HCC).    History of Present Illness   Per H&P:  88 y.o. 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   PRIOR LEVEL OF FUNCTION AND LIVING ENVIRONMENT     Prior Level of Function      Flowsheet Row Most Recent Value   Dominant Hand right   Ambulation independent   Transferring independent   Toileting independent   Bathing independent   Dressing independent   Eating independent   Driving/Transportation    Communication understands/communicates without difficulty   Prior Level of Function Comment Prior to recent hospitalizations patient independent however pt currently admitted from SNF   Assistive Device Currently Used at Home shower chair             Prior Living Environment      Flowsheet Row Most Recent Value   People in Home facility resident   Current Living Arrangements short-term skilled nursing facility   Home Accessibility wheelchair  accessible   Living Environment Comment Per EMR pt typically lives w/ roomates on 2nd floor with stairs to access, no elevator. Pt currently admitted from SNF          VITALS AND PAIN     PT Vitals      Date/Time Pulse HR Source SpO2 Pt Activity O2 Therapy BP BP Location BP Method Pt Position [facility]   11/26/24 0839 82 Monitor 97 % At rest None (Room air) 154/85 Right upper arm Automatic Lying BET   11/26/24 0845 95 Monitor 95 % At rest None (Room air) 107/56 Right upper arm Automatic Sitting BET   11/26/24 0847 -- -- -- -- -- 148/85 Right upper arm Automatic Sitting BET          PT Pain      Date/Time Pain Type Rating: Rest Rating: Activity [facility]   11/26/24 0839 Pain Assessment 0 - no pain 0 - no pain BET             Objective   OBJECTIVE     Start time:  0832  End time:  0853  Session Length: 21 min  Mode of Treatment: co-treatment  Reason for co-treatment: expedite d/c planning    General Observations  Patient received supine, in bed. He was agreeable to therapy, no issues or concerns identified by nurse prior to session. PCT in room for patient hygiene    Precautions: fall, enhanced contact and droplet (activity as tolerated)       Limitations/Impairments: safety/cognitive      PT Eval and Treat - 11/26/24 0832          Cognition    Orientation Status oriented to;person   +time=month & year    Affect/Mental Status flat/blunted affect;low arousal/lethargic     Follows Commands follows one-step commands;increased processing time needed;verbal cues/prompting required;initiation impaired;repetition of directions required     Comment, Cognition cooperative but noting fatigue during session, eyes closed at times, appearing somewhat confused but able to follow one step commands with inc time and cues        Sensory Assessment    Sensory Assessment sensation intact, lower extremities        Lower Extremity Assessment    General Observations Assessed functionally BLE ROM WFL and strength at least 3/5        Bed Mobility    Bed  Mobility Activities right;supine to sit     Rock moderate assist (50-74% patient effort);2 person assist     Safety/Cues increased time to complete;verbal cues;tactile cues;sequencing;initiation;increased time to complete;verbal cues;tactile cues;sequencing;initiation     Assistive Device draw sheet;head of bed elevated     Comment able to advance LEs toward HOB w/ inc time and cues but req assist to upright trunk        Mobility Belt    Mobility Belt Used During Session no - medical contraindication     Reason Mobility Belt Not Used colostomy bag        Sit/Stand Transfer    Surface edge of bed;chair with arm rests     Rock minimum assist (75% or more patient effort);2 person assist;moderate assist (50-74% patient effort)     Assistive Device walker, front-wheeled     Transfer Comments From bed and  to chair then to/from chair. Overall Sade x2 except 1st stand to sit into chair req'd modA x2 d/t poor eccentric control and sitting prior to fully reaching chair        Surface-to-Surface Transfers    Transfer Location bed to chair     Transfer Technique stand step     Rock minimum assist (75% or more patient effort);2 person assist     Safety/Cues increased time to complete;verbal cues;technique     Assistive Device walker, front-wheeled     Transfer Comments to the right        Gait Training    Rock, Gait minimum assist (75% or more patient effort);2 person assist     Assistive Device walker, front-wheeled     Distance in Feet 2 feet     Pattern step-to     Deviations/Abnormal Patterns step length decreased;stride length decreased;gait speed decreased     Comment short distance bed to chair, somewhat unsteady.        Balance    Static Sitting Balance WFL;sitting in chair;sitting, edge of bed     Sit to Stand Dynamic Balance moderate impairment     Static Standing Balance mild impairment;supported     Dynamic Standing Balance moderate impairment;supported     Comment, Balance RW         "Impairments/Safety Issues    Impairments Affecting Function balance;cognition;endurance/activity tolerance;strength;pain   pt stating pain \"all over\"    Functional Endurance fair(-), pt reporting fatigue and noted dizziness at EOB. BP drop initially once in chair but improved and pt reports dizziness decreased by end of session     Safety Issues Affecting Function insight into deficits/self-awareness;problem-solving;ability to follow commands                                    Education Documentation  Joint Mobility/Strength, taught by Nel Saldivar, PT at 11/26/2024 10:46 AM.  Learner: Patient  Readiness: Acceptance  Method: Explanation  Response: Verbalizes Understanding, Needs Reinforcement  Comment: Role of PT, PT POC, safety during mobility, call bell        Session Outcome  Patient reclined, in chair at end of session, chair alarm on, all needs met, call light in reach, personal items in reach (PCT present for further care). Nursing notified about patient's performance and patient's position.    AM-PAC™ - Mobility (Current Function)     Turning form your back to your side while in flat bed without using bedrails 3 - A Little   Moving from lying on your back to sitting on the side of a flat bed without using bedrails 2 - A Lot   Moving to and from a bed to a chair 2 - A Lot   Standing up from a chair using your arms 2 - A Lot   To walk in a hospital room 2 - A Lot   Climbing 3-5 steps with a railing 1 - Total   AM-PAC™ Mobility Score 12      ASSESSMENT AND PLAN     Progress Summary  [facility] 12/24. Pt rew modA x2 for bed mobility, min-modA x2 for STS and Sade x2 for transfer to chair w/ RW d/t pain as well as balance, strength, endurance & cog deficits. Dec BP once in chair that improved by end of session. He req cont'd skilled PT to progress mobility & address noted deficits in order to max fxnl independence & dec fall risk. Rec SNF.    Patient/Family Therapy Goals Statement: sleep    PT Plan      Flowsheet Row " Most Recent Value   Rehab Potential fair, will monitor progress closely at 11/26/2024 0832   Therapy Frequency 3 times/wk at 11/26/2024 0832   Planned Therapy Interventions balance training, bed mobility training, gait training, patient/family education, ROM (range of motion), strengthening, transfer training at 11/26/2024 0832            PT Discharge Recommendations      Flowsheet Row Most Recent Value   PT Recommended Discharge Disposition skilled nursing facility at 11/26/2024 0832   Anticipated Equipment Needs if Discharged Home (PT) other (see comments)  [TBD pending cont'd progress] at 11/26/2024 0832                 PT Goals      Flowsheet Row Most Recent Value   Bed Mobility Goal 1    Activity/Assistive Device bed mobility activities, all at 11/26/2024 0832   Hunt supervision required at 11/26/2024 0832   Time Frame by discharge at 11/26/2024 0832   Progress/Outcome goal ongoing at 11/26/2024 0832   Transfer Goal 1    Activity/Assistive Device sit-to-stand/stand-to-sit, bed-to-chair/chair-to-bed, stand pivot, walker, front-wheeled at 11/26/2024 0832   Hunt supervision required at 11/26/2024 0832   Time Frame by discharge at 11/26/2024 0832   Progress/Outcome goal ongoing at 11/26/2024 0832   Gait Training Goal 1    Activity/Assistive Device gait (walking locomotion), walker, front-wheeled at 11/26/2024 0832   Hunt supervision required at 11/26/2024 0832   Distance 30 at 11/26/2024 0832   Time Frame by discharge at 11/26/2024 0832   Progress/Outcome goal ongoing at 11/26/2024 0832

## 2024-11-26 NOTE — ASSESSMENT & PLAN NOTE
- Sent from nursing home for concerns for significant clots during weekly drain from his right sided pleurx with c/f possible blockage  - CTA Chest with interval decrease in size of loculated right hydropneumothorax  - Discussed with IR. If pleurx is draining properly, there is no need for an IR evaluation.   - Appreciate thoracic surgery recs - no acute interventions. F/u with outpt thoracic surgery

## 2024-11-26 NOTE — PROGRESS NOTES
Department of Hospital Medicine  Daily Progress Note       SUBJECTIVE   Interval History: Patient seen and examined at bedside chair. VALENTINE. Denies SOB, cp, n/v, abd pain.     Medically stable - pending insurance auth back to SNF.      OBJECTIVE      Vital signs in last 24 hours:  Temp:  [36.1 °C (97 °F)-37.1 °C (98.7 °F)] 36.1 °C (97 °F)  Heart Rate:  [79-96] 95  Resp:  [14-18] 14  BP: (107-154)/(56-96) 148/85    Intake/Output Summary (Last 24 hours) at 11/26/2024 1348  Last data filed at 11/26/2024 0800  Gross per 24 hour   Intake 100 ml   Output 625 ml   Net -525 ml       PHYSICAL EXAMINATION      Physical Exam    General: chronically ill appearing, appears comfortable, NAD, aaox1  HEENT: EOMI, NCAT, sclerae anicteric, no conjunctival pallor, no stridor  CV: RRR, no mrg  Pulm: coarse BS, +pleurx  GI: +colostomy, +BS, abd soft, nontender to palpation, nondistended  Extremities: no b/l LE edema, NT, warm, perfused  Neuro: CN 2-12 grossly intact by nonfocal exam  Psych: normal mood and affect. No psychomotor agitation       LINES, CATHETERS, DRAINS, AIRWAYS, AND WOUNDS   Lines, Drains, Airways, Wounds:  Peripheral IV (Adult) 11/23/24 Anterior;Left;Upper Arm (Active)   Number of days: 1       Wound Coccyx (Active)   Number of days: 1       Wound Skin Tear Left;Lower;Posterior;Proximal Arm (Active)   Number of days: 1       Comments:      LABS / IMAGING / TELE      I personally rvd the labs, radiographic reports and EKG findings myself.     Labs  Results from last 7 days   Lab Units 11/26/24  0550 11/25/24  0526 11/24/24  0554   WBC K/uL 11.84* 11.84* 11.66*   HEMOGLOBIN g/dL 11.8* 12.3* 12.1*   HEMATOCRIT % 34.4* 37.1* 37.1*   PLATELETS K/uL 267 297 293       .  Results from last 7 days   Lab Units 11/26/24  0550 11/25/24  0526 11/24/24  0554 11/22/24  2351   SODIUM mEQ/L 138 140 140 135*   POTASSIUM mEQ/L 3.7 3.8 3.1* 3.3*   CHLORIDE mEQ/L 104 105 104 102   CO2 mEQ/L 28 30 27 26   BUN mg/dL 20 16 12 13    CREATININE mg/dL 0.5* 0.5* 0.5* 0.5*   CALCIUM mg/dL 8.5* 8.8 8.4* 8.5*   ALBUMIN g/dL  --   --   --  2.7*   BILIRUBIN TOTAL mg/dL  --   --   --  0.7   ALK PHOS IU/L  --   --   --  106   ALT IU/L  --   --   --  18   AST IU/L  --   --   --  20   GLUCOSE mg/dL 129* 116* 92 99       Results from last 7 days   Lab Units 11/24/24  0554   MAGNESIUM mg/dL 1.9                 Imaging  X-RAY CHEST 1 VIEW    Result Date: 11/23/2024  Narrative: CLINICAL HISTORY: blood from pleurx cath on R side, has covid COMPARISON: November 13, 2024 COMMENT: TECHNIQUE: Single frontal view of the chest was performed. LINES/TUBES/HARDWARE: Monitoring leads/wires overlie the patient. Right-sided Pleurx catheter in similar position. LUNGS AND PLEURA: See Impression. CARDIOMEDIASTINUM: Unchanged noting cardiomegaly and aortic atherosclerosis. SKELETON/OTHER: Degenerative changes of the spine and shoulders.     Impression: IMPRESSION: 1.  Loculated right basilar hydropneumothorax, slightly smaller from prior with Pleurx in place.. 2.  Residual right lower and middle lobe consolidation which is similar.     CT ANGIOGRAPHY CHEST PULMONARY EMBOLISM WITH IV CONTRAST    Result Date: 11/23/2024  Narrative: EXAM: CT ANGIOGRAPHY CHEST PULMONARY EMBOLISM W IV CONTRAST CLINICAL HISTORY: h/o PE, blood in pleurx cath, COVID, ro PE COMPARISON: CT CHEST W IV CONTRAST with report dated 11/7/2024 6:44 PM. TECHNIQUE: CT chest was performed using thin section reconstructions. Contrast injection rate and acquisition timing were optimized for opacification of the pulmonary arteries to evaluate for pulmonary embolism. 3-D RECONSTRUCTION: Additional 3-D/MIP reconstructions were performed and reviewed. CT DOSE:  One or more dose reduction techniques (e.g. automated exposure control, adjustment of the mA and/or kV according to patient size, use of iterative reconstruction technique) utilized for this examination. CONTRAST: 100mL of iopamidoL (ISOVUE-370) 370 mg iodine  /mL (76 %) injection 100 mL COMMENT: PULMONARY ARTERIES: Image Quality is: High noting the following limitations: Respiratory motion artifact No pulmonary embolism is identified to the proximal subsegmental level. AIRWAYS, LUNGS AND PLEURA: Layering secretions are present within the trachea and right lower lobe airways. Interval decrease in size of a loculated right hydropneumothorax following Pleurx catheter placement. Pleural effusion contains hyperdense components compatible with hemorrhage. Redemonstration of diffuse nodular pleural thickening. Complete right lower lobe consolidation present, with patchy groundglass opacity and septal thickening in the aerated portions. Hypoenhancement within a portion of the right basal segments with intraparenchymal gas likely related to infection. Dependent right upper lobe atelectasis, and near complete middle lobe consolidation. Trace left effusion with dependent left lower lobe atelectatic changes. A couple subcentimeter nodules present in the left upper lobe measuring up to 2 mm on series 3 image 157. CARDIOMEDIASTINUM: Cardiomegaly.  Multivessel coronary atherosclerosis.  Aortic annular and valvular calcification. 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 Tortuous great vessels. LYMPH NODES: No thoracic lymphadenopathy IMAGED THYROID: Within normal limits IMAGED ESOPHAGUS: Nondilated UPPER ABDOMEN: Bilateral renal cortical scarring. Bilateral renal sinus and cortical cysts. CHEST WALL: Within normal limits BONES: Osteopenia with multilevel compression fractures including T7, T8, and T12 unchanged.     Impression: IMPRESSION: 1.  No acute pulmonary embolism. 2.  Interval decrease in size of loculated right hydropneumothorax following pleural catheter placement. Effusion contains hyperdense components compatible with hemorrhage/hemothorax. 3.  Diffuse lobular right pleural thickening again suspicious for malignancy. 4.  Aspirated secretions  in the trachea and right lower lobe airways. 5.  Near complete right lower lobe consolidation with changes likely reflecting combination of atelectasis and developing necrotizing pneumonia (potentially from aspiration given above). Complete middle lobe consolidation. 6.  Trace left effusion with underlying atelectasis. 7.  Unchanged thoracic compression deformities.     X-RAY CHEST 1 VIEW    Result Date: 11/13/2024  Narrative: CLINICAL HISTORY: Reassess effusion. AP portable view of the chest. COMPARISON: 11/10/2024 COMMENT: The cardiomediastinal silhouette is stable in appearance. The right chest tube has not significantly changed in position. A right hydropneumothorax is redemonstrated. The pleural fluid has slightly increased. Parenchymal opacity in the right lower lung has not significantly changed. There are degenerative changes of the spine.     Impression: IMPRESSION: As above    X-RAY CHEST 1 VIEW    Result Date: 11/10/2024  Narrative: CLINICAL HISTORY: follow up pleurex catheter placement     Impression: IMPRESSION: Interval right chest tube placement terminating over the medial aspect of the right mid to lower hemithorax. Redemonstration of a right hydropneumothorax with slightly intervally decreased pleural fluid and similar appearance to slightly intervally increased pleural air projecting over the inferior right hemithorax. COMMENT: Comparison: Chest x-ray and CT chest 11/7/2024. Technique: A single frontal AP portable upright projection of the chest was obtained. FINDINGS: There has been interval placement of a right-sided chest tube terminating over the medial aspect of the right mid to lower hemithorax. Again noted is a right hydropneumothorax with slight interval decrease in the amount of pleural fluid and similar appearance to slight interval increase in the amount of pleural air projecting over the inferior right hemithorax. The left lung remains clear. The cardiomediastinal silhouette is  unchanged. The upper abdomen is unremarkable. There is no acute osseous abnormality.    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 Patient recently had a pleural pigtail placement on 10/16/2024 for treatment with TPA/dornase which was removed on 10/23/2024. He presents with worsening shortness of breath and persistent right hydropneumothorax in the setting of trapped lung. COMPARISON: CT chest 11/7/2024 FACULTY: KARAN Obregon MD MEDICATIONS: Lidocaine 1% with and without epinephrine intradermal, fentanyl 50 mcg CONTRAST: None RADIATION: 0.2 minutes, 3.4 mGy PROCEDURE SUMMARY: Preprocedure timeout and wedged supine positioning on the angiography table. Preliminary ultrasound of the right hemithorax identified the known hydropneumothorax. The planned pleural puncture site and tunneled tract was marked then evaluated with grayscale and doppler ultrasound to ensure the absence of large intervening vessels. The anterolateral chest was prepped and draped in the usual sterile fashion. Initiation and maintenance of systemic analgesia by IR nursing under personal physician supervision. The planned access sites and dermal tract were anesthetized with 1% lidocaine with and without epinephrine. A 5 Wallisian Yueh needle was introduced into the pleural space under US-guidance through which a 0.035 Amplatz guidewire was placed. A skin nick was made ~8 cm anteroinferiorly and the 15.5Fr Pleur-X catheter tunneled to the pleural puncture site. The Pleur-X was  trimmed and inserted into the pleural space via the provided 16Fr peel-away sheath (which was then removed). Serosanguineous fluid returned from the drain which was placed to vacuum canister drainage. A total of 400 mL was removed before capping the catheter. The skin overlying the pleural puncture site was closed with resorbable 2-0 Vicryl sutures and skin glue superficially. The catheter was sutured to the adjacent skin with non-resorbable 2-0 monofilament. Sterile dressings were applied. The patient tolerated the procedure well and was transported to the recovery area without incident. DEVICE: Single lumen 15.5 Fr tunneled drain (Pleur-X) SAMPLES: None COMPLICATIONS: None immediate EBL: Minimal (1-10 mL)     Impression: IMPRESSION: 1. Uncomplicated right tunneled pleural drain (Pleur-X) placement followed by 400 mL large volume thoracentesis of serosanguineous fluid.    CT CHEST WITH IV CONTRAST    Result Date: 11/7/2024  Narrative: CLINICAL HISTORY: Pleural effusion, malignancy suspected COMMENT:  Computed tomography of the chest is performed. CT Dose:  One or more dose reduction techniques (e.g. automated exposure control, adjustment of the mA and/or kV according to patient size, use of iterative reconstruction technique) utilized for this examination. Contrast: 75 mL Isovue 370 nonionic IV contrast Comparison studies:   10/14/2024 CTA chest FINDINGS: LUNG PARENCHYMA AND PLEURA: Redemonstration of nodular right pleural thickening suspicious for malignancy.  Right hydropneumothorax.  Fluid volume is similar to the prior study from one month ago.  However, the pneumothorax component is new. Potentially loculated 5.6 cm air and fluid component medially in the right mid hemithorax.  Right lower lobe and right middle lobe volume loss and consolidation similar to prior.  New posterior right upper lobe atelectasis. Trace left pleural effusion.  No new left lung consolidation. JENNIFER AND MEDIASTINUM: Stable HEART AND  PERICARDIUM: Cardiomegaly.  Coronary artery calcifications.  No pericardial effusion. CHEST WALL: Unremarkable AXILLA: Unremarkable BONY ARCHITECTURE: Stable UPPER ABDOMEN: Tiny nonobstructing right upper pole renal calculus.  Partially imaged hypodensities in the renal sinuses, consistent with parapelvic cysts and/or pelvocaliectasis.     Impression: IMPRESSION: 1.  Redemonstration of nodular right pleural thickening consistent with malignancy.  Large right hydropneumothorax.  Fluid component is similar to the recent prior study.  Pneumothorax component is new.  There is a potentially loculated 5.6 cm air and fluid component medially in the right mid hemithorax. 2.  Slightly increased posterior right upper lobe atelectasis compared to prior. No significant change in right middle and right lower lobe volume loss and consolidation. Finding:    New or increased pneumothorax   Acuity: Critical  Status:  CLOSED Critical read back was performed and results were read back by Dr. Acosta,  on 11/7/2024 6:34 PM .     X-RAY CHEST 1 VIEW    Result Date: 11/7/2024  Narrative: CLINICAL HISTORY: Shortness of breath.     Impression: IMPRESSION: Small right effusion. COMMENT: AP radiograph the chest reveals a small right effusion. Previously noted right basal chest tube has been removed. There is patchy right basal consolidation. Left lung is well-aerated. There is no pneumothorax. Comparison is made to previous study dated 10/21/2024    IR CHEST TUBE REMOVAL(BEDSIDE)    Result Date: 10/25/2024  Narrative: CLINICAL HISTORY: Resolution of loculated right pleural effusion following pleural pigtail catheter placement and TPA dornase treatment. COMMENT: A request is received to remove the right pleural pigtail catheter as there has been decreasing output from the chest tube following TPA dornase treatment. The patient had had a previous Pleurx catheter which was removed secondary to low output. His effusion may recur and consideration  for repeat Pleurx catheter if he becomes symptomatic. At bedside, the patient is correctly identified. No air leak is seen in the pleura vac placed to waterseal. He is turned onto his left side. The dressing is removed from the catheter. The areas cleansed with ChloraPrep. The retention suture removed and on expiration, the catheter transected and removed in its entirety. A sterile Vaseline gauze dressing is placed over the site. The patient tolerated the procedure well. No imaging. This service rendered by Melissa Tsang PA-C.     Impression: IMPRESSION: Successful bedside removal of right pleural pigtail catheter following TPA dornase treatments for loculated effusion. If effusion recurs and patient's symptomatic, could consider repeat Pleurx catheter placement. I certify that I have personally reviewed this examination and agree with Melissa Tsang's report. Cj Yen M.D.        ECG/Telemetry  Not on tele      ASSESSMENT AND PLAN           Assessment & Plan  Hemothorax on right  - Sent from nursing home for concerns for significant clots during weekly drain from his right sided pleurx with c/f possible blockage  - CTA Chest with interval decrease in size of loculated right hydropneumothorax  - Discussed with IR. If pleurx is draining properly, there is no need for an IR evaluation.   - Appreciate thoracic surgery recs - no acute interventions. F/u with outpt thoracic surgery  Abnormal findings on diagnostic imaging of lung  - CTA Chest with aspirated secretions in the trachea and RLL. There is a diffuse lobular right pleural thickening noted again suspicious for a malignancy  - Currently without any evidence of fevers, or hypoxia  - Mild leukocytosis with seems to have been elevated over the 4 weeks  - Seems to be at baseline per information obtained  - Appreciate Pulm recs - not convinced about a PNA and recs monitoring off Abx.   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  - Has been on rivaroxaban for 6-month and changed to prophylactic dose  - Subsequently has had DVT in 2020 & possibly has been on long-term rivaroxaban since then  - Resumed rivaroxaban   Normocytic anemia  - His hemoglobin seems to be at baseline  - Continue to monitor as needed  HLD (hyperlipidemia)  - Continue statin    CDI: COVID-19 is not a current infection   CDI: Bloody pleural effusion not due to/not related to Pleurx catheter or Xarelto use     VTE Assessment: Padua VTE Score: 4  VTE Prophylaxis Plan: Current anticoagulants:  rivaroxaban (XARELTO) tablet 20 mg, oral, Daily with dinner      Code Status: Full Code  Estimated Discharge Date: 11/26/2024  Disposition Planning: pending insurance auth      Fany Malave DO  Pager [phone]  11/26/2024

## 2024-11-26 NOTE — PLAN OF CARE
Problem: Adult Inpatient Plan of Care  Goal: Plan of Care Review  Outcome: Progressing  Flowsheets (Taken 11/26/2024 104)  Progress: improving  Outcome Evaluation: PT eval complete.  Plan of Care Reviewed With: patient     Problem: Mobility Impairment  Goal: Optimal Mobility  Outcome: Progressing

## 2024-11-26 NOTE — PROGRESS NOTES
Occupational Therapy -  Initial Evaluation     Patient: Dave Arredondo  Location: [facility] in [place] [address]  MRN: [id number]  Today's date: 11/26/2024    HISTORY OF PRESENT ILLNESS     Dave is a 88 y.o. male admitted on 11/22/2024 with Hemothorax on right [J94.2]  Bloody pleural effusion [J94.2]. Principal problem is Hemothorax on right.    Past Medical History  Dave has a past medical history of Coronary artery disease, DVT (deep venous thrombosis) (CMS/HCC), Hypertension, Lipid disorder, Pleural effusion, Pulmonary embolism (CMS/HCC), Rectal cancer (CMS/HCC), and SBO (small bowel obstruction) (CMS/HCC).    History of Present Illness   Per H&P:  88 y.o. 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   PRIOR LEVEL OF FUNCTION AND LIVING ENVIRONMENT     Prior Level of Function      Flowsheet Row Most Recent Value   Dominant Hand right   Ambulation independent   Transferring independent   Toileting independent   Bathing independent   Dressing independent   Eating independent   Driving/Transportation    Communication understands/communicates without difficulty   Prior Level of Function Comment Prior to recent hospitalizations patient independent however pt currently admitted from SNF   Assistive Device Currently Used at Home shower chair             Prior Living Environment      Flowsheet Row Most Recent Value   People in Home facility resident   Current Living Arrangements short-term skilled nursing facility   Home Accessibility  wheelchair accessible   Living Environment Comment Per EMR pt typically lives w/ roomates on 2nd floor with stairs to access, no elevator. Pt currently admitted from [facility]          Occupational Profile      Flowsheet Row Most Recent Value   Environmental Supports and Barriers pt from [facility]          VITALS AND PAIN     OT Vitals      Date/Time Pulse HR Source SpO2 Pt Activity O2 Therapy BP BP Location BP Method Pt Position [facility]   11/26/24 0839 82 Monitor 97 % At rest None (Room air) 154/85 Right upper arm Automatic Lying BET   11/26/24 0845 95 Monitor 95 % At rest None (Room air) 107/56 Right upper arm Automatic Sitting BET   11/26/24 0847 -- -- -- -- -- 148/85 Right upper arm Automatic Sitting BET          OT Pain      Date/Time Pain Type Rating: Rest Rating: Activity [facility]   11/26/24 0839 Pain Assessment 0 - no pain 0 - no pain BET             Objective   OBJECTIVE     Start time:  0827  End time:  0855  Session Length: 28 min  Mode of Treatment: co-treatment  Reason for co-treatment: expedite dispo planning    General Observations  Patient received reclined, in bed. He was agreeable to therapy, no issues or concerns identified by nurse prior to session. RN cleared; PCT in room for pt hygiene management; pt cooperative    Precautions: fall, enhanced contact and droplet       Limitations/Impairments: safety/cognitive   Services  Do You Speak a Language Other Than English at Home?: no      OT Eval and Treat - 11/26/24 0827          Cognition    Orientation Status oriented to;person     Affect/Mental Status flat/blunted affect;low arousal/lethargic     Follows Commands follows one-step commands;increased processing time needed;verbal cues/prompting required;initiation impaired;repetition of directions required     Comment, Cognition cooperative but noting fatigue during session, eyes closed at times, appearing somewhat confused but able to follow one step commands with inc time and cues        Vision  Assessment/Intervention    Vision Assessment unable/difficult to assess   pt poor historian and unable to provide information       Hearing Assessment    Hearing Status WFL        Sensory Assessment    Sensory Assessment sensation intact, upper extremities        Upper Extremity Assessment    UE Assessment ROM and Strength WFL     General Observations ROM WFL; Strength 3+/5 grossly assessed        Bed Mobility    Bed Mobility Activities right;supine to sit     [place] moderate assist (50-74% patient effort);2 person assist     Safety/Cues increased time to complete;verbal cues;hand placement;preparatory posture;initiation;sequencing;technique     Comment mod Ax2 supine to sit 2* inc weakness; VCs for initiation and technique        Mobility Belt    Mobility Belt Used During Session no - medical contraindication     Reason Mobility Belt Not Used colostomy bag        Sit/Stand Transfer    Surface edge of bed;chair with arm rests     [place] minimum assist (75% or more patient effort);2 person assist;moderate assist (50-74% patient effort)     Safety/Cues increased time to complete;minimal;verbal cues;hand placement;technique;preparatory posture;maintaining center of gravity over base of support     Assistive Device walker, front-wheeled     Transfer Comments min Ax2 for STS tx from EOB w/ RW; mod Ax2 for stand to sit to bedside chair 2* poor eccentric control; pt began sitting prior to reaching bedside chair        Surface-to-Surface Transfers    Transfer Location bed to chair     Transfer Technique stand step     [place] minimum assist (75% or more patient effort);2 person assist     Safety/Cues increased time to complete;verbal cues;technique     Assistive Device walker, front-wheeled     Transfer Comments stand step tx at min Ax2 w/ VCs to maintain upright posture while taking steps        Lower Body Dressing    Tasks don;socks     [place] dependent (less than 25% patient effort)     Position  supine     Adaptive Equipment none     Comment dep for sock donning 2* inc weakness and dec func reach        Toileting    Comment (+) colostomy bag; pt bag leaking and required dep hygiene management from PCT        Balance    Static Sitting Balance WFL;sitting in chair;sitting, edge of bed     Dynamic Sitting Balance mild impairment;sitting, edge of bed     Sit to Stand Dynamic Balance moderate impairment     Static Standing Balance mild impairment;supported     Dynamic Standing Balance moderate impairment;supported     Comment, Balance min Ax2 w/ RW for stand step tx from EOB to bedside chair; no overt LOB noted                                    Education Documentation  Safety, taught by Ivonne Winkler OT at 11/26/2024 10:58 AM.  Learner: Patient  Readiness: Acceptance  Method: Explanation  Response: Verbalizes Understanding  Comment: OT role/ POC, safety during func tasks and ADLs, econ/pacing, d/c planning    Loss of Control, taught by Ivonne Winkler OT at 11/26/2024 10:58 AM.  Learner: Patient  Readiness: Acceptance  Method: Explanation  Response: Verbalizes Understanding  Comment: OT role/ POC, safety during func tasks and ADLs, econ/pacing, d/c planning    Coping Strategies, taught by Ivonne Winkler OT at 11/26/2024 10:58 AM.  Learner: Patient  Readiness: Acceptance  Method: Explanation  Response: Verbalizes Understanding  Comment: OT role/ POC, safety during func tasks and ADLs, econ/pacing, d/c planning        Session Outcome  Patient in chair, reclined at end of session, chair alarm on, all needs met, call light in reach, personal items in reach. Nursing notified about patient's performance, patient's position, and patient's response to therapy/activity.    AM-PAC™ - ADL (Current Function)     Putting on/taking off regular lower body clothing 2 - A Lot   Bathing 2 - A Lot   Toileting 1 - Total   Putting on/taking off regular upper body clothing 3 - A Little   Help for taking care of personal grooming  3 - A Little   Eating meals 3 - A Little   AM-PAC™ ADL Score 14      ASSESSMENT AND PLAN     Progress Summary  OT eval complete. Pt is an 89 y/o admitted w/ COVID. PCT present t/o session. Pt limited by cognition, balance, strength, and func endurance. Pt requiring min Ax2 for STS and stand step tx from EOB to bedside chair w/ RW; mod Ax2 for stand to sit to bedside chair 2* poor eccentric control; pt began sitting prior to reaching bedside chair. Pt w/ colostomy bag dep for hygiene management d/t bag leaking and dep for donning socks. OT to continue POC in the acute setting. Rec return to SNF.    Patient/Family Therapy Goal Statement: none stated    OT Plan      Flowsheet Row Most Recent Value   Rehab Potential good, to achieve stated therapy goals at 11/26/2024 0827   Therapy Frequency 3 times/wk at 11/26/2024 0827   Planned Therapy Interventions activity tolerance training, adaptive equipment training, BADL retraining, functional balance retraining, occupation/activity based interventions, patient/caregiver education/training, transfer/mobility retraining at 11/26/2024 0827            OT Discharge Recommendations      Flowsheet Row Most Recent Value   OT Recommended Discharge Disposition skilled nursing facility at 11/26/2024 0827                 OT Goals      Flowsheet Row Most Recent Value   Bed Mobility Goal 1    Activity/Assistive Device bed mobility activities, all at 11/26/2024 0827   [place] minimum assist (75% or more patient effort) at 11/26/2024 0827   Time Frame by discharge at 11/26/2024 0827   Progress/Outcome goal ongoing at 11/26/2024 0827   Transfer Goal 1    Activity/Assistive Device all transfers at 11/26/2024 0827   [place] supervision required at 11/26/2024 0827   Time Frame by discharge at 11/26/2024 0827   Progress/Outcome goal ongoing at 11/26/2024 0827   Dressing Goal 1    Activity/Adaptive Equipment upper body dressing, lower body dressing, dressing skills, all at 11/26/2024  0827   [place] minimum assist (75% or more patient effort) at 11/26/2024 0827   Time Frame by discharge at 11/26/2024 0827   Progress/Outcome goal ongoing at 11/26/2024 0827

## 2024-11-26 NOTE — HOSPITAL COURSE
Dave is a 88 y.o. male admitted on 11/22/2024 with Hemothorax on right [J94.2]  Bloody pleural effusion [J94.2]. Principal problem is Hemothorax on right.    Past Medical History  Dave has a past medical history of Coronary artery disease, DVT (deep venous thrombosis) (CMS/HCC), Hypertension, Lipid disorder, Pleural effusion, Pulmonary embolism (CMS/HCC), Rectal cancer (CMS/HCC), and SBO (small bowel obstruction) (CMS/HCC).    History of Present Illness   Per H&P:  88 y.o. 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

## 2024-11-26 NOTE — PLAN OF CARE
Problem: Adult Inpatient Plan of Care  Goal: Plan of Care Review  Outcome: Progressing  Flowsheets (Taken 11/26/2024 1052)  Progress: improving  Outcome Evaluation: OT eval complete  Plan of Care Reviewed With: patient     Problem: Self-Care Deficit  Goal: Improved Ability to Complete Activities of Daily Living  Outcome: Progressing

## 2024-11-27 VITALS
OXYGEN SATURATION: 97 % | SYSTOLIC BLOOD PRESSURE: 159 MMHG | TEMPERATURE: 97.1 F | DIASTOLIC BLOOD PRESSURE: 78 MMHG | HEART RATE: 71 BPM | BODY MASS INDEX: 22.45 KG/M2 | HEIGHT: 62 IN | WEIGHT: 122 LBS | RESPIRATION RATE: 14 BRPM

## 2024-11-27 LAB
ERYTHROCYTE [DISTWIDTH] IN BLOOD BY AUTOMATED COUNT: 14.8 % (ref 11.6–14.4)
HCT VFR BLD AUTO: 38.8 % (ref 40.1–51)
HGB BLD-MCNC: 13.1 G/DL (ref 13.7–17.5)
MCH RBC QN AUTO: 30.8 PG (ref 28–33.2)
MCHC RBC AUTO-ENTMCNC: 33.8 G/DL (ref 32.2–36.5)
MCV RBC AUTO: 91.1 FL (ref 83–98)
PLATELET # BLD AUTO: 287 K/UL (ref 150–350)
PMV BLD AUTO: 8.9 FL (ref 9.4–12.4)
RBC # BLD AUTO: 4.26 M/UL (ref 4.5–5.8)
WBC # BLD AUTO: 10.9 K/UL (ref 3.8–10.5)

## 2024-11-27 PROCEDURE — 63700000 HC SELF-ADMINISTRABLE DRUG: Performed by: INTERNAL MEDICINE

## 2024-11-27 PROCEDURE — 36415 COLL VENOUS BLD VENIPUNCTURE: CPT | Performed by: STUDENT IN AN ORGANIZED HEALTH CARE EDUCATION/TRAINING PROGRAM

## 2024-11-27 PROCEDURE — 99239 HOSP IP/OBS DSCHRG MGMT >30: CPT | Performed by: HOSPITALIST

## 2024-11-27 PROCEDURE — 85027 COMPLETE CBC AUTOMATED: CPT | Performed by: STUDENT IN AN ORGANIZED HEALTH CARE EDUCATION/TRAINING PROGRAM

## 2024-11-27 PROCEDURE — G0378 HOSPITAL OBSERVATION PER HR: HCPCS

## 2024-11-27 RX ADMIN — FUROSEMIDE 20 MG: 20 TABLET ORAL at 08:57

## 2024-11-27 RX ADMIN — SENNOSIDES 2 TABLET: 8.6 TABLET, FILM COATED ORAL at 08:57

## 2024-11-27 RX ADMIN — METOPROLOL SUCCINATE 100 MG: 100 TABLET, EXTENDED RELEASE ORAL at 08:57

## 2024-11-27 ASSESSMENT — COGNITIVE AND FUNCTIONAL STATUS - GENERAL
CLIMB 3 TO 5 STEPS WITH RAILING: 2 - A LOT
WALKING IN HOSPITAL ROOM: 2 - A LOT
STANDING UP FROM CHAIR USING ARMS: 2 - A LOT
MOVING TO AND FROM BED TO CHAIR: 2 - A LOT

## 2024-11-27 NOTE — DISCHARGE INSTRUCTIONS
H&P  Dave Arredondo is a 88 y.o. 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     Currently unable to obtain much information per patient himself.  Seems to have endorsed chest pain to the ER team initially.  Per information obtained, he has good and bad days and sometimes able to make conversations but mostly confused.  Per nursing staff, he is at baseline and had no change in his mental status.     There has been some sparse cough but nothing major per information obtained.  He has been recently diagnosed with COVID and unclear what prompted the testing at the nursing home.    Hospital Course  He was admitted to further his pleurx catheter that he has for his hemothorax. His nursing facility noted significant amout of clots when doing his weekly drain. CT chest noted an interval decrease in size of his loculated right hydropneumothorax. Given the nurses were able to drain without any issues, IR evaluation was not needed.     He was also in isolation due to being covid+.

## 2024-11-27 NOTE — ASSESSMENT & PLAN NOTE
-Sent from nursing home for concerns for significant clots during weekly drain from his right sided pleurx with c/f possible blockage  -CTA Chest with interval decrease in size of loculated right hydropneumothorax  -Discussed with IR. If pleurx is draining properly, there is no need for an IR evaluation.   -Appreciate thoracic surgery recs - no acute interventions. F/u with outpt thoracic surgery

## 2024-11-27 NOTE — PLAN OF CARE
Plan of Care Review  Plan of Care Reviewed With: patient  Progress: no change  Outcome Evaluation: Pt lethargic, and confused at times. Reorientation provided. Incontinence care provided. Condom catheter placed. Pt complained of pain on left elbow and left side of abdomen. Pain medication offered, but pt refused. VSS. FSP maintained. Pt resting in bed, call light within reach.

## 2024-11-27 NOTE — ASSESSMENT & PLAN NOTE
-Seems to have had initial left lower extremity DVT and PE in 2019  -Has been on rivaroxaban for 6-month and changed to prophylactic dose  -Subsequently has had DVT in 2020 & possibly has been on long-term rivaroxaban since  -Resumed rivaroxaban

## 2024-11-27 NOTE — ASSESSMENT & PLAN NOTE
-CTA Chest with aspirated secretions in the trachea and RLL. There is a diffuse lobular right pleural thickening noted again suspicious for a malignancy  -Currently without any evidence of fevers, or hypoxia  -Mild leukocytosis with seems to have been elevated over the 4 weeks  -Seems to be at baseline per information obtained  -Appreciate Pulm recs - not convinced about a PNA and recs monitoring off Abx.

## 2024-11-27 NOTE — UM PHYSICIAN REVIEW NOTE
Patient Name: Dave Arredondo      MRN: [id number]    11/23 clots in catheter   Started draining now an issue to dispo/placement on to of rapid readmission for similar dx     A phone call will not be placed to payor to request Peer to Peer phone appeal.    Zeyad Whipple MD  11/27/2024

## 2024-11-27 NOTE — DISCHARGE SUMMARY
Hospital Medicine    Inpatient Discharge Summary        BRIEF OVERVIEW     Patient: Dave Arredondo  Admission Date: 2024   : 1935 Discharge Date: 2024       PCP: Keith Miller DO  Disposition: Skilled Nursing Facility - Other     Destination: Skilled Nursing Facility     Attending Provider: Crissy Whipple DO Attending phys phone: [phone]    Code Status At Discharge: Full Code        ASSESSMENT AND PLAN     Assessment & Plan  Hemothorax on right  -Sent from nursing home for concerns for significant clots during weekly drain from his right sided pleurx with c/f possible blockage  -CTA Chest with interval decrease in size of loculated right hydropneumothorax  -Discussed with IR. If pleurx is draining properly, there is no need for an IR evaluation.   -Appreciate thoracic surgery recs - no acute interventions. F/u with outpt thoracic surgery  Abnormal findings on diagnostic imaging of lung  -CTA Chest with aspirated secretions in the trachea and RLL. There is a diffuse lobular right pleural thickening noted again suspicious for a malignancy  -Currently without any evidence of fevers, or hypoxia  -Mild leukocytosis with seems to have been elevated over the 4 weeks  -Seems to be at baseline per information obtained  -Appreciate Pulm recs - not convinced about a PNA and recs monitoring off Abx.   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 year-old male  admitted on 11/22/2024 with Hemothorax on right.    88-year-old male with past medical history of PE on Xarelto, hyperlipidemia, Pleurx catheter for hemopneumothorax presented from his facility due to concerns for blockage/clots in his Pleurx catheter.  Imaging showed interval decrease in size of loculated right hydropneumothorax following pleural catheter placement.  There was also diffuse lobular right pleural thickening again suspicious for malignancy and aspirated secretions.  IR was consulted for evaluation as well as pulmonology.  Pulmonology did not suspect patient had pneumonia and recommended monitoring off antibiotics.  Of note patient was COVID-positive however is not symptomatic.  IR discussed with prior team and said that if Pleurx catheter was able to be drained it was working without issues and did not require an IR evaluation.  Thoracic surgery was also consulted and recommended no acute intervention and continuing Pleurx drainage with outpatient follow-up.  Patient was deemed stable for SNF and was waiting for insurance Auth prior to return.      Exam on Day of Discharge  Temp:  [36.2 °C (97.1 °F)-36.8 °C (98.2 °F)] 36.2 °C (97.1 °F)  Heart Rate:  [67-71] 71  Resp:  [14-16] 14  BP: (145-159)/(69-86) 159/78    Physical Exam  General: NAD, calm, appears chronically ill  HEENT: atraumatic  Cardiovascular: RRR, no murmurs; S1/S2+;  Pulmonary: course breath sounds; Pleurx noted  Gi: Soft, nontender, nondistended  Ext: no pedal edema  Neuro: Alert and oriented x1-2  Psych: Calm, appropriate answers    DISCHARGE MEDICATIONS         Medication List        CONTINUE taking these medications      albuterol HFA 90 mcg/actuation inhaler  Inhale 2 puffs every 8 (eight) hours as needed for shortness of breath or wheezing.  Dose: 2 puff     atorvastatin 20 mg tablet  Commonly known as: LIPITOR  Take 20 mg by mouth daily.  Dose: 20 mg     cyanocobalamin 1,000 mcg/mL injection  Commonly known as: VITAMIN B-12  Inject  1,000 mcg into the shoulder, thigh, or buttocks every 30 (thirty) days. Every 1st Monday of the month  Dose: 1,000 mcg     docusate sodium 100 mg capsule  Commonly known as: COLACE  Take 1 capsule (100 mg total) by mouth 2 (two) times a day.  Dose: 100 mg     furosemide 20 mg tablet  Commonly known as: LASIX  Take 20 mg by mouth daily.  Dose: 20 mg     melatonin ODT  Take 1 tablet (3 mg total) by mouth nightly.  Dose: 3 mg     metoprolol succinate  mg 24 hr tablet  Commonly known as: TOPROL-XL  Take 100 mg by mouth daily.  Dose: 100 mg     nitroglycerin 0.4 mg SL tablet  Commonly known as: NITROSTAT  Place 0.4 mg under the tongue every 5 (five) minutes as needed.  Dose: 0.4 mg     pantoprazole 40 mg EC tablet  Commonly known as: PROTONIX  Take 1 tablet (40 mg total) by mouth nightly Indications: GI ppx w/Xarelto.  Dose: 40 mg     rivaroxaban 20 mg tablet  Commonly known as: XARELTO  Take 1 tablet (20 mg total) by mouth daily with dinner Indications: a clot in the lung, blood clot in a deep vein of the extremities.  Dose: 20 mg     senna 8.6 mg tablet  Commonly known as: SENOKOT  Take 2 tablets by mouth 2 (two) times a day.  Dose: 2 tablet            STOP taking these medications      acetaminophen 325 mg tablet  Commonly known as: TYLENOL             INSTRUCTIONS AND FOLLOW-UP     Instructions for after discharge       Activity as Tolerated      Diet      Diet Type / Texture: Regular            Important Issues to Address in Follow-Up  Follow up with PCP, thoracic surgery    Scheduled Appointments            In 5 days Sergey Dennis MD [facility] Thoracic Surgery at [facility]            Recommended Follow-up Visits   Follow-Up Providers       Keith Miller DO   Specialty: Internal Medicine   Relationship: PCP - General        Schedule an appointment as soon as possible for a visit in 1 week(s)              After Discharge Care                Destination       [facility]    Skilled Nursing                                Test Results Pending at Discharge  Pending Inpatient Labs       Order Current Status    [place] Draw Panel In process            LABS AND IMAGING   Pertinent Labs  Results from last 7 days   Lab Units 11/27/24  0646   WBC K/uL 10.90*   HEMOGLOBIN g/dL 13.1*   HEMATOCRIT % 38.8*   PLATELETS K/uL 287     Results from last 7 days   Lab Units 11/26/24  0550   SODIUM mEQ/L 138   POTASSIUM mEQ/L 3.7   CHLORIDE mEQ/L 104   CO2 mEQ/L 28   BUN mg/dL 20   CREATININE mg/dL 0.5*   GLUCOSE mg/dL 129*   CALCIUM mg/dL 8.5*         Pertinent Imaging  X-RAY CHEST 1 VIEW    Result Date: 11/23/2024  IMPRESSION: 1.  Loculated right basilar hydropneumothorax, slightly smaller from prior with Pleurx in place.. 2.  Residual right lower and middle lobe consolidation which is similar.     CT ANGIOGRAPHY CHEST PULMONARY EMBOLISM WITH IV CONTRAST    Result Date: 11/23/2024  IMPRESSION: 1.  No acute pulmonary embolism. 2.  Interval decrease in size of loculated right hydropneumothorax following pleural catheter placement. Effusion contains hyperdense components compatible with hemorrhage/hemothorax. 3.  Diffuse lobular right pleural thickening again suspicious for malignancy. 4.  Aspirated secretions in the trachea and right lower lobe airways. 5.  Near complete right lower lobe consolidation with changes likely reflecting combination of atelectasis and developing necrotizing pneumonia (potentially from aspiration given above). Complete middle lobe consolidation. 6.  Trace left effusion with underlying atelectasis. 7.  Unchanged thoracic compression deformities.     X-RAY CHEST 1 VIEW    Result Date: 11/13/2024  IMPRESSION: As above    X-RAY CHEST 1 VIEW    Result Date: 11/10/2024  IMPRESSION: Interval right chest tube placement terminating over the medial aspect of the right mid to lower hemithorax. Redemonstration of a right hydropneumothorax with slightly intervally decreased pleural fluid  and similar appearance to slightly intervally increased pleural air projecting over the inferior right hemithorax. COMMENT: Comparison: Chest x-ray and CT chest 11/7/2024. Technique: A single frontal AP portable upright projection of the chest was obtained. FINDINGS: There has been interval placement of a right-sided chest tube terminating over the medial aspect of the right mid to lower hemithorax. Again noted is a right hydropneumothorax with slight interval decrease in the amount of pleural fluid and similar appearance to slight interval increase in the amount of pleural air projecting over the inferior right hemithorax. The left lung remains clear. The cardiomediastinal silhouette is unchanged. The upper abdomen is unremarkable. There is no acute osseous abnormality.    IR TUNNELED PLEURAL CATHETER PLACEMENT    Result Date: 11/8/2024  IMPRESSION: 1. Uncomplicated right tunneled pleural drain (Pleur-X) placement followed by 400 mL large volume thoracentesis of serosanguineous fluid.    ULTRASOUND GUIDED NEEDLE PLACEMENT    Result Date: 11/8/2024  IMPRESSION: 1. Uncomplicated right tunneled pleural drain (Pleur-X) placement followed by 400 mL large volume thoracentesis of serosanguineous fluid.    CT CHEST WITH IV CONTRAST    Result Date: 11/7/2024  IMPRESSION: 1.  Redemonstration of nodular right pleural thickening consistent with malignancy.  Large right hydropneumothorax.  Fluid component is similar to the recent prior study.  Pneumothorax component is new.  There is a potentially loculated 5.6 cm air and fluid component medially in the right mid hemithorax. 2.  Slightly increased posterior right upper lobe atelectasis compared to prior. No significant change in right middle and right lower lobe volume loss and consolidation. Finding:    New or increased pneumothorax   Acuity: Critical  Status:  CLOSED Critical read back was performed and results were read back by Dr. Acosta,  on 11/7/2024 6:34 PM .     X-RAY  CHEST 1 VIEW    Result Date: 11/7/2024  IMPRESSION: Small right effusion. COMMENT: AP radiograph the chest reveals a small right effusion. Previously noted right basal chest tube has been removed. There is patchy right basal consolidation. Left lung is well-aerated. There is no pneumothorax. Comparison is made to previous study dated 10/21/2024   Cardiac Imaging    TRANSTHORACIC ECHO (TTE) COMPLETE 06/17/2024    Interpretation Summary    Left Ventricle: Normal ventricle size. Concentric left ventricular hypertrophy. Normal systolic function. Estimated EF 65%. No regional wall motion abnormalities. Grade I diastolic dysfunction.    Right Ventricle: Normal ventricle size. Normal systolic function.    Left Atrium: Normal sized atrium.    Right Atrium: Normal sized atrium.    Aortic Valve: Tricuspid valve.  Sclerotic leaflets. Mild regurgitation.    Mitral Valve: Sclerotic mitral valve. Normal leaflet motion. Mild mitral annular calcification. Mild regurgitation.    Tricuspid Valve: Normal structure. Mild regurgitation. Estimated RVSP = 37 mmHg.    Pulmonic Valve: Normal structure. Trace regurgitation.    Aorta: Aortic root grossly normal.    IVC/SVC: Normal sized inferior vena cava. Inferior vena cava demonstrates normal respiratory collapse.    Pericardium: No evidence of pericardial effusion.    Compared to the prior echocardiogram of 4/27/2020, there is no significant change.      OTHER SERVICES PROVIDED   Consults During Admission  IP CONSULT TO PULMONOLOGY/SLEEP MEDICINE  IP CONSULT TO THORACIC SURGERY  IP CONSULT TO PODIATRY    Procedures Performed  none      Thank you for allowing the Division of Hospital Medicine to care for your patient.        Spent more than 35 minutes in patient evaluation, physical evaluation, and coordination of discharge and care.

## 2024-11-27 NOTE — PLAN OF CARE
Care Coordination Discharge Plan Summary    Admission Assessment Summary    General Information  Readmission Within the last 30 days: no previous admission in last 30 days, other (see comments) (as per chart review patient was discharged from [facility] on 11/15/2024)  Does patient have a :    Patient-Specific Goals (include timeframe): return to [facility]    Living Arrangements  Arrived From: short term rehab  Current Living Arrangements: short-term skilled nursing facility  People in Home: facility resident  Home Accessibility: wheelchair accessible  Living Arrangement Comments: patient was a short term resident form [facility]    [employer] [facility] [employer] - Screenings  Housing Stability  In the last 12 months, was there a time when you were not able to pay the mortgage or rent on time?: No  In the past 12 months, how many times have you moved where you were living?: 0  At any time in the past 12 months, were you homeless or living in a shelter (including now)?: No  Utility Access  In the past 12 months has the electric, gas, oil, or water company threatened to shut off services in your home?: No  Transportation Needs  In the past 12 months, has lack of transportation kept you from medical appointments or from getting medications?: No  In the past 12 months, has lack of transportation kept you from meetings, work, or from getting things needed for daily living?: No    Functional Status Prior to Admission  Assistive Device/Animal Currently Used at Home: shower chair  Functional Status Comments: INDP with devices  IADL Comments: needs assistance with some ADL's    Discharge Needs Assessment    Concerns to be Addressed: care coordination/care conferences, discharge planning  Anticipated Changes Related to Illness: inability to care for self    Discharge Plan Summary    Patient Choice  Offered/Gave Vendor List: yes  Patient's Choice of Community Agency(s): [facility]  Patient and/or patient  guardian/advocate was made aware of their right to choose a provider. A list of eligible providers was presented and reviewed with the patient and/or patient guardian/advocate in written and/or verbal form. The list delineates providers in the patient’s desired geographic area who are participating in the Medicare program and/or providers contracted with the patient’s primary insurance. The Medicare list and quality ratings were obtained from the Medicare.gov [medicare.gov] website.    Concerns / Comments: DC today to [facility]. BLS p/u scheduled for 1600.    Discharge Plan:  Disposition/Destination: Skilled Nursing Facility - Other  / Skilled Nursing Facility  Destination - Admitted Since 11/22/2024       Service Provider Services Address Phone Fax Patient Preferred Last Updated    [facility] [address] [place] [phone] -- Jonna Suazo MSW 11/25/2024 150            Connection to Community  Not applicable    Discharge Transportation:  Is Out of Hospital DNR needed at Discharge: no  Does patient need discharge transport? Yes  Discharge Transportation Vendor: Della ([phone])  Type of Transportation: basic life support  What day is the transport expected?: 11/27/24  What time is the transport expected?: 1600     EDUARDO spoke with Nicole at [facility]. Insurance auth was approved. Auth # [id number]. EDUARDO had spoken with Roseanne [phone]) at [facility] and [facility] to try to escalate auth. She sent message to internal team. Nicole with [facility] confirmed patient can discharge to [facility] today. [facility] BLS p/u confirmed for 1600. Facility aware of  time. EDUARDO spoke with patient's friend/roommate Eliezer  time and dc plan. All questions answered. IMM reviewed. Treatment team updated.     DCP  [facility]   Insurance auth approved - Auth # [id number]  BLS p/u 1600 - Trip # [id number]  RN report # [phone]  MD  report # [phone] - ask for TRISTAN Ross or April to be paged

## 2024-11-28 LAB — MYCOBACTERIUM SPEC CULT: NORMAL

## 2024-12-04 ENCOUNTER — TELEPHONE (OUTPATIENT)
Dept: THORACIC SURGERY | Facility: CLINIC | Age: 89
End: 2024-12-04
Payer: COMMERCIAL

## 2024-12-04 NOTE — TELEPHONE ENCOUNTER
Marry from [facility] left a voicemail for reschedule post op, after being transferred there was no answer and no option to leave a voicemail

## 2024-12-11 ENCOUNTER — OFFICE VISIT (OUTPATIENT)
Dept: THORACIC SURGERY | Facility: CLINIC | Age: 89
End: 2024-12-11
Payer: COMMERCIAL

## 2024-12-11 ENCOUNTER — HOSPITAL ENCOUNTER (OUTPATIENT)
Dept: RADIOLOGY | Facility: HOSPITAL | Age: 89
Discharge: HOME | End: 2024-12-11
Attending: SURGERY
Payer: COMMERCIAL

## 2024-12-11 VITALS
DIASTOLIC BLOOD PRESSURE: 66 MMHG | WEIGHT: 116 LBS | BODY MASS INDEX: 18.21 KG/M2 | HEART RATE: 72 BPM | OXYGEN SATURATION: 99 % | HEIGHT: 67 IN | SYSTOLIC BLOOD PRESSURE: 94 MMHG | TEMPERATURE: 97.5 F

## 2024-12-11 DIAGNOSIS — J90 PLEURAL EFFUSION ON RIGHT: ICD-10-CM

## 2024-12-11 DIAGNOSIS — J90 PLEURAL EFFUSION ON RIGHT: Primary | ICD-10-CM

## 2024-12-11 PROCEDURE — 99214 OFFICE O/P EST MOD 30 MIN: CPT | Performed by: SURGERY

## 2024-12-11 PROCEDURE — 71046 X-RAY EXAM CHEST 2 VIEWS: CPT

## 2024-12-11 PROCEDURE — 3008F BODY MASS INDEX DOCD: CPT | Performed by: SURGERY

## 2024-12-11 ASSESSMENT — PAIN SCALES - GENERAL: PAINLEVEL_OUTOF10: 5

## 2024-12-11 NOTE — PROGRESS NOTES
THORACIC SURGERY PROGRESS NOTE      Patient ID: Dave Arredondo  : 1935  MRN: [id number]                                            Visit Date: 2024  Encounter Provider: Sergey Dennis  Referring Provider: No ref. provider found      Date of Surgery / Procedure:   24, right VATS pleural biopsy and insertion of pleurx catheter      CC: 2024  right VATS pleural biopsy insertion of Pleurx catheter     HPI: Dave Arredondo is a 88 y.o. male with history of rectal cancer status postresection now with an end colostomy readmitted with a recurrent loculated pleural effusion cytology negative x 3 with unclear etiology, pigtail catheter placed tPA dornase given with significant improvement in effusion but still no clear cause presents to the OR for VATS pleural biopsy.     24 - Right VATS pleural biopsy and insertion of pleurx catheter. Pathology showed benign pleural tissue with inflammation.  He has dyspnea occasionally. He denies fevers, chills, sweats and cough.      24-  removal of Pleurx catheter    10/14/24- readmitted to hospital with pleural effusion, IR deferred pleurX replacement    24- readmitted to hospital with pleural effusion, IR placed pleurX catheter on 24- readmitted with pleural effusion, discharged on 24-here today for his first post discharge follow-up visit.  He is overall not doing very well.  Has no appetite eats very small amounts of food.  He has no energy to even eat or do physical therapy.  Feels he has no energy to do anything.  Breathing also is becoming worse.  Since his discharge from the hospital 3 weeks ago he has not had his Pleurx catheter drained once at his facility.  He reports the nurses give him a hard time with doing anything and have not been willing to drain the Pleurx catheter.    Medical History:   Past Medical History:   Diagnosis Date    Coronary artery disease     DVT (deep venous thrombosis) (CMS/[facility])      "Hypertension     Lipid disorder     Pleural effusion     Pulmonary embolism (CMS/HCC)     Rectal cancer (CMS/HCC)     SBO (small bowel obstruction) (CMS/HCC)        Surgical History:   Past Surgical History   Procedure Laterality Date    Colostomy      Hernia repair      Pleura biopsy      Thoracentesis      THORACOSCOPY--VATS-PLEURAL BIOPSY, PLEURX CATHETER PLACEMENT Right 7/8/2024    Performed by Sergey Dennis MD at [facility] OR hospitals       Social History:   Social History     Socioeconomic History    Marital status:      Spouse name: None    Number of children: None    Years of education: None    Highest education level: None   Tobacco Use    Smoking status: Never    Smokeless tobacco: Never   Substance and Sexual Activity    Alcohol use: Not Currently    Drug use: Never    Sexual activity: Defer   Social History Narrative    Lives in the [facility] in [place]      Social Drivers of Health     Food Insecurity: No Food Insecurity (11/23/2024)    Hunger Vital Sign     Worried About Running Out of Food in the Last Year: Never true     Ran Out of Food in the Last Year: Never true   Transportation Needs: No Transportation Needs (11/23/2024)    PRAPARE - Transportation     Lack of Transportation (Medical): No     Lack of Transportation (Non-Medical): No   Housing Stability: Low Risk  (11/23/2024)    Housing Stability Vital Sign     Unable to Pay for Housing in the Last Year: No     Number of Times Moved in the Last Year: 0     Homeless in the Last Year: No       Family History:   Family History   Problem Relation Name Age of Onset    Heart disease Biological Mother         Home Medications:      Allergies: No Known Allergies      Vitals: Visit Vitals  BP 94/66 (BP Location: Left upper arm, Patient Position: Sitting)   Pulse 72   Temp 36.4 °C (97.5 °F) (Oral)   Ht 1.702 m (5' 7\")   Wt 52.6 kg (116 lb)   SpO2 99%   BMI 18.17 kg/m²       Physical Exam  Constitutional: Appears elderly and chronically ill " debilitated very weak  Neurologic: Cranial nerves are intact.   Eyes: Antiicteric, equal and reactive.  Neck: No jugular venous distension. Trachea is midline. Thyroid is without nodularity or involvement. Normal movement with swallowing. No cervical adenopathy.   Respiratory: Reduced breath sounds at the right lung base  Cardiovascular: Regular rate and rhythm with No murmurs, gallops, or rubs.    Abdomen: Soft, non tender and non distended with normal bowel sounds ,   Musculoskeletal: No clubbing, cyanosis, or edema. No petechiae. Full strength of all four extremities.     Radiology  Independent review of most recent imaging and all relevant previous imaging was compared with the reading of the attending radiologist. Significant findings are as below:     Narrative & Impression   EXAM: CT ANGIOGRAPHY CHEST PULMONARY EMBOLISM W IV CONTRAST     CLINICAL HISTORY: h/o PE, blood in pleurx cath, COVID, ro PE     COMPARISON: CT CHEST W IV CONTRAST with report dated 11/7/2024 6:44 PM.     TECHNIQUE: CT chest was performed using thin section reconstructions. Contrast  injection rate and acquisition timing were optimized for opacification of the  pulmonary arteries to evaluate for pulmonary embolism. 3-D RECONSTRUCTION:  Additional 3-D/MIP reconstructions were performed and reviewed. CT DOSE:  One or  more dose reduction techniques (e.g. automated exposure control, adjustment of  the mA and/or kV according to patient size, use of iterative reconstruction  technique) utilized for this examination.     CONTRAST: 100mL of iopamidoL (ISOVUE-370) 370 mg iodine /mL (76 %) injection 100  mL     COMMENT:     PULMONARY ARTERIES: Image Quality is: High noting the following limitations:  Respiratory motion artifact No pulmonary embolism is identified to the proximal  subsegmental level.     AIRWAYS, LUNGS AND PLEURA: Layering secretions are present within the trachea  and right lower lobe airways. Interval decrease in size of a  loculated right  hydropneumothorax following Pleurx catheter placement. Pleural effusion contains  hyperdense components compatible with hemorrhage. Redemonstration of diffuse  nodular pleural thickening. Complete right lower lobe consolidation present,  with patchy groundglass opacity and septal thickening in the aerated portions.  Hypoenhancement within a portion of the right basal segments with  intraparenchymal gas likely related to infection. Dependent right upper lobe  atelectasis, and near complete middle lobe consolidation. Trace left effusion  with dependent left lower lobe atelectatic changes. A couple subcentimeter  nodules present in the left upper lobe measuring up to 2 mm on series 3 image  157.     CARDIOMEDIASTINUM: Cardiomegaly.  Multivessel coronary atherosclerosis.  Aortic  annular and valvular calcification.     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  Tortuous great vessels.     LYMPH NODES: No thoracic lymphadenopathy     IMAGED THYROID: Within normal limits     IMAGED ESOPHAGUS: Nondilated     UPPER ABDOMEN: Bilateral renal cortical scarring. Bilateral renal sinus and  cortical cysts.     CHEST WALL: Within normal limits     BONES: Osteopenia with multilevel compression fractures including T7, T8, and  T12 unchanged.        --  IMPRESSION:  1.  No acute pulmonary embolism.  2.  Interval decrease in size of loculated right hydropneumothorax following  pleural catheter placement. Effusion contains hyperdense components compatible  with hemorrhage/hemothorax.  3.  Diffuse lobular right pleural thickening again suspicious for malignancy.  4.  Aspirated secretions in the trachea and right lower lobe airways.  5.  Near complete right lower lobe consolidation with changes likely reflecting  combination of atelectasis and developing necrotizing pneumonia (potentially  from aspiration given above). Complete middle lobe consolidation.  6.  Trace left effusion with  underlying atelectasis.  7.  Unchanged thoracic compression deformities.          Pathology    A.  Right visceral pleura:                 Benign pleural tissue with mixed inflammation, fibrinous debris and focal fibroblastic response.       B.  Right parietal pleura biopsy:                 Benign pleural tissue with mixed inflammation, fibrosis and focal fibroblastic response.        Assessment/Plan   Dave Arredondo is a 87 y/o history of rectal cancer status postresection now with an end colostomy readmitted with a recurrent loculated pleural effusion cytology negative x 3 with unclear etiology. S/p right VATS pleural biopsy and insertion of pleurx catheter on 7/8/24.  Pathology showed benign pleural tissue with inflammation.     We had removed the surgical placed Pleurx catheter back in August at his wishes, however since the Pleurx has been removed he has had several readmissions to the hospital with a recurrent pleural effusion.  The Pleurx catheter was reinserted by interventional radiology 1 month ago beginning of November.  He was readmitted towards the end of November, however since his discharge to a skilled nursing facility the Pleurx catheter has not been drained or even looked at once.  I have ordered a two-view chest x-ray today to reevaluate the size of the pleural effusion, however he needs to have his Pleurx catheter drained 3 times a week by the nurses at his skilled nursing facility to improve his dyspnea on exertion and prevent the effusion from reaccumulating.  This was written in a form to be taken back with the patient to the skilled nursing facility               Sergey Dennis MD

## (undated) DEVICE — STERISTRIP 1/2INX4IN

## (undated) DEVICE — SUCTION TUBING CONNECTION 18 INCH

## (undated) DEVICE — COVER LIGHTHANDLE (STERILE SINGLE PA

## (undated) DEVICE — TUBING SMOKE EVAC PENCIL COATED

## (undated) DEVICE — ELECTRODE CAUTERY EZ CLEAN HOOK

## (undated) DEVICE — SUTURE MONOCRYL 4-0  Y496G PS-2 I8IN

## (undated) DEVICE — NEEDLE DISP HYPO 22GX1-1/2IN

## (undated) DEVICE — HEEL  ELBOW PROTECTOR

## (undated) DEVICE — APPLICATOR CHLORAPREP 26ML ORANGE TINT

## (undated) DEVICE — PAD GROUND ELECTROSURGICAL W/CORD

## (undated) DEVICE — SPECIMEN TRAP MUCOUS 40CC

## (undated) DEVICE — DRESSING TEGADERM 4X4 3/4

## (undated) DEVICE — DRESSING SPONGE GAUZE 4X4 STER

## (undated) DEVICE — SUTURE SOFSILK 0 BLACK 12X30 PRE-CUT

## (undated) DEVICE — SUTURE TICRON 2 BLUE 1X30 HOS-10

## (undated) DEVICE — SUTURE SOFSILK 2-0 BLACK 1X30 V-20

## (undated) DEVICE — SYRINGE DISP LUER-LOK 20 CC

## (undated) DEVICE — SUTURE POLYSORB 2-0 UNDYED 1X30 V-20

## (undated) DEVICE — KIT NEEDLE PAD LRG LIGHT GLOVE

## (undated) DEVICE — TAPE ADHESIVE 3IN

## (undated) DEVICE — DRAPE IOBAN

## (undated) DEVICE — BLADE SCALPEL #10

## (undated) DEVICE — PACK CARDIOVASCULAR

## (undated) DEVICE — TIP BOVIE BLADE COATED 3IN E1450G

## (undated) DEVICE — SYSTEM VISUALIZATION CLEARIFY

## (undated) DEVICE — TIP SUCTION YANKAUER

## (undated) DEVICE — GOWN SIRUS FABRNF RAGLAN XL ST 28/CS

## (undated) DEVICE — GLOVE SZ 7.5 MICRO PROTEXIS LATEX

## (undated) DEVICE — Device

## (undated) DEVICE — SLEEVE SCD COMFORT KNEE LENGTH MED

## (undated) DEVICE — TIPS SCISSOR MICROLINE LAP

## (undated) DEVICE — TOWEL SURGICAL W17XL27IN BLUE COTTON STANDARD PREWASHED DELI

## (undated) DEVICE — BLADE SCALPEL #15

## (undated) DEVICE — SPONGE LAP 18X18 SAFE-T RFID ENHANCED XRAY

## (undated) DEVICE — SUTURE POLYSORB 0 VIOLET 1X30 GU-46

## (undated) DEVICE — DRAIN CHEST SAHARA SINGLE CHAMBER